# Patient Record
Sex: FEMALE | Race: WHITE | NOT HISPANIC OR LATINO | Employment: OTHER | URBAN - METROPOLITAN AREA
[De-identification: names, ages, dates, MRNs, and addresses within clinical notes are randomized per-mention and may not be internally consistent; named-entity substitution may affect disease eponyms.]

---

## 2017-01-03 ENCOUNTER — TELEPHONE (OUTPATIENT)
Dept: FAMILY MEDICINE | Facility: CLINIC | Age: 77
End: 2017-01-03

## 2017-01-03 DIAGNOSIS — M25.561 RIGHT KNEE PAIN, UNSPECIFIED CHRONICITY: Primary | ICD-10-CM

## 2017-01-03 NOTE — TELEPHONE ENCOUNTER
Pt did not get message from Daughter and arrived for appt. Has been rescheduled to 1/4/17. Thanks, Laverne

## 2017-01-03 NOTE — TELEPHONE ENCOUNTER
Called for pt again--spoke to Daughter ([facility]), she will have pt call back to reschedule. Thanks, Laverne

## 2017-01-03 NOTE — TELEPHONE ENCOUNTER
Left message on machine for pt to call back to reschedule appt today--Dr. Marin out sick today. Thanks, Laverne

## 2017-01-05 NOTE — TELEPHONE ENCOUNTER
----- Message from Manuela Rosario sent at 1/5/2017  3:22 PM CST -----  Contact: [facility] - [phone]  Patient is requesting a refill of Norco Rx. Please call at [phone]

## 2017-01-06 DIAGNOSIS — M25.561 RIGHT KNEE PAIN, UNSPECIFIED CHRONICITY: Primary | ICD-10-CM

## 2017-01-06 RX ORDER — HYDROCODONE BITARTRATE AND ACETAMINOPHEN 10; 325 MG/1; MG/1
1 TABLET ORAL EVERY 4 HOURS PRN
Qty: 40 TABLET | Refills: 0 | Status: SHIPPED | OUTPATIENT
Start: 2017-01-06 | End: 2017-03-22

## 2017-01-09 ENCOUNTER — OFFICE VISIT (OUTPATIENT)
Dept: ORTHOPEDICS | Facility: CLINIC | Age: 77
End: 2017-01-09
Payer: MEDICARE

## 2017-01-09 ENCOUNTER — HOSPITAL ENCOUNTER (OUTPATIENT)
Dept: RADIOLOGY | Facility: HOSPITAL | Age: 77
Discharge: HOME | End: 2017-01-09
Attending: ORTHOPAEDIC SURGERY
Payer: MEDICARE

## 2017-01-09 VITALS
BODY MASS INDEX: 23.7 KG/M2 | WEIGHT: 160 LBS | DIASTOLIC BLOOD PRESSURE: 81 MMHG | HEART RATE: 87 BPM | HEIGHT: 69 IN | SYSTOLIC BLOOD PRESSURE: 171 MMHG

## 2017-01-09 DIAGNOSIS — Z96.651 STATUS POST TOTAL RIGHT KNEE REPLACEMENT: Primary | ICD-10-CM

## 2017-01-09 DIAGNOSIS — M25.561 RIGHT KNEE PAIN, UNSPECIFIED CHRONICITY: ICD-10-CM

## 2017-01-09 PROCEDURE — 73564 X-RAY EXAM KNEE 4 OR MORE: CPT | Mod: 26,RT,, | Performed by: RADIOLOGY

## 2017-01-09 PROCEDURE — 99999 PR PBB SHADOW E&M-EST. PATIENT-LVL III: CPT | Mod: PBBFAC,,, | Performed by: ORTHOPAEDIC SURGERY

## 2017-01-09 PROCEDURE — 99024 POSTOP FOLLOW-UP VISIT: CPT | Mod: S$GLB,,, | Performed by: ORTHOPAEDIC SURGERY

## 2017-01-09 PROCEDURE — 73562 X-RAY EXAM OF KNEE 3: CPT | Mod: 26,59,RT, | Performed by: RADIOLOGY

## 2017-01-09 PROCEDURE — 73564 X-RAY EXAM KNEE 4 OR MORE: CPT | Mod: TC,PN,RT

## 2017-01-11 NOTE — PROGRESS NOTES
This note was created using Dragon dictation software.  It occasionally misinterpreted phrases or words.      Date of surgery: October 18, 2016    Review of Systems     Musculoskeletal: see HPI    PMH, Surgical Hx, Meds, Allergies, Family History, and Social history all reviewed and correct in chart.    Chief complaint: Right knee pain    History of present illness: 76-year-old female who is about 12 weeks out from right total knee arthroplasty.  Patient is doing pretty well.  Has some swelling still.  Lacking extension but has good flexion.  Pain of 4 out of 10.     Physical exam: Examination the right knee shows healed surgical incision.  Patient has minimal effusion still.  Range of motion is about 5° to 125°.  No calf pain.  Negative Homans sign.    X-rays: 2 views of the right knee are reviewed which show well aligned total knee arthroplasty without complication    Assessment: Status post right Microport total knee arthroplasty    Plan:   Continue with physical therapy. Follow-up in 3 months.  She can stop all the medicines.  No x-ray needed.

## 2017-01-16 ENCOUNTER — CLINICAL SUPPORT (OUTPATIENT)
Dept: REHABILITATION | Facility: HOSPITAL | Age: 77
End: 2017-01-16
Attending: ORTHOPAEDIC SURGERY
Payer: MEDICARE

## 2017-01-16 DIAGNOSIS — M25.561 RIGHT KNEE PAIN, UNSPECIFIED CHRONICITY: ICD-10-CM

## 2017-01-16 PROCEDURE — 97110 THERAPEUTIC EXERCISES: CPT | Mod: PN

## 2017-01-16 PROCEDURE — 97010 HOT OR COLD PACKS THERAPY: CPT | Mod: PN

## 2017-01-20 NOTE — PROGRESS NOTES
Name: Lizeth Henderson  [facility] Number: [id number]  Date of Treatment: 1/16/2017   Diagnosis:   Encounter Diagnosis   Name Primary?    Right knee pain, unspecified chronicity        Time in: 1645  Time Out: 1745  Total Treatment Time: 60      Subjective:    Lizeth reports improvement of symptoms and decreased pain.  Patient reports their pain to be 3/10 on a 0-10 scale with 0 being no pain and 10 being the worst pain imaginable.    Objective  Lizeth received therapeutic exercises to develop strength, endurance, ROM and flexibility for 50 minutes including:    Bike x 10'   Seated hamstring stretches L/R 3/30s   Standing gastroc stretches 3/30s B in // bars   Standing HR/TR 10/3 B in // bars   Minisquats 10/3 B in // bars with cues for proper technique   Shuttle B LE leg press 10/3 50#, unilateral leg press 10/3 37#   Supine QS B 10/3   Supine R SLR 10/3   Supine R HS with sheet 10/3   R SLR 10/3 with cues for QS   Bridges 10/3   Ball squeeze in supine 3/10   GTB 10/3    The patient received the following supervised modalities after being cleared for contradictions: Ice Pack x 10' to R knee to decrease pain and edema    Written Home Exercises Provided: Cont with HEP  Pt demo good understanding of the education provided. Lizeth demonstrated good return demonstration of activities.     Assessment:   Pt will continue to benefit from skilled PT intervention. Medical Necessity is demonstrated by:  Fall Risk, Requires skilled supervision to complete and progress HEP and Weakness.    Patient is making good progress towards established goals.    Plan:  Continue with established Plan of Care towards PT goals.

## 2017-01-24 ENCOUNTER — CLINICAL SUPPORT (OUTPATIENT)
Dept: REHABILITATION | Facility: HOSPITAL | Age: 77
End: 2017-01-24
Attending: ORTHOPAEDIC SURGERY
Payer: MEDICARE

## 2017-01-24 DIAGNOSIS — M25.561 RIGHT KNEE PAIN, UNSPECIFIED CHRONICITY: ICD-10-CM

## 2017-01-24 PROCEDURE — 97110 THERAPEUTIC EXERCISES: CPT | Mod: PN

## 2017-01-24 NOTE — PROGRESS NOTES
"Name: Lizeth Henderson  [facility] Number: [id number]  Date of Treatment: 01/24/2017   Diagnosis:   Encounter Diagnosis   Name Primary?    Right knee pain, unspecified chronicity        Time in: 1605  Time Out: 1659  Total Treatment Time: 54        Subjective:    Lizeth reports decreased pain.  Patient reports their pain to be 3/10 on a 0-10 scale with 0 being no pain and 10 being the worst pain imaginable.    Objective    Patient received individual therapy to increase strength, endurance, ROM, flexibility and posture with 0 patients with activities as follows:     Lizeth received therapeutic exercises to develop strength, endurance, ROM and flexibility for 54 minutes including:     Bike x 10 min L-2  Hamstring stretch 3 x 30 sec B LE  gastroc stretch 3 x 30 sec B LE  HR/TR x 30 reps  Mini squats 3 x 10 reps   SLR 3 x 10 reps R LE  SAQ 3 x 10 reps B LE  Quad sets x 30 reps  Shuttle 37# B LE; 31# R Le x 30 reps each        KT applied to L knee for patella mechanical correction.  "I" strip (2 separate strips)  applied over medial/lateral border of patellar at 50% tension.  Then 50-75% tension applied to distal/proximal strip, with knee flexed, utilizing "C" technique.    Pt demo good understanding of the education provided. Lizeth demonstrated good return demonstration of activities.     Assessment:     Decreased pain in R knee following application on KT tape.    Pt will continue to benefit from skilled PT intervention. Medical Necessity is demonstrated by:  Fall Risk, Unable to participate in daily activities, Continued inability to participate in vocational pursuits, Pain limits function of effected part for some activities, Unable to participate fully in daily activities, Requires skilled supervision to complete and progress HEP, Weakness and Edema.    Patient is making good progress towards established goals.        Plan:  Continue with established Plan of Care towards PT goals.   "

## 2017-01-26 ENCOUNTER — CLINICAL SUPPORT (OUTPATIENT)
Dept: REHABILITATION | Facility: HOSPITAL | Age: 77
End: 2017-01-26
Attending: ORTHOPAEDIC SURGERY
Payer: MEDICARE

## 2017-01-26 DIAGNOSIS — M25.561 RIGHT KNEE PAIN, UNSPECIFIED CHRONICITY: ICD-10-CM

## 2017-01-26 PROCEDURE — 97110 THERAPEUTIC EXERCISES: CPT | Mod: PN

## 2017-01-26 NOTE — PROGRESS NOTES
"Name: Lizeth Henderson  [facility] Number: [id number]  Date of Treatment: 01/26/2017   Diagnosis:   Encounter Diagnosis   Name Primary?    Right knee pain, unspecified chronicity        Time in: 1552  Time Out: 1650  Total Treatment Time: 58  Group Time: 0      Subjective:    Lizeth reports improvement of symptoms with taping.  Patient reports their pain to be 2/10 on a 0-10 scale with 0 being no pain and 10 being the worst pain imaginable.    Objective    Lizeth received therapeutic exercises to develop strength, endurance and ROM for 58 minutes including:     Bike Lv3 10'  Hamstring stretch 3x30" sit  gastroc stretch 3x30" 1/2 roll  HR/TR x30  Mini squats 3x10  SLR 3x10 B 2#  SAQ small 1/2 bolster (blue) 3x10 B 2#  Quad sets B long sit x30 and x10 with 10" hold  Sit<>stand x10  Bike Lv1 x10' for extension      Written Home Exercises Provided: Yes    Pt demo good understanding of the education provided. Lizeth demonstrated good return demonstration of activities.     Assessment:     Pt will continue to benefit from skilled PT intervention. Medical Necessity is demonstrated by:  Pain limits function of effected part for some activities, Unable to participate fully in daily activities, Requires skilled supervision to complete and progress HEP and Weakness.    Patient is making good progress towards established goals.    Plan:    Continue with established Plan of Care towards PT goals.   "

## 2017-02-02 ENCOUNTER — CLINICAL SUPPORT (OUTPATIENT)
Dept: REHABILITATION | Facility: HOSPITAL | Age: 77
End: 2017-02-02
Attending: ORTHOPAEDIC SURGERY
Payer: MEDICARE

## 2017-02-02 DIAGNOSIS — M25.561 RIGHT KNEE PAIN, UNSPECIFIED CHRONICITY: ICD-10-CM

## 2017-02-02 PROCEDURE — 97110 THERAPEUTIC EXERCISES: CPT | Mod: PN

## 2017-02-02 NOTE — PROGRESS NOTES
"Name: Lizeth Henderson  [facility] Number: [id number]  Date of Treatment: 02/02/2017   Diagnosis:   Encounter Diagnosis   Name Primary?    Right knee pain, unspecified chronicity        Time in: 1555  Time Out: 1655  Total Treatment Time: 60  Group Time: 0      Subjective:    Lizeth reports improvement of symptoms.  Patient reports their pain to be 2/10 on a 0-10 scale with 0 being no pain and 10 being the worst pain imaginable.    Objective    Lizeth received therapeutic exercises to develop strength, endurance, ROM and balance for 60 minutes including:     Bike Lv3 10'  Hamstring stretch 3x30" sit  Gastroc stretch 3x30" 1/2 roll  // bar ex 2x10: HR/TR, march, hip abd, hip ext, hamstring curls  Supine ex 3x10: saq with towel roll under knees, quad sets with towel roll under heels, heel slides with sheet to increase flexion    Written Home Exercises Provided: yes    Pt demo good understanding of the education provided. Lizeth demonstrated good return demonstration of activities.     Assessment:     Pt will continue to benefit from skilled PT intervention. Medical Necessity is demonstrated by:  Requires skilled supervision to complete and progress HEP and Weakness.    Patient is making good progress towards established goals.    Plan:    Continue with established Plan of Care towards PT goals.   "

## 2017-03-22 ENCOUNTER — OFFICE VISIT (OUTPATIENT)
Dept: PSYCHIATRY | Facility: CLINIC | Age: 77
End: 2017-03-22
Payer: MEDICARE

## 2017-03-22 VITALS
DIASTOLIC BLOOD PRESSURE: 80 MMHG | BODY MASS INDEX: 25.4 KG/M2 | SYSTOLIC BLOOD PRESSURE: 146 MMHG | WEIGHT: 171.5 LBS | HEART RATE: 84 BPM | HEIGHT: 69 IN

## 2017-03-22 DIAGNOSIS — F33.41 MDD (MAJOR DEPRESSIVE DISORDER), RECURRENT, IN PARTIAL REMISSION: ICD-10-CM

## 2017-03-22 DIAGNOSIS — F41.1 GAD (GENERALIZED ANXIETY DISORDER): ICD-10-CM

## 2017-03-22 PROCEDURE — 99999 PR PBB SHADOW E&M-EST. PATIENT-LVL II: CPT | Mod: PBBFAC,,, | Performed by: PSYCHIATRY & NEUROLOGY

## 2017-03-22 PROCEDURE — 3079F DIAST BP 80-89 MM HG: CPT | Mod: S$GLB,,, | Performed by: PSYCHIATRY & NEUROLOGY

## 2017-03-22 PROCEDURE — 3077F SYST BP >= 140 MM HG: CPT | Mod: S$GLB,,, | Performed by: PSYCHIATRY & NEUROLOGY

## 2017-03-22 PROCEDURE — 1159F MED LIST DOCD IN RCRD: CPT | Mod: S$GLB,,, | Performed by: PSYCHIATRY & NEUROLOGY

## 2017-03-22 PROCEDURE — 99213 OFFICE O/P EST LOW 20 MIN: CPT | Mod: S$GLB,,, | Performed by: PSYCHIATRY & NEUROLOGY

## 2017-03-22 PROCEDURE — 1157F ADVNC CARE PLAN IN RCRD: CPT | Mod: S$GLB,,, | Performed by: PSYCHIATRY & NEUROLOGY

## 2017-03-22 PROCEDURE — 99499 UNLISTED E&M SERVICE: CPT | Mod: S$GLB,,, | Performed by: PSYCHIATRY & NEUROLOGY

## 2017-03-22 PROCEDURE — 1160F RVW MEDS BY RX/DR IN RCRD: CPT | Mod: S$GLB,,, | Performed by: PSYCHIATRY & NEUROLOGY

## 2017-03-22 RX ORDER — MIRTAZAPINE 15 MG/1
TABLET, FILM COATED ORAL
Qty: 1 TABLET | Refills: 0
Start: 2017-03-22 | End: 2017-05-25 | Stop reason: SDUPTHER

## 2017-03-22 RX ORDER — TRAZODONE HYDROCHLORIDE 100 MG/1
100 TABLET ORAL NIGHTLY
Qty: 30 TABLET | Refills: 4 | Status: SHIPPED | OUTPATIENT
Start: 2017-03-22 | End: 2017-05-25 | Stop reason: SDUPTHER

## 2017-03-22 RX ORDER — SERTRALINE HYDROCHLORIDE 100 MG/1
TABLET, FILM COATED ORAL
Qty: 60 TABLET | Refills: 4 | Status: SHIPPED | OUTPATIENT
Start: 2017-03-22 | End: 2017-05-25 | Stop reason: SDUPTHER
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
## 2017-03-22 NOTE — MR AVS SNAPSHOT
[facility] [facility]  [address]  [place] [address]  Phone: [phone]                  Lizeth Henderson   3/22/2017 3:30 PM   Office Visit    Description:  Female : 1940   Provider:  Katarzyna Vinson MD   Department:  [facility]           Diagnoses this Visit        Comments    MDD (major depressive disorder), recurrent, in partial remission         DASH (generalized anxiety disorder)                To Do List           Future Appointments        Provider Department Dept Phone    4/10/2017 1:00 PM Gab Chiang MD [facility] [phone]    2017 3:40 PM Ericka Marin MD [facility] [phone]      Goals (5 Years of Data)     None      Follow-Up and Disposition     Return for 2 months .       These Medications        Disp Refills Start End    mirtazapine (REMERON) 15 MG tablet 1 tablet 0 3/22/2017     Take one-half tablet PO nightly until you run out of the medication - then STOP    Pharmacy: [address] Ph #: [phone]       trazodone (DESYREL) 100 MG tablet 30 tablet 4 3/22/2017 3/22/2018    Take 1 tablet (100 mg total) by mouth every evening. - Oral    Pharmacy: [address] Ph #: [phone]       sertraline (ZOLOFT) 100 MG tablet 60 tablet 4 3/22/2017     Take two tablets by mouth daily    Pharmacy: [address] Ph #: [phone]       folic acid-vit B6-vit B12 2.5-25-2 mg (FOLBIC) 2.5-25-2 mg Tab 30 tablet 4 3/22/2017     Take 1 tablet by mouth once daily. - Oral    Pharmacy: [address] Ph #: [phone]         [facility]     [facility] -  Assistance  Registered nurses in the [facility] Center provide clinical advisement, health education, appointment booking, and  other advisory services.  Call for this free service at [phone].             Medications           Message regarding Medications     Verify the changes and/or additions to your medication regime listed below are the same as discussed with your clinician today.  If any of these changes or additions are incorrect, please notify your healthcare provider.        START taking these NEW medications        Refills    trazodone (DESYREL) 100 MG tablet 4    Sig: Take 1 tablet (100 mg total) by mouth every evening.    Class: Normal    Route: Oral      CHANGE how you are taking these medications     Start Taking Instead of    mirtazapine (REMERON) 15 MG tablet mirtazapine (REMERON) 15 MG tablet    Dosage:  Take one-half tablet PO nightly until you run out of the medication - then STOP Dosage:  Take 1 tablet (15 mg total) by mouth every evening.    Reason for Change:  Reorder       STOP taking these medications     hydrocodone-acetaminophen 10-325mg (NORCO)  mg Tab Take 1 tablet by mouth every 4 (four) hours as needed for Pain.           Verify that the below list of medications is an accurate representation of the medications you are currently taking.  If none reported, the list may be blank. If incorrect, please contact your healthcare provider. Carry this list with you in case of emergency.           Current Medications     acetaminophen (TYLENOL) 325 MG tablet Take 1.5 tablets (487.5 mg total) by mouth every 6 (six) hours as needed for Pain.    alendronate (FOSAMAX) 70 MG tablet Take 1 tablet (70 mg total) by mouth every 7 days.    atorvastatin (LIPITOR) 40 MG tablet Take 1 tablet (40 mg total) by mouth once daily.    famotidine (PEPCID) 20 MG tablet Take 1 tablet (20 mg total) by mouth once daily.    fenofibrate (TRICOR) 54 MG tablet Take 1 tablet (54 mg total) by mouth once daily.    ferrous sulfate 325 mg (65 mg iron) Tab tablet Take 1 tablet (325 mg total) by mouth daily with breakfast.    folic acid-vit  "B6-vit B12 2.5-25-2 mg (FOLBIC) 2.5-25-2 mg Tab Take 1 tablet by mouth once daily.    lisinopril (PRINIVIL,ZESTRIL) 40 MG tablet Take 1 tablet (40 mg total) by mouth once daily.    mirabegron (MYRBETRIQ) 25 mg Tb24 ER tablet Take 1 tablet (25 mg total) by mouth once daily.    mirtazapine (REMERON) 15 MG tablet Take one-half tablet PO nightly until you run out of the medication - then STOP    sertraline (ZOLOFT) 100 MG tablet Take two tablets by mouth daily    trazodone (DESYREL) 100 MG tablet Take 1 tablet (100 mg total) by mouth every evening.           Clinical Reference Information           Your Vitals Were     BP Pulse Height Weight BMI    146/80 84 5' 9" (1.753 m) 77.8 kg (171 lb 8.3 oz) 25.33 kg/m2      Blood Pressure          Most Recent Value    BP  (!)  146/80      Allergies as of 3/22/2017     No Known Allergies      Immunizations Administered on Date of Encounter - 3/22/2017     None      Language Assistance Services     ATTENTION: Language assistance services are available, free of charge. Please call [phone].      ATENCIÓN: Si habla rik, tiene a allen disposición servicios gratuitos de asistencia lingüística. Llame al [phone].     [facility] Ý: N?u b?n nói Ti?ng Vi?t, có các d?ch v? h? tr? ngôn ng? mi?n phí dành cho b?n. G?i s? [phone].         [facility] complies with applicable Federal civil rights laws and does not discriminate on the basis of race, color, national origin, age, disability, or sex.        "

## 2017-04-10 ENCOUNTER — OFFICE VISIT (OUTPATIENT)
Dept: ORTHOPEDICS | Facility: CLINIC | Age: 77
End: 2017-04-10
Payer: MEDICARE

## 2017-04-10 VITALS
SYSTOLIC BLOOD PRESSURE: 187 MMHG | HEIGHT: 69 IN | WEIGHT: 171 LBS | HEART RATE: 79 BPM | DIASTOLIC BLOOD PRESSURE: 82 MMHG | BODY MASS INDEX: 25.33 KG/M2

## 2017-04-10 DIAGNOSIS — M17.0 ARTHRITIS OF BOTH KNEES: ICD-10-CM

## 2017-04-10 DIAGNOSIS — Z96.651 STATUS POST TOTAL RIGHT KNEE REPLACEMENT: Primary | ICD-10-CM

## 2017-04-10 PROCEDURE — 1157F ADVNC CARE PLAN IN RCRD: CPT | Mod: S$GLB,,, | Performed by: ORTHOPAEDIC SURGERY

## 2017-04-10 PROCEDURE — 99999 PR PBB SHADOW E&M-EST. PATIENT-LVL III: CPT | Mod: PBBFAC,,, | Performed by: ORTHOPAEDIC SURGERY

## 2017-04-10 PROCEDURE — 1159F MED LIST DOCD IN RCRD: CPT | Mod: S$GLB,,, | Performed by: ORTHOPAEDIC SURGERY

## 2017-04-10 PROCEDURE — 3077F SYST BP >= 140 MM HG: CPT | Mod: S$GLB,,, | Performed by: ORTHOPAEDIC SURGERY

## 2017-04-10 PROCEDURE — 99212 OFFICE O/P EST SF 10 MIN: CPT | Mod: S$GLB,,, | Performed by: ORTHOPAEDIC SURGERY

## 2017-04-10 PROCEDURE — 1125F AMNT PAIN NOTED PAIN PRSNT: CPT | Mod: S$GLB,,, | Performed by: ORTHOPAEDIC SURGERY

## 2017-04-10 PROCEDURE — 1160F RVW MEDS BY RX/DR IN RCRD: CPT | Mod: S$GLB,,, | Performed by: ORTHOPAEDIC SURGERY

## 2017-04-10 PROCEDURE — 3079F DIAST BP 80-89 MM HG: CPT | Mod: S$GLB,,, | Performed by: ORTHOPAEDIC SURGERY

## 2017-04-10 NOTE — PROGRESS NOTES
This note was created using Dragon dictation software.  It occasionally misinterpreted phrases or words.      Date of surgery: October 18, 2016    Review of Systems     Musculoskeletal: see HPI    PMH, Surgical Hx, Meds, Allergies, Family History, and Social history all reviewed and correct in chart.    Chief complaint: Right knee pain    History of present illness: 76-year-old female who is 6 months out from right total knee arthroplasty.  Patient is doing pretty well.  Has some swelling still.  Lacking extension but has good flexion.  Pain of one out of 10.     Physical exam: Examination the right knee shows healed surgical incision.  Patient has minimal swelling.  Range of motion is about 3° to 125°.  No calf pain.  Negative Homans sign.    X-rays: 2 views of the right knee are reviewed which show well aligned total knee arthroplasty without complication    Assessment: Status post right [place] total knee arthroplasty    Plan:   Continue with physical therapy exercises. Follow-up in 6 months.  4 views of the right knee at that time.  Left knee is giving her some trouble but not to severe at this time.

## 2017-05-25 ENCOUNTER — DOCUMENTATION ONLY (OUTPATIENT)
Dept: FAMILY MEDICINE | Facility: CLINIC | Age: 77
End: 2017-05-25

## 2017-05-25 ENCOUNTER — OFFICE VISIT (OUTPATIENT)
Dept: PSYCHIATRY | Facility: CLINIC | Age: 77
End: 2017-05-25
Payer: MEDICARE

## 2017-05-25 VITALS
BODY MASS INDEX: 25.6 KG/M2 | HEART RATE: 80 BPM | DIASTOLIC BLOOD PRESSURE: 79 MMHG | SYSTOLIC BLOOD PRESSURE: 158 MMHG | WEIGHT: 172.81 LBS | HEIGHT: 69 IN

## 2017-05-25 DIAGNOSIS — F33.1 MAJOR DEPRESSIVE DISORDER, RECURRENT EPISODE, MODERATE: ICD-10-CM

## 2017-05-25 DIAGNOSIS — F41.1 GENERALIZED ANXIETY DISORDER: ICD-10-CM

## 2017-05-25 PROCEDURE — 99213 OFFICE O/P EST LOW 20 MIN: CPT | Mod: S$GLB,,, | Performed by: PSYCHIATRY & NEUROLOGY

## 2017-05-25 PROCEDURE — 99499 UNLISTED E&M SERVICE: CPT | Mod: S$GLB,,, | Performed by: PSYCHIATRY & NEUROLOGY

## 2017-05-25 PROCEDURE — 1159F MED LIST DOCD IN RCRD: CPT | Mod: S$GLB,,, | Performed by: PSYCHIATRY & NEUROLOGY

## 2017-05-25 PROCEDURE — 99999 PR PBB SHADOW E&M-EST. PATIENT-LVL II: CPT | Mod: PBBFAC,,, | Performed by: PSYCHIATRY & NEUROLOGY

## 2017-05-25 RX ORDER — SERTRALINE HYDROCHLORIDE 100 MG/1
TABLET, FILM COATED ORAL
Qty: 60 TABLET | Refills: 4 | Status: SHIPPED | OUTPATIENT
Start: 2017-05-25 | End: 2017-06-26 | Stop reason: SDUPTHER

## 2017-05-25 RX ORDER — MIRTAZAPINE 15 MG/1
TABLET, FILM COATED ORAL
Qty: 30 TABLET | Refills: 0 | Status: SHIPPED | OUTPATIENT
Start: 2017-05-25 | End: 2017-06-26 | Stop reason: DRUGHIGH

## 2017-05-25 RX ORDER — TRAZODONE HYDROCHLORIDE 100 MG/1
100 TABLET ORAL NIGHTLY
Qty: 30 TABLET | Refills: 4 | Status: SHIPPED | OUTPATIENT
Start: 2017-05-25 | End: 2017-06-26 | Stop reason: DRUGHIGH
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
## 2017-05-25 NOTE — PROGRESS NOTES
Pre-Visit Chart Review  For Appointment Scheduled on 5/29/17.    Health Maintenance Due   Topic Date Due    TETANUS VACCINE  08[birth year]14/1958    Zoster Vaccine  08/14/1999    Pneumococcal (65+) (2 of 2 - PPSV23) 11/11/2016

## 2017-05-29 ENCOUNTER — OFFICE VISIT (OUTPATIENT)
Dept: FAMILY MEDICINE | Facility: CLINIC | Age: 77
End: 2017-05-29
Payer: MEDICARE

## 2017-05-29 VITALS
HEIGHT: 69 IN | BODY MASS INDEX: 26 KG/M2 | SYSTOLIC BLOOD PRESSURE: 140 MMHG | DIASTOLIC BLOOD PRESSURE: 78 MMHG | TEMPERATURE: 98 F | WEIGHT: 175.5 LBS | HEART RATE: 66 BPM

## 2017-05-29 DIAGNOSIS — Z00.00 ROUTINE MEDICAL EXAM: Primary | ICD-10-CM

## 2017-05-29 DIAGNOSIS — I10 BENIGN HYPERTENSION: ICD-10-CM

## 2017-05-29 DIAGNOSIS — D53.9 DEFICIENCY ANEMIA: ICD-10-CM

## 2017-05-29 DIAGNOSIS — E78.2 HYPERLIPIDEMIA, MIXED: ICD-10-CM

## 2017-05-29 DIAGNOSIS — M85.80 OSTEOPENIA, UNSPECIFIED LOCATION: ICD-10-CM

## 2017-05-29 DIAGNOSIS — R05.3 CHRONIC COUGH: ICD-10-CM

## 2017-05-29 DIAGNOSIS — D64.9 ANEMIA, UNSPECIFIED TYPE: ICD-10-CM

## 2017-05-29 PROCEDURE — 99397 PER PM REEVAL EST PAT 65+ YR: CPT | Mod: S$GLB,,, | Performed by: FAMILY MEDICINE

## 2017-05-29 PROCEDURE — 99999 PR PBB SHADOW E&M-EST. PATIENT-LVL III: CPT | Mod: PBBFAC,,, | Performed by: FAMILY MEDICINE

## 2017-05-29 PROCEDURE — 99499 UNLISTED E&M SERVICE: CPT | Mod: S$GLB,,, | Performed by: FAMILY MEDICINE

## 2017-05-29 RX ORDER — ASPIRIN 81 MG/1
81 TABLET ORAL DAILY
COMMUNITY

## 2017-05-29 RX ORDER — LOSARTAN POTASSIUM 50 MG/1
50 TABLET ORAL DAILY
Qty: 90 TABLET | Refills: 3 | Status: SHIPPED | OUTPATIENT
Start: 2017-05-29 | End: 2017-11-09 | Stop reason: SDUPTHER

## 2017-05-29 NOTE — PROGRESS NOTES
CHIEF COMPLAINT:  Routine medical exam      HISTORY OF PRESENT ILLNESS:  Lizeth Henderson is a 76 y.o. female who presents to clinic for a routine medical exam. She declines mammograms. She is due for a repeat DEXA scan in the fall.  She is due for screening lab work and also adacel and zostavax.  She follows up with her dentist.  She does not get any regular exercise and it is hard for her to eat a healthy diet as she is living with her granddaughter who cooks.  She stats that for the last several months she has had a chronic nonproductive cough. She denies any PND, GERD.        REVIEW OF SYSTEMS:  The patient denies any fever, chills, night sweats, headaches, vision changes, difficulty speaking or swallowing, decreased hearing, weight loss, weight gain, chest pain, palpitations, shortness of breath,  nausea, vomiting, abdominal pain, dysuria, diarrhea, constipation, hematuria, hematochezia, melena, changes in her hair, skin, nails, numbness or weakness in her extremities, erythema, swelling over any of her joints, myalgia, swollen glands, easy bruising, fatigue, edemaShe denies any vaginal discharge, breast masses, nipple discharge, change in the skin overlying her breasts.      MEDICATIONS:   Reviewed and/or reconciled in EPIC    ALLERGIES:  Reviewed and/or reconciled in [facility]    PAST MEDICAL/SURGICAL HISTORY:   Past Medical History:   Diagnosis Date    Adrenal adenoma 2016    Anxiety     Arthritis     Benign hypertension     Colon cancer age 61    colon    Coronary artery disease     Depression     Full dentures     General anesthetics causing adverse effect in therapeutic use     yells and talks when wakes up    Hyperlipidemia     Osteopenia     Shingles     Wears glasses       Past Surgical History:   Procedure Laterality Date    APPENDECTOMY      BREAST BIOPSY Left     benign    BUNIONECTOMY Left     CATARACT EXTRACTION Bilateral     HEMICOLECTOMY  2002    HERNIA REPAIR      x5    left toe  "surgery      joelle    PATENT DUCTUS ARTERIOUS LIGATION  1948    SALPINGOOPHORECTOMY  2002    due to colon cancer    TONSILLECTOMY, ADENOIDECTOMY         FAMILY HISTORY:    Family History   Problem Relation Age of Onset    Cancer Father      colon    Alzheimer's disease Mother     Depression Daughter     Kidney disease Daughter     Ulcerative colitis Daughter     Depression Daughter     Depression Daughter     Anxiety disorder Daughter      PTSD    Cancer Maternal Uncle      colon cancer    Cancer Cousin      colon cancer       SOCIAL HISTORY:    Social History     Social History    Marital status:      Spouse name: N/A    Number of children: 3    Years of education: N/A     Occupational History    Not on file.     Social History Main Topics    Smoking status: Never Smoker    Smokeless tobacco: Never Used    Alcohol use No    Drug use: No    Sexual activity: No     Other Topics Concern    Not on file     Social History Narrative    The patient does not exercise regularly ().    Rates diet as poor.    She is not satisfied with weight.                   PHYSICAL EXAM:  VITAL SIGNS:   Vitals:    05/29/17 1331   BP: (!) 160/85   BP Location: Right arm   Patient Position: Sitting   BP Method: Automatic   Pulse: 66   Temp: 98.4 °F (36.9 °C)   TempSrc: Oral   Weight: 79.6 kg (175 lb 7.8 oz)   Height: 5' 9" (1.753 m)     GENERAL:  Patient appears well nourished, sitting on exam table, in no acute distress.  HEENT:  Atraumatic, normocephalic, PERRLA, EOMI, no conjunctival injection, sclerae are anicteric, normal external auditory canals,TMs clear b/l, gross hearing intact to whisper, MMM, no oropharygneal erythema or exudate.  NECK:  Supple, normal ROM, trachea is midline , no supraclavicular or cervical LAD or masses palpated.  Thyroid gland not palpable.  CARDIOVASCULAR:  RRR, normal S1 and S2, no m/r/g.  RESPIRATORY:  CTA b/l, no wheezes, rhonchi, rales.  No increased work of breathing, no "  use of accessory muscles.  ABDOMEN:  Soft, nontender, nondistended, normoactive bowel sounds in all four quadrants, no rebound or guarding, no HSM or masses palpated.  Normal percussion.  EXTREMITIES:  2+ DP pulses b/l, no edema.  SKIN:  Warm, no lesions on exposed skin.  NEUROMUSCULAR:  Cranial nerves II-XII grossly intact.  Strength is 4+/5 over upper and lower extremity flexors/extensors b/l, 2+ biceps and patellar reflexes b/l. No clubbing or cyanosis of digits/nails.  Steady gait.  PSYCH:  Patient is alert and oriented to person, time, place. They are appropriately dressed and groomed. There is normal eye contact. Rate and tone of speech is normal. Normal insight, judgement. Normal thought content and process.     LABORATORY/IMAGING STUDIES: pending    ASSESSMENT/PLAN: This is a 76 y.o. female who presents to clinic for routine medical exam  1.Routine medical exam: We will check a screening CBC, CMP, TSH, fasting lipid panel. Patient will be notified of these results and the need for any further evaluation and treatment. She will follow up with her dentist/optometrist as scheduled. We discussed the importance of increasing her exercise, continuing with a healthy, well-balanced diet. She will look into getting her adacel, zostavax at her local pharmacy  2. Anemia: obtain anemia labs  3. Hyperlipidemia: lipid panel  4. HTN: see below  5. chronic cough: obtain cxr, stop lisinopril, start losartan  6. Osteopenia: vitamin D level, will need DEXA scan at follow up visit.    Patient readiness: acceptance and barriers:none    During the course of the visit the patient was educated and counseled about the following:     Hypertension:   Medication: stop lisinopril due to cough, start losartan, follow up in 3 weeks for nurse BP check.    Goals: Hypertension: Reduce Blood Pressure    Did patient meet goals/outcomes: No    The following self management tools provided: declined    Patient Instructions (the written plan) was  given to the patient/family.     Time spent with patient: 30 minutes        Ericka Marin MD

## 2017-06-26 ENCOUNTER — OFFICE VISIT (OUTPATIENT)
Dept: PSYCHIATRY | Facility: CLINIC | Age: 77
End: 2017-06-26
Payer: MEDICARE

## 2017-06-26 VITALS
BODY MASS INDEX: 26.81 KG/M2 | HEART RATE: 80 BPM | DIASTOLIC BLOOD PRESSURE: 84 MMHG | SYSTOLIC BLOOD PRESSURE: 157 MMHG | WEIGHT: 181 LBS | HEIGHT: 69 IN

## 2017-06-26 DIAGNOSIS — F33.41 MDD (MAJOR DEPRESSIVE DISORDER), RECURRENT, IN PARTIAL REMISSION: ICD-10-CM

## 2017-06-26 DIAGNOSIS — F41.1 GENERALIZED ANXIETY DISORDER: ICD-10-CM

## 2017-06-26 PROCEDURE — 99499 UNLISTED E&M SERVICE: CPT | Mod: S$GLB,,, | Performed by: PSYCHIATRY & NEUROLOGY

## 2017-06-26 PROCEDURE — 99213 OFFICE O/P EST LOW 20 MIN: CPT | Mod: S$GLB,,, | Performed by: PSYCHIATRY & NEUROLOGY

## 2017-06-26 PROCEDURE — 99999 PR PBB SHADOW E&M-EST. PATIENT-LVL III: CPT | Mod: PBBFAC,,, | Performed by: PSYCHIATRY & NEUROLOGY

## 2017-06-26 PROCEDURE — 1159F MED LIST DOCD IN RCRD: CPT | Mod: S$GLB,,, | Performed by: PSYCHIATRY & NEUROLOGY

## 2017-06-26 RX ORDER — TRAZODONE HYDROCHLORIDE 150 MG/1
150 TABLET ORAL NIGHTLY PRN
Qty: 30 TABLET | Refills: 2 | Status: SHIPPED | OUTPATIENT
Start: 2017-06-26 | End: 2017-08-10 | Stop reason: SDUPTHER

## 2017-06-26 RX ORDER — BUSPIRONE HYDROCHLORIDE 7.5 MG/1
7.5 TABLET ORAL 2 TIMES DAILY
Qty: 60 TABLET | Refills: 2 | Status: SHIPPED | OUTPATIENT
Start: 2017-06-26 | End: 2017-08-10 | Stop reason: DRUGHIGH

## 2017-06-26 RX ORDER — SERTRALINE HYDROCHLORIDE 100 MG/1
TABLET, FILM COATED ORAL
Qty: 60 TABLET | Refills: 4 | Status: SHIPPED | OUTPATIENT
Start: 2017-06-26 | End: 2017-08-10 | Stop reason: SDUPTHER

## 2017-06-26 RX ORDER — MIRTAZAPINE 7.5 MG/1
TABLET, FILM COATED ORAL
Qty: 14 TABLET | Refills: 0 | Status: SHIPPED | OUTPATIENT
Start: 2017-06-26 | End: 2017-08-10
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
## 2017-07-11 RX ORDER — MIRTAZAPINE 7.5 MG/1
TABLET, FILM COATED ORAL
Qty: 14 TABLET | Refills: 0 | OUTPATIENT
Start: 2017-07-11

## 2017-08-10 ENCOUNTER — OFFICE VISIT (OUTPATIENT)
Dept: PSYCHIATRY | Facility: CLINIC | Age: 77
End: 2017-08-10
Payer: MEDICARE

## 2017-08-10 ENCOUNTER — LAB VISIT (OUTPATIENT)
Dept: LAB | Facility: HOSPITAL | Age: 77
End: 2017-08-10
Attending: FAMILY MEDICINE
Payer: MEDICARE

## 2017-08-10 VITALS
WEIGHT: 175.94 LBS | DIASTOLIC BLOOD PRESSURE: 85 MMHG | SYSTOLIC BLOOD PRESSURE: 160 MMHG | BODY MASS INDEX: 26.06 KG/M2 | HEIGHT: 69 IN | HEART RATE: 66 BPM

## 2017-08-10 DIAGNOSIS — F41.1 GENERALIZED ANXIETY DISORDER: ICD-10-CM

## 2017-08-10 DIAGNOSIS — D53.9 DEFICIENCY ANEMIA: ICD-10-CM

## 2017-08-10 DIAGNOSIS — M85.80 OSTEOPENIA, UNSPECIFIED LOCATION: ICD-10-CM

## 2017-08-10 DIAGNOSIS — D64.9 ANEMIA, UNSPECIFIED TYPE: ICD-10-CM

## 2017-08-10 DIAGNOSIS — E78.2 HYPERLIPIDEMIA, MIXED: ICD-10-CM

## 2017-08-10 DIAGNOSIS — Z00.00 ROUTINE MEDICAL EXAM: ICD-10-CM

## 2017-08-10 DIAGNOSIS — F33.41 MDD (MAJOR DEPRESSIVE DISORDER), RECURRENT, IN PARTIAL REMISSION: ICD-10-CM

## 2017-08-10 LAB
25(OH)D3+25(OH)D2 SERPL-MCNC: 11 NG/ML
ALBUMIN SERPL BCP-MCNC: 4 G/DL
ALP SERPL-CCNC: 60 U/L
ALT SERPL W/O P-5'-P-CCNC: 10 U/L
ANION GAP SERPL CALC-SCNC: 10 MMOL/L
AST SERPL-CCNC: 23 U/L
BASOPHILS # BLD AUTO: 0.03 K/UL
BASOPHILS NFR BLD: 0.7 %
BILIRUB SERPL-MCNC: 0.5 MG/DL
BUN SERPL-MCNC: 15 MG/DL
CALCIUM SERPL-MCNC: 9.5 MG/DL
CHLORIDE SERPL-SCNC: 106 MMOL/L
CHOLEST/HDLC SERPL: 3.6 {RATIO}
CO2 SERPL-SCNC: 28 MMOL/L
CREAT SERPL-MCNC: 0.9 MG/DL
DIFFERENTIAL METHOD: NORMAL
EOSINOPHIL # BLD AUTO: 0.1 K/UL
EOSINOPHIL NFR BLD: 2.1 %
ERYTHROCYTE [DISTWIDTH] IN BLOOD BY AUTOMATED COUNT: 13.6 %
EST. GFR  (AFRICAN AMERICAN): >60 ML/MIN/1.73 M^2
EST. GFR  (NON AFRICAN AMERICAN): >60 ML/MIN/1.73 M^2
FERRITIN SERPL-MCNC: 260 NG/ML
FOLATE SERPL-MCNC: 16.8 NG/ML
GLUCOSE SERPL-MCNC: 95 MG/DL
HCT VFR BLD AUTO: 37.4 %
HDL/CHOLESTEROL RATIO: 28 %
HDLC SERPL-MCNC: 164 MG/DL
HDLC SERPL-MCNC: 46 MG/DL
HGB BLD-MCNC: 12.6 G/DL
IRON SERPL-MCNC: 58 UG/DL
LDLC SERPL CALC-MCNC: 93.6 MG/DL
LYMPHOCYTES # BLD AUTO: 1.3 K/UL
LYMPHOCYTES NFR BLD: 30.6 %
MCH RBC QN AUTO: 29.4 PG
MCHC RBC AUTO-ENTMCNC: 33.7 G/DL
MCV RBC AUTO: 87 FL
MONOCYTES # BLD AUTO: 0.4 K/UL
MONOCYTES NFR BLD: 8 %
NEUTROPHILS # BLD AUTO: 2.5 K/UL
NEUTROPHILS NFR BLD: 58.4 %
NONHDLC SERPL-MCNC: 118 MG/DL
PLATELET # BLD AUTO: 187 K/UL
PMV BLD AUTO: 10.7 FL
POTASSIUM SERPL-SCNC: 4.2 MMOL/L
PROT SERPL-MCNC: 7.1 G/DL
RBC # BLD AUTO: 4.29 M/UL
RETICS/RBC NFR AUTO: 1.3 %
SATURATED IRON: 17 %
SODIUM SERPL-SCNC: 144 MMOL/L
TOTAL IRON BINDING CAPACITY: 337 UG/DL
TRANSFERRIN SERPL-MCNC: 228 MG/DL
TRIGL SERPL-MCNC: 122 MG/DL
TSH SERPL DL<=0.005 MIU/L-ACNC: 0.91 UIU/ML
VIT B12 SERPL-MCNC: >2000 PG/ML
WBC # BLD AUTO: 4.35 K/UL

## 2017-08-10 PROCEDURE — 99213 OFFICE O/P EST LOW 20 MIN: CPT | Mod: S$GLB,,, | Performed by: PSYCHIATRY & NEUROLOGY

## 2017-08-10 PROCEDURE — 84443 ASSAY THYROID STIM HORMONE: CPT

## 2017-08-10 PROCEDURE — 82607 VITAMIN B-12: CPT

## 2017-08-10 PROCEDURE — 85025 COMPLETE CBC W/AUTO DIFF WBC: CPT

## 2017-08-10 PROCEDURE — 85045 AUTOMATED RETICULOCYTE COUNT: CPT

## 2017-08-10 PROCEDURE — 80061 LIPID PANEL: CPT

## 2017-08-10 PROCEDURE — 36415 COLL VENOUS BLD VENIPUNCTURE: CPT | Mod: PO

## 2017-08-10 PROCEDURE — 1159F MED LIST DOCD IN RCRD: CPT | Mod: S$GLB,,, | Performed by: PSYCHIATRY & NEUROLOGY

## 2017-08-10 PROCEDURE — 99499 UNLISTED E&M SERVICE: CPT | Mod: S$GLB,,, | Performed by: PSYCHIATRY & NEUROLOGY

## 2017-08-10 PROCEDURE — 83540 ASSAY OF IRON: CPT

## 2017-08-10 PROCEDURE — 3077F SYST BP >= 140 MM HG: CPT | Mod: S$GLB,,, | Performed by: PSYCHIATRY & NEUROLOGY

## 2017-08-10 PROCEDURE — 80053 COMPREHEN METABOLIC PANEL: CPT

## 2017-08-10 PROCEDURE — 82306 VITAMIN D 25 HYDROXY: CPT

## 2017-08-10 PROCEDURE — 82746 ASSAY OF FOLIC ACID SERUM: CPT

## 2017-08-10 PROCEDURE — 99999 PR PBB SHADOW E&M-EST. PATIENT-LVL III: CPT | Mod: PBBFAC,,, | Performed by: PSYCHIATRY & NEUROLOGY

## 2017-08-10 PROCEDURE — 82728 ASSAY OF FERRITIN: CPT

## 2017-08-10 PROCEDURE — 3079F DIAST BP 80-89 MM HG: CPT | Mod: S$GLB,,, | Performed by: PSYCHIATRY & NEUROLOGY

## 2017-08-10 PROCEDURE — 84238 ASSAY NONENDOCRINE RECEPTOR: CPT

## 2017-08-10 RX ORDER — TRAZODONE HYDROCHLORIDE 150 MG/1
150 TABLET ORAL NIGHTLY PRN
Qty: 30 TABLET | Refills: 2 | Status: SHIPPED | OUTPATIENT
Start: 2017-08-10 | End: 2017-10-23 | Stop reason: SDUPTHER

## 2017-08-10 RX ORDER — SERTRALINE HYDROCHLORIDE 100 MG/1
TABLET, FILM COATED ORAL
Qty: 60 TABLET | Refills: 2 | Status: SHIPPED | OUTPATIENT
Start: 2017-08-10 | End: 2017-10-23 | Stop reason: SDUPTHER

## 2017-08-10 RX ORDER — BUSPIRONE HYDROCHLORIDE 10 MG/1
10 TABLET ORAL 2 TIMES DAILY
Qty: 60 TABLET | Refills: 2 | Status: SHIPPED | OUTPATIENT
Start: 2017-08-10 | End: 2017-10-23 | Stop reason: SDUPTHER

## 2017-08-10 NOTE — PROGRESS NOTES
Outpatient Psychiatry Follow-Up Visit (MD/NP)    8/10/2017    Clinical Status of Patient:  Outpatient (Ambulatory)    Chief Complaint:  Lizeth Henderson is a 76 y.o. female who presents today for follow-up of anxiety, depression.   Met with patient alone    Interval History and Content of Current Session:  Interim Events/Subjective Report/Content of Current Session: Follow up appointment.    75 yo  retired female with h/o of depression onset in the early 1980's.  Depressive symptoms recurred in 2004 following the death of her . Hx of lifelong worry.     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 s/p Total Knee Arthroplasty 10/18/16     MOCA 3/17 25/30   MOCA  1/16 23/30 (missed clock hands, cube, serial 7s, delayed recall)    Interim Hx:  Mirtazapine weaned off last visit. Buspar added.  She is compliant with Sertraline, Trazodone, Folbic.  Feels better this visit.  About to move. Stress is up.  Not nearly as anxious.   + wt loss.  Appetite still good.  She overeats, some occasional binges (ice cream, pop corn),  She does feel loss of control. No purging.    Moving in 3 weeks.  Will miss Nelsy.  She sleeps with Ruba (daughter).  She falls apart when   Daughter Veronica very depressed, does not come out of her room.  Grandson also moving in. 11 people under one roof.     Anxiety at night, min clicks in bed - thinks about the move, the kids, her grandson who is incarcerated. She has had a few panic attacks.  No agoraphobia.   Feels better out of home;  She has been irritable lately.  Chest  feels tight when anxious; no other associated symptoms or radiation.   Motivated, sanded down her iron bed.   She got off of Folbic and took OTC - felt less energetic.   Not crying much, weepy at times. Self care ok.  Going to restroom a lot at night - on Myrbetriq;  It takes her at least 30 mins to go back to sleep.   Functioning, but living situation is unbearable.  Easily frustrated by Nelsy's parents - particularly Nelsy's father.  Too strict on Nelsy.  No physical violence.   Denies suicidal/homicidal ideations.  No violence, no self harm.  Hopeful now. Not being able to drive is the worst thing. No longer has a 's license.     Depression: 8/10   Anxiety: 9/10.    Denies symptoms of jossy/psychosis. 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  Denies alcohol/drug use.  Caffeine: occasional diet coke   Tobacco: none, + second smoke - they smoke outdoors     Memory pretty good; manages own meds    Gun in home, but in lock box with Miguel (son in law) and not accessible   She is doing adult coloring or reading to cope.   Denies alcohol/drug use. No caffeine, no tobacco     Review of Systems     Psychiatry: see HPI   Constitutional: wt loss  Musculoskeletal: right knee pain 3-410, no falls, uses cane PRN   Neurology:  Memory improved.       Past Medical, Family and Social History: The patient's past medical, family and social history have been reviewed and updated as appropriate within the electronic medical record - see encounter notes.    Medication:   Zoloft 200 mg daily   Trazodone 100 mg QHS prn insomnia   Trazodone 150 mg nightly   Buspar 7.5 mg BID   Foltx 1 tab daily     Compliance: yes    Side effects:  Wt gain, increased appetite     Risk Parameters:  Patient reports no suicidal ideation  Patient reports no homicidal ideation  Patient reports no self-injurious behavior  Patient reports no violent behavior       Exam (detailed: at least 9 elements; comprehensive: all 15 elements)  "  Constitutional  Vitals:  Most recent vital signs, dated more than 90 days prior to this appointment, were reviewed.    See EPIC entry for today's vital signs.       General:  unremarkable, age appropriate, casually dressed, good eye contact, at times, not tearful, well dressed      Musculoskeletal  Muscle Strength/Tone:  No tremor    Gait & Station:  Slow, steady      Psychiatric  Speech:  no latency; no press, spontaneous   Mood & Affect:  "better"   Full range, smiles at times   Thought Process:  Linear    Associations:  intact   Thought Content:  no active or passive SI today, no homicidality, delusions, or paranoia, hallucinations: (auditory: no, visual: no)      Insight:  Improved    Judgement: Adequate to circumstances    Orientation:  Alert and oriented x 4  August 10, 2017    Memory: Intact for content of interview   Language: Grossly intact    Attention Span & Concentration:  Grossly intact   Fund of Knowledge:  Intact for patient's level of education     Assessment and Diagnosis   Status/Progress: Based on the examination today, the patient's problem(s) is under inadequate control but improved.   New problems have not been presented today.  Comorbidities are currently complicating management of the primary condition.      General Impression:  Overall, she was improved on medication regimen, but with continued high psychosocial stressors and upcoming move.  pt has previously weaned off of clonazepam w/ improved cognition off of it. Anxiety and depression improved, but remain high.  She does not appear depressed and is functioning relatively well.     MDD, recurrent, moderate without psychosis  DASH  Hx Binge Eating Disorder   Cognitive Disorder, unspecified.     HTN, dyslipidemia, elevated fasting glucose.  hx colon cancer, adrenal nodule, s/p knee replacement, HTN    GAF: 58    Intervention/Counseling/Treatment Plan   · Medication Management: Continue current medications. The risks and benefits of " medication were discussed with the patient.  · Counseling provided with patient as follows: importance of compliance with chosen treatment options was emphasized, risks and benefits of treatment options, including medications, were discussed with the patient    1. Continue Zoloft  200 mg daily.      2. Continue Trazodone 150 mg QHS prn insomnia - discussed potential for oversedation.      3. Continue Foltx 1 tab daily     4. Reviewed risks of serotonin syndrome with patient due to multiple medications - potential symptoms and potential severity reviewed.     5. Stable MOCA with actual improvement in score suggests against a neurodegenerative d/o at this time - will continue to monitor     6. Pt instructed to go to ER with thoughts of harming self, others. Call to report any worsening of symptoms or problems with the medication    7. Psychotherapy is recommended.     8. Titrate Buspirone to 10 mg BID to target anxiety     9. Pt will have labs ordered by PCP today, including CBC, CMP, Iron studies, B12, Folate, Vit D, TSH    10. Encouraged pt to discuss urinary frequency and if she has any chest pain to discuss with PCP.     Return to Clinic: 8 weeks

## 2017-08-12 LAB — STFR SERPL-MCNC: 6 MG/L

## 2017-09-21 DIAGNOSIS — E55.9 VITAMIN D DEFICIENCY: Primary | ICD-10-CM

## 2017-09-21 RX ORDER — ASPIRIN 325 MG
50000 TABLET, DELAYED RELEASE (ENTERIC COATED) ORAL WEEKLY
Qty: 8 CAPSULE | Refills: 0 | Status: SHIPPED | OUTPATIENT
Start: 2017-09-21 | End: 2017-09-28 | Stop reason: SDUPTHER

## 2017-09-21 NOTE — TELEPHONE ENCOUNTER
Lab work at goal except that saturated iron is still slightly low. Can continue with iron tablet once daily.    The vitamin D level is low. I am starting high dose vitamin D supplementation for 8 weeks to be followed with a repeat vitamin D level.

## 2017-09-28 RX ORDER — ASPIRIN 325 MG
50000 TABLET, DELAYED RELEASE (ENTERIC COATED) ORAL WEEKLY
Qty: 8 CAPSULE | Refills: 0 | Status: SHIPPED | OUTPATIENT
Start: 2017-09-28 | End: 2018-01-05 | Stop reason: SDUPTHER

## 2017-10-03 DIAGNOSIS — M25.572 LEFT ANKLE PAIN, UNSPECIFIED CHRONICITY: Primary | ICD-10-CM

## 2017-10-04 ENCOUNTER — OFFICE VISIT (OUTPATIENT)
Dept: ORTHOPEDICS | Facility: CLINIC | Age: 77
End: 2017-10-04
Payer: MEDICARE

## 2017-10-04 ENCOUNTER — HOSPITAL ENCOUNTER (OUTPATIENT)
Dept: RADIOLOGY | Facility: HOSPITAL | Age: 77
Discharge: HOME | End: 2017-10-04
Attending: ORTHOPAEDIC SURGERY
Payer: MEDICARE

## 2017-10-04 VITALS
DIASTOLIC BLOOD PRESSURE: 80 MMHG | HEART RATE: 72 BPM | HEIGHT: 69 IN | SYSTOLIC BLOOD PRESSURE: 166 MMHG | BODY MASS INDEX: 25.92 KG/M2 | WEIGHT: 175 LBS

## 2017-10-04 DIAGNOSIS — M25.572 LEFT ANKLE PAIN, UNSPECIFIED CHRONICITY: ICD-10-CM

## 2017-10-04 DIAGNOSIS — M76.822 POSTERIOR TIBIAL TENDINITIS, LEFT: Primary | ICD-10-CM

## 2017-10-04 PROCEDURE — 73610 X-RAY EXAM OF ANKLE: CPT | Mod: 26,LT,, | Performed by: RADIOLOGY

## 2017-10-04 PROCEDURE — 99999 PR PBB SHADOW E&M-EST. PATIENT-LVL III: CPT | Mod: PBBFAC,,, | Performed by: ORTHOPAEDIC SURGERY

## 2017-10-04 PROCEDURE — 73610 X-RAY EXAM OF ANKLE: CPT | Mod: TC,PO,LT

## 2017-10-04 PROCEDURE — 99214 OFFICE O/P EST MOD 30 MIN: CPT | Mod: S$GLB,,, | Performed by: ORTHOPAEDIC SURGERY

## 2017-10-04 NOTE — PROGRESS NOTES
Past Medical History:   Diagnosis Date    Adrenal adenoma 2016    Anxiety     Arthritis     Benign hypertension     Colon cancer age 61    colon    Coronary artery disease     Depression     Full dentures     General anesthetics causing adverse effect in therapeutic use     yells and talks when wakes up    Hyperlipidemia     Osteopenia     Shingles     Wears glasses        Past Surgical History:   Procedure Laterality Date    APPENDECTOMY      BREAST BIOPSY Left     benign    BUNIONECTOMY Left     CATARACT EXTRACTION Bilateral     HEMICOLECTOMY  2002    HERNIA REPAIR      x5    left toe surgery      hammertoe    PATENT DUCTUS ARTERIOUS LIGATION  1948    SALPINGOOPHORECTOMY  2002    due to colon cancer    TONSILLECTOMY, ADENOIDECTOMY         Current Outpatient Prescriptions   Medication Sig    alendronate (FOSAMAX) 70 MG tablet Take 1 tablet (70 mg total) by mouth every 7 days. (Patient taking differently: Take 70 mg by mouth every 7 days. )    aspirin (ECOTRIN) 81 MG EC tablet Take 81 mg by mouth once daily.    atorvastatin (LIPITOR) 40 MG tablet Take 1 tablet (40 mg total) by mouth once daily.    busPIRone (BUSPAR) 10 MG tablet Take 1 tablet (10 mg total) by mouth 2 (two) times daily.    cholecalciferol, vitamin D3, 50,000 unit capsule Take 1 capsule (50,000 Units total) by mouth once a week.    fenofibrate (TRICOR) 54 MG tablet Take 1 tablet (54 mg total) by mouth once daily.    ferrous sulfate 325 mg (65 mg iron) Tab tablet Take 1 tablet (325 mg total) by mouth daily with breakfast.    folic acid-vit B6-vit B12 2.5-25-2 mg (FOLBIC) 2.5-25-2 mg Tab Take 1 tablet by mouth once daily.    losartan (COZAAR) 50 MG tablet Take 1 tablet (50 mg total) by mouth once daily.    mirabegron (MYRBETRIQ) 25 mg Tb24 ER tablet Take 1 tablet (25 mg total) by mouth once daily.    sertraline (ZOLOFT) 100 MG tablet Take two tablets by mouth daily    trazodone (DESYREL) 150 MG tablet Take 1 tablet  (150 mg total) by mouth nightly as needed for Insomnia.     No current facility-administered medications for this visit.        Review of patient's allergies indicates:   Allergen Reactions    Lisinopril      cough       Family History   Problem Relation Age of Onset    Cancer Father      colon    Alzheimer's disease Mother     Depression Daughter     Kidney disease Daughter     Ulcerative colitis Daughter     Depression Daughter     Depression Daughter     Anxiety disorder Daughter      PTSD    Cancer Maternal Uncle      colon cancer    Cancer Cousin      colon cancer       Social History     Social History    Marital status:      Spouse name: N/A    Number of children: 3    Years of education: N/A     Occupational History    Not on file.     Social History Main Topics    Smoking status: Never Smoker    Smokeless tobacco: Never Used    Alcohol use No    Drug use: No    Sexual activity: No     Other Topics Concern    Not on file     Social History Narrative    The patient does not exercise regularly ().    Rates diet as poor.    She is not satisfied with weight.                   Chief Complaint:   Chief Complaint   Patient presents with    Left Ankle - Pain       History of present illness: Is a 77-year-old female patient of mine who comes with a new problem.  Patient complains of medial ankle pain.  Patient denies an injury but did have a fall around that time.  She didn't think that she hurt her ankle though.  This occurred about 2 weeks ago.  Pain with walking standing sitting or laying.  Pain is as high as a 9 out of 10.  No bruising or swelling.      Review of Systems:    Constitution: Negative for chills, fever, and sweats.  Negative for unexplained weight loss.    HENT:  Negative for headaches and blurry vision.    Cardiovascular:Negative for chest pain or irregular heart beat. Negative for hypertension.    Respiratory:  Negative for cough and shortness of  breath.    Gastrointestinal: Negative for abdominal pain, heartburn, melena, nausea, and vomitting.    Genitourinary:  Negative bladder incontinence and dysuria.    Musculoskeletal:  See HPI    Neurological: Negative for numbness.    Psychiatric/Behavioral: Negative for depression.  The patient is not nervous/anxious.      Endocrine: Negative for polyuria    Hematologic/Lymphatic: Negative for bleeding problem.  Does not bruise/bleed easily.    Skin: Negative for poor would healing and rash      Physical Examination:    Vital Signs:    Vitals:    10/04/17 0800   BP: (!) 166/80   Pulse: 72       Body mass index is 25.84 kg/m².    This a well-developed, well nourished patient in no acute distress.  They are alert and oriented and cooperative to examination.  Pt. walks without an antalgic gait.      Examination of the patient's left foot and ankle shows no signs of rashes or erythema. The patient has no ecchymosis or effusion or masses. The patient has a negative anterior drawer and talar tilting exam. The patient has full range of motion of ankle dorsiflexion, plantarflexion, inversion, and eversion. Patient has 5 out of 5 motor strength in all muscle groups. Patient has 2+ dorsalis pedis pulses and intact light touch sensation. The patient is moderately tender over both medial ankle ligaments and medial malleolus .  No tenderness over the lateral ankle ligaments and the lateral malleolus. Negative squeeze test.    Examination of the patient's right foot and ankle shows no signs of rashes or erythema. The patient has no ecchymosis or effusion or masses. The patient has a negative anterior drawer and talar tilting exam. The patient has full range of motion of ankle dorsiflexion, plantarflexion, inversion, and eversion. Patient has 5 out of 5 motor strength in all muscle groups. Patient has 2+ dorsalis pedis pulses and intact light touch sensation. The patient is nontender over both medial ankle ligaments and medial  malleolus as well as lateral ankle ligaments and the lateral malleolus. Negative squeeze test.    X-rays: X-rays left ankle are ordered and reviewed which show no acute findings     Assessment:: Left posterior tibial tendinitis    Plan:  I reviewed the findings with her today.  I recommended a lace up ankle corset.  Patient needs to work on some stretching particularly of her calf and ankle.  Follow-up as needed.    This note was created using Dragon voice recognition software that occasionally misinterpreted phrases or words.    Consult note is delivered via Epic messaging service.

## 2017-10-23 ENCOUNTER — TELEPHONE (OUTPATIENT)
Dept: PSYCHIATRY | Facility: CLINIC | Age: 77
End: 2017-10-23

## 2017-10-23 RX ORDER — SERTRALINE HYDROCHLORIDE 100 MG/1
TABLET, FILM COATED ORAL
Qty: 60 TABLET | Refills: 2 | Status: SHIPPED | OUTPATIENT
Start: 2017-10-23 | End: 2017-12-20 | Stop reason: SDUPTHER

## 2017-10-23 RX ORDER — TRAZODONE HYDROCHLORIDE 150 MG/1
150 TABLET ORAL NIGHTLY PRN
Qty: 30 TABLET | Refills: 2 | Status: SHIPPED | OUTPATIENT
Start: 2017-10-23 | End: 2018-01-31 | Stop reason: SDUPTHER

## 2017-10-23 RX ORDER — BUSPIRONE HYDROCHLORIDE 10 MG/1
10 TABLET ORAL 2 TIMES DAILY
Qty: 60 TABLET | Refills: 2 | Status: SHIPPED | OUTPATIENT
Start: 2017-10-23 | End: 2018-01-31 | Stop reason: SDUPTHER

## 2017-10-23 NOTE — TELEPHONE ENCOUNTER
Patient canceled Friday appointment. States her daughter is unable to get off work to bring her. Will reschedule when she has daughter availability    Request refills on all her medications

## 2017-10-30 RX ORDER — MIRABEGRON 25 MG/1
TABLET, FILM COATED, EXTENDED RELEASE ORAL
Qty: 30 TABLET | Refills: 11 | Status: SHIPPED | OUTPATIENT
Start: 2017-10-30 | End: 2017-11-09 | Stop reason: SDUPTHER

## 2017-11-09 ENCOUNTER — OFFICE VISIT (OUTPATIENT)
Dept: FAMILY MEDICINE | Facility: CLINIC | Age: 77
End: 2017-11-09
Payer: MEDICARE

## 2017-11-09 VITALS
TEMPERATURE: 98 F | DIASTOLIC BLOOD PRESSURE: 90 MMHG | SYSTOLIC BLOOD PRESSURE: 140 MMHG | HEIGHT: 68 IN | BODY MASS INDEX: 25.56 KG/M2 | WEIGHT: 168.63 LBS | HEART RATE: 72 BPM

## 2017-11-09 DIAGNOSIS — E78.2 HYPERLIPIDEMIA, MIXED: ICD-10-CM

## 2017-11-09 DIAGNOSIS — N32.81 OVERACTIVE BLADDER: Chronic | ICD-10-CM

## 2017-11-09 DIAGNOSIS — M85.80 OSTEOPENIA, UNSPECIFIED LOCATION: ICD-10-CM

## 2017-11-09 DIAGNOSIS — I10 BENIGN HYPERTENSION: Primary | ICD-10-CM

## 2017-11-09 DIAGNOSIS — Z85.038 HISTORY OF COLON CANCER: ICD-10-CM

## 2017-11-09 DIAGNOSIS — R42 VERTIGO: ICD-10-CM

## 2017-11-09 DIAGNOSIS — E27.8 ADRENAL NODULE: ICD-10-CM

## 2017-11-09 PROCEDURE — 99214 OFFICE O/P EST MOD 30 MIN: CPT | Mod: S$GLB,,, | Performed by: INTERNAL MEDICINE

## 2017-11-09 PROCEDURE — 99499 UNLISTED E&M SERVICE: CPT | Mod: S$GLB,,, | Performed by: INTERNAL MEDICINE

## 2017-11-09 PROCEDURE — 99999 PR PBB SHADOW E&M-EST. PATIENT-LVL III: CPT | Mod: PBBFAC,,, | Performed by: INTERNAL MEDICINE

## 2017-11-09 RX ORDER — ALENDRONATE SODIUM 70 MG/1
70 TABLET ORAL
Qty: 4 TABLET | Refills: 11 | Status: SHIPPED | OUTPATIENT
Start: 2017-11-09 | End: 2018-07-02 | Stop reason: SDUPTHER

## 2017-11-09 RX ORDER — LOSARTAN POTASSIUM 100 MG/1
100 TABLET ORAL DAILY
Qty: 30 TABLET | Refills: 3 | Status: SHIPPED | OUTPATIENT
Start: 2017-11-09 | End: 2018-03-12 | Stop reason: SDUPTHER

## 2017-11-09 NOTE — Clinical Note
Had flu, tetanus, and shingles vaccines in August 2017 at [facility] in [place], please try to obtain records

## 2017-11-09 NOTE — PROGRESS NOTES
Assessment and Plan:    1. Benign hypertension  Not well controlled on any previous check in the last year. Will increase losartan to 100 mg with BP check and labs in 2 weeks. If not controlled, would add HCTZ 12.5 mg QAM.   - Basic metabolic panel; Future  - losartan (COZAAR) 100 MG tablet; Take 1 tablet (100 mg total) by mouth once daily.  Dispense: 30 tablet; Refill: 3    2. Hyperlipidemia, mixed  Stable, continue current meds    3. History of colon cancer  Stage 3, treated with surgery and chemo in 2002. Last C-scope 2009, told she does not need repeat C-scopes.     4. Adrenal nodule  Noted in 2010, follow up in Jan 2017 was unchanged so this was felt to be benign.     5. Osteopenia, unspecified location  - alendronate (FOSAMAX) 70 MG tablet; Take 1 tablet (70 mg total) by mouth every 7 days.  Dispense: 4 tablet; Refill: 11    6. Overactive bladder  Not well controlled on 25 mg, will increase to 50 which is max dose  - mirabegron (MYRBETRIQ) 25 mg Tb24 ER tablet; Take 2 tablets (50 mg total) by mouth once daily.  Dispense: 30 tablet; Refill: 11    7. Vertigo  Unclear what caused this, but history of starting when she moved her head back suggests BPPV. No previous episodes and resolved spontaneously. Will monitor.     Patient reports having all needed vaccines in August, will try to obtain records.   ______________________________________________________________________  Subjective:    Chief Complaint:  Establish care    HPI:  Lizeth is a 77 y.o. year old woman here for ER follow up and to establish care.     She was previously seeing Dr. Marin in [place], but has moved to [place].     She was seen in the ED 2 days ago for dizziness and HTN. BP was noted to be 206/99 in ED. Cardiac workup was negative, CBC and CMP unremarkable, CT head was negtive, CXR showed some RLL atelectasis. She was not given any anti-hypertensives and BP improved while she was there.     Notes that she has been having headaches  recently, would go away when she took tylenol. 2 days ago, she developed acute onset vertigo, and had a lot of difficulty walking. She took her BP at home and it was very high, which is what brought her to the ED. In the ED, BP improved without any medication and vertigo resolved and has not returned.      HTN- Typically not well controlled. Has been on stable dose of losartan for a while.     Osteopenia- She was started on fosamax in Dec 2015, was doing fine on this. Ran out of refills 3-4 months ago so has not been taking this recently. She has never had a fracture.     HLD- Stable on atorvastatin and fenofibrate. Last lipid panel showed good compliance.     PDA- Notes being diagnosed with PDA as a young child, had surgery after an episode of chest pain.     History of colon cancer- Diagnosed with stage 3 colon cancer 2001, treated with brendan-colectomy and chemotherapy which was completed in 2002. Was seeing Dr. Arenas with GI in [place], notes that she was told in 2009 after a normal colonoscopy that she did not need repeat colonoscopies unless she had symptoms.     Overactive Bladder- She started Myrbetric about 2 years ago, initially was working but now waking up several times a night to urinate. Tolerating this well, and affordable to her.     She actively follows with psychiatry ([facility]) for anxiety and depression.     She reports that she had a normal stress test years ago, has not had an angiogram in the past.     Past Medical History:  Past Medical History:   Diagnosis Date    Adrenal adenoma 2016    Anxiety     Arthritis     Benign hypertension     Colon cancer age 61    colon    Coronary artery disease     Depression     Full dentures     General anesthetics causing adverse effect in therapeutic use     yells and talks when wakes up    Hyperlipidemia     Osteopenia     Shingles     Wears glasses        Past Surgical History:  Past Surgical History:   Procedure Laterality Date    APPENDECTOMY       BREAST BIOPSY Left     benign    BUNIONECTOMY Left     CATARACT EXTRACTION Bilateral     HEMICOLECTOMY  2002    HERNIA REPAIR      x5    left toe surgery      joelle    PATENT DUCTUS ARTERIOUS LIGATION  1948    SALPINGOOPHORECTOMY  2002    due to colon cancer    TONSILLECTOMY, ADENOIDECTOMY         Family History:  Family History   Problem Relation Age of Onset    Cancer Father      colon    Alzheimer's disease Mother     Depression Daughter     Kidney disease Daughter     Ulcerative colitis Daughter     Depression Daughter     Depression Daughter     Anxiety disorder Daughter      PTSD    Cancer Maternal Uncle      colon cancer    Cancer Cousin      colon cancer       Social History:  Social History     Social History    Marital status:      Spouse name: N/A    Number of children: 3    Years of education: N/A     Social History Main Topics    Smoking status: Never Smoker    Smokeless tobacco: Never Used    Alcohol use No    Drug use: No    Sexual activity: No     Other Topics Concern    None     Social History Narrative    The patient does not exercise regularly ().    Rates diet as poor.    She is not satisfied with weight.                   Medications:      Current Outpatient Prescriptions:     aspirin (ECOTRIN) 81 MG EC tablet, Take 81 mg by mouth once daily., Disp: , Rfl:     atorvastatin (LIPITOR) 40 MG tablet, Take 1 tablet (40 mg total) by mouth once daily., Disp: 90 tablet, Rfl: 3    busPIRone (BUSPAR) 10 MG tablet, Take 1 tablet (10 mg total) by mouth 2 (two) times daily., Disp: 60 tablet, Rfl: 2    cholecalciferol, vitamin D3, 50,000 unit capsule, Take 1 capsule (50,000 Units total) by mouth once a week., Disp: 8 capsule, Rfl: 0    fenofibrate (TRICOR) 54 MG tablet, Take 1 tablet (54 mg total) by mouth once daily., Disp: 90 tablet, Rfl: 3    ferrous sulfate 325 mg (65 mg iron) Tab tablet, Take 1 tablet (325 mg total) by mouth daily with breakfast., Disp: ,  "Rfl: 0    folic acid-vit B6-vit B12 2.5-25-2 mg (FOLBIC) 2.5-25-2 mg Tab, Take 1 tablet by mouth once daily., Disp: 30 tablet, Rfl: 2    losartan (COZAAR) 50 MG tablet, Take 1 tablet (50 mg total) by mouth once daily., Disp: 90 tablet, Rfl: 3    meclizine (ANTIVERT) 25 mg tablet, Take 1 tablet (25 mg total) by mouth 3 (three) times daily as needed., Disp: 20 tablet, Rfl: 0    MYRBETRIQ 25 mg Tb24 ER tablet, TAKE 1 TABLET BY MOUTH EVERY DAY, Disp: 30 tablet, Rfl: 11    sertraline (ZOLOFT) 100 MG tablet, Take two tablets by mouth daily, Disp: 60 tablet, Rfl: 2    trazodone (DESYREL) 150 MG tablet, Take 1 tablet (150 mg total) by mouth nightly as needed for Insomnia., Disp: 30 tablet, Rfl: 2      Allergies:  Lisinopril    Immunizations:  Immunization History   Administered Date(s) Administered    Influenza 12/07/2007, 09/23/2008, 10/02/2009, 10/04/2010, 09/04/2014    Influenza - High Dose 09/30/2011, 09/04/2014, 10/23/2015    Influenza - Quadrivalent - PF (3 years & older) 09/19/2016    PPD Test 10/20/2009, 10/20/2009    Pneumococcal Conjugate - 13 Valent 11/11/2015    Pneumococcal Polysaccharide - 23 Valent 09/15/2007       Review of Systems:  Review of Systems   Constitutional: Negative for chills and fever.   Respiratory: Negative for shortness of breath.    Cardiovascular: Positive for chest pain (tight, related to anxiety). Negative for palpitations.   Gastrointestinal: Negative for abdominal pain, nausea and vomiting.   Genitourinary:        Increased urination at night   Musculoskeletal: Negative for neck pain.   Neurological: Positive for dizziness and headaches.   Psychiatric/Behavioral: The patient is nervous/anxious.      Entered by patient and reviewed and updated during visit      Objective:     Vitals:  Vitals:    11/09/17 1456   BP: (!) 160/90   Pulse: 72   Temp: 98.4 °F (36.9 °C)   Weight: 76.5 kg (168 lb 10.4 oz)   Height: 5' 8" (1.727 m)   PainSc: 0-No pain       Physical Exam "   Constitutional: She is oriented to person, place, and time. She appears well-developed and well-nourished. No distress.   HENT:   Mouth/Throat: Oropharynx is clear and moist.   Eyes: No scleral icterus.   Cardiovascular: Normal rate and regular rhythm.    No murmur heard.  Pulmonary/Chest: Effort normal and breath sounds normal. No respiratory distress. She has no wheezes.   Musculoskeletal: She exhibits edema (trace bilateral ankle edema).   Neurological: She is alert and oriented to person, place, and time.   Skin: Skin is warm and dry.   Psychiatric: She has a normal mood and affect. Her behavior is normal.   Vitals reviewed.      Data:  Previous labs reviewed and pertinent for normal CMP 2 days ago.        Yohana Young MD  Internal Medicine

## 2017-11-10 ENCOUNTER — TELEPHONE (OUTPATIENT)
Dept: ADMINISTRATIVE | Facility: HOSPITAL | Age: 77
End: 2017-11-10

## 2017-11-10 NOTE — TELEPHONE ENCOUNTER
----- Message from Yohana Young MD sent at 11/9/2017  3:46 PM CST -----  Had flu, tetanus, and shingles vaccines in August 2017 at [facility] in [place], please try to obtain records    Requested

## 2017-11-10 NOTE — LETTER
November 10, 2017    [facility] [place], [place] [facility]  [address]  [facility] [address]  Phone: [phone] November 10, 2017     Patient: Lizeth Henderson    YOB: 1940   Date of Visit: 11/10/2017       To Whom It May Concern:    We are seeing Lizeth Henderson in the clinic at [facility].  Yohana Young MD is their PCP.  She has an outstanding lab/procedure at the time we reviewed their chart.  To help with our Health Maintenance records will you please supply the following report(s):     []  Mammogram                                                []  Colonoscopy   []  Pap Smear                                                  []  Outside Lab Results   []  Dexa scans                                                   []  Eye Exam   []  Foot Exam                                                     [] Other___________   [x]  Outside Immunizations  (flu, tetanus, and shingles given in August 2017 - need dates or update in LINKS)                           Please Fax to [facility] at .    Thank you for your help.  If my Care Coordinator can be of any assistance, you can call JOVITA Greer at [phone].     If you have any questions or concerns, please don't hesitate to call.    Sincerely,        Yohana Young MD

## 2017-11-21 ENCOUNTER — LAB VISIT (OUTPATIENT)
Dept: LAB | Facility: HOSPITAL | Age: 77
End: 2017-11-21
Attending: INTERNAL MEDICINE
Payer: MEDICARE

## 2017-11-21 ENCOUNTER — CLINICAL SUPPORT (OUTPATIENT)
Dept: FAMILY MEDICINE | Facility: CLINIC | Age: 77
End: 2017-11-21
Payer: MEDICARE

## 2017-11-21 ENCOUNTER — TELEPHONE (OUTPATIENT)
Dept: FAMILY MEDICINE | Facility: CLINIC | Age: 77
End: 2017-11-21

## 2017-11-21 VITALS — DIASTOLIC BLOOD PRESSURE: 100 MMHG | SYSTOLIC BLOOD PRESSURE: 158 MMHG

## 2017-11-21 DIAGNOSIS — I10 BENIGN HYPERTENSION: Primary | ICD-10-CM

## 2017-11-21 DIAGNOSIS — I10 BENIGN HYPERTENSION: ICD-10-CM

## 2017-11-21 DIAGNOSIS — Z01.30 BP CHECK: Primary | ICD-10-CM

## 2017-11-21 LAB
ANION GAP SERPL CALC-SCNC: 10 MMOL/L
BUN SERPL-MCNC: 19 MG/DL
CALCIUM SERPL-MCNC: 9.6 MG/DL
CHLORIDE SERPL-SCNC: 103 MMOL/L
CO2 SERPL-SCNC: 29 MMOL/L
CREAT SERPL-MCNC: 0.8 MG/DL
EST. GFR  (AFRICAN AMERICAN): >60 ML/MIN/1.73 M^2
EST. GFR  (NON AFRICAN AMERICAN): >60 ML/MIN/1.73 M^2
GLUCOSE SERPL-MCNC: 93 MG/DL
POTASSIUM SERPL-SCNC: 4.4 MMOL/L
SODIUM SERPL-SCNC: 142 MMOL/L

## 2017-11-21 PROCEDURE — 36415 COLL VENOUS BLD VENIPUNCTURE: CPT | Mod: PN

## 2017-11-21 PROCEDURE — 99999 PR PBB SHADOW E&M-EST. PATIENT-LVL I: CPT | Mod: PBBFAC,,,

## 2017-11-21 PROCEDURE — 99499 UNLISTED E&M SERVICE: CPT | Mod: S$GLB,,, | Performed by: INTERNAL MEDICINE

## 2017-11-21 PROCEDURE — 80048 BASIC METABOLIC PNL TOTAL CA: CPT

## 2017-11-21 RX ORDER — HYDROCHLOROTHIAZIDE 12.5 MG/1
12.5 TABLET ORAL DAILY
Qty: 30 TABLET | Refills: 1 | Status: SHIPPED | OUTPATIENT
Start: 2017-11-21 | End: 2018-07-02 | Stop reason: SDUPTHER

## 2017-11-21 NOTE — TELEPHONE ENCOUNTER
Will add HCTZ, prescription sent to her pharmacy. Please let her know, and schedule a follow up with me in about 2 weeks.

## 2017-11-21 NOTE — TELEPHONE ENCOUNTER
Pt came in for BP check. First BP was 160/100, second BP was 158/100. Asymptomatic. POt states her BP med was doubled at last OV w/ Dr Young. Please review and advise. Pt aware we will contact her to schedule f/u appt w/ Dr Young or nurse, and/or information regarding any med changes.Pt call back numbers:   [phone] (H)  [phone] (M)

## 2017-11-22 NOTE — TELEPHONE ENCOUNTER
Spoke w/ pt. Advised as recommended. Confirmed this rx is in addition to current meds. Appt made for 12/6/17 at 1120am per pt request.

## 2017-11-28 DIAGNOSIS — M25.561 PAIN IN BOTH KNEES, UNSPECIFIED CHRONICITY: Primary | ICD-10-CM

## 2017-11-28 DIAGNOSIS — M25.562 PAIN IN BOTH KNEES, UNSPECIFIED CHRONICITY: Primary | ICD-10-CM

## 2017-11-29 ENCOUNTER — DOCUMENTATION ONLY (OUTPATIENT)
Dept: FAMILY MEDICINE | Facility: CLINIC | Age: 77
End: 2017-11-29

## 2017-11-29 NOTE — PROGRESS NOTES
Pre-Visit Chart Review  For Appointment Scheduled on 11/30/17.    There are no preventive care reminders to display for this patient.

## 2017-12-06 ENCOUNTER — PATIENT MESSAGE (OUTPATIENT)
Dept: FAMILY MEDICINE | Facility: CLINIC | Age: 77
End: 2017-12-06

## 2017-12-06 ENCOUNTER — OFFICE VISIT (OUTPATIENT)
Dept: FAMILY MEDICINE | Facility: CLINIC | Age: 77
End: 2017-12-06
Payer: MEDICARE

## 2017-12-06 ENCOUNTER — LAB VISIT (OUTPATIENT)
Dept: LAB | Facility: HOSPITAL | Age: 77
End: 2017-12-06
Attending: INTERNAL MEDICINE
Payer: MEDICARE

## 2017-12-06 VITALS
HEART RATE: 76 BPM | BODY MASS INDEX: 27.5 KG/M2 | DIASTOLIC BLOOD PRESSURE: 84 MMHG | HEIGHT: 68 IN | SYSTOLIC BLOOD PRESSURE: 134 MMHG | WEIGHT: 181.44 LBS | TEMPERATURE: 98 F

## 2017-12-06 DIAGNOSIS — I10 BENIGN HYPERTENSION: Primary | ICD-10-CM

## 2017-12-06 DIAGNOSIS — R73.03 PREDIABETES: Chronic | ICD-10-CM

## 2017-12-06 DIAGNOSIS — I10 BENIGN HYPERTENSION: ICD-10-CM

## 2017-12-06 LAB
ANION GAP SERPL CALC-SCNC: 8 MMOL/L
BUN SERPL-MCNC: 17 MG/DL
CALCIUM SERPL-MCNC: 9.5 MG/DL
CHLORIDE SERPL-SCNC: 103 MMOL/L
CO2 SERPL-SCNC: 30 MMOL/L
CREAT SERPL-MCNC: 0.8 MG/DL
EST. GFR  (AFRICAN AMERICAN): >60 ML/MIN/1.73 M^2
EST. GFR  (NON AFRICAN AMERICAN): >60 ML/MIN/1.73 M^2
ESTIMATED AVG GLUCOSE: 97 MG/DL
GLUCOSE SERPL-MCNC: 94 MG/DL
HBA1C MFR BLD HPLC: 5 %
POTASSIUM SERPL-SCNC: 4.2 MMOL/L
SODIUM SERPL-SCNC: 141 MMOL/L

## 2017-12-06 PROCEDURE — 36415 COLL VENOUS BLD VENIPUNCTURE: CPT | Mod: PN

## 2017-12-06 PROCEDURE — 80048 BASIC METABOLIC PNL TOTAL CA: CPT

## 2017-12-06 PROCEDURE — 99999 PR PBB SHADOW E&M-EST. PATIENT-LVL III: CPT | Mod: PBBFAC,,, | Performed by: INTERNAL MEDICINE

## 2017-12-06 PROCEDURE — 83036 HEMOGLOBIN GLYCOSYLATED A1C: CPT

## 2017-12-06 PROCEDURE — 99213 OFFICE O/P EST LOW 20 MIN: CPT | Mod: S$GLB,,, | Performed by: INTERNAL MEDICINE

## 2017-12-06 PROCEDURE — 99499 UNLISTED E&M SERVICE: CPT | Mod: S$GLB,,, | Performed by: INTERNAL MEDICINE

## 2017-12-06 RX ORDER — ATORVASTATIN CALCIUM 40 MG/1
40 TABLET, FILM COATED ORAL DAILY
Qty: 90 TABLET | Refills: 3 | Status: CANCELLED | OUTPATIENT
Start: 2017-12-06 | End: 2018-12-06

## 2017-12-06 RX ORDER — FENOFIBRATE 54 MG/1
54 TABLET ORAL DAILY
Qty: 90 TABLET | Refills: 3 | Status: CANCELLED | OUTPATIENT
Start: 2017-12-06

## 2017-12-06 NOTE — PROGRESS NOTES
Assessment and Plan:    1. Benign hypertension  Controlled today s/p increased dose of losartan and addition of HCTZ  - Basic metabolic panel; Future    2. Prediabetes  Persistently elevated A1c in prediabetic range, with one value of 6.6 in 2008. Based on this, feel that the diagnosis of prediabetes is appropriate. Will repeat.  - Hemoglobin A1c; Future        ______________________________________________________________________  Subjective:    Chief Complaint:  Follow up BP    HPI:  Lizeth is a 77 y.o. year old woman here for follow up of HTN.    Since the last visit where her losartan had been increased, she came in for nurse BP check and it was still elevated. Based on this, we decided to add HCTZ to her regimen. She notes that she is tolerating these well without any side effects.    She has been trying not to add much additional salt. Otherwise not really watching diet.   Notes that she has been walking 3 times a day for about 20 minutes, severn days a week.    Medications:  Current Outpatient Prescriptions on File Prior to Visit   Medication Sig Dispense Refill    alendronate (FOSAMAX) 70 MG tablet Take 1 tablet (70 mg total) by mouth every 7 days. 4 tablet 11    aspirin (ECOTRIN) 81 MG EC tablet Take 81 mg by mouth once daily.      atorvastatin (LIPITOR) 40 MG tablet Take 1 tablet (40 mg total) by mouth once daily. 90 tablet 3    busPIRone (BUSPAR) 10 MG tablet Take 1 tablet (10 mg total) by mouth 2 (two) times daily. 60 tablet 2    fenofibrate (TRICOR) 54 MG tablet Take 1 tablet (54 mg total) by mouth once daily. 90 tablet 3    folic acid-vit B6-vit B12 2.5-25-2 mg (FOLBIC) 2.5-25-2 mg Tab Take 1 tablet by mouth once daily. 30 tablet 2    hydroCHLOROthiazide (HYDRODIURIL) 12.5 MG Tab Take 1 tablet (12.5 mg total) by mouth once daily. 30 tablet 1    losartan (COZAAR) 100 MG tablet Take 1 tablet (100 mg total) by mouth once daily. 30 tablet 3    meclizine (ANTIVERT) 25 mg tablet Take 1 tablet (25 mg  "total) by mouth 3 (three) times daily as needed. 20 tablet 0    mirabegron (MYRBETRIQ) 25 mg Tb24 ER tablet Take 2 tablets (50 mg total) by mouth once daily. 30 tablet 11    sertraline (ZOLOFT) 100 MG tablet Take two tablets by mouth daily 60 tablet 2    trazodone (DESYREL) 150 MG tablet Take 1 tablet (150 mg total) by mouth nightly as needed for Insomnia. 30 tablet 2    ferrous sulfate 325 mg (65 mg iron) Tab tablet Take 1 tablet (325 mg total) by mouth daily with breakfast.  0     No current facility-administered medications on file prior to visit.        Review of Systems:  Review of Systems   Constitutional: Negative for chills and fever.   Respiratory: Negative for shortness of breath and wheezing.    Cardiovascular: Negative for chest pain and leg swelling.   Neurological: Negative for dizziness and light-headedness.       Past Medical History:  Past Medical History:   Diagnosis Date    Adrenal adenoma 2016    Anxiety     Arthritis     Benign hypertension     Colon cancer age 61    colon    Coronary artery disease     Depression     Full dentures     General anesthetics causing adverse effect in therapeutic use     yells and talks when wakes up    Hyperlipidemia     Osteopenia     Shingles     Wears glasses        Objective:    Vitals:  Vitals:    12/06/17 1113   BP: 134/84   Pulse: 76   Temp: 98.1 °F (36.7 °C)   Weight: 82.3 kg (181 lb 7 oz)   Height: 5' 8" (1.727 m)   PainSc: 0-No pain       Physical Exam   Constitutional: She is oriented to person, place, and time. She appears well-developed and well-nourished. No distress.   HENT:   Mouth/Throat: Oropharynx is clear and moist.   Eyes: No scleral icterus.   Pulmonary/Chest: Effort normal. No respiratory distress.   Musculoskeletal: She exhibits no edema.   Neurological: She is alert and oriented to person, place, and time.   Skin: Skin is warm and dry.   Psychiatric: She has a normal mood and affect. Her behavior is normal.   Vitals " reviewed.      Data:  Previous labs reviewed and pertinent for normal BMP 11/21, elevated A1c in the past, usually below 6.5 with one reading of 6.6.      Yohana Young MD  Internal Medicine

## 2017-12-11 RX ORDER — ATORVASTATIN CALCIUM 40 MG/1
40 TABLET, FILM COATED ORAL DAILY
Qty: 90 TABLET | Refills: 3 | Status: SHIPPED | OUTPATIENT
Start: 2017-12-11 | End: 2017-12-17 | Stop reason: SDUPTHER

## 2017-12-18 RX ORDER — ATORVASTATIN CALCIUM 40 MG/1
TABLET, FILM COATED ORAL
Qty: 90 TABLET | Refills: 3 | Status: SHIPPED | OUTPATIENT
Start: 2017-12-18 | End: 2018-07-02 | Stop reason: SDUPTHER

## 2017-12-20 RX ORDER — SERTRALINE HYDROCHLORIDE 100 MG/1
TABLET, FILM COATED ORAL
Qty: 180 TABLET | Refills: 0 | Status: SHIPPED | OUTPATIENT
Start: 2017-12-20 | End: 2018-01-31 | Stop reason: SDUPTHER

## 2018-01-05 DIAGNOSIS — E55.9 VITAMIN D DEFICIENCY: ICD-10-CM

## 2018-01-05 RX ORDER — CHOLECALCIFEROL (VITAMIN D3) 1250 MCG
CAPSULE ORAL
Qty: 8 CAPSULE | Refills: 3 | Status: SHIPPED | OUTPATIENT
Start: 2018-01-05 | End: 2018-01-31

## 2018-01-25 ENCOUNTER — TELEPHONE (OUTPATIENT)
Dept: PSYCHIATRY | Facility: CLINIC | Age: 78
End: 2018-01-25

## 2018-01-25 NOTE — TELEPHONE ENCOUNTER
Called patient and spoke to her and cancelled her for today appointment and she will call clinic back if she can do 01/31/18 @ 2pm

## 2018-01-31 ENCOUNTER — OFFICE VISIT (OUTPATIENT)
Dept: PSYCHIATRY | Facility: CLINIC | Age: 78
End: 2018-01-31
Payer: MEDICARE

## 2018-01-31 VITALS
BODY MASS INDEX: 27.96 KG/M2 | HEART RATE: 77 BPM | WEIGHT: 184.5 LBS | HEIGHT: 68 IN | DIASTOLIC BLOOD PRESSURE: 83 MMHG | SYSTOLIC BLOOD PRESSURE: 169 MMHG

## 2018-01-31 DIAGNOSIS — F41.1 GENERALIZED ANXIETY DISORDER: ICD-10-CM

## 2018-01-31 DIAGNOSIS — F33.41 MDD (MAJOR DEPRESSIVE DISORDER), RECURRENT, IN PARTIAL REMISSION: ICD-10-CM

## 2018-01-31 PROCEDURE — 99999 PR PBB SHADOW E&M-EST. PATIENT-LVL III: CPT | Mod: PBBFAC,,, | Performed by: PSYCHIATRY & NEUROLOGY

## 2018-01-31 PROCEDURE — 1159F MED LIST DOCD IN RCRD: CPT | Mod: S$GLB,,, | Performed by: PSYCHIATRY & NEUROLOGY

## 2018-01-31 PROCEDURE — 99499 UNLISTED E&M SERVICE: CPT | Mod: S$GLB,,, | Performed by: PSYCHIATRY & NEUROLOGY

## 2018-01-31 PROCEDURE — 99213 OFFICE O/P EST LOW 20 MIN: CPT | Mod: S$GLB,,, | Performed by: PSYCHIATRY & NEUROLOGY

## 2018-01-31 PROCEDURE — 3008F BODY MASS INDEX DOCD: CPT | Mod: S$GLB,,, | Performed by: PSYCHIATRY & NEUROLOGY

## 2018-01-31 PROCEDURE — 1125F AMNT PAIN NOTED PAIN PRSNT: CPT | Mod: S$GLB,,, | Performed by: PSYCHIATRY & NEUROLOGY

## 2018-01-31 RX ORDER — TRAZODONE HYDROCHLORIDE 150 MG/1
150 TABLET ORAL NIGHTLY PRN
Qty: 90 TABLET | Refills: 2 | Status: SHIPPED | OUTPATIENT
Start: 2018-01-31 | End: 2018-12-21 | Stop reason: SDUPTHER

## 2018-01-31 RX ORDER — SERTRALINE HYDROCHLORIDE 100 MG/1
200 TABLET, FILM COATED ORAL DAILY
Qty: 180 TABLET | Refills: 2 | Status: SHIPPED | OUTPATIENT
Start: 2018-01-31 | End: 2019-01-27 | Stop reason: SDUPTHER

## 2018-01-31 RX ORDER — BUSPIRONE HYDROCHLORIDE 10 MG/1
10 TABLET ORAL 2 TIMES DAILY
Qty: 180 TABLET | Refills: 2 | Status: SHIPPED | OUTPATIENT
Start: 2018-01-31 | End: 2019-02-14 | Stop reason: SDUPTHER

## 2018-01-31 NOTE — PROGRESS NOTES
Outpatient Psychiatry Follow-Up Visit (MD/NP)    1/31/2018    Clinical Status of Patient:  Outpatient (Ambulatory)    Chief Complaint:  Lizeth Henderson is a 77 y.o. female who presents today for follow-up of anxiety, depression.   Met with patient alone    Interval History and Content of Current Session:  Interim Events/Subjective Report/Content of Current Session: Follow up appointment.    75 yo  retired female with h/o of depression onset in the early 1980's.  Depressive symptoms recurred in 2004 following the death of her . Hx of lifelong worry.     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 s/p Total Knee Arthroplasty 10/18/16     MOCA 3/17 25/30   MOCA  1/16 23/30 (missed clock hands, cube, serial 7s, delayed recall)    Interim Hx:  No medication changes last visit.  Pt is doing exceptionally well   Loves new living situation in [place].   She has met a neighbor close to, retired nurse   Out to lunch, walking 20 mins 3 times a day   Still have problems with family   Daughter doing well     Went to ER;  New PCP - doses to weak.  Increased blood meds  checkks at home, running high;     She is having a lot of anxiety, but can distract herself.  Occasional negative thoughts. Takes trazodone in bed.   [place] messed up her check - worries financial issues;   Worries about dying.  Mother loved to be 99 yo   Makes her self get up and go.   Not going to let things get to her   Tendency to think; youngest grandson until Dec 2018  Panic attacks less than weely   Can control worry with  "getting busy   Jog saw puzzle, cross word puzzles, coloring     No significant depressed, making bed, symbolism   Self care is good   Memory not great; loses train of thought. Management own meds, no fiannces   Appetite is high, pirtion control is the issue - luch time h  Weaning self off of diet coke, drinks tea   No alcohol   Denies symptoms of jossy/psychosis.     Denies suicidal/homicidal ideations.  Occasionally russell.   Miracle happened twice to her, she recovered from near misses,but still very scary.     Review of Systems     Psychiatry: see HPI   Constitutional: wt gain   Musculoskeletal: right knee pain 2/10, no falls, uses cane PRN   Neurology:  Memory improved.       Past Medical, Family and Social History: The patient's past medical, family and social history have been reviewed and updated as appropriate within the electronic medical record - see encounter notes.    Medication:   Zoloft 200 mg daily  Trazodone 150 mg nightly  Buspar 10 mg BID   Foltx 1 tab daily   Adderall XR 30 mg in am     Compliance: yes    Side effects:  Wt gain, increased appetite     Risk Parameters:  Patient reports no suicidal ideation  Patient reports no homicidal ideation  Patient reports no self-injurious behavior  Patient reports no violent behavior       Exam (detailed: at least 9 elements; comprehensive: all 15 elements)   Constitutional  Vitals:  Most recent vital signs, dated more than 90 days prior to this appointment, were reviewed.    See EPIC entry for today's vital signs.       General:  unremarkable, age appropriate, casually dressed, good eye contact, at times, not tearful, well dressed      Musculoskeletal  Muscle Strength/Tone:  No tremor    Gait & Station:  Slow, steady      Psychiatric  Speech:  no latency; no press, spontaneous   Mood & Affect:  "good"   Full range, smiles at times   Thought Process:  Linear    Associations:  intact   Thought Content:  no active or passive SI today, no homicidality, " delusions, or paranoia, hallucinations: (auditory: no, visual: no)      Insight:  Improved    Judgement: Adequate to circumstances    Orientation:  Alert and oriented x 4    Memory: Intact for content of interview   Language: Grossly intact    Attention Span & Concentration:  Grossly intact   Fund of Knowledge:  Intact for patient's level of education     Assessment and Diagnosis   Status/Progress: Based on the examination today, the patient's problem(s) is under inadequate control but improved.   New problems have not been presented today.  Comorbidities are currently complicating management of the primary condition.      General Impression:  Overall, he was improved on medication regimen, but with continued high psychosocial stressors and upcoming move.  pt has previously weaned off of clonazepam w/ improved cognition off of it. Anxiety and depression improved, but remain high.  She does not appear depressed and is functioning relatively well.     MDD, recurrent, in partial remission   DASH  Hx Binge Eating Disorder   Cognitive Disorder, unspecified.     HTN, dyslipidemia, elevated fasting glucose.  hx colon cancer, adrenal nodule, s/p knee replacement, HTN    GAF: 65    Intervention/Counseling/Treatment Plan   · Medication Management: Continue current medications. The risks and benefits of medication were discussed with the patient.  · Counseling provided with patient as follows: importance of compliance with chosen treatment options was emphasized, risks and benefits of treatment options, including medications, were discussed with the patient    1. Continue Zoloft  200 mg daily.      2. Continue Trazodone 150  mg QHS prn insomnia - discussed potential for oversedation.      3. Continue Foltx 1 tab daily     4. Reviewed risks of serotonin syndrome with patient due to multiple medications - potential symptoms and potential severity reviewed.     5. Stable MOCA with actual improvement in score suggests against a  neurodegenerative d/o at this time - will continue to monitor     6. Pt instructed to go to ER with thoughts of harming self, others. Call to report any worsening of symptoms or problems with the medication    7. Psychotherapy is recommended.     8. Continue Buspirone 10 mg BID to target anxiety     9.e Encouraged pt to discuss urinary frequency and if she has any chest pain to discuss with PCP.     Return to Clinic: 12  weeks

## 2018-02-04 RX ORDER — BUSPIRONE HYDROCHLORIDE 10 MG/1
TABLET ORAL
Qty: 60 TABLET | Refills: 0 | OUTPATIENT
Start: 2018-02-04

## 2018-03-12 DIAGNOSIS — E78.2 HYPERLIPIDEMIA, MIXED: ICD-10-CM

## 2018-03-12 RX ORDER — LOSARTAN POTASSIUM 100 MG/1
TABLET ORAL
Qty: 30 TABLET | Refills: 0 | Status: SHIPPED | OUTPATIENT
Start: 2018-03-12 | End: 2018-04-20 | Stop reason: SDUPTHER

## 2018-03-12 NOTE — TELEPHONE ENCOUNTER
Patient did not show up for her appointment this morning. She needs to be rescheduled prior to any more refills.

## 2018-03-23 ENCOUNTER — OFFICE VISIT (OUTPATIENT)
Dept: OPHTHALMOLOGY | Facility: CLINIC | Age: 78
End: 2018-03-23
Payer: MEDICARE

## 2018-03-23 DIAGNOSIS — Z96.1 PSEUDOPHAKIA, BOTH EYES: ICD-10-CM

## 2018-03-23 DIAGNOSIS — H43.813 PVD (POSTERIOR VITREOUS DETACHMENT), BILATERAL: ICD-10-CM

## 2018-03-23 DIAGNOSIS — H53.30 BINOCULAR VISUAL DISTURBANCE: Primary | ICD-10-CM

## 2018-03-23 PROCEDURE — 99999 PR PBB SHADOW E&M-EST. PATIENT-LVL II: CPT | Mod: PBBFAC,,, | Performed by: OPHTHALMOLOGY

## 2018-03-23 PROCEDURE — 92004 COMPRE OPH EXAM NEW PT 1/>: CPT | Mod: S$GLB,,, | Performed by: OPHTHALMOLOGY

## 2018-03-23 NOTE — PROGRESS NOTES
"HPI     Blurred Vision    Additional comments: blurred  va film x 2 yrs//  DLE 6 motnhs//           Comments   blurred  va film x 2 yrs//  Had cat sx more than 5 yrs//  No  Flashes //   pos for floaters//    Film seems to be mainly when reading. Started before BP was in   200's/100's. Denies burning, but they kind of water sometimes. "Film"   seems apparent as soon as she sits down to read. Notices some when   watching tv as well, but not as bad as when reading.        Last edited by Ashley Luna MD on 3/23/2018 11:00 AM.   (History)        ROS     Positive for: Musculoskeletal (knee problems), Cardiovascular (HTN -   still "a little high" went to ED when it was 200/100 - new PCP switched   meds now doing better), Eyes (pseudophakia OU; YAG capsulotomy OS   7/20/11.), Heme/Lymph (ASA)    Negative for: Neurological (denies CVA), Endocrine (denies DM)    Last edited by Ashley Luna MD on 3/23/2018 11:04 AM.   (History)        Assessment /Plan     For exam results, see Encounter Report.    Binocular visual disturbance  -     Salazar Visual Field - OU - Extended - Both Eyes; Future    Pseudophakia, both eyes    PVD (posterior vitreous detachment), bilateral          Binocular visual disturbance - persistent for months, notices mostly when reading. Possibly some missing areas on Amsler grid testing, but optic nerves and retina appear WNL. I recommend first treating tear film with Warm compresses, lid hygiene. Handout given. Also I recommend that you use artificial tears 2-3 times daily. Recommended brands include Systane, Refresh, Genteal, and Thera-tears. Generic drops are okay too. Avoid any drops that say they "get the red out" - these should not be used on a regular basis. Recent hypertensive episode, will also check HVF 30-2.    Pseudophakia, both eyes - s/p YAG capsulotomy OS 7/20/11, OD no significant PCO. Looks good.    PVD (posterior vitreous detachment), bilateral - RD " precautions.

## 2018-03-23 NOTE — PATIENT INSTRUCTIONS
"  I recommend that you use artificial tears 2-3 times daily. Recommended brands include Systane, Refresh, Genteal, and Thera-tears. Generic drops are okay too. Avoid any drops that say they "get the red out" - these should not be used on a regular basis.      "

## 2018-04-17 ENCOUNTER — CLINICAL SUPPORT (OUTPATIENT)
Dept: OPHTHALMOLOGY | Facility: CLINIC | Age: 78
End: 2018-04-17
Attending: OPHTHALMOLOGY
Payer: MEDICARE

## 2018-04-17 DIAGNOSIS — H53.30 BINOCULAR VISUAL DISTURBANCE: ICD-10-CM

## 2018-04-17 PROCEDURE — 92083 EXTENDED VISUAL FIELD XM: CPT | Mod: S$GLB,,, | Performed by: OPHTHALMOLOGY

## 2018-04-18 ENCOUNTER — TELEPHONE (OUTPATIENT)
Dept: OPHTHALMOLOGY | Facility: CLINIC | Age: 78
End: 2018-04-18

## 2018-04-18 NOTE — TELEPHONE ENCOUNTER
States she purchased some allergy drops. Some days worse than others. HVF abnormal but no definitive pattern. If still having trouble, consider repeat test with eyelids taped. Or consider referral to Dr. Harrison.

## 2018-04-20 DIAGNOSIS — E78.2 HYPERLIPIDEMIA, MIXED: ICD-10-CM

## 2018-04-23 RX ORDER — LOSARTAN POTASSIUM 100 MG/1
TABLET ORAL
Qty: 30 TABLET | Refills: 0 | Status: SHIPPED | OUTPATIENT
Start: 2018-04-23 | End: 2018-06-06 | Stop reason: SDUPTHER

## 2018-05-16 ENCOUNTER — PES CALL (OUTPATIENT)
Dept: ADMINISTRATIVE | Facility: CLINIC | Age: 78
End: 2018-05-16

## 2018-06-06 DIAGNOSIS — E78.2 HYPERLIPIDEMIA, MIXED: ICD-10-CM

## 2018-06-06 RX ORDER — LOSARTAN POTASSIUM 100 MG/1
TABLET ORAL
Qty: 30 TABLET | Refills: 0 | Status: SHIPPED | OUTPATIENT
Start: 2018-06-06 | End: 2018-07-02 | Stop reason: SDUPTHER

## 2018-07-02 ENCOUNTER — OFFICE VISIT (OUTPATIENT)
Dept: FAMILY MEDICINE | Facility: CLINIC | Age: 78
End: 2018-07-02
Payer: MEDICARE

## 2018-07-02 VITALS
TEMPERATURE: 98 F | HEIGHT: 68 IN | DIASTOLIC BLOOD PRESSURE: 82 MMHG | WEIGHT: 188.94 LBS | BODY MASS INDEX: 28.63 KG/M2 | SYSTOLIC BLOOD PRESSURE: 134 MMHG | HEART RATE: 64 BPM | RESPIRATION RATE: 16 BRPM

## 2018-07-02 DIAGNOSIS — N32.81 OVERACTIVE BLADDER: Chronic | ICD-10-CM

## 2018-07-02 DIAGNOSIS — M85.80 OSTEOPENIA, UNSPECIFIED LOCATION: ICD-10-CM

## 2018-07-02 DIAGNOSIS — E27.8 ADRENAL NODULE: Primary | ICD-10-CM

## 2018-07-02 DIAGNOSIS — E78.2 HYPERLIPIDEMIA, MIXED: ICD-10-CM

## 2018-07-02 DIAGNOSIS — I10 ESSENTIAL HYPERTENSION: ICD-10-CM

## 2018-07-02 PROCEDURE — 99999 PR PBB SHADOW E&M-EST. PATIENT-LVL III: CPT | Mod: PBBFAC,,, | Performed by: INTERNAL MEDICINE

## 2018-07-02 PROCEDURE — 99214 OFFICE O/P EST MOD 30 MIN: CPT | Mod: S$GLB,,, | Performed by: INTERNAL MEDICINE

## 2018-07-02 PROCEDURE — 3075F SYST BP GE 130 - 139MM HG: CPT | Mod: CPTII,S$GLB,, | Performed by: INTERNAL MEDICINE

## 2018-07-02 PROCEDURE — 3079F DIAST BP 80-89 MM HG: CPT | Mod: CPTII,S$GLB,, | Performed by: INTERNAL MEDICINE

## 2018-07-02 PROCEDURE — 99499 UNLISTED E&M SERVICE: CPT | Mod: HCNC,S$GLB,, | Performed by: INTERNAL MEDICINE

## 2018-07-02 RX ORDER — FENOFIBRATE 54 MG/1
54 TABLET ORAL DAILY
Qty: 90 TABLET | Refills: 3 | Status: SHIPPED | OUTPATIENT
Start: 2018-07-02 | End: 2019-07-26 | Stop reason: SDUPTHER

## 2018-07-02 RX ORDER — TRAZODONE HYDROCHLORIDE 150 MG/1
150 TABLET ORAL NIGHTLY PRN
Qty: 90 TABLET | Refills: 2 | Status: CANCELLED | OUTPATIENT
Start: 2018-07-02 | End: 2019-07-02

## 2018-07-02 RX ORDER — ALENDRONATE SODIUM 70 MG/1
70 TABLET ORAL
Qty: 12 TABLET | Refills: 3 | Status: SHIPPED | OUTPATIENT
Start: 2018-07-02 | End: 2019-06-30 | Stop reason: SDUPTHER

## 2018-07-02 RX ORDER — ASPIRIN 81 MG/1
81 TABLET ORAL DAILY
Status: CANCELLED | COMMUNITY
Start: 2018-07-02

## 2018-07-02 RX ORDER — HYDROCHLOROTHIAZIDE 12.5 MG/1
12.5 TABLET ORAL DAILY
Qty: 90 TABLET | Refills: 3 | Status: SHIPPED | OUTPATIENT
Start: 2018-07-02 | End: 2019-07-26 | Stop reason: SDUPTHER

## 2018-07-02 RX ORDER — ATORVASTATIN CALCIUM 40 MG/1
40 TABLET, FILM COATED ORAL DAILY
Qty: 90 TABLET | Refills: 3 | Status: SHIPPED | OUTPATIENT
Start: 2018-07-02 | End: 2019-07-26 | Stop reason: SDUPTHER

## 2018-07-02 RX ORDER — BUSPIRONE HYDROCHLORIDE 10 MG/1
10 TABLET ORAL 2 TIMES DAILY
Qty: 180 TABLET | Refills: 2 | Status: CANCELLED | OUTPATIENT
Start: 2018-07-02 | End: 2019-07-02

## 2018-07-02 RX ORDER — LOSARTAN POTASSIUM 100 MG/1
100 TABLET ORAL DAILY
Qty: 90 TABLET | Refills: 3 | Status: SHIPPED | OUTPATIENT
Start: 2018-07-02 | End: 2019-07-26 | Stop reason: SDUPTHER

## 2018-07-02 RX ORDER — SERTRALINE HYDROCHLORIDE 100 MG/1
200 TABLET, FILM COATED ORAL DAILY
Qty: 180 TABLET | Refills: 2 | Status: CANCELLED | OUTPATIENT
Start: 2018-07-02

## 2018-07-02 NOTE — PROGRESS NOTES
Assessment and Plan:    1. Essential hypertension  Stable, will continue current medications, update labs.   - hydroCHLOROthiazide (HYDRODIURIL) 12.5 MG Tab; Take 1 tablet (12.5 mg total) by mouth once daily.  Dispense: 90 tablet; Refill: 3  - losartan (COZAAR) 100 MG tablet; Take 1 tablet (100 mg total) by mouth once daily.  Dispense: 90 tablet; Refill: 3  - Comprehensive metabolic panel; Future    2. Hyperlipidemia, mixed  Stable, will continue current medications, update labs.   - fenofibrate (TRICOR) 54 MG tablet; Take 1 tablet (54 mg total) by mouth once daily.  Dispense: 90 tablet; Refill: 3  - atorvastatin (LIPITOR) 40 MG tablet; Take 1 tablet (40 mg total) by mouth once daily.  Dispense: 90 tablet; Refill: 3  - Lipid panel; Future    3. Osteopenia, unspecified location  - alendronate (FOSAMAX) 70 MG tablet; Take 1 tablet (70 mg total) by mouth every 7 days.  Dispense: 12 tablet; Refill: 3    4. Adrenal nodule  Noted in 2010, follow up in Jan 2017 was unchanged so this was felt to be benign.     5. Overactive bladder  Good benefit from this medication, will continue.  - mirabegron (MYRBETRIQ) 50 mg Tb24; Take 1 tablet (50 mg total) by mouth nightly.  Dispense: 90 tablet; Refill: 3    ______________________________________________________________________  Subjective:    Chief Complaint:  Follow up chronic medical conditions    HPI:  Lizeth is a 77 y.o. year old woman here to follow up chronic medical conditions.     HTN- Takes HCTZ and losartan. Was normal today, but has been more variable at home, occasionally higher than goal.     Osteopenia- Takes alendronate.     HLD- Takes atorvastatin and fenofibrate.    Anxiety- Sees Dr. Vinson. Takes sertraline and buspar, trazodone for sleep.     OAB- Takes myrbetriq. Has run out and symptoms were a lot worse when she did not take this, had some incontinence last night.      Medications:  Current Outpatient Prescriptions on File Prior to Visit   Medication Sig Dispense  Refill    alendronate (FOSAMAX) 70 MG tablet Take 1 tablet (70 mg total) by mouth every 7 days. 4 tablet 11    aspirin (ECOTRIN) 81 MG EC tablet Take 81 mg by mouth once daily.      atorvastatin (LIPITOR) 40 MG tablet TAKE 1 TABLET BY MOUTH EVERY DAY 90 tablet 3    busPIRone (BUSPAR) 10 MG tablet Take 1 tablet (10 mg total) by mouth 2 (two) times daily. 180 tablet 2    fenofibrate (TRICOR) 54 MG tablet Take 1 tablet (54 mg total) by mouth once daily. 90 tablet 3    hydroCHLOROthiazide (HYDRODIURIL) 12.5 MG Tab Take 1 tablet (12.5 mg total) by mouth once daily. 30 tablet 1    losartan (COZAAR) 100 MG tablet TAKE 1 TABLET BY MOUTH DAILY 30 tablet 0    mirabegron (MYRBETRIQ) 50 mg Tb24 Take by mouth once daily.      sertraline (ZOLOFT) 100 MG tablet Take 2 tablets (200 mg total) by mouth once daily. 180 tablet 2    traZODone (DESYREL) 150 MG tablet Take 1 tablet (150 mg total) by mouth nightly as needed for Insomnia. 90 tablet 2    ferrous sulfate 325 mg (65 mg iron) Tab tablet Take 1 tablet (325 mg total) by mouth daily with breakfast.  0    folic acid-vit B6-vit B12 2.5-25-2 mg (FOLBIC) 2.5-25-2 mg Tab Take 1 tablet by mouth once daily. 90 tablet 2     No current facility-administered medications on file prior to visit.        Review of Systems:  Review of Systems   Constitutional: Negative for chills and fever.   Respiratory: Negative for shortness of breath and wheezing.    Cardiovascular: Negative for chest pain and leg swelling.   Gastrointestinal: Negative for constipation and diarrhea.   Musculoskeletal: Positive for arthralgias.   Neurological: Negative for seizures and syncope.   Psychiatric/Behavioral: The patient is nervous/anxious.        Past Medical History:  Past Medical History:   Diagnosis Date    Adrenal adenoma 2016    Anxiety     Arthritis     Benign hypertension     Colon cancer age 61    colon    Coronary artery disease     Depression     Full dentures     General anesthetics  "causing adverse effect in therapeutic use     yells and talks when wakes up    Hyperlipidemia     Osteopenia     Shingles     Wears glasses        Objective:    Vitals:  Vitals:    07/02/18 1403   BP: 134/82   Pulse: 64   Resp: 16   Temp: 98.3 °F (36.8 °C)   TempSrc: Oral   Weight: 85.7 kg (188 lb 15 oz)   Height: 5' 8" (1.727 m)   PainSc:   8   PainLoc: Leg       Physical Exam   Constitutional: She is oriented to person, place, and time. She appears well-developed and well-nourished. No distress.   HENT:   Mouth/Throat: Oropharynx is clear and moist.   Eyes: No scleral icterus.   Cardiovascular: Normal rate and regular rhythm.    No murmur heard.  Pulmonary/Chest: Effort normal and breath sounds normal. No respiratory distress. She has no wheezes. She has no rales.   Musculoskeletal: She exhibits no edema.   Neurological: She is alert and oriented to person, place, and time.   Skin: Skin is warm and dry.   Psychiatric: She has a normal mood and affect. Her behavior is normal.   Vitals reviewed.      Data:  Previous labs reviewed and pertinent for BMP generally WNL in Dec.      Yohana Young MD  Internal Medicine  "

## 2018-08-16 ENCOUNTER — LAB VISIT (OUTPATIENT)
Dept: LAB | Facility: HOSPITAL | Age: 78
End: 2018-08-16
Attending: INTERNAL MEDICINE
Payer: MEDICARE

## 2018-08-16 DIAGNOSIS — I10 ESSENTIAL HYPERTENSION: ICD-10-CM

## 2018-08-16 DIAGNOSIS — E78.2 HYPERLIPIDEMIA, MIXED: ICD-10-CM

## 2018-08-16 LAB
ALBUMIN SERPL BCP-MCNC: 3.7 G/DL
ALP SERPL-CCNC: 49 U/L
ALT SERPL W/O P-5'-P-CCNC: 8 U/L
ANION GAP SERPL CALC-SCNC: 9 MMOL/L
AST SERPL-CCNC: 20 U/L
BILIRUB SERPL-MCNC: 0.4 MG/DL
BUN SERPL-MCNC: 20 MG/DL
CALCIUM SERPL-MCNC: 9.5 MG/DL
CHLORIDE SERPL-SCNC: 106 MMOL/L
CHOLEST SERPL-MCNC: 163 MG/DL
CHOLEST/HDLC SERPL: 3.5 {RATIO}
CO2 SERPL-SCNC: 29 MMOL/L
CREAT SERPL-MCNC: 1 MG/DL
EST. GFR  (AFRICAN AMERICAN): >60 ML/MIN/1.73 M^2
EST. GFR  (NON AFRICAN AMERICAN): 53.7 ML/MIN/1.73 M^2
GLUCOSE SERPL-MCNC: 86 MG/DL
HDLC SERPL-MCNC: 47 MG/DL
HDLC SERPL: 28.8 %
LDLC SERPL CALC-MCNC: 98.2 MG/DL
NONHDLC SERPL-MCNC: 116 MG/DL
POTASSIUM SERPL-SCNC: 3.9 MMOL/L
PROT SERPL-MCNC: 6.5 G/DL
SODIUM SERPL-SCNC: 144 MMOL/L
TRIGL SERPL-MCNC: 89 MG/DL

## 2018-08-16 PROCEDURE — 36415 COLL VENOUS BLD VENIPUNCTURE: CPT | Mod: PN

## 2018-08-16 PROCEDURE — 80053 COMPREHEN METABOLIC PANEL: CPT

## 2018-08-16 PROCEDURE — 80061 LIPID PANEL: CPT

## 2018-09-06 ENCOUNTER — PES CALL (OUTPATIENT)
Dept: ADMINISTRATIVE | Facility: CLINIC | Age: 78
End: 2018-09-06

## 2018-11-05 NOTE — TELEPHONE ENCOUNTER
Called and spoke to patient. Notes that she is not taking lisinopril, only needs refill on atorvastatin.   
LOV 12/06/2017    Follow up scheduled for 03/12/2018  
ambulatory

## 2018-12-20 ENCOUNTER — PATIENT OUTREACH (OUTPATIENT)
Dept: ADMINISTRATIVE | Facility: HOSPITAL | Age: 78
End: 2018-12-20

## 2018-12-21 RX ORDER — TRAZODONE HYDROCHLORIDE 150 MG/1
TABLET ORAL
Qty: 90 TABLET | Refills: 0 | Status: SHIPPED | OUTPATIENT
Start: 2018-12-21 | End: 2019-02-14 | Stop reason: SDUPTHER

## 2019-01-03 ENCOUNTER — OFFICE VISIT (OUTPATIENT)
Dept: PODIATRY | Facility: CLINIC | Age: 79
End: 2019-01-03
Payer: MEDICARE

## 2019-01-03 ENCOUNTER — OFFICE VISIT (OUTPATIENT)
Dept: FAMILY MEDICINE | Facility: CLINIC | Age: 79
End: 2019-01-03
Payer: MEDICARE

## 2019-01-03 ENCOUNTER — LAB VISIT (OUTPATIENT)
Dept: LAB | Facility: HOSPITAL | Age: 79
End: 2019-01-03
Attending: INTERNAL MEDICINE
Payer: MEDICARE

## 2019-01-03 VITALS — BODY MASS INDEX: 28.63 KG/M2 | WEIGHT: 188.94 LBS | HEIGHT: 68 IN

## 2019-01-03 VITALS
DIASTOLIC BLOOD PRESSURE: 70 MMHG | SYSTOLIC BLOOD PRESSURE: 122 MMHG | HEART RATE: 80 BPM | BODY MASS INDEX: 29.03 KG/M2 | WEIGHT: 191.56 LBS | HEIGHT: 68 IN

## 2019-01-03 DIAGNOSIS — R53.83 FATIGUE, UNSPECIFIED TYPE: ICD-10-CM

## 2019-01-03 DIAGNOSIS — R26.81 GAIT INSTABILITY: ICD-10-CM

## 2019-01-03 DIAGNOSIS — F41.1 GENERALIZED ANXIETY DISORDER: ICD-10-CM

## 2019-01-03 DIAGNOSIS — G62.0 CHEMOTHERAPY-INDUCED PERIPHERAL NEUROPATHY: Primary | ICD-10-CM

## 2019-01-03 DIAGNOSIS — B35.1 ONYCHOMYCOSIS DUE TO DERMATOPHYTE: ICD-10-CM

## 2019-01-03 DIAGNOSIS — I70.0 AORTIC ATHEROSCLEROSIS: ICD-10-CM

## 2019-01-03 DIAGNOSIS — F33.1 MAJOR DEPRESSIVE DISORDER, RECURRENT EPISODE, MODERATE: Primary | ICD-10-CM

## 2019-01-03 DIAGNOSIS — R73.03 PREDIABETES: Chronic | ICD-10-CM

## 2019-01-03 DIAGNOSIS — I10 BENIGN HYPERTENSION: ICD-10-CM

## 2019-01-03 DIAGNOSIS — Z85.038 HISTORY OF COLON CANCER: ICD-10-CM

## 2019-01-03 DIAGNOSIS — M20.11 VALGUS DEFORMITY OF BOTH GREAT TOES: ICD-10-CM

## 2019-01-03 DIAGNOSIS — T45.1X5A CHEMOTHERAPY-INDUCED PERIPHERAL NEUROPATHY: Primary | ICD-10-CM

## 2019-01-03 DIAGNOSIS — M20.12 VALGUS DEFORMITY OF BOTH GREAT TOES: ICD-10-CM

## 2019-01-03 DIAGNOSIS — M20.42 HAMMER TOES OF BOTH FEET: ICD-10-CM

## 2019-01-03 DIAGNOSIS — E27.8 ADRENAL NODULE: ICD-10-CM

## 2019-01-03 DIAGNOSIS — M20.41 HAMMER TOES OF BOTH FEET: ICD-10-CM

## 2019-01-03 DIAGNOSIS — E78.2 HYPERLIPIDEMIA, MIXED: ICD-10-CM

## 2019-01-03 DIAGNOSIS — Z78.0 ASYMPTOMATIC POSTMENOPAUSAL STATE: ICD-10-CM

## 2019-01-03 DIAGNOSIS — R29.6 FALLS FREQUENTLY: ICD-10-CM

## 2019-01-03 DIAGNOSIS — M85.80 OSTEOPENIA, UNSPECIFIED LOCATION: ICD-10-CM

## 2019-01-03 LAB
ALBUMIN SERPL BCP-MCNC: 3.6 G/DL
ALP SERPL-CCNC: 46 U/L
ALT SERPL W/O P-5'-P-CCNC: 8 U/L
ANION GAP SERPL CALC-SCNC: 11 MMOL/L
AST SERPL-CCNC: 22 U/L
BASOPHILS # BLD AUTO: 0.04 K/UL
BASOPHILS NFR BLD: 0.6 %
BILIRUB SERPL-MCNC: 0.4 MG/DL
BUN SERPL-MCNC: 18 MG/DL
CALCIUM SERPL-MCNC: 9.6 MG/DL
CHLORIDE SERPL-SCNC: 105 MMOL/L
CO2 SERPL-SCNC: 28 MMOL/L
CREAT SERPL-MCNC: 1 MG/DL
DIFFERENTIAL METHOD: ABNORMAL
EOSINOPHIL # BLD AUTO: 0.1 K/UL
EOSINOPHIL NFR BLD: 0.9 %
ERYTHROCYTE [DISTWIDTH] IN BLOOD BY AUTOMATED COUNT: 12.8 %
EST. GFR  (AFRICAN AMERICAN): >60 ML/MIN/1.73 M^2
EST. GFR  (NON AFRICAN AMERICAN): 53.7 ML/MIN/1.73 M^2
ESTIMATED AVG GLUCOSE: 114 MG/DL
GLUCOSE SERPL-MCNC: 123 MG/DL
HBA1C MFR BLD HPLC: 5.6 %
HCT VFR BLD AUTO: 36 %
HGB BLD-MCNC: 11.6 G/DL
IMM GRANULOCYTES # BLD AUTO: 0.03 K/UL
IMM GRANULOCYTES NFR BLD AUTO: 0.5 %
LYMPHOCYTES # BLD AUTO: 1.7 K/UL
LYMPHOCYTES NFR BLD: 27 %
MCH RBC QN AUTO: 28.3 PG
MCHC RBC AUTO-ENTMCNC: 32.2 G/DL
MCV RBC AUTO: 88 FL
MONOCYTES # BLD AUTO: 0.5 K/UL
MONOCYTES NFR BLD: 7.5 %
NEUTROPHILS # BLD AUTO: 4.1 K/UL
NEUTROPHILS NFR BLD: 63.5 %
NRBC BLD-RTO: 0 /100 WBC
PLATELET # BLD AUTO: 216 K/UL
PMV BLD AUTO: 10.6 FL
POTASSIUM SERPL-SCNC: 3.8 MMOL/L
PROT SERPL-MCNC: 6.9 G/DL
RBC # BLD AUTO: 4.1 M/UL
SODIUM SERPL-SCNC: 144 MMOL/L
TSH SERPL DL<=0.005 MIU/L-ACNC: 1.06 UIU/ML
WBC # BLD AUTO: 6.4 K/UL

## 2019-01-03 PROCEDURE — 99999 PR PBB SHADOW E&M-EST. PATIENT-LVL III: CPT | Mod: PBBFAC,,, | Performed by: INTERNAL MEDICINE

## 2019-01-03 PROCEDURE — 99499 RISK ADDL DX/OHS AUDIT: ICD-10-PCS | Mod: HCNC,S$GLB,, | Performed by: INTERNAL MEDICINE

## 2019-01-03 PROCEDURE — 99214 OFFICE O/P EST MOD 30 MIN: CPT | Mod: S$GLB,,, | Performed by: INTERNAL MEDICINE

## 2019-01-03 PROCEDURE — 84443 ASSAY THYROID STIM HORMONE: CPT

## 2019-01-03 PROCEDURE — 11721 PR DEBRIDEMENT OF NAILS, 6 OR MORE: ICD-10-PCS | Mod: Q9,S$GLB,, | Performed by: PODIATRIST

## 2019-01-03 PROCEDURE — 1100F PR PT FALLS ASSESS DOC 2+ FALLS/FALL W/INJURY/YR: ICD-10-PCS | Mod: CPTII,S$GLB,, | Performed by: PODIATRIST

## 2019-01-03 PROCEDURE — 99999 PR PBB SHADOW E&M-EST. PATIENT-LVL III: ICD-10-PCS | Mod: PBBFAC,,, | Performed by: INTERNAL MEDICINE

## 2019-01-03 PROCEDURE — 36415 COLL VENOUS BLD VENIPUNCTURE: CPT | Mod: PN

## 2019-01-03 PROCEDURE — 3074F PR MOST RECENT SYSTOLIC BLOOD PRESSURE < 130 MM HG: ICD-10-PCS | Mod: CPTII,S$GLB,, | Performed by: PODIATRIST

## 2019-01-03 PROCEDURE — 3078F DIAST BP <80 MM HG: CPT | Mod: CPTII,S$GLB,, | Performed by: INTERNAL MEDICINE

## 2019-01-03 PROCEDURE — 99499 UNLISTED E&M SERVICE: CPT | Mod: HCNC,S$GLB,, | Performed by: INTERNAL MEDICINE

## 2019-01-03 PROCEDURE — 3288F PR FALLS RISK ASSESSMENT DOCUMENTED: ICD-10-PCS | Mod: CPTII,S$GLB,, | Performed by: PODIATRIST

## 2019-01-03 PROCEDURE — 99203 OFFICE O/P NEW LOW 30 MIN: CPT | Mod: 25,S$GLB,, | Performed by: PODIATRIST

## 2019-01-03 PROCEDURE — 99999 PR PBB SHADOW E&M-EST. PATIENT-LVL III: CPT | Mod: PBBFAC,,, | Performed by: PODIATRIST

## 2019-01-03 PROCEDURE — 3288F FALL RISK ASSESSMENT DOCD: CPT | Mod: CPTII,S$GLB,, | Performed by: PODIATRIST

## 2019-01-03 PROCEDURE — 99214 PR OFFICE/OUTPT VISIT, EST, LEVL IV, 30-39 MIN: ICD-10-PCS | Mod: S$GLB,,, | Performed by: INTERNAL MEDICINE

## 2019-01-03 PROCEDURE — 3078F DIAST BP <80 MM HG: CPT | Mod: CPTII,S$GLB,, | Performed by: PODIATRIST

## 2019-01-03 PROCEDURE — 1100F PTFALLS ASSESS-DOCD GE2>/YR: CPT | Mod: CPTII,S$GLB,, | Performed by: PODIATRIST

## 2019-01-03 PROCEDURE — 85025 COMPLETE CBC W/AUTO DIFF WBC: CPT

## 2019-01-03 PROCEDURE — 3074F SYST BP LT 130 MM HG: CPT | Mod: CPTII,S$GLB,, | Performed by: INTERNAL MEDICINE

## 2019-01-03 PROCEDURE — 3078F PR MOST RECENT DIASTOLIC BLOOD PRESSURE < 80 MM HG: ICD-10-PCS | Mod: CPTII,S$GLB,, | Performed by: PODIATRIST

## 2019-01-03 PROCEDURE — 3074F SYST BP LT 130 MM HG: CPT | Mod: CPTII,S$GLB,, | Performed by: PODIATRIST

## 2019-01-03 PROCEDURE — 1100F PTFALLS ASSESS-DOCD GE2>/YR: CPT | Mod: CPTII,S$GLB,, | Performed by: INTERNAL MEDICINE

## 2019-01-03 PROCEDURE — 83036 HEMOGLOBIN GLYCOSYLATED A1C: CPT

## 2019-01-03 PROCEDURE — 80053 COMPREHEN METABOLIC PANEL: CPT

## 2019-01-03 PROCEDURE — 99499 UNLISTED E&M SERVICE: CPT | Mod: HCNC,S$GLB,, | Performed by: PODIATRIST

## 2019-01-03 PROCEDURE — 99499 RISK ADDL DX/OHS AUDIT: ICD-10-PCS | Mod: HCNC,S$GLB,, | Performed by: PODIATRIST

## 2019-01-03 PROCEDURE — 3288F FALL RISK ASSESSMENT DOCD: CPT | Mod: CPTII,S$GLB,, | Performed by: INTERNAL MEDICINE

## 2019-01-03 PROCEDURE — 3078F PR MOST RECENT DIASTOLIC BLOOD PRESSURE < 80 MM HG: ICD-10-PCS | Mod: CPTII,S$GLB,, | Performed by: INTERNAL MEDICINE

## 2019-01-03 PROCEDURE — 99999 PR PBB SHADOW E&M-EST. PATIENT-LVL III: ICD-10-PCS | Mod: PBBFAC,,, | Performed by: PODIATRIST

## 2019-01-03 PROCEDURE — 1100F PR PT FALLS ASSESS DOC 2+ FALLS/FALL W/INJURY/YR: ICD-10-PCS | Mod: CPTII,S$GLB,, | Performed by: INTERNAL MEDICINE

## 2019-01-03 PROCEDURE — 99203 PR OFFICE/OUTPT VISIT, NEW, LEVL III, 30-44 MIN: ICD-10-PCS | Mod: 25,S$GLB,, | Performed by: PODIATRIST

## 2019-01-03 PROCEDURE — 11721 DEBRIDE NAIL 6 OR MORE: CPT | Mod: Q9,S$GLB,, | Performed by: PODIATRIST

## 2019-01-03 PROCEDURE — 3074F PR MOST RECENT SYSTOLIC BLOOD PRESSURE < 130 MM HG: ICD-10-PCS | Mod: CPTII,S$GLB,, | Performed by: INTERNAL MEDICINE

## 2019-01-03 PROCEDURE — 3288F PR FALLS RISK ASSESSMENT DOCUMENTED: ICD-10-PCS | Mod: CPTII,S$GLB,, | Performed by: INTERNAL MEDICINE

## 2019-01-03 NOTE — PROGRESS NOTES
"Assessment and Plan:    1. Benign hypertension  Controlled today, continue same medications, update labs. Advised to obtain BP cuff and work on home monitoring as more values will help us to better manage her BP.  - Comprehensive metabolic panel; Future    2. Hyperlipidemia, mixed  Last labs were controlled. Will be checking LFTs today. Continue same medications.     3. Osteopenia, unspecified location  On fosamax, due for repeat DEXA.  - DXA Bone Density Spine And Hip; Future    4. Falls frequently  Agree that patient should be seeing PT for gait training (reportedly Podiatrist was planning to write PT orders, patient advised to call me if she does not hear from anyone). Discussed ideally would be using walker rather than a cane. She has a walker but has been using the cane.     5. Fatigue, unspecified type  Discussed possible medical causes of this, may also be due to inactivity, depressed mood, ageing, poor diet etc. Recommended improvements in diet, getting more active, self care, etc.   - TSH; Future  - CBC auto differential; Future    6. Major depressive disorder, recurrent episode, moderate  7. Generalized anxiety disorder  Continue follow up with psych for medication management. Appears to be doing well at this time.     8. Prediabetes  Poor diet recently, patient reports she is about to start weight watchers. Will recheck A1c.  - Hemoglobin A1c; Future    9. Adrenal nodule  CT in 2016 "The left adrenal nodule which suggests adenoma is not significantly changed compared to images from prior CT chest of 2/17/2010" No additional follow up needed at this time.     10. History of colon cancer  With post-chemotherapy neuropathy.   - CBC auto differential; Future    11. Asymptomatic postmenopausal state  - DXA Bone Density Spine And Hip; Future    12. Aortic atherosclerosis  Noted on previous imaging. Patient is on statin. Will CTM. " "    ______________________________________________________________________  Subjective:    Chief Complaint:  Follow up chronic medical conditions    HPI:  Lizeth is a 78 y.o. year old woman here to follow up chronic medical conditions.     HTN- Uses losartan and HCTZ. No problems with these. Has not been checking BP often at home.     HLD- Takes atorvastatin, fenofibrate, no side effects.     Prediabetes- Reports that diet has been poor for the last month or so with the holidays, has put on some weight. Not on medications for this.     Depression/Anxiety- Uses buspar BID, sertraline, and trazodone. She notes that her mood has been "up and down" but overall improved on the current regimen. Feels that she is crying less, and better able to deal with stressors. No side effects on the current medications. Sees psychiatry for medication management.    Osteopenia- Takes fosamax, new since last DEXA in 2015. She has had a couple of falls, but has not had any major falls. No fractures.     Seen by podiatry this AM with neuropathy. Had recommended PT and will be placing orders per patient.    Medications:  Current Outpatient Medications on File Prior to Visit   Medication Sig Dispense Refill    alendronate (FOSAMAX) 70 MG tablet Take 1 tablet (70 mg total) by mouth every 7 days. 12 tablet 3    aspirin (ECOTRIN) 81 MG EC tablet Take 81 mg by mouth once daily.      atorvastatin (LIPITOR) 40 MG tablet Take 1 tablet (40 mg total) by mouth once daily. 90 tablet 3    busPIRone (BUSPAR) 10 MG tablet Take 1 tablet (10 mg total) by mouth 2 (two) times daily. 180 tablet 2    fenofibrate (TRICOR) 54 MG tablet Take 1 tablet (54 mg total) by mouth once daily. 90 tablet 3    folic acid-vit B6-vit B12 2.5-25-2 mg (FOLBIC) 2.5-25-2 mg Tab Take 1 tablet by mouth once daily. 90 tablet 2    hydroCHLOROthiazide (HYDRODIURIL) 12.5 MG Tab Take 1 tablet (12.5 mg total) by mouth once daily. 90 tablet 3    losartan (COZAAR) 100 MG tablet " "Take 1 tablet (100 mg total) by mouth once daily. 90 tablet 3    mirabegron (MYRBETRIQ) 50 mg Tb24 Take 1 tablet (50 mg total) by mouth nightly. 90 tablet 3    sertraline (ZOLOFT) 100 MG tablet Take 2 tablets (200 mg total) by mouth once daily. 180 tablet 2    traZODone (DESYREL) 150 MG tablet TAKE 1 TABLET(150 MG) BY MOUTH EVERY NIGHT AS NEEDED FOR INSOMNIA 90 tablet 0    [DISCONTINUED] ferrous sulfate 325 mg (65 mg iron) Tab tablet Take 1 tablet (325 mg total) by mouth daily with breakfast.  0     No current facility-administered medications on file prior to visit.        Review of Systems:  Review of Systems   Constitutional: Negative for chills and fever.   Respiratory: Negative for shortness of breath and wheezing.    Cardiovascular: Negative for chest pain, palpitations and leg swelling.   Gastrointestinal: Negative for nausea and vomiting.   Neurological: Positive for numbness (feet). Negative for seizures, syncope and weakness (no focal weakness).   Psychiatric/Behavioral: Positive for decreased concentration. Negative for dysphoric mood, hallucinations, self-injury and suicidal ideas. The patient is nervous/anxious.        Past Medical History:  Past Medical History:   Diagnosis Date    Adrenal adenoma 2016    Anxiety     Arthritis     Benign hypertension     Colon cancer age 61    colon    Coronary artery disease     Depression     Full dentures     General anesthetics causing adverse effect in therapeutic use     yells and talks when wakes up    Hyperlipidemia     Osteopenia     Shingles     Wears glasses        Objective:    Vitals:  Vitals:    01/03/19 1029   BP: 122/70   Pulse: 80   Weight: 86.9 kg (191 lb 9.3 oz)   Height: 5' 8" (1.727 m)   PainSc: 0-No pain       Physical Exam   Constitutional: She is oriented to person, place, and time. She appears well-developed and well-nourished. No distress.   HENT:   Mouth/Throat: Oropharynx is clear and moist.   Eyes: Conjunctivae are normal. " Right eye exhibits no discharge. Left eye exhibits no discharge.   Cardiovascular: Normal rate and regular rhythm.   Pulmonary/Chest: Effort normal. No respiratory distress. She has no wheezes. She has no rales.   Neurological: She is alert and oriented to person, place, and time.   walks slowly with cane, rises slowly from chair   Skin: Skin is warm and dry.   Psychiatric: She has a normal mood and affect. Her behavior is normal. Judgment and thought content normal.   Vitals reviewed.      Data:  Previous labs reviewed and pertinent for A1c 5.0.      Yohana Young MD  Internal Medicine

## 2019-01-04 NOTE — PROGRESS NOTES
Subjective:      Patient ID: Lizeth Henderson is a 78 y.o. female.    Chief Complaint: Foot Pain (toe pain,  toe nails thick and long)    Lizeth is a 78 y.o. female who presents to the clinic for evaluation and treatment of high risk feet. Lizeth has a past medical history of Adrenal adenoma (2016), Anxiety, Arthritis, Benign hypertension, Colon cancer (age 61), Coronary artery disease, Depression, Full dentures, General anesthetics causing adverse effect in therapeutic use, Hyperlipidemia, Osteopenia, Shingles, and Wears glasses. The patient's chief complaint is long, thick toenails. Relates experiencing pain from the toenails with wearing shoe gear.  Describes pain as aching and rates as a 2/10.  Symptoms are exacerbated with wearing closed toe shoes and alleviated with doffing of shoe gear.  Relates being physically unable to trim on her own.  Also, relates having instability with gait.  Relates this has been an issue for years.  States this is also in conjunction with daily symptoms of numbness, tingling, and burning of the lower extremities.  Attempts to treat instability with use of a cane.  Denies any additional pedal complaints.      PCP: Yohana Young MD    Date Last Seen by PCP: 1/3/19      Hemoglobin A1C   Date Value Ref Range Status   01/03/2019 5.6 4.0 - 5.6 % Final     Comment:     ADA Screening Guidelines:  5.7-6.4%  Consistent with prediabetes  >or=6.5%  Consistent with diabetes  High levels of fetal hemoglobin interfere with the HbA1C  assay. Heterozygous hemoglobin variants (HbS, HgC, etc)do  not significantly interfere with this assay.   However, presence of multiple variants may affect accuracy.     12/06/2017 5.0 4.0 - 5.6 % Final     Comment:     According to ADA guidelines, hemoglobin A1c <7.0% represents  optimal control in non-pregnant diabetic patients. Different  metrics may apply to specific patient populations.   Standards of Medical Care in Diabetes-2016.  For the purpose of screening  for the presence of diabetes:  <5.7%     Consistent with the absence of diabetes  5.7-6.4%  Consistent with increasing risk for diabetes   (prediabetes)  >or=6.5%  Consistent with diabetes  Currently, no consensus exists for use of hemoglobin A1c  for diagnosis of diabetes for children.  This Hemoglobin A1c assay has significant interference with fetal   hemoglobin   (HbF). The results are invalid for patients with abnormal amounts of   HbF,   including those with known Hereditary Persistence   of Fetal Hemoglobin. Heterozygous hemoglobin variants (HbAS, HbAC,   HbAD, HbAE, HbA2) do not significantly interfere with this assay;   however, presence of multiple variants in a sample may impact the %   interference.     11/21/2014 5.3 4.5 - 6.2 % Final       Review of Systems   Constitution: Negative for chills and fever.   Cardiovascular: Negative for claudication and leg swelling.   Skin: Positive for color change and nail changes.   Musculoskeletal: Positive for myalgias. Negative for muscle cramps and muscle weakness.   Neurological: Positive for numbness and paresthesias.   Psychiatric/Behavioral: Negative for altered mental status.           Objective:      Physical Exam   Constitutional: She is oriented to person, place, and time. She appears well-developed and well-nourished. No distress.   Cardiovascular:   Pulses:       Dorsalis pedis pulses are 2+ on the right side, and 2+ on the left side.        Posterior tibial pulses are 1+ on the right side, and 1+ on the left side.   CFT is < 3 seconds bilateral.  Pedal hair growth is decreased bilateral.  Varicosities noted bilateral.  Mild nonpitting lower extremity edema noted bilateral.  Toes are cool to touch bilateral.     Musculoskeletal: She exhibits tenderness. She exhibits no edema.   Muscle strength 5/5 in all muscle groups bilateral.  No tenderness nor crepitation with ROM of foot/ankle joints bilateral.  Mild pain with palpation of hypertrophic toenails  bilateral.  Bilateral pes planus.  Bilateral hallux abducto valgus.  Bilateral semi-rigid contracture of toes 2-5.  Instability with gait.  Increased angle and base of gait.  Decreased stride length and speed.   Neurological: She is alert and oriented to person, place, and time. She has normal strength. A sensory deficit is present.   Protective sensation per Meeker-Aishwarya monofilament is significantly decreased bilateral.  Vibratory sensation is absent bilateral.  Light touch is intact bilateral.   Skin: Skin is warm, dry and intact. Capillary refill takes less than 2 seconds. No abrasion, no bruising, no burn, no ecchymosis, no laceration, no lesion, no petechiae and no rash noted. She is not diaphoretic. No erythema. No pallor.   Pedal skin appears thin bilateral.  Toenails x 10 appear appear thickened by 6 mm's, elongated by 8 mm's, and discolored with subungual debris. Examination of the skin reveals no evidence of significant maceration, rashes, open lesions, suspicious appearing nevi or other concerning lesions.              Assessment:       Encounter Diagnoses   Name Primary?    Chemotherapy-induced peripheral neuropathy Yes    Valgus deformity of both great toes     Hammer toes of both feet     Onychomycosis due to dermatophyte     Gait instability          Plan:       Lizeth was seen today for foot pain.    Diagnoses and all orders for this visit:    Chemotherapy-induced peripheral neuropathy  -     Ambulatory consult to Physical Therapy    Valgus deformity of both great toes    Hammer toes of both feet    Onychomycosis due to dermatophyte    Gait instability  -     Ambulatory consult to Physical Therapy      I counseled the patient on her conditions, their implications and medical management.    - Orders written for PT to address gait instability, likely secondary to neuropathy induced by chemotherapy.  Advised to continue use of her assistive device as a third point of contact.      - Recommend  treating bilateral onychomycosis with daily application of Vicks vaporub daily x 6 months.      - Instructed to continue wearing shoe gear that accommodates for digital deformities.    - Shoe inspection. Patient instructed on proper foot hygeine. We discussed wearing proper shoe gear, daily foot inspections, never walking without protective shoe gear, never putting sharp instruments to feet, routine podiatric nail visits every 2-3 months.      - With patient's permission, nails were aggressively reduced and debrided x 10 to their soft tissue attachment mechanically and with electric , removing all offending nail and debris. Patient relates relief following the procedure. She will continue to monitor the areas daily, inspect her feet, wear protective shoe gear when ambulatory, moisturizer to maintain skin integrity and follow in this office in approximately 2-3 months, sooner p.r.n.    Ric Akbar DPM

## 2019-01-10 ENCOUNTER — CLINICAL SUPPORT (OUTPATIENT)
Dept: REHABILITATION | Facility: HOSPITAL | Age: 79
End: 2019-01-10
Attending: PODIATRIST
Payer: MEDICARE

## 2019-01-10 DIAGNOSIS — R26.89 FUNCTIONAL GAIT ABNORMALITY: ICD-10-CM

## 2019-01-10 PROCEDURE — 97161 PT EVAL LOW COMPLEX 20 MIN: CPT | Mod: PO | Performed by: PHYSICAL THERAPIST

## 2019-01-10 NOTE — PLAN OF CARE
[facility]  Physical Therapy Initial Evaluation    Name: Lizeth Henderson  Clinic Number: [id number]    Therapy Diagnosis:   Encounter Diagnosis   Name Primary?    Functional gait abnormality      Physician: Ric Akbar DPM    Physician Orders: PT Eval and Treat   Medical Diagnosis from Referral:   Chemotherapy-induced peripheral neuropathy   Gait instability   PT to address gait instability, likely secondary to neuropathy induced by chemotherapy     Evaluation Date: 1/10/2019  Authorization Period Expiration: 1/3/2020  Plan of Care Expiration: 03/08/2019  Visit # / Visits authorized: 1    Time In: 0720  Time Out: 0800  Total Billable Time: 40 minutes    Precautions: Standard and Fall    Subjective   Date of onset: 1/3/2019  History of current condition - Lizeth reports: having balance difficulty with walking at home. Last fall > 6 weeks ago. Patient reported having a cane in the past and now walking without out. Patient reported also getting order for walker to help with long distance walking outside the house.     Past Medical History:   Diagnosis Date    Adrenal adenoma 2016    Anxiety     Arthritis     Benign hypertension     Colon cancer age 61    colon    Coronary artery disease     Depression     Full dentures     General anesthetics causing adverse effect in therapeutic use     yells and talks when wakes up    Hyperlipidemia     Osteopenia     Shingles     Wears glasses      Lizeth Henderson  has a past surgical history that includes Salpingoophorectomy (2002); Hemicolectomy (2002); Breast biopsy (Left); TONSILLECTOMY, ADENOIDECTOMY; Patent ductus arterious ligation (1948); left toe surgery; Bunionectomy (Left); Hernia repair; Appendectomy; and Cataract extraction (Bilateral).    Lizeth has a current medication list which includes the following prescription(s): alendronate, aspirin, atorvastatin, buspirone, fenofibrate, folic acid-vit b6-vit b12 2.5-25-2 mg,  hydrochlorothiazide, losartan, mirabegron, sertraline, and trazodone.    Review of patient's allergies indicates:   Allergen Reactions    Lisinopril      cough        Imaging: noted noted    Prior Therapy: 2 years ago for right TKR 2016  Social History:  lives with their daughter  Occupation: Retired  Prior Level of Function: modified independent with cane.  Current Level of Function: modified independent, increase time    Pain:  Current 0/10, worst 0/10, best 0/10   Location: NA   Description: NA  Aggravating Factors: NA  Easing Factors: NA     Pts goals: To walk with more stability, improve posture. Increase strength to legs.    Objective     Posture:FHP, rounded shoulders, thoracic kyphosis, ingrid-pes planus  Sensation: intact to light touch  Vibratory sense absent @ malleolus and first MTP bilateral     Right Left Comment   Hip Flexion: 4/5 4-/5    Hip ABD: 4/5 4-/5    Hip ADD: 4+/5 4+/5          Knee Ext: 4+/5 4/5    Knee Flex: 4+/5 4/5        VILLELA Balance Assessment    1. Sitting to Standing   3 - able to stand independely using hands  2. Standing Unsupported   4 - unable to stand 30 seconds unassisted  3. Sitting Unsupported   4 - able to sit safely and securely 2 minutes  4. Standing to Sitting   3 - controls descent by using hands  5. Pivot Transfer   2 - able to transfer with verbal cuing and/or supervision  6. Standing with Eyes Closed   3 - able to stand 10 seconds with supervision  7. Standing with Feet Together   3 - able to place feet together independently and stand for 1 minute with supervision  8. Reaching Forward with Outstretched Arm   2 - can reach forward 5 cm/2 inches safely  9. Retrieving Object from Floor   3 - able to pick slipper but needs supervision  10. Turning to Look Behind   4 - looks behind from both sides and weights shifts well  11. Turning 360 Degrees   2 - able to turn 360 safely but slowly  12. Placing Alternate Foot on Step   1 - able to complete > 2 steps needs min  assist  13. Standing with One Foot in Front   1 - need help to step but can hold 15 seconds  14. Standing on One Foot   0 - unable to try or needs assistance to prevent fall    Total: 35  Maximum: 56    41-56= Independent  21-40= Walking with assistance  0-20= Wheelchair bound    GAIT: Lizeth ambulates 100 feet with no assistive device with modified independently, increase time noted.    GAIT DEVIATIONS: decreased bartolo, decreased step length to LLE    Transfers: modified independent, increase time noted, decreased body mechanics awareness.    Pt/family was provided educational information, including: role of PT, goals for PT, scheduling - pt verbalized understanding. Discussed insurance limitations with pt.     OTHER TESTS:  30 second Chair Rise 9 completed      Minimum standards/norms:    TUG:  < 13.5 seconds/ Males 7.3 sec, Females 8.1 sec  SLS:  26-29 sec  Ages 20-69  Self Selected Walking Speed:  .4-.8m/sec  Limited community ambulator                                                   .8- 1.2  Community ambulator                                                    1.2 m/sec and above  Able to safely cross streets  30 Second Chair Rise:  Ages 60-64  Males 14-19   Females  12-17      CMS Impairment/Limitation/Restriction for FOTO upper leg Survey    Therapist reviewed FOTO scores for Lizeth Henderson on 1/10/2019.   FOTO documents entered into [employer] ([employer]) - see Media section.    Limitation Score: 49%  Category: Mobility    Current : CK = at least 40% but < 60% impaired, limited or restricted  Goal: CK = at least 40% but < 60% impaired, limited or restricted         TREATMENT   Due to time constraints and patient safety, patient in agreement with HEP being addressing next therapy session with family member as well.  Home Exercises and Patient Education Provided    Education provided:   - next session, postural correction addressing in seated position.    Written Home Exercises Provided: next session.  Exercises were  reviewed and Lizeth was able to demonstrate them prior to the end of the session.  Lizeth demonstrated good  understanding of the education provided.(transfers)    Assessment   Lizeth is a 78 y.o. female referred to outpatient Physical Therapy with a medical diagnosis of Chemotherapy-induced peripheral neuropathy   Gait instability . Pt presents with postural imbalance.    Problem List: decreased ROM, decreased flexibility, decreased strength, decreased balance and stability and inability to participate fully in vocation pursuits.    Pt prognosis is Good.   Pt will benefit from skilled outpatient Physical Therapy to address the deficits stated above and in the chart below, provide pt/family education, and to maximize pt's level of independence.     Plan of care discussed with patient: Yes  Pt's spiritual, cultural and educational needs considered and patient is agreeable to the plan of care and goals as stated below:     Anticipated Barriers for therapy: transportation    Medical Necessity is demonstrated by the following  History  Co-morbidities and personal factors that may impact the plan of care Co-morbidities:   advanced age, anxiety and history of cancer    Personal Factors:   no deficits     high   Examination  Body Structures and Functions, activity limitations and participation restrictions that may impact the plan of care Body Regions:   lower extremities  trunk    Body Systems:    gross symmetry  ROM  strength  gross coordinated movement  balance  gait  transfers  transitions  motor control    Participation Restrictions:   Home management  Community ambulation    Activity limitations:   Learning and applying knowledge  no deficits    General Tasks and Commands  no deficits    Communication  no deficits    Mobility  lifting and carrying objects  walking    Self care  no deficits    Domestic Life  doing house work (cleaning house, washing dishes, laundry)    Interactions/Relationships  no deficits    Life  Areas  no deficits    Community and Social Life  no deficits         high   Clinical Presentation stable and uncomplicated low   Decision Making/ Complexity Score: low     Short Term GOALS:  In 4 weeks, pt. will:  - improve hip/quad MMT 1/2 grade for standing ADLs  - decrease outcome measure limitation to <45%  - demonstrate functional sit to stand testing up to 10 times for mobility purposes.  - negotiate stairs mod-I with good postural balance.    Long Term GOALS:  In 8 weeks, pt. will:  - be independent and compliant with HEP and SX management   - decrease outcome measure limitation to <40%  - ambulate > 150 ft with good standing postural response.  - demonstrate standing righting/protective response for safety purposes.  - demonstrate anterior loading over threshold independently.  - improve briggs balance by 5 points for ADL purposes.    Plan   Plan of care Certification: 1/10/2019 to 03/08/2019.  Outpatient Physical Therapy 2 times weekly for 8 weeks to include the following interventions: Gait Training, Manual Therapy, Moist Heat/ Ice, Neuromuscular Re-ed, Patient Education, Therapeutic Activites and Therapeutic Exercise.      Lizeth may at times be seen by a PTA as part of the Rehab Team.    Leo Rivero, PT

## 2019-01-15 ENCOUNTER — CLINICAL SUPPORT (OUTPATIENT)
Dept: REHABILITATION | Facility: HOSPITAL | Age: 79
End: 2019-01-15
Attending: PODIATRIST
Payer: MEDICARE

## 2019-01-15 DIAGNOSIS — R26.89 FUNCTIONAL GAIT ABNORMALITY: ICD-10-CM

## 2019-01-15 PROCEDURE — 97110 THERAPEUTIC EXERCISES: CPT | Mod: PO | Performed by: PHYSICAL THERAPIST

## 2019-01-15 NOTE — PROGRESS NOTES
"  Physical Therapy Daily Treatment Note     Name: Lizeth Henderson  Clinic Number: [id number]    Therapy Diagnosis:   Encounter Diagnosis   Name Primary?    Functional gait abnormality      Physician: Ric Akbar DPM    Visit Date: 1/15/2019  Medical Diagnosis from Referral:   Chemotherapy-induced peripheral neuropathy   Gait instability   PT to address gait instability, likely secondary to neuropathy induced by chemotherapy    Would benefit from gait and proprioceptive training     Evaluation Date: 1/10/2019  Authorization Period Expiration: 1/3/2020  Plan of Care Expiration: 03/08/2019  Visit # / Visits authorized: 2/12     Time In: 0700  Time Out: 0800  Total Billable Time: 30 minutes    Precautions: Standard and Fall    Subjective     Pt reports: feeling well this am and not using cane. No falls noted.  She was compliant with home exercise program.  Response to previous treatment: muscle soreness  Functional change: Too soon to tell    Pain: 0/10  Location: NA     Objective     Lizeth received therapeutic exercises to develop strength, endurance, ROM, flexibility, posture and core stabilization for 45 minutes including: (30 minutes one on one)  Recumbent bike x 10 minutes, low intensity  Seated: LAQ 2# 3/10 each  Supine: ball squeezes 3/10, 3" hold  S/L: CLAM shells with green band 3/10 each  SLR 2/10 each    Standing:hip abduction 2/10 each    Sit to stands: with vertical 10# stick 3/10, 23" height      Home Exercises Provided and Patient Education Provided     Education provided:   - Yes    Written Home Exercises Provided: Patient instructed to cont prior HEP.  Exercises were reviewed and Lizeth was able to demonstrate them prior to the end of the session.  Lizeth demonstrated good  understanding of the education provided.     See EMR under Media for exercises provided prior visit.    Assessment     Cueing on posture.  Decreased body mechanics with sit to stands with impaired safety awareness.    Lizeth is " progressing well towards her goals.   Pt prognosis is Good.     Pt will continue to benefit from skilled outpatient physical therapy to address the deficits listed in the problem list box on initial evaluation, provide pt/family education and to maximize pt's level of independence in the home and community environment.     Pt's spiritual, cultural and educational needs considered and pt agreeable to plan of care and goals.    Anticipated barriers to physical therapy: none    Goals:   Short Term GOALS:  In 4 weeks, pt. will:  - improve hip/quad MMT 1/2 grade for standing ADLs.(Progressing, not met)  - decrease outcome measure limitation to <45%(Progressing, not met)  - demonstrate functional sit to stand testing up to 10 times for mobility purposes.(Progressing, not met)  - negotiate stairs mod-I with good postural balance.(Progressing, not met)   Long Term GOALS:  In 8 weeks, pt. will:  - be independent and compliant with HEP and SX management (Progressing, not met)  - decrease outcome measure limitation to <40%(Progressing, not met)  - ambulate > 150 ft with good standing postural response.(Progressing, not met)  - demonstrate standing righting/protective response for safety purposes.(Progressing, not met)  - demonstrate anterior loading over threshold independently.(Progressing, not met)  - improve briggs balance by 5 points for ADL purposes.(Progressing, not met)    Plan     Continue with POC addressing leg strength, core and balance.    Leo Rivero, PT

## 2019-01-17 ENCOUNTER — CLINICAL SUPPORT (OUTPATIENT)
Dept: REHABILITATION | Facility: HOSPITAL | Age: 79
End: 2019-01-17
Payer: MEDICARE

## 2019-01-17 DIAGNOSIS — R26.89 FUNCTIONAL GAIT ABNORMALITY: ICD-10-CM

## 2019-01-17 PROCEDURE — 97110 THERAPEUTIC EXERCISES: CPT | Mod: PO | Performed by: PHYSICAL THERAPIST

## 2019-01-17 NOTE — PROGRESS NOTES
"  Physical Therapy Daily Treatment Note     Name: Lizeth Henderson  Clinic Number: [id number]    Therapy Diagnosis:   Encounter Diagnosis   Name Primary?    Functional gait abnormality      Physician: Ric Akbar DPM    Visit Date: 1/17/2019  Medical Diagnosis from Referral:   Chemotherapy-induced peripheral neuropathy   Gait instability   PT to address gait instability, likely secondary to neuropathy induced by chemotherapy   Would benefit from gait and proprioceptive training   Evaluation Date: 1/10/2019  Authorization Period Expiration: 1/3/2020  Plan of Care Expiration: 03/08/2019  Visit # / Visits authorized: 3/12     Time In: 0700  Time Out: 0755  Total Billable Time: 45 minutes    Precautions: Standard and Fall    Subjective     Pt reports: having no new s/s. Left hip pain intermittent with prolong sitting position.  She was compliant with home exercise program.  Response to previous treatment: muscle soreness  Functional change: Too soon to tell    Pain: 0/10  Location: NA     Objective     Lizeth received therapeutic exercises to develop strength, endurance, ROM, flexibility, posture and core stabilization for 45 minutes including:    Recumbent bike x 10 minutes, low intensity    Seated: LAQ 2# 3/10 each, altermating  Supine: ball squeezes 3/10, 3" hold  Supine: short lever bridge with glut sets 3/10  S/L: CLAM shells with green band 3/10 each  SLR 2/10 each  Supine: DKTC with green SW 3/10 (HS)    Standing:hip abduction 2/10 each      Leg press: 20# DL 3/10    Transfers:  Sit to stands: with vertical 10# stick 3/10, 22" height  Sit to stands: without UE support: 1/5    Anterior loading: pivot step to pattern 1/10 with CGA    Home Exercises Provided and Patient Education Provided     Education provided:   - Yes    Written Home Exercises Provided: Patient instructed to cont prior HEP.  Exercises were reviewed and Lizeth was able to demonstrate them prior to the end of the session.  Lizeth demonstrated " good  understanding of the education provided.     See EMR under Media for exercises provided prior visit.    Assessment   Iminprovement noted in S/L position with clams.  Cueing on posture.  CGA with anterior loading, pivot turns.    Lizeth is progressing well towards her goals.   Pt prognosis is Good.     Pt will continue to benefit from skilled outpatient physical therapy to address the deficits listed in the problem list box on initial evaluation, provide pt/family education and to maximize pt's level of independence in the home and community environment.     Pt's spiritual, cultural and educational needs considered and pt agreeable to plan of care and goals.    Anticipated barriers to physical therapy: none    Goals:   Short Term GOALS:  In 4 weeks, pt. will:  - improve hip/quad MMT 1/2 grade for standing ADLs.(Progressing, not met)  - decrease outcome measure limitation to <45%(Progressing, not met)  - demonstrate functional sit to stand testing up to 10 times for mobility purposes.(Progressing, not met)  - negotiate stairs mod-I with good postural balance.(Progressing, not met)   Long Term GOALS:  In 8 weeks, pt. will:  - be independent and compliant with HEP and SX management (Progressing, not met)  - decrease outcome measure limitation to <40%(Progressing, not met)  - ambulate > 150 ft with good standing postural response.(Progressing, not met)  - demonstrate standing righting/protective response for safety purposes.(Progressing, not met)  - demonstrate anterior loading over threshold independently.(Progressing, not met)  - improve briggs balance by 5 points for ADL purposes.(Progressing, not met)    Plan     Continue with POC addressing leg strength, core and balance.    Leo Rivero, PT

## 2019-01-22 ENCOUNTER — CLINICAL SUPPORT (OUTPATIENT)
Dept: REHABILITATION | Facility: HOSPITAL | Age: 79
End: 2019-01-22
Attending: PODIATRIST
Payer: MEDICARE

## 2019-01-22 DIAGNOSIS — R26.89 FUNCTIONAL GAIT ABNORMALITY: ICD-10-CM

## 2019-01-22 PROCEDURE — 97110 THERAPEUTIC EXERCISES: CPT | Mod: PO

## 2019-01-22 NOTE — PROGRESS NOTES
"  Physical Therapy Daily Treatment Note     Name: Lizeth Henderson  Clinic Number: [id number]    Therapy Diagnosis:   Encounter Diagnosis   Name Primary?    Functional gait abnormality      Physician: Ric Akbar DPM    Visit Date: 1/22/2019  Medical Diagnosis from Referral:   Chemotherapy-induced peripheral neuropathy   Gait instability   PT to address gait instability, likely secondary to neuropathy induced by chemotherapy   Would benefit from gait and proprioceptive training   Evaluation Date: 1/10/2019  Authorization Period Expiration: 1/3/2020  Plan of Care Expiration: 03/08/2019  Visit # / Visits authorized: 4/12     Time In: 1700  Time Out: 1750  Total Billable Time: 45 minutes    Precautions: Standard and Fall    Subjective     Pt reports: that she tripped over her choir robe on Sunday morning and fell into a wall. Patient states she did not fall onto the floor but she did catch herself with her left hand and now there is a bruise on her left wrist. Patient states she continues to walk her dog 3x a day and she feels like she is able to walk longer distances before she needs to take a break. She was compliant with home exercise program.  Response to previous treatment: muscle soreness  Functional change: Too soon to tell    Pain: 0/10  Location: NA     Objective     Lizeth received therapeutic exercises to develop strength, endurance, ROM, flexibility, posture and core stabilization for 45 minutes including:    Recumbent bike x 10 minutes, low intensity    Seated: LAQ 3# 3/10 each, altermating  Supine: ball squeezes 3/10, 3" hold  Supine: short lever bridge with glut sets 3/10  S/L: CLAM shells with green band 3/10 each  SLR 2/10 each  Supine: DKTC with green SW 3/10 (HS)    Standing:hip abduction 2/10 each  Heel raises 2/10  Leg press: 20# DL 3/10    Transfers:  Sit to stands: with vertical 10# stick 3/10, 22" height  Sit to stands: without UE support: 1/5    Anterior loading: pivot step to pattern 1/10 " with CGA    Home Exercises Provided and Patient Education Provided     Education provided:   - Yes    Written Home Exercises Provided: yes with red and green theraband.  Exercises were reviewed and Lizeth was able to demonstrate them prior to the end of the session. Lizeth demonstrated good  understanding of the education provided.     See EMR under Media for exercises provided 1/22/19.    Assessment   Lizeth tolerated treatment well today. Valgus collapse noted with sit to stand and leg press activities requiring tactile cueing to achieve correction. Patient with heavy hip flexion compensation when performing standing hip abduction indicating weak lateral gluts. Patient able to perform therapeutic exercise without complaints of pain.     Lizeth is progressing well towards her goals.   Pt prognosis is Good.     Pt will continue to benefit from skilled outpatient physical therapy to address the deficits listed in the problem list box on initial evaluation, provide pt/family education and to maximize pt's level of independence in the home and community environment.     Pt's spiritual, cultural and educational needs considered and pt agreeable to plan of care and goals.    Anticipated barriers to physical therapy: none    Goals:   Short Term GOALS:  In 4 weeks, pt. will:  - improve hip/quad MMT 1/2 grade for standing ADLs.(Progressing, not met)  - decrease outcome measure limitation to <45%(Progressing, not met)  - demonstrate functional sit to stand testing up to 10 times for mobility purposes.(Progressing, not met)  - negotiate stairs mod-I with good postural balance.(Progressing, not met)   Long Term GOALS:  In 8 weeks, pt. will:  - be independent and compliant with HEP and SX management (Progressing, not met)  - decrease outcome measure limitation to <40%(Progressing, not met)  - ambulate > 150 ft with good standing postural response.(Progressing, not met)  - demonstrate standing righting/protective response for  safety purposes.(Progressing, not met)  - demonstrate anterior loading over threshold independently.(Progressing, not met)  - improve briggs balance by 5 points for ADL purposes.(Progressing, not met)    Plan     Continue with POC addressing leg strength, core and balance.    Kylah Hobson, PTA

## 2019-01-24 ENCOUNTER — CLINICAL SUPPORT (OUTPATIENT)
Dept: REHABILITATION | Facility: HOSPITAL | Age: 79
End: 2019-01-24
Attending: PODIATRIST
Payer: MEDICARE

## 2019-01-24 DIAGNOSIS — R26.89 FUNCTIONAL GAIT ABNORMALITY: ICD-10-CM

## 2019-01-24 PROCEDURE — 97110 THERAPEUTIC EXERCISES: CPT | Mod: PO | Performed by: PHYSICAL THERAPIST

## 2019-01-24 NOTE — PROGRESS NOTES
"  Physical Therapy Daily Treatment Note     Name: Lizeth Henderson  Clinic Number: [id number]    Therapy Diagnosis:   Encounter Diagnosis   Name Primary?    Functional gait abnormality      Physician: Ric Akbar DPM    Visit Date: 1/24/2019  Medical Diagnosis from Referral:   Chemotherapy-induced peripheral neuropathy   Gait instability   PT to address gait instability, likely secondary to neuropathy induced by chemotherapy   Would benefit from gait and proprioceptive training   Evaluation Date: 1/10/2019  Authorization Period Expiration: 1/3/2020  Plan of Care Expiration: 03/08/2019  Visit # / Visits authorized: 5/12     Time In: 0700  Time Out: 0735  Total Billable Time: 30 minutes    Precautions: Standard and Fall    Subjective     Pt reports: feeling better and good energy from previous session. No falls noted. Patient reported having to leave early due to prior engagements/MD.. She was compliant with home exercise program.  Response to previous treatment: muscle soreness  Functional change: Too soon to tell    Pain: 0/10  Location: NA     Objective     Lizeth received therapeutic exercises to develop strength, endurance, ROM, flexibility, posture and core stabilization for 30 minutes including:    Recumbent bike x 10 minutes, low intensity    Seated: LAQ 3# 3/10 each, altermating  Supine: ball squeezes 3/10, 3" hold  Supine: short lever bridge with glut sets 3/10, with green band  S/L: CLAM shells with green band 3/10 each    Standing:hip abduction 3/10 each  Heel raises 2/10    Transfers:  Sit to stands: with vertical 10# stick 3/10, 22" height  Sit to stands: without UE support: 1/5    Anterior loading: pivot step to pattern 1/10 with CGA (previous)    Home Exercises Provided and Patient Education Provided     Education provided:   - Yes    Written Home Exercises Provided: yes with red and green theraband.  Exercises were reviewed and Lizeth was able to demonstrate them prior to the end of the session. " Lizeth demonstrated good  understanding of the education provided.     See EMR under Media for exercises provided 1/22/19.    Assessment   Lizeth tolerated therapy without increase in s/s. Cueing on posture.  Sit to stands improving with < use of hands. Cueing on knee position to avoid increases valgus as well.    Lizeth is progressing well towards her goals.   Pt prognosis is Good.     Pt will continue to benefit from skilled outpatient physical therapy to address the deficits listed in the problem list box on initial evaluation, provide pt/family education and to maximize pt's level of independence in the home and community environment.     Pt's spiritual, cultural and educational needs considered and pt agreeable to plan of care and goals.    Anticipated barriers to physical therapy: none    Goals:   Short Term GOALS:  In 4 weeks, pt. will:  - improve hip/quad MMT 1/2 grade for standing ADLs.(Progressing, not met)  - decrease outcome measure limitation to <45%(Progressing, not met)  - demonstrate functional sit to stand testing up to 10 times for mobility purposes.(Progressing, not met)  - negotiate stairs mod-I with good postural balance.(Progressing, not met)   Long Term GOALS:  In 8 weeks, pt. will:  - be independent and compliant with HEP and SX management (Progressing, not met)  - decrease outcome measure limitation to <40%(Progressing, not met)  - ambulate > 150 ft with good standing postural response.(Progressing, not met)  - demonstrate standing righting/protective response for safety purposes.(Progressing, not met)  - demonstrate anterior loading over threshold independently.(Progressing, not met)  - improve briggs balance by 5 points for ADL purposes.(Progressing, not met)    Plan     Continue with POC addressing leg strength, core and balance.    Leo Rivero, PT

## 2019-01-27 RX ORDER — SERTRALINE HYDROCHLORIDE 100 MG/1
TABLET, FILM COATED ORAL
Qty: 180 TABLET | Refills: 0 | Status: SHIPPED | OUTPATIENT
Start: 2019-01-27 | End: 2019-02-14 | Stop reason: SDUPTHER

## 2019-01-31 ENCOUNTER — CLINICAL SUPPORT (OUTPATIENT)
Dept: REHABILITATION | Facility: HOSPITAL | Age: 79
End: 2019-01-31
Attending: INTERNAL MEDICINE
Payer: MEDICARE

## 2019-01-31 DIAGNOSIS — R26.89 FUNCTIONAL GAIT ABNORMALITY: ICD-10-CM

## 2019-01-31 PROCEDURE — 97110 THERAPEUTIC EXERCISES: CPT | Mod: HCNC,PO | Performed by: PHYSICAL THERAPIST

## 2019-01-31 NOTE — PROGRESS NOTES
"  Physical Therapy Daily Treatment Note     Name: Lizeth Henderson  Clinic Number: [id number]    Therapy Diagnosis:   Encounter Diagnosis   Name Primary?    Functional gait abnormality      Physician: Ric Akbar DPM    Visit Date: 1/31/2019  Medical Diagnosis from Referral:   Chemotherapy-induced peripheral neuropathy   Gait instability   PT to address gait instability, likely secondary to neuropathy induced by chemotherapy   Would benefit from gait and proprioceptive training   Evaluation Date: 1/10/2019  Authorization Period Expiration: 1/3/2020  Plan of Care Expiration: 03/08/2019  Visit # / Visits authorized: 6/12     Time In: 0700  Time Out:0800  Total Billable Time: 30 minutes    Precautions: Standard and Fall    Subjective     Pt reports: having no new s/s and will be possibly getting an order for DME addressing a rolator for community distances. No falls noted. She was compliant with home exercise program.  Response to previous treatment: muscle soreness  Functional change: walking more in the house without cane.    Pain: 0/10  Location: NA     Objective     Lizeth received therapeutic exercises to develop strength, endurance, ROM, flexibility, posture and core stabilization for 45 minutes including: (30 minutes one on one)    Recumbent bike x 10 minutes, low intensity    Seated: LAQ 3# 3/10 each, altermating  Supine: ball squeezes 3/10, 3" hold  Supine: short lever bridge with glut sets 3/10, with green band  S/L: CLAM shells with green band 3/10 each    Standing:hip abduction 3/10 each  Heel raises 2/10    Transfers:  Sit to stands: with vertical 10# stick 3/10, 22" height  Sit to stands: without UE support: 1/5    Anterior loading: pivot step to pattern 1/10 with CGA (previous)    Home Exercises Provided and Patient Education Provided     Education provided:   - Yes    Written Home Exercises Provided: yes with red and green theraband.  Exercises were reviewed and Lizeth was able to demonstrate them " prior to the end of the session. Lizeth demonstrated good  understanding of the education provided.     See EMR under Media for exercises provided 1/22/19.    Assessment   Patient demonstrated good tolerance with TE and required 50% cueing on posture.  Increased time with turning during ambulatory tasks.    Lizeth is progressing well towards her goals.   Pt prognosis is Good.     Pt will continue to benefit from skilled outpatient physical therapy to address the deficits listed in the problem list box on initial evaluation, provide pt/family education and to maximize pt's level of independence in the home and community environment.     Pt's spiritual, cultural and educational needs considered and pt agreeable to plan of care and goals.    Anticipated barriers to physical therapy: none    Goals:   Short Term GOALS:  In 4 weeks, pt. will:  - improve hip/quad MMT 1/2 grade for standing ADLs.(Progressing, not met)  - decrease outcome measure limitation to <45%(Progressing, not met)  - demonstrate functional sit to stand testing up to 10 times for mobility purposes.Met, 1/31/2019  - negotiate stairs mod-I with good postural balance.(Progressing, not met)   Long Term GOALS:  In 8 weeks, pt. will:  - be independent and compliant with HEP and SX management (Progressing, not met)  - decrease outcome measure limitation to <40%(Progressing, not met)  - ambulate > 150 ft with good standing postural response.(Progressing, not met)  - demonstrate standing righting/protective response for safety purposes.(Progressing, not met)  - demonstrate anterior loading over threshold independently.(Progressing, not met)  - improve briggs balance by 5 points for ADL purposes.(Progressing, not met)    Plan     Continue with POC addressing leg strength, core and balance.    Leo Rivero, PT

## 2019-02-07 ENCOUNTER — CLINICAL SUPPORT (OUTPATIENT)
Dept: REHABILITATION | Facility: HOSPITAL | Age: 79
End: 2019-02-07
Attending: PODIATRIST
Payer: MEDICARE

## 2019-02-07 DIAGNOSIS — R26.89 FUNCTIONAL GAIT ABNORMALITY: ICD-10-CM

## 2019-02-07 PROCEDURE — 97110 THERAPEUTIC EXERCISES: CPT | Mod: HCNC,PO | Performed by: PHYSICAL THERAPIST

## 2019-02-07 NOTE — PROGRESS NOTES
"  Physical Therapy Daily Treatment Note     Name: Lizeth Henderson  Clinic Number: [id number]    Therapy Diagnosis:   Encounter Diagnosis   Name Primary?    Functional gait abnormality      Physician: Ric Akbar DPM    Visit Date: 2/7/2019  Medical Diagnosis from Referral:   Chemotherapy-induced peripheral neuropathy   Gait instability   PT to address gait instability, likely secondary to neuropathy induced by chemotherapy   Would benefit from gait and proprioceptive training   Evaluation Date: 1/10/2019  Authorization Period Expiration: 1/3/2020  Plan of Care Expiration: 03/08/2019  Visit # / Visits authorized: 7/12     Time In: 0700  Time Out:0730  Total Billable Time: 25 minutes    Precautions: Standard and Fall    Subjective     Pt reports: doing well with no falls noted. Patient reported having to leave early due to MD appt. She was compliant with home exercise program.  Response to previous treatment: muscle soreness  Functional change: walking more in the house without cane.    Pain: 0/10  Location: NA     Objective     Lizeth received therapeutic exercises to develop strength, endurance, ROM, flexibility, posture and core stabilization for 30 minutes including: (25 minutes one on one)    Recumbent bike x 10 minutes, low intensity    Seated: LAQ 3# 3/10 each, altermating  Supine: ball squeezes 3/10, 3" hold  Supine: short lever bridge with glut sets 3/10, with green band  S/L: CLAM shells with green band 3/10 each    Standing:hip abduction 3/10 each (previous)  Heel raises 2/10 (previous)    Transfers:  Sit to stands: with vertical 10# stick 3/10, 22" height  Sit to stands: without UE support: 1/5    Anterior loading: pivot step to pattern 1/10 with CGA (previous)    Stair negotiations: x 5,  modified independent    Home Exercises Provided and Patient Education Provided     Education provided:   - Yes    Written Home Exercises Provided: yes with red and green theraband.  Exercises were reviewed and Lizeth " was able to demonstrate them prior to the end of the session. Lizeth demonstrated good  understanding of the education provided.     See EMR under Media for exercises provided 1/22/19.    Assessment   Negotiating stairs goal met Mod-I.  Improvement with gait.    Lizeth is progressing well towards her goals.   Pt prognosis is Good.     Pt will continue to benefit from skilled outpatient physical therapy to address the deficits listed in the problem list box on initial evaluation, provide pt/family education and to maximize pt's level of independence in the home and community environment.     Pt's spiritual, cultural and educational needs considered and pt agreeable to plan of care and goals.    Anticipated barriers to physical therapy: none    Goals:   Short Term GOALS:  In 4 weeks, pt. will:  - improve hip/quad MMT 1/2 grade for standing ADLs.(Progressing, not met)  - decrease outcome measure limitation to <45%(Progressing, not met)  - demonstrate functional sit to stand testing up to 10 times for mobility purposes.Met, 1/31/2019  - negotiate stairs mod-I with good postural balance. Met, 2/7/2019  Long Term GOALS:  In 8 weeks, pt. will:  - be independent and compliant with HEP and SX management (Progressing, not met)  - decrease outcome measure limitation to <40%(Progressing, not met)  - ambulate > 150 ft with good standing postural response.(Progressing, not met)  - demonstrate standing righting/protective response for safety purposes.(Progressing, not met)  - demonstrate anterior loading over threshold independently.(Progressing, not met)  - improve briggs balance by 5 points for ADL purposes.(Progressing, not met)    Plan     Continue with POC addressing leg strength, core and balance.    Leo Rivero, PT

## 2019-02-14 ENCOUNTER — CLINICAL SUPPORT (OUTPATIENT)
Dept: REHABILITATION | Facility: HOSPITAL | Age: 79
End: 2019-02-14
Attending: PODIATRIST
Payer: MEDICARE

## 2019-02-14 ENCOUNTER — OFFICE VISIT (OUTPATIENT)
Dept: PSYCHIATRY | Facility: CLINIC | Age: 79
End: 2019-02-14
Payer: MEDICARE

## 2019-02-14 ENCOUNTER — HOSPITAL ENCOUNTER (OUTPATIENT)
Dept: RADIOLOGY | Facility: HOSPITAL | Age: 79
Discharge: HOME | End: 2019-02-14
Attending: INTERNAL MEDICINE
Payer: MEDICARE

## 2019-02-14 ENCOUNTER — TELEPHONE (OUTPATIENT)
Dept: PODIATRY | Facility: CLINIC | Age: 79
End: 2019-02-14

## 2019-02-14 VITALS
HEART RATE: 80 BPM | HEIGHT: 68 IN | SYSTOLIC BLOOD PRESSURE: 139 MMHG | DIASTOLIC BLOOD PRESSURE: 76 MMHG | WEIGHT: 183.19 LBS | BODY MASS INDEX: 27.76 KG/M2

## 2019-02-14 DIAGNOSIS — R26.81 GAIT INSTABILITY: Primary | ICD-10-CM

## 2019-02-14 DIAGNOSIS — F33.1 MAJOR DEPRESSIVE DISORDER, RECURRENT EPISODE, MODERATE: ICD-10-CM

## 2019-02-14 DIAGNOSIS — Z78.0 ASYMPTOMATIC POSTMENOPAUSAL STATE: ICD-10-CM

## 2019-02-14 DIAGNOSIS — M85.80 OSTEOPENIA, UNSPECIFIED LOCATION: ICD-10-CM

## 2019-02-14 DIAGNOSIS — F41.1 GENERALIZED ANXIETY DISORDER: ICD-10-CM

## 2019-02-14 DIAGNOSIS — R26.89 FUNCTIONAL GAIT ABNORMALITY: ICD-10-CM

## 2019-02-14 PROCEDURE — 99999 PR PBB SHADOW E&M-EST. PATIENT-LVL III: CPT | Mod: PBBFAC,HCNC,, | Performed by: PSYCHIATRY & NEUROLOGY

## 2019-02-14 PROCEDURE — 97110 THERAPEUTIC EXERCISES: CPT | Mod: HCNC,PO

## 2019-02-14 PROCEDURE — 90833 PR PSYCHOTHERAPY W/PATIENT W/E&M, 30 MIN (ADD ON): ICD-10-PCS | Mod: HCNC,S$GLB,, | Performed by: PSYCHIATRY & NEUROLOGY

## 2019-02-14 PROCEDURE — 3075F PR MOST RECENT SYSTOLIC BLOOD PRESS GE 130-139MM HG: ICD-10-PCS | Mod: HCNC,CPTII,S$GLB, | Performed by: PSYCHIATRY & NEUROLOGY

## 2019-02-14 PROCEDURE — 99213 OFFICE O/P EST LOW 20 MIN: CPT | Mod: HCNC,S$GLB,, | Performed by: PSYCHIATRY & NEUROLOGY

## 2019-02-14 PROCEDURE — 1101F PR PT FALLS ASSESS DOC 0-1 FALLS W/OUT INJ PAST YR: ICD-10-PCS | Mod: HCNC,CPTII,S$GLB, | Performed by: PSYCHIATRY & NEUROLOGY

## 2019-02-14 PROCEDURE — 99213 PR OFFICE/OUTPT VISIT, EST, LEVL III, 20-29 MIN: ICD-10-PCS | Mod: HCNC,S$GLB,, | Performed by: PSYCHIATRY & NEUROLOGY

## 2019-02-14 PROCEDURE — 77080 DXA BONE DENSITY AXIAL: CPT | Mod: TC,HCNC,PO

## 2019-02-14 PROCEDURE — 77080 DEXA BONE DENSITY SPINE HIP: ICD-10-PCS | Mod: 26,HCNC,, | Performed by: RADIOLOGY

## 2019-02-14 PROCEDURE — 3075F SYST BP GE 130 - 139MM HG: CPT | Mod: HCNC,CPTII,S$GLB, | Performed by: PSYCHIATRY & NEUROLOGY

## 2019-02-14 PROCEDURE — 77080 DXA BONE DENSITY AXIAL: CPT | Mod: 26,HCNC,, | Performed by: RADIOLOGY

## 2019-02-14 PROCEDURE — 1101F PT FALLS ASSESS-DOCD LE1/YR: CPT | Mod: HCNC,CPTII,S$GLB, | Performed by: PSYCHIATRY & NEUROLOGY

## 2019-02-14 PROCEDURE — 90833 PSYTX W PT W E/M 30 MIN: CPT | Mod: HCNC,S$GLB,, | Performed by: PSYCHIATRY & NEUROLOGY

## 2019-02-14 PROCEDURE — 3078F PR MOST RECENT DIASTOLIC BLOOD PRESSURE < 80 MM HG: ICD-10-PCS | Mod: HCNC,CPTII,S$GLB, | Performed by: PSYCHIATRY & NEUROLOGY

## 2019-02-14 PROCEDURE — 99999 PR PBB SHADOW E&M-EST. PATIENT-LVL III: ICD-10-PCS | Mod: PBBFAC,HCNC,, | Performed by: PSYCHIATRY & NEUROLOGY

## 2019-02-14 PROCEDURE — 3078F DIAST BP <80 MM HG: CPT | Mod: HCNC,CPTII,S$GLB, | Performed by: PSYCHIATRY & NEUROLOGY

## 2019-02-14 RX ORDER — SERTRALINE HYDROCHLORIDE 100 MG/1
TABLET, FILM COATED ORAL
Qty: 180 TABLET | Refills: 1 | Status: SHIPPED | OUTPATIENT
Start: 2019-02-14 | End: 2019-06-12 | Stop reason: SDUPTHER

## 2019-02-14 RX ORDER — TRAZODONE HYDROCHLORIDE 150 MG/1
TABLET ORAL
Qty: 90 TABLET | Refills: 0 | Status: SHIPPED | OUTPATIENT
Start: 2019-02-14 | End: 2019-06-12 | Stop reason: SDUPTHER

## 2019-02-14 RX ORDER — BUSPIRONE HYDROCHLORIDE 10 MG/1
10 TABLET ORAL 2 TIMES DAILY
Qty: 180 TABLET | Refills: 0 | Status: SHIPPED | OUTPATIENT
Start: 2019-02-14 | End: 2019-05-31 | Stop reason: SDUPTHER

## 2019-02-14 RX ORDER — BUPROPION HYDROCHLORIDE 100 MG/1
100 TABLET, EXTENDED RELEASE ORAL DAILY
Qty: 30 TABLET | Refills: 1 | Status: SHIPPED | OUTPATIENT
Start: 2019-02-14 | End: 2019-05-31 | Stop reason: SDUPTHER

## 2019-02-14 NOTE — PROGRESS NOTES
Outpatient Psychiatry Follow-Up Visit (MD/NP)    2/14/2019    Clinical Status of Patient:  Outpatient (Ambulatory)    Chief Complaint:  Lizeth Henderson is a 78 y.o. female who presents today for follow-up of anxiety, depression.   Met with patient alone    Interval History and Content of Current Session:  Interim Events/Subjective Report/Content of Current Session: Follow up appointment.    77 yo  retired female with h/o of depression onset in the early 1980's.  Depressive symptoms recurred in 2004 following the death of her . Hx of lifelong worry.     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 s/p Total Knee Arthroplasty 10/18/16     MOCA 3/17 25/30   MOCA  1/16 23/30 (missed clock hands, cube, serial 7s, delayed recall)    Interim Hx:  No medication changes last visit.  Pt presents after one year.  Reports she has not been doing as well due to several psychosocial stressors.   Daughter Ruba has problems with uncontrolled spending. They were in process of being evicted from apartment (a second time) and now are trying to get caught up on rent   Current apartment is comfortable, she is able to walk the dog. Been there one year.  Only met one person at [facility].     Grandson in jail;. He asks her to call around to look for half way house for him. She falls for this; he has been scamming people from [employer].    Daughter Veronica is doing better.  Her oldest daughter Connie she suspects has relapsed on drugs  Not heard from her since July.     Pt reports feeling very russell, less irritable (used  to have road rage at home).. Ruba will say something sometimes about her being irritable.   Sleeps welll.  Was playing computer games. Reads some. She has some issues with forgetfulness, focus.   Joined [employer].     Made a friend through [employer]  + motivation issues, does some housework.  Sadness related to not driving.   Panic attacks are occurring like she used to get. Denies anticipatory anxiety or agoraphobia. + issues with sleep onset.   Can control worry with getting busy     Memory not great; loses train of thought. No issues with management of her own meds, no fiances to manage.  Daughter tells her she repeats herself.   Appetite tends to be high.   Weaning self off of diet coke, drinks tea   No alcohol   Denies symptoms of jossy/psychosis.   Denies suicidal/homicidal ideations.    MMSE 30/30 with normal clock drawing  PHQ-9: somewhat difficult (depressed mood, anhedonia, tired, appetite changes, feeling bad about herself)  DASH-7: somewhat difficult (worryng too much, nervous, impending doom, irritable, restless)    Psychotherapy:   · Target symptoms: depression, anxiety  · Why chosen therapy is appropriate versus another modality: relevant to diagnosis, patient responds to this modality  · Outcome monitoring methods: self-report, observation  · Therapeutic intervention type: supportive psychotherapy  · Topics discussed/themes: building skills sets for symptom management, symptom recognition, nutrition, setting boundaries   · The patient's response to the intervention is accepting. The patient's progress toward treatment goals is fair progress.  · Duration of intervention: 20 minutes      Review of Systems     Psychiatry: see HPI   Constitutional: wt stable   Musculoskeletal: right knee pain 6/10, no falls  Neurology:  Memory issues     Past Medical, Family and Social History: The patient's past medical, family and social history have been reviewed and updated as appropriate within the electronic medical  "record - see encounter notes.    Medication:   Zoloft 200 mg daily  Trazodone 150 mg nightly  Buspar 10 mg BID   Foltx 1 tab daily     Compliance: yes    Side effects:  None reported today     Risk Parameters:  Patient reports no suicidal ideation  Patient reports no homicidal ideation  Patient reports no self-injurious behavior  Patient reports no violent behavior       Exam (detailed: at least 9 elements; comprehensive: all 15 elements)   Constitutional  Vitals:  Most recent vital signs, dated more than 90 days prior to this appointment, were reviewed.    See EPIC entry for today's vital signs.       General:  unremarkable, age appropriate, casually dressed, good eye contact, at times, not tearful, well dressed      Musculoskeletal  Muscle Strength/Tone:  No tremor    Gait & Station:  Slow, steady      Psychiatric  Speech:  no latency; no press, spontaneous   Mood & Affect:  "depressed"  Full range, smiles at times   Thought Process:  Linear    Associations:  intact   Thought Content:  no active or passive SI today, no homicidality, delusions, or paranoia, hallucinations: (auditory: no, visual: no)      Insight:  Improved    Judgement: Adequate to circumstances    Orientation:  Alert and oriented x 4    Memory: Intact for content of interview   Language: Grossly intact    Attention Span & Concentration:  Grossly intact   Fund of Knowledge:  Intact for patient's level of education     Assessment and Diagnosis   Status/Progress: Based on the examination today, the patient's problem(s) is under inadequate control but improved.   New problems have not been presented today.  Comorbidities are currently complicating management of the primary condition.      General Impression:  Overall, he was improved on medication regimen, but with continued high psychosocial stressors and upcoming move.  pt has previously weaned off of clonazepam w/ improved cognition off of it. Anxiety and depression improved, but remain high.  She " does not appear depressed and is functioning relatively well, but stressors and family conflicts are impacting her mood, anxiety symptoms. MMSE 30/30 with normal clock drawing.  Depression/anxiety are likely what is affecting pt's cognition.     MDD, recurrent, mild    DSAH  Hx Binge Eating Disorder   Cognitive Disorder, unspecified.     HTN, dyslipidemia, elevated fasting glucose.  hx colon cancer, adrenal nodule, s/p knee replacement, HTN    GAF: 60    Intervention/Counseling/Treatment Plan   · Medication Management: Continue current medications. The risks and benefits of medication were discussed with the patient.  · Counseling provided with patient as follows: importance of compliance with chosen treatment options was emphasized, risks and benefits of treatment options, including medications, were discussed with the patient    1. Continue Zoloft  200 mg daily.      2. Continue Trazodone 150  mg QHS prn insomnia - discussed potential for oversedation.      3. Continue Foltx 1 tab daily     4. Reviewed risks of serotonin syndrome with patient due to multiple medications - potential symptoms and potential severity reviewed.     5. Add Wellbutrin  mg daily.  Discussed increased risk of seizures, anxiety, insomnia     6. Pt instructed to go to ER with thoughts of harming self, others. Call to report any worsening of symptoms or problems with the medication    7. Psychotherapy is recommended.     8. Continue Buspirone 10 mg BID to target anxiety     9. Given contact info for [facility] in [place]       Return to Clinic: 6 weeks

## 2019-02-14 NOTE — TELEPHONE ENCOUNTER
----- Message from Joceline Cole sent at 2/14/2019  1:35 PM CST -----  Contact: pt  Pt calling states physical therapist Feroz said need something from the Dr to get the pt a walker,so she would like the office to please call him at [phone].

## 2019-02-14 NOTE — PROGRESS NOTES
"  Physical Therapy Daily Treatment Note     Name: Lizeth Henderson  Clinic Number: [id number]    Therapy Diagnosis:   Encounter Diagnosis   Name Primary?    Functional gait abnormality      Physician: Ric Akbar DPM    Visit Date: 2/14/2019  Medical Diagnosis from Referral:   Chemotherapy-induced peripheral neuropathy   Gait instability   PT to address gait instability, likely secondary to neuropathy induced by chemotherapy   Would benefit from gait and proprioceptive training   Evaluation Date: 1/10/2019  Authorization Period Expiration: 1/3/2020  Plan of Care Expiration: 03/08/2019  Visit # / Visits authorized: 8/12     Time In: 0700  Time Out:0750  Total Billable Time: 45 minutes    Precautions: Standard and Fall    Subjective     Pt reports: doing well with no falls noted. No complaints today. Requests to leave 10 minutes early due to appoint for Bone Scan  Response to previous treatment: muscle soreness  Functional change: walking more in the house without cane.    Pain: 0/10  Location: NA     Objective     Lizeth received therapeutic exercises to develop strength, endurance, ROM, flexibility, posture and core stabilization for 30 minutes including:     Recumbent bike x 10 minutes, low intensity    Seated: LAQ 3# 3/10 each, altermating  Supine: ball squeezes 3/10, 3" hold  Supine: short lever bridge with glut sets 3/10, with green band  S/L: CLAM shells with green band 3/10 each  Mini Squats, VCs for posture 3/10  Standing:hip abduction 3/10 each  Heel raises 2/10 (previous)    Transfers:  Sit to stands: with vertical 10# stick 3/10, 22" height  Sit to stands: without UE support: 1/5    Anterior loading: pivot step to pattern 1/10 with CGA (previous)    Stair negotiations: x 5,  modified independent    Home Exercises Provided and Patient Education Provided     Education provided:   - Yes    Written Home Exercises Provided: yes with red and green theraband.  Exercises were reviewed and Lizeth was able to " demonstrate them prior to the end of the session. Lizeth demonstrated good  understanding of the education provided.     See EMR under Media for exercises provided 1/22/19.    Assessment   Pt dimitri tx with ther ex well, frequent rest breaks required. Pt required VCs for posture and to decrease knee valgus with exs.  Improvement with gait.    Lizeth is progressing well towards her goals.   Pt prognosis is Good.     Pt will continue to benefit from skilled outpatient physical therapy to address the deficits listed in the problem list box on initial evaluation, provide pt/family education and to maximize pt's level of independence in the home and community environment.     Pt's spiritual, cultural and educational needs considered and pt agreeable to plan of care and goals.    Anticipated barriers to physical therapy: none    Goals:   Short Term GOALS:  In 4 weeks, pt. will:  - improve hip/quad MMT 1/2 grade for standing ADLs.(Progressing, not met)  - decrease outcome measure limitation to <45%(Progressing, not met)  - demonstrate functional sit to stand testing up to 10 times for mobility purposes.Met, 1/31/2019  - negotiate stairs mod-I with good postural balance. Met, 2/7/2019  Long Term GOALS:  In 8 weeks, pt. will:  - be independent and compliant with HEP and SX management (Progressing, not met)  - decrease outcome measure limitation to <40%(Progressing, not met)  - ambulate > 150 ft with good standing postural response.(Progressing, not met)  - demonstrate standing righting/protective response for safety purposes.(Progressing, not met)  - demonstrate anterior loading over threshold independently.(Progressing, not met)  - improve briggs balance by 5 points for ADL purposes.(Progressing, not met)    Plan     Continue with POC addressing leg strength, core and balance.    Lazarus Bustillo, PTA

## 2019-02-14 NOTE — PATIENT INSTRUCTIONS
[facility], call [phone].     Continue all meds except ADD Bupropion SR (Wellbutrin) 100 mg in AM for depression booster

## 2019-02-14 NOTE — TELEPHONE ENCOUNTER
Spoke with Feroz DACOSTA here at [facility] he thought patient would benefit very much from a walker or Rolator. He asked if order could be placed and if/when done to contact Sydnie GARCIA here to get this placed for the patient.     Please advise. Thank you.

## 2019-02-19 ENCOUNTER — CLINICAL SUPPORT (OUTPATIENT)
Dept: REHABILITATION | Facility: HOSPITAL | Age: 79
End: 2019-02-19
Attending: PODIATRIST
Payer: MEDICARE

## 2019-02-19 DIAGNOSIS — R26.89 FUNCTIONAL GAIT ABNORMALITY: ICD-10-CM

## 2019-02-19 PROCEDURE — 97110 THERAPEUTIC EXERCISES: CPT | Mod: HCNC,PO

## 2019-02-19 NOTE — PROGRESS NOTES
"  Physical Therapy Daily Treatment Note     Name: Lizeth Henderson  Clinic Number: [id number]    Therapy Diagnosis:   Encounter Diagnosis   Name Primary?    Functional gait abnormality      Physician: Ric Akbar DPM    Visit Date: 2/19/2019  Medical Diagnosis from Referral:   Chemotherapy-induced peripheral neuropathy   Gait instability   PT to address gait instability, likely secondary to neuropathy induced by chemotherapy   Would benefit from gait and proprioceptive training   Evaluation Date: 1/10/2019  Authorization Period Expiration: 1/3/2020  Plan of Care Expiration: 03/08/2019  Visit # / Visits authorized: 9/12     Time In: 0700  Time Out:0750  Total Billable Time: 45 minutes    Precautions: Standard and Fall    Subjective     Pt reports: doing well with no falls noted. No complaints today. Requests to leave 10 minutes early due to appoint for Bone Scan  Response to previous treatment: muscle soreness  Functional change: walking more in the house without cane.    Pain: 0/10  Location: NA     Objective     Lizeth received therapeutic exercises to develop strength, endurance, ROM, flexibility, posture and core stabilization for 30 minutes including:     Recumbent bike x 10 minutes, low intensity  Sravan Chi walking 45' x 1  Seated: LAQ 3# 3/10 each, altermating  Supine: ball squeezes 3/10, 3" hold  Supine: short lever bridge with glut sets 3/10, with green band  S/L: CLAM shells  3/10 each L only due to hip pn with R SL  Mini Squats, VCs for posture 3/10  Standing:hip abduction 3/10 each  Heel raises 2/10    Transfers:  Sit to stands: with vertical 10# stick 3/10, 22" height  Sit to stands: without UE support: 1/5    Anterior loading: pivot step to pattern 1/10 with CGA (previous)    Stair negotiations: x 5,  modified independent    Home Exercises Provided and Patient Education Provided     Education provided:   - Yes    Written Home Exercises Provided: yes with red and green theraband.  Exercises were " reviewed and Lizeth was able to demonstrate them prior to the end of the session. Lizeth demonstrated good  understanding of the education provided.     See EMR under Media for exercises provided 1/22/19.    Assessment   Pt dimitri tx with ther ex well, with the exception of attempted L SL which provoked L hip pn. Pt with frequent rest breaks required. Pt required VCs for posture and to decrease knee valgus with exs. Some minor bal issues with Sravan Chi walking but she was able to recover w/o A.   Improvement with gait.    Lizeth is progressing well towards her goals.   Pt prognosis is Good.     Pt will continue to benefit from skilled outpatient physical therapy to address the deficits listed in the problem list box on initial evaluation, provide pt/family education and to maximize pt's level of independence in the home and community environment.     Pt's spiritual, cultural and educational needs considered and pt agreeable to plan of care and goals.    Anticipated barriers to physical therapy: none    Goals:   Short Term GOALS:  In 4 weeks, pt. will:  - improve hip/quad MMT 1/2 grade for standing ADLs.(Progressing, not met)  - decrease outcome measure limitation to <45%(Progressing, not met)  - demonstrate functional sit to stand testing up to 10 times for mobility purposes.Met, 1/31/2019  - negotiate stairs mod-I with good postural balance. Met, 2/7/2019  Long Term GOALS:  In 8 weeks, pt. will:  - be independent and compliant with HEP and SX management (Progressing, not met)  - decrease outcome measure limitation to <40%(Progressing, not met)  - ambulate > 150 ft with good standing postural response.(Progressing, not met)  - demonstrate standing righting/protective response for safety purposes.(Progressing, not met)  - demonstrate anterior loading over threshold independently.(Progressing, not met)  - improve briggs balance by 5 points for ADL purposes.(Progressing, not met)    Plan     Continue with POC addressing leg  strength, core and balance.    Lazarus Bustillo, PTA

## 2019-04-03 ENCOUNTER — OFFICE VISIT (OUTPATIENT)
Dept: PODIATRY | Facility: CLINIC | Age: 79
End: 2019-04-03
Payer: MEDICARE

## 2019-04-03 VITALS — WEIGHT: 183.19 LBS | HEIGHT: 68 IN | BODY MASS INDEX: 27.76 KG/M2

## 2019-04-03 DIAGNOSIS — L84 CORN OR CALLUS: ICD-10-CM

## 2019-04-03 DIAGNOSIS — B35.1 ONYCHOMYCOSIS DUE TO DERMATOPHYTE: ICD-10-CM

## 2019-04-03 DIAGNOSIS — R20.2 PARESTHESIA OF FOOT, BILATERAL: ICD-10-CM

## 2019-04-03 DIAGNOSIS — G62.0 CHEMOTHERAPY-INDUCED PERIPHERAL NEUROPATHY: Primary | ICD-10-CM

## 2019-04-03 DIAGNOSIS — M20.42 HAMMER TOES OF BOTH FEET: ICD-10-CM

## 2019-04-03 DIAGNOSIS — T45.1X5A CHEMOTHERAPY-INDUCED PERIPHERAL NEUROPATHY: Primary | ICD-10-CM

## 2019-04-03 DIAGNOSIS — M20.11 VALGUS DEFORMITY OF BOTH GREAT TOES: ICD-10-CM

## 2019-04-03 DIAGNOSIS — M20.12 VALGUS DEFORMITY OF BOTH GREAT TOES: ICD-10-CM

## 2019-04-03 DIAGNOSIS — M20.41 HAMMER TOES OF BOTH FEET: ICD-10-CM

## 2019-04-03 PROCEDURE — 11055 PR TRIM HYPERKERATOTIC SKIN LESION, ONE: ICD-10-PCS | Mod: Q9,HCNC,S$GLB, | Performed by: PODIATRIST

## 2019-04-03 PROCEDURE — 1101F PR PT FALLS ASSESS DOC 0-1 FALLS W/OUT INJ PAST YR: ICD-10-PCS | Mod: HCNC,CPTII,S$GLB, | Performed by: PODIATRIST

## 2019-04-03 PROCEDURE — 99213 OFFICE O/P EST LOW 20 MIN: CPT | Mod: 25,HCNC,S$GLB, | Performed by: PODIATRIST

## 2019-04-03 PROCEDURE — 99999 PR PBB SHADOW E&M-EST. PATIENT-LVL II: ICD-10-PCS | Mod: PBBFAC,HCNC,, | Performed by: PODIATRIST

## 2019-04-03 PROCEDURE — 1101F PT FALLS ASSESS-DOCD LE1/YR: CPT | Mod: HCNC,CPTII,S$GLB, | Performed by: PODIATRIST

## 2019-04-03 PROCEDURE — 11055 PARING/CUTG B9 HYPRKER LES 1: CPT | Mod: Q9,HCNC,S$GLB, | Performed by: PODIATRIST

## 2019-04-03 PROCEDURE — 11721 PR DEBRIDEMENT OF NAILS, 6 OR MORE: ICD-10-PCS | Mod: 59,Q9,HCNC,S$GLB | Performed by: PODIATRIST

## 2019-04-03 PROCEDURE — 11721 DEBRIDE NAIL 6 OR MORE: CPT | Mod: 59,Q9,HCNC,S$GLB | Performed by: PODIATRIST

## 2019-04-03 PROCEDURE — 99499 RISK ADDL DX/OHS AUDIT: ICD-10-PCS | Mod: HCNC,S$GLB,, | Performed by: PODIATRIST

## 2019-04-03 PROCEDURE — 99213 PR OFFICE/OUTPT VISIT, EST, LEVL III, 20-29 MIN: ICD-10-PCS | Mod: 25,HCNC,S$GLB, | Performed by: PODIATRIST

## 2019-04-03 PROCEDURE — 99999 PR PBB SHADOW E&M-EST. PATIENT-LVL II: CPT | Mod: PBBFAC,HCNC,, | Performed by: PODIATRIST

## 2019-04-03 PROCEDURE — 99499 UNLISTED E&M SERVICE: CPT | Mod: HCNC,S$GLB,, | Performed by: PODIATRIST

## 2019-04-03 NOTE — PROGRESS NOTES
Subjective:      Patient ID: Lizeth Henderson is a 78 y.o. female.    Chief Complaint: Diabetes Mellitus (Casimiro 1/13/19 5.6 1/3/19) and Diabetic Foot Exam    Lizeth is a 78 y.o. female who presents to the clinic for evaluation and treatment of high risk feet. Lizeth has a past medical history of Adrenal adenoma (2016), Anxiety, Arthritis, Benign hypertension, Colon cancer (age 61), Coronary artery disease, Depression, Full dentures, General anesthetics causing adverse effect in therapeutic use, Hyperlipidemia, Osteopenia, Shingles, and Wears glasses. The patient's chief complaint is long, thick toenails.  Relates being unable to trim on her own.  Denies being painful with wearing shoe gear.  Also, relates having a painful callus to the tip of the Lt. 3rd toe.  States the lesion is tender with weight bearing.  Rates pain as a 9/10.  Attempts to pad the site with a toe sleeve with only modest improvement.  Denies any additional pedal complaints.      PCP: Yohana Young MD    Date Last Seen by PCP: 1/3/19      Hemoglobin A1C   Date Value Ref Range Status   01/03/2019 5.6 4.0 - 5.6 % Final     Comment:     ADA Screening Guidelines:  5.7-6.4%  Consistent with prediabetes  >or=6.5%  Consistent with diabetes  High levels of fetal hemoglobin interfere with the HbA1C  assay. Heterozygous hemoglobin variants (HbS, HgC, etc)do  not significantly interfere with this assay.   However, presence of multiple variants may affect accuracy.     12/06/2017 5.0 4.0 - 5.6 % Final     Comment:     According to ADA guidelines, hemoglobin A1c <7.0% represents  optimal control in non-pregnant diabetic patients. Different  metrics may apply to specific patient populations.   Standards of Medical Care in Diabetes-2016.  For the purpose of screening for the presence of diabetes:  <5.7%     Consistent with the absence of diabetes  5.7-6.4%  Consistent with increasing risk for diabetes   (prediabetes)  >or=6.5%  Consistent with  diabetes  Currently, no consensus exists for use of hemoglobin A1c  for diagnosis of diabetes for children.  This Hemoglobin A1c assay has significant interference with fetal   hemoglobin   (HbF). The results are invalid for patients with abnormal amounts of   HbF,   including those with known Hereditary Persistence   of Fetal Hemoglobin. Heterozygous hemoglobin variants (HbAS, HbAC,   HbAD, HbAE, HbA2) do not significantly interfere with this assay;   however, presence of multiple variants in a sample may impact the %   interference.     11/21/2014 5.3 4.5 - 6.2 % Final       Review of Systems   Constitution: Negative for chills and fever.   Cardiovascular: Negative for claudication and leg swelling.   Skin: Positive for color change and nail changes.   Musculoskeletal: Negative for muscle cramps, muscle weakness and myalgias.   Neurological: Positive for numbness and paresthesias.   Psychiatric/Behavioral: Negative for altered mental status.           Objective:      Physical Exam   Constitutional: She is oriented to person, place, and time. She appears well-developed and well-nourished. No distress.   Cardiovascular:   Pulses:       Dorsalis pedis pulses are 2+ on the right side, and 2+ on the left side.        Posterior tibial pulses are 2+ on the right side, and 2+ on the left side.   CFT is < 3 seconds bilateral.  Pedal hair growth is decreased bilateral.  Varicosities noted bilateral.  Mild nonpitting lower extremity edema noted bilateral.  Toes are cool to touch bilateral.     Musculoskeletal: She exhibits tenderness. She exhibits no edema.   Muscle strength 5/5 in all muscle groups bilateral.  No tenderness nor crepitation with ROM of foot/ankle joints bilateral.  Mild pain with palpation of the callus of the LT. 3rd toe.   Bilateral pes planus.  Bilateral hallux abducto valgus.  Bilateral semi-rigid contracture of toes 2-5.    Neurological: She is alert and oriented to person, place, and time. She has  normal strength. A sensory deficit is present.   Protective sensation per Slater-Aishwarya monofilament is significantly decreased bilateral.  Vibratory sensation is absent bilateral.  Light touch is intact bilateral.   Skin: Skin is warm, dry and intact. Capillary refill takes less than 2 seconds. Lesion noted. No abrasion, no bruising, no burn, no ecchymosis, no laceration, no petechiae and no rash noted. She is not diaphoretic. No erythema. No pallor.   Pedal skin appears thin bilateral.  Toenails x 10 appear appear thickened by 4 mm's, elongated by 6mm's, and discolored with subungual debris.  Hyperkeratotic lesion noted to the distal tip of the Lt. 3rd toe.  Examination of the skin reveals no evidence of significant maceration, rashes, open lesions, suspicious appearing nevi or other concerning lesions.              Assessment:       Encounter Diagnoses   Name Primary?    Chemotherapy-induced peripheral neuropathy Yes    Valgus deformity of both great toes     Hammer toes of both feet     Onychomycosis due to dermatophyte     Corn or callus     Paresthesia of foot, bilateral          Plan:       Lizeth was seen today for diabetes mellitus and diabetic foot exam.    Diagnoses and all orders for this visit:    Chemotherapy-induced peripheral neuropathy    Valgus deformity of both great toes    Hammer toes of both feet    Onychomycosis due to dermatophyte    Corn or callus    Paresthesia of foot, bilateral      I counseled the patient on her conditions, their implications and medical management.    - Given verbal and written information regarding frankincense and myrrh balm to address LE paresthesias.    - Instructed to continue wearing shoe gear that accommodates for digital deformities.  Briefly discussed performing a flexor tenotomy of the Lt. 3rd toe, in the future, to minimize callus build up and pain.    - Shoe inspection. Patient instructed on proper foot hygeine. We discussed wearing proper shoe  gear, daily foot inspections, never walking without protective shoe gear, never putting sharp instruments to feet, routine podiatric nail visits every 2-3 months.      - With patient's permission, nails were aggressively reduced and debrided x 10 to their soft tissue attachment mechanically and with electric , removing all offending nail and debris.  Also, a sterile #15 scalpel was used to trim the lesion of the LT. 3rd toe down to smooth appearing skin without incident.  Patient relates relief following the procedure. She will continue to monitor the areas daily, inspect her feet, wear protective shoe gear when ambulatory, moisturizer to maintain skin integrity and follow in this office in approximately 2-3 months, sooner p.r.n.     Ric Akbar DPM

## 2019-04-09 DIAGNOSIS — E55.9 VITAMIN D DEFICIENCY: ICD-10-CM

## 2019-04-10 RX ORDER — CHOLECALCIFEROL (VITAMIN D3) 1250 MCG
CAPSULE ORAL
Qty: 8 CAPSULE | Refills: 0 | Status: SHIPPED | OUTPATIENT
Start: 2019-04-10 | End: 2019-06-12

## 2019-05-01 RX ORDER — SERTRALINE HYDROCHLORIDE 100 MG/1
TABLET, FILM COATED ORAL
Qty: 180 TABLET | Refills: 0 | OUTPATIENT
Start: 2019-05-01

## 2019-05-31 RX ORDER — BUSPIRONE HYDROCHLORIDE 10 MG/1
TABLET ORAL
Qty: 180 TABLET | Refills: 0 | Status: SHIPPED | OUTPATIENT
Start: 2019-05-31 | End: 2019-06-12 | Stop reason: SDUPTHER

## 2019-05-31 RX ORDER — BUPROPION HYDROCHLORIDE 100 MG/1
TABLET, EXTENDED RELEASE ORAL
Qty: 30 TABLET | Refills: 0 | Status: SHIPPED | OUTPATIENT
Start: 2019-05-31 | End: 2019-06-17

## 2019-06-07 ENCOUNTER — PES CALL (OUTPATIENT)
Dept: ADMINISTRATIVE | Facility: CLINIC | Age: 79
End: 2019-06-07

## 2019-06-12 ENCOUNTER — OFFICE VISIT (OUTPATIENT)
Dept: PSYCHIATRY | Facility: CLINIC | Age: 79
End: 2019-06-12
Payer: MEDICARE

## 2019-06-12 VITALS
SYSTOLIC BLOOD PRESSURE: 127 MMHG | WEIGHT: 170.88 LBS | HEART RATE: 82 BPM | DIASTOLIC BLOOD PRESSURE: 80 MMHG | BODY MASS INDEX: 25.9 KG/M2 | HEIGHT: 68 IN

## 2019-06-12 DIAGNOSIS — F33.41 MDD (MAJOR DEPRESSIVE DISORDER), RECURRENT, IN PARTIAL REMISSION: ICD-10-CM

## 2019-06-12 DIAGNOSIS — F41.1 GENERALIZED ANXIETY DISORDER: ICD-10-CM

## 2019-06-12 PROCEDURE — 3074F SYST BP LT 130 MM HG: CPT | Mod: HCNC,CPTII,S$GLB, | Performed by: PSYCHIATRY & NEUROLOGY

## 2019-06-12 PROCEDURE — 1101F PR PT FALLS ASSESS DOC 0-1 FALLS W/OUT INJ PAST YR: ICD-10-PCS | Mod: HCNC,CPTII,S$GLB, | Performed by: PSYCHIATRY & NEUROLOGY

## 2019-06-12 PROCEDURE — 90833 PSYTX W PT W E/M 30 MIN: CPT | Mod: HCNC,S$GLB,, | Performed by: PSYCHIATRY & NEUROLOGY

## 2019-06-12 PROCEDURE — 3079F PR MOST RECENT DIASTOLIC BLOOD PRESSURE 80-89 MM HG: ICD-10-PCS | Mod: HCNC,CPTII,S$GLB, | Performed by: PSYCHIATRY & NEUROLOGY

## 2019-06-12 PROCEDURE — 1101F PT FALLS ASSESS-DOCD LE1/YR: CPT | Mod: HCNC,CPTII,S$GLB, | Performed by: PSYCHIATRY & NEUROLOGY

## 2019-06-12 PROCEDURE — 99213 PR OFFICE/OUTPT VISIT, EST, LEVL III, 20-29 MIN: ICD-10-PCS | Mod: HCNC,S$GLB,, | Performed by: PSYCHIATRY & NEUROLOGY

## 2019-06-12 PROCEDURE — 3074F PR MOST RECENT SYSTOLIC BLOOD PRESSURE < 130 MM HG: ICD-10-PCS | Mod: HCNC,CPTII,S$GLB, | Performed by: PSYCHIATRY & NEUROLOGY

## 2019-06-12 PROCEDURE — 99999 PR PBB SHADOW E&M-EST. PATIENT-LVL III: ICD-10-PCS | Mod: PBBFAC,HCNC,, | Performed by: PSYCHIATRY & NEUROLOGY

## 2019-06-12 PROCEDURE — 90833 PR PSYCHOTHERAPY W/PATIENT W/E&M, 30 MIN (ADD ON): ICD-10-PCS | Mod: HCNC,S$GLB,, | Performed by: PSYCHIATRY & NEUROLOGY

## 2019-06-12 PROCEDURE — 99213 OFFICE O/P EST LOW 20 MIN: CPT | Mod: HCNC,S$GLB,, | Performed by: PSYCHIATRY & NEUROLOGY

## 2019-06-12 PROCEDURE — 99999 PR PBB SHADOW E&M-EST. PATIENT-LVL III: CPT | Mod: PBBFAC,HCNC,, | Performed by: PSYCHIATRY & NEUROLOGY

## 2019-06-12 PROCEDURE — 3079F DIAST BP 80-89 MM HG: CPT | Mod: HCNC,CPTII,S$GLB, | Performed by: PSYCHIATRY & NEUROLOGY

## 2019-06-12 RX ORDER — BUSPIRONE HYDROCHLORIDE 10 MG/1
10 TABLET ORAL 2 TIMES DAILY
Qty: 180 TABLET | Refills: 1 | Status: SHIPPED | OUTPATIENT
Start: 2019-06-12 | End: 2019-08-21 | Stop reason: SDUPTHER

## 2019-06-12 RX ORDER — SERTRALINE HYDROCHLORIDE 100 MG/1
TABLET, FILM COATED ORAL
Qty: 180 TABLET | Refills: 1 | Status: SHIPPED | OUTPATIENT
Start: 2019-06-12 | End: 2019-09-13 | Stop reason: SDUPTHER

## 2019-06-12 RX ORDER — TRAZODONE HYDROCHLORIDE 150 MG/1
TABLET ORAL
Qty: 90 TABLET | Refills: 1 | Status: SHIPPED | OUTPATIENT
Start: 2019-06-12 | End: 2019-09-13 | Stop reason: SDUPTHER
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
## 2019-06-12 NOTE — Clinical Note
Please confirm if pt is taking Wellbutrin  mg daily - she was unsure at this visit if she has been taking it - thanks

## 2019-06-17 ENCOUNTER — TELEPHONE (OUTPATIENT)
Dept: PSYCHIATRY | Facility: CLINIC | Age: 79
End: 2019-06-17

## 2019-06-17 NOTE — TELEPHONE ENCOUNTER
Called pt regarding below message. Left voicemail with return number.       ----- Message from Katarzyna Vinson MD sent at 6/16/2019  8:09 PM CDT -----  Please confirm if pt is taking Wellbutrin  mg daily - she was unsure at this visit if she has been taking it - thanks

## 2019-06-17 NOTE — TELEPHONE ENCOUNTER
Please advise the patient that since she was feeling well at last visit, we will not proceed with Wellbutrin, please d/c this med at pt's pharmacy to avoid confusion.  thanks

## 2019-06-17 NOTE — TELEPHONE ENCOUNTER
Called pt regarding below message. Pt states she is not taking the Wellbutrin  mg. Informed pt I will send this information to Dr. Visnon. Pt verbalized understanding with no further questions.

## 2019-06-17 NOTE — TELEPHONE ENCOUNTER
Called pt regarding below message. Informed pt of below per Dr. Vinson. Informed pt I will discontinue the medication at her pharmacy. Pt verbalized understanding with no further questions.

## 2019-06-18 ENCOUNTER — TELEPHONE (OUTPATIENT)
Dept: PODIATRY | Facility: CLINIC | Age: 79
End: 2019-06-18

## 2019-06-18 NOTE — TELEPHONE ENCOUNTER
Called and spoke with patient, she is needing a flexor tenotomy of the Lt. 3rd toe, made that appt at her request on 7/12/19 @ 1000am. Patient voiced all understanding.

## 2019-06-18 NOTE — TELEPHONE ENCOUNTER
----- Message from Hedy Benites sent at 6/18/2019 10:32 AM CDT -----  Contact: pt  Calling in regards to reschedule appointment and be worked into the schedule and please advise  [phone]

## 2019-06-18 NOTE — TELEPHONE ENCOUNTER
----- Message from Mahesh Galeano sent at 6/18/2019 10:39 AM CDT -----  Type: Needs Medical Advice    Who Called:  Patient    Best Call Back Number: [phone]  Additional Information: Patient states that she would like a callback regarding rescheduling her appointment on 07/10 to 07/12 if possible.  Patient states that Dr. Akbar told her that she would need a longer appointment to make time for a minor procedure.

## 2019-06-30 DIAGNOSIS — M85.80 OSTEOPENIA, UNSPECIFIED LOCATION: ICD-10-CM

## 2019-07-01 RX ORDER — ALENDRONATE SODIUM 70 MG/1
TABLET ORAL
Qty: 12 TABLET | Refills: 0 | Status: SHIPPED | OUTPATIENT
Start: 2019-07-01 | End: 2019-07-26 | Stop reason: SDUPTHER

## 2019-07-02 NOTE — TELEPHONE ENCOUNTER
"Called and spoke with patient in regards to scheduling a follow up appointment. Patient stated "My daughter is gone every other week and she is the one that helps me. We are going to schedule it on the portal tonight." I voiced understanding.   "

## 2019-07-12 ENCOUNTER — OFFICE VISIT (OUTPATIENT)
Dept: PODIATRY | Facility: CLINIC | Age: 79
End: 2019-07-12
Payer: MEDICARE

## 2019-07-12 VITALS — WEIGHT: 170.88 LBS | HEIGHT: 68 IN | BODY MASS INDEX: 25.9 KG/M2

## 2019-07-12 DIAGNOSIS — L84 CORN OR CALLUS: ICD-10-CM

## 2019-07-12 DIAGNOSIS — M20.42 HAMMER TOES OF BOTH FEET: ICD-10-CM

## 2019-07-12 DIAGNOSIS — N32.81 OVERACTIVE BLADDER: Chronic | ICD-10-CM

## 2019-07-12 DIAGNOSIS — M79.675 TOE PAIN, LEFT: Primary | ICD-10-CM

## 2019-07-12 DIAGNOSIS — M20.41 HAMMER TOES OF BOTH FEET: ICD-10-CM

## 2019-07-12 PROCEDURE — 99999 PR PBB SHADOW E&M-EST. PATIENT-LVL III: CPT | Mod: PBBFAC,HCNC,, | Performed by: PODIATRIST

## 2019-07-12 PROCEDURE — 28232 PR INCISION FLEX TOE TENDON: ICD-10-PCS | Mod: HCNC,T3,S$GLB, | Performed by: PODIATRIST

## 2019-07-12 PROCEDURE — 28232 INCISION OF TOE TENDON: CPT | Mod: HCNC,T3,S$GLB, | Performed by: PODIATRIST

## 2019-07-12 PROCEDURE — 99499 NO LOS: ICD-10-PCS | Mod: HCNC,S$GLB,, | Performed by: PODIATRIST

## 2019-07-12 PROCEDURE — 99499 UNLISTED E&M SERVICE: CPT | Mod: HCNC,S$GLB,, | Performed by: PODIATRIST

## 2019-07-12 PROCEDURE — 99999 PR PBB SHADOW E&M-EST. PATIENT-LVL III: ICD-10-PCS | Mod: PBBFAC,HCNC,, | Performed by: PODIATRIST

## 2019-07-12 NOTE — PROGRESS NOTES
Subjective:      Patient ID: Lizeth Henderson is a 78 y.o. female.    Chief Complaint: Foot Pain (3rd toe left foot)    Patient presents to clinic to address the hammertoe of the Lt. 3rd toe.  Relates continued discomfort from the digit on account of callus build up.  Rates pain from the toe as a 0/10 while at rest.  Symptoms are exacerbated with direct weight bearing to the digit.  Has attempted to file the site and soften with moisturizer with minimal improvement in symptoms.  Presents to have a flexor tenotomy of the affected digit.  Denies any additional pedal complaints.      Hemoglobin A1C   Date Value Ref Range Status   01/03/2019 5.6 4.0 - 5.6 % Final     Comment:     ADA Screening Guidelines:  5.7-6.4%  Consistent with prediabetes  >or=6.5%  Consistent with diabetes  High levels of fetal hemoglobin interfere with the HbA1C  assay. Heterozygous hemoglobin variants (HbS, HgC, etc)do  not significantly interfere with this assay.   However, presence of multiple variants may affect accuracy.     12/06/2017 5.0 4.0 - 5.6 % Final     Comment:     According to ADA guidelines, hemoglobin A1c <7.0% represents  optimal control in non-pregnant diabetic patients. Different  metrics may apply to specific patient populations.   Standards of Medical Care in Diabetes-2016.  For the purpose of screening for the presence of diabetes:  <5.7%     Consistent with the absence of diabetes  5.7-6.4%  Consistent with increasing risk for diabetes   (prediabetes)  >or=6.5%  Consistent with diabetes  Currently, no consensus exists for use of hemoglobin A1c  for diagnosis of diabetes for children.  This Hemoglobin A1c assay has significant interference with fetal   hemoglobin   (HbF). The results are invalid for patients with abnormal amounts of   HbF,   including those with known Hereditary Persistence   of Fetal Hemoglobin. Heterozygous hemoglobin variants (HbAS, HbAC,   HbAD, HbAE, HbA2) do not significantly interfere with this  assay;   however, presence of multiple variants in a sample may impact the %   interference.     11/21/2014 5.3 4.5 - 6.2 % Final       Review of Systems   Constitution: Negative for chills and fever.   Cardiovascular: Negative for claudication and leg swelling.   Skin: Positive for color change and nail changes.   Musculoskeletal: Negative for muscle cramps, muscle weakness and myalgias.   Neurological: Positive for numbness and paresthesias.   Psychiatric/Behavioral: Negative for altered mental status.           Objective:      Physical Exam   Constitutional: She is oriented to person, place, and time. She appears well-developed and well-nourished. No distress.   Cardiovascular:   Pulses:       Dorsalis pedis pulses are 2+ on the right side, and 2+ on the left side.        Posterior tibial pulses are 2+ on the right side, and 2+ on the left side.   CFT is < 3 seconds bilateral.  Pedal hair growth is decreased bilateral.  Varicosities noted bilateral.  Mild nonpitting lower extremity edema noted bilateral.  Toes are cool to touch bilateral.     Musculoskeletal: She exhibits tenderness. She exhibits no edema.   Muscle strength 5/5 in all muscle groups bilateral.  No tenderness nor crepitation with ROM of foot/ankle joints bilateral.  Mild pain with palpation of the callus at the tip of the Lt. 3rd toe.   Bilateral pes planus.  Bilateral hallux abducto valgus.  Bilateral semi-rigid contracture of toes 2-5.    Neurological: She is alert and oriented to person, place, and time. She has normal strength. A sensory deficit is present.   Protective sensation per [place]-Aishwarya monofilament is significantly decreased bilateral.  Vibratory sensation is absent bilateral.  Light touch is intact bilateral.   Skin: Skin is warm, dry and intact. Capillary refill takes less than 2 seconds. Lesion noted. No abrasion, no bruising, no burn, no ecchymosis, no laceration, no petechiae and no rash noted. She is not diaphoretic. No  erythema. No pallor.   Pedal skin appears thin bilateral. Hyperkeratotic lesion noted to the distal tip of the Lt. 3rd toe.  Examination of the skin reveals no evidence of significant maceration, rashes, open lesions, suspicious appearing nevi or other concerning lesions.              Assessment:       Encounter Diagnoses   Name Primary?    Hammer toes of both feet     Toe pain, left Yes    Corn or callus          Plan:       Lizeth was seen today for foot pain.    Diagnoses and all orders for this visit:    Toe pain, left    Hammer toes of both feet    Corn or callus      I counseled the patient on her conditions, their implications and medical management.    Discussed with patient the procedure, risks, benefits, and follow up care associated with a flexor tenotomy of the Lt. 3rd toe.  All questions were thoroughly answered.  Consent was read, signed, and witnessed by nursing staff.  See attached procedure note.     The stab incision was covered with xeroform, and a football dressing was applied.     Advised to keep today's dressings CDI x 1 week.     Advised to keep the surgical extremities dry while bathing to minimize the risk of infection.     Patient to ambulate only in postoperative shoes.     Following today's procedure, patient to rest, ice, and elevate the surgical extremity.     RTC in 1 week for follow up.      Op Note:     Date of surgery: 7/12/19     Surgeon: Dr. Raffy DPM     Preoperative diagnosis: 1. Hammer toe, Lt. 3rd    Postoperative diagnosis: As above  Procedure: 1. Flexor tenotomy of toe, Lt. 3rd  Anesthesia: Local  Hemostasis: Pressure  Estimated blood loss: < 0.1 mL  Specimen: None  Culture: None  Complications: None  Condition upon discharge: Stable     Procedure in detail:  Upon prepping the skin with an alcohol swab, a digital block of the Lt. 3rd toe was performed utilizing 3 mL of 1% lidocaine plain.  The foot were then scrubbed, draped, and prepped in the usual aseptic manner.   Digital tourniquets were then applied.     Attention was directed to the plantar surface of the Lt. 3rd toe.  A #11 scalpel was used to perform a percutaneous tenotomy, through a single stab incision, at the plantar aspect of the distal interphalangeal joint. This was done taking care to release the flexor digitorum longus tendon.  The distal portion of each toe was manually dorsiflexed to ensure adequate release during the tenotomy.  The stab incision was then reapproximated with 4-0 nylon utilizing a simple interrupted suture technique.     Upon completion, a prompt hyperemic response was noted to all toes of the surgical extremity.  The incision was dressed with xeroform, and a football dressing and postoperative shoe were applied.  The patient tolerated the procedure and local anesthesia quite well.  The patient was discharged with the following verbal and written instructions:     1. Keep dressing, clean, dry, and intact to surgical extremity for 1 week.   2. Rest, ice, and elevate the surgical extremity.  3. Weight bearing to the surgical extremity in postoperative shoe only.  4. Instructed to call with any postoperative concerns or complications.  5. Follow up in one week in clinic.     Ric Akbar DPM

## 2019-07-15 RX ORDER — MIRABEGRON 50 MG/1
TABLET, FILM COATED, EXTENDED RELEASE ORAL
Qty: 90 TABLET | Refills: 0 | Status: SHIPPED | OUTPATIENT
Start: 2019-07-15 | End: 2019-07-26

## 2019-07-19 ENCOUNTER — OFFICE VISIT (OUTPATIENT)
Dept: PODIATRY | Facility: CLINIC | Age: 79
End: 2019-07-19
Payer: MEDICARE

## 2019-07-19 VITALS — BODY MASS INDEX: 25.9 KG/M2 | HEIGHT: 68 IN | WEIGHT: 170.88 LBS

## 2019-07-19 DIAGNOSIS — Z98.890 POSTOPERATIVE STATE: Primary | ICD-10-CM

## 2019-07-19 PROCEDURE — 99999 PR PBB SHADOW E&M-EST. PATIENT-LVL II: ICD-10-PCS | Mod: PBBFAC,HCNC,, | Performed by: PODIATRIST

## 2019-07-19 PROCEDURE — 99024 POSTOP FOLLOW-UP VISIT: CPT | Mod: HCNC,S$GLB,, | Performed by: PODIATRIST

## 2019-07-19 PROCEDURE — 99024 PR POST-OP FOLLOW-UP VISIT: ICD-10-PCS | Mod: HCNC,S$GLB,, | Performed by: PODIATRIST

## 2019-07-19 PROCEDURE — 99999 PR PBB SHADOW E&M-EST. PATIENT-LVL II: CPT | Mod: PBBFAC,HCNC,, | Performed by: PODIATRIST

## 2019-07-19 NOTE — PROGRESS NOTES
Subjective:      Patient ID: Lizeth Henderson is a 78 y.o. female.    Chief Complaint: Post-op Evaluation    Patient presents to clinic 1 week S/P flexor tenotomy of the Lt. 3rd toe.  Denies pain from the surgical site with today's exam.  Has kept the previous football dressing clean, dry, and intact x 1 week.  Relates light ambulation around her home while wearing the postoperative shoe.  Denies having N/V/F/C/D. Denies any additional pedal complaints.      Hemoglobin A1C   Date Value Ref Range Status   01/03/2019 5.6 4.0 - 5.6 % Final     Comment:     ADA Screening Guidelines:  5.7-6.4%  Consistent with prediabetes  >or=6.5%  Consistent with diabetes  High levels of fetal hemoglobin interfere with the HbA1C  assay. Heterozygous hemoglobin variants (HbS, HgC, etc)do  not significantly interfere with this assay.   However, presence of multiple variants may affect accuracy.     12/06/2017 5.0 4.0 - 5.6 % Final     Comment:     According to ADA guidelines, hemoglobin A1c <7.0% represents  optimal control in non-pregnant diabetic patients. Different  metrics may apply to specific patient populations.   Standards of Medical Care in Diabetes-2016.  For the purpose of screening for the presence of diabetes:  <5.7%     Consistent with the absence of diabetes  5.7-6.4%  Consistent with increasing risk for diabetes   (prediabetes)  >or=6.5%  Consistent with diabetes  Currently, no consensus exists for use of hemoglobin A1c  for diagnosis of diabetes for children.  This Hemoglobin A1c assay has significant interference with fetal   hemoglobin   (HbF). The results are invalid for patients with abnormal amounts of   HbF,   including those with known Hereditary Persistence   of Fetal Hemoglobin. Heterozygous hemoglobin variants (HbAS, HbAC,   HbAD, HbAE, HbA2) do not significantly interfere with this assay;   however, presence of multiple variants in a sample may impact the %   interference.     11/21/2014 5.3 4.5 - 6.2 % Final        Review of Systems   Constitution: Negative for chills and fever.   Cardiovascular: Negative for claudication and leg swelling.   Skin: Positive for color change and nail changes.   Musculoskeletal: Negative for muscle cramps, muscle weakness and myalgias.   Neurological: Positive for numbness and paresthesias.   Psychiatric/Behavioral: Negative for altered mental status.           Objective:      Physical Exam   Constitutional: She is oriented to person, place, and time. She appears well-developed and well-nourished. No distress.   Cardiovascular:   Pulses:       Dorsalis pedis pulses are 2+ on the right side, and 2+ on the left side.        Posterior tibial pulses are 2+ on the right side, and 2+ on the left side.   CFT is < 3 seconds bilateral.  Pedal hair growth is decreased bilateral.  Varicosities noted bilateral.  Mild nonpitting lower extremity edema noted bilateral.  Toes are cool to touch bilateral.     Musculoskeletal: She exhibits no edema or tenderness.   Muscle strength 5/5 in all muscle groups bilateral.  No tenderness nor crepitation with ROM of foot/ankle joints bilateral.  (-) for pain with palpation to the incision site of the Lt. 3rd toe.  Bilateral pes planus.  Bilateral hallux abducto valgus.  Bilateral semi-rigid contracture of toes 2-5.    Neurological: She is alert and oriented to person, place, and time. She has normal strength. A sensory deficit is present.   Protective sensation per [place]-Aishwarya monofilament is significantly decreased bilateral.  Vibratory sensation is absent bilateral.  Light touch is intact bilateral.   Skin: Skin is warm, dry and intact. Capillary refill takes less than 2 seconds. No abrasion, no bruising, no burn, no ecchymosis, no laceration, no lesion, no petechiae and no rash noted. She is not diaphoretic. No erythema. No pallor.   Stab incision of the Lt. 3rd toe is well approximated with suture.  No sign of localized infection, dehiscence, ischemia, or  necrosis noted the length of the incision.             Assessment:       Encounter Diagnosis   Name Primary?    Postoperative state Yes         Plan:       Lizeth was seen today for post-op evaluation.    Diagnoses and all orders for this visit:    Postoperative state      I counseled the patient on her conditions, their implications and medical management.    Overall, satisfactory postoperative results noted.  Incision is well maintained and devoid of localized signs of infection.    The incision was covered with xeroform and a bandage.  Advised to repeat this bandage daily x 1 week.    May resume use of casual shoe gear.    Instructed to keep the site covered with a bandage while showering.  To refrain from soaking and scrubbing x 1 week.     RTC in 1 week for 2nd postop visit and suture removal.    Ric Akbar DPM

## 2019-07-26 ENCOUNTER — LAB VISIT (OUTPATIENT)
Dept: LAB | Facility: HOSPITAL | Age: 79
End: 2019-07-26
Attending: INTERNAL MEDICINE
Payer: MEDICARE

## 2019-07-26 ENCOUNTER — OFFICE VISIT (OUTPATIENT)
Dept: PODIATRY | Facility: CLINIC | Age: 79
End: 2019-07-26
Payer: MEDICARE

## 2019-07-26 ENCOUNTER — OFFICE VISIT (OUTPATIENT)
Dept: FAMILY MEDICINE | Facility: CLINIC | Age: 79
End: 2019-07-26
Payer: MEDICARE

## 2019-07-26 VITALS
WEIGHT: 168.31 LBS | RESPIRATION RATE: 18 BRPM | HEIGHT: 68 IN | SYSTOLIC BLOOD PRESSURE: 104 MMHG | DIASTOLIC BLOOD PRESSURE: 78 MMHG | BODY MASS INDEX: 25.51 KG/M2 | HEART RATE: 80 BPM

## 2019-07-26 VITALS — BODY MASS INDEX: 25.9 KG/M2 | HEIGHT: 68 IN | WEIGHT: 170.88 LBS

## 2019-07-26 DIAGNOSIS — I10 ESSENTIAL HYPERTENSION: ICD-10-CM

## 2019-07-26 DIAGNOSIS — L57.0 ACTINIC KERATOSES: ICD-10-CM

## 2019-07-26 DIAGNOSIS — M85.80 OSTEOPENIA, UNSPECIFIED LOCATION: ICD-10-CM

## 2019-07-26 DIAGNOSIS — R29.898 WEAKNESS OF BOTH LOWER EXTREMITIES: ICD-10-CM

## 2019-07-26 DIAGNOSIS — N18.30 STAGE 3 CHRONIC KIDNEY DISEASE: ICD-10-CM

## 2019-07-26 DIAGNOSIS — N32.81 OVERACTIVE BLADDER: Chronic | ICD-10-CM

## 2019-07-26 DIAGNOSIS — Z98.890 POSTOPERATIVE STATE: Primary | ICD-10-CM

## 2019-07-26 DIAGNOSIS — E78.2 HYPERLIPIDEMIA, MIXED: ICD-10-CM

## 2019-07-26 DIAGNOSIS — N18.30 STAGE 3 CHRONIC KIDNEY DISEASE: Primary | ICD-10-CM

## 2019-07-26 LAB
ANION GAP SERPL CALC-SCNC: 11 MMOL/L (ref 8–16)
BUN SERPL-MCNC: 25 MG/DL (ref 8–23)
CALCIUM SERPL-MCNC: 10 MG/DL (ref 8.7–10.5)
CHLORIDE SERPL-SCNC: 102 MMOL/L (ref 95–110)
CO2 SERPL-SCNC: 26 MMOL/L (ref 23–29)
CREAT SERPL-MCNC: 1.1 MG/DL (ref 0.5–1.4)
EST. GFR  (AFRICAN AMERICAN): 55.2 ML/MIN/1.73 M^2
EST. GFR  (NON AFRICAN AMERICAN): 47.9 ML/MIN/1.73 M^2
GLUCOSE SERPL-MCNC: 106 MG/DL (ref 70–110)
POTASSIUM SERPL-SCNC: 3.9 MMOL/L (ref 3.5–5.1)
SODIUM SERPL-SCNC: 139 MMOL/L (ref 136–145)

## 2019-07-26 PROCEDURE — 80048 BASIC METABOLIC PNL TOTAL CA: CPT | Mod: HCNC

## 2019-07-26 PROCEDURE — 99999 PR PBB SHADOW E&M-EST. PATIENT-LVL II: CPT | Mod: PBBFAC,HCNC,, | Performed by: PODIATRIST

## 2019-07-26 PROCEDURE — 1101F PT FALLS ASSESS-DOCD LE1/YR: CPT | Mod: HCNC,CPTII,S$GLB, | Performed by: INTERNAL MEDICINE

## 2019-07-26 PROCEDURE — 3078F DIAST BP <80 MM HG: CPT | Mod: HCNC,CPTII,S$GLB, | Performed by: INTERNAL MEDICINE

## 2019-07-26 PROCEDURE — 3074F PR MOST RECENT SYSTOLIC BLOOD PRESSURE < 130 MM HG: ICD-10-PCS | Mod: HCNC,CPTII,S$GLB, | Performed by: INTERNAL MEDICINE

## 2019-07-26 PROCEDURE — 1101F PR PT FALLS ASSESS DOC 0-1 FALLS W/OUT INJ PAST YR: ICD-10-PCS | Mod: HCNC,CPTII,S$GLB, | Performed by: INTERNAL MEDICINE

## 2019-07-26 PROCEDURE — 3078F PR MOST RECENT DIASTOLIC BLOOD PRESSURE < 80 MM HG: ICD-10-PCS | Mod: HCNC,CPTII,S$GLB, | Performed by: INTERNAL MEDICINE

## 2019-07-26 PROCEDURE — 99499 UNLISTED E&M SERVICE: CPT | Mod: S$GLB,,, | Performed by: INTERNAL MEDICINE

## 2019-07-26 PROCEDURE — 3074F SYST BP LT 130 MM HG: CPT | Mod: HCNC,CPTII,S$GLB, | Performed by: INTERNAL MEDICINE

## 2019-07-26 PROCEDURE — 99499 RISK ADDL DX/OHS AUDIT: ICD-10-PCS | Mod: S$GLB,,, | Performed by: INTERNAL MEDICINE

## 2019-07-26 PROCEDURE — 36415 COLL VENOUS BLD VENIPUNCTURE: CPT | Mod: HCNC,PN

## 2019-07-26 PROCEDURE — 99024 PR POST-OP FOLLOW-UP VISIT: ICD-10-PCS | Mod: HCNC,S$GLB,, | Performed by: PODIATRIST

## 2019-07-26 PROCEDURE — 99214 OFFICE O/P EST MOD 30 MIN: CPT | Mod: HCNC,S$GLB,, | Performed by: INTERNAL MEDICINE

## 2019-07-26 PROCEDURE — 99999 PR PBB SHADOW E&M-EST. PATIENT-LVL III: CPT | Mod: PBBFAC,HCNC,, | Performed by: INTERNAL MEDICINE

## 2019-07-26 PROCEDURE — 99024 POSTOP FOLLOW-UP VISIT: CPT | Mod: HCNC,S$GLB,, | Performed by: PODIATRIST

## 2019-07-26 PROCEDURE — 99999 PR PBB SHADOW E&M-EST. PATIENT-LVL III: ICD-10-PCS | Mod: PBBFAC,HCNC,, | Performed by: INTERNAL MEDICINE

## 2019-07-26 PROCEDURE — 99999 PR PBB SHADOW E&M-EST. PATIENT-LVL II: ICD-10-PCS | Mod: PBBFAC,HCNC,, | Performed by: PODIATRIST

## 2019-07-26 PROCEDURE — 99214 PR OFFICE/OUTPT VISIT, EST, LEVL IV, 30-39 MIN: ICD-10-PCS | Mod: HCNC,S$GLB,, | Performed by: INTERNAL MEDICINE

## 2019-07-26 RX ORDER — ALENDRONATE SODIUM 70 MG/1
TABLET ORAL
Qty: 12 TABLET | Refills: 3 | Status: SHIPPED | OUTPATIENT
Start: 2019-07-26 | End: 2019-10-24 | Stop reason: SDUPTHER

## 2019-07-26 RX ORDER — ATORVASTATIN CALCIUM 40 MG/1
40 TABLET, FILM COATED ORAL DAILY
Qty: 90 TABLET | Refills: 3 | Status: SHIPPED | OUTPATIENT
Start: 2019-07-26 | End: 2019-09-13 | Stop reason: SDUPTHER

## 2019-07-26 RX ORDER — HYDROCHLOROTHIAZIDE 12.5 MG/1
12.5 TABLET ORAL DAILY
Qty: 90 TABLET | Refills: 3 | Status: SHIPPED | OUTPATIENT
Start: 2019-07-26 | End: 2019-09-13 | Stop reason: SDUPTHER

## 2019-07-26 RX ORDER — FENOFIBRATE 54 MG/1
54 TABLET ORAL DAILY
Qty: 90 TABLET | Refills: 3 | Status: SHIPPED | OUTPATIENT
Start: 2019-07-26 | End: 2019-09-13 | Stop reason: SDUPTHER

## 2019-07-26 RX ORDER — LOSARTAN POTASSIUM 100 MG/1
100 TABLET ORAL DAILY
Qty: 90 TABLET | Refills: 3 | Status: SHIPPED | OUTPATIENT
Start: 2019-07-26 | End: 2019-09-13 | Stop reason: SDUPTHER

## 2019-07-26 NOTE — PROGRESS NOTES
Assessment and Plan:    1. Hyperlipidemia, mixed  Doing well, will continue current medications.   - atorvastatin (LIPITOR) 40 MG tablet; Take 1 tablet (40 mg total) by mouth once daily.  Dispense: 90 tablet; Refill: 3  - fenofibrate (TRICOR) 54 MG tablet; Take 1 tablet (54 mg total) by mouth once daily.  Dispense: 90 tablet; Refill: 3    2. Overactive bladder  Helping with symptoms, will continue  - mirabegron (MYRBETRIQ) 50 mg Tb24; TAKE 1 TABLET(50 MG) BY MOUTH EVERY NIGHT  Dispense: 90 tablet; Refill: 0    3. Essential hypertension  BP controlled, continue current medications.  - hydroCHLOROthiazide (HYDRODIURIL) 12.5 MG Tab; Take 1 tablet (12.5 mg total) by mouth once daily.  Dispense: 90 tablet; Refill: 3  - losartan (COZAAR) 100 MG tablet; Take 1 tablet (100 mg total) by mouth once daily.  Dispense: 90 tablet; Refill: 3  - Basic metabolic panel; Future    4. Osteopenia, unspecified location  - alendronate (FOSAMAX) 70 MG tablet; TAKE 1 TABLET(70 MG) BY MOUTH EVERY 7 DAYS  Dispense: 12 tablet; Refill: 3    5. Stage 3 chronic kidney disease  - Basic metabolic panel; Future    6. Actinic keratoses  Several AKs noted, family h/o NMSC, fair skin. Recommended full skin check with Dermatology.  - Ambulatory referral to Dermatology    7. Weakness of both lower extremities  Discussed using walker as often as possible, ideally whenever she is leaving the house. 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     HPI:  Lizeth is a 78 y.o. year old woman here to follow up chronic medical conditions.     She reports that she has been having some episodes of feeling like her legs are weak and shaky. Describes this as sometimes when she is walking for longer distances her legs will start to feel shaky and as if they would give out. Has not been using her walker as much  as she should. She had gone to PT in January but did not feel like she found this helpful as this caused too much knee pain.     She has not had nay falls and she is taking alendronate.    She has been taking the losartan an HCTZ and BP has been controlled. No problems with this.     No problems with atorvastatin or fenofibrate.       Medications:  Current Outpatient Medications on File Prior to Visit   Medication Sig Dispense Refill    alendronate (FOSAMAX) 70 MG tablet TAKE 1 TABLET(70 MG) BY MOUTH EVERY 7 DAYS 12 tablet 0    aspirin (ECOTRIN) 81 MG EC tablet Take 81 mg by mouth once daily.      atorvastatin (LIPITOR) 40 MG tablet Take 1 tablet (40 mg total) by mouth once daily. 90 tablet 3    busPIRone (BUSPAR) 10 MG tablet Take 1 tablet (10 mg total) by mouth 2 (two) times daily. 180 tablet 1    fenofibrate (TRICOR) 54 MG tablet Take 1 tablet (54 mg total) by mouth once daily. 90 tablet 3    hydroCHLOROthiazide (HYDRODIURIL) 12.5 MG Tab Take 1 tablet (12.5 mg total) by mouth once daily. 90 tablet 3    losartan (COZAAR) 100 MG tablet Take 1 tablet (100 mg total) by mouth once daily. 90 tablet 3    sertraline (ZOLOFT) 100 MG tablet TAKE 2 TABLETS(200 MG) BY MOUTH EVERY  tablet 1    traZODone (DESYREL) 150 MG tablet TAKE 1 TABLET(150 MG) BY MOUTH EVERY NIGHT AS NEEDED FOR INSOMNIA 90 tablet 1    [DISCONTINUED] MYRBETRIQ 50 mg Tb24 TAKE 1 TABLET(50 MG) BY MOUTH EVERY NIGHT 90 tablet 0     No current facility-administered medications on file prior to visit.        Review of Systems:  Review of Systems   Constitutional: Negative for chills and fever.   Respiratory: Negative for chest tightness and shortness of breath.    Cardiovascular: Negative for chest pain and leg swelling.   Skin: Negative for rash and wound.   Neurological: Positive for weakness. Negative for numbness.       Past Medical History:  Past Medical History:   Diagnosis Date    Adrenal adenoma 2016    Anxiety     Arthritis      "Benign hypertension     Colon cancer age 61    colon    Coronary artery disease     Depression     Full dentures     General anesthetics causing adverse effect in therapeutic use     yells and talks when wakes up    Hyperlipidemia     Osteopenia     Shingles     Wears glasses        Objective:    Vitals:  Vitals:    07/26/19 1326   BP: 104/78   Pulse: 80   Resp: 18   Weight: 76.4 kg (168 lb 5.1 oz)   Height: 5' 8" (1.727 m)   PainSc: 0-No pain       Physical Exam   Constitutional: She is oriented to person, place, and time. She appears well-developed and well-nourished. No distress.   HENT:   Mouth/Throat: Oropharynx is clear and moist.   Eyes: No scleral icterus.   Cardiovascular: Normal rate and regular rhythm.   No murmur heard.  Pulmonary/Chest: Effort normal and breath sounds normal. No respiratory distress. She has no wheezes.   Musculoskeletal: She exhibits no edema.   Neurological: She is alert and oriented to person, place, and time.   Skin: Skin is warm and dry.   several small erythematous papules with scaling on left forearm   Psychiatric: She has a normal mood and affect. Her behavior is normal.   Vitals reviewed.      Data:  Previous labs reviewed and pertinent for eGFR 53.7.      Yohana Young MD  Internal Medicine  "

## 2019-07-26 NOTE — PROGRESS NOTES
Subjective:      Patient ID: Lizeth Henderson is a 78 y.o. female.    Chief Complaint: Post-op Evaluation    Patient presents to clinic 2 weeks S/P flexor tenotomy of the Lt. 3rd toe.  Denies pain from the surgical site with today's exam.  Has kept the incision site covered with xeroform and a bandage daily x 1 week.  Relates light ambulation around her home while wearing casual shoe gear.  Denies having N/V/F/C/D. Denies any additional pedal complaints.      Hemoglobin A1C   Date Value Ref Range Status   01/03/2019 5.6 4.0 - 5.6 % Final     Comment:     ADA Screening Guidelines:  5.7-6.4%  Consistent with prediabetes  >or=6.5%  Consistent with diabetes  High levels of fetal hemoglobin interfere with the HbA1C  assay. Heterozygous hemoglobin variants (HbS, HgC, etc)do  not significantly interfere with this assay.   However, presence of multiple variants may affect accuracy.     12/06/2017 5.0 4.0 - 5.6 % Final     Comment:     According to ADA guidelines, hemoglobin A1c <7.0% represents  optimal control in non-pregnant diabetic patients. Different  metrics may apply to specific patient populations.   Standards of Medical Care in Diabetes-2016.  For the purpose of screening for the presence of diabetes:  <5.7%     Consistent with the absence of diabetes  5.7-6.4%  Consistent with increasing risk for diabetes   (prediabetes)  >or=6.5%  Consistent with diabetes  Currently, no consensus exists for use of hemoglobin A1c  for diagnosis of diabetes for children.  This Hemoglobin A1c assay has significant interference with fetal   hemoglobin   (HbF). The results are invalid for patients with abnormal amounts of   HbF,   including those with known Hereditary Persistence   of Fetal Hemoglobin. Heterozygous hemoglobin variants (HbAS, HbAC,   HbAD, HbAE, HbA2) do not significantly interfere with this assay;   however, presence of multiple variants in a sample may impact the %   interference.     11/21/2014 5.3 4.5 - 6.2 %  Final       Review of Systems   Constitution: Negative for chills and fever.   Cardiovascular: Negative for claudication and leg swelling.   Skin: Positive for color change and nail changes.   Musculoskeletal: Negative for muscle cramps, muscle weakness and myalgias.   Neurological: Positive for numbness and paresthesias.   Psychiatric/Behavioral: Negative for altered mental status.           Objective:      Physical Exam   Constitutional: She is oriented to person, place, and time. She appears well-developed and well-nourished. No distress.   Cardiovascular:   Pulses:       Dorsalis pedis pulses are 2+ on the right side, and 2+ on the left side.        Posterior tibial pulses are 2+ on the right side, and 2+ on the left side.   CFT is < 3 seconds bilateral.  Pedal hair growth is decreased bilateral.  Varicosities noted bilateral.  Mild nonpitting lower extremity edema noted bilateral.  Toes are cool to touch bilateral.     Musculoskeletal: She exhibits no edema or tenderness.   Muscle strength 5/5 in all muscle groups bilateral.  No tenderness nor crepitation with ROM of foot/ankle joints bilateral.  (-) for pain with palpation to the incision site of the Lt. 3rd toe.  Bilateral pes planus.  Bilateral hallux abducto valgus.  Bilateral semi-rigid contracture of toes 2-5.    Neurological: She is alert and oriented to person, place, and time. She has normal strength. A sensory deficit is present.   Protective sensation per Liberty-Aishwarya monofilament is significantly decreased bilateral.  Vibratory sensation is absent bilateral.  Light touch is intact bilateral.   Skin: Skin is warm, dry and intact. Capillary refill takes less than 2 seconds. No abrasion, no bruising, no burn, no ecchymosis, no laceration, no lesion, no petechiae and no rash noted. She is not diaphoretic. No erythema. No pallor.   Stab incision of the Lt. 3rd toe is well approximated with suture.  No sign of localized infection, dehiscence,  ischemia, or necrosis noted the length of the incision.             Assessment:       Encounter Diagnosis   Name Primary?    Postoperative state Yes         Plan:       Lizeth was seen today for post-op evaluation.    Diagnoses and all orders for this visit:    Postoperative state      I counseled the patient on her conditions, their implications and medical management.    Overall, satisfactory postoperative results noted.  Incision is well maintained and devoid of localized signs of infection.    With the patient's verbal consent, a sterile suture removal kit was used to remove the single stitch of the Lt. 3rd toe with minimal pain noted.      May discontinue daily bandage changes, as the incision is fully epithelialized.    May resume use of casual shoe gear with ambulation to tolerance.    To continue with her normal bathing routine.     RTC in 3 months for routine care.    Ric Akbar DPM

## 2019-07-26 NOTE — PATIENT INSTRUCTIONS
The dry, scaly spots that we talked about today are called actinic keratoses. They are related to sun exposure and damage to the skin from the sun. They tend to occur on areas with a lot of sun exposure like the face, ears, arms, and hands, but can happen anywhere with sun exposure. These are not skin cancer, but they have the possibility of turning in to skin cancer, so they are usually removed in some way.     If there are only a few, they can be treated with cryotherapy (frozen off), so that the damaged surface skin falls off, and healthy skin can take its place. This procedure is generally pretty fast, and involves some stinging/pain. The risks of this procedure include pain, bleeding, infection, scarring, or the need for a repeat procedure if the lesion returns or does not resolve. After this procedure, you can usually return to your usual activity.     If there are a lot of actinic keratoses, you may need to see a Dermatologist (skin doctor) to talk about possible treatments. They may recommend creams or special lights that can treat a lot of actinic keratoses at the same time.

## 2019-07-30 DIAGNOSIS — I10 ESSENTIAL HYPERTENSION: ICD-10-CM

## 2019-07-30 DIAGNOSIS — E78.2 HYPERLIPIDEMIA, MIXED: ICD-10-CM

## 2019-07-30 RX ORDER — LOSARTAN POTASSIUM 100 MG/1
TABLET ORAL
Qty: 90 TABLET | Refills: 0 | OUTPATIENT
Start: 2019-07-30

## 2019-07-30 RX ORDER — ATORVASTATIN CALCIUM 40 MG/1
TABLET, FILM COATED ORAL
Qty: 90 TABLET | Refills: 0 | OUTPATIENT
Start: 2019-07-30

## 2019-07-30 RX ORDER — FENOFIBRATE 54 MG/1
TABLET ORAL
Qty: 90 TABLET | Refills: 0 | OUTPATIENT
Start: 2019-07-30

## 2019-07-30 RX ORDER — HYDROCHLOROTHIAZIDE 12.5 MG/1
TABLET ORAL
Qty: 90 TABLET | Refills: 0 | OUTPATIENT
Start: 2019-07-30

## 2019-07-30 NOTE — TELEPHONE ENCOUNTER
Spoke w/ Roshni, these were duplicates sent in error . The rx refills were received last week . Pls disregard . It will not allow me to cancel the refill request. Can you please refuse the meds . Thx .--lp

## 2019-08-21 ENCOUNTER — TELEPHONE (OUTPATIENT)
Dept: PSYCHIATRY | Facility: CLINIC | Age: 79
End: 2019-08-21

## 2019-08-21 DIAGNOSIS — F33.1 MDD (MAJOR DEPRESSIVE DISORDER), RECURRENT EPISODE, MODERATE: Primary | ICD-10-CM

## 2019-08-21 RX ORDER — BUSPIRONE HYDROCHLORIDE 10 MG/1
TABLET ORAL
Qty: 1 TABLET | Refills: 0
Start: 2019-08-21 | End: 2019-08-28 | Stop reason: SDUPTHER

## 2019-08-21 NOTE — TELEPHONE ENCOUNTER
Pt called regarding current status. Pt states increase in anxiety and depression x 1 wk. Pt reports Financial stress ( car repossessed and may be evicted from apartment). Pt reports feelings of hopelessness, not eating, crying spells, unmotivated and stays in bed. Pt is requesting medication changes. Advised pt I will send her concerns to Dr. Vinson as well as a request for case management to help with her current financial struggles. Pt verbalized understanding with no further questions.

## 2019-08-21 NOTE — TELEPHONE ENCOUNTER
"I spoke to the patient.  She reports feeling very stressed. Their car was repossessed last week  Lease renewed, but now charging a higher rate and they don't think they are going to be able to stay in current apartment.   She cannot sleep at night  Ate two pieces of toast all day yesterday, lost 3 lbs in interim  She is very anxious   Yesterday "all I did was cry."  No acute safety concerns or SI reported.   Has gone to her [employer] - was told daughter made too much money for community assistance.   Pt is very accepting of referral to case management.  I offered retrial of Mirtazapine - she declined, cannot get a ride to the pharmacy.  Pt's daughter is working, gets a ride to work with coworker.  Daughter lost $ 600 in disability income which is hurting them too.   Will titrate Buspar to 10 mg in am and 20 mg in PM.   Continue Sertraline 200 mg daily and trazodone 150 mg nightly for now.  I can also titrate trazodone if needed.   She will call me back in 1-2 weeks for an update.        "

## 2019-08-23 ENCOUNTER — OUTPATIENT CASE MANAGEMENT (OUTPATIENT)
Dept: ADMINISTRATIVE | Facility: OTHER | Age: 79
End: 2019-08-23

## 2019-08-23 NOTE — LETTER
September 6, 2019           Dear Ms. Lizeth,     Welcome to [facility] Complex Care Management Program. It was a pleasure talking with you today. My name is Safia Coleman, BERNARD and I look forward to being your Nurse Care Manager. My goal is to help you function at the healthiest and highest level possible. You can contact me directly at [phone]. As we discussed and with your permission, I have sent a referral to our  for assistance with the resources we discussed today.     As an [facility] patient with [employer], some of the services we may be able to provide include:      Development of an individualized care plan with a Registered Nurse    Connection with a    Connection with available resources and services     Coordinate communication among your care team members    Provide coaching and education    Help you understand your doctors treatment plan   Help you obtain information about your insurance coverage.     All services provided by [facility]s Complex Care Managers and other care team members are coordinated with and communicated to your primary care team.    As part of your enrollment, you will be receiving education materials and more information about these services in your [facility] account, by phone or through the mail.  If you do not wish to participate or receive information, please contact our office at [phone].      Sincerely,        Safia Coleman, RN  [facility]   Outpatient RN Complex Care Manager

## 2019-08-28 ENCOUNTER — TELEPHONE (OUTPATIENT)
Dept: PSYCHIATRY | Facility: CLINIC | Age: 79
End: 2019-08-28

## 2019-08-28 RX ORDER — BUSPIRONE HYDROCHLORIDE 10 MG/1
TABLET ORAL
Qty: 1 TABLET | Refills: 0
Start: 2019-08-28 | End: 2019-09-13 | Stop reason: SDUPTHER

## 2019-09-06 ENCOUNTER — PATIENT MESSAGE (OUTPATIENT)
Dept: PSYCHIATRY | Facility: CLINIC | Age: 79
End: 2019-09-06

## 2019-09-06 NOTE — PATIENT INSTRUCTIONS
Fall Prevention  Falls often occur due to slipping, tripping or losing your balance. Millions of people fall every year and injure themselves. Here are ways to reduce your risk of falling again.  · Think about your fall, was there anything that caused your fall that can be fixed, removed, or replaced?  · Make your home safe by keeping walkways clear of objects you may trip over.  · Use non-slip pads under rugs. Do not use area rugs or small throw rugs.  · Use non-slip mats in bathtubs and showers.  · Install handrails and lights on staircases.  · Do not walk in poorly lit areas.  · Do not stand on chairs or wobbly ladders.  · Use caution when reaching overhead or looking upward. This position can cause a loss of balance.  · Be sure your shoes fit properly, have non-slip bottoms and are in good condition.   · Wear shoes both inside and out. Avoid going barefoot or wearing slippers.  · Be cautious when going up and down stairs, curbs, and when walking on uneven sidewalks.  · If your balance is poor, consider using a cane or walker.  · If your fall was related to alcohol use, stop or limit alcohol intake.   · If your fall was related to use of sleeping medicines, talk to your doctor about this. You may need to reduce your dosage at bedtime if you awaken during the night to go to the bathroom.    · To reduce the need for nighttime bathroom trips:  ¨ Avoid drinking fluids for several hours before going to bed  ¨ Empty your bladder before going to bed  ¨ Men can keep a urinal at the bedside  · Stay as active as you can. Balance, flexibility, strength, and endurance all come from exercise. They all play a role in preventing falls. Ask your healthcare provider which types of activity are right for you.  · Get your vision checked on a regular basis.  · If you have pets, know where they are before you stand up or walk so you don't trip over them.  · Use night lights.  Date Last Reviewed: 11/5/2015  © 8385-7284 [employer]. [address] [place] [address], [place] [address]. All rights reserved. This information is not intended as a substitute for professional medical care. Always follow your healthcare professional's instructions.        Preventing Falls in the Home  An adult or child can fall for many reasons. If you are an older adult, you may fall because your reaction time slows down. Your muscles and joints may get stiff, weak, or less flexible because of illness, medicines, or a physical condition. These things can also make a child more likely to fall or be injured in a fall.  Other health problems that make falls more likely include:  · Arthritis  · Dizziness or lightheadedness when you get out of bed (orthostatic hypotension)  · History of a stroke  · Dizziness  · Anemia  · Certain medicines taken for mental illness  · Problems with balance or gait  · History of falls with or without an injury  · Changes in vision (vision impairment)  · Changes in thinking skills and memory (cognitive impairment)  Injuries from a fall can include broken bones, dislocated joints, and cuts. When these injuries are serious enough, they can make it impossible for you or a child who is injured in a fall to live on his or her own.  Prevention tips  To help prevent falls and fall-related injuries, follow the tips below.   Floors  Make floors safer by doing the following:   · Put nonskid pads under area rugs.  · Remove throw rugs.  · Replace worn floor coverings.  · Tack carpets firmly to each step on carpeted stairs. Put nonskid strips on the edges of uncarpeted stairs.  · Keep floors and stairs free of clutter and cords.  · Arrange furniture so there are clear pathways.  · Clean up any spills right away.  · Wear shoes that fit.  Bathrooms    Make bathrooms safer by doing the following:   · Install grab bars in the tub or shower.  · Apply nonskid strips or put a nonskid rubber mat in the tub or shower.  · Sit on a bath chair to bathe.  · Use  bathmats with nonskid backing.  Lighting and the environment  Improve lighting in your home by doing the following:   · Keep a flashlight in each room. Or put a lamp next to the bed within easy reach.  · Put nightlights in the bedrooms, hallways, kitchen, and bathrooms.  · Make sure all stairways have good lighting.  · Take your time when going up and down stairs.  · Put handrails on both sides of stairs and in walkways for more support. To prevent injury to your wrist or arm, dont use handrails to pull yourself up.  · Install grab bars to pull yourself up.  · Move or rearrange items that you use often. This will make them easier to find or reach.  · Look at your home to find any safety hazards. Especially look at doorways, walkways, and the driveway. Remove or repair any safety problems that you find.  Date Last Reviewed: 8/1/2016  © 6351-7901 [employer]. [address] [place] [address]. All rights reserved. This information is not intended as a substitute for professional medical care. Always follow your healthcare professional's instructions.        Exercises to Prevent Falls  Certain types of exercises may help make you less likely to fall. Try the ones below. Or do other exercises that your health care provider suggests. Depending on your health, you may need to start slowly. Don't let that stop you. Even small amounts of exercise can help you. Be sure to talk to your health care provider before starting any exercise program.       Improve balance  Many types of exercise can help improve balance. Sravan chi and yoga are good examples. Here's another one to try. You can do it anytime and almost anywhere.  · Stand next to a counter or solid support.  · Push yourself up onto your tiptoes.  · Hold for 5 seconds. If you start to lose your balance, hold on to the counter.  · Rest and repeat 5 times. Work up to holding for 20 to 30 seconds, if you can. Increase flexibility  Being more flexible  "makes it easier for you to move around safely. Try exercises like the seated hamstring stretch.  · Sit in a chair and put one foot on a stool.  · Straighten your leg and reach with both hands down either side of your leg. Reach as far down your leg as you can.  · Hold for about 20 seconds.  · Go back to the starting position. Then repeat 5 times. Switch legs. Build strength  "Resistance" exercises help build strength. You can do them without equipment. Or you can use weights, elastic bands, or special machines. One such exercise is called the biceps curl. You can hold a 1-pound weight or even a can of soup. Do this exercise at least 3 times a week. Strive for every day.  · Sit up straight in a chair.  · Keep your elbow close to your body and your wrist straight.  · Bend your arm, moving your hand up to your shoulder. Then slowly lower your arm.  · Repeat 5 times. Switch to the other arm.   Build your staying power  Aerobic exercises make your heart and lungs stronger so you can keep moving longer. Walking and swimming are two of the best types of exercises you can do. Using a stationary bike is great, too. Find an aerobic exercise that you enjoy. Start slowly and build up. Even 5 minutes is helpful. Aim for a goal of 30 minutes, at least 3 times a week. You don't have to do 30 minutes in 1 session. Break it up and walk a little throughout the day.  More helpful tips  · Start easy. Slowly work up to doing more.  · Talk with your health care provider about the best exercises for you.  · Call senior centers or health clubs about exercise programs.  · If needed, have a family member watch you walk every so often to check your stability.  · Exercise with a friend. Choose an activity you both enjoy.  · Consider kayce chi or yoga to strengthen your balance.  · Try exercises that you can do anytime, anywhere. Here are 2 examples. Have someone with you when you first try these:  ¨ Practice walking by placing 1 foot right in " front of the other.  ¨ Stand up and sit down 10 times. Repeat this throughout the day.   Date Last Reviewed: 6/13/2015  © 0063-4419 [employer]. [address] [place] [address]. All rights reserved. This information is not intended as a substitute for professional medical care. Always follow your healthcare professional's instructions.        Preventing Falls: Staying Active    Staying active is one of the best things you can do to prevent falls. Keep in mind that doing too little can be as risky as doing too much. That's because not being active can make you weaker and more likely to fall. But how much can you do safely? Start easy. Slowly work up to doing more. Talk to your health care provider about safe ways for you to stay active.  Stay active, stay connected  Staying connected with other people can help lower your risk of falls. One way it does this is by helping to keep you from feeling isolated and depressed. Find a social activity you enjoy. Make it part of your weekly or daily routine:  · Join a club or visit a senior center.  · Go to Yarsanism services.  · Organize a [employer] or game of cards.  · Garden with your neighbor.  · Have a friend join you to go walking outdoors or in the mall.  How exercise helps  The list of benefits from exercise just keeps getting longer. And, as you age, you can keep reaping those rewards. Balance, flexibility, strength, and endurance all come from exercise. They all play a role in preventing falls. It's never too late to start exercising. Try organized activities. You can find these at senior centers, health clubs, or even at a Taoism, temple, or Mandaen. This approach may work best if you've never exercised in the past or if you need company to get motivated. But you can exercise on your own if you prefer. Try an exercise video or walk in the park.  Date Last Reviewed: 6/13/2015  © 7618-2700 [employer]. [address] [place] [address]  [place] [address]. All rights reserved. This information is not intended as a substitute for professional medical care. Always follow your healthcare professional's instructions.        Depression  Depression is one of the most common mental health problems today. It is not just a state of unhappiness or sadness. It is a true disease. The cause seems to be related to a decrease in chemicals that transmit signals in the brain. Having a family history of depression, alcoholism, or suicide increases the risk. Chronic illness, chronic pain, migraine headaches and high emotional stress also increase the risk.  Depression is something we tend to recognize in others, but may have a hard time seeing in ourselves. It can show in many physical and emotional ways:  · Loss of appetite  · Over-eating  · Not being able to sleep  · Sleeping too much  · Tiredness not related to physical exertion  · Restlessness or irritability  · Slowness of movement or speech  · Feeling depressed or withdrawn  · Loss of interest in things you once enjoyed  · Trouble concentrating, poor memory, trouble making decisions  · Thoughts of harming or killing oneself, or thoughts that life is not worth living  · Low self-esteem  The treatment for depression may include both medicine and psychotherapy. Antidepressants can reduce suffering and can improve the ability to function during the depressed period. Therapy can offer emotional support and help you understand emotional factors that may be causing the depression.  Home care  · On-going care and support helps people manage this disease.  Find a healthcare provider and therapist who meet your needs. Seek help when you feel like you may be getting ill.  · Be kind to yourself. Make it a point to do things that you enjoy (gardening, walking in nature, going to a movie, etc.). Reward yourself for small successes.  · Take care of your physical body. Eat a balanced diet (low in saturated fat and high in fruits and  vegetables). Exercise at least 3 times a week for 30 minutes. Even mild-moderate exercise (like brisk walking) can make you feel better.  · Avoid alcohol, which can make depression worse.  · Take medicine as prescribed.  · Tell each of your healthcare providers about all of the prescription drugs, over-the-counter medicines, vitamins, and supplements you take. Certain supplements interact with medicines and can result in dangerous side effects. Ask your pharmacist when you have questions about drug interactions.  · Talk with your family and trusted friends about your feelings and thoughts. Ask them to help you recognize behavior changes early so you can get help and, if needed, medicine can be adjusted.  Follow-up care  Follow up with your healthcare provider, or as advised.  Call 911  Call 911 if you:  · Have suicidal thoughts, a suicide plan, and the means to carry out the plan  · Have trouble breathing  · Are very confused  · Feel very drowsy or have trouble awakening  · Faint or lose consciousness  · Have new chest pain that becomes more severe, lasts longer, or spreads into your shoulder, arm, neck, jaw or back  When to seek medical advice  Call your healthcare provider right away if any of these occur:  · Feeling extreme depression, fear, anxiety, or anger toward yourself or others  · Feeling out of control  · Feeling that you may try to harm yourself or another  · Hearing voices that others do not hear  · Seeing things that others do not see  · Cant sleep or eat for 3 days in a row  · Friends or family express concern over your behavior and ask you to seek help  Date Last Reviewed: 9/29/2015  © 5164-4801 [employer]. [address] [place] [address], [place] [address]. All rights reserved. This information is not intended as a substitute for professional medical care. Always follow your healthcare professional's instructions.        Know the Signs and Symptoms of Depression  Everyone feels down at times.  "The blues are a natural part of life. But an unhappy period thats intense or lasts for more than a couple of weeks can be a sign of depression.  Depression is a serious illness. It is not a sign of weakness or a "character flaw," and it is not something you can "snap out of." In fact, most people with depression need treatment to get better. Depression can disrupt the lives of family and friends. If you know someone you think may be depressed, find out what you can do to help.    Recognizing signs of depression  People who are depressed may:  · Feel unhappy, sad, blue, down, or miserable nearly every day  · Feel helpless, hopeless, or worthless  · Lose interest in hobbies, friends, and activities that used to give pleasure  · Not sleep well or sleep too much  · Gain or lose weight  · Feel low on energy or constantly tired  · Have a hard time concentrating or making decisions  · Lose interest in sex  · Have physical symptoms, such as stomachaches, headaches, or backaches  Know the serious signals  Never ignore a person's comments about suicide or behaviors that can lead to self-harm. Warning signals for suicide include:  · Threats or talk of suicide  · Statements such as I wont be a problem much longer or Nothing matters  · Giving away possessions or making a will or  arrangements  · Buying a gun or other weapon  · Sudden, unexplained cheerfulness or calm after a period of depression  If you notice any of these signs, get help right away. Call a healthcare professional, mental health clinic, or suicide hotline and ask what action to take. In an emergency, dont hesitate to call the police.  Resources:  · National Institutes of Mental Izyhgt072-197-3133qnt.[facility] Hospitalh.nih.gov  · National Erin on Mental Iwrkzuo221-082-1365lfj.caryln.org   · Mental Health Qqacqaq585-992-6048zrb.nmha.org  · National Suicide Ixkrlje266-742-5102 (800-SUICIDE)  · National Suicide Prevention Ssixtwac468-822-2068 " (229-521-JRMV)www.suicidepreventionlifeline.org   Date Last Reviewed: 1/1/2017  © 1893-3585 [employer]. [address] [place] [address], [place] [address]. All rights reserved. This information is not intended as a substitute for professional medical care. Always follow your healthcare professional's instructions.        Depression: Tips to Help Yourself  As your healthcare providers help treat your depression, you can also help yourself. Keep in mind that your illness affects you emotionally, physically, mentally, and socially. So full recovery will take time. Take care of your body and your soul, and be patient with yourself as you get better.    Self-care  · Educate yourself. Read about treatment and medicine options. If you have the energy, attend local conferences or support groups. Keep a list of useful websites and helpful books and use them as needed. This illness is not your fault. Dont blame yourself for your depression.  · Manage early symptoms. If you notice symptoms returning, experience triggers, or identify other factors that may lead to a depressive episode, get help as soon as possible. Ask trusted friends and family to monitor your behavior and let you know if they see anything of concern.  · Work with your provider. Find a provider you can trust. Communicate honestly with that person and share information on your treatment for depression and your reaction to medicines.  · Be prepared for a crisis. Know what to do if you experience a crisis. Keep the phone number of a crisis hotline and know the location of your community's urgent care centers and the closest emergency department.  · Hold off on big decisions. Depression can cloud your judgment. So wait until you feel better before making major life decisions, such as changing jobs, moving, or getting  or .  · Be patient. Recovering from depression is a process. Dont be discouraged if it takes some time to feel  better.  · Keep it simple. Depression saps your energy and concentration. So you wont be able to do all the things you used to do. Set small goals and do what you can.  · Be with others. Dont isolate yourself--youll only feel worse. Try to be with other people. And take part in fun activities when you can. Go to a movie, ballgame, [employer] service, or social event. Talk openly with people you can trust. And accept help when its offered.  Take care of your body  People with depression often lose the desire to take care of themselves. That only makes their problems worse. During treatment and afterward, make a point to:  · Exercise. Its a great way to take care of your body. And studies have shown that exercise helps fight depression.  · Avoid drugs and alcohol. These may ease the pain in the short term. But theyll only make your problems worse in the long run.  · Get relief from stress. Ask your healthcare provider for relaxation exercises and techniques to help relieve stress.  · Eat right. A balanced and healthy diet helps keep your body healthy.  Date Last Reviewed: 1/1/2017  © 2828-3826 [employer]. [address] [place] [address]. All rights reserved. This information is not intended as a substitute for professional medical care. Always follow your healthcare professional's instructions.        What Can Cause Depression?  Depression is a real illness and certain factors have been known to trigger it. Below are some common known causes. Any of these factors, or a combination of them, can make depression more likely. Sometimes, depression occurs for no one clear reason. But no matter what the cause, depression can be treated.    Loss or stress  Depression can occur in children and adults, but it often starts in adulthood. Normal grief over a death, breakup, or other loss may become depression. Life stresses such as physical abuse, job loss, or sudden change in finances can also  trigger depression. In some cases, years go by before the depression sets in.  Family history  The tendency to develop depression seems to run in families. If one or more of your close relatives (parents, grandparents, or siblings) have had an episode of depression, you may be more likely to develop the illness, too.  Drugs or alcohol  Drugs and alcohol can upset the chemical balance in the brain. This can lead to an episode of depression. Some depressed people turn to drugs or alcohol to numb the pain. But in the long run, doing so just makes depression worse.  Medicines  Depression can be a side effect of some medicines for high blood pressure, cancer, pain, and other health problems. So tell your doctor about all medicines you take. But never stop taking one without your doctors OK.  Physical illness  Being sick can make anyone feel frustrated and sad. But some health problems may cause actual changes in your brain that lead to depression. Other health problems, such as an underactive thyroid, may be mistaken for depression.  Hormones  Hormones carry messages in the bloodstream. They may affect brain chemicals, leading to depression. Women may get depressed when their hormone levels change quickly, such as just before their period, after giving birth, or during menopause.  Date Last Reviewed: 1/1/2017  © 7817-2271 [employer]. [address] [place] [address], [place] [address]. All rights reserved. This information is not intended as a substitute for professional medical care. Always follow your healthcare professional's instructions.        Your Bodys Response to Anxiety    Normal anxiety is part of the bodys natural defense system. It's an alert to a threat that is unknown, vague, or comes from your own internal fears. While youre in this state, your feelings can range from a vague sense of worry to physical sensations such as a pounding heartbeat. These feelings make you want to react to the threat.  An anxiety response is normal in many situations. But when you have an anxiety disorder, the same response can occur at the wrong times.  Anxiety can be helpful  Normal anxiety is a signal from your brain that warns you of a threat and is a normal response to help you prevent something or decrease the bad effects of something you can't control. For example, anxiety is a normal response to situations that might damage your body, separate you from a loved one, or lose your job. The symptoms of anxiety can be physical and mental.  How does it feel?  At certain times, people with anxiety may have:  · Dizziness  · Muscle tension or pain  · Restlessness  · Sleeplessness  · Trouble concentrating  · Racing heartbeat  · Fast breathing  · Shaking or trembling  · Stomachache  · Diarrhea  · Loss of energy  · Sweating  · Cold, clammy hands  · Chest pain  · Dry mouth  Anxiety can also be a problem  Anxiety can become a problem when it is hard to control, occurs for months, and interferes with important parts of your life. With an anxiety disorder, your body has the response described above, but in inappropriate ways. The response a person has depends on the anxiety disorder he or she has. With some disorders, the anxiety is way out of proportion to the threat that triggers it. With others, anxiety may occur even when there isnt a clear threat or trigger.  Who does it affect?  Some people are more prone to persistent anxiety than others. It tends to run in families, and it affects more younger people than older people, and more women than men. But no age, race, or gender is immune to anxiety problems.  Anxiety can be treated  The good news is that the anxiety thats disrupting your life can be treated. Check with your healthcare provider and rule out any physical problems that may be causing the anxiety symptoms. If an anxiety disorder is diagnosed seek mental healthcare. This is an illness and it can respond to treatment. Most  "types of anxiety disorders will respond to "talk therapy" and medicines. Working with your doctor or other healthcare provider, you can develop skills to help you cope with anxiety. You can also gain the perspective you need to overcome your fears. Note: Good sources of support or guidance can be found at your local hospital, mental health clinic, or an employee assistance program.  How to cope with anxiety  If anxiety is wearing you down, here are some things you can do to cope:  · Keep in mind that you cant control everything about a situation. Change what you can and let the rest take its course.  · Exercise--its a great way to relieve tension and help your body feel relaxed.  · Avoid caffeine and nicotine, which can make anxiety symptoms worse.  · Fight the temptation to turn to alcohol or unprescribed drugs for relief. They only make things worse in the long run.  · Educate yourself about anxiety disorders. Keep track of helpful online resources and books you can use during stressful periods.  · Try stress management techniques such as meditation.  · Consider online or in-person support groups.   Date Last Reviewed: 1/1/2017 © 2000-2017 [employer]. [address] [place] [address]. All rights reserved. This information is not intended as a substitute for professional medical care. Always follow your healthcare professional's instructions.        Treating Anxiety Disorders with Therapy    If you have an anxiety disorder, you dont have to suffer anymore. Treatment is available. Therapy (also called counseling) is often a helpful treatment for anxiety disorders. With therapy, a specially trained professional (therapist) helps you face and learn to manage your anxiety. Therapy can be short-term or long-term depending on your needs. In some cases, medicine may also be prescribed with therapy. It may take time before you notice how much therapy is helping, but stick with it. With therapy, " you can feel better.  Cognitive behavioral therapy (CBT)  Cognitive behavioral therapy (CBT) teaches you to manage anxiety. It does this by helping you understand how you think and act when youre anxious. Research has shown CBT to be a very effective treatment for anxiety disorders. How CBT is run is almost like a class. It involves homework and activities to build skills that teach you to cope with anxiety step by step. It can be done in a group or one-on-one, and often takes place for a set number of sessions. CBT has two main parts:  · Cognitive therapy helps you identify the negative, irrational thoughts that occur with your anxiety. Youll learn to replace these with more positive, realistic thoughts.  · Behavioral therapy helps you change how you react to anxiety. Youll learn coping skills and methods for relaxing to help you better deal with anxiety.  Other forms of therapy  Other therapy methods may work better for you than CBT. Or, you may move from CBT to another form of therapy as your treatment needs change. This may mean meeting with a therapist by yourself or in a group. Therapy can also help you work through problems in your life, such as drug or alcohol dependence, that may be making your anxiety worse.  Getting better takes time  Therapy will help you feel better and teach you skills to help manage anxiety long term. But change doesnt happen right away. It takes a commitment from you. And treatment only works if you learn to face the causes of your anxiety. So, you might feel worse before you feel better. This can sometimes make it hard to stick with it. But remember: Therapy is a very effective treatment. The results will be well worth it.  Helping yourself  If anxiety is wearing you down, here are some things you can do to cope:  · Check with your doctor and rule out any physical problems that may be causing the anxiety symptoms.  · If an anxiety disorder is diagnosed, seek mental healthcare.  This is an illness and it can respond to treatment. Most types of anxiety disorders will respond to talk therapy and medicine.  · Educate yourself about anxiety disorders. Keep track of helpful online resources and books you can use during stressful periods.  · Try stress management techniques such as meditation.  · Consider online or in-person support groups.  · Dont fight your feelings. Anxiety feeds itself. The more you worry about it, the worse it gets. Instead, try to identify what might have triggered your anxiety. Then try to put this threat in perspective.  · Keep in mind that you cant control everything about a situation. Change what you can and let the rest take its course.  · Exercise -- its a great way to relieve tension and help your body feel relaxed.  · Examine your life for stress, and try to find ways to reduce it.  · Avoid caffeine and nicotine, which can make anxiety symptoms worse.  · Fight the temptation to turn to alcohol or unprescribed drugs for relief. They only make things worse in the long run.   Date Last Reviewed: 1/1/2017  © 1316-8428 [employer]. [address] [place] [address]. All rights reserved. This information is not intended as a substitute for professional medical care. Always follow your healthcare professional's instructions.        Counseling for Depression  For some people, counseling, also called talk therapy, has been found to be as effective as medicine for mild to moderate depression. When done by a trained professional, this treatment is a powerful way to better understand your thoughts and feelings. Like medicine, it may take time before you notice how much counseling is helping.    Kinds of talk therapy  Different counselors use different methods for talk therapy. But all therapy aims to help change how you think about your problem. Most therapy for depression is often done one-on-one. But it may also be done in a group setting. You and  your healthcare provider can discuss the type of therapy you think would work best for you. You can also discuss who the best person is to provide the therapy.  How therapy helps  Talking about your problems can help them seem less overwhelming. It can help work through problems you have with your life and your relationships. It can also help you understand how depression is clouding your thinking, not letting you see the world the way it really is. Therapy can give you:  · Insight about your emotions  · New tools for dealing with your problems  · Emotional support for making progress  Getting better takes time  Talk therapy can help you feel better. But change doesnt happen right away. Depression takes away your energy and motivation. So it can be hard to feel like going to therapy and sticking with it. But therapy has been proven to be very valuable in the treatment of depression. Therapy for depression is often done for a set number of sessions. In other cases, you and your therapist decide together at what point you no longer need therapy.  Additional sources of help  In addition to a professional counselor, it may help to talk to other people in your life. You may find support and insight from:  · A close friend or family member  · A  trained in counseling  · A local support group or community group  · A 12-step program (such as Alcoholics Anonymous) for dealing with problems that can contribute to depression, such as alcohol or drug addiction  Date Last Reviewed: 1/1/2017 © 2000-2017 [employer]. [address] [place] [address]. All rights reserved. This information is not intended as a substitute for professional medical care. Always follow your healthcare professional's instructions.        Established High Blood Pressure    High blood pressure (hypertension) is a chronic disease. Often, healthcare providers dont know what causes it. But it can be caused by certain  health conditions and medicines.  If you have high blood pressure, you may not have any symptoms. If you do have symptoms, they may include headache, dizziness, changes in your vision, chest pain, and shortness of breath. But even without symptoms, high blood pressure thats not treated raises your risk for heart attack and stroke. High blood pressure is a serious health risk and shouldnt be ignored.  A blood pressure reading is made up of two numbers: a higher number over a lower number. The top number is the systolic pressure. The bottom number is the diastolic pressure. A normal blood pressure is a systolic pressure of  less than 120 over a diastolic pressure of less than 80. You will see your blood pressure readings written together. For example, a person with a systolic pressure of 188 and a diastolic pressure of 78 will have 118/78 written in the medical record.  High blood pressure is when either the top number is 140 or higher, or the bottom number is 90 or higher. This must be the result when taking your blood pressure a number of times. The blood pressures between normal and high are called prehypertension.  Home care  If you have high blood pressure, you should do what is listed below to lower your blood pressure. If you are taking medicines for high blood pressure, these methods may reduce or end your need for medicines in the future.  · Begin a weight-loss program if you are overweight.  · Cut back on how much salt you get in your diet. Heres how to do this:  ¨ Dont eat foods that have a lot of salt. These include olives, pickles, smoked meats, and salted potato chips.  ¨ Dont add salt to your food at the table.  ¨ Use only small amounts of salt when cooking.  · Start an exercise program. Talk with your healthcare provider about the type of exercise program that would be best for you. It doesn't have to be hard. Even brisk walking for 20 minutes 3 times a week is a good form of exercise.  · Dont  take medicines that stimulate the heart. This includes many over-the-counter cold and sinus decongestant pills and sprays, as well as diet pills. Check the warnings about hypertension on the label. Before buying any over-the-counter medicines or supplements, always ask the pharmacist about the product's potential interaction with your high blood pressure and your high blood pressure medicines.  · Stimulants such as amphetamine or cocaine could be deadly for someone with high blood pressure. Never take these.  · Limit how much caffeine you get in your diet. Switch to caffeine-free products.  · Stop smoking. If you are a long-time smoker, this can be hard. Talk to your healthcare provider about medicines and nicotine replacement options to help you. Also, enroll in a stop-smoking program to make it more likely that you will quit for good.  · Learn how to handle stress. This is an important part of any program to lower blood pressure. Learn about relaxation methods like meditation, yoga, or biofeedback.  · If your provider prescribed medicines, take them exactly as directed. Missing doses may cause your blood pressure get out of control.  · If you miss a dose or doses, check with your healthcare provider or pharmacist about what to do.  · Consider buying an automatic blood pressure machine. Ask your provider for a recommendation. You can get one of these at most pharmacies.     The American Heart Association recommends the following guidelines for home blood pressure monitoring:  · Don't smoke or drink coffee for 30 minutes before taking your blood pressure.  · Go to the bathroom before the test.  · Relax for 5 minutes before taking the measurement.  · Sit with your back supported (don't sit on a couch or soft chair); keep your feet on the floor uncrossed. Place your arm on a solid flat surface (like a table) with the upper part of the arm at heart level. Place the middle of the cuff directly above the eye of the  elbow. Check the monitor's instruction manual for an illustration.  · Take multiple readings. When you measure, take 2 to 3 readings one minute apart and record all of the results.  · Take your blood pressure at the same time every day, or as your healthcare provider recommends.  · Record the date, time, and blood pressure reading.  · Take the record with you to your next medical appointment. If your blood pressure monitor has a built-in memory, simply take the monitor with you to your next appointment.  · Call your provider if you have several high readings. Don't be frightened by a single high blood pressure reading, but if you get several high readings, check in with your healthcare provider.  · Note: When blood pressure reaches a systolic (top number) of 180 or higher OR diastolic (bottom number) of 110 or higher, seek emergency medical treatment.  Follow-up care  You will need to see your healthcare provider regularly. This is to check your blood pressure and to make changes to your medicines. Make a follow-up appointment as directed. Bring the record of your home blood pressure readings to the appointment.  When to seek medical advice  Call your healthcare provider right away if any of these occur:  · Blood pressure reaches a systolic (upper number) of 180 or higher OR a diastolic (bottom number) of 110 or higher  · Chest pain or shortness of breath  · Severe headache  · Throbbing or rushing sound in the ears  · Nosebleed  · Sudden severe pain in your belly (abdomen)  · Extreme drowsiness, confusion, or fainting  · Dizziness or spinning sensation (vertigo)  · Weakness of an arm or leg or one side of the face  · You have problems speaking or seeing   Date Last Reviewed: 12/1/2016  © 6210-9809 [employer]. [address] [place] [address], [place] [address]. All rights reserved. This information is not intended as a substitute for professional medical care. Always follow your healthcare professional's  instructions.        Low Blood Pressure (All Causes)  A blood pressure reading is made up of 2 numbers There is a top number over a bottom number. The top number is the systolic pressure. The bottom number is the diastolic pressure. A normal blood pressure is a systolic pressure less than 120 over a diastolic pressure less than 80. Low blood pressure (hypotension) is a blood pressure that is less than what is normal for you. Low blood pressure can cause dizziness, lightheadedness, or fainting.  Some medicines can cause low blood pressure. They include:  · High blood pressure pills  · Water pills (diuretics)  · Some heart medicines  · Some antidepressants  · Pain, anxiety, sedative, and sleeping medicines  Other causes include:  · Dehydration, severe infection, or fever  · Blood loss, such as bleeding from the stomach or intestines.  · Heart failure  · Change in heart rate or rhythm (arrhythmia)  · A drop in blood pressure from a sudden change in body position, from lying down to standing (orthostatic hypotension)  · Alcohol or drug intoxication  · Neurological diseases that impair the autonomic nervous system  Treatment will depend on what is causing your low blood pressure.  Home care  Follow these guidelines when caring for yourself at home:  · Rest until your symptoms get better.  · Keep a record of your symptoms and what you were doing when they occurred. Bring the record with you to your next appointment.  · Be aware of how quickly your blood pressure drops when you become dehydrated, spend a lot of time in the sun, or have low blood sugar. Take measures to prevent blood pressure drops at these times.  · Follow the treatment plan described by your healthcare provider.  Follow-up care  Follow up with your healthcare provider, or as advised.  When to seek medical advice  Call your healthcare provider right away if any of these occur:  · Dizziness, lightheadedness, or fainting  · Black or red color in your stools  or vomit  · Shortness of breath or difficulty breathing  · Chest, shoulder, arm, neck, or upper back pain  · Abdominal pain  · Diarrhea or vomiting that doesnt go away  · You arent able to eat or drink  · Fever of 100.4°F (38°C) or higher, or as directed by your healthcare provider  · Burning when you urinate  · Foul-smelling urine  Date Last Reviewed: 12/1/2016 © 2000-2017 [employer]. [address] [place] [address]. All rights reserved. This information is not intended as a substitute for professional medical care. Always follow your healthcare professional's instructions.        Understanding Osteoarthritis of the Knee    A joint is a place where two bones meet. The knee is called a hinge joint. This joint is formed where the thighbone (femur) meets the shinbone (tibia). A healthy knee joint bends freely. Knee osteoarthritis is a condition where parts of the knee joint wear out. This can lead to pain, stiffness, and limited movement.   What is osteoarthritis?  Every joint contains a smooth tissue called cartilage. Cartilage cushions the ends of bones and helps bones in a joint glide smoothly against each other. Knee osteoarthritis occurs when cartilage in the knee joint begins to break down and wear away. Bones may become exposed and rub together. The cartilage may become irritated and rough. This prevents smooth movement of the joint and can lead to pain.  Causes of osteoarthritis of the knee  Causes can include:  · Wear and tear from normal use over time  · Overuse of the knee during sports or work activities  · Being overweight. This increases stress on the knee joint.  · Misalignment of the knee joint  · Injury to the knee  Symptoms of osteoarthritis of the knee  Common symptoms include:  · Pain and swelling around the joint. The pain and swelling get worse with activity and better with rest.  · Grinding sound when moving the knee  · Reduced knee movement  · Knee stiffness. This is  often worse first thing in the morning.  Treating osteoarthritis of the knee  Osteoarthritis is a long-term condition. Treatment usually focuses on managing symptoms. Treatment may include:  · Over-the-counter or prescription medicines taken by mouth to help relieve pain and swelling  · Injections of medicine into the joint to help relieve symptoms for a time  · A weight-loss plan for people who are overweight  · A plan of physical therapy and exercises to improve the strength and flexibility of the muscles around the knee  · Heat or cold therapy to help relieve pain and stiffness  · Assistive devices that help with movement, such as a cane or a walker  · Assistive devices that make activities of daily life easier, such as raised toilet seats or shower bars  If other treatments dont do enough to relieve symptoms, you may need surgery to replace the joint. During this surgery, the damaged joint is removed. An artificial knee joint is then put into place. This can help relieve pain and stiffness and restore movement of the knee.     When to call your healthcare provider  Call your healthcare provider right away if you have any of these:  · Fever of 100.4°F (38°C) or higher, or as directed  · Symptoms that dont get better with prescribed medicines or get worse  · New symptoms   Date Last Reviewed: 3/10/2016  © 1357-1381 [employer]. [address] [place] [address], [place] [address]. All rights reserved. This information is not intended as a substitute for professional medical care. Always follow your healthcare professional's instructions.

## 2019-09-06 NOTE — PROGRESS NOTES
Summary:  08/26/2019  (1st Attempt)  RN-CM received OPCM referral for High Risk (84%) patient from Katarzyna Vinson MD. Patient's PCP is Dr. Yohana Young. Reason for referral: MDD (major depressive disorder), recurrent episode, moderate. Chart review completed. Attempted to contact patient X2 at [phone]; phone rang and rolled to busy. Will attempt to contact patient at a later date. Gideon Coleman RN-OPCM    09/06/2019 (Enrollment) RNGideonCM contacted patient and completed assessment and medication reconciliation today. Patient is an 80 y/o , female with a PMH of Chemotherapy-Induced Peripheral Neuropathy, Depression, Anxiety, Benign Hypertension, Mitral Regurgitation, Hyperlipidemia, Overactive Bladder, History of Colon Cancer (2002), Prediabetes, Adrenal Nodule, Osteopenia, Polyarthralgia, Arthritis of Both Knees, S/P Right Total Knee Replacement (2016), Insomnia, Gait Abnormality. Patient lives with her daughter, Ruba Godinez in an apartment in [place]. Patient was referred mainly for depression associated with housing insecurity. Patient is AAOX4. Reports independence in all ADLs and IADLs. Reports financial struggles in affording medication co-pays, rent, food and also transportation issues. States that she has lost about 20 pounds over the last 6 months due to inadequate food supply and loss of appetite. States that daughter Ruba, works and has to take off of work to get her to her appointments. Patient states that she has had to miss or re-schedule appointments in the past due to transportation problems. Patient states that her daughter Ruba had significant medical issues several months ago, where she was unable to work for approximately 6 weeks or so, her car was repossessed and she ended up having to take money out of her 401K to get the car back. Patient states that during that period of time, they got behind on their rent, couldn't afford much food (states were eating peanut butter/jelly  sandwiches and hot dogs), and were without transportation for some time. Questioned patient as to whether she has utilized food quinones or any kind of assistance from [employer]ist in the past. Patient states that she and her daughter go to a large [employer] and they have helped them in the past. Encouraged patient to talk with the ministry in her [employer] regarding any assistance they may be able to offer for rent, food, etc. Patient states that they have been unable to recover from the financial setback and are in danger of being evicted from their apartment. She is attempting to find resources for alternate housing. Patient is in agreement with  referral for housing resources as the possibility of eviction is causing her significant anxiety and depression. Patient scored a 12 on the PHQ9 assessment. Denies any SI/HI or plan of self-harm. Denies need for any additional mental health options and is currently being treated by Dr. Vinson for depression and anxiety. Patient states that she and her daughter have been looking for alternate arrangements in [place], but have not been able to find any available, affordable housing alternatives. Patient expressed that the housing needs were her most pressing issue. [facility] regarding patient's situation. She will contact patient as soon as possible to complete assessment and provide patient with housing resources and address other social needs once the most pressing issue has been addressed. Patient states she considers her health good compared to others her age. Patient states that she has fallen in the past, but no falls in the last 12 months. States that she has a walker, but only uses it when she goes out of the apartment.  I will print educational literature about Fall Prevention, Depression/Anxiety, Hypertension/Hypotension, and Osteoporosis and provide to patient when we meet in the clinic on Monday 9/16/19 after her appointment at  8:00am with NP for her Annual Wellness Visit. Reviewed insurance benefits with patient and patient may be eligible for [employer] Gold Plus SNP-DE (Dual Eligibility) Plan. Encouraged patient to call [employer] to see if she qualifies for the plan as there are many cost savings benefits with this plan. Patient confirmed that she knows how to contact the insurance company. Patient expressed appreciation for outreach and is in agreement with meeting in the clinic on Monday 9/16/19 after her appointment with NP. Will continue to follow and coordinate care with OPCM-LCSW as appropriate to meet patient's needs. - EVELIA Coleman, RN-OPCM    09/09/2019 Received IM message from Rebekah Benites LPN from Dr. Vinson's office at 1:40PM stating that patient was being evicted from her apartment on Wednesday, 9/11/19, and had no place to go. This RN-CM contacted OPCM-LCSW, Lisa Joseph who called patient and provided her with a list of resources to follow up with regarding more affordable housing. SW discovered that patient and daughter have been dealing with this issue for about a year and have only been looking for housing in the [facility], and now it has come to a crisis situation. States that patient expressed immediate concern over housing issues and that she was only able to complete a partial assessment as patient's housing needs were the most pressing issue. SW states that patient was in agreement with a follow up call on 9/10/19 to complete assessment and attempt to address the other social needs identified in nursing assessment. Gideon Coleman, RN-OPCM.      Interventions:  - Assessment completed with patient 9/6/19.   - Sent referral to OPCM-LCSW for identified barriers: Housing, Depression, Medication, Nutrition, Transportation, Financial Support, Advanced Care Planning.   - Discussed case with OPCM-ERICA, Lisa Joseph regarding patient's need for immediate housing resources.   - Printed patient educational information/resources  "(CHRIS) on Depression, Anxiety, Hypertension/Hypotension, Fall Prevention, Osteoarthritis (will provide to patient after clinic visit on Monday 9/16/19 0800).   - Reviewed upcoming scheduled appointments with patient.    - Empowered patient to discuss treatment plan with Physician/care team.   - Educated patient on importance of compliance with physician follow ups.  - Educated patient on importance of Medication compliance.  - Educated patient on importance of exercise as tolerated.   - Educated patient on importance of Dietary compliance.  - Encouraged patient to call [employer] regarding qualification for [employer] Gold Plus SNP-DE (Dual Eligibility) Plan.     Plan:  - Call patient on Friday 9/13/19 and remind her to meet me in clinic on Monday, 9/16/19, after her 0800 appointment to provide educational resources, list of upcoming scheduled appointments, [facility]'s "How to Start the Conversation about Advance Care Planning", [employer] OTC Catalog and [employer] Summary of Benefits for Humana Gold Plus HMO.   - Provide patient with contact information (if not already done by SW) for Ana Vazquez at [facility] ([phone], ) to assist in transferring patient to [employer] Gold Plus SNP-DE (Dual Eligibility) Plan if qualifies.    - Provide patient with and review [employer] Summary of Benefits for current Gold Plus HMO plan and the Gold Plus SNP-DE Plan if patient qualifies for Dual Eligibility Plan.  - Provide patient with and review the [employer] OTC Products Catalog and patient's current $30 per quarter benefit.   - Assess for contact with OPCM-LCSW for identified barriers: Housing, Depression, Medication, Nutrition, Transportation, Financial Support, Advanced Care Planning. - 9/9/19 Partial SW assessment completed (Patient in immediate need of housing resources). SW to call patient to complete assessment 9/10/19 and address other identified needs.   - Coordinate care with OPCM-SW as appropriate to meet patient's needs.   - " Depression/Anxiety - Review educational information/resources with patient. Educate as needed.   - Hypertension/Hypotension - Review educational information/resources with patient. Educate as needed.   - Fall Prevention - Review educational information/resources with patient. Educate as needed.   - Osteoarthritis - Review educational information/resources with patient. Educate as needed.   - Assess for any additional needs.     Today[facility] Self-Management Care Plan was developed with the patients input and was based on identified barriers from todays assessment. Goals were written today with the patient and the patient has agreed to work towards these goals to improve her overall well-being. Patient verbalized understanding of the care plan, goals, and all of today's instructions. Encouraged patient to communicate with her physician and health care team about health conditions and the treatment plan. Provided my contact information today and encouraged patient to call me with any questions as needed. - EVELIA Coleman, RN-[facility] [place]       Clinical Reference Documents Added to Patient Instructions       Document    ANXIETY DISORDERS, TREATING, WITH THERAPY   (ENGLISH)    ANXIETY, YOUR BODY'S RESPONSE TO  (ENGLISH)    DEPRESSION (ENGLISH)    DEPRESSION, KNOW THE SIGNS AND SYMPTOMS (ENGLISH)    DEPRESSION, WHAT CAN CAUSE (ENGLISH)    DEPRESSION, COUNSELING FOR (ENGLISH)    DEPRESSION: TIPS TO HELP YOURSELF  (ENGLISH)    FALL PREVENTION (ENGLISH)    FALLS IN THE HOME, PREVENTING (ENGLISH)    FALLS, PREVENTING, EXERCISES TO IMPROVE BALANCE, FLEXIBILITY, STRENGTH, AND STAYING POWER (ENGLISH)    FALLS, PREVENTING, STAYING ACTIVE (ENGLISH)    HYPERTENSION, ESTABLISHED (ENGLISH)    HYPOTENSION, ALL CAUSES (ENGLISH)    OSTEOARTHRITIS OF THE KNEE, UNDERSTANDING (ENGLISH)

## 2019-09-09 ENCOUNTER — OUTPATIENT CASE MANAGEMENT (OUTPATIENT)
Dept: ADMINISTRATIVE | Facility: OTHER | Age: 79
End: 2019-09-09

## 2019-09-09 ENCOUNTER — TELEPHONE (OUTPATIENT)
Dept: PSYCHIATRY | Facility: CLINIC | Age: 79
End: 2019-09-09

## 2019-09-09 NOTE — TELEPHONE ENCOUNTER
I spoke to the patient and her daughter (also a patient)  Pt learned today that they will be evicted from apartment on Wednesday. They do not have money to move or for deposit on apartment.  Pt states she has been very overwhelmed, at her wits end.  No options to live with family.  Case Management spoke with patient and provided some information/resources.     Additionally, I provided contact info for:   BAKARI, [facility], [employer], and they have contact info for [facility]. I have asked them to notify me of outcome.

## 2019-09-09 NOTE — TELEPHONE ENCOUNTER
Pt called clinic regarding current living status. Pt states she received word today that she will be evicted on 9/11/19 at 9 am. Pt has no where to go and is very upset. Advised pt I will reach out to case management and return her call with additional information. Pt verbalized understanding with no further questions.     Skype message sent directly to Safia to discuss options.

## 2019-09-09 NOTE — PROGRESS NOTES
LCSW received a referral from OPCM RN for the following patient identified barriers: housing, depression (PHQ-9=12), medication, nutrition, transportation, financial.  Collaborated with OPCM RN; housing situation is priority.  Contacted pt telephonically and completed a partial assessment.  Pt states she and her daughter live together in an apartment; per pt she received word from the constable that she and daughter need to be out of the apartment by 9/11/19.  Pt states they've been dealing with this for about 1 year and have received several eviction notices.  Pt reports previous  allowed them to pay rent late secondary to daughter received check from employer after first of the month.  Pt states new  won't allow this.  Additionally, daughter had health issues and was out of work for 6 weeks so they are behind in paying rent.  Pt states they've looked for other affordable apartments (prefers no more than $1000/month) but weren't able to find one on the 1st floor which is what pt needs secondary to bad knees.  Pt states they didn't look outside of [facility].  Provided pt with contact number for [facility] and encouraged her to call.  This LCSW contacted Cruzito Kaye with [facility] [phone] who states the program requires a minor dependent to live in the home.  Cruzito states he may be aware of some resources and asks pt to call his office [phone].  Provided pt with office contact number.  This LCSW contacted several apartment complexes, 2 of which have waiting lists for 2 BR on first floor[phone] and [facility] [phone].  Also provided contact number for [facility] apartment complex, [address] [phone] which has at least 1 apartment available October 5.  Pt agrees to follow up 9/10/19

## 2019-09-11 ENCOUNTER — TELEPHONE (OUTPATIENT)
Dept: PSYCHIATRY | Facility: CLINIC | Age: 79
End: 2019-09-11

## 2019-09-11 NOTE — TELEPHONE ENCOUNTER
I spoke to the patient.  Reports she is not doing well.  She reports that she and daughter are being evicted in morning.  She is worried about how she will move her things.  Daughter is coming over tonight to take some of their things to her home. She has obtained names of an apt from [facility] and they have 3 pm appt today about possibly renting an apt.    She feels scared about how she feels.  She has felt hopeless, briefly passively suicidal, but she denies any active SI and no intention to act on this.    Pt lives with daughter Ruba -   Daughter is Wendy is coming over tonight to help.   Small bedroom   [place] -   We are going over at 3 pm today and talk to the apartments

## 2019-09-11 NOTE — TELEPHONE ENCOUNTER
Pts daughter called clinic request urgent appt. Ruba states they are in the process of being evicted and pt is not handling this well. Pt is crying non stop, expressed feelings of hopelessness, and is unable to cope with the eviction. Ruba thinks pt needs to be seen asap. Advised Ruba I will send a request to Dr. Vinson and I will return her call. Ruba verbalized understanding with no further questions.

## 2019-09-11 NOTE — TELEPHONE ENCOUNTER
Called pt per Dr. Vinson's request. Advised pt that Dr. Vinson will return her call after clinic and would like to see pt tomorrow at 2:30 pm. Pt has agreed to schedule the appt. Advised pt if her symptoms worsen or if she has SI/HI to go to the ER. Pt verbalized understanding with no further questions.

## 2019-09-12 ENCOUNTER — TELEPHONE (OUTPATIENT)
Dept: PSYCHIATRY | Facility: CLINIC | Age: 79
End: 2019-09-12

## 2019-09-12 NOTE — TELEPHONE ENCOUNTER
Please contact pt - she did not show for appt today - I had sent urgent message to case management to assist with her transportation.  She was being evicted today, please follow up on how she is doing - thank you

## 2019-09-12 NOTE — TELEPHONE ENCOUNTER
Called Pt regarding below message. Pt states no one called regarding transportation. [facility] and have found anew apartment in the [place] area. Pt states rent will be $1050 and [facility] will pay half of first month. Pt sounds very optimistic and pleased. Advised pt I will send a message to Lisa to arrange transportation for her upcoming appointments. Pt verbalized understanding and was very thankful for Dr. Vinson and myself.

## 2019-09-13 ENCOUNTER — TELEPHONE (OUTPATIENT)
Dept: PSYCHIATRY | Facility: CLINIC | Age: 79
End: 2019-09-13

## 2019-09-13 ENCOUNTER — OUTPATIENT CASE MANAGEMENT (OUTPATIENT)
Dept: ADMINISTRATIVE | Facility: OTHER | Age: 79
End: 2019-09-13

## 2019-09-13 RX ORDER — HYDROXYZINE HYDROCHLORIDE 10 MG/1
10 TABLET, FILM COATED ORAL 2 TIMES DAILY PRN
Qty: 30 TABLET | Refills: 0 | Status: SHIPPED | OUTPATIENT
Start: 2019-09-13 | End: 2019-09-17 | Stop reason: ALTCHOICE

## 2019-09-13 RX ORDER — HYDROXYZINE HYDROCHLORIDE 10 MG/1
10 TABLET, FILM COATED ORAL 3 TIMES DAILY PRN
Qty: 30 TABLET | Refills: 0 | Status: SHIPPED | OUTPATIENT
Start: 2019-09-13 | End: 2019-09-13 | Stop reason: SDUPTHER

## 2019-09-13 NOTE — TELEPHONE ENCOUNTER
Called pts daughters Jeferson regarding voicemail received. Jeferson states they were forcibly removed this morning by the constable. Jeferson states pt became frantic and jeferson thought it was best to bring pt to [facility] ER for evaluation. Pt has been placed in psych room and has been seen by LCSW in ER. Advised Jeferson to call if sh needs anything Jeferson verbalized understanding with no further questions.

## 2019-09-13 NOTE — TELEPHONE ENCOUNTER
Late entry.  I spoke to the patient at approx 5:15 pm (I am out of the clinic today attending physician retreat).     Pt reports having panic attack when constable came to evict her.  She was crying and calling for her dog, who has been boarded at their vet.  Pt reports apt manager called her and told her all of her possessions were hauled out to curb and people were going through them.  Daughter took her to [facility] ER - she reports they did nothing for her, but refill her medications (she was not able to pack them before the eviction).   She asks for PRN med to help w/ anxiety.   Her Druze has given her hotel voucher for 2 days; they have applied to move into a new apt which is more affordable.  They are waiting for application to go through.   Pt denies SI at any time today.  She feels better tonight in hotel.     Pt reports she can tolerate benadryl, as she takes it PRN for allergies.   I will send in Rx for Vistaril 10 mg BID prn anxiety and will also send over Rx of medications.  Discussed this med is for short term use - may cause confusion, high risk med, inc'd risk of falls, sedation.   Pt reports Druze will bring her to follow up appt next week if I can work her in.  Will contact her Monday to offer appt next week.

## 2019-09-13 NOTE — TELEPHONE ENCOUNTER
Please contact [facility] pain management to see if they contact patient and daughter today to discuss emergency housing options - [phone] Shanti Sharp or Main case management # [phone].

## 2019-09-13 NOTE — TELEPHONE ENCOUNTER
Patients daughter called and asked if there was a way for patient to be seen today I explained that Dr Vinson was out of clinic. She asked to have Dr Vinson call her if possible she needs to speak to Dr vinson about the patient.  Please call when available

## 2019-09-13 NOTE — TELEPHONE ENCOUNTER
Called pt regarding below message. Informed pt of below information and gave pt Lisa's direct number. Pt is going to return Lisa's call at her convenience. Advised pt to reschedule her appt with Иван Castillo on 9/16 due to lack of transportation until she can register with [facility]. Pt verbalized understanding with no further questions.     ----- Message from Lisa Joseph LCSW sent at 9/13/2019  8:58 AM CDT -----  Contact: Lisa Joseph LCSW  Good morning:  I am a  with [facility] (OPCM) working with Ms. Henderson.  I received a referral from the OPCM RN Safia Masterson on 9/6/19 and contacted Ms. Henderson on 9/9/19.  After discussing the case with Safia then contacting the patient it became clear that housing was the priority as pt stated she and daughter were being evicted on 9/11/19.  Per pt, they had been dealing with the threat of eviction for about 1 year and pt/daughter had looked for other apartments but weren't able to secure appropriate and affordable housing.  They never looked outside of the [facility].  I provided pt with some possible resources and made a few calls to apartment complexes myself.  There were a few good possibilities and others I encouraged her to put her name on the waiting list.  I have attempted to reach out to pt 2 times since then to address other needs but have been unable to reach her.  I left 2 messages for her to call.  [facility] offers transportation however the pt needs to call herself to get registered and reservations need to be made about 1 week in advance.      If you speak to Ms. Henderson, please ask her to call me at [phone].  Thank you,  Lisa Joseph LCSW

## 2019-09-13 NOTE — PROGRESS NOTES
Summary:  09/13/2019 Spoke with RADHA, Lisa Joseph this morning regarding her inability to make contact with the patient since initially speaking with her on 9/9/19. States that she has left messages for patient on 9/10/19 and again this morning, 9/13/19, with no return call. States that per Dr. Vinson's office notes, patient did not show up for appointment on 9/12/19, was evicted on 9/11/19 and has been staying in a hotel, but has found an apartment which [employer] will pay 1/2 the rent for the first month. States that patient told office that no one had called her about transportation options. SW has attempted to contact patient several times, but has not gotten a return call. SW sending message today to Dr. Vinson regarding attempts to contact patient and to request that office ask patient to call SW if they are in contact with her. (1st Attempt) This RN-CM attempted to contact patient to remind her about meeting in the clinic on Monday, 9/16/19, after her Annual Wellness appointment with the NP at 8:00am and to request that she call SW back as soon as possible. Called [phone]; no answer; left detailed message requesting that patient return my call and that OPCM-ERICA had been trying to contact her since 9/10/19. Left call back number for this RN-CM and also number for ERICA-CM. Called [phone]; no answer; no voice mail box set up. Called 3rd number, [phone] X 2 attempts; no answer; message stating your call cannot be completed at this time. Will check schedule Monday morning, 9/16/19, to see if patient arrives at clinic for 8:00am appointment and if arrives, will attempt to meet with patient clinic. If patient does not show up for appointment on Monday morning, will attempt to contact patient again telephonically. - EVELIA Coleman, RN-OPCM.     Interventions:  - Spoke with OPCM-LCSW regarding inability to make contact with patient to address other identified needs.   - Attempted to contact patient at 3  "different telephone numbers; was able to leave message at one number.    Plan:  - Call patient on Friday 9/13/19 and remind her to meet me in clinic on Monday, 9/16/19, after her 0800 appointment to provide educational resources, list of upcoming scheduled appointments, [facility]'s "How to Start the Conversation about Advance Care Planning", [employer] OTC Catalog and [employer] Summary of Benefits for [employer] Gold Plus HMO. - 9/13/19 Unable to contact patient; left message.  - Provide patient with contact information (if not already done by SW) for Ana Vazquez at [facility] ([phone], ) to assist in transferring patient to [employer] Gold Plus SNP-DE (Dual Eligibility) Plan if qualifies.    - Provide patient with and review [employer] Summary of Benefits for current Gold Plus HMO plan and the Gold Plus SNP-DE Plan if patient qualifies for Dual Eligibility Plan.  - Provide patient with and review the Hum[employer] OTC Products Catalog and patient's current $30 per quarter benefit.   - Assess for contact with OPCM-LCSW for identified barriers: Housing, Depression, Medication, Nutrition, Transportation, Financial Support, Advanced Care Planning. - 9/9/19 Partial SW assessment completed (Patient in immediate need of housing resources). SW to call patient to complete assessment 9/10/19 and address other identified needs. 9/13/19 SW has been unable to reach patient to complete assessment.   - Coordinate care with OPCM-SW as appropriate to meet patient's needs. - Ongoing; 9/13/19 Spoke with SW; has been unable to reach patient again since 9/9/19; has left messages 9/10/19 and 9/13/19, but patient has not returned her call. SW sending message today to Dr. Vinson regarding same.  - Depression/Anxiety - Review educational information/resources with patient. Educate as needed.   - Hypertension/Hypotension - Review educational information/resources with patient. Educate as needed.   - Fall Prevention - Review educational information/resources " with patient. Educate as needed.   - Osteoarthritis - Review educational information/resources with patient. Educate as needed.   - Assess for any additional needs.          Clinical Reference Documents Added to Patient Instructions       Document    ANXIETY DISORDERS, TREATING, WITH THERAPY   (ENGLISH)    ANXIETY, YOUR BODY'S RESPONSE TO  (ENGLISH)    DEPRESSION (ENGLISH)    DEPRESSION, KNOW THE SIGNS AND SYMPTOMS (ENGLISH)    DEPRESSION, WHAT CAN CAUSE (ENGLISH)    DEPRESSION, COUNSELING FOR (ENGLISH)    DEPRESSION: TIPS TO HELP YOURSELF  (ENGLISH)    FALL PREVENTION (ENGLISH)    FALLS IN THE HOME, PREVENTING (ENGLISH)    FALLS, PREVENTING, EXERCISES TO IMPROVE BALANCE, FLEXIBILITY, STRENGTH, AND STAYING POWER (ENGLISH)    FALLS, PREVENTING, STAYING ACTIVE (ENGLISH)    HYPERTENSION, ESTABLISHED (ENGLISH)    HYPOTENSION, ALL CAUSES (ENGLISH)    OSTEOARTHRITIS OF THE KNEE, UNDERSTANDING (ENGLISH)

## 2019-09-13 NOTE — TELEPHONE ENCOUNTER
Called and spoke to Ms Sharp she is going to reach out to our [facility] case management social worker and have her reach out to [facility]  and see what we all can come up with to help the patient with resources on housing.

## 2019-09-13 NOTE — PROGRESS NOTES
Unable to reach pt for follow up x 2 attempts (9/10 and 9/13/19).  Messages left for pt to call.  Collaborated with OPCM RN Safia.  Message sent to Dr. Vinson and BEAR Welch re: status.  Mailed letter to pt requesting return call.    1:15p  Spoke to [facility] ER  Kasie Valverde LMSW as pt is currently in ER.  Discussed case and efforts made to try to assist with housing/resources.  Pt stated no family or friends who could provide temporary housing; also suggested pt enlist the help of her [employer].  Have been unable to reach pt x several attempts since Tuesday.  Kasie is assisting as able.  Collaborated with OPCM RN and OPCM Supervisors.

## 2019-09-13 NOTE — TELEPHONE ENCOUNTER
Noted.  Pt currently in [facility] ER. Please check on pt status prior to end of clinic day.  Please confirm with pt's daughter Ruba if they have a place to stay moving forward.  Thank you

## 2019-09-16 ENCOUNTER — TELEPHONE (OUTPATIENT)
Dept: FAMILY MEDICINE | Facility: CLINIC | Age: 79
End: 2019-09-16

## 2019-09-16 NOTE — TELEPHONE ENCOUNTER
Called pt regarding below message. Spoke to pts daughter regarding appt for tomorrow. Agreed to schedule. Ruba verbalized understanding with no further questions.

## 2019-09-16 NOTE — TELEPHONE ENCOUNTER
Ms. Henderson missed her wellness visit today. I called to offer scheduling of AWV but pt states she was just evicted from her appt and could not do that now.

## 2019-09-16 NOTE — PROGRESS NOTES
"Summary:  Letter not mailed as pt was evicted from her apartment last week.  Contacted pt, which is listed as daughter Ruba's number.  Spoke to pt who states "not very well"; had ER secondary to being upset about eviction.  Offered to assist in any way.  Pt states the [employer] is helping a lot; has paid for them to stay at [facility] [phone] (room is under the name Justin Gilmore) Rm 104 for 3 nights.  Pt states they applied for an apartment at [facility] and are waiting on background check; if approved, the apartment is available immediately.  [employer] is $1050 which is cheaper than the $1400 they paid at last apartment complex.  This LCSW offered to assist calling other apartment complexes in the event [facility] falls through.  Pt changed conversation stating she needs a ride to see Dr. Vinson but thinks a friend from [employer] can take her.  This LCSW informed pt per Humana plan, no transportation benefit.  Pt states she contacted [facility] in the past but was informed they were full.  Pt also saying she didn't get her medications when she left the apartment and pharmacy won't refill prescriptions if they aren't due.  Pt wanting to end call abruptly, said "I'll let you know" and hung up.  Per chart, pt has Medicaid QMB which may qualify her for Humana SNP DE.  Message to [facility] rep Hamida Magallanes requesting she check Medicaid status in chart.  Collaborating with OPCM RN    Call back to pt who requests this LCSW speak to her daughter Ruba.  This LCSW provided supportive counseling offered to help in any way.  Per Ruba, their [employer] and her co-workers are assisting as able.  Ruba states they can stay with a [employer] member tonight if needed.  Ruba reports she contacted [employer] re: status of background check and was told they don't have it yet; states the apartment will call after lunch if background check not received.  Ruba states [facility] provided them with a list of apartment complexes " that she is calling but none have immediate availability.  This LCSW inquired if apartment complex may have kept pts medications in their office.  Ruba states she believes they don't have them; wants pt to call [employer] and ask for an override.  This LCSW informed Ruba pt may be eligible for [employer] Gold SNP DE plan and if so, would have a transportation benefit, up to 60 Well Dine meals per year, and OTC benefit.  Explained this LCSW wouldn't be able to contact [employer] re: possible plan change.  Provided contact number for [employer] rep Ana Vazquez [phone] ext [phone] and encouraged pt/Ruba to call.  Offered to assist in any way.  Ruba states they have support and have pursued options but nothing has come through yet.  Provided contact info.    Intervention:  Provide support  Provide contact info for [employer] rep Ana Vazquez re: EDWIN DE  Collaborate with EDDIE RN    Plan:  Follow up in 1 week  Complete assessment

## 2019-09-16 NOTE — TELEPHONE ENCOUNTER
Pt left voicemail regarding scheduling a f/u appt. No available appts for this week. Please advise on where to schedule pt.

## 2019-09-17 ENCOUNTER — OUTPATIENT CASE MANAGEMENT (OUTPATIENT)
Dept: ADMINISTRATIVE | Facility: OTHER | Age: 79
End: 2019-09-17

## 2019-09-17 ENCOUNTER — OFFICE VISIT (OUTPATIENT)
Dept: PSYCHIATRY | Facility: CLINIC | Age: 79
End: 2019-09-17
Payer: MEDICARE

## 2019-09-17 VITALS
BODY MASS INDEX: 24.85 KG/M2 | HEIGHT: 68 IN | HEART RATE: 95 BPM | DIASTOLIC BLOOD PRESSURE: 89 MMHG | WEIGHT: 163.94 LBS | SYSTOLIC BLOOD PRESSURE: 159 MMHG

## 2019-09-17 DIAGNOSIS — F33.1 MDD (MAJOR DEPRESSIVE DISORDER), RECURRENT EPISODE, MODERATE: ICD-10-CM

## 2019-09-17 DIAGNOSIS — F41.1 GENERALIZED ANXIETY DISORDER: ICD-10-CM

## 2019-09-17 DIAGNOSIS — Z59.9 PROBLEM RELATED TO HOUSING AND ECONOMIC CIRCUMSTANCES: ICD-10-CM

## 2019-09-17 DIAGNOSIS — I10 ESSENTIAL HYPERTENSION: ICD-10-CM

## 2019-09-17 PROCEDURE — 99999 PR PBB SHADOW E&M-EST. PATIENT-LVL III: CPT | Mod: PBBFAC,HCNC,, | Performed by: PSYCHIATRY & NEUROLOGY

## 2019-09-17 PROCEDURE — 1101F PT FALLS ASSESS-DOCD LE1/YR: CPT | Mod: HCNC,CPTII,S$GLB, | Performed by: PSYCHIATRY & NEUROLOGY

## 2019-09-17 PROCEDURE — 1101F PR PT FALLS ASSESS DOC 0-1 FALLS W/OUT INJ PAST YR: ICD-10-PCS | Mod: HCNC,CPTII,S$GLB, | Performed by: PSYCHIATRY & NEUROLOGY

## 2019-09-17 PROCEDURE — 99999 PR PBB SHADOW E&M-EST. PATIENT-LVL III: ICD-10-PCS | Mod: PBBFAC,HCNC,, | Performed by: PSYCHIATRY & NEUROLOGY

## 2019-09-17 PROCEDURE — 3079F PR MOST RECENT DIASTOLIC BLOOD PRESSURE 80-89 MM HG: ICD-10-PCS | Mod: HCNC,CPTII,S$GLB, | Performed by: PSYCHIATRY & NEUROLOGY

## 2019-09-17 PROCEDURE — 99213 OFFICE O/P EST LOW 20 MIN: CPT | Mod: HCNC,S$GLB,, | Performed by: PSYCHIATRY & NEUROLOGY

## 2019-09-17 PROCEDURE — 90833 PSYTX W PT W E/M 30 MIN: CPT | Mod: HCNC,S$GLB,, | Performed by: PSYCHIATRY & NEUROLOGY

## 2019-09-17 PROCEDURE — 3077F PR MOST RECENT SYSTOLIC BLOOD PRESSURE >= 140 MM HG: ICD-10-PCS | Mod: HCNC,CPTII,S$GLB, | Performed by: PSYCHIATRY & NEUROLOGY

## 2019-09-17 PROCEDURE — 99213 PR OFFICE/OUTPT VISIT, EST, LEVL III, 20-29 MIN: ICD-10-PCS | Mod: HCNC,S$GLB,, | Performed by: PSYCHIATRY & NEUROLOGY

## 2019-09-17 PROCEDURE — 3077F SYST BP >= 140 MM HG: CPT | Mod: HCNC,CPTII,S$GLB, | Performed by: PSYCHIATRY & NEUROLOGY

## 2019-09-17 PROCEDURE — 90833 PR PSYCHOTHERAPY W/PATIENT W/E&M, 30 MIN (ADD ON): ICD-10-PCS | Mod: HCNC,S$GLB,, | Performed by: PSYCHIATRY & NEUROLOGY

## 2019-09-17 PROCEDURE — 3079F DIAST BP 80-89 MM HG: CPT | Mod: HCNC,CPTII,S$GLB, | Performed by: PSYCHIATRY & NEUROLOGY

## 2019-09-17 RX ORDER — HYDROCHLOROTHIAZIDE 12.5 MG/1
12.5 TABLET ORAL DAILY
Qty: 30 TABLET | Refills: 0 | Status: SHIPPED | OUTPATIENT
Start: 2019-09-17 | End: 2020-01-28 | Stop reason: SDUPTHER

## 2019-09-17 RX ORDER — TRAZODONE HYDROCHLORIDE 150 MG/1
TABLET ORAL
Qty: 30 TABLET | Refills: 0 | Status: SHIPPED | OUTPATIENT
Start: 2019-09-17 | End: 2019-10-14 | Stop reason: SDUPTHER

## 2019-09-17 RX ORDER — LOSARTAN POTASSIUM 100 MG/1
100 TABLET ORAL DAILY
Qty: 30 TABLET | Refills: 0 | Status: SHIPPED | OUTPATIENT
Start: 2019-09-17 | End: 2020-01-28 | Stop reason: SDUPTHER

## 2019-09-17 RX ORDER — ALPRAZOLAM 0.25 MG/1
TABLET ORAL
Qty: 20 TABLET | Refills: 0 | Status: SHIPPED | OUTPATIENT
Start: 2019-09-17 | End: 2019-10-14 | Stop reason: SDUPTHER

## 2019-09-17 RX ORDER — SERTRALINE HYDROCHLORIDE 100 MG/1
TABLET, FILM COATED ORAL
Qty: 60 TABLET | Refills: 0 | Status: SHIPPED | OUTPATIENT
Start: 2019-09-17 | End: 2019-10-14 | Stop reason: SDUPTHER

## 2019-09-17 SDOH — SOCIAL DETERMINANTS OF HEALTH (SDOH): PROBLEM RELATED TO HOUSING AND ECONOMIC CIRCUMSTANCES, UNSPECIFIED: Z59.9

## 2019-09-17 NOTE — PROGRESS NOTES
Received phone message from [employer] [phone], if interested in apartment call.  This LCSW [facility] who states 2BR apartment is available immediately; 1st floor may be available as well; rent is $1090 and application fee and deposit will be waived.  Attempted to contact pt/Ruba; no answer but left message re: above

## 2019-09-17 NOTE — PROGRESS NOTES
Summary:    09/17/2019 (2nd Attempt)  RNGideonCM spoke with OPCM-SW, Lisa Joseph, regarding her inability to reach patient/daughter to inform them about an available apartment at [facility]. I attempted to contact patient/daughter, Ruba, at the only available phone number [phone] to update and to follow up for OPCM. There was no answer; left detailed message requesting a return call to this RN-CM and OPCM-SW, along with contact information for both. Also included in message the information regarding the available apartment: [facility] [phone], if interested in apartment call. Per [employer] a 2BR apartment is available immediately; 1st floor may be available as well; rent is $1090 and application fee and deposit will be waived. (3rd Attempt)  I am unable to send an attempt letter to patient via [employer] because her address is not correct and patient is only staying in the hotel for a couple of days, then may be moving into a [employer] member's home or into an available apartment. It appears that patient may be active in the Patient Portal. Will send attempt letter via patient portal. Will continue to coordinate with OPCM-SW to try and reach patient for follow up. If no response to attempt letter or messages by 9/23/19, will attempt to contact telephonically to determine status. Gideon Coleman, RN-OPCM    09/23/2019 (4th Attempt - Case Closure) RNGideonCM received update that attempt letter sent via patient portal had not been read to date. As I have no know address for patient I am unable to mail an attempt letter to her. Spoke with OPCM-SW this morning and she has tried again to contact patient and daughter, with no success. I attempted to contact patient today at all 3 numbers listed in chart. The [phone] has no voice mail set up. The [phone] states call cannot be completed at this time. Left message on patient's daughter (Ruba Godinez) phone [phone] stating that both SW and RN were closing case today  due to inability to speak with patient or daughter. Left contact information for this RN-CM and SW-CM and encouraged daughter to call us if there were any needs that we could assist them with. Will send message to referring physician, Dr. Katarzyna Vinson to let her know that we are closing the case; neither patient nor daughter has returned our multiple calls. Closing case today. - EVELIA Coleman, RN-OPCM    Interventions:  - Left detailed message requesting a return call to OPCM-RN and OPCM-SW. - 9/17/19, 9/23/19  - Sent attempt letter via patient portal as patient's current address is unknown. - Rec'd notice on 9/23/19 that attempt letter has not been read.  - Spoke with OPCM-SW this morning; unable to reach patient or daughter and neither have returned her calls. - 9/23/19  - Sent in-basket message to referring physician, Dr. Katarzyna Vinson to let her know that we are closing the case; neither patient nor daughter has returned our multiple calls. - 9/23/19  - Closed case 9/23/19

## 2019-09-18 ENCOUNTER — PATIENT MESSAGE (OUTPATIENT)
Dept: ADMINISTRATIVE | Facility: OTHER | Age: 79
End: 2019-09-18

## 2019-09-18 NOTE — TELEPHONE ENCOUNTER
Called pt regarding below message. Spoke to pts daughter jeferson. Jeferson confirmed they did receive my message regarding prescriptions sent to [facility]. Jeferson verbalized understanding with no further questions.

## 2019-09-19 ENCOUNTER — OUTPATIENT CASE MANAGEMENT (OUTPATIENT)
Dept: ADMINISTRATIVE | Facility: OTHER | Age: 79
End: 2019-09-19

## 2019-09-19 NOTE — PROGRESS NOTES
2nd Attempt to complete SW follow-up for Outpatient Care Management; left message on mobile number requesting return call.  No answer on home number and voicemail is not set up.  Unable to send a letter via [employer]PS as permanent address is unknown at this time.  Collaborating with OPCM RN re: difficulty reaching pt/daughter.  OPCM RN has attempted to follow up x 2 and has not received a return call.    On 9/18/19, OPCM RN sent a message through to pt portal requesting pt call OPCM RN and OPCM SW; contact numbers listed.  LCSW will reattempt on 9/24/19 and if unable to reach pt will close case.

## 2019-09-22 NOTE — PROGRESS NOTES
Outpatient Psychiatry Follow-Up Visit (MD/NP)    9/17/2019    Clinical Status of Patient:  Outpatient (Ambulatory)    Chief Complaint:  Lizeth Henderson is a 79 y.o. female who presents today for follow-up of anxiety, depression.   Met with patient alone and pt w/ daughter.      Interval History and Content of Current Session:  Interim Events/Subjective Report/Content of Current Session: Follow up appointment.    78 yo  retired female with h/o of depression onset in the early 1980's.  Depressive symptoms recurred in 2004 following the death of her . Hx of lifelong worry.     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  She and daughter were recently evicted.  Have been staying in hotel, then with [employer] members.  They are leaving to go out of town so will have no place to go tomorrow.  She and daughter have an appt this evening to see home in [place].  She was turned down at another complex for bad credit.  Pt went to ER last week after having a severe panic attack when the constable came to evict them.  She was not able to get her meds together when they left, so she has been w/out her Sertraline and trazodone.  She has been able to take her Buspirone.  Vistaril is barely taking edge off.   Dog is being boarded.   I placed referral to case management.  Pt and daughter do have a list of resources, including  "community action center, homeless shelters, Evangelical organizations.  "I have called them all."  Pt has learned of a resource in [place] that can provide her furniture.     Pt reports she has been doing horribly.  This is the worst thing that could have happened to her.  She feels helpless and hopeless.  She has been having crying spells, although they have been a little better.  States she went bizzerk before going to ER.  She has been eating junk food.  + panic attacks.    Pt does have daughter Veronica who will take in her only, not her daughter - pt will not go w/out daughter Ruba, "we are a team."   She has lost 7 lbs since appt in 6/19.  She is overwhelmed, worried, depressed, but better than she was.     DASH-7: 19, extremely difficult   PHQ-9: 24, extremely difficult     Pt denies any active SI, but has had passive SI.  Denies symptoms of jossy/psychosis. No HI, no violence.   Pt's daughter Ruba reports she was very worried about her mother last week.  Neither feel she needs to be hospitalized - Ruba admits to feeling just like her mother has been feeling.  She too denied active SI.     Denies alcohol/drug use.    MMSE 30/30 with normal clock drawing 2/19   DASH-7: somewhat difficult (worryng too much, nervous, impending doom) - feels this is a function of family stressors.     Psychotherapy:   · Target symptoms: depression, anxiety  · Why chosen therapy is appropriate versus another modality: relevant to diagnosis, patient responds to this modality  · Outcome monitoring methods: self-report, observation  · Therapeutic intervention type: supportive psychotherapy  · Topics discussed/themes: building skills sets for symptom management, symptom recognition, housing concerns. financial stressors   · The patient's response to the intervention is accepting. The patient's progress toward treatment goals is poor progress.  · Duration of intervention: 20 minutes      Review of Systems     Psychiatry: see HPI   Constitutional: " "wt loss  Musculoskeletal: 610 leg pain, no falls  CV: no chest pain     Past Medical, Family and Social History: The patient's past medical, family and social history have been reviewed and updated as appropriate within the electronic medical record - see encounter notes.    Medication:   Zoloft 200 mg daily  Trazodone 150 mg nightly  Buspar 10 mg BID     Compliance: not taking Sertraline and Trazodone     Side effects:  None reported today     Risk Parameters:  Patient reports intermittent passive suicidal ideation  Patient reports no homicidal ideation  Patient reports no self-injurious behavior  Patient reports no violent behavior       Exam (detailed: at least 9 elements; comprehensive: all 15 elements)   Constitutional  Vitals:  Most recent vital signs, dated more than 90 days prior to this appointment, were reviewed.    See EPIC entry for today's vital signs.       General:  unremarkable, age appropriate, casually dressed, briefly tearful, good eye contact      Musculoskeletal  Muscle Strength/Tone:  No tremor    Gait & Station:  Slow, steady      Psychiatric  Speech:  no latency; no press, spontaneous   Mood & Affect:  "helpless"  Anxious    Thought Process:  Linear    Associations:  intact   Thought Content:  no active SI, intermittent passive SI today, no homicidality, delusions, or paranoia, hallucinations: (auditory: no, visual: no)      Insight:  Fair    Judgement: Adequate to circumstances    Orientation:  Alert and oriented x 4    Memory: Intact for content of interview   Language: Grossly intact    Attention Span & Concentration:  Grossly intact   Fund of Knowledge:  Intact for patient's level of education     Assessment and Diagnosis   Status/Progress: Based on the examination today, the patient's problem(s) is under inadequate control.   New problems have not been presented today.  Comorbidities are currently complicating management of the primary condition.      General Impression:  Overall, he was " improved on medication regimen, but with continued high psychosocial stressors and upcoming move.  pt has previously weaned off of clonazepam w/ improved cognition off of it. Anxiety and depression improved, but remain high.  She does not appear depressed and is functioning relatively well, but stressors and family conflicts are impacting her mood, anxiety symptoms. MMSE 30/30 with normal clock drawing in previous visit  Pt presents today distraught, recently evicted from apt.       MDD, recurrent, moderate   DASH  Hx Binge Eating Disorder   Cognitive Disorder, unspecified.     HTN, dyslipidemia, elevated fasting glucose.  hx colon cancer, adrenal nodule, s/p knee replacement, HTN    GAF:50     Intervention/Counseling/Treatment Plan   · Medication Management: Continue current medications. The risks and benefits of medication were discussed with the patient.  · Counseling provided with patient as follows: importance of compliance with chosen treatment options was emphasized, risks and benefits of treatment options, including medications, were discussed with the patient    1. Continue Zoloft 200 mg daily.  We will look into pharmacy which may be more affordable.      2. Continue Trazodone 150  mg QHS prn insomnia - discussed potential for oversedation.      3. Reviewed risks of serotonin syndrome with patient due to multiple medications - potential symptoms and potential severity reviewed.     4.  Pt given Rx of Wellbutrin  mg BID at ER     5.. Continue Buspirone 10 mg BID to target anxiety     6. Pt instructed to go to ER with thoughts of harming self, others. Call to report any worsening of symptoms or problems with the medication    7. Stop Vistaril, small Rx of Alprazolam 0.25 mg daily PRN anxiety, Discussed risk of decreased RT, sedation, addictive potential, and not to mix with alcohol, fall risk    8. Pt asked to check in with me for an update in the next 1-2 weeks.     Return to Clinic: 3-4 weeks.

## 2019-09-24 ENCOUNTER — TELEPHONE (OUTPATIENT)
Dept: PSYCHIATRY | Facility: CLINIC | Age: 79
End: 2019-09-24

## 2019-09-24 NOTE — PROGRESS NOTES
3rd Attempt to complete SW follow-up for Outpatient Care Management;  left message on mobile phone encouraged pt/daughter Ruba to call re: any needs.  Called home phone; no answer and voicemail isn't set up.  LCSW will close case and notified OPCM RN.

## 2019-09-24 NOTE — TELEPHONE ENCOUNTER
----- Message from Joao Albarran RN sent at 9/24/2019 12:55 PM CDT -----  Contact: Safia Coleman RN-EDDIE  Good afternoon. This is Safia Coleman RN with [facility]'s Outpatient Care Management Team. I received a referral on the above patient from your office. I wanted to make you aware that despite multiple attempts to contact Ms. Henderson or her daughter, Ruba Godinez, neither the , Lisa Joseph or I have been able to reach them. We have both left multiple messages requesting return calls. As of today, we have not received a return call from either.  As per our policy, we are closing her case today. Both Lisa and I left another message today on Ms. Yeh's phone (the only working phone we have on file) that we were closing the case due to inability to follow up. I left both my contact information as well as the SW's contact information and encouraged her to call one or both of us as needed. Per the appointment schedule, Ms. Stanley has an appointment with you on 10/15/19. Please let her know that we have been trying to reach her to follow up and that she can call us if we can assist in any way.      Kindest Regards,  Safia Coleman RN-[facility]  [phone] or [phone]

## 2019-10-04 ENCOUNTER — PES CALL (OUTPATIENT)
Dept: ADMINISTRATIVE | Facility: CLINIC | Age: 79
End: 2019-10-04

## 2019-10-14 RX ORDER — BUSPIRONE HYDROCHLORIDE 10 MG/1
TABLET ORAL
Qty: 60 TABLET | Refills: 2 | Status: SHIPPED | OUTPATIENT
Start: 2019-10-14 | End: 2020-04-30

## 2019-10-14 RX ORDER — SERTRALINE HYDROCHLORIDE 100 MG/1
TABLET, FILM COATED ORAL
Qty: 60 TABLET | Refills: 2 | Status: SHIPPED | OUTPATIENT
Start: 2019-10-14 | End: 2020-01-26

## 2019-10-14 RX ORDER — BUPROPION HYDROCHLORIDE 100 MG/1
100 TABLET, EXTENDED RELEASE ORAL 2 TIMES DAILY
Qty: 60 TABLET | Refills: 2 | Status: SHIPPED | OUTPATIENT
Start: 2019-10-14 | End: 2020-04-01

## 2019-10-14 RX ORDER — ALPRAZOLAM 0.25 MG/1
TABLET ORAL
Qty: 20 TABLET | Refills: 0 | Status: SHIPPED | OUTPATIENT
Start: 2019-10-14 | End: 2021-08-10 | Stop reason: SDUPTHER

## 2019-10-14 RX ORDER — TRAZODONE HYDROCHLORIDE 150 MG/1
TABLET ORAL
Qty: 30 TABLET | Refills: 2 | Status: SHIPPED | OUTPATIENT
Start: 2019-10-14 | End: 2020-03-26

## 2019-10-14 NOTE — TELEPHONE ENCOUNTER
Patient called an cancelled appointment for 10/15/19 as she does not have a ride because they moved and she needs her daughter to be off to bring her in.  She rescheduled for 12/12/19 and needs refills on her medications and she changed the pharmacy closer to her home [place]

## 2019-10-24 DIAGNOSIS — M85.80 OSTEOPENIA, UNSPECIFIED LOCATION: ICD-10-CM

## 2019-10-24 DIAGNOSIS — N32.81 OVERACTIVE BLADDER: Chronic | ICD-10-CM

## 2019-10-24 DIAGNOSIS — E78.2 HYPERLIPIDEMIA, MIXED: ICD-10-CM

## 2019-10-24 RX ORDER — ALENDRONATE SODIUM 70 MG/1
TABLET ORAL
Qty: 12 TABLET | Refills: 3 | Status: SHIPPED | OUTPATIENT
Start: 2019-10-24 | End: 2020-05-21

## 2019-10-24 RX ORDER — FENOFIBRATE 54 MG/1
54 TABLET ORAL DAILY
Qty: 30 TABLET | Refills: 0 | Status: SHIPPED | OUTPATIENT
Start: 2019-10-24 | End: 2020-05-21

## 2019-10-24 RX ORDER — ATORVASTATIN CALCIUM 40 MG/1
40 TABLET, FILM COATED ORAL DAILY
Qty: 30 TABLET | Refills: 0 | Status: SHIPPED | OUTPATIENT
Start: 2019-10-24 | End: 2020-05-21

## 2019-10-24 NOTE — TELEPHONE ENCOUNTER
----- Message from Ananya Roberto sent at 10/24/2019 11:18 AM CDT -----  Contact: patient  Patient called to state she is changing drug stores AND she needs all her scripts filled that Dr. Young fills, thank you!    New Pharmacy: [facility] in [place] [phone]    Medications:    alendronate (FOSAMAX) 70 MG tablet    mirabegron (MYRBETRIQ) 50 mg Tb24    fenofibrate (TRICOR) 54 MG tablet    atorvastatin (LIPITOR) 40 MG tablet (thinks)

## 2019-10-25 ENCOUNTER — TELEPHONE (OUTPATIENT)
Dept: FAMILY MEDICINE | Facility: CLINIC | Age: 79
End: 2019-10-25

## 2019-11-04 RX ORDER — SERTRALINE HYDROCHLORIDE 100 MG/1
TABLET, FILM COATED ORAL
Qty: 180 TABLET | Refills: 0 | OUTPATIENT
Start: 2019-11-04

## 2020-01-20 ENCOUNTER — HOSPITAL ENCOUNTER (EMERGENCY)
Facility: HOSPITAL | Age: 80
Discharge: HOME | End: 2020-01-20
Attending: EMERGENCY MEDICINE
Payer: MEDICARE

## 2020-01-20 VITALS
OXYGEN SATURATION: 99 % | WEIGHT: 146.63 LBS | SYSTOLIC BLOOD PRESSURE: 151 MMHG | RESPIRATION RATE: 18 BRPM | TEMPERATURE: 99 F | BODY MASS INDEX: 22.29 KG/M2 | DIASTOLIC BLOOD PRESSURE: 75 MMHG | HEART RATE: 84 BPM

## 2020-01-20 DIAGNOSIS — N30.01 ACUTE CYSTITIS WITH HEMATURIA: ICD-10-CM

## 2020-01-20 DIAGNOSIS — R11.2 NON-INTRACTABLE VOMITING WITH NAUSEA, UNSPECIFIED VOMITING TYPE: Primary | ICD-10-CM

## 2020-01-20 DIAGNOSIS — R11.10 EMESIS: ICD-10-CM

## 2020-01-20 DIAGNOSIS — E86.0 DEHYDRATION: ICD-10-CM

## 2020-01-20 LAB
ALBUMIN SERPL BCP-MCNC: 3.7 G/DL (ref 3.5–5.2)
ALP SERPL-CCNC: 55 U/L (ref 55–135)
ALT SERPL W/O P-5'-P-CCNC: 8 U/L (ref 10–44)
AMORPH CRY URNS QL MICRO: ABNORMAL
ANION GAP SERPL CALC-SCNC: 13 MMOL/L (ref 8–16)
AST SERPL-CCNC: 23 U/L (ref 10–40)
BACTERIA #/AREA URNS HPF: ABNORMAL /HPF
BASOPHILS # BLD AUTO: 0.03 K/UL (ref 0–0.2)
BASOPHILS NFR BLD: 0.3 % (ref 0–1.9)
BILIRUB SERPL-MCNC: 0.6 MG/DL (ref 0.1–1)
BILIRUB UR QL STRIP: NEGATIVE
BUN SERPL-MCNC: 24 MG/DL (ref 8–23)
CALCIUM SERPL-MCNC: 10 MG/DL (ref 8.7–10.5)
CAOX CRY URNS QL MICRO: ABNORMAL
CHLORIDE SERPL-SCNC: 103 MMOL/L (ref 95–110)
CLARITY UR: CLEAR
CO2 SERPL-SCNC: 22 MMOL/L (ref 23–29)
COLOR UR: YELLOW
CREAT SERPL-MCNC: 1.4 MG/DL (ref 0.5–1.4)
DIFFERENTIAL METHOD: ABNORMAL
EOSINOPHIL # BLD AUTO: 0.1 K/UL (ref 0–0.5)
EOSINOPHIL NFR BLD: 0.8 % (ref 0–8)
ERYTHROCYTE [DISTWIDTH] IN BLOOD BY AUTOMATED COUNT: 13.4 % (ref 11.5–14.5)
EST. GFR  (AFRICAN AMERICAN): 41 ML/MIN/1.73 M^2
EST. GFR  (NON AFRICAN AMERICAN): 36 ML/MIN/1.73 M^2
GLUCOSE SERPL-MCNC: 154 MG/DL (ref 70–110)
GLUCOSE UR QL STRIP: NEGATIVE
HCT VFR BLD AUTO: 35 % (ref 37–48.5)
HGB BLD-MCNC: 11.4 G/DL (ref 12–16)
HGB UR QL STRIP: ABNORMAL
IMM GRANULOCYTES # BLD AUTO: 0.03 K/UL (ref 0–0.04)
IMM GRANULOCYTES NFR BLD AUTO: 0.3 % (ref 0–0.5)
KETONES UR QL STRIP: NEGATIVE
LEUKOCYTE ESTERASE UR QL STRIP: NEGATIVE
LYMPHOCYTES # BLD AUTO: 0.8 K/UL (ref 1–4.8)
LYMPHOCYTES NFR BLD: 8.3 % (ref 18–48)
MCH RBC QN AUTO: 29.1 PG (ref 27–31)
MCHC RBC AUTO-ENTMCNC: 32.6 G/DL (ref 32–36)
MCV RBC AUTO: 89 FL (ref 82–98)
MICROSCOPIC COMMENT: ABNORMAL
MONOCYTES # BLD AUTO: 0.9 K/UL (ref 0.3–1)
MONOCYTES NFR BLD: 9 % (ref 4–15)
NEUTROPHILS # BLD AUTO: 7.7 K/UL (ref 1.8–7.7)
NEUTROPHILS NFR BLD: 81.3 % (ref 38–73)
NITRITE UR QL STRIP: NEGATIVE
NRBC BLD-RTO: 0 /100 WBC
PH UR STRIP: 5 [PH] (ref 5–8)
PLATELET # BLD AUTO: 235 K/UL (ref 150–350)
PMV BLD AUTO: 10.8 FL (ref 9.2–12.9)
POTASSIUM SERPL-SCNC: 4.3 MMOL/L (ref 3.5–5.1)
PROT SERPL-MCNC: 6.9 G/DL (ref 6–8.4)
PROT UR QL STRIP: NEGATIVE
RBC # BLD AUTO: 3.92 M/UL (ref 4–5.4)
RBC #/AREA URNS HPF: 20 /HPF (ref 0–4)
SODIUM SERPL-SCNC: 138 MMOL/L (ref 136–145)
SP GR UR STRIP: >=1.03 (ref 1–1.03)
URN SPEC COLLECT METH UR: ABNORMAL
UROBILINOGEN UR STRIP-ACNC: NEGATIVE EU/DL
WBC # BLD AUTO: 9.47 K/UL (ref 3.9–12.7)
WBC #/AREA URNS HPF: 7 /HPF (ref 0–5)

## 2020-01-20 PROCEDURE — 80053 COMPREHEN METABOLIC PANEL: CPT | Mod: HCNC

## 2020-01-20 PROCEDURE — 99285 EMERGENCY DEPT VISIT HI MDM: CPT | Mod: 25,HCNC

## 2020-01-20 PROCEDURE — 93010 ELECTROCARDIOGRAM REPORT: CPT | Mod: HCNC,,, | Performed by: INTERNAL MEDICINE

## 2020-01-20 PROCEDURE — 93010 EKG 12-LEAD: ICD-10-PCS | Mod: HCNC,,, | Performed by: INTERNAL MEDICINE

## 2020-01-20 PROCEDURE — 36415 COLL VENOUS BLD VENIPUNCTURE: CPT | Mod: HCNC

## 2020-01-20 PROCEDURE — 85025 COMPLETE CBC W/AUTO DIFF WBC: CPT | Mod: HCNC

## 2020-01-20 PROCEDURE — 63600175 PHARM REV CODE 636 W HCPCS: Performed by: EMERGENCY MEDICINE

## 2020-01-20 PROCEDURE — 81000 URINALYSIS NONAUTO W/SCOPE: CPT | Mod: HCNC

## 2020-01-20 PROCEDURE — 96360 HYDRATION IV INFUSION INIT: CPT | Mod: HCNC

## 2020-01-20 PROCEDURE — 93005 ELECTROCARDIOGRAM TRACING: CPT | Mod: HCNC

## 2020-01-20 RX ORDER — NITROFURANTOIN 25; 75 MG/1; MG/1
100 CAPSULE ORAL 2 TIMES DAILY
Qty: 10 CAPSULE | Refills: 0 | Status: SHIPPED | OUTPATIENT
Start: 2020-01-20 | End: 2020-01-25

## 2020-01-20 RX ORDER — ONDANSETRON 4 MG/1
4 TABLET, FILM COATED ORAL EVERY 6 HOURS
Qty: 20 TABLET | Refills: 0 | Status: SHIPPED | OUTPATIENT
Start: 2020-01-20 | End: 2020-05-21

## 2020-01-20 RX ADMIN — SODIUM CHLORIDE 1000 ML: 0.9 INJECTION, SOLUTION INTRAVENOUS at 09:01

## 2020-01-21 NOTE — ED PROVIDER NOTES
79 year old female that presents to the ER with a 4 day history of emesis, urinary retention and no bowel movement. She also has low mid back pain.       Pt understands that a workup will begin in the treatment lounge/results waiting areas due to there being no available beds. Pt also undertstands they will be placed in the next available bed where they will be seen and dispositioned by a physician. I am removing myself from the care of pt. Pt will be assigned to next available physician.      Kosta De Luna FN-C 1941       SCRIBE #1 NOTE: I, Enrique White, am scribing for, and in the presence of, Michael Munoz MD. I have scribed the entire note.       History     Chief Complaint   Patient presents with    Emesis     x4d. with urinary retention x4h. Mid/lower back pain, no CVA tenderness.     Review of patient's allergies indicates:   Allergen Reactions    Lisinopril      cough         History of Present Illness     HPI    1/20/2020, 9:28 PM  History obtained from the patient      History of Present Illness: Lizeth Henderson is a 79 y.o. female patient who presents to the Emergency Department for evaluation of vomiting which onset 3-4 days ago. Symptoms are episodic and moderate in severity. No mitigating or exacerbating factors reported. Associated sxs include urine decreased. Patient denies any fever, chills, diarrhea, abd pain, dysuria, and all other sxs at this time. No prior Tx reported. No further complaints or concerns at this time. Patient denies any recent medications changes/dietary changes. Patient reports 3 episodes of emesis today PTA.      Arrival mode: Personal transportation      PCP: Yohana Young MD      Past Medical History:  Past Medical History:   Diagnosis Date    Adrenal adenoma 2016    Anxiety     Arthritis     Benign hypertension     Colon cancer age 61    colon    Coronary artery disease     Depression     Full dentures     General anesthetics causing adverse effect in  therapeutic use     yells and talks when wakes up    Hyperlipidemia     Osteopenia     Shingles     Wears glasses        Past Surgical History:  Past Surgical History:   Procedure Laterality Date    APPENDECTOMY      BREAST BIOPSY Left     benign    BUNIONECTOMY Left     CATARACT EXTRACTION Bilateral     HEMICOLECTOMY  2002    HERNIA REPAIR      x5    left toe surgery      hammertoe    PATENT DUCTUS ARTERIOUS LIGATION  1948    SALPINGOOPHORECTOMY  2002    due to colon cancer    TONSILLECTOMY, ADENOIDECTOMY           Family History:  Family History   Problem Relation Age of Onset    Cancer Father         colon    Hypertension Father     Alzheimer's disease Mother     Depression Daughter     Kidney disease Daughter     Ulcerative colitis Daughter     Depression Daughter     Depression Daughter     Anxiety disorder Daughter         PTSD    Cancer Maternal Uncle         colon cancer    Cancer Cousin         colon cancer    Amblyopia Neg Hx     Blindness Neg Hx     Cataracts Neg Hx     Diabetes Neg Hx     Glaucoma Neg Hx     Macular degeneration Neg Hx     Retinal detachment Neg Hx     Strabismus Neg Hx     Stroke Neg Hx     Thyroid disease Neg Hx        Social History:   Social History     Tobacco Use    Smoking status: Never Smoker    Smokeless tobacco: Never Used   Substance and Sexual Activity    Alcohol use: No    Drug use: No    Sexual activity: Never        Review of Systems     Review of Systems   Constitutional: Negative for chills and fever.   HENT: Negative for sore throat.    Respiratory: Negative for shortness of breath.    Cardiovascular: Negative for chest pain.   Gastrointestinal: Positive for vomiting. Negative for abdominal pain, diarrhea and nausea.   Genitourinary: Positive for decreased urine volume. Negative for dysuria.   Musculoskeletal: Negative for back pain.   Skin: Negative for rash.   Neurological: Negative for weakness.   Hematological: Does not  bruise/bleed easily.   All other systems reviewed and are negative.       Physical Exam     Initial Vitals [01/20/20 1937]   BP Pulse Resp Temp SpO2   (!) 168/81 108 18 99.4 °F (37.4 °C) 99 %      MAP       --          Physical Exam  Nursing Notes and Vital Signs Reviewed.  Constitutional: Well-developed and well-nourished. NAD.  Head: Atraumatic. Normocephalic.  Eyes: PERRL. EOM intact. Conjunctivae are not pale. No scleral icterus.  ENT: Mucous membranes are moist. Oropharynx is clear and symmetric.    Neck: Supple. Full ROM. No lymphadenopathy.  Cardiovascular: Regular rate. Regular rhythm. No murmurs, rubs, or gallops. Distal pulses are 2+ and symmetric.  Pulmonary/Chest: No respiratory distress. Clear to auscultation bilaterally. No wheezing or rales.  Abdominal: Soft and non-distended.  There is no tenderness.  No rebound, guarding, or rigidity. Good bowel sounds.  Genitourinary: No CVA tenderness  Musculoskeletal: Moves all extremities. No obvious deformities. No calf tenderness.  Skin: Warm and dry.  Neurological:  Alert, awake, and appropriate.  Normal speech.  No acute focal neurological deficits are appreciated.  Psychiatric: Normal affect. Good eye contact. Appropriate in content.     ED Course   Procedures  ED Vital Signs:  Vitals:    01/20/20 1937 01/20/20 2128 01/20/20 2130 01/20/20 2132   BP: (!) 168/81 (!) 148/74 (!) 151/82 (!) 152/83   Pulse: 108 80 94 (!) 119   Resp: 18      Temp: 99.4 °F (37.4 °C) 98.9 °F (37.2 °C)     TempSrc: Oral Oral     SpO2: 99%      Weight: 66.5 kg (146 lb 9.7 oz)       01/20/20 2315   BP: (!) 151/75   Pulse: 84   Resp: 18   Temp: 98.8 °F (37.1 °C)   TempSrc: Oral   SpO2:    Weight:        Abnormal Lab Results:  Labs Reviewed   COMPREHENSIVE METABOLIC PANEL - Abnormal; Notable for the following components:       Result Value    CO2 22 (*)     Glucose 154 (*)     BUN, Bld 24 (*)     ALT 8 (*)     eGFR if  41 (*)     eGFR if non  36 (*)      All other components within normal limits   CBC W/ AUTO DIFFERENTIAL - Abnormal; Notable for the following components:    RBC 3.92 (*)     Hemoglobin 11.4 (*)     Hematocrit 35.0 (*)     Lymph # 0.8 (*)     Gran% 81.3 (*)     Lymph% 8.3 (*)     All other components within normal limits   URINALYSIS, REFLEX TO URINE CULTURE - Abnormal; Notable for the following components:    Specific Gravity, UA >=1.030 (*)     Occult Blood UA 3+ (*)     All other components within normal limits    Narrative:     Preferred Collection Type->Urine, Clean Catch   URINALYSIS MICROSCOPIC - Abnormal; Notable for the following components:    RBC, UA 20 (*)     WBC, UA 7 (*)     Bacteria Few (*)     All other components within normal limits    Narrative:     Preferred Collection Type->Urine, Clean Catch        All Lab Results:  Results for orders placed or performed during the hospital encounter of 01/20/20   Comprehensive metabolic panel   Result Value Ref Range    Sodium 138 136 - 145 mmol/L    Potassium 4.3 3.5 - 5.1 mmol/L    Chloride 103 95 - 110 mmol/L    CO2 22 (L) 23 - 29 mmol/L    Glucose 154 (H) 70 - 110 mg/dL    BUN, Bld 24 (H) 8 - 23 mg/dL    Creatinine 1.4 0.5 - 1.4 mg/dL    Calcium 10.0 8.7 - 10.5 mg/dL    Total Protein 6.9 6.0 - 8.4 g/dL    Albumin 3.7 3.5 - 5.2 g/dL    Total Bilirubin 0.6 0.1 - 1.0 mg/dL    Alkaline Phosphatase 55 55 - 135 U/L    AST 23 10 - 40 U/L    ALT 8 (L) 10 - 44 U/L    Anion Gap 13 8 - 16 mmol/L    eGFR if African American 41 (A) >60 mL/min/1.73 m^2    eGFR if non African American 36 (A) >60 mL/min/1.73 m^2   CBC auto differential   Result Value Ref Range    WBC 9.47 3.90 - 12.70 K/uL    RBC 3.92 (L) 4.00 - 5.40 M/uL    Hemoglobin 11.4 (L) 12.0 - 16.0 g/dL    Hematocrit 35.0 (L) 37.0 - 48.5 %    Mean Corpuscular Volume 89 82 - 98 fL    Mean Corpuscular Hemoglobin 29.1 27.0 - 31.0 pg    Mean Corpuscular Hemoglobin Conc 32.6 32.0 - 36.0 g/dL    RDW 13.4 11.5 - 14.5 %    Platelets 235 150 - 350 K/uL     MPV 10.8 9.2 - 12.9 fL    Immature Granulocytes 0.3 0.0 - 0.5 %    Gran # (ANC) 7.7 1.8 - 7.7 K/uL    Immature Grans (Abs) 0.03 0.00 - 0.04 K/uL    Lymph # 0.8 (L) 1.0 - 4.8 K/uL    Mono # 0.9 0.3 - 1.0 K/uL    Eos # 0.1 0.0 - 0.5 K/uL    Baso # 0.03 0.00 - 0.20 K/uL    nRBC 0 0 /100 WBC    Gran% 81.3 (H) 38.0 - 73.0 %    Lymph% 8.3 (L) 18.0 - 48.0 %    Mono% 9.0 4.0 - 15.0 %    Eosinophil% 0.8 0.0 - 8.0 %    Basophil% 0.3 0.0 - 1.9 %    Differential Method Automated    Urinalysis, Reflex to Urine Culture Urine, Clean Catch   Result Value Ref Range    Specimen UA Urine, Catheterized     Color, UA Yellow Yellow, Straw, Ciara    Appearance, UA Clear Clear    pH, UA 5.0 5.0 - 8.0    Specific Gravity, UA >=1.030 (A) 1.005 - 1.030    Protein, UA Negative Negative    Glucose, UA Negative Negative    Ketones, UA Negative Negative    Bilirubin (UA) Negative Negative    Occult Blood UA 3+ (A) Negative    Nitrite, UA Negative Negative    Urobilinogen, UA Negative <2.0 EU/dL    Leukocytes, UA Negative Negative   Urinalysis Microscopic   Result Value Ref Range    RBC, UA 20 (H) 0 - 4 /hpf    WBC, UA 7 (H) 0 - 5 /hpf    Bacteria Few (A) None-Occ /hpf    Ca Oxalate Simin, UA Occasional None-Moderate    Amorphous, UA Few None-Moderate    Microscopic Comment SEE COMMENT          Imaging Results          X-Ray Chest PA And Lateral (Final result)  Result time 01/20/20 22:05:42    Final result by Clovis Harrison III, MD (01/20/20 22:05:42)                 Impression:      No acute disease identified in the chest.      Electronically signed by: Clovis Harrison MD  Date:    01/20/2020  Time:    22:05             Narrative:    EXAMINATION:  XR CHEST PA AND LATERAL    CLINICAL HISTORY:  emesis;    COMPARISON:  11/07/2017    FINDINGS:  Heart size is within normal limits.  There is stable aortic atherosclerosis and tortuosity.  The lungs appear clear of active disease.  No acute appearing infiltrate, pleural effusion or pneumothorax  identified.  No acute osseous abnormality identified.                               X-Ray Abdomen Flat And Erect (Final result)  Result time 01/20/20 22:08:01    Final result by Clovis Harrison III, MD (01/20/20 22:08:01)                 Impression:      Constipation.  Nonspecific short segment of gas-filled mildly dilated colon near the splenic flexure.  No evidence of significant bowel obstruction or other acute disease.      Electronically signed by: Clovis Harrison MD  Date:    01/20/2020  Time:    22:08             Narrative:    EXAMINATION:  XR ABDOMEN FLAT AND ERECT    CLINICAL HISTORY:  Vomiting (787.03);    FINDINGS:  No definite free air is seen. There is focal gaseous dilation of the colon at the hepatic flexure.  There is a moderate amount of stool scattered throughout the remainder of the colon which does not appear dilated.  No significant small bowel dilation identified.  Pelvic phleboliths are noted.  No definitive radiographic evidence of urolithiasis.                                 The EKG was ordered, reviewed, and independently interpreted by the ED provider.  Interpretation time: 1943  Rate: 106 BPM  Rhythm: sinus tachycardia  Interpretation: LVH with repolarization abnormality. No STEMI.      The Emergency Provider reviewed the vital signs and test results, which are outlined above.     ED Discussion       11:32 PM: Reassessed pt at this time.  Pt states her condition has improved at this time. Discussed with pt all pertinent ED information and results. Discussed pt dx and plan of tx. Gave pt all f/u and return to the ED instructions. All questions and concerns were addressed at this time. Pt expresses understanding of information and instructions, and is comfortable with plan to discharge. Pt is stable for discharge.  Tolerating PO well    I discussed with patient and/or family/caretaker that evaluation in the ED does not suggest any emergent or life threatening medical conditions requiring  immediate intervention beyond what was provided in the ED, and I believe patient is safe for discharge.  Regardless, an unremarkable evaluation in the ED does not preclude the development or presence of a serious of life threatening condition. As such, patient was instructed to return immediately for any worsening or change in current symptoms.    I counseled the patient on the risks of taking antibiotics, including taking all doses as prescribed. I explained the risk of antibiotic resistance in the future and susceptibility to C. diff infection, severe allergic reactions, and yeast infections.        MDM        Medical Decision Making:   Clinical Tests:   Lab Tests: Ordered and Reviewed  Radiological Study: Ordered and Reviewed  Medical Tests: Reviewed and Ordered           ED Medication(s):  Medications   sodium chloride 0.9% bolus 1,000 mL (0 mLs Intravenous Stopped 1/20/20 0213)       Discharge Medication List as of 1/20/2020 11:33 PM      START taking these medications    Details   nitrofurantoin, macrocrystal-monohydrate, (MACROBID) 100 MG capsule Take 1 capsule (100 mg total) by mouth 2 (two) times daily. for 5 days, Starting Mon 1/20/2020, Until Sat 1/25/2020, Print      ondansetron (ZOFRAN) 4 MG tablet Take 1 tablet (4 mg total) by mouth every 6 (six) hours., Starting Mon 1/20/2020, Print             Follow-up Information     Yohana Young MD In 2 days.    Specialty:  Internal Medicine  Contact information:  [address] [place] [address]  [phone]             [facility] - .    Specialty:  Emergency Medicine  Why:  If symptoms worsen  Contact information:  [address]  [facility] [address] [phone]                     Scribe Attestation:   Scribe #1: I performed the above scribed service and the documentation accurately describes the services I performed. I attest to the accuracy of the note.     Attending:   Physician Attestation Statement  for Scribe #1: I, Michael Munoz MD, personally performed the services described in this documentation, as scribed by Enrique White, in my presence, and it is both accurate and complete.           Clinical Impression       ICD-10-CM ICD-9-CM   1. Non-intractable vomiting with nausea, unspecified vomiting type R11.2 787.01   2. Emesis R11.10 787.03   3. Dehydration E86.0 276.51   4. Acute cystitis with hematuria N30.01 595.0       Disposition:   Disposition: Discharged  Condition: Stable         Michael Munoz MD  01/21/20 0044

## 2020-01-23 ENCOUNTER — OFFICE VISIT (OUTPATIENT)
Dept: PODIATRY | Facility: CLINIC | Age: 80
End: 2020-01-23
Payer: MEDICARE

## 2020-01-23 VITALS
RESPIRATION RATE: 17 BRPM | DIASTOLIC BLOOD PRESSURE: 74 MMHG | HEIGHT: 68 IN | SYSTOLIC BLOOD PRESSURE: 120 MMHG | WEIGHT: 148.81 LBS | HEART RATE: 74 BPM | BODY MASS INDEX: 22.55 KG/M2

## 2020-01-23 DIAGNOSIS — G62.0 CHEMOTHERAPY-INDUCED PERIPHERAL NEUROPATHY: Primary | ICD-10-CM

## 2020-01-23 DIAGNOSIS — B35.1 ONYCHOMYCOSIS DUE TO DERMATOPHYTE: ICD-10-CM

## 2020-01-23 DIAGNOSIS — T45.1X5A CHEMOTHERAPY-INDUCED PERIPHERAL NEUROPATHY: Primary | ICD-10-CM

## 2020-01-23 PROCEDURE — 1159F MED LIST DOCD IN RCRD: CPT | Mod: HCNC,S$GLB,, | Performed by: PODIATRIST

## 2020-01-23 PROCEDURE — 3074F SYST BP LT 130 MM HG: CPT | Mod: HCNC,CPTII,S$GLB, | Performed by: PODIATRIST

## 2020-01-23 PROCEDURE — 99999 PR PBB SHADOW E&M-EST. PATIENT-LVL III: ICD-10-PCS | Mod: PBBFAC,HCNC,, | Performed by: PODIATRIST

## 2020-01-23 PROCEDURE — 1126F PR PAIN SEVERITY QUANTIFIED, NO PAIN PRESENT: ICD-10-PCS | Mod: HCNC,S$GLB,, | Performed by: PODIATRIST

## 2020-01-23 PROCEDURE — 99213 PR OFFICE/OUTPT VISIT, EST, LEVL III, 20-29 MIN: ICD-10-PCS | Mod: 25,HCNC,S$GLB, | Performed by: PODIATRIST

## 2020-01-23 PROCEDURE — 99213 OFFICE O/P EST LOW 20 MIN: CPT | Mod: 25,HCNC,S$GLB, | Performed by: PODIATRIST

## 2020-01-23 PROCEDURE — 11721 PR DEBRIDEMENT OF NAILS, 6 OR MORE: ICD-10-PCS | Mod: Q9,HCNC,S$GLB, | Performed by: PODIATRIST

## 2020-01-23 PROCEDURE — 1159F PR MEDICATION LIST DOCUMENTED IN MEDICAL RECORD: ICD-10-PCS | Mod: HCNC,S$GLB,, | Performed by: PODIATRIST

## 2020-01-23 PROCEDURE — 1101F PT FALLS ASSESS-DOCD LE1/YR: CPT | Mod: HCNC,CPTII,S$GLB, | Performed by: PODIATRIST

## 2020-01-23 PROCEDURE — 1101F PR PT FALLS ASSESS DOC 0-1 FALLS W/OUT INJ PAST YR: ICD-10-PCS | Mod: HCNC,CPTII,S$GLB, | Performed by: PODIATRIST

## 2020-01-23 PROCEDURE — 3074F PR MOST RECENT SYSTOLIC BLOOD PRESSURE < 130 MM HG: ICD-10-PCS | Mod: HCNC,CPTII,S$GLB, | Performed by: PODIATRIST

## 2020-01-23 PROCEDURE — 3078F PR MOST RECENT DIASTOLIC BLOOD PRESSURE < 80 MM HG: ICD-10-PCS | Mod: HCNC,CPTII,S$GLB, | Performed by: PODIATRIST

## 2020-01-23 PROCEDURE — 1126F AMNT PAIN NOTED NONE PRSNT: CPT | Mod: HCNC,S$GLB,, | Performed by: PODIATRIST

## 2020-01-23 PROCEDURE — 3078F DIAST BP <80 MM HG: CPT | Mod: HCNC,CPTII,S$GLB, | Performed by: PODIATRIST

## 2020-01-23 PROCEDURE — 11721 DEBRIDE NAIL 6 OR MORE: CPT | Mod: Q9,HCNC,S$GLB, | Performed by: PODIATRIST

## 2020-01-23 PROCEDURE — 99999 PR PBB SHADOW E&M-EST. PATIENT-LVL III: CPT | Mod: PBBFAC,HCNC,, | Performed by: PODIATRIST

## 2020-01-23 NOTE — PROGRESS NOTES
Subjective:       Patient ID: Lizeth Henderson is a 79 y.o. female.    Chief Complaint: Routine Foot Care (nial cut down, rates no pain, nondiabetic, PCP Dr. Kirkpatrick )      HPI: Patient presents to the office with the chief complaint of elongated, thickened and dystrophic nail plates to the B/L foot. This patient does have Peripheral Neuropathy. Patient does follow with Primary Care for management of comorbid states. This patient's PMD is Yohana Young MD. This patient last saw his/her primary care provider on 7/29/19.  Patient presents this afternoon with her daughter.      Review of patient's allergies indicates:   Allergen Reactions    Lisinopril      cough       Past Medical History:   Diagnosis Date    Adrenal adenoma 2016    Anxiety     Arthritis     Benign hypertension     Colon cancer age 61    colon    Coronary artery disease     Depression     Full dentures     General anesthetics causing adverse effect in therapeutic use     yells and talks when wakes up    Hyperlipidemia     Osteopenia     Shingles     Wears glasses        Family History   Problem Relation Age of Onset    Cancer Father         colon    Hypertension Father     Alzheimer's disease Mother     Depression Daughter     Kidney disease Daughter     Ulcerative colitis Daughter     Depression Daughter     Depression Daughter     Anxiety disorder Daughter         PTSD    Cancer Maternal Uncle         colon cancer    Cancer Cousin         colon cancer    Amblyopia Neg Hx     Blindness Neg Hx     Cataracts Neg Hx     Diabetes Neg Hx     Glaucoma Neg Hx     Macular degeneration Neg Hx     Retinal detachment Neg Hx     Strabismus Neg Hx     Stroke Neg Hx     Thyroid disease Neg Hx        Social History     Socioeconomic History    Marital status:      Spouse name: Not on file    Number of children: 3    Years of education: Not on file    Highest education level: Not on file   Occupational History    Not on  file   Social Needs    Financial resource strain: Not on file    Food insecurity:     Worry: Not on file     Inability: Not on file    Transportation needs:     Medical: Not on file     Non-medical: Not on file   Tobacco Use    Smoking status: Never Smoker    Smokeless tobacco: Never Used   Substance and Sexual Activity    Alcohol use: No    Drug use: No    Sexual activity: Never   Lifestyle    Physical activity:     Days per week: Not on file     Minutes per session: Not on file    Stress: Not on file   Relationships    Social connections:     Talks on phone: Not on file     Gets together: Not on file     Attends Denominational service: Not on file     Active member of club or organization: Not on file     Attends meetings of clubs or organizations: Not on file     Relationship status: Not on file   Other Topics Concern    Not on file   Social History Narrative    The patient does not exercise regularly ().    Rates diet as poor.    She is not satisfied with weight.               Past Surgical History:   Procedure Laterality Date    APPENDECTOMY      BREAST BIOPSY Left     benign    BUNIONECTOMY Left     CATARACT EXTRACTION Bilateral     HEMICOLECTOMY  2002    HERNIA REPAIR      x5    left toe surgery      hammjackie    PATENT DUCTUS ARTERIOUS LIGATION  1948    SALPINGOOPHORECTOMY  2002    due to colon cancer    TONSILLECTOMY, ADENOIDECTOMY         Review of Systems   Constitutional: Negative for chills, fatigue and fever.   HENT: Negative for hearing loss.    Eyes: Negative for photophobia and visual disturbance.   Respiratory: Negative for cough, chest tightness, shortness of breath and wheezing.    Cardiovascular: Negative for chest pain and palpitations.   Gastrointestinal: Negative for constipation, diarrhea, nausea and vomiting.   Endocrine: Negative for cold intolerance and heat intolerance.   Genitourinary: Negative for flank pain.   Musculoskeletal: Positive for arthralgias, back pain and  "gait problem. Negative for neck pain and neck stiffness.   Skin: Negative for wound.   Neurological: Positive for numbness. Negative for light-headedness and headaches.   Psychiatric/Behavioral: Negative for sleep disturbance.          Objective:   /74 (BP Location: Right arm, Patient Position: Sitting, BP Method: Medium (Automatic))   Pulse 74   Resp 17   Ht 5' 8" (1.727 m)   Wt 67.5 kg (148 lb 13 oz)   BMI 22.63 kg/m²     Physical Exam  LOWER EXTREMITY PHYSICAL EXAMINATION    NEUROLOGY: Protective sensation is not intact to the left and right plantar surfaces of the foot and digits, as the patient has no sensation/detection at greater than 4 distinct points of contact with 5.07 [place] [employer] monofilament. Sensation to light touch is intact on the left and right foot. Proprioception is intact, bilateral. Sensation to pin prick is reduced to absent. Vibratory sensation is diminished    DERMATOLOGY: On the left foot, nails 1, 2, 3, 4 and 5 are suggestive o type is noted.  f onychomycotic changes. On the right foot, nails 1, 2, 3, 4 and 5 are suggestive of onychomycotic changes. These nail plates are thickened, are dystrophic, chaulky in appearance and malodorous with substantial subungual debris. These nail plates are yellow to brown in appearance. The remaining nail plates are elongated and do not have suggestive clinical features of onychomycosis.     ORTHOPEDIC: Manual Muscle Testing is 5/5 in all planes on the left and right, without pains, with and without resistance.  Pes planus foot Gait pattern is non-antalgic.    VASCULAR: Warm to warm, proximal to distal. Capillary refill time is within normal limits and less  than 3 seconds. Hair growth is sparse on the left and right dorsal foot and at the digits. The left dorsalis pedis pulse is 1/4 and on the right is 1/4, and the left posterior tibial pulse is 1/4 and is 1/4 on the right.     Assessment:     1. Chemotherapy-induced peripheral neuropathy  "   2. Onychomycosis due to dermatophyte        Plan:     Chemotherapy-induced peripheral neuropathy  Neuropathic foot counseling and education is provided at this visit. Patient is advised to wear socks and shoes at all times.  Do not walk barefoot, or with just socks, even when indoors.  Be sure to check and inspect the inside of the shoe before putting them on her feet.  Protect your feet at all times.  Walking shoes and/or athletic shoes are the best types of shoe gear. Do not wear vinyl or plastic type shoe gear, as they do not stretch and/or breathe.  Protect your feet from hot and/or cold. Elevate the extremities when sitting.  Do not wear excessively tight socks and/or shoe gear. Wiggle your toes for a few minutes throughout the day. Move your ankles up and down, in and out, to help blood flow in your lower extremity.     Onychomycosis due to dermatophyte  The onychomycotic nail plates, as outlined above, are sharply debrided with double action nail nipper, and/or with the assistance of a mechanical rotary nehemias, with removal of all offending nail and nail border(s), for reduction of pains. Nails are reduced in terms of length, width and girth with removal of subungual debris to facilitate pain free weight bearing and ambulation. The elongated nails as outlined in the objective portion of this note, were trimmed to appropriate length, with a double action nail nipper, for alleviation/reduction of pains as well. Follow up in approx. 3-4 months.        Future Appointments   Date Time Provider Department Center   1/28/2020  2:00 PM [facility]

## 2020-01-26 RX ORDER — SERTRALINE HYDROCHLORIDE 100 MG/1
TABLET, FILM COATED ORAL
Qty: 180 TABLET | Refills: 0 | Status: SHIPPED | OUTPATIENT
Start: 2020-01-26 | End: 2020-04-01

## 2020-01-28 ENCOUNTER — OFFICE VISIT (OUTPATIENT)
Dept: INTERNAL MEDICINE | Facility: CLINIC | Age: 80
End: 2020-01-28
Payer: MEDICARE

## 2020-01-28 VITALS
BODY MASS INDEX: 22.38 KG/M2 | TEMPERATURE: 98 F | HEART RATE: 82 BPM | DIASTOLIC BLOOD PRESSURE: 86 MMHG | OXYGEN SATURATION: 99 % | SYSTOLIC BLOOD PRESSURE: 148 MMHG | WEIGHT: 147.69 LBS | HEIGHT: 68 IN

## 2020-01-28 DIAGNOSIS — N39.0 URINARY TRACT INFECTION WITH HEMATURIA, SITE UNSPECIFIED: ICD-10-CM

## 2020-01-28 DIAGNOSIS — F33.1 MAJOR DEPRESSIVE DISORDER, RECURRENT EPISODE, MODERATE: ICD-10-CM

## 2020-01-28 DIAGNOSIS — R31.9 URINARY TRACT INFECTION WITH HEMATURIA, SITE UNSPECIFIED: ICD-10-CM

## 2020-01-28 DIAGNOSIS — I10 ESSENTIAL HYPERTENSION: ICD-10-CM

## 2020-01-28 DIAGNOSIS — N32.81 OVERACTIVE BLADDER: Chronic | ICD-10-CM

## 2020-01-28 DIAGNOSIS — I10 BENIGN HYPERTENSION: Primary | ICD-10-CM

## 2020-01-28 PROCEDURE — 3077F SYST BP >= 140 MM HG: CPT | Mod: HCNC,CPTII,S$GLB, | Performed by: FAMILY MEDICINE

## 2020-01-28 PROCEDURE — 99999 PR PBB SHADOW E&M-EST. PATIENT-LVL III: ICD-10-PCS | Mod: PBBFAC,HCNC,, | Performed by: FAMILY MEDICINE

## 2020-01-28 PROCEDURE — 3079F DIAST BP 80-89 MM HG: CPT | Mod: HCNC,CPTII,S$GLB, | Performed by: FAMILY MEDICINE

## 2020-01-28 PROCEDURE — 99499 RISK ADDL DX/OHS AUDIT: ICD-10-PCS | Mod: HCNC,S$GLB,, | Performed by: FAMILY MEDICINE

## 2020-01-28 PROCEDURE — 99214 OFFICE O/P EST MOD 30 MIN: CPT | Mod: HCNC,S$GLB,, | Performed by: FAMILY MEDICINE

## 2020-01-28 PROCEDURE — 1126F AMNT PAIN NOTED NONE PRSNT: CPT | Mod: HCNC,S$GLB,, | Performed by: FAMILY MEDICINE

## 2020-01-28 PROCEDURE — 3077F PR MOST RECENT SYSTOLIC BLOOD PRESSURE >= 140 MM HG: ICD-10-PCS | Mod: HCNC,CPTII,S$GLB, | Performed by: FAMILY MEDICINE

## 2020-01-28 PROCEDURE — 1126F PR PAIN SEVERITY QUANTIFIED, NO PAIN PRESENT: ICD-10-PCS | Mod: HCNC,S$GLB,, | Performed by: FAMILY MEDICINE

## 2020-01-28 PROCEDURE — 1159F MED LIST DOCD IN RCRD: CPT | Mod: HCNC,S$GLB,, | Performed by: FAMILY MEDICINE

## 2020-01-28 PROCEDURE — 99499 UNLISTED E&M SERVICE: CPT | Mod: HCNC,S$GLB,, | Performed by: FAMILY MEDICINE

## 2020-01-28 PROCEDURE — 1101F PT FALLS ASSESS-DOCD LE1/YR: CPT | Mod: HCNC,CPTII,S$GLB, | Performed by: FAMILY MEDICINE

## 2020-01-28 PROCEDURE — 1101F PR PT FALLS ASSESS DOC 0-1 FALLS W/OUT INJ PAST YR: ICD-10-PCS | Mod: HCNC,CPTII,S$GLB, | Performed by: FAMILY MEDICINE

## 2020-01-28 PROCEDURE — 1159F PR MEDICATION LIST DOCUMENTED IN MEDICAL RECORD: ICD-10-PCS | Mod: HCNC,S$GLB,, | Performed by: FAMILY MEDICINE

## 2020-01-28 PROCEDURE — 99214 PR OFFICE/OUTPT VISIT, EST, LEVL IV, 30-39 MIN: ICD-10-PCS | Mod: HCNC,S$GLB,, | Performed by: FAMILY MEDICINE

## 2020-01-28 PROCEDURE — 99999 PR PBB SHADOW E&M-EST. PATIENT-LVL III: CPT | Mod: PBBFAC,HCNC,, | Performed by: FAMILY MEDICINE

## 2020-01-28 PROCEDURE — 3079F PR MOST RECENT DIASTOLIC BLOOD PRESSURE 80-89 MM HG: ICD-10-PCS | Mod: HCNC,CPTII,S$GLB, | Performed by: FAMILY MEDICINE

## 2020-01-28 RX ORDER — HYDROCHLOROTHIAZIDE 12.5 MG/1
12.5 TABLET ORAL DAILY
Qty: 90 TABLET | Refills: 0 | Status: SHIPPED | OUTPATIENT
Start: 2020-01-28 | End: 2020-08-12 | Stop reason: SDUPTHER

## 2020-01-28 RX ORDER — LOSARTAN POTASSIUM 100 MG/1
100 TABLET ORAL DAILY
Qty: 90 TABLET | Refills: 0 | Status: SHIPPED | OUTPATIENT
Start: 2020-01-28 | End: 2020-10-07

## 2020-01-28 NOTE — PROGRESS NOTES
Lizeth Henderson  01/28/2020  [id number]    Yohana Young MD  Patient Care Team:  Yohana Young MD as PCP - General  Katarzyna Vinson MD (Psychiatry)  Alyssa Toledo LPN (Inactive) as Care Coordinator        Chief Complaint:  Chief Complaint   Patient presents with    Annual Exam       History of Present Illness:   Lizeth Henderson 79 y.o. female  has recently moved to town and needs refill on her 2 blood pressure medications.  She states her pressure has been mildly elevated recently and is today 148.    Otherwise she is feeling well and we discussed and looked at her emergency department records from a few weeks ago when she was treated for vomiting and UTI.  She states that this is totally resolved and that she is staying hydrated well.    She is also treated in Psychiatry.    She will be finding a new PCP in this area soon and follow up with them in the next few months for labs and recheck of her blood pressure.  She has not had any recent symptoms of chest pain and dizziness or shortness of breath.      History:  Past Medical History:   Diagnosis Date    Adrenal adenoma 2016    Anxiety     Arthritis     Benign hypertension     Colon cancer age 61    colon    Coronary artery disease     Depression     Full dentures     General anesthetics causing adverse effect in therapeutic use     yells and talks when wakes up    Hyperlipidemia     Osteopenia     Shingles     Wears glasses      Past Surgical History:   Procedure Laterality Date    APPENDECTOMY      BREAST BIOPSY Left     benign    BUNIONECTOMY Left     CATARACT EXTRACTION Bilateral     HEMICOLECTOMY  2002    HERNIA REPAIR      x5    left toe surgery      hammertoe    PATENT DUCTUS ARTERIOUS LIGATION  1948    SALPINGOOPHORECTOMY  2002    due to colon cancer    TONSILLECTOMY, ADENOIDECTOMY       Family History   Problem Relation Age of Onset    Cancer Father         colon    Hypertension Father     Alzheimer's disease Mother      Depression Daughter     Kidney disease Daughter     Ulcerative colitis Daughter     Depression Daughter     Depression Daughter     Anxiety disorder Daughter         PTSD    Cancer Maternal Uncle         colon cancer    Cancer Cousin         colon cancer    Amblyopia Neg Hx     Blindness Neg Hx     Cataracts Neg Hx     Diabetes Neg Hx     Glaucoma Neg Hx     Macular degeneration Neg Hx     Retinal detachment Neg Hx     Strabismus Neg Hx     Stroke Neg Hx     Thyroid disease Neg Hx      Social History     Socioeconomic History    Marital status:      Spouse name: Not on file    Number of children: 3    Years of education: Not on file    Highest education level: Not on file   Occupational History    Not on file   Social Needs    Financial resource strain: Not on file    Food insecurity:     Worry: Not on file     Inability: Not on file    Transportation needs:     Medical: Not on file     Non-medical: Not on file   Tobacco Use    Smoking status: Never Smoker    Smokeless tobacco: Never Used   Substance and Sexual Activity    Alcohol use: No    Drug use: No    Sexual activity: Never   Lifestyle    Physical activity:     Days per week: Not on file     Minutes per session: Not on file    Stress: Not on file   Relationships    Social connections:     Talks on phone: Not on file     Gets together: Not on file     Attends [employer] service: Not on file     Active member of club or organization: Not on file     Attends meetings of clubs or organizations: Not on file     Relationship status: Not on file   Other Topics Concern    Not on file   Social History Narrative    The patient does not exercise regularly ().    Rates diet as poor.    She is not satisfied with weight.             Patient Active Problem List   Diagnosis    Major depressive disorder, recurrent episode, moderate    Generalized anxiety disorder    Benign hypertension    Osteopenia    Mitral regurgitation     Eating disorder    Polyarthralgia    History of colon cancer    Arthritis of both knees    Hyperlipidemia, mixed    Adrenal nodule    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    Primary insomnia    Postoperative anemia    Gait abnormality    Overactive bladder    Prediabetes    Functional gait abnormality    Hammer toes of both feet    Chemotherapy-induced peripheral neuropathy    Valgus deformity of both great toes    Urinary tract infection with hematuria     Review of patient's allergies indicates:   Allergen Reactions    Lisinopril      cough       The following were reviewed at this visit: active problem list, medication list, allergies, family history, social history, and health maintenance.    Medications:  Current Outpatient Medications on File Prior to Visit   Medication Sig Dispense Refill    alendronate (FOSAMAX) 70 MG tablet TAKE 1 TABLET(70 MG) BY MOUTH EVERY 7 DAYS 12 tablet 3    ALPRAZolam (XANAX) 0.25 MG tablet Take one-half to one tablet PO daily PRN anxiety 20 tablet 0    aspirin (ECOTRIN) 81 MG EC tablet Take 81 mg by mouth once daily.      atorvastatin (LIPITOR) 40 MG tablet Take 1 tablet (40 mg total) by mouth once daily. 30 tablet 0    busPIRone (BUSPAR) 10 MG tablet Take one tablet PO BID 60 tablet 2    fenofibrate (TRICOR) 54 MG tablet Take 1 tablet (54 mg total) by mouth once daily. 30 tablet 0    hydroCHLOROthiazide (HYDRODIURIL) 12.5 MG Tab Take 1 tablet (12.5 mg total) by mouth once daily. 30 tablet 0    losartan (COZAAR) 100 MG tablet Take 1 tablet (100 mg total) by mouth once daily. 30 tablet 0    mirabegron (MYRBETRIQ) 50 mg Tb24 TAKE 1 TABLET(50 MG) BY MOUTH EVERY NIGHT 90 tablet 0    sertraline (ZOLOFT) 100 MG tablet TAKE 2 TABLETS BY MOUTH EVERY  tablet 0    buPROPion (WELLBUTRIN SR) 100 MG TBSR 12 hr tablet Take 1 tablet (100 mg total) by mouth 2 (two) times daily. 60 tablet 2    ondansetron (ZOFRAN) 4 MG tablet Take 1 tablet  (4 mg total) by mouth every 6 (six) hours. (Patient not taking: Reported on 1/28/2020) 20 tablet 0    traZODone (DESYREL) 150 MG tablet TAKE 1 TABLET(150 MG) BY MOUTH EVERY NIGHT AS NEEDED FOR INSOMNIA (Patient not taking: Reported on 1/28/2020) 30 tablet 2     No current facility-administered medications on file prior to visit.        Medications have been reviewed and reconciled with patient at this visit.      Exam:  Wt Readings from Last 3 Encounters:   01/28/20 67 kg (147 lb 11.3 oz)   01/23/20 67.5 kg (148 lb 13 oz)   01/20/20 66.5 kg (146 lb 9.7 oz)     Temp Readings from Last 3 Encounters:   01/28/20 98.4 °F (36.9 °C) (Tympanic)   01/20/20 98.8 °F (37.1 °C) (Oral)   09/13/19 98.5 °F (36.9 °C) (Oral)     BP Readings from Last 3 Encounters:   01/28/20 (!) 148/86   01/23/20 120/74   01/20/20 (!) 151/75     Pulse Readings from Last 3 Encounters:   01/28/20 82   01/23/20 74   01/20/20 84     Body mass index is 22.46 kg/m².      Review of Systems   Constitutional: Negative for chills, fever and weight loss.   Respiratory: Negative for shortness of breath.    Cardiovascular: Negative for chest pain and palpitations.   Genitourinary: Negative for dysuria.     Physical Exam   Constitutional: She is oriented to person, place, and time. She appears well-developed and well-nourished. No distress.   HENT:   Head: Normocephalic and atraumatic.   Eyes: Pupils are equal, round, and reactive to light. EOM are normal. Right eye exhibits no discharge. Left eye exhibits no discharge.   Neck: Normal range of motion. Neck supple. No thyromegaly present.   Cardiovascular: Normal rate and regular rhythm. Exam reveals no gallop and no friction rub.   No murmur heard.  Pulmonary/Chest: Effort normal and breath sounds normal. No respiratory distress. She has no wheezes. She has no rales.   Abdominal: Soft. She exhibits no distension. There is no tenderness.   Musculoskeletal: Normal range of motion.   Neurological: She is alert and  oriented to person, place, and time.   Psychiatric: She has a normal mood and affect.   Vitals reviewed.    WNWD, A&O    Very pleasant adult female accompanied by her daughter today.  Lizeth was seen today for annual exam.    Diagnoses and all orders for this visit:    Benign hypertension    Major depressive disorder, recurrent episode, moderate    Overactive bladder    Urinary tract infection with hematuria, site unspecified     25 min spent face-to-face with patient and over 50% that time in consultation regarding a needed follow-up for her recent UTI and vomiting and need for good hydration.  Also proper management of her blood pressure.    The patient verbalized good understanding of the medical issues discussed today and expressed appreciation for the care provided.  Patient was given the opportunity to ask questions and be an active participant in their medical care. Patient had no further questions or concerns at this time.   The patient was encouraged to participate in appropriate physical activity.    After visit summary was printed and given to patient upon discharge today.  Patient goals and care plan are included in After Visit Summary.    This note was produced with voice recognition software and may have sound a like errors

## 2020-01-28 NOTE — PATIENT INSTRUCTIONS
Follow-up with new primary care an and next few months    Blood pressure medications will be refilled for 90 days

## 2020-03-26 RX ORDER — TRAZODONE HYDROCHLORIDE 150 MG/1
TABLET ORAL
Qty: 30 TABLET | Refills: 0 | Status: SHIPPED | OUTPATIENT
Start: 2020-03-26 | End: 2020-04-01

## 2020-04-01 ENCOUNTER — OFFICE VISIT (OUTPATIENT)
Dept: PSYCHIATRY | Facility: CLINIC | Age: 80
End: 2020-04-01
Payer: MEDICARE

## 2020-04-01 DIAGNOSIS — F41.1 GENERALIZED ANXIETY DISORDER: Primary | ICD-10-CM

## 2020-04-01 PROCEDURE — 99499 UNLISTED E&M SERVICE: CPT | Mod: HCNC,S$GLB,, | Performed by: PSYCHIATRY & NEUROLOGY

## 2020-04-01 PROCEDURE — 99499 RISK ADDL DX/OHS AUDIT: ICD-10-PCS | Mod: HCNC,S$GLB,, | Performed by: PSYCHIATRY & NEUROLOGY

## 2020-04-01 PROCEDURE — 90792 PR PSYCHIATRIC DIAGNOSTIC EVALUATION W/MEDICAL SERVICES: ICD-10-PCS | Mod: HCNC,S$GLB,, | Performed by: PSYCHIATRY & NEUROLOGY

## 2020-04-01 PROCEDURE — 99999 PR PBB SHADOW E&M-EST. PATIENT-LVL I: CPT | Mod: PBBFAC,HCNC,, | Performed by: PSYCHIATRY & NEUROLOGY

## 2020-04-01 PROCEDURE — 99999 PR PBB SHADOW E&M-EST. PATIENT-LVL I: ICD-10-PCS | Mod: PBBFAC,HCNC,, | Performed by: PSYCHIATRY & NEUROLOGY

## 2020-04-01 PROCEDURE — 90792 PSYCH DIAG EVAL W/MED SRVCS: CPT | Mod: HCNC,S$GLB,, | Performed by: PSYCHIATRY & NEUROLOGY

## 2020-04-01 RX ORDER — SERTRALINE HYDROCHLORIDE 50 MG/1
50 TABLET, FILM COATED ORAL DAILY
Qty: 30 TABLET | Refills: 1 | Status: SHIPPED | OUTPATIENT
Start: 2020-04-01 | End: 2020-04-30 | Stop reason: SDUPTHER

## 2020-04-01 RX ORDER — BUPROPION HYDROCHLORIDE 150 MG/1
150 TABLET ORAL DAILY
Qty: 30 TABLET | Refills: 1 | Status: SHIPPED | OUTPATIENT
Start: 2020-04-01 | End: 2020-04-30 | Stop reason: SDUPTHER

## 2020-04-01 RX ORDER — MIRTAZAPINE 15 MG/1
15 TABLET, FILM COATED ORAL NIGHTLY PRN
Qty: 30 TABLET | Refills: 1 | Status: SHIPPED | OUTPATIENT
Start: 2020-04-01 | End: 2020-04-30 | Stop reason: SDUPTHER

## 2020-04-01 RX ORDER — BUSPIRONE HYDROCHLORIDE 10 MG/1
10 TABLET ORAL 2 TIMES DAILY
Qty: 60 TABLET | Refills: 1 | Status: SHIPPED | OUTPATIENT
Start: 2020-04-01 | End: 2020-04-30 | Stop reason: SDUPTHER
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
## 2020-04-25 RX ORDER — BUPROPION HYDROCHLORIDE 100 MG/1
TABLET, EXTENDED RELEASE ORAL
Qty: 60 TABLET | Refills: 2 | OUTPATIENT
Start: 2020-04-25

## 2020-04-30 ENCOUNTER — OFFICE VISIT (OUTPATIENT)
Dept: PSYCHIATRY | Facility: CLINIC | Age: 80
End: 2020-04-30
Payer: MEDICARE

## 2020-04-30 DIAGNOSIS — F33.9 MAJOR DEPRESSION, RECURRENT, CHRONIC: ICD-10-CM

## 2020-04-30 DIAGNOSIS — F41.1 GENERALIZED ANXIETY DISORDER: Primary | ICD-10-CM

## 2020-04-30 PROCEDURE — 99214 OFFICE O/P EST MOD 30 MIN: CPT | Mod: HCNC,95,, | Performed by: PSYCHIATRY & NEUROLOGY

## 2020-04-30 PROCEDURE — 1159F PR MEDICATION LIST DOCUMENTED IN MEDICAL RECORD: ICD-10-PCS | Mod: HCNC,95,, | Performed by: PSYCHIATRY & NEUROLOGY

## 2020-04-30 PROCEDURE — 1159F MED LIST DOCD IN RCRD: CPT | Mod: HCNC,95,, | Performed by: PSYCHIATRY & NEUROLOGY

## 2020-04-30 PROCEDURE — 1101F PT FALLS ASSESS-DOCD LE1/YR: CPT | Mod: HCNC,CPTII,95, | Performed by: PSYCHIATRY & NEUROLOGY

## 2020-04-30 PROCEDURE — 99214 PR OFFICE/OUTPT VISIT, EST, LEVL IV, 30-39 MIN: ICD-10-PCS | Mod: HCNC,95,, | Performed by: PSYCHIATRY & NEUROLOGY

## 2020-04-30 PROCEDURE — 1101F PR PT FALLS ASSESS DOC 0-1 FALLS W/OUT INJ PAST YR: ICD-10-PCS | Mod: HCNC,CPTII,95, | Performed by: PSYCHIATRY & NEUROLOGY

## 2020-04-30 PROCEDURE — 99499 UNLISTED E&M SERVICE: CPT | Mod: HCNC,95,, | Performed by: PSYCHIATRY & NEUROLOGY

## 2020-04-30 PROCEDURE — 99499 RISK ADDL DX/OHS AUDIT: ICD-10-PCS | Mod: HCNC,95,, | Performed by: PSYCHIATRY & NEUROLOGY

## 2020-04-30 RX ORDER — BUSPIRONE HYDROCHLORIDE 10 MG/1
10 TABLET ORAL 2 TIMES DAILY
Qty: 180 TABLET | Refills: 0 | Status: SHIPPED | OUTPATIENT
Start: 2020-04-30 | End: 2020-07-28 | Stop reason: SDUPTHER

## 2020-04-30 RX ORDER — MIRTAZAPINE 15 MG/1
15 TABLET, FILM COATED ORAL NIGHTLY PRN
Qty: 90 TABLET | Refills: 0 | Status: SHIPPED | OUTPATIENT
Start: 2020-04-30 | End: 2020-07-28 | Stop reason: SDUPTHER

## 2020-04-30 RX ORDER — SERTRALINE HYDROCHLORIDE 50 MG/1
50 TABLET, FILM COATED ORAL DAILY
Qty: 90 TABLET | Refills: 0 | Status: SHIPPED | OUTPATIENT
Start: 2020-04-30 | End: 2020-07-28 | Stop reason: SDUPTHER

## 2020-04-30 RX ORDER — BUPROPION HYDROCHLORIDE 150 MG/1
150 TABLET ORAL DAILY
Qty: 90 TABLET | Refills: 0 | Status: SHIPPED | OUTPATIENT
Start: 2020-04-30 | End: 2020-07-28 | Stop reason: SDUPTHER
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
## 2020-05-06 ENCOUNTER — PATIENT MESSAGE (OUTPATIENT)
Dept: ADMINISTRATIVE | Facility: HOSPITAL | Age: 80
End: 2020-05-06

## 2020-05-21 ENCOUNTER — OFFICE VISIT (OUTPATIENT)
Dept: PODIATRY | Facility: CLINIC | Age: 80
End: 2020-05-21
Payer: MEDICARE

## 2020-05-21 VITALS — TEMPERATURE: 99 F | HEIGHT: 68 IN | BODY MASS INDEX: 22.38 KG/M2 | WEIGHT: 147.69 LBS

## 2020-05-21 DIAGNOSIS — M20.42 HAMMER TOES OF BOTH FEET: ICD-10-CM

## 2020-05-21 DIAGNOSIS — G62.0 CHEMOTHERAPY-INDUCED PERIPHERAL NEUROPATHY: Primary | ICD-10-CM

## 2020-05-21 DIAGNOSIS — T45.1X5A CHEMOTHERAPY-INDUCED PERIPHERAL NEUROPATHY: Primary | ICD-10-CM

## 2020-05-21 DIAGNOSIS — M20.41 HAMMER TOES OF BOTH FEET: ICD-10-CM

## 2020-05-21 DIAGNOSIS — B35.1 ONYCHOMYCOSIS DUE TO DERMATOPHYTE: ICD-10-CM

## 2020-05-21 PROCEDURE — 1101F PR PT FALLS ASSESS DOC 0-1 FALLS W/OUT INJ PAST YR: ICD-10-PCS | Mod: HCNC,CPTII,S$GLB, | Performed by: PODIATRIST

## 2020-05-21 PROCEDURE — 99999 PR PBB SHADOW E&M-EST. PATIENT-LVL III: CPT | Mod: PBBFAC,HCNC,, | Performed by: PODIATRIST

## 2020-05-21 PROCEDURE — 99213 PR OFFICE/OUTPT VISIT, EST, LEVL III, 20-29 MIN: ICD-10-PCS | Mod: 25,HCNC,S$GLB, | Performed by: PODIATRIST

## 2020-05-21 PROCEDURE — 1125F AMNT PAIN NOTED PAIN PRSNT: CPT | Mod: HCNC,S$GLB,, | Performed by: PODIATRIST

## 2020-05-21 PROCEDURE — 99499 RISK ADDL DX/OHS AUDIT: ICD-10-PCS | Mod: HCNC,S$GLB,, | Performed by: PODIATRIST

## 2020-05-21 PROCEDURE — 11721 DEBRIDE NAIL 6 OR MORE: CPT | Mod: Q9,HCNC,S$GLB, | Performed by: PODIATRIST

## 2020-05-21 PROCEDURE — 1125F PR PAIN SEVERITY QUANTIFIED, PAIN PRESENT: ICD-10-PCS | Mod: HCNC,S$GLB,, | Performed by: PODIATRIST

## 2020-05-21 PROCEDURE — 99999 PR PBB SHADOW E&M-EST. PATIENT-LVL III: ICD-10-PCS | Mod: PBBFAC,HCNC,, | Performed by: PODIATRIST

## 2020-05-21 PROCEDURE — 1159F MED LIST DOCD IN RCRD: CPT | Mod: HCNC,S$GLB,, | Performed by: PODIATRIST

## 2020-05-21 PROCEDURE — 99213 OFFICE O/P EST LOW 20 MIN: CPT | Mod: 25,HCNC,S$GLB, | Performed by: PODIATRIST

## 2020-05-21 PROCEDURE — 99499 UNLISTED E&M SERVICE: CPT | Mod: HCNC,S$GLB,, | Performed by: PODIATRIST

## 2020-05-21 PROCEDURE — 11721 PR DEBRIDEMENT OF NAILS, 6 OR MORE: ICD-10-PCS | Mod: Q9,HCNC,S$GLB, | Performed by: PODIATRIST

## 2020-05-21 PROCEDURE — 1101F PT FALLS ASSESS-DOCD LE1/YR: CPT | Mod: HCNC,CPTII,S$GLB, | Performed by: PODIATRIST

## 2020-05-21 PROCEDURE — 1159F PR MEDICATION LIST DOCUMENTED IN MEDICAL RECORD: ICD-10-PCS | Mod: HCNC,S$GLB,, | Performed by: PODIATRIST

## 2020-05-21 NOTE — PROGRESS NOTES
Subjective:      Patient ID: Lizeth Henderson is a 79 y.o. female.    Chief Complaint: Nail Care    Lizeth is a 79 y.o. female who presents to the clinic for evaluation and treatment of high risk feet. Lizeth has a past medical history of Adrenal adenoma (2016), Anxiety, Arthritis, Benign hypertension, Colon cancer (age 61), Coronary artery disease, Depression, Full dentures, General anesthetics causing adverse effect in therapeutic use, Hyperlipidemia, Osteopenia, Shingles, and Wears glasses. The patient's chief complaint is long, thick toenails.  Relates being unable to trim on her own.  Denies being painful with wearing shoe gear.  Notes sporadic neuropathy pain in the feet, however, states this is fleeting in presentation.  Notes symptoms resolve on their own.  Has not attempted to self treat.  Denies any additional pedal complaints.      PCP: Jaun Gray MD  Date Last Seen by PCP: 1/28/20      Hemoglobin A1C   Date Value Ref Range Status   01/03/2019 5.6 4.0 - 5.6 % Final     Comment:     ADA Screening Guidelines:  5.7-6.4%  Consistent with prediabetes  >or=6.5%  Consistent with diabetes  High levels of fetal hemoglobin interfere with the HbA1C  assay. Heterozygous hemoglobin variants (HbS, HgC, etc)do  not significantly interfere with this assay.   However, presence of multiple variants may affect accuracy.     12/06/2017 5.0 4.0 - 5.6 % Final     Comment:     According to ADA guidelines, hemoglobin A1c <7.0% represents  optimal control in non-pregnant diabetic patients. Different  metrics may apply to specific patient populations.   Standards of Medical Care in Diabetes-2016.  For the purpose of screening for the presence of diabetes:  <5.7%     Consistent with the absence of diabetes  5.7-6.4%  Consistent with increasing risk for diabetes   (prediabetes)  >or=6.5%  Consistent with diabetes  Currently, no consensus exists for use of hemoglobin A1c  for diagnosis of diabetes for children.  This  Hemoglobin A1c assay has significant interference with fetal   hemoglobin   (HbF). The results are invalid for patients with abnormal amounts of   HbF,   including those with known Hereditary Persistence   of Fetal Hemoglobin. Heterozygous hemoglobin variants (HbAS, HbAC,   HbAD, HbAE, HbA2) do not significantly interfere with this assay;   however, presence of multiple variants in a sample may impact the %   interference.     11/21/2014 5.3 4.5 - 6.2 % Final       Review of Systems   Constitution: Negative for chills and fever.   Cardiovascular: Negative for claudication and leg swelling.   Skin: Positive for color change and nail changes.   Musculoskeletal: Negative for muscle cramps, muscle weakness and myalgias.   Neurological: Positive for numbness and paresthesias.   Psychiatric/Behavioral: Negative for altered mental status.           Objective:      Physical Exam   Constitutional: She is oriented to person, place, and time. She appears well-developed and well-nourished. No distress.   Cardiovascular:   Pulses:       Dorsalis pedis pulses are 2+ on the right side, and 2+ on the left side.        Posterior tibial pulses are 2+ on the right side, and 2+ on the left side.   CFT is < 3 seconds bilateral.  Pedal hair growth is decreased bilateral.  Varicosities noted bilateral.  Mild nonpitting lower extremity edema noted bilateral.  Toes are cool to touch bilateral.     Musculoskeletal: She exhibits no edema or tenderness.   Muscle strength 5/5 in all muscle groups bilateral.  No tenderness nor crepitation with ROM of foot/ankle joints bilateral.  Bilateral pes planus.  Bilateral hallux abducto valgus.  Bilateral semi-rigid contracture of toes 2-5.    Neurological: She is alert and oriented to person, place, and time. She has normal strength. A sensory deficit is present.   Protective sensation per [place] monofilament is significantly decreased bilateral.  Light touch is intact bilateral.   Skin: Skin  is warm, dry and intact. Capillary refill takes less than 2 seconds. Lesion noted. No abrasion, no bruising, no burn, no ecchymosis, no laceration, no petechiae and no rash noted. She is not diaphoretic. No erythema. No pallor.   Pedal skin appears thin bilateral.  Toenails x 10 appear appear thickened by 4 mm's, elongated by 5 mm's, and discolored with subungual debris. Examination of the skin reveals no evidence of significant maceration, rashes, open lesions, suspicious appearing nevi or other concerning lesions.              Assessment:       Encounter Diagnoses   Name Primary?    Chemotherapy-induced peripheral neuropathy Yes    Onychomycosis due to dermatophyte     Hammer toes of both feet          Plan:       Lizeth was seen today for nail care.    Diagnoses and all orders for this visit:    Chemotherapy-induced peripheral neuropathy    Onychomycosis due to dermatophyte    Hammer toes of both feet      I counseled the patient on her conditions, their implications and medical management.    - Instructed to continue wearing shoe gear that accommodates for digital deformities.     - Shoe inspection. Patient instructed on proper foot hygeine. We discussed wearing proper shoe gear, daily foot inspections, never walking without protective shoe gear, never putting sharp instruments to feet, routine podiatric nail visits every 2-3 months.      - With patient's permission, nails were aggressively reduced and debrided x 10 to their soft tissue attachment mechanically and with electric , removing all offending nail and debris. Patient relates relief following the procedure. She will continue to monitor the areas daily, inspect her feet, wear protective shoe gear when ambulatory, moisturizer to maintain skin integrity and follow in this office in approximately 2-3 months, sooner p.r.n.     Ric Akbar DPM

## 2020-07-28 ENCOUNTER — OFFICE VISIT (OUTPATIENT)
Dept: PSYCHIATRY | Facility: CLINIC | Age: 80
End: 2020-07-28
Payer: MEDICARE

## 2020-07-28 DIAGNOSIS — F33.9 MAJOR DEPRESSION, RECURRENT, CHRONIC: ICD-10-CM

## 2020-07-28 DIAGNOSIS — F41.1 GENERALIZED ANXIETY DISORDER: Primary | ICD-10-CM

## 2020-07-28 PROCEDURE — 1101F PR PT FALLS ASSESS DOC 0-1 FALLS W/OUT INJ PAST YR: ICD-10-PCS | Mod: HCNC,CPTII,95, | Performed by: PSYCHIATRY & NEUROLOGY

## 2020-07-28 PROCEDURE — 99213 OFFICE O/P EST LOW 20 MIN: CPT | Mod: HCNC,95,, | Performed by: PSYCHIATRY & NEUROLOGY

## 2020-07-28 PROCEDURE — 1101F PT FALLS ASSESS-DOCD LE1/YR: CPT | Mod: HCNC,CPTII,95, | Performed by: PSYCHIATRY & NEUROLOGY

## 2020-07-28 PROCEDURE — 99213 PR OFFICE/OUTPT VISIT, EST, LEVL III, 20-29 MIN: ICD-10-PCS | Mod: HCNC,95,, | Performed by: PSYCHIATRY & NEUROLOGY

## 2020-07-28 PROCEDURE — 99499 RISK ADDL DX/OHS AUDIT: ICD-10-PCS | Mod: HCNC,95,, | Performed by: PSYCHIATRY & NEUROLOGY

## 2020-07-28 PROCEDURE — 1159F PR MEDICATION LIST DOCUMENTED IN MEDICAL RECORD: ICD-10-PCS | Mod: HCNC,95,, | Performed by: PSYCHIATRY & NEUROLOGY

## 2020-07-28 PROCEDURE — 99499 UNLISTED E&M SERVICE: CPT | Mod: HCNC,95,, | Performed by: PSYCHIATRY & NEUROLOGY

## 2020-07-28 PROCEDURE — 1159F MED LIST DOCD IN RCRD: CPT | Mod: HCNC,95,, | Performed by: PSYCHIATRY & NEUROLOGY

## 2020-07-28 RX ORDER — MIRTAZAPINE 15 MG/1
15 TABLET, FILM COATED ORAL NIGHTLY PRN
Qty: 90 TABLET | Refills: 0 | Status: SHIPPED | OUTPATIENT
Start: 2020-07-28 | End: 2020-10-07

## 2020-07-28 RX ORDER — BUPROPION HYDROCHLORIDE 150 MG/1
150 TABLET ORAL DAILY
Qty: 90 TABLET | Refills: 0 | Status: SHIPPED | OUTPATIENT
Start: 2020-07-28 | End: 2020-10-07

## 2020-07-28 RX ORDER — SERTRALINE HYDROCHLORIDE 50 MG/1
50 TABLET, FILM COATED ORAL DAILY
Qty: 90 TABLET | Refills: 0 | Status: SHIPPED | OUTPATIENT
Start: 2020-07-28 | End: 2020-10-07

## 2020-07-28 RX ORDER — BUSPIRONE HYDROCHLORIDE 10 MG/1
10 TABLET ORAL 2 TIMES DAILY
Qty: 180 TABLET | Refills: 0 | Status: SHIPPED | OUTPATIENT
Start: 2020-07-28 | End: 2020-11-04 | Stop reason: SDUPTHER
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
## 2020-08-05 ENCOUNTER — LAB VISIT (OUTPATIENT)
Dept: LAB | Facility: HOSPITAL | Age: 80
End: 2020-08-05
Attending: FAMILY MEDICINE
Payer: MEDICARE

## 2020-08-05 ENCOUNTER — TELEPHONE (OUTPATIENT)
Dept: ORTHOPEDICS | Facility: CLINIC | Age: 80
End: 2020-08-05

## 2020-08-05 ENCOUNTER — OFFICE VISIT (OUTPATIENT)
Dept: INTERNAL MEDICINE | Facility: CLINIC | Age: 80
End: 2020-08-05
Payer: MEDICARE

## 2020-08-05 VITALS
TEMPERATURE: 99 F | HEIGHT: 68 IN | DIASTOLIC BLOOD PRESSURE: 76 MMHG | SYSTOLIC BLOOD PRESSURE: 142 MMHG | RESPIRATION RATE: 18 BRPM | OXYGEN SATURATION: 98 % | WEIGHT: 185.31 LBS | BODY MASS INDEX: 28.09 KG/M2 | HEART RATE: 94 BPM

## 2020-08-05 DIAGNOSIS — I10 BENIGN HYPERTENSION: ICD-10-CM

## 2020-08-05 DIAGNOSIS — N32.81 OVERACTIVE BLADDER: Chronic | ICD-10-CM

## 2020-08-05 DIAGNOSIS — E78.2 HYPERLIPIDEMIA, MIXED: ICD-10-CM

## 2020-08-05 DIAGNOSIS — M17.12 ARTHRITIS OF LEFT KNEE: ICD-10-CM

## 2020-08-05 DIAGNOSIS — E78.2 HYPERLIPIDEMIA, MIXED: Primary | ICD-10-CM

## 2020-08-05 LAB
ALBUMIN SERPL BCP-MCNC: 3.4 G/DL (ref 3.5–5.2)
ALP SERPL-CCNC: 76 U/L (ref 55–135)
ALT SERPL W/O P-5'-P-CCNC: 5 U/L (ref 10–44)
ANION GAP SERPL CALC-SCNC: 7 MMOL/L (ref 8–16)
AST SERPL-CCNC: 18 U/L (ref 10–40)
BASOPHILS # BLD AUTO: 0.02 K/UL (ref 0–0.2)
BASOPHILS NFR BLD: 0.4 % (ref 0–1.9)
BILIRUB SERPL-MCNC: 0.3 MG/DL (ref 0.1–1)
BUN SERPL-MCNC: 24 MG/DL (ref 8–23)
CALCIUM SERPL-MCNC: 9.2 MG/DL (ref 8.7–10.5)
CHLORIDE SERPL-SCNC: 108 MMOL/L (ref 95–110)
CHOLEST SERPL-MCNC: 279 MG/DL (ref 120–199)
CHOLEST/HDLC SERPL: 6.2 {RATIO} (ref 2–5)
CO2 SERPL-SCNC: 28 MMOL/L (ref 23–29)
CREAT SERPL-MCNC: 0.9 MG/DL (ref 0.5–1.4)
DIFFERENTIAL METHOD: ABNORMAL
EOSINOPHIL # BLD AUTO: 0.1 K/UL (ref 0–0.5)
EOSINOPHIL NFR BLD: 1.6 % (ref 0–8)
ERYTHROCYTE [DISTWIDTH] IN BLOOD BY AUTOMATED COUNT: 13.2 % (ref 11.5–14.5)
EST. GFR  (AFRICAN AMERICAN): >60 ML/MIN/1.73 M^2
EST. GFR  (NON AFRICAN AMERICAN): >60 ML/MIN/1.73 M^2
GLUCOSE SERPL-MCNC: 72 MG/DL (ref 70–110)
HCT VFR BLD AUTO: 34.4 % (ref 37–48.5)
HDLC SERPL-MCNC: 45 MG/DL (ref 40–75)
HDLC SERPL: 16.1 % (ref 20–50)
HGB BLD-MCNC: 10.8 G/DL (ref 12–16)
IMM GRANULOCYTES # BLD AUTO: 0.02 K/UL (ref 0–0.04)
IMM GRANULOCYTES NFR BLD AUTO: 0.4 % (ref 0–0.5)
LDLC SERPL CALC-MCNC: 204.2 MG/DL (ref 63–159)
LYMPHOCYTES # BLD AUTO: 1.7 K/UL (ref 1–4.8)
LYMPHOCYTES NFR BLD: 33.7 % (ref 18–48)
MCH RBC QN AUTO: 28.1 PG (ref 27–31)
MCHC RBC AUTO-ENTMCNC: 31.4 G/DL (ref 32–36)
MCV RBC AUTO: 89 FL (ref 82–98)
MONOCYTES # BLD AUTO: 0.5 K/UL (ref 0.3–1)
MONOCYTES NFR BLD: 9.9 % (ref 4–15)
NEUTROPHILS # BLD AUTO: 2.7 K/UL (ref 1.8–7.7)
NEUTROPHILS NFR BLD: 54 % (ref 38–73)
NONHDLC SERPL-MCNC: 234 MG/DL
NRBC BLD-RTO: 0 /100 WBC
PLATELET # BLD AUTO: 216 K/UL (ref 150–350)
PMV BLD AUTO: 10.9 FL (ref 9.2–12.9)
POTASSIUM SERPL-SCNC: 4.3 MMOL/L (ref 3.5–5.1)
PROT SERPL-MCNC: 6.5 G/DL (ref 6–8.4)
RBC # BLD AUTO: 3.85 M/UL (ref 4–5.4)
SODIUM SERPL-SCNC: 143 MMOL/L (ref 136–145)
TRIGL SERPL-MCNC: 149 MG/DL (ref 30–150)
WBC # BLD AUTO: 5.04 K/UL (ref 3.9–12.7)

## 2020-08-05 PROCEDURE — 1101F PR PT FALLS ASSESS DOC 0-1 FALLS W/OUT INJ PAST YR: ICD-10-PCS | Mod: HCNC,CPTII,S$GLB, | Performed by: FAMILY MEDICINE

## 2020-08-05 PROCEDURE — 99999 PR PBB SHADOW E&M-EST. PATIENT-LVL III: CPT | Mod: PBBFAC,HCNC,, | Performed by: FAMILY MEDICINE

## 2020-08-05 PROCEDURE — 1159F MED LIST DOCD IN RCRD: CPT | Mod: HCNC,S$GLB,, | Performed by: FAMILY MEDICINE

## 2020-08-05 PROCEDURE — 1101F PT FALLS ASSESS-DOCD LE1/YR: CPT | Mod: HCNC,CPTII,S$GLB, | Performed by: FAMILY MEDICINE

## 2020-08-05 PROCEDURE — 36415 COLL VENOUS BLD VENIPUNCTURE: CPT | Mod: HCNC

## 2020-08-05 PROCEDURE — 1126F PR PAIN SEVERITY QUANTIFIED, NO PAIN PRESENT: ICD-10-PCS | Mod: HCNC,S$GLB,, | Performed by: FAMILY MEDICINE

## 2020-08-05 PROCEDURE — 85025 COMPLETE CBC W/AUTO DIFF WBC: CPT | Mod: HCNC

## 2020-08-05 PROCEDURE — 99999 PR PBB SHADOW E&M-EST. PATIENT-LVL III: ICD-10-PCS | Mod: PBBFAC,HCNC,, | Performed by: FAMILY MEDICINE

## 2020-08-05 PROCEDURE — 3077F SYST BP >= 140 MM HG: CPT | Mod: HCNC,CPTII,S$GLB, | Performed by: FAMILY MEDICINE

## 2020-08-05 PROCEDURE — 1126F AMNT PAIN NOTED NONE PRSNT: CPT | Mod: HCNC,S$GLB,, | Performed by: FAMILY MEDICINE

## 2020-08-05 PROCEDURE — 99214 OFFICE O/P EST MOD 30 MIN: CPT | Mod: HCNC,S$GLB,, | Performed by: FAMILY MEDICINE

## 2020-08-05 PROCEDURE — 99214 PR OFFICE/OUTPT VISIT, EST, LEVL IV, 30-39 MIN: ICD-10-PCS | Mod: HCNC,S$GLB,, | Performed by: FAMILY MEDICINE

## 2020-08-05 PROCEDURE — 80061 LIPID PANEL: CPT | Mod: HCNC

## 2020-08-05 PROCEDURE — 3078F DIAST BP <80 MM HG: CPT | Mod: HCNC,CPTII,S$GLB, | Performed by: FAMILY MEDICINE

## 2020-08-05 PROCEDURE — 3077F PR MOST RECENT SYSTOLIC BLOOD PRESSURE >= 140 MM HG: ICD-10-PCS | Mod: HCNC,CPTII,S$GLB, | Performed by: FAMILY MEDICINE

## 2020-08-05 PROCEDURE — 3078F PR MOST RECENT DIASTOLIC BLOOD PRESSURE < 80 MM HG: ICD-10-PCS | Mod: HCNC,CPTII,S$GLB, | Performed by: FAMILY MEDICINE

## 2020-08-05 PROCEDURE — 1159F PR MEDICATION LIST DOCUMENTED IN MEDICAL RECORD: ICD-10-PCS | Mod: HCNC,S$GLB,, | Performed by: FAMILY MEDICINE

## 2020-08-05 PROCEDURE — 80053 COMPREHEN METABOLIC PANEL: CPT | Mod: HCNC

## 2020-08-05 RX ORDER — MIRABEGRON 50 MG/1
TABLET, FILM COATED, EXTENDED RELEASE ORAL
Qty: 90 TABLET | Refills: 0 | Status: SHIPPED | OUTPATIENT
Start: 2020-08-05 | End: 2020-08-12 | Stop reason: SDUPTHER

## 2020-08-05 NOTE — PROGRESS NOTES
"Subjective:       Patient ID: Lizeth Henderson is a 79 y.o. female.    Chief Complaint: Establish Care    HPI  Right knee replaced   Difficult recovery with time  Onset 20 years ago  Trauma in teens after fall on gym fall  Now with left knee pain  Has tried physical therapy in past with some relief  Does not desire at this time  Desire conservative therapy  Has also had steroid injections  No adverse effects with steroid injections  Desires ortho referral for possible synnvisc injections  Worse with getting up  Walking improves pain    Review of Systems   Constitutional: Negative for activity change and unexpected weight change.   HENT: Negative for hearing loss, rhinorrhea and trouble swallowing.    Eyes: Negative for discharge and visual disturbance.   Respiratory: Negative for chest tightness and wheezing.    Cardiovascular: Negative for chest pain and palpitations.   Gastrointestinal: Negative for blood in stool, constipation, diarrhea and vomiting.   Endocrine: Negative for polydipsia and polyuria.   Genitourinary: Negative for difficulty urinating, dysuria, hematuria and menstrual problem.   Musculoskeletal: Positive for arthralgias. Negative for gait problem, joint swelling and neck pain.   Neurological: Negative for weakness and headaches.   Psychiatric/Behavioral: Negative for confusion and dysphoric mood.        Objective:   BP (!) 142/76 (BP Location: Right arm, Patient Position: Sitting, BP Method: Large (Manual))   Pulse 94   Temp 98.6 °F (37 °C) (Tympanic)   Resp 18   Ht 5' 8" (1.727 m)   Wt 84.1 kg (185 lb 4.8 oz)   SpO2 98%   BMI 28.17 kg/m²     BP Readings from Last 3 Encounters:   08/05/20 (!) 142/76   01/28/20 (!) 148/86   01/23/20 120/74       Lab Results   Component Value Date    HGBA1C 5.6 01/03/2019       Physical Exam  Vitals signs reviewed.   Constitutional:       General: She is not in acute distress.     Appearance: She is well-developed.   HENT:      Head: Normocephalic and " atraumatic.   Neck:      Musculoskeletal: Normal range of motion.   Cardiovascular:      Rate and Rhythm: Normal rate.   Pulmonary:      Effort: Pulmonary effort is normal. No respiratory distress.   Abdominal:      Palpations: Abdomen is soft.   Musculoskeletal: Normal range of motion.         General: Deformity (left knee) present. No swelling or signs of injury.      Right lower leg: No edema.      Left lower leg: No edema.   Skin:     General: Skin is warm and dry.   Neurological:      Mental Status: She is alert and oriented to person, place, and time.   Psychiatric:         Behavior: Behavior normal.       Assessment:     1. Hyperlipidemia, mixed    2. Overactive bladder    3. Benign hypertension    4. Arthritis of left knee      Plan:     Problem List Items Addressed This Visit        Cardiac/Vascular    Benign hypertension    Current Assessment & Plan     mildly elevated today. Due to geriatric patient with left knee arthritis, will relax normotensive requirements         Relevant Orders    Comprehensive metabolic panel    CBC auto differential    Hyperlipidemia, mixed - Primary    Current Assessment & Plan     Not on statin med.  78yo  Apple per day           Relevant Orders    Lipid Panel       Renal/    Overactive bladder (Chronic)    Current Assessment & Plan     Improved sx but not 100% resolved. Has been taking for 5 years. No adverse side effects         Relevant Medications    mirabegron (MYRBETRIQ) 50 mg Tb24      Other Visit Diagnoses     Arthritis of left knee        Relevant Orders    Ambulatory referral/consult to Orthopedics      desires conservative options. Will refer to ortho for possible synvisc injections    Follow up in about 3 months (around 11/5/2020) for Hypertension.

## 2020-08-05 NOTE — ASSESSMENT & PLAN NOTE
mildly elevated today. Due to geriatric patient with left knee arthritis, will relax normotensive requirements

## 2020-08-06 ENCOUNTER — TELEPHONE (OUTPATIENT)
Dept: ORTHOPEDICS | Facility: CLINIC | Age: 80
End: 2020-08-06

## 2020-08-06 NOTE — PROGRESS NOTES
Sig increase in cholesterol-may consider meds but continue with apple/day and monitor. Anemia present-rec eating food rich in iron

## 2020-08-07 ENCOUNTER — TELEPHONE (OUTPATIENT)
Dept: ORTHOPEDICS | Facility: CLINIC | Age: 80
End: 2020-08-07

## 2020-08-10 ENCOUNTER — TELEPHONE (OUTPATIENT)
Dept: INTERNAL MEDICINE | Facility: CLINIC | Age: 80
End: 2020-08-10

## 2020-08-10 NOTE — TELEPHONE ENCOUNTER
----- Message from Jordy Bowen MD sent at 8/6/2020  8:22 AM CDT -----  Sig increase in cholesterol-may consider meds but continue with apple/day and monitor. Anemia present-rec eating food rich in iron

## 2020-08-12 ENCOUNTER — PATIENT MESSAGE (OUTPATIENT)
Dept: INTERNAL MEDICINE | Facility: CLINIC | Age: 80
End: 2020-08-12

## 2020-08-12 DIAGNOSIS — N32.81 OVERACTIVE BLADDER: Chronic | ICD-10-CM

## 2020-08-12 DIAGNOSIS — I10 ESSENTIAL HYPERTENSION: ICD-10-CM

## 2020-08-12 RX ORDER — HYDROCHLOROTHIAZIDE 12.5 MG/1
12.5 TABLET ORAL DAILY
Qty: 90 TABLET | Refills: 0 | Status: SHIPPED | OUTPATIENT
Start: 2020-08-12 | End: 2020-10-08 | Stop reason: SDUPTHER

## 2020-08-12 RX ORDER — MIRABEGRON 50 MG/1
TABLET, FILM COATED, EXTENDED RELEASE ORAL
Qty: 90 TABLET | Refills: 0 | Status: SHIPPED | OUTPATIENT
Start: 2020-08-12 | End: 2020-10-08 | Stop reason: SDUPTHER

## 2020-08-12 RX ORDER — BUSPIRONE HYDROCHLORIDE 10 MG/1
10 TABLET ORAL 2 TIMES DAILY
Qty: 180 TABLET | Refills: 0 | OUTPATIENT
Start: 2020-08-12 | End: 2021-08-12

## 2020-09-23 ENCOUNTER — PATIENT OUTREACH (OUTPATIENT)
Dept: ADMINISTRATIVE | Facility: OTHER | Age: 80
End: 2020-09-23

## 2020-09-23 ENCOUNTER — OFFICE VISIT (OUTPATIENT)
Dept: PODIATRY | Facility: CLINIC | Age: 80
End: 2020-09-23
Payer: MEDICAID

## 2020-09-23 VITALS
SYSTOLIC BLOOD PRESSURE: 192 MMHG | HEIGHT: 68 IN | WEIGHT: 185.44 LBS | BODY MASS INDEX: 28.1 KG/M2 | HEART RATE: 89 BPM | DIASTOLIC BLOOD PRESSURE: 101 MMHG

## 2020-09-23 DIAGNOSIS — G62.0 CHEMOTHERAPY-INDUCED PERIPHERAL NEUROPATHY: Primary | ICD-10-CM

## 2020-09-23 DIAGNOSIS — T45.1X5A CHEMOTHERAPY-INDUCED PERIPHERAL NEUROPATHY: Primary | ICD-10-CM

## 2020-09-23 DIAGNOSIS — B35.1 ONYCHOMYCOSIS DUE TO DERMATOPHYTE: ICD-10-CM

## 2020-09-23 DIAGNOSIS — M20.42 HAMMER TOES OF BOTH FEET: ICD-10-CM

## 2020-09-23 DIAGNOSIS — M20.41 HAMMER TOES OF BOTH FEET: ICD-10-CM

## 2020-09-23 PROCEDURE — 11721 PR DEBRIDEMENT OF NAILS, 6 OR MORE: ICD-10-PCS | Mod: S$PBB,HCNC,, | Performed by: PODIATRIST

## 2020-09-23 PROCEDURE — 99213 PR OFFICE/OUTPT VISIT, EST, LEVL III, 20-29 MIN: ICD-10-PCS | Mod: 25,S$PBB,HCNC, | Performed by: PODIATRIST

## 2020-09-23 PROCEDURE — 99213 OFFICE O/P EST LOW 20 MIN: CPT | Mod: PBBFAC,HCNC | Performed by: PODIATRIST

## 2020-09-23 PROCEDURE — 99999 PR PBB SHADOW E&M-EST. PATIENT-LVL III: CPT | Mod: PBBFAC,HCNC,, | Performed by: PODIATRIST

## 2020-09-23 PROCEDURE — 99999 PR PBB SHADOW E&M-EST. PATIENT-LVL III: ICD-10-PCS | Mod: PBBFAC,HCNC,, | Performed by: PODIATRIST

## 2020-09-23 PROCEDURE — 11721 DEBRIDE NAIL 6 OR MORE: CPT | Mod: Q9,PBBFAC,HCNC | Performed by: PODIATRIST

## 2020-09-23 PROCEDURE — 99213 OFFICE O/P EST LOW 20 MIN: CPT | Mod: 25,S$PBB,HCNC, | Performed by: PODIATRIST

## 2020-09-23 PROCEDURE — 11721 DEBRIDE NAIL 6 OR MORE: CPT | Mod: S$PBB,HCNC,, | Performed by: PODIATRIST

## 2020-09-23 NOTE — PROGRESS NOTES
PODIATRIC MEDICINE AND SURGERY     CHIEF COMPLAINT  Chief Complaint   Patient presents with    Routine Foot Care     PCP Dr. Bowen 8/05/20 3 mo RFC         HPI    SUBJECTIVE: Lizeth Henderson is a 80 y.o. female who  has a past medical history of Adrenal adenoma (2016), Anxiety, Arthritis, Benign hypertension, Colon cancer (age 61), Coronary artery disease, Depression, Full dentures, General anesthetics causing adverse effect in therapeutic use, Hyperlipidemia, Osteopenia, Shingles, and Wears glasses. Lizethpresents to clinic for high risk oot exam and care secondary to neuropathy.  Lizeth admits numbness, burning, and/or tingling sensations in their feet. Patient admits to painful toenails aggravated by increased weight bearing, shoe gear, and pressure. States pain is relieved with routine debridements. Patient has no other pedal complaints at this time.    HgA1c:   Hemoglobin A1C   Date Value Ref Range Status   01/03/2019 5.6 4.0 - 5.6 % Final     Comment:     ADA Screening Guidelines:  5.7-6.4%  Consistent with prediabetes  >or=6.5%  Consistent with diabetes  High levels of fetal hemoglobin interfere with the HbA1C  assay. Heterozygous hemoglobin variants (HbS, HgC, etc)do  not significantly interfere with this assay.   However, presence of multiple variants may affect accuracy.     12/06/2017 5.0 4.0 - 5.6 % Final     Comment:     According to ADA guidelines, hemoglobin A1c <7.0% represents  optimal control in non-pregnant diabetic patients. Different  metrics may apply to specific patient populations.   Standards of Medical Care in Diabetes-2016.  For the purpose of screening for the presence of diabetes:  <5.7%     Consistent with the absence of diabetes  5.7-6.4%  Consistent with increasing risk for diabetes   (prediabetes)  >or=6.5%  Consistent with diabetes  Currently, no consensus exists for use of hemoglobin A1c  for diagnosis of diabetes for children.  This Hemoglobin A1c assay has significant  interference with fetal   hemoglobin   (HbF). The results are invalid for patients with abnormal amounts of   HbF,   including those with known Hereditary Persistence   of Fetal Hemoglobin. Heterozygous hemoglobin variants (HbAS, HbAC,   HbAD, HbAE, HbA2) do not significantly interfere with this assay;   however, presence of multiple variants in a sample may impact the %   interference.     11/21/2014 5.3 4.5 - 6.2 % Final         PMH  Past Medical History:   Diagnosis Date    Adrenal adenoma 2016    Anxiety     Arthritis     Benign hypertension     Colon cancer age 61    colon    Coronary artery disease     Depression     Full dentures     General anesthetics causing adverse effect in therapeutic use     yells and talks when wakes up    Hyperlipidemia     Osteopenia     Shingles     Wears glasses      Patient Active Problem List   Diagnosis    Major depression, recurrent, chronic    Generalized anxiety disorder    Benign hypertension    Osteopenia    Mitral regurgitation    Eating disorder    Polyarthralgia    History of colon cancer    Arthritis of both knees    Hyperlipidemia, mixed    Adrenal nodule    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      buPROPion (WELLBUTRIN XL) 150 MG TB24 tablet Take 1 tablet (150 mg total) by mouth once daily. 90 tablet 0    busPIRone (BUSPAR) 10 MG tablet Take 1 tablet (10 mg  total) by mouth 2 (two) times daily. 180 tablet 0    hydroCHLOROthiazide (HYDRODIURIL) 12.5 MG Tab Take 1 tablet (12.5 mg total) by mouth once daily. 90 tablet 0    losartan (COZAAR) 100 MG tablet Take 1 tablet (100 mg total) by mouth once daily. 90 tablet 0    mirabegron (MYRBETRIQ) 50 mg Tb24 TAKE 1 TABLET(50 MG) BY MOUTH EVERY NIGHT 90 tablet 0    mirtazapine (REMERON) 15 MG tablet Take 1 tablet (15 mg total) by mouth nightly as needed. 90 tablet 0    sertraline (ZOLOFT) 50 MG tablet Take 1 tablet (50 mg total) by mouth once daily. 90 tablet 0     No current facility-administered medications on file prior to visit.        PSH     Past Surgical History:   Procedure Laterality Date    APPENDECTOMY      BREAST BIOPSY Left     benign    BUNIONECTOMY Left     CATARACT EXTRACTION Bilateral     HEMICOLECTOMY  2002    HERNIA REPAIR      x5    left toe surgery      hammertoe    PATENT DUCTUS ARTERIOUS LIGATION  1948    SALPINGOOPHORECTOMY  2002    due to colon cancer    TONSILLECTOMY, ADENOIDECTOMY          ALL  Review of patient's allergies indicates:   Allergen Reactions    Lisinopril      cough       SOC     Social History     Tobacco Use    Smoking status: Never Smoker    Smokeless tobacco: Never Used   Substance Use Topics    Alcohol use: No     Frequency: Never    Drug use: No         Family HX    Family History   Problem Relation Age of Onset    Cancer Father         colon    Hypertension Father     Alzheimer's disease Mother     Depression Daughter     Kidney disease Daughter     Ulcerative colitis Daughter     Depression Daughter     Depression Daughter     Anxiety disorder Daughter         PTSD    Cancer Maternal Uncle         colon cancer    Cancer Cousin         colon cancer    Amblyopia Neg Hx     Blindness Neg Hx     Cataracts Neg Hx     Diabetes Neg Hx     Glaucoma Neg Hx     Macular degeneration Neg Hx     Retinal detachment Neg Hx     Strabismus Neg Hx     Stroke  "Neg Hx     Thyroid disease Neg Hx             REVIEW OF SYSTEMS  General: Denies any fever or chills  Chest: Denies shortness of breath, wheezing, coughing, or sputum production  Heart: Denies chest pain.  As noted above and per history of current illness above, otherwise negative in the remainder of the 14 systems.    PHYSICAL EXAM  Vitals:    09/23/20 1552   BP: (!) 192/101   Pulse: 89   Weight: 84.1 kg (185 lb 6.5 oz)   Height: 5' 8" (1.727 m)       GEN:  This patient is well-developed, well-nourished and appears stated age, well-oriented to person, place and time, and cooperative and pleasant on today's visit.      LOWER EXTREMITY    VASCULAR  DP pedal pulse 2/4 RIGHT, LEFT2/4   PT pedal pulse 2/4 RIGHT, LEFT2/4  Capillary refill time immediate to the toes.   Feet are warm to the touch. Skin temperature warm to warm from proximally to distally       DERMATOLOGIC  Skin moist with healthy texture and turgor.  Thickened, dystrophic, elongated, discolored toenails with subungal debris 1-5 b/l   There are no open ulcerations, lacerations, or fissures to bilateral foot and ankle regions. There are no signs of infection as there is no erythema, no proximal-extending lymphangiitis, no fluctuance, or crepitus noted on palpation to bilateral foot and ankle regions.   There is no interdigital maceration.   There are no hyperkeratotic lesions noted to feet.    NEUROLOGIC  Protective sensation intact at 6/10 sites upon examination with Mulino Weinsten 5.07 g monofilament.  Propioception intact at 1st MTPJ b/l.  Babinski reflex absent    ORTHOPEDIC/BIOMECHANICAL  No symptomatic structural abnormalities noted. Muscle strength is 5/5 for foot inverters, everters, plantarflexors, and dorsiflexors. Muscle tone is normal.  Inspection/palpation of bone, joints and muscles unremarkable. Semi rigid digital contractures noted 2-5 b/l.     ASSESSMENT  Chemotherapy-induced peripheral neuropathy    Onychomycosis due to " dermatophyte    Hammer toes of both feet        PLAN  -The patient was examined and evaulated  -Discuss presenting problems, etiology, pathologic processes and management options with patient today.   .  -With patient's permission, the elongated onychomycotic toenails, as outlined in the physical examination, were sharply debrided/trimmed with a double action nail nipper to their soft tissue attachment. If indicated, the nails were then smoothed down in thickness with an ethan board to facilitate in further debridement removing all offending nail and subungual debris. Patient relates relief following the procedure.       Discussed hammertoe deformities and conservative management.  Patient was also guided on shoe gear selection of extra depth or larger toe boxed shoes.       Disclaimer: This note was partially prepared using a voice recognition system and is likely to have sound alike errors within the text.        Future Appointments   Date Time Provider Department Center   11/5/2020  8:00 AM [facility]    12/17/2020  1:15 PM Carlotta Xiao DPM HG POD [place]       Report Electronically Signed By:     Carlotta Xiao DPM   Podiatry  [facility]-   9/23/2020

## 2020-09-29 ENCOUNTER — PATIENT MESSAGE (OUTPATIENT)
Dept: OTHER | Facility: OTHER | Age: 80
End: 2020-09-29

## 2020-10-07 DIAGNOSIS — I10 ESSENTIAL HYPERTENSION: ICD-10-CM

## 2020-10-07 DIAGNOSIS — N32.81 OVERACTIVE BLADDER: Chronic | ICD-10-CM

## 2020-10-07 RX ORDER — SERTRALINE HYDROCHLORIDE 50 MG/1
TABLET, FILM COATED ORAL
Qty: 90 TABLET | Refills: 0 | Status: SHIPPED | OUTPATIENT
Start: 2020-10-07 | End: 2020-11-04

## 2020-10-07 RX ORDER — MIRTAZAPINE 15 MG/1
TABLET, FILM COATED ORAL
Qty: 90 TABLET | Refills: 0 | Status: SHIPPED | OUTPATIENT
Start: 2020-10-07 | End: 2020-11-04 | Stop reason: SDUPTHER

## 2020-10-07 RX ORDER — BUPROPION HYDROCHLORIDE 150 MG/1
TABLET ORAL
Qty: 90 TABLET | Refills: 0 | Status: SHIPPED | OUTPATIENT
Start: 2020-10-07 | End: 2020-11-04 | Stop reason: SDUPTHER

## 2020-10-08 RX ORDER — MIRABEGRON 50 MG/1
TABLET, FILM COATED, EXTENDED RELEASE ORAL
Qty: 90 TABLET | Refills: 0 | Status: SHIPPED | OUTPATIENT
Start: 2020-10-08 | End: 2021-01-02

## 2020-10-08 RX ORDER — HYDROCHLOROTHIAZIDE 12.5 MG/1
12.5 TABLET ORAL DAILY
Qty: 90 TABLET | Refills: 3 | Status: SHIPPED | OUTPATIENT
Start: 2020-10-08 | End: 2021-12-31

## 2020-10-08 NOTE — TELEPHONE ENCOUNTER
----- Message from Radha Brannon sent at 10/7/2020  3:50 PM CDT -----  Regarding: pt  Pt is stating she has been trying to get a refill on all of her medication  prescribed for her , but not one has been sent to the pharmacy . Please call back at .[phone] (home)       .  [id number] - [place], [place] - [address]  [address]  [address] [address]  Phone: [phone] Fax: [phone]    Thank you,   Radha Brannon

## 2020-10-13 ENCOUNTER — PATIENT MESSAGE (OUTPATIENT)
Dept: ORTHOPEDICS | Facility: CLINIC | Age: 80
End: 2020-10-13

## 2020-10-13 DIAGNOSIS — M25.562 LEFT KNEE PAIN, UNSPECIFIED CHRONICITY: Primary | ICD-10-CM

## 2020-10-15 ENCOUNTER — HOSPITAL ENCOUNTER (OUTPATIENT)
Dept: RADIOLOGY | Facility: HOSPITAL | Age: 80
Discharge: HOME | End: 2020-10-15
Attending: PHYSICIAN ASSISTANT
Payer: MEDICARE

## 2020-10-15 ENCOUNTER — OFFICE VISIT (OUTPATIENT)
Dept: ORTHOPEDICS | Facility: CLINIC | Age: 80
End: 2020-10-15
Payer: MEDICARE

## 2020-10-15 VITALS
HEIGHT: 68 IN | WEIGHT: 185 LBS | BODY MASS INDEX: 28.04 KG/M2 | HEART RATE: 83 BPM | DIASTOLIC BLOOD PRESSURE: 89 MMHG | SYSTOLIC BLOOD PRESSURE: 162 MMHG

## 2020-10-15 DIAGNOSIS — G89.29 CHRONIC PAIN OF LEFT KNEE: ICD-10-CM

## 2020-10-15 DIAGNOSIS — M25.562 LEFT KNEE PAIN, UNSPECIFIED CHRONICITY: ICD-10-CM

## 2020-10-15 DIAGNOSIS — M17.12 PRIMARY OSTEOARTHRITIS OF LEFT KNEE: Primary | ICD-10-CM

## 2020-10-15 DIAGNOSIS — M25.562 CHRONIC PAIN OF LEFT KNEE: ICD-10-CM

## 2020-10-15 PROCEDURE — 1101F PR PT FALLS ASSESS DOC 0-1 FALLS W/OUT INJ PAST YR: ICD-10-PCS | Mod: HCNC,CPTII,S$GLB, | Performed by: PHYSICIAN ASSISTANT

## 2020-10-15 PROCEDURE — 1125F AMNT PAIN NOTED PAIN PRSNT: CPT | Mod: HCNC,S$GLB,, | Performed by: PHYSICIAN ASSISTANT

## 2020-10-15 PROCEDURE — 73564 XR KNEE ORTHO LEFT WITH FLEXION: ICD-10-PCS | Mod: 26,HCNC,LT, | Performed by: RADIOLOGY

## 2020-10-15 PROCEDURE — 3077F PR MOST RECENT SYSTOLIC BLOOD PRESSURE >= 140 MM HG: ICD-10-PCS | Mod: HCNC,CPTII,S$GLB, | Performed by: PHYSICIAN ASSISTANT

## 2020-10-15 PROCEDURE — 3077F SYST BP >= 140 MM HG: CPT | Mod: HCNC,CPTII,S$GLB, | Performed by: PHYSICIAN ASSISTANT

## 2020-10-15 PROCEDURE — 1101F PT FALLS ASSESS-DOCD LE1/YR: CPT | Mod: HCNC,CPTII,S$GLB, | Performed by: PHYSICIAN ASSISTANT

## 2020-10-15 PROCEDURE — 3079F PR MOST RECENT DIASTOLIC BLOOD PRESSURE 80-89 MM HG: ICD-10-PCS | Mod: HCNC,CPTII,S$GLB, | Performed by: PHYSICIAN ASSISTANT

## 2020-10-15 PROCEDURE — 73562 X-RAY EXAM OF KNEE 3: CPT | Mod: 26,59,HCNC,RT | Performed by: RADIOLOGY

## 2020-10-15 PROCEDURE — 1125F PR PAIN SEVERITY QUANTIFIED, PAIN PRESENT: ICD-10-PCS | Mod: HCNC,S$GLB,, | Performed by: PHYSICIAN ASSISTANT

## 2020-10-15 PROCEDURE — 99999 PR PBB SHADOW E&M-EST. PATIENT-LVL III: CPT | Mod: PBBFAC,HCNC,, | Performed by: PHYSICIAN ASSISTANT

## 2020-10-15 PROCEDURE — 99999 PR PBB SHADOW E&M-EST. PATIENT-LVL III: ICD-10-PCS | Mod: PBBFAC,HCNC,, | Performed by: PHYSICIAN ASSISTANT

## 2020-10-15 PROCEDURE — 3079F DIAST BP 80-89 MM HG: CPT | Mod: HCNC,CPTII,S$GLB, | Performed by: PHYSICIAN ASSISTANT

## 2020-10-15 PROCEDURE — 1159F PR MEDICATION LIST DOCUMENTED IN MEDICAL RECORD: ICD-10-PCS | Mod: HCNC,S$GLB,, | Performed by: PHYSICIAN ASSISTANT

## 2020-10-15 PROCEDURE — 20610 LARGE JOINT ASPIRATION/INJECTION: L KNEE: ICD-10-PCS | Mod: HCNC,LT,S$GLB, | Performed by: PHYSICIAN ASSISTANT

## 2020-10-15 PROCEDURE — 20610 DRAIN/INJ JOINT/BURSA W/O US: CPT | Mod: HCNC,LT,S$GLB, | Performed by: PHYSICIAN ASSISTANT

## 2020-10-15 PROCEDURE — 99204 OFFICE O/P NEW MOD 45 MIN: CPT | Mod: 25,HCNC,S$GLB, | Performed by: PHYSICIAN ASSISTANT

## 2020-10-15 PROCEDURE — 73562 X-RAY EXAM OF KNEE 3: CPT | Mod: TC,HCNC,59,RT

## 2020-10-15 PROCEDURE — 1159F MED LIST DOCD IN RCRD: CPT | Mod: HCNC,S$GLB,, | Performed by: PHYSICIAN ASSISTANT

## 2020-10-15 PROCEDURE — 99204 PR OFFICE/OUTPT VISIT, NEW, LEVL IV, 45-59 MIN: ICD-10-PCS | Mod: 25,HCNC,S$GLB, | Performed by: PHYSICIAN ASSISTANT

## 2020-10-15 PROCEDURE — 73562 XR KNEE ORTHO LEFT WITH FLEXION: ICD-10-PCS | Mod: 26,59,HCNC,RT | Performed by: RADIOLOGY

## 2020-10-15 PROCEDURE — 73564 X-RAY EXAM KNEE 4 OR MORE: CPT | Mod: 26,HCNC,LT, | Performed by: RADIOLOGY

## 2020-10-15 RX ORDER — DICLOFENAC SODIUM 10 MG/G
2 GEL TOPICAL 3 TIMES DAILY PRN
Qty: 2 TUBE | Refills: 6 | Status: SHIPPED | OUTPATIENT
Start: 2020-10-15 | End: 2021-02-03

## 2020-10-15 RX ORDER — METHYLPREDNISOLONE ACETATE 80 MG/ML
80 INJECTION, SUSPENSION INTRA-ARTICULAR; INTRALESIONAL; INTRAMUSCULAR; SOFT TISSUE
Status: DISCONTINUED | OUTPATIENT
Start: 2020-10-15 | End: 2020-10-15 | Stop reason: HOSPADM

## 2020-10-15 RX ADMIN — METHYLPREDNISOLONE ACETATE 80 MG: 80 INJECTION, SUSPENSION INTRA-ARTICULAR; INTRALESIONAL; INTRAMUSCULAR; SOFT TISSUE at 08:10

## 2020-10-15 NOTE — PROCEDURES
Large Joint Aspiration/Injection: L knee    Date/Time: 10/15/2020 8:20 AM  Performed by: Raquel Stevens PA-C  Authorized by: Raquel Stevens PA-C     Consent Done?:  Yes (Verbal)  Indications:  Pain  Site marked: the procedure site was marked    Timeout: prior to procedure the correct patient, procedure, and site was verified    Prep: patient was prepped and draped in usual sterile fashion      Local anesthesia used?: Yes    Local anesthetic:  Lidocaine 1% without epinephrine  Anesthetic total (ml):  2      Details:  Needle Size:  22 G  Ultrasonic Guidance for needle placement?: No    Approach:  Anterolateral  Location:  Knee  Site:  L knee  Medications:  80 mg methylPREDNISolone acetate 80 mg/mL  Patient tolerance:  Patient tolerated the procedure well with no immediate complications     After verbal consent was obtained for left knee injection, patient ID, site, and side were verified.  The  left  knee was sterilly prepped in the standard fashion.  A 22-gauge needle was introduced into left knee joint from an carolann-lateral site without complication. The left knee was then injected with 20 mg lidocaine plain and 80 mg depomedrol.  A sterile bandaid was applied.  The patient was informed to apply an ice pack approximately 10min once arriving home and not to do anything strenuous for 24hours.  Elevation of glucose in diabetic patients was discussed.  She was instructed to call if there were any problems. The patient was discharged in stable condition.

## 2020-10-15 NOTE — PROGRESS NOTES
Subjective:      Patient ID: Lizeth Henderson is a 80 y.o. female.    Chief Complaint: Pain of the Left Knee      HPI: Lizeth Henderson  is a 80 y.o. female who c/o Pain of the Left Knee   for duration of 5-7 years.  She denies any inciting injury.  She has history right total knee arthroplasty done in [place] about for 5 years ago.  Her complaint today is the left knee.  She has had pain chronically for years.  She has had prior corticosteroid injections which gave a little bit of relief.  She does ibuprofen 400 mg every 4-6 hours with minimal relief as well.  She tells me she has never done viscosupplementation but has done physical therapy.  She is not overly active.  Her hobbies include reading.  Severity 9/10.  Quality is aching, sharp, shooting, intermittent.  Alleviating factors include rest.  Also minimally improved with ibuprofen 400 mg.  Aggravating factors include getting up from prolonged inactivity, weight-bearing, being active.  She complains of intermittent swelling occasionally.  She has no complaints with the right knee today.    Past Medical History:   Diagnosis Date    Adrenal adenoma 2016    Anxiety     Arthritis     Benign hypertension     Colon cancer age 61    colon    Coronary artery disease     Depression     Full dentures     General anesthetics causing adverse effect in therapeutic use     yells and talks when wakes up    Hyperlipidemia     Osteopenia     Shingles     Wears glasses      Past Surgical History:   Procedure Laterality Date    APPENDECTOMY      BREAST BIOPSY Left     benign    BUNIONECTOMY Left     CATARACT EXTRACTION Bilateral     HEMICOLECTOMY  2002    HERNIA REPAIR      x5    left toe surgery      joelle    PATENT DUCTUS ARTERIOUS LIGATION  1948    SALPINGOOPHORECTOMY  2002    due to colon cancer    TONSILLECTOMY, ADENOIDECTOMY       Family History   Problem Relation Age of Onset    Cancer Father         colon    Hypertension Father      Alzheimer's disease Mother     Depression Daughter     Kidney disease Daughter     Ulcerative colitis Daughter     Depression Daughter     Depression Daughter     Anxiety disorder Daughter         PTSD    Cancer Maternal Uncle         colon cancer    Cancer Cousin         colon cancer    Amblyopia Neg Hx     Blindness Neg Hx     Cataracts Neg Hx     Diabetes Neg Hx     Glaucoma Neg Hx     Macular degeneration Neg Hx     Retinal detachment Neg Hx     Strabismus Neg Hx     Stroke Neg Hx     Thyroid disease Neg Hx      Social History     Socioeconomic History    Marital status:      Spouse name: Not on file    Number of children: 3    Years of education: Not on file    Highest education level: Not on file   Occupational History    Not on file   Social Needs    Financial resource strain: Somewhat hard    Food insecurity     Worry: Sometimes true     Inability: Sometimes true    Transportation needs     Medical: No     Non-medical: No   Tobacco Use    Smoking status: Never Smoker    Smokeless tobacco: Never Used   Substance and Sexual Activity    Alcohol use: No     Frequency: Never    Drug use: No    Sexual activity: Never   Lifestyle    Physical activity     Days per week: 7 days     Minutes per session: 40 min    Stress: To some extent   Relationships    Social connections     Talks on phone: More than three times a week     Gets together: Never     Attends Denominational service: Not on file     Active member of club or organization: No     Attends meetings of clubs or organizations: Never     Relationship status:    Other Topics Concern    Not on file   Social History Narrative    The patient does not exercise regularly ().    Rates diet as poor.    She is not satisfied with weight.             Medication List with Changes/Refills   New Medications    DICLOFENAC SODIUM (VOLTAREN) 1 % GEL    Apply 2 g topically 3 (three) times daily as needed.   Current Medications     ALPRAZOLAM (XANAX) 0.25 MG TABLET    Take one-half to one tablet PO daily PRN anxiety    ASPIRIN (ECOTRIN) 81 MG EC TABLET    Take 81 mg by mouth once daily.    BUPROPION (WELLBUTRIN XL) 150 MG TB24 TABLET    TAKE 1 TABLET(150 MG) BY MOUTH EVERY DAY    BUSPIRONE (BUSPAR) 10 MG TABLET    Take 1 tablet (10 mg total) by mouth 2 (two) times daily.    HYDROCHLOROTHIAZIDE (HYDRODIURIL) 12.5 MG TAB    Take 1 tablet (12.5 mg total) by mouth once daily.    LOSARTAN (COZAAR) 100 MG TABLET    TAKE 1 TABLET(100 MG) BY MOUTH EVERY DAY    MIRABEGRON (MYRBETRIQ) 50 MG TB24    TAKE 1 TABLET(50 MG) BY MOUTH EVERY NIGHT    MIRTAZAPINE (REMERON) 15 MG TABLET    TAKE 1 TABLET(15 MG) BY MOUTH EVERY NIGHT AS NEEDED    SERTRALINE (ZOLOFT) 50 MG TABLET    TAKE 1 TABLET(50 MG) BY MOUTH EVERY DAY     Review of patient's allergies indicates:   Allergen Reactions    Lisinopril      cough       Review of Systems   Constitution: Negative for fever.   Cardiovascular: Negative for chest pain.   Respiratory: Negative for cough and shortness of breath.    Skin: Negative for rash.   Musculoskeletal: Positive for joint pain, joint swelling and stiffness.   Gastrointestinal: Negative for heartburn.   Neurological: Negative for headaches and numbness.         Objective:        General    Nursing note and vitals reviewed.  Constitutional: She is oriented to person, place, and time. She appears well-developed and well-nourished.   HENT:   Head: Normocephalic and atraumatic.   Eyes: EOM are normal.   Cardiovascular: Normal rate and regular rhythm.    Pulmonary/Chest: Effort normal.   Abdominal: Soft.   Neurological: She is alert and oriented to person, place, and time.   Psychiatric: She has a normal mood and affect. Her behavior is normal.             Left Knee Exam     Inspection   Erythema: absent  Swelling: absent  Effusion: absent  Deformity: absent  Bruising: absent    Tenderness   The patient tender to palpation of the condyle, medial joint line  and lateral joint line.    Crepitus   The patient has crepitus of the patella.    Range of Motion   Extension: normal   Flexion:  120 abnormal     Tests   Meniscus   Gus:  Medial - negative Lateral - negative  Stability Lachman: normal (-1 to 2mm) PCL-Posterior Drawer: normal (0 to 2mm)  MCL - Valgus: abnormal - grade I  LCL - Varus: normal (0 to 2mm)  Patella   Patellar apprehension: negative  Patellar Tracking: normal  Patellar Grind: negative    Other   Meniscal Cyst: absent  Popliteal (Baker's) Cyst: absent  Sensation: normal    Comments:  Comp soft, cap refill < 2 sec.    Muscle Strength   Right Lower Extremity   Quadriceps:  4/5   Hamstrin/5   Left Lower Extremity   Quadriceps:  4/5   Hamstrin/5     Vascular Exam       Edema  Right Lower Leg: absent  Left Lower Leg: absent              Xray images and report were reviewed today.  I agree with the radiologist's interpretation.    X-ray Knee Ortho Left with Flexion  Narrative: EXAMINATION:  XR KNEE ORTHO LEFT WITH FLEXION    CLINICAL HISTORY:  Pain in left knee    TECHNIQUE:  AP standing views of both knees, AP flexion views of both knees, lateral view of the left knee and Merchant views of both knees    COMPARISON:  2017    FINDINGS:  A right total knee arthroplasty is noted.  There is at least moderate joint space narrowing and marginal osteophyte formation seen involving all 3 compartments of the left knee and greatest at the lateral compartment.  No left-sided joint effusion.  No acute fracture or dislocation.  Impression: 1.  As above    Electronically signed by: Hugh Obrien DO  Date:    10/15/2020  Time:    08:40        Assessment:       Encounter Diagnoses   Name Primary?    Primary osteoarthritis of left knee Yes    Chronic pain of left knee           Plan:       Lizeth was seen today for pain.    Diagnoses and all orders for this visit:    Primary osteoarthritis of left knee  -     diclofenac sodium (VOLTAREN) 1 % Gel; Apply 2  g topically 3 (three) times daily as needed.  -     CANE FOR HOME USE  -     Prior authorization Order  -     Large Joint Aspiration/Injection: L knee    Chronic pain of left knee  -     diclofenac sodium (VOLTAREN) 1 % Gel; Apply 2 g topically 3 (three) times daily as needed.  -     CANE FOR HOME USE  -     Prior authorization Order  -     Large Joint Aspiration/Injection: L knee        Lizeth Henderson is a new pt who comes in today for the above problems.  We had a long discussion today regarding degenerative arthritis in the knees. The patient understands that arthritis is chronic and will worsen over time.  The patient also understands that arthritis may cause episodic flare-ups in pain. Management or if arthritis is achieved through a multi-modal approach including weight loss in obese individuals, activity modification, NSAIDs (topical vs oral) where appropriate, periodic intra-articular steroid injections, viscosupplementation, physical therapy, knee bracing, ambulatory aids, as well as geniculate nerve blocks.      After discussion of risks and benefits of all of the above were discussed, the decision was made to move forward with corticosteroid injection today.  We will get her a cane for ambulation as well.  I have submitted an authorization request for [employer].  Lastly I have recommended a topical anti-inflammatory cream.  I have reviewed her recent blood work.  In August her GFR was within normal limits.  However blood work over the last year and a half prior did show a decrease in GFR.  I would like to try to avoid oral NSAIDs to avoid irritation in her kidneys.  I have also given her a home exercise program to work on quad strengthening.  If she has problems before follow-up next month, she will notify the office.  They verbalized understanding and agree.    Kellgren Roel Scale 3 left knee      Follow up in about 1 month (around 11/15/2020) for monovisc left knee.          The patient  understands, chooses and consents to this plan and accepts all   the risks which include but are not limited to the risks mentioned above.     Disclaimer: This note was prepared using a voice recognition system and is likely to have sound alike errors within the text.

## 2020-11-04 ENCOUNTER — OFFICE VISIT (OUTPATIENT)
Dept: PSYCHIATRY | Facility: CLINIC | Age: 80
End: 2020-11-04
Payer: MEDICARE

## 2020-11-04 DIAGNOSIS — F41.1 GAD (GENERALIZED ANXIETY DISORDER): ICD-10-CM

## 2020-11-04 DIAGNOSIS — F33.1 MDD (MAJOR DEPRESSIVE DISORDER), RECURRENT EPISODE, MODERATE: Primary | ICD-10-CM

## 2020-11-04 PROCEDURE — 99499 RISK ADDL DX/OHS AUDIT: ICD-10-PCS | Mod: 95,,, | Performed by: PSYCHIATRY & NEUROLOGY

## 2020-11-04 PROCEDURE — 1159F PR MEDICATION LIST DOCUMENTED IN MEDICAL RECORD: ICD-10-PCS | Mod: HCNC,95,, | Performed by: PSYCHIATRY & NEUROLOGY

## 2020-11-04 PROCEDURE — 99214 OFFICE O/P EST MOD 30 MIN: CPT | Mod: HCNC,95,, | Performed by: PSYCHIATRY & NEUROLOGY

## 2020-11-04 PROCEDURE — 1101F PR PT FALLS ASSESS DOC 0-1 FALLS W/OUT INJ PAST YR: ICD-10-PCS | Mod: HCNC,CPTII,95, | Performed by: PSYCHIATRY & NEUROLOGY

## 2020-11-04 PROCEDURE — 1101F PT FALLS ASSESS-DOCD LE1/YR: CPT | Mod: HCNC,CPTII,95, | Performed by: PSYCHIATRY & NEUROLOGY

## 2020-11-04 PROCEDURE — 99214 PR OFFICE/OUTPT VISIT, EST, LEVL IV, 30-39 MIN: ICD-10-PCS | Mod: HCNC,95,, | Performed by: PSYCHIATRY & NEUROLOGY

## 2020-11-04 PROCEDURE — 99499 UNLISTED E&M SERVICE: CPT | Mod: 95,,, | Performed by: PSYCHIATRY & NEUROLOGY

## 2020-11-04 PROCEDURE — 1159F MED LIST DOCD IN RCRD: CPT | Mod: HCNC,95,, | Performed by: PSYCHIATRY & NEUROLOGY

## 2020-11-04 RX ORDER — BUSPIRONE HYDROCHLORIDE 10 MG/1
10 TABLET ORAL 2 TIMES DAILY
Qty: 180 TABLET | Refills: 0 | Status: SHIPPED | OUTPATIENT
Start: 2020-11-04 | End: 2021-02-10 | Stop reason: SDUPTHER

## 2020-11-04 RX ORDER — ESCITALOPRAM OXALATE 5 MG/1
5 TABLET ORAL DAILY
Qty: 30 TABLET | Refills: 2 | Status: SHIPPED | OUTPATIENT
Start: 2020-11-04 | End: 2020-12-11 | Stop reason: SDUPTHER

## 2020-11-04 RX ORDER — MIRTAZAPINE 15 MG/1
TABLET, FILM COATED ORAL
Qty: 90 TABLET | Refills: 0 | Status: SHIPPED | OUTPATIENT
Start: 2020-11-04 | End: 2021-02-10 | Stop reason: SDUPTHER

## 2020-11-04 RX ORDER — BUPROPION HYDROCHLORIDE 150 MG/1
150 TABLET ORAL DAILY
Qty: 90 TABLET | Refills: 0 | Status: SHIPPED | OUTPATIENT
Start: 2020-11-04 | End: 2021-02-10 | Stop reason: SDUPTHER
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
## 2020-11-05 ENCOUNTER — LAB VISIT (OUTPATIENT)
Dept: LAB | Facility: HOSPITAL | Age: 80
End: 2020-11-05
Attending: FAMILY MEDICINE
Payer: MEDICARE

## 2020-11-05 ENCOUNTER — OFFICE VISIT (OUTPATIENT)
Dept: INTERNAL MEDICINE | Facility: CLINIC | Age: 80
End: 2020-11-05
Payer: MEDICARE

## 2020-11-05 VITALS
TEMPERATURE: 99 F | HEART RATE: 87 BPM | HEIGHT: 68 IN | BODY MASS INDEX: 29.02 KG/M2 | WEIGHT: 191.5 LBS | DIASTOLIC BLOOD PRESSURE: 86 MMHG | OXYGEN SATURATION: 98 % | SYSTOLIC BLOOD PRESSURE: 144 MMHG

## 2020-11-05 DIAGNOSIS — N32.81 OVERACTIVE BLADDER: Chronic | ICD-10-CM

## 2020-11-05 DIAGNOSIS — R26.9 GAIT ABNORMALITY: Primary | ICD-10-CM

## 2020-11-05 DIAGNOSIS — I10 BENIGN HYPERTENSION: ICD-10-CM

## 2020-11-05 DIAGNOSIS — M17.0 ARTHRITIS OF BOTH KNEES: ICD-10-CM

## 2020-11-05 DIAGNOSIS — D64.9 POSTOPERATIVE ANEMIA: ICD-10-CM

## 2020-11-05 DIAGNOSIS — R73.03 PREDIABETES: Chronic | ICD-10-CM

## 2020-11-05 PROBLEM — N39.0 URINARY TRACT INFECTION WITH HEMATURIA: Status: RESOLVED | Noted: 2020-01-28 | Resolved: 2020-11-05

## 2020-11-05 PROBLEM — R31.9 URINARY TRACT INFECTION WITH HEMATURIA: Status: RESOLVED | Noted: 2020-01-28 | Resolved: 2020-11-05

## 2020-11-05 LAB
ALBUMIN SERPL BCP-MCNC: 3.6 G/DL (ref 3.5–5.2)
ALP SERPL-CCNC: 87 U/L (ref 55–135)
ALT SERPL W/O P-5'-P-CCNC: 6 U/L (ref 10–44)
ANION GAP SERPL CALC-SCNC: 11 MMOL/L (ref 8–16)
AST SERPL-CCNC: 20 U/L (ref 10–40)
BASOPHILS # BLD AUTO: 0.03 K/UL (ref 0–0.2)
BASOPHILS NFR BLD: 0.5 % (ref 0–1.9)
BILIRUB SERPL-MCNC: 0.3 MG/DL (ref 0.1–1)
BUN SERPL-MCNC: 25 MG/DL (ref 8–23)
CALCIUM SERPL-MCNC: 9.3 MG/DL (ref 8.7–10.5)
CHLORIDE SERPL-SCNC: 104 MMOL/L (ref 95–110)
CO2 SERPL-SCNC: 27 MMOL/L (ref 23–29)
CREAT SERPL-MCNC: 0.9 MG/DL (ref 0.5–1.4)
DIFFERENTIAL METHOD: ABNORMAL
EOSINOPHIL # BLD AUTO: 0.1 K/UL (ref 0–0.5)
EOSINOPHIL NFR BLD: 1.6 % (ref 0–8)
ERYTHROCYTE [DISTWIDTH] IN BLOOD BY AUTOMATED COUNT: 13.3 % (ref 11.5–14.5)
EST. GFR  (AFRICAN AMERICAN): >60 ML/MIN/1.73 M^2
EST. GFR  (NON AFRICAN AMERICAN): >60 ML/MIN/1.73 M^2
FERRITIN SERPL-MCNC: 98 NG/ML (ref 20–300)
GLUCOSE SERPL-MCNC: 90 MG/DL (ref 70–110)
HCT VFR BLD AUTO: 36 % (ref 37–48.5)
HGB BLD-MCNC: 11.4 G/DL (ref 12–16)
IMM GRANULOCYTES # BLD AUTO: 0.03 K/UL (ref 0–0.04)
IMM GRANULOCYTES NFR BLD AUTO: 0.5 % (ref 0–0.5)
IRON SERPL-MCNC: 75 UG/DL (ref 30–160)
LYMPHOCYTES # BLD AUTO: 1.6 K/UL (ref 1–4.8)
LYMPHOCYTES NFR BLD: 27.8 % (ref 18–48)
MCH RBC QN AUTO: 28.6 PG (ref 27–31)
MCHC RBC AUTO-ENTMCNC: 31.7 G/DL (ref 32–36)
MCV RBC AUTO: 91 FL (ref 82–98)
MONOCYTES # BLD AUTO: 0.5 K/UL (ref 0.3–1)
MONOCYTES NFR BLD: 8.7 % (ref 4–15)
NEUTROPHILS # BLD AUTO: 3.5 K/UL (ref 1.8–7.7)
NEUTROPHILS NFR BLD: 60.9 % (ref 38–73)
NRBC BLD-RTO: 0 /100 WBC
PATH REV BLD -IMP: NORMAL
PLATELET # BLD AUTO: 221 K/UL (ref 150–350)
PMV BLD AUTO: 11.1 FL (ref 9.2–12.9)
POTASSIUM SERPL-SCNC: 4 MMOL/L (ref 3.5–5.1)
PROT SERPL-MCNC: 7 G/DL (ref 6–8.4)
RBC # BLD AUTO: 3.98 M/UL (ref 4–5.4)
SATURATED IRON: 18 % (ref 20–50)
SODIUM SERPL-SCNC: 142 MMOL/L (ref 136–145)
TOTAL IRON BINDING CAPACITY: 420 UG/DL (ref 250–450)
TRANSFERRIN SERPL-MCNC: 284 MG/DL (ref 200–375)
WBC # BLD AUTO: 5.72 K/UL (ref 3.9–12.7)

## 2020-11-05 PROCEDURE — 82728 ASSAY OF FERRITIN: CPT | Mod: HCNC

## 2020-11-05 PROCEDURE — 85025 COMPLETE CBC W/AUTO DIFF WBC: CPT | Mod: HCNC

## 2020-11-05 PROCEDURE — 1101F PT FALLS ASSESS-DOCD LE1/YR: CPT | Mod: HCNC,CPTII,S$GLB, | Performed by: FAMILY MEDICINE

## 2020-11-05 PROCEDURE — 99999 PR PBB SHADOW E&M-EST. PATIENT-LVL IV: ICD-10-PCS | Mod: PBBFAC,HCNC,, | Performed by: FAMILY MEDICINE

## 2020-11-05 PROCEDURE — 3080F DIAST BP >= 90 MM HG: CPT | Mod: HCNC,CPTII,S$GLB, | Performed by: FAMILY MEDICINE

## 2020-11-05 PROCEDURE — 3077F PR MOST RECENT SYSTOLIC BLOOD PRESSURE >= 140 MM HG: ICD-10-PCS | Mod: HCNC,CPTII,S$GLB, | Performed by: FAMILY MEDICINE

## 2020-11-05 PROCEDURE — 85060 BLOOD SMEAR INTERPRETATION: CPT | Mod: HCNC,,, | Performed by: PATHOLOGY

## 2020-11-05 PROCEDURE — 1126F AMNT PAIN NOTED NONE PRSNT: CPT | Mod: HCNC,S$GLB,, | Performed by: FAMILY MEDICINE

## 2020-11-05 PROCEDURE — 99214 PR OFFICE/OUTPT VISIT, EST, LEVL IV, 30-39 MIN: ICD-10-PCS | Mod: HCNC,S$GLB,, | Performed by: FAMILY MEDICINE

## 2020-11-05 PROCEDURE — 36415 COLL VENOUS BLD VENIPUNCTURE: CPT | Mod: HCNC

## 2020-11-05 PROCEDURE — 3077F SYST BP >= 140 MM HG: CPT | Mod: HCNC,CPTII,S$GLB, | Performed by: FAMILY MEDICINE

## 2020-11-05 PROCEDURE — 99214 OFFICE O/P EST MOD 30 MIN: CPT | Mod: HCNC,S$GLB,, | Performed by: FAMILY MEDICINE

## 2020-11-05 PROCEDURE — 83540 ASSAY OF IRON: CPT | Mod: HCNC

## 2020-11-05 PROCEDURE — 1159F PR MEDICATION LIST DOCUMENTED IN MEDICAL RECORD: ICD-10-PCS | Mod: HCNC,S$GLB,, | Performed by: FAMILY MEDICINE

## 2020-11-05 PROCEDURE — 83036 HEMOGLOBIN GLYCOSYLATED A1C: CPT | Mod: HCNC

## 2020-11-05 PROCEDURE — 1159F MED LIST DOCD IN RCRD: CPT | Mod: HCNC,S$GLB,, | Performed by: FAMILY MEDICINE

## 2020-11-05 PROCEDURE — 1126F PR PAIN SEVERITY QUANTIFIED, NO PAIN PRESENT: ICD-10-PCS | Mod: HCNC,S$GLB,, | Performed by: FAMILY MEDICINE

## 2020-11-05 PROCEDURE — 3080F PR MOST RECENT DIASTOLIC BLOOD PRESSURE >= 90 MM HG: ICD-10-PCS | Mod: HCNC,CPTII,S$GLB, | Performed by: FAMILY MEDICINE

## 2020-11-05 PROCEDURE — 99999 PR PBB SHADOW E&M-EST. PATIENT-LVL IV: CPT | Mod: PBBFAC,HCNC,, | Performed by: FAMILY MEDICINE

## 2020-11-05 PROCEDURE — 1101F PR PT FALLS ASSESS DOC 0-1 FALLS W/OUT INJ PAST YR: ICD-10-PCS | Mod: HCNC,CPTII,S$GLB, | Performed by: FAMILY MEDICINE

## 2020-11-05 PROCEDURE — 80053 COMPREHEN METABOLIC PANEL: CPT | Mod: HCNC

## 2020-11-05 PROCEDURE — 85060 PATHOLOGIST REVIEW: ICD-10-PCS | Mod: HCNC,,, | Performed by: PATHOLOGY

## 2020-11-05 NOTE — ASSESSMENT & PLAN NOTE
Using cane  Received steroid inj  Looking into hylalin inj  Doing HEP  Walks dog   Handicap tag to fill out

## 2020-11-05 NOTE — PROGRESS NOTES
"Subjective:       Patient ID: Lizeth Henderson is a 80 y.o. female.    Chief Complaint: Follow-up (HTN)    HPI  Has been evaluated by ortho  Received injections for knees  Also received topical cream  Using cane for ambulation  Desires handicap sticker  Reports taking 2 tabs ibu bid    Does not have smart phone but interested in dig htn program  Reports compliance to antihtn meds  Denies adverse side effects  Review of Systems   Constitutional: Negative for appetite change, chills, diaphoresis and fever.   HENT: Negative for congestion, facial swelling, hearing loss and voice change.    Respiratory: Negative for cough and shortness of breath.    Cardiovascular: Negative for chest pain.   Gastrointestinal: Negative for blood in stool, diarrhea, nausea and vomiting.   Skin: Negative for color change, pallor, rash and wound.   Neurological: Negative for facial asymmetry, speech difficulty, weakness and headaches.        Objective:   BP (!) 140/90 (BP Location: Right arm, Patient Position: Sitting, BP Method: Medium (Manual))   Pulse 87   Temp 99 °F (37.2 °C) (Temporal)   Ht 5' 8" (1.727 m)   Wt 86.8 kg (191 lb 7.5 oz)   SpO2 98%   BMI 29.11 kg/m²     BP Readings from Last 3 Encounters:   11/05/20 (!) 140/90   10/15/20 (!) 162/89   09/23/20 (!) 192/101       Lab Results   Component Value Date    HGBA1C 5.6 01/03/2019       Physical Exam  Vitals signs reviewed.   Constitutional:       General: She is not in acute distress.     Appearance: Normal appearance. She is well-developed. She is not ill-appearing or toxic-appearing.   HENT:      Head: Normocephalic and atraumatic.      Right Ear: Hearing normal.      Left Ear: Hearing normal.   Neck:      Musculoskeletal: Normal range of motion.   Cardiovascular:      Rate and Rhythm: Normal rate.   Pulmonary:      Effort: Pulmonary effort is normal. No respiratory distress.   Abdominal:      Palpations: Abdomen is soft.   Musculoskeletal: Normal range of motion.      " Comments: Cane with ambulation   Skin:     General: Skin is warm and dry.   Neurological:      Mental Status: She is alert and oriented to person, place, and time.   Psychiatric:         Behavior: Behavior normal.       Assessment:     1. Gait abnormality    2. Arthritis of both knees    3. Benign hypertension    4. Prediabetes    5. Postoperative anemia    6. Overactive bladder      Plan:     Problem List Items Addressed This Visit        Cardiac/Vascular    Benign hypertension    Current Assessment & Plan     Will relax normotensive range in this geriatric population  Mildly elevated         Relevant Orders    Comprehensive Metabolic Panel       Renal/    Overactive bladder (Chronic)    Current Assessment & Plan     Monitored and evaluated medical condition. Stable.  Reports compliance to meds.  No adverse effects to meds.  Continue meds and monitor.    Continue myrbetriq            Oncology    Postoperative anemia    Current Assessment & Plan     Repeat labs  Not taking fe supp  If persists, rec fe supp         Relevant Orders    Pathologist Interpretation Differential    Iron and TIBC    Ferritin    CBC Auto Differential       Endocrine    Prediabetes (Chronic)    Relevant Orders    Hemoglobin A1C       Orthopedic    Arthritis of both knees    Current Assessment & Plan     Using cane  Received steroid inj  Looking into hylalin inj  Doing HEP  Walks dog   Handicap tag to fill out            Other    Gait abnormality - Primary          Follow up in about 3 months (around 2/5/2021) for Hypertension, fasting.

## 2020-11-05 NOTE — ASSESSMENT & PLAN NOTE
Monitored and evaluated medical condition. Stable.  Reports compliance to meds.  No adverse effects to meds.  Continue meds and monitor.    Continue myrbetriq

## 2020-11-06 PROBLEM — D72.0: Status: ACTIVE | Noted: 2020-11-06

## 2020-11-06 LAB
ESTIMATED AVG GLUCOSE: 108 MG/DL (ref 68–131)
HBA1C MFR BLD HPLC: 5.4 % (ref 4–5.6)
PATH REV BLD -IMP: NORMAL

## 2020-11-09 NOTE — PROGRESS NOTES
Anemia stable, otherwise normal labs. Kidney fcn improved.   Anemia likely iron def/nutrition. Will monitor. rec iron rich foods

## 2020-11-12 ENCOUNTER — PATIENT OUTREACH (OUTPATIENT)
Dept: ADMINISTRATIVE | Facility: HOSPITAL | Age: 80
End: 2020-11-12

## 2020-11-12 NOTE — PROGRESS NOTES
Working [employer] HTN report.  Noted pt saw pcp 11/5 bp 144/86. Has nurse visit scheduled for 11/19. Entered [id number].

## 2020-12-08 ENCOUNTER — OFFICE VISIT (OUTPATIENT)
Dept: PODIATRY | Facility: CLINIC | Age: 80
End: 2020-12-08
Payer: MEDICARE

## 2020-12-08 VITALS
SYSTOLIC BLOOD PRESSURE: 174 MMHG | HEIGHT: 68 IN | BODY MASS INDEX: 29.01 KG/M2 | HEART RATE: 85 BPM | WEIGHT: 191.38 LBS | DIASTOLIC BLOOD PRESSURE: 91 MMHG

## 2020-12-08 DIAGNOSIS — L60.0 ONYCHOCRYPTOSIS: Primary | ICD-10-CM

## 2020-12-08 DIAGNOSIS — G62.0 CHEMOTHERAPY-INDUCED PERIPHERAL NEUROPATHY: ICD-10-CM

## 2020-12-08 DIAGNOSIS — B35.1 ONYCHOMYCOSIS DUE TO DERMATOPHYTE: ICD-10-CM

## 2020-12-08 DIAGNOSIS — M79.674 TOE PAIN, BILATERAL: ICD-10-CM

## 2020-12-08 DIAGNOSIS — M79.675 TOE PAIN, BILATERAL: ICD-10-CM

## 2020-12-08 DIAGNOSIS — T45.1X5A CHEMOTHERAPY-INDUCED PERIPHERAL NEUROPATHY: ICD-10-CM

## 2020-12-08 PROCEDURE — 3080F DIAST BP >= 90 MM HG: CPT | Mod: HCNC,CPTII,S$GLB, | Performed by: PODIATRIST

## 2020-12-08 PROCEDURE — 1159F PR MEDICATION LIST DOCUMENTED IN MEDICAL RECORD: ICD-10-PCS | Mod: HCNC,S$GLB,, | Performed by: PODIATRIST

## 2020-12-08 PROCEDURE — 99213 PR OFFICE/OUTPT VISIT, EST, LEVL III, 20-29 MIN: ICD-10-PCS | Mod: 25,HCNC,S$GLB, | Performed by: PODIATRIST

## 2020-12-08 PROCEDURE — 3080F PR MOST RECENT DIASTOLIC BLOOD PRESSURE >= 90 MM HG: ICD-10-PCS | Mod: HCNC,CPTII,S$GLB, | Performed by: PODIATRIST

## 2020-12-08 PROCEDURE — 99213 OFFICE O/P EST LOW 20 MIN: CPT | Mod: 25,HCNC,S$GLB, | Performed by: PODIATRIST

## 2020-12-08 PROCEDURE — 3288F PR FALLS RISK ASSESSMENT DOCUMENTED: ICD-10-PCS | Mod: HCNC,CPTII,S$GLB, | Performed by: PODIATRIST

## 2020-12-08 PROCEDURE — 11721 DEBRIDE NAIL 6 OR MORE: CPT | Mod: Q9,HCNC,S$GLB, | Performed by: PODIATRIST

## 2020-12-08 PROCEDURE — 1101F PT FALLS ASSESS-DOCD LE1/YR: CPT | Mod: HCNC,CPTII,S$GLB, | Performed by: PODIATRIST

## 2020-12-08 PROCEDURE — 99999 PR PBB SHADOW E&M-EST. PATIENT-LVL III: CPT | Mod: PBBFAC,HCNC,, | Performed by: PODIATRIST

## 2020-12-08 PROCEDURE — 3077F SYST BP >= 140 MM HG: CPT | Mod: HCNC,CPTII,S$GLB, | Performed by: PODIATRIST

## 2020-12-08 PROCEDURE — 3288F FALL RISK ASSESSMENT DOCD: CPT | Mod: HCNC,CPTII,S$GLB, | Performed by: PODIATRIST

## 2020-12-08 PROCEDURE — 1101F PR PT FALLS ASSESS DOC 0-1 FALLS W/OUT INJ PAST YR: ICD-10-PCS | Mod: HCNC,CPTII,S$GLB, | Performed by: PODIATRIST

## 2020-12-08 PROCEDURE — 11721 PR DEBRIDEMENT OF NAILS, 6 OR MORE: ICD-10-PCS | Mod: Q9,HCNC,S$GLB, | Performed by: PODIATRIST

## 2020-12-08 PROCEDURE — 1159F MED LIST DOCD IN RCRD: CPT | Mod: HCNC,S$GLB,, | Performed by: PODIATRIST

## 2020-12-08 PROCEDURE — 3077F PR MOST RECENT SYSTOLIC BLOOD PRESSURE >= 140 MM HG: ICD-10-PCS | Mod: HCNC,CPTII,S$GLB, | Performed by: PODIATRIST

## 2020-12-08 PROCEDURE — 99999 PR PBB SHADOW E&M-EST. PATIENT-LVL III: ICD-10-PCS | Mod: PBBFAC,HCNC,, | Performed by: PODIATRIST

## 2020-12-08 NOTE — PROGRESS NOTES
PODIATRIC MEDICINE AND SURGERY     CHIEF COMPLAINT  Chief Complaint   Patient presents with    Routine Foot Care     PCP Dr. Bowen 11/05/20 3 mo RFC         HPI    SUBJECTIVE: Lizeth Henderson is a 80 y.o. female who  has a past medical history of Adrenal adenoma (2016), Anxiety, Arthritis, Benign hypertension, Colon cancer (age 61), Coronary artery disease, Depression, Full dentures, General anesthetics causing adverse effect in therapeutic use, Hyperlipidemia, Osteopenia, Shingles, and Wears glasses. Lizethpresents to clinic for high risk oot exam and care secondary to neuropathy.  Lizeth admits numbness, burning, and/or tingling sensations in their feet. Patient admits to painful toenails aggravated by increased weight bearing, shoe gear, and pressure. States pain is relieved with routine debridements. She complains about painful ingrown toenail LEFT hallux. Patient has no other pedal complaints at this time.    HgA1c:   Hemoglobin A1C   Date Value Ref Range Status   11/05/2020 5.4 4.0 - 5.6 % Final     Comment:     ADA Screening Guidelines:  5.7-6.4%  Consistent with prediabetes  >or=6.5%  Consistent with diabetes  High levels of fetal hemoglobin interfere with the HbA1C  assay. Heterozygous hemoglobin variants (HbS, HgC, etc)do  not significantly interfere with this assay.   However, presence of multiple variants may affect accuracy.     01/03/2019 5.6 4.0 - 5.6 % Final     Comment:     ADA Screening Guidelines:  5.7-6.4%  Consistent with prediabetes  >or=6.5%  Consistent with diabetes  High levels of fetal hemoglobin interfere with the HbA1C  assay. Heterozygous hemoglobin variants (HbS, HgC, etc)do  not significantly interfere with this assay.   However, presence of multiple variants may affect accuracy.     12/06/2017 5.0 4.0 - 5.6 % Final     Comment:     According to ADA guidelines, hemoglobin A1c <7.0% represents  optimal control in non-pregnant diabetic patients. Different  metrics may apply to  specific patient populations.   Standards of Medical Care in Diabetes-2016.  For the purpose of screening for the presence of diabetes:  <5.7%     Consistent with the absence of diabetes  5.7-6.4%  Consistent with increasing risk for diabetes   (prediabetes)  >or=6.5%  Consistent with diabetes  Currently, no consensus exists for use of hemoglobin A1c  for diagnosis of diabetes for children.  This Hemoglobin A1c assay has significant interference with fetal   hemoglobin   (HbF). The results are invalid for patients with abnormal amounts of   HbF,   including those with known Hereditary Persistence   of Fetal Hemoglobin. Heterozygous hemoglobin variants (HbAS, HbAC,   HbAD, HbAE, HbA2) do not significantly interfere with this assay;   however, presence of multiple variants in a sample may impact the %   interference.           PMH  Past Medical History:   Diagnosis Date    Adrenal adenoma 2016    Anxiety     Arthritis     Benign hypertension     Colon cancer age 61    colon    Coronary artery disease     Depression     Full dentures     General anesthetics causing adverse effect in therapeutic use     yells and talks when wakes up    Hyperlipidemia     Osteopenia     Shingles     Wears glasses      Patient Active Problem List   Diagnosis    Major depression, recurrent, chronic    Generalized anxiety disorder    Benign hypertension    Osteopenia    Mitral regurgitation    Eating disorder    Polyarthralgia    History of colon cancer    Arthritis of both knees    Hyperlipidemia, mixed    Adrenal nodule    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      buPROPion (WELLBUTRIN XL) 150 MG TB24 tablet Take 1 tablet (150 mg total) by mouth once daily. 90 tablet 0    busPIRone (BUSPAR) 10 MG tablet Take 1 tablet (10 mg total) by mouth 2 (two) times daily. 180 tablet 0    diclofenac sodium (VOLTAREN) 1 % Gel Apply 2 g topically 3 (three) times daily as needed. 2 Tube 6    escitalopram oxalate (LEXAPRO) 5 MG Tab Take 1 tablet (5 mg total) by mouth once daily. 30 tablet 2    hydroCHLOROthiazide (HYDRODIURIL) 12.5 MG Tab Take 1 tablet (12.5 mg total) by mouth once daily. 90 tablet 3    losartan (COZAAR) 100 MG tablet TAKE 1 TABLET(100 MG) BY MOUTH EVERY DAY 90 tablet 0    mirabegron (MYRBETRIQ) 50 mg Tb24 TAKE 1 TABLET(50 MG) BY MOUTH EVERY NIGHT 90 tablet 0    mirtazapine (REMERON) 15 MG tablet TAKE 1 TABLET(15 MG) BY MOUTH EVERY NIGHT AS NEEDED 90 tablet 0     No current facility-administered medications on file prior to visit.        PSH     Past Surgical History:   Procedure Laterality Date    APPENDECTOMY      BREAST BIOPSY Left     benign    BUNIONECTOMY Left     CATARACT EXTRACTION Bilateral     HEMICOLECTOMY  2002    HERNIA REPAIR      x5    left toe surgery      hammertoe    PATENT DUCTUS ARTERIOUS LIGATION  1948    SALPINGOOPHORECTOMY  2002    due to colon cancer    TONSILLECTOMY, ADENOIDECTOMY          ALL  Review of patient's allergies indicates:   Allergen Reactions    Lisinopril      cough       SOC     Social History     Tobacco Use    Smoking status: Never Smoker    Smokeless tobacco: Never Used   Substance Use Topics    Alcohol use: No     Frequency: Never    Drug use: No         Family HX    Family History   Problem Relation Age of Onset    Cancer Father         colon    Hypertension Father     Alzheimer's  "disease Mother     Depression Daughter     Kidney disease Daughter     Ulcerative colitis Daughter     Depression Daughter     Depression Daughter     Anxiety disorder Daughter         PTSD    Cancer Maternal Uncle         colon cancer    Cancer Cousin         colon cancer    Amblyopia Neg Hx     Blindness Neg Hx     Cataracts Neg Hx     Diabetes Neg Hx     Glaucoma Neg Hx     Macular degeneration Neg Hx     Retinal detachment Neg Hx     Strabismus Neg Hx     Stroke Neg Hx     Thyroid disease Neg Hx             REVIEW OF SYSTEMS  General: Denies any fever or chills  Chest: Denies shortness of breath, wheezing, coughing, or sputum production  Heart: Denies chest pain.  As noted above and per history of current illness above, otherwise negative in the remainder of the 14 systems.    PHYSICAL EXAM  Vitals:    12/08/20 1619   BP: (!) 174/91   Pulse: 85   Weight: 86.8 kg (191 lb 5.8 oz)   Height: 5' 8" (1.727 m)       GEN:  This patient is well-developed, well-nourished and appears stated age, well-oriented to person, place and time, and cooperative and pleasant on today's visit.      LOWER EXTREMITY    VASCULAR  DP pedal pulse 2/4 RIGHT, LEFT2/4   PT pedal pulse 2/4 RIGHT, LEFT2/4  Capillary refill time immediate to the toes.   Feet are warm to the touch. Skin temperature warm to warm from proximally to distally       DERMATOLOGIC  Skin moist with healthy texture and turgor.  Thickened, dystrophic, elongated, discolored toenails with subungal debris 1-5 b/l   There are no open ulcerations, lacerations, or fissures to bilateral foot and ankle regions. There are no signs of infection as there is no erythema, no proximal-extending lymphangiitis, no fluctuance, or crepitus noted on palpation to bilateral foot and ankle regions.   There is no interdigital maceration.   There are no hyperkeratotic lesions noted to feet.  Incurvated medial border LEFT hallux, no acute SOI     NEUROLOGIC  Protective sensation " intact at 6/10 sites upon examination with [place] 5.07 g monofilament.  Propioception intact at 1st MTPJ b/l.  Babinski reflex absent    ORTHOPEDIC/BIOMECHANICAL  No symptomatic structural abnormalities noted. Muscle strength is 5/5 for foot inverters, everters, plantarflexors, and dorsiflexors. Muscle tone is normal.  Inspection/palpation of bone, joints and muscles unremarkable. Semi rigid digital contractures noted 2-5 b/l.     ASSESSMENT  Onychocryptosis    Chemotherapy-induced peripheral neuropathy    Onychomycosis due to dermatophyte    Toe pain, bilateral        PLAN  -The patient was examined and evaulated  -Discuss presenting problems, etiology, pathologic processes and management options with patient today.     Utilizing sterile toenail clippers I aggressively trimmed the offending nail border approximately 3 mm from its edge and carried the nail plate incision down at an angle in order to wedge out the offending cryptotic portion of the nail plate. The offending border was then removed in toto. The remaining nail was grinded down with an electric  down to nail bed. Minimal blood was drawn. Applied betadine and covered with band-aid. Patient tolerated the procedure well and related significant relief.      -With patient's permission, the elongated onychomycotic toenails, as outlined in the physical examination, were sharply debrided/trimmed with a double action nail nipper to their soft tissue attachment. If indicated, the nails were then smoothed down in thickness with an ethan board to facilitate in further debridement removing all offending nail and subungual debris. Patient relates relief following the procedure.         Disclaimer: This note was partially prepared using a voice recognition system and is likely to have sound alike errors within the text.        Future Appointments   Date Time Provider Department Center   2/4/2021  7:20 [facility]    3/11/2021  3:15 PM Carlotta Xiao DPM [facility] [place]       Report Electronically Signed By:     Carlotta Xiao DPM   Podiatry  [facility]-   12/8/2020

## 2020-12-11 ENCOUNTER — PATIENT MESSAGE (OUTPATIENT)
Dept: OTHER | Facility: OTHER | Age: 80
End: 2020-12-11

## 2020-12-11 ENCOUNTER — PATIENT MESSAGE (OUTPATIENT)
Dept: PSYCHIATRY | Facility: CLINIC | Age: 80
End: 2020-12-11

## 2020-12-11 RX ORDER — ESCITALOPRAM OXALATE 5 MG/1
5 TABLET ORAL DAILY
Qty: 30 TABLET | Refills: 2 | Status: SHIPPED | OUTPATIENT
Start: 2020-12-11 | End: 2021-02-10 | Stop reason: SDUPTHER

## 2021-01-17 ENCOUNTER — IMMUNIZATION (OUTPATIENT)
Dept: INTERNAL MEDICINE | Facility: CLINIC | Age: 81
End: 2021-01-17
Payer: MEDICARE

## 2021-01-17 DIAGNOSIS — Z23 NEED FOR VACCINATION: Primary | ICD-10-CM

## 2021-01-17 PROCEDURE — 91300 COVID-19, MRNA, LNP-S, PF, 30 MCG/0.3 ML DOSE VACCINE: CPT | Mod: PBBFAC | Performed by: FAMILY MEDICINE

## 2021-01-21 ENCOUNTER — TELEPHONE (OUTPATIENT)
Dept: PSYCHIATRY | Facility: CLINIC | Age: 81
End: 2021-01-21

## 2021-01-30 DIAGNOSIS — M25.569 KNEE PAIN, UNSPECIFIED CHRONICITY, UNSPECIFIED LATERALITY: Primary | ICD-10-CM

## 2021-02-03 ENCOUNTER — OFFICE VISIT (OUTPATIENT)
Dept: ORTHOPEDICS | Facility: CLINIC | Age: 81
End: 2021-02-03
Payer: MEDICARE

## 2021-02-03 ENCOUNTER — HOSPITAL ENCOUNTER (OUTPATIENT)
Dept: RADIOLOGY | Facility: HOSPITAL | Age: 81
Discharge: HOME | End: 2021-02-03
Attending: ORTHOPAEDIC SURGERY
Payer: MEDICARE

## 2021-02-03 VITALS — RESPIRATION RATE: 16 BRPM | HEIGHT: 68 IN | BODY MASS INDEX: 28.95 KG/M2 | WEIGHT: 191 LBS

## 2021-02-03 DIAGNOSIS — M17.12 PRIMARY OSTEOARTHRITIS OF LEFT KNEE: Primary | ICD-10-CM

## 2021-02-03 DIAGNOSIS — M25.569 KNEE PAIN, UNSPECIFIED CHRONICITY, UNSPECIFIED LATERALITY: ICD-10-CM

## 2021-02-03 PROCEDURE — 1125F PR PAIN SEVERITY QUANTIFIED, PAIN PRESENT: ICD-10-PCS | Mod: S$GLB,,, | Performed by: ORTHOPAEDIC SURGERY

## 2021-02-03 PROCEDURE — 1159F MED LIST DOCD IN RCRD: CPT | Mod: S$GLB,,, | Performed by: ORTHOPAEDIC SURGERY

## 2021-02-03 PROCEDURE — 73564 X-RAY EXAM KNEE 4 OR MORE: CPT | Mod: 26,LT,, | Performed by: RADIOLOGY

## 2021-02-03 PROCEDURE — 73564 PR  X-RAY KNEE 4+ VIEW: ICD-10-PCS | Mod: 26,LT,, | Performed by: RADIOLOGY

## 2021-02-03 PROCEDURE — 99203 OFFICE O/P NEW LOW 30 MIN: CPT | Mod: S$GLB,,, | Performed by: ORTHOPAEDIC SURGERY

## 2021-02-03 PROCEDURE — 3288F PR FALLS RISK ASSESSMENT DOCUMENTED: ICD-10-PCS | Mod: CPTII,S$GLB,, | Performed by: ORTHOPAEDIC SURGERY

## 2021-02-03 PROCEDURE — 99999 PR PBB SHADOW E&M-EST. PATIENT-LVL III: CPT | Mod: PBBFAC,,, | Performed by: ORTHOPAEDIC SURGERY

## 2021-02-03 PROCEDURE — 1101F PR PT FALLS ASSESS DOC 0-1 FALLS W/OUT INJ PAST YR: ICD-10-PCS | Mod: CPTII,S$GLB,, | Performed by: ORTHOPAEDIC SURGERY

## 2021-02-03 PROCEDURE — 99203 PR OFFICE/OUTPT VISIT, NEW, LEVL III, 30-44 MIN: ICD-10-PCS | Mod: S$GLB,,, | Performed by: ORTHOPAEDIC SURGERY

## 2021-02-03 PROCEDURE — 73564 X-RAY EXAM KNEE 4 OR MORE: CPT | Mod: 26,RT,, | Performed by: RADIOLOGY

## 2021-02-03 PROCEDURE — 1159F PR MEDICATION LIST DOCUMENTED IN MEDICAL RECORD: ICD-10-PCS | Mod: S$GLB,,, | Performed by: ORTHOPAEDIC SURGERY

## 2021-02-03 PROCEDURE — 1125F AMNT PAIN NOTED PAIN PRSNT: CPT | Mod: S$GLB,,, | Performed by: ORTHOPAEDIC SURGERY

## 2021-02-03 PROCEDURE — 73564 X-RAY EXAM KNEE 4 OR MORE: CPT | Mod: TC,50,PO

## 2021-02-03 PROCEDURE — 99999 PR PBB SHADOW E&M-EST. PATIENT-LVL III: ICD-10-PCS | Mod: PBBFAC,,, | Performed by: ORTHOPAEDIC SURGERY

## 2021-02-03 PROCEDURE — 1101F PT FALLS ASSESS-DOCD LE1/YR: CPT | Mod: CPTII,S$GLB,, | Performed by: ORTHOPAEDIC SURGERY

## 2021-02-03 PROCEDURE — 3288F FALL RISK ASSESSMENT DOCD: CPT | Mod: CPTII,S$GLB,, | Performed by: ORTHOPAEDIC SURGERY

## 2021-02-07 ENCOUNTER — IMMUNIZATION (OUTPATIENT)
Dept: INTERNAL MEDICINE | Facility: CLINIC | Age: 81
End: 2021-02-07
Payer: MEDICARE

## 2021-02-07 DIAGNOSIS — Z23 NEED FOR VACCINATION: Primary | ICD-10-CM

## 2021-02-07 PROCEDURE — 91300 COVID-19, MRNA, LNP-S, PF, 30 MCG/0.3 ML DOSE VACCINE: CPT | Mod: PBBFAC | Performed by: FAMILY MEDICINE

## 2021-02-07 PROCEDURE — 0002A COVID-19, MRNA, LNP-S, PF, 30 MCG/0.3 ML DOSE VACCINE: CPT | Mod: PBBFAC | Performed by: FAMILY MEDICINE

## 2021-02-10 ENCOUNTER — OFFICE VISIT (OUTPATIENT)
Dept: PSYCHIATRY | Facility: CLINIC | Age: 81
End: 2021-02-10
Payer: MEDICARE

## 2021-02-10 DIAGNOSIS — F41.1 GAD (GENERALIZED ANXIETY DISORDER): ICD-10-CM

## 2021-02-10 DIAGNOSIS — F33.1 MDD (MAJOR DEPRESSIVE DISORDER), RECURRENT EPISODE, MODERATE: Primary | ICD-10-CM

## 2021-02-10 PROCEDURE — 99499 RISK ADDL DX/OHS AUDIT: ICD-10-PCS | Mod: 95,,, | Performed by: PSYCHIATRY & NEUROLOGY

## 2021-02-10 PROCEDURE — 1159F MED LIST DOCD IN RCRD: CPT | Mod: 95,,, | Performed by: PSYCHIATRY & NEUROLOGY

## 2021-02-10 PROCEDURE — 99499 UNLISTED E&M SERVICE: CPT | Mod: 95,,, | Performed by: PSYCHIATRY & NEUROLOGY

## 2021-02-10 PROCEDURE — 1159F PR MEDICATION LIST DOCUMENTED IN MEDICAL RECORD: ICD-10-PCS | Mod: 95,,, | Performed by: PSYCHIATRY & NEUROLOGY

## 2021-02-10 PROCEDURE — 99214 OFFICE O/P EST MOD 30 MIN: CPT | Mod: 95,,, | Performed by: PSYCHIATRY & NEUROLOGY

## 2021-02-10 PROCEDURE — 99214 PR OFFICE/OUTPT VISIT, EST, LEVL IV, 30-39 MIN: ICD-10-PCS | Mod: 95,,, | Performed by: PSYCHIATRY & NEUROLOGY

## 2021-02-10 RX ORDER — MIRTAZAPINE 15 MG/1
TABLET, FILM COATED ORAL
Qty: 135 TABLET | Refills: 0 | Status: SHIPPED | OUTPATIENT
Start: 2021-02-10 | End: 2021-05-13

## 2021-02-10 RX ORDER — BUSPIRONE HYDROCHLORIDE 10 MG/1
10 TABLET ORAL 2 TIMES DAILY
Qty: 180 TABLET | Refills: 0 | Status: SHIPPED | OUTPATIENT
Start: 2021-02-10 | End: 2021-05-13 | Stop reason: SDUPTHER

## 2021-02-10 RX ORDER — BUPROPION HYDROCHLORIDE 150 MG/1
150 TABLET ORAL DAILY
Qty: 90 TABLET | Refills: 0 | Status: SHIPPED | OUTPATIENT
Start: 2021-02-10 | End: 2021-05-13 | Stop reason: SDUPTHER

## 2021-02-10 RX ORDER — ESCITALOPRAM OXALATE 5 MG/1
5 TABLET ORAL DAILY
Qty: 90 TABLET | Refills: 0 | Status: SHIPPED | OUTPATIENT
Start: 2021-02-10 | End: 2021-05-13 | Stop reason: SDUPTHER

## 2021-02-12 ENCOUNTER — PES CALL (OUTPATIENT)
Dept: ADMINISTRATIVE | Facility: CLINIC | Age: 81
End: 2021-02-12

## 2021-02-22 ENCOUNTER — PES CALL (OUTPATIENT)
Dept: ADMINISTRATIVE | Facility: CLINIC | Age: 81
End: 2021-02-22

## 2021-02-23 ENCOUNTER — OFFICE VISIT (OUTPATIENT)
Dept: PODIATRY | Facility: CLINIC | Age: 81
End: 2021-02-23
Payer: MEDICARE

## 2021-02-23 VITALS
DIASTOLIC BLOOD PRESSURE: 94 MMHG | SYSTOLIC BLOOD PRESSURE: 181 MMHG | WEIGHT: 190.94 LBS | HEIGHT: 68 IN | BODY MASS INDEX: 28.94 KG/M2 | HEART RATE: 90 BPM

## 2021-02-23 DIAGNOSIS — M79.675 TOE PAIN, BILATERAL: ICD-10-CM

## 2021-02-23 DIAGNOSIS — T45.1X5A CHEMOTHERAPY-INDUCED PERIPHERAL NEUROPATHY: Primary | ICD-10-CM

## 2021-02-23 DIAGNOSIS — M79.674 TOE PAIN, BILATERAL: ICD-10-CM

## 2021-02-23 DIAGNOSIS — G62.0 CHEMOTHERAPY-INDUCED PERIPHERAL NEUROPATHY: Primary | ICD-10-CM

## 2021-02-23 DIAGNOSIS — B35.1 ONYCHOMYCOSIS DUE TO DERMATOPHYTE: ICD-10-CM

## 2021-02-23 PROCEDURE — 99213 PR OFFICE/OUTPT VISIT, EST, LEVL III, 20-29 MIN: ICD-10-PCS | Mod: 25,S$GLB,, | Performed by: PODIATRIST

## 2021-02-23 PROCEDURE — 3080F DIAST BP >= 90 MM HG: CPT | Mod: CPTII,S$GLB,, | Performed by: PODIATRIST

## 2021-02-23 PROCEDURE — 99499 RISK ADDL DX/OHS AUDIT: ICD-10-PCS | Mod: S$GLB,,, | Performed by: PODIATRIST

## 2021-02-23 PROCEDURE — 11721 DEBRIDE NAIL 6 OR MORE: CPT | Mod: 59,Q9,S$GLB, | Performed by: PODIATRIST

## 2021-02-23 PROCEDURE — 3077F PR MOST RECENT SYSTOLIC BLOOD PRESSURE >= 140 MM HG: ICD-10-PCS | Mod: CPTII,S$GLB,, | Performed by: PODIATRIST

## 2021-02-23 PROCEDURE — 11056 PR TRIM BENIGN HYPERKERATOTIC SKIN LESION,2-4: ICD-10-PCS | Mod: Q9,S$GLB,, | Performed by: PODIATRIST

## 2021-02-23 PROCEDURE — 3080F PR MOST RECENT DIASTOLIC BLOOD PRESSURE >= 90 MM HG: ICD-10-PCS | Mod: CPTII,S$GLB,, | Performed by: PODIATRIST

## 2021-02-23 PROCEDURE — 1101F PR PT FALLS ASSESS DOC 0-1 FALLS W/OUT INJ PAST YR: ICD-10-PCS | Mod: CPTII,S$GLB,, | Performed by: PODIATRIST

## 2021-02-23 PROCEDURE — 99499 UNLISTED E&M SERVICE: CPT | Mod: S$GLB,,, | Performed by: PODIATRIST

## 2021-02-23 PROCEDURE — 99999 PR PBB SHADOW E&M-EST. PATIENT-LVL III: CPT | Mod: PBBFAC,,, | Performed by: PODIATRIST

## 2021-02-23 PROCEDURE — 3077F SYST BP >= 140 MM HG: CPT | Mod: CPTII,S$GLB,, | Performed by: PODIATRIST

## 2021-02-23 PROCEDURE — 11056 PARNG/CUTG B9 HYPRKR LES 2-4: CPT | Mod: Q9,S$GLB,, | Performed by: PODIATRIST

## 2021-02-23 PROCEDURE — 99999 PR PBB SHADOW E&M-EST. PATIENT-LVL III: ICD-10-PCS | Mod: PBBFAC,,, | Performed by: PODIATRIST

## 2021-02-23 PROCEDURE — 3288F FALL RISK ASSESSMENT DOCD: CPT | Mod: CPTII,S$GLB,, | Performed by: PODIATRIST

## 2021-02-23 PROCEDURE — 1159F PR MEDICATION LIST DOCUMENTED IN MEDICAL RECORD: ICD-10-PCS | Mod: S$GLB,,, | Performed by: PODIATRIST

## 2021-02-23 PROCEDURE — 99213 OFFICE O/P EST LOW 20 MIN: CPT | Mod: 25,S$GLB,, | Performed by: PODIATRIST

## 2021-02-23 PROCEDURE — 1101F PT FALLS ASSESS-DOCD LE1/YR: CPT | Mod: CPTII,S$GLB,, | Performed by: PODIATRIST

## 2021-02-23 PROCEDURE — 1159F MED LIST DOCD IN RCRD: CPT | Mod: S$GLB,,, | Performed by: PODIATRIST

## 2021-02-23 PROCEDURE — 3288F PR FALLS RISK ASSESSMENT DOCUMENTED: ICD-10-PCS | Mod: CPTII,S$GLB,, | Performed by: PODIATRIST

## 2021-02-23 PROCEDURE — 11721 PR DEBRIDEMENT OF NAILS, 6 OR MORE: ICD-10-PCS | Mod: 59,Q9,S$GLB, | Performed by: PODIATRIST

## 2021-02-24 ENCOUNTER — OFFICE VISIT (OUTPATIENT)
Dept: ORTHOPEDICS | Facility: CLINIC | Age: 81
End: 2021-02-24
Payer: MEDICARE

## 2021-02-24 VITALS — BODY MASS INDEX: 28.79 KG/M2 | WEIGHT: 190 LBS | HEIGHT: 68 IN | RESPIRATION RATE: 16 BRPM

## 2021-02-24 DIAGNOSIS — M17.12 PRIMARY OSTEOARTHRITIS OF LEFT KNEE: Primary | ICD-10-CM

## 2021-02-24 PROCEDURE — 99499 NO LOS: ICD-10-PCS | Mod: S$GLB,,, | Performed by: ORTHOPAEDIC SURGERY

## 2021-02-24 PROCEDURE — 20610 DRAIN/INJ JOINT/BURSA W/O US: CPT | Mod: LT,S$GLB,, | Performed by: ORTHOPAEDIC SURGERY

## 2021-02-24 PROCEDURE — 1125F PR PAIN SEVERITY QUANTIFIED, PAIN PRESENT: ICD-10-PCS | Mod: S$GLB,,, | Performed by: ORTHOPAEDIC SURGERY

## 2021-02-24 PROCEDURE — 99999 PR PBB SHADOW E&M-EST. PATIENT-LVL III: CPT | Mod: PBBFAC,,, | Performed by: ORTHOPAEDIC SURGERY

## 2021-02-24 PROCEDURE — 99499 UNLISTED E&M SERVICE: CPT | Mod: S$GLB,,, | Performed by: ORTHOPAEDIC SURGERY

## 2021-02-24 PROCEDURE — 1101F PT FALLS ASSESS-DOCD LE1/YR: CPT | Mod: CPTII,S$GLB,, | Performed by: ORTHOPAEDIC SURGERY

## 2021-02-24 PROCEDURE — 99999 PR PBB SHADOW E&M-EST. PATIENT-LVL III: ICD-10-PCS | Mod: PBBFAC,,, | Performed by: ORTHOPAEDIC SURGERY

## 2021-02-24 PROCEDURE — 1101F PR PT FALLS ASSESS DOC 0-1 FALLS W/OUT INJ PAST YR: ICD-10-PCS | Mod: CPTII,S$GLB,, | Performed by: ORTHOPAEDIC SURGERY

## 2021-02-24 PROCEDURE — 3288F FALL RISK ASSESSMENT DOCD: CPT | Mod: CPTII,S$GLB,, | Performed by: ORTHOPAEDIC SURGERY

## 2021-02-24 PROCEDURE — 20610 LARGE JOINT ASPIRATION/INJECTION: L KNEE: ICD-10-PCS | Mod: LT,S$GLB,, | Performed by: ORTHOPAEDIC SURGERY

## 2021-02-24 PROCEDURE — 3288F PR FALLS RISK ASSESSMENT DOCUMENTED: ICD-10-PCS | Mod: CPTII,S$GLB,, | Performed by: ORTHOPAEDIC SURGERY

## 2021-02-24 PROCEDURE — 1125F AMNT PAIN NOTED PAIN PRSNT: CPT | Mod: S$GLB,,, | Performed by: ORTHOPAEDIC SURGERY

## 2021-03-01 ENCOUNTER — TELEPHONE (OUTPATIENT)
Dept: ADMINISTRATIVE | Facility: HOSPITAL | Age: 81
End: 2021-03-01

## 2021-04-14 ENCOUNTER — TELEPHONE (OUTPATIENT)
Dept: INTERNAL MEDICINE | Facility: CLINIC | Age: 81
End: 2021-04-14

## 2021-04-15 DIAGNOSIS — I10 ESSENTIAL HYPERTENSION: ICD-10-CM

## 2021-04-15 RX ORDER — LOSARTAN POTASSIUM 100 MG/1
100 TABLET ORAL DAILY
Qty: 90 TABLET | Refills: 0 | Status: SHIPPED | OUTPATIENT
Start: 2021-04-15 | End: 2021-07-19 | Stop reason: SDUPTHER

## 2021-04-22 ENCOUNTER — OFFICE VISIT (OUTPATIENT)
Dept: PODIATRY | Facility: CLINIC | Age: 81
End: 2021-04-22
Payer: MEDICARE

## 2021-04-22 DIAGNOSIS — M79.674 TOE PAIN, BILATERAL: ICD-10-CM

## 2021-04-22 DIAGNOSIS — L84 CORN OR CALLUS: ICD-10-CM

## 2021-04-22 DIAGNOSIS — B35.1 ONYCHOMYCOSIS DUE TO DERMATOPHYTE: ICD-10-CM

## 2021-04-22 DIAGNOSIS — G62.0 CHEMOTHERAPY-INDUCED PERIPHERAL NEUROPATHY: Primary | ICD-10-CM

## 2021-04-22 DIAGNOSIS — M79.675 TOE PAIN, BILATERAL: ICD-10-CM

## 2021-04-22 DIAGNOSIS — T45.1X5A CHEMOTHERAPY-INDUCED PERIPHERAL NEUROPATHY: Primary | ICD-10-CM

## 2021-04-22 PROCEDURE — 3288F PR FALLS RISK ASSESSMENT DOCUMENTED: ICD-10-PCS | Mod: CPTII,S$GLB,, | Performed by: PODIATRIST

## 2021-04-22 PROCEDURE — 11721 PR DEBRIDEMENT OF NAILS, 6 OR MORE: ICD-10-PCS | Mod: Q9,59,S$GLB, | Performed by: PODIATRIST

## 2021-04-22 PROCEDURE — 99499 UNLISTED E&M SERVICE: CPT | Mod: S$GLB,,, | Performed by: PODIATRIST

## 2021-04-22 PROCEDURE — 3288F FALL RISK ASSESSMENT DOCD: CPT | Mod: CPTII,S$GLB,, | Performed by: PODIATRIST

## 2021-04-22 PROCEDURE — 99999 PR PBB SHADOW E&M-EST. PATIENT-LVL II: ICD-10-PCS | Mod: PBBFAC,,, | Performed by: PODIATRIST

## 2021-04-22 PROCEDURE — 11721 DEBRIDE NAIL 6 OR MORE: CPT | Mod: Q9,59,S$GLB, | Performed by: PODIATRIST

## 2021-04-22 PROCEDURE — 11056 PARNG/CUTG B9 HYPRKR LES 2-4: CPT | Mod: Q9,S$GLB,, | Performed by: PODIATRIST

## 2021-04-22 PROCEDURE — 1101F PR PT FALLS ASSESS DOC 0-1 FALLS W/OUT INJ PAST YR: ICD-10-PCS | Mod: CPTII,S$GLB,, | Performed by: PODIATRIST

## 2021-04-22 PROCEDURE — 99999 PR PBB SHADOW E&M-EST. PATIENT-LVL II: CPT | Mod: PBBFAC,,, | Performed by: PODIATRIST

## 2021-04-22 PROCEDURE — 99499 RISK ADDL DX/OHS AUDIT: ICD-10-PCS | Mod: S$GLB,,, | Performed by: PODIATRIST

## 2021-04-22 PROCEDURE — 1101F PT FALLS ASSESS-DOCD LE1/YR: CPT | Mod: CPTII,S$GLB,, | Performed by: PODIATRIST

## 2021-04-22 PROCEDURE — 11056 PR TRIM BENIGN HYPERKERATOTIC SKIN LESION,2-4: ICD-10-PCS | Mod: Q9,S$GLB,, | Performed by: PODIATRIST

## 2021-05-04 ENCOUNTER — OFFICE VISIT (OUTPATIENT)
Dept: INTERNAL MEDICINE | Facility: CLINIC | Age: 81
End: 2021-05-04
Payer: MEDICARE

## 2021-05-04 ENCOUNTER — HOSPITAL ENCOUNTER (OUTPATIENT)
Dept: CARDIOLOGY | Facility: HOSPITAL | Age: 81
Discharge: HOME | End: 2021-05-04
Payer: MEDICARE

## 2021-05-04 ENCOUNTER — HOSPITAL ENCOUNTER (OUTPATIENT)
Dept: RADIOLOGY | Facility: HOSPITAL | Age: 81
Discharge: HOME | End: 2021-05-04
Attending: FAMILY MEDICINE
Payer: MEDICARE

## 2021-05-04 VITALS
WEIGHT: 222.25 LBS | TEMPERATURE: 99 F | HEIGHT: 68 IN | BODY MASS INDEX: 33.68 KG/M2 | OXYGEN SATURATION: 96 % | SYSTOLIC BLOOD PRESSURE: 126 MMHG | HEART RATE: 100 BPM | DIASTOLIC BLOOD PRESSURE: 80 MMHG

## 2021-05-04 DIAGNOSIS — N32.81 OVERACTIVE BLADDER: ICD-10-CM

## 2021-05-04 DIAGNOSIS — Z76.89 ENCOUNTER TO ESTABLISH CARE WITH NEW DOCTOR: Primary | ICD-10-CM

## 2021-05-04 DIAGNOSIS — Z28.39 IMMUNIZATION DEFICIENCY: ICD-10-CM

## 2021-05-04 DIAGNOSIS — D64.9 ANEMIA, UNSPECIFIED TYPE: ICD-10-CM

## 2021-05-04 DIAGNOSIS — I10 ESSENTIAL HYPERTENSION: ICD-10-CM

## 2021-05-04 DIAGNOSIS — M17.0 ARTHRITIS OF BOTH KNEES: ICD-10-CM

## 2021-05-04 PROCEDURE — 3288F PR FALLS RISK ASSESSMENT DOCUMENTED: ICD-10-PCS | Mod: CPTII,S$GLB,, | Performed by: FAMILY MEDICINE

## 2021-05-04 PROCEDURE — 99214 OFFICE O/P EST MOD 30 MIN: CPT | Mod: S$GLB,,, | Performed by: FAMILY MEDICINE

## 2021-05-04 PROCEDURE — 71046 X-RAY EXAM CHEST 2 VIEWS: CPT | Mod: 26,,, | Performed by: RADIOLOGY

## 2021-05-04 PROCEDURE — 99999 PR PBB SHADOW E&M-EST. PATIENT-LVL IV: ICD-10-PCS | Mod: PBBFAC,,, | Performed by: FAMILY MEDICINE

## 2021-05-04 PROCEDURE — 1101F PR PT FALLS ASSESS DOC 0-1 FALLS W/OUT INJ PAST YR: ICD-10-PCS | Mod: CPTII,S$GLB,, | Performed by: FAMILY MEDICINE

## 2021-05-04 PROCEDURE — 93010 ELECTROCARDIOGRAM REPORT: CPT | Mod: S$GLB,,, | Performed by: INTERNAL MEDICINE

## 2021-05-04 PROCEDURE — 1125F PR PAIN SEVERITY QUANTIFIED, PAIN PRESENT: ICD-10-PCS | Mod: S$GLB,,, | Performed by: FAMILY MEDICINE

## 2021-05-04 PROCEDURE — 1125F AMNT PAIN NOTED PAIN PRSNT: CPT | Mod: S$GLB,,, | Performed by: FAMILY MEDICINE

## 2021-05-04 PROCEDURE — 1101F PT FALLS ASSESS-DOCD LE1/YR: CPT | Mod: CPTII,S$GLB,, | Performed by: FAMILY MEDICINE

## 2021-05-04 PROCEDURE — 71046 X-RAY EXAM CHEST 2 VIEWS: CPT | Mod: TC

## 2021-05-04 PROCEDURE — 99214 PR OFFICE/OUTPT VISIT, EST, LEVL IV, 30-39 MIN: ICD-10-PCS | Mod: S$GLB,,, | Performed by: FAMILY MEDICINE

## 2021-05-04 PROCEDURE — 3288F FALL RISK ASSESSMENT DOCD: CPT | Mod: CPTII,S$GLB,, | Performed by: FAMILY MEDICINE

## 2021-05-04 PROCEDURE — 71046 XR CHEST PA AND LATERAL: ICD-10-PCS | Mod: 26,,, | Performed by: RADIOLOGY

## 2021-05-04 PROCEDURE — 99999 PR PBB SHADOW E&M-EST. PATIENT-LVL IV: CPT | Mod: PBBFAC,,, | Performed by: FAMILY MEDICINE

## 2021-05-04 PROCEDURE — 93010 EKG 12-LEAD: ICD-10-PCS | Mod: S$GLB,,, | Performed by: INTERNAL MEDICINE

## 2021-05-04 PROCEDURE — 93005 ELECTROCARDIOGRAM TRACING: CPT

## 2021-05-04 PROCEDURE — 1159F MED LIST DOCD IN RCRD: CPT | Mod: S$GLB,,, | Performed by: FAMILY MEDICINE

## 2021-05-04 PROCEDURE — 1159F PR MEDICATION LIST DOCUMENTED IN MEDICAL RECORD: ICD-10-PCS | Mod: S$GLB,,, | Performed by: FAMILY MEDICINE

## 2021-05-04 RX ORDER — OXYBUTYNIN CHLORIDE 5 MG/1
5 TABLET ORAL 2 TIMES DAILY
Qty: 60 TABLET | Refills: 2 | Status: SHIPPED | OUTPATIENT
Start: 2021-05-04 | End: 2021-08-07

## 2021-05-13 ENCOUNTER — OFFICE VISIT (OUTPATIENT)
Dept: PSYCHIATRY | Facility: CLINIC | Age: 81
End: 2021-05-13
Payer: MEDICARE

## 2021-05-13 DIAGNOSIS — F33.41 MDD (MAJOR DEPRESSIVE DISORDER), RECURRENT, IN PARTIAL REMISSION: Primary | ICD-10-CM

## 2021-05-13 DIAGNOSIS — G47.00 INSOMNIA, UNSPECIFIED TYPE: ICD-10-CM

## 2021-05-13 PROCEDURE — 1159F MED LIST DOCD IN RCRD: CPT | Mod: S$GLB,,, | Performed by: PSYCHIATRY & NEUROLOGY

## 2021-05-13 PROCEDURE — 99499 UNLISTED E&M SERVICE: CPT | Mod: S$GLB,,, | Performed by: PSYCHIATRY & NEUROLOGY

## 2021-05-13 PROCEDURE — 99214 PR OFFICE/OUTPT VISIT, EST, LEVL IV, 30-39 MIN: ICD-10-PCS | Mod: S$GLB,,, | Performed by: PSYCHIATRY & NEUROLOGY

## 2021-05-13 PROCEDURE — 1159F PR MEDICATION LIST DOCUMENTED IN MEDICAL RECORD: ICD-10-PCS | Mod: S$GLB,,, | Performed by: PSYCHIATRY & NEUROLOGY

## 2021-05-13 PROCEDURE — 99214 OFFICE O/P EST MOD 30 MIN: CPT | Mod: S$GLB,,, | Performed by: PSYCHIATRY & NEUROLOGY

## 2021-05-13 PROCEDURE — 99499 RISK ADDL DX/OHS AUDIT: ICD-10-PCS | Mod: S$GLB,,, | Performed by: PSYCHIATRY & NEUROLOGY

## 2021-05-13 RX ORDER — QUETIAPINE FUMARATE 50 MG/1
50 TABLET, FILM COATED ORAL NIGHTLY
Qty: 30 TABLET | Refills: 2 | Status: SHIPPED | OUTPATIENT
Start: 2021-05-13 | End: 2021-08-09

## 2021-05-13 RX ORDER — ESCITALOPRAM OXALATE 5 MG/1
5 TABLET ORAL DAILY
Qty: 90 TABLET | Refills: 0 | Status: SHIPPED | OUTPATIENT
Start: 2021-05-13 | End: 2021-08-10 | Stop reason: SDUPTHER

## 2021-05-13 RX ORDER — BUSPIRONE HYDROCHLORIDE 10 MG/1
10 TABLET ORAL 2 TIMES DAILY
Qty: 180 TABLET | Refills: 0 | Status: SHIPPED | OUTPATIENT
Start: 2021-05-13 | End: 2021-08-10 | Stop reason: SDUPTHER

## 2021-05-13 RX ORDER — BUPROPION HYDROCHLORIDE 150 MG/1
150 TABLET ORAL DAILY
Qty: 90 TABLET | Refills: 0 | Status: SHIPPED | OUTPATIENT
Start: 2021-05-13 | End: 2021-08-10 | Stop reason: SDUPTHER

## 2021-05-18 ENCOUNTER — LAB VISIT (OUTPATIENT)
Dept: LAB | Facility: HOSPITAL | Age: 81
End: 2021-05-18
Attending: FAMILY MEDICINE
Payer: MEDICARE

## 2021-05-18 DIAGNOSIS — D64.9 ANEMIA, UNSPECIFIED TYPE: ICD-10-CM

## 2021-05-18 PROCEDURE — 82274 ASSAY TEST FOR BLOOD FECAL: CPT | Performed by: FAMILY MEDICINE

## 2021-05-20 ENCOUNTER — PATIENT MESSAGE (OUTPATIENT)
Dept: INTERNAL MEDICINE | Facility: CLINIC | Age: 81
End: 2021-05-20

## 2021-05-27 LAB — HEMOCCULT STL QL IA: NEGATIVE

## 2021-06-07 ENCOUNTER — OFFICE VISIT (OUTPATIENT)
Dept: INTERNAL MEDICINE | Facility: CLINIC | Age: 81
End: 2021-06-07
Payer: MEDICARE

## 2021-06-07 DIAGNOSIS — N32.81 OVERACTIVE BLADDER: Primary | ICD-10-CM

## 2021-06-07 PROCEDURE — 1159F PR MEDICATION LIST DOCUMENTED IN MEDICAL RECORD: ICD-10-PCS | Mod: 95,,, | Performed by: FAMILY MEDICINE

## 2021-06-07 PROCEDURE — 1159F MED LIST DOCD IN RCRD: CPT | Mod: 95,,, | Performed by: FAMILY MEDICINE

## 2021-06-07 PROCEDURE — 99213 PR OFFICE/OUTPT VISIT, EST, LEVL III, 20-29 MIN: ICD-10-PCS | Mod: 95,,, | Performed by: FAMILY MEDICINE

## 2021-06-07 PROCEDURE — 99213 OFFICE O/P EST LOW 20 MIN: CPT | Mod: 95,,, | Performed by: FAMILY MEDICINE

## 2021-06-10 ENCOUNTER — TELEPHONE (OUTPATIENT)
Dept: INTERNAL MEDICINE | Facility: CLINIC | Age: 81
End: 2021-06-10

## 2021-06-14 ENCOUNTER — TELEPHONE (OUTPATIENT)
Dept: ADMINISTRATIVE | Facility: HOSPITAL | Age: 81
End: 2021-06-14

## 2021-06-24 DIAGNOSIS — N32.81 OVERACTIVE BLADDER: Chronic | ICD-10-CM

## 2021-06-24 RX ORDER — MIRABEGRON 50 MG/1
TABLET, FILM COATED, EXTENDED RELEASE ORAL
Qty: 90 TABLET | Refills: 0 | Status: SHIPPED | OUTPATIENT
Start: 2021-06-24 | End: 2022-06-10 | Stop reason: SDUPTHER

## 2021-07-15 DIAGNOSIS — I10 ESSENTIAL HYPERTENSION: ICD-10-CM

## 2021-07-19 DIAGNOSIS — I10 ESSENTIAL HYPERTENSION: ICD-10-CM

## 2021-07-19 RX ORDER — LOSARTAN POTASSIUM 100 MG/1
TABLET ORAL
Qty: 90 TABLET | Refills: 0 | OUTPATIENT
Start: 2021-07-19

## 2021-07-19 RX ORDER — LOSARTAN POTASSIUM 100 MG/1
100 TABLET ORAL DAILY
Qty: 90 TABLET | Refills: 0 | Status: SHIPPED | OUTPATIENT
Start: 2021-07-19 | End: 2021-10-25

## 2021-08-07 RX ORDER — OXYBUTYNIN CHLORIDE 5 MG/1
TABLET ORAL
Qty: 60 TABLET | Refills: 2 | Status: SHIPPED | OUTPATIENT
Start: 2021-08-07 | End: 2021-11-04

## 2021-08-10 ENCOUNTER — OFFICE VISIT (OUTPATIENT)
Dept: PSYCHIATRY | Facility: CLINIC | Age: 81
End: 2021-08-10
Payer: MEDICARE

## 2021-08-10 DIAGNOSIS — G47.00 INSOMNIA, UNSPECIFIED TYPE: ICD-10-CM

## 2021-08-10 DIAGNOSIS — F33.9 MAJOR DEPRESSION, RECURRENT, CHRONIC: ICD-10-CM

## 2021-08-10 DIAGNOSIS — F41.1 GAD (GENERALIZED ANXIETY DISORDER): Primary | ICD-10-CM

## 2021-08-10 PROCEDURE — 99499 RISK ADDL DX/OHS AUDIT: ICD-10-PCS | Mod: 95,,, | Performed by: PSYCHIATRY & NEUROLOGY

## 2021-08-10 PROCEDURE — 99213 PR OFFICE/OUTPT VISIT, EST, LEVL III, 20-29 MIN: ICD-10-PCS | Mod: 95,,, | Performed by: PSYCHIATRY & NEUROLOGY

## 2021-08-10 PROCEDURE — 99213 OFFICE O/P EST LOW 20 MIN: CPT | Mod: 95,,, | Performed by: PSYCHIATRY & NEUROLOGY

## 2021-08-10 PROCEDURE — 99499 UNLISTED E&M SERVICE: CPT | Mod: 95,,, | Performed by: PSYCHIATRY & NEUROLOGY

## 2021-08-10 RX ORDER — BUPROPION HYDROCHLORIDE 150 MG/1
150 TABLET ORAL DAILY
Qty: 90 TABLET | Refills: 1 | Status: SHIPPED | OUTPATIENT
Start: 2021-08-10 | End: 2022-01-31

## 2021-08-10 RX ORDER — ALPRAZOLAM 0.25 MG/1
TABLET ORAL
Qty: 20 TABLET | Refills: 3 | Status: SHIPPED | OUTPATIENT
Start: 2021-08-10 | End: 2022-03-01 | Stop reason: SDUPTHER

## 2021-08-10 RX ORDER — BUSPIRONE HYDROCHLORIDE 10 MG/1
10 TABLET ORAL 2 TIMES DAILY
Qty: 180 TABLET | Refills: 1 | Status: SHIPPED | OUTPATIENT
Start: 2021-08-10 | End: 2022-03-01 | Stop reason: SDUPTHER

## 2021-08-10 RX ORDER — ESCITALOPRAM OXALATE 5 MG/1
5 TABLET ORAL DAILY
Qty: 90 TABLET | Refills: 1 | Status: SHIPPED | OUTPATIENT
Start: 2021-08-10 | End: 2022-03-01

## 2021-08-10 RX ORDER — QUETIAPINE FUMARATE 50 MG/1
50 TABLET, FILM COATED ORAL NIGHTLY
Qty: 30 TABLET | Refills: 3 | Status: SHIPPED | OUTPATIENT
Start: 2021-08-10 | End: 2021-12-27

## 2021-08-18 ENCOUNTER — TELEPHONE (OUTPATIENT)
Dept: ADMINISTRATIVE | Facility: HOSPITAL | Age: 81
End: 2021-08-18

## 2021-09-04 ENCOUNTER — HOSPITAL ENCOUNTER (EMERGENCY)
Facility: HOSPITAL | Age: 81
Discharge: HOME | End: 2021-09-04
Attending: EMERGENCY MEDICINE
Payer: MEDICARE

## 2021-09-04 VITALS
RESPIRATION RATE: 20 BRPM | HEIGHT: 68 IN | HEART RATE: 95 BPM | WEIGHT: 217.38 LBS | OXYGEN SATURATION: 97 % | DIASTOLIC BLOOD PRESSURE: 77 MMHG | SYSTOLIC BLOOD PRESSURE: 161 MMHG | BODY MASS INDEX: 32.94 KG/M2 | TEMPERATURE: 98 F

## 2021-09-04 DIAGNOSIS — R11.0 NAUSEA: ICD-10-CM

## 2021-09-04 DIAGNOSIS — T67.5XXA HEAT EXHAUSTION, INITIAL ENCOUNTER: Primary | ICD-10-CM

## 2021-09-04 LAB
ALBUMIN SERPL BCP-MCNC: 3.9 G/DL (ref 3.5–5.2)
ALP SERPL-CCNC: 76 U/L (ref 55–135)
ALT SERPL W/O P-5'-P-CCNC: 10 U/L (ref 10–44)
ANION GAP SERPL CALC-SCNC: 15 MMOL/L (ref 8–16)
AST SERPL-CCNC: 21 U/L (ref 10–40)
BASOPHILS # BLD AUTO: 0.03 K/UL (ref 0–0.2)
BASOPHILS NFR BLD: 0.4 % (ref 0–1.9)
BILIRUB SERPL-MCNC: 0.6 MG/DL (ref 0.1–1)
BUN SERPL-MCNC: 25 MG/DL (ref 8–23)
CALCIUM SERPL-MCNC: 10.4 MG/DL (ref 8.7–10.5)
CHLORIDE SERPL-SCNC: 101 MMOL/L (ref 95–110)
CK SERPL-CCNC: 197 U/L (ref 20–180)
CO2 SERPL-SCNC: 23 MMOL/L (ref 23–29)
CREAT SERPL-MCNC: 1.2 MG/DL (ref 0.5–1.4)
DIFFERENTIAL METHOD: ABNORMAL
EOSINOPHIL # BLD AUTO: 0 K/UL (ref 0–0.5)
EOSINOPHIL NFR BLD: 0.4 % (ref 0–8)
ERYTHROCYTE [DISTWIDTH] IN BLOOD BY AUTOMATED COUNT: 13.3 % (ref 11.5–14.5)
EST. GFR  (AFRICAN AMERICAN): 49 ML/MIN/1.73 M^2
EST. GFR  (NON AFRICAN AMERICAN): 42 ML/MIN/1.73 M^2
GLUCOSE SERPL-MCNC: 129 MG/DL (ref 70–110)
HCT VFR BLD AUTO: 35.3 % (ref 37–48.5)
HGB BLD-MCNC: 11.9 G/DL (ref 12–16)
IMM GRANULOCYTES # BLD AUTO: 0.04 K/UL (ref 0–0.04)
IMM GRANULOCYTES NFR BLD AUTO: 0.5 % (ref 0–0.5)
LIPASE SERPL-CCNC: 12 U/L (ref 4–60)
LYMPHOCYTES # BLD AUTO: 1.2 K/UL (ref 1–4.8)
LYMPHOCYTES NFR BLD: 13.6 % (ref 18–48)
MCH RBC QN AUTO: 27.7 PG (ref 27–31)
MCHC RBC AUTO-ENTMCNC: 33.7 G/DL (ref 32–36)
MCV RBC AUTO: 82 FL (ref 82–98)
MONOCYTES # BLD AUTO: 0.7 K/UL (ref 0.3–1)
MONOCYTES NFR BLD: 7.9 % (ref 4–15)
NEUTROPHILS # BLD AUTO: 6.5 K/UL (ref 1.8–7.7)
NEUTROPHILS NFR BLD: 77.2 % (ref 38–73)
NRBC BLD-RTO: 0 /100 WBC
PLATELET # BLD AUTO: 281 K/UL (ref 150–450)
PMV BLD AUTO: 10.3 FL (ref 9.2–12.9)
POTASSIUM SERPL-SCNC: 3.4 MMOL/L (ref 3.5–5.1)
PROT SERPL-MCNC: 7.3 G/DL (ref 6–8.4)
RBC # BLD AUTO: 4.3 M/UL (ref 4–5.4)
SODIUM SERPL-SCNC: 139 MMOL/L (ref 136–145)
TROPONIN I SERPL DL<=0.01 NG/ML-MCNC: 0.01 NG/ML (ref 0–0.03)
WBC # BLD AUTO: 8.45 K/UL (ref 3.9–12.7)

## 2021-09-04 PROCEDURE — 84484 ASSAY OF TROPONIN QUANT: CPT | Performed by: EMERGENCY MEDICINE

## 2021-09-04 PROCEDURE — 96375 TX/PRO/DX INJ NEW DRUG ADDON: CPT

## 2021-09-04 PROCEDURE — 25000003 PHARM REV CODE 250: Performed by: EMERGENCY MEDICINE

## 2021-09-04 PROCEDURE — 83690 ASSAY OF LIPASE: CPT | Performed by: EMERGENCY MEDICINE

## 2021-09-04 PROCEDURE — 82550 ASSAY OF CK (CPK): CPT | Performed by: EMERGENCY MEDICINE

## 2021-09-04 PROCEDURE — 96361 HYDRATE IV INFUSION ADD-ON: CPT

## 2021-09-04 PROCEDURE — 93010 ELECTROCARDIOGRAM REPORT: CPT | Mod: ,,, | Performed by: INTERNAL MEDICINE

## 2021-09-04 PROCEDURE — 93005 ELECTROCARDIOGRAM TRACING: CPT

## 2021-09-04 PROCEDURE — 99284 EMERGENCY DEPT VISIT MOD MDM: CPT | Mod: 25

## 2021-09-04 PROCEDURE — 93010 EKG 12-LEAD: ICD-10-PCS | Mod: ,,, | Performed by: INTERNAL MEDICINE

## 2021-09-04 PROCEDURE — 85025 COMPLETE CBC W/AUTO DIFF WBC: CPT | Performed by: EMERGENCY MEDICINE

## 2021-09-04 PROCEDURE — 96365 THER/PROPH/DIAG IV INF INIT: CPT

## 2021-09-04 PROCEDURE — 63600175 PHARM REV CODE 636 W HCPCS: Performed by: EMERGENCY MEDICINE

## 2021-09-04 PROCEDURE — 80053 COMPREHEN METABOLIC PANEL: CPT | Performed by: EMERGENCY MEDICINE

## 2021-09-04 RX ORDER — PROMETHAZINE HYDROCHLORIDE 25 MG/1
25 TABLET ORAL EVERY 6 HOURS PRN
Qty: 18 TABLET | Refills: 0 | Status: SHIPPED | OUTPATIENT
Start: 2021-09-04 | End: 2021-09-11

## 2021-09-04 RX ORDER — POTASSIUM CHLORIDE 20 MEQ/1
40 TABLET, EXTENDED RELEASE ORAL
Status: COMPLETED | OUTPATIENT
Start: 2021-09-04 | End: 2021-09-04

## 2021-09-04 RX ORDER — ONDANSETRON 2 MG/ML
8 INJECTION INTRAMUSCULAR; INTRAVENOUS
Status: COMPLETED | OUTPATIENT
Start: 2021-09-04 | End: 2021-09-04

## 2021-09-04 RX ADMIN — SODIUM CHLORIDE, SODIUM LACTATE, POTASSIUM CHLORIDE, AND CALCIUM CHLORIDE 1000 ML: .6; .31; .03; .02 INJECTION, SOLUTION INTRAVENOUS at 11:09

## 2021-09-04 RX ADMIN — ONDANSETRON 8 MG: 2 INJECTION INTRAMUSCULAR; INTRAVENOUS at 07:09

## 2021-09-04 RX ADMIN — PROMETHAZINE HYDROCHLORIDE 12.5 MG: 25 INJECTION INTRAMUSCULAR; INTRAVENOUS at 11:09

## 2021-09-04 RX ADMIN — POTASSIUM CHLORIDE 40 MEQ: 1500 TABLET, EXTENDED RELEASE ORAL at 11:09

## 2021-09-04 RX ADMIN — SODIUM CHLORIDE, SODIUM LACTATE, POTASSIUM CHLORIDE, AND CALCIUM CHLORIDE 1000 ML: .6; .31; .03; .02 INJECTION, SOLUTION INTRAVENOUS at 07:09

## 2021-09-23 ENCOUNTER — PATIENT OUTREACH (OUTPATIENT)
Dept: ADMINISTRATIVE | Facility: HOSPITAL | Age: 81
End: 2021-09-23

## 2021-09-26 ENCOUNTER — IMMUNIZATION (OUTPATIENT)
Dept: PRIMARY CARE CLINIC | Facility: CLINIC | Age: 81
End: 2021-09-26
Payer: MEDICARE

## 2021-09-26 DIAGNOSIS — Z23 NEED FOR VACCINATION: Primary | ICD-10-CM

## 2021-09-26 PROCEDURE — 91300 COVID-19, MRNA, LNP-S, PF, 30 MCG/0.3 ML DOSE VACCINE: CPT | Mod: PBBFAC | Performed by: FAMILY MEDICINE

## 2021-09-26 PROCEDURE — 0003A COVID-19, MRNA, LNP-S, PF, 30 MCG/0.3 ML DOSE VACCINE: CPT | Mod: CV19,PBBFAC | Performed by: FAMILY MEDICINE

## 2021-10-24 DIAGNOSIS — I10 ESSENTIAL HYPERTENSION: ICD-10-CM

## 2021-10-26 RX ORDER — LOSARTAN POTASSIUM 100 MG/1
TABLET ORAL
Qty: 90 TABLET | Refills: 2 | Status: SHIPPED | OUTPATIENT
Start: 2021-10-26 | End: 2022-07-19

## 2021-11-16 ENCOUNTER — TELEPHONE (OUTPATIENT)
Dept: INTERNAL MEDICINE | Facility: CLINIC | Age: 81
End: 2021-11-16
Payer: MEDICARE

## 2021-11-18 ENCOUNTER — OFFICE VISIT (OUTPATIENT)
Dept: INTERNAL MEDICINE | Facility: CLINIC | Age: 81
End: 2021-11-18
Payer: MEDICARE

## 2021-11-18 VITALS
DIASTOLIC BLOOD PRESSURE: 68 MMHG | RESPIRATION RATE: 16 BRPM | WEIGHT: 200.81 LBS | BODY MASS INDEX: 30.44 KG/M2 | HEART RATE: 106 BPM | OXYGEN SATURATION: 96 % | SYSTOLIC BLOOD PRESSURE: 122 MMHG | HEIGHT: 68 IN | TEMPERATURE: 99 F

## 2021-11-18 DIAGNOSIS — L29.9 PRURITIC CONDITION: ICD-10-CM

## 2021-11-18 DIAGNOSIS — M25.472 EDEMA OF LEFT ANKLE: Primary | ICD-10-CM

## 2021-11-18 PROCEDURE — 99214 OFFICE O/P EST MOD 30 MIN: CPT | Mod: PBBFAC | Performed by: PHYSICIAN ASSISTANT

## 2021-11-18 PROCEDURE — 99999 PR PBB SHADOW E&M-EST. PATIENT-LVL IV: CPT | Mod: PBBFAC,HCNC,, | Performed by: PHYSICIAN ASSISTANT

## 2021-11-18 PROCEDURE — 99999 PR PBB SHADOW E&M-EST. PATIENT-LVL IV: ICD-10-PCS | Mod: PBBFAC,HCNC,, | Performed by: PHYSICIAN ASSISTANT

## 2021-11-18 PROCEDURE — 99213 PR OFFICE/OUTPT VISIT, EST, LEVL III, 20-29 MIN: ICD-10-PCS | Mod: HCNC,S$GLB,, | Performed by: PHYSICIAN ASSISTANT

## 2021-11-18 PROCEDURE — 99213 OFFICE O/P EST LOW 20 MIN: CPT | Mod: HCNC,S$GLB,, | Performed by: PHYSICIAN ASSISTANT

## 2021-11-18 RX ORDER — AMMONIUM LACTATE 12 G/100G
LOTION TOPICAL 2 TIMES DAILY PRN
Qty: 396 G | Refills: 1 | Status: SHIPPED | OUTPATIENT
Start: 2021-11-18 | End: 2022-06-13

## 2021-12-15 ENCOUNTER — PATIENT OUTREACH (OUTPATIENT)
Dept: ADMINISTRATIVE | Facility: HOSPITAL | Age: 81
End: 2021-12-15
Payer: MEDICARE

## 2021-12-20 ENCOUNTER — TELEPHONE (OUTPATIENT)
Dept: ADMINISTRATIVE | Facility: HOSPITAL | Age: 81
End: 2021-12-20
Payer: MEDICARE

## 2022-01-27 ENCOUNTER — TELEPHONE (OUTPATIENT)
Dept: PSYCHIATRY | Facility: CLINIC | Age: 82
End: 2022-01-27
Payer: MEDICARE

## 2022-02-01 RX ORDER — OXYBUTYNIN CHLORIDE 5 MG/1
TABLET ORAL
Qty: 60 TABLET | Refills: 2 | Status: SHIPPED | OUTPATIENT
Start: 2022-02-01 | End: 2022-04-20

## 2022-02-11 ENCOUNTER — OFFICE VISIT (OUTPATIENT)
Dept: PODIATRY | Facility: CLINIC | Age: 82
End: 2022-02-11
Payer: MEDICARE

## 2022-02-11 VITALS — WEIGHT: 200 LBS | HEIGHT: 68 IN | BODY MASS INDEX: 30.31 KG/M2

## 2022-02-11 DIAGNOSIS — T45.1X5A CHEMOTHERAPY-INDUCED PERIPHERAL NEUROPATHY: Primary | ICD-10-CM

## 2022-02-11 DIAGNOSIS — L60.3 ONYCHODYSTROPHY: ICD-10-CM

## 2022-02-11 DIAGNOSIS — G62.0 CHEMOTHERAPY-INDUCED PERIPHERAL NEUROPATHY: Primary | ICD-10-CM

## 2022-02-11 PROCEDURE — 99499 UNLISTED E&M SERVICE: CPT | Mod: HCNC,S$GLB,, | Performed by: PODIATRIST

## 2022-02-11 PROCEDURE — 99999 PR PBB SHADOW E&M-EST. PATIENT-LVL III: CPT | Mod: PBBFAC,HCNC,, | Performed by: PODIATRIST

## 2022-02-11 PROCEDURE — 11721 DEBRIDE NAIL 6 OR MORE: CPT | Mod: Q9,HCNC,S$GLB, | Performed by: PODIATRIST

## 2022-02-11 PROCEDURE — 1125F PR PAIN SEVERITY QUANTIFIED, PAIN PRESENT: ICD-10-PCS | Mod: HCNC,CPTII,S$GLB, | Performed by: PODIATRIST

## 2022-02-11 PROCEDURE — 1160F RVW MEDS BY RX/DR IN RCRD: CPT | Mod: HCNC,CPTII,S$GLB, | Performed by: PODIATRIST

## 2022-02-11 PROCEDURE — 3288F FALL RISK ASSESSMENT DOCD: CPT | Mod: HCNC,CPTII,S$GLB, | Performed by: PODIATRIST

## 2022-02-11 PROCEDURE — 99999 PR PBB SHADOW E&M-EST. PATIENT-LVL III: ICD-10-PCS | Mod: PBBFAC,HCNC,, | Performed by: PODIATRIST

## 2022-02-11 PROCEDURE — 1160F PR REVIEW ALL MEDS BY PRESCRIBER/CLIN PHARMACIST DOCUMENTED: ICD-10-PCS | Mod: HCNC,CPTII,S$GLB, | Performed by: PODIATRIST

## 2022-02-11 PROCEDURE — 1159F PR MEDICATION LIST DOCUMENTED IN MEDICAL RECORD: ICD-10-PCS | Mod: HCNC,CPTII,S$GLB, | Performed by: PODIATRIST

## 2022-02-11 PROCEDURE — 3288F PR FALLS RISK ASSESSMENT DOCUMENTED: ICD-10-PCS | Mod: HCNC,CPTII,S$GLB, | Performed by: PODIATRIST

## 2022-02-11 PROCEDURE — 1125F AMNT PAIN NOTED PAIN PRSNT: CPT | Mod: HCNC,CPTII,S$GLB, | Performed by: PODIATRIST

## 2022-02-11 PROCEDURE — 99499 NO LOS: ICD-10-PCS | Mod: HCNC,S$GLB,, | Performed by: PODIATRIST

## 2022-02-11 PROCEDURE — 1159F MED LIST DOCD IN RCRD: CPT | Mod: HCNC,CPTII,S$GLB, | Performed by: PODIATRIST

## 2022-02-11 PROCEDURE — 1101F PR PT FALLS ASSESS DOC 0-1 FALLS W/OUT INJ PAST YR: ICD-10-PCS | Mod: HCNC,CPTII,S$GLB, | Performed by: PODIATRIST

## 2022-02-11 PROCEDURE — 11721 PR DEBRIDEMENT OF NAILS, 6 OR MORE: ICD-10-PCS | Mod: Q9,HCNC,S$GLB, | Performed by: PODIATRIST

## 2022-02-11 PROCEDURE — 1101F PT FALLS ASSESS-DOCD LE1/YR: CPT | Mod: HCNC,CPTII,S$GLB, | Performed by: PODIATRIST

## 2022-02-11 NOTE — PROGRESS NOTES
Subjective:       Patient ID: Lizeth Henderson is a 81 y.o. female.    Chief Complaint: Routine Foot Care (F/u for nail care, soreness on toenails, rates pain 4/10, x years, non-diabetic, wears casual shoes, last seen PCP Dr. Euceda 06/07/21)      HPI: Patient presents to the office today with the chief complaint of elongated, thickened and dystrophic nail plates to the B/L foot.  This patient does have a PMHx. of  Peripheral Neuropathy secondary to chemotherapy. Patient does follow with Primary Care for management of comorbid states. This patient's PMD is Lazarus Euceda MD. This patient last saw his/her primary care provider on 11/18/2021    Review of patient's allergies indicates:   Allergen Reactions    Lisinopril      cough       Past Medical History:   Diagnosis Date    Adrenal adenoma 2016    Anxiety     Arthritis     Benign hypertension     Colon cancer age 61    colon    Coronary artery disease     Depression     Full dentures     General anesthetics causing adverse effect in therapeutic use     yells and talks when wakes up    Hyperlipidemia     Osteopenia     Shingles     Wears glasses        Family History   Problem Relation Age of Onset    Cancer Father         colon    Hypertension Father     Alzheimer's disease Mother     Depression Daughter     Kidney disease Daughter     Ulcerative colitis Daughter     Depression Daughter     Depression Daughter     Anxiety disorder Daughter         PTSD    Cancer Maternal Uncle         colon cancer    Cancer Cousin         colon cancer    Amblyopia Neg Hx     Blindness Neg Hx     Cataracts Neg Hx     Diabetes Neg Hx     Glaucoma Neg Hx     Macular degeneration Neg Hx     Retinal detachment Neg Hx     Strabismus Neg Hx     Stroke Neg Hx     Thyroid disease Neg Hx        Social History     Socioeconomic History    Marital status:     Number of children: 3   Tobacco Use    Smoking status: Never Smoker    Smokeless  "tobacco: Never Used   Substance and Sexual Activity    Alcohol use: No    Drug use: No    Sexual activity: Never   Social History Narrative    The patient does not exercise regularly ().    Rates diet as poor.    She is not satisfied with weight.             Social Determinants of Health     Financial Resource Strain: Low Risk     Difficulty of Paying Living Expenses: Not very hard   Food Insecurity: Food Insecurity Present    Worried About Running Out of Food in the Last Year: Sometimes true    Ran Out of Food in the Last Year: Never true   Transportation Needs: No Transportation Needs    Lack of Transportation (Medical): No    Lack of Transportation (Non-Medical): No   Physical Activity: Unknown    Days of Exercise per Week: 0 days   Stress: No Stress Concern Present    Feeling of Stress : Only a little   Social Connections: Unknown    Frequency of Communication with Friends and Family: Three times a week    Frequency of Social Gatherings with Friends and Family: Never    Active Member of Clubs or Organizations: No    Marital Status:    Housing Stability: Low Risk     Unable to Pay for Housing in the Last Year: No    Number of Places Lived in the Last Year: 1    Unstable Housing in the Last Year: No       Past Surgical History:   Procedure Laterality Date    APPENDECTOMY      BREAST BIOPSY Left     benign    BUNIONECTOMY Left     CATARACT EXTRACTION Bilateral     HEMICOLECTOMY  2002    HERNIA REPAIR      x5    left toe surgery      joelle    PATENT DUCTUS ARTERIOUS LIGATION  1948    SALPINGOOPHORECTOMY  2002    due to colon cancer    TONSILLECTOMY, ADENOIDECTOMY         Review of Systems       Objective:   Ht 5' 8" (1.727 m)   Wt 90.7 kg (200 lb)   BMI 30.41 kg/m²     Physical Exam  LOWER EXTREMITY PHYSICAL EXAMINATION    VASCULAR:  The right dorsalis pedis pulse 2/4 and the right posterior tibial pulse 2/4.  The left dorsalis pedis pulse 2/4 and posterior tibial pulse on the " left is 2/4.  Capillary refill is intact.  Pedal hair growth intact  NEUROLOGY: Protective sensation is not intact to the left and right plantar surfaces of the foot and digits, as the patient has no sensation/detection at greater than 4 distinct points of contact with 5.07 [place] A[place]a monofilament. Sensation to light touch is intact on the left and right foot. Proprioception is intact, bilateral. Sensation to pin prick is reduced to absent. Vibratory sensation is diminished.    DERMATOLOGY:  Skin is supple, moist, intact.  There is no signs of callusing, ulcerations, other lesions identified to the dorsal or plantar aspect of the right or left foot.  The R1, 2, 5 and left L1,2, 5 are thickened, discolored dystrophic.  There is subungual debris.  Nail plates have area of dark discoloration.  The remaining nails 3-4 on the right foot and the left foot are elongated but of normal color, thickness, and texture.   There is no signs of ingrowing into the medial or lateral borders.  There is no evidence of wounds or skin breakdown.  No edema or erythema.  No obvious lacerations or fissuring.  Interdigital spaces are clean, dry, intact.  No rashes or scars appreciated.    ORTHOPEDIC: Manual Muscle Testing is 5/5 in all planes on the left and right, without pains, with and without resistance. Gait pattern is non-antalgic.    Assessment:     1. Chemotherapy-induced peripheral neuropathy    2. Onychodystrophy        Plan:     Chemotherapy-induced peripheral neuropathy    Onychodystrophy      Foot counseling and education is provided at this visit. Patient is advised to wear socks and shoes at all times.  Do not walk barefoot, or with just socks, even when indoors.  Be sure to check and inspect the inside of the shoe before putting them on her feet.  Protect your feet at all times.  Walking shoes and/or athletic shoes are the best types of shoe gear. Do not wear vinyl or plastic type shoe gear, as they do not stretch  and/or breathe.  Protect your feet from hot and/or cold. Elevate the extremities when sitting.  Do not wear excessively tight socks and/or shoe gear. Wiggle your toes for a few minutes throughout the day. Move your ankles up and down, in and out, to help blood flow in your lower extremity.    Dystrophic nail plates, as outlined above (R#1,2,5  ; L#1,2,5 ), are sharply debrided with double action nail nipper, and/or with the assistance of a mechanical rotary nehemias, with removal of all offending nail and nail border(s), for reduction of pains. Nails are reduced in terms of length, width and girth with removal of subungual debris to facilitate pain free weight bearing and ambulation. The elongated nails as outlined in the objective portion of this note, were trimmed to appropriate length, with a double action nail nipper, for alleviation/reduction of pains as well. Follow up in approx. 3-4 months.          Future Appointments   Date Time Provider Department [place]   3/1/2022  4:30 PM Preston Frye MD [facility]   6/17/2022 11:15 AM Danyell Nails DPM [facility] [place]

## 2022-02-28 ENCOUNTER — TELEPHONE (OUTPATIENT)
Dept: PSYCHIATRY | Facility: CLINIC | Age: 82
End: 2022-02-28
Payer: MEDICARE

## 2022-03-01 ENCOUNTER — OFFICE VISIT (OUTPATIENT)
Dept: PSYCHIATRY | Facility: CLINIC | Age: 82
End: 2022-03-01
Payer: MEDICARE

## 2022-03-01 DIAGNOSIS — F33.9 MAJOR DEPRESSION, RECURRENT, CHRONIC: ICD-10-CM

## 2022-03-01 DIAGNOSIS — G47.00 INSOMNIA, UNSPECIFIED TYPE: ICD-10-CM

## 2022-03-01 DIAGNOSIS — F41.1 GAD (GENERALIZED ANXIETY DISORDER): Primary | ICD-10-CM

## 2022-03-01 PROCEDURE — 99213 OFFICE O/P EST LOW 20 MIN: CPT | Mod: 95,,, | Performed by: PSYCHIATRY & NEUROLOGY

## 2022-03-01 PROCEDURE — 99213 PR OFFICE/OUTPT VISIT, EST, LEVL III, 20-29 MIN: ICD-10-PCS | Mod: 95,,, | Performed by: PSYCHIATRY & NEUROLOGY

## 2022-03-01 RX ORDER — ESCITALOPRAM OXALATE 10 MG/1
10 TABLET ORAL DAILY
Qty: 30 TABLET | Refills: 2 | Status: SHIPPED | OUTPATIENT
Start: 2022-03-01 | End: 2022-04-21

## 2022-03-01 RX ORDER — BUSPIRONE HYDROCHLORIDE 10 MG/1
10 TABLET ORAL 2 TIMES DAILY
Qty: 180 TABLET | Refills: 1 | Status: SHIPPED | OUTPATIENT
Start: 2022-03-01 | End: 2022-07-12 | Stop reason: SDUPTHER

## 2022-03-01 RX ORDER — QUETIAPINE FUMARATE 50 MG/1
50 TABLET, FILM COATED ORAL NIGHTLY
Qty: 30 TABLET | Refills: 2 | Status: SHIPPED | OUTPATIENT
Start: 2022-03-01 | End: 2022-05-28

## 2022-03-01 RX ORDER — ALPRAZOLAM 0.25 MG/1
TABLET ORAL
Qty: 20 TABLET | Refills: 3 | Status: SHIPPED | OUTPATIENT
Start: 2022-03-01 | End: 2022-07-12 | Stop reason: SDUPTHER

## 2022-03-01 RX ORDER — BUPROPION HYDROCHLORIDE 150 MG/1
150 TABLET ORAL DAILY
Qty: 90 TABLET | Refills: 0 | Status: SHIPPED | OUTPATIENT
Start: 2022-03-01 | End: 2022-07-12 | Stop reason: SDUPTHER
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
## 2022-03-11 ENCOUNTER — OFFICE VISIT (OUTPATIENT)
Dept: INTERNAL MEDICINE | Facility: CLINIC | Age: 82
End: 2022-03-11
Payer: MEDICARE

## 2022-03-11 ENCOUNTER — HOSPITAL ENCOUNTER (OUTPATIENT)
Dept: RADIOLOGY | Facility: HOSPITAL | Age: 82
Discharge: HOME | End: 2022-03-11
Attending: PHYSICIAN ASSISTANT
Payer: MEDICARE

## 2022-03-11 ENCOUNTER — PATIENT MESSAGE (OUTPATIENT)
Dept: INTERNAL MEDICINE | Facility: CLINIC | Age: 82
End: 2022-03-11

## 2022-03-11 VITALS
TEMPERATURE: 97 F | HEART RATE: 107 BPM | OXYGEN SATURATION: 94 % | BODY MASS INDEX: 31.91 KG/M2 | WEIGHT: 210.56 LBS | HEIGHT: 68 IN | SYSTOLIC BLOOD PRESSURE: 124 MMHG | DIASTOLIC BLOOD PRESSURE: 72 MMHG

## 2022-03-11 DIAGNOSIS — R05.9 COUGH: ICD-10-CM

## 2022-03-11 DIAGNOSIS — R13.10 DYSPHAGIA, UNSPECIFIED TYPE: Primary | ICD-10-CM

## 2022-03-11 DIAGNOSIS — D72.0 HYPERSEGMENTATION OF NEUTROPHILS: ICD-10-CM

## 2022-03-11 DIAGNOSIS — J30.9 ALLERGIC RHINITIS, UNSPECIFIED SEASONALITY, UNSPECIFIED TRIGGER: ICD-10-CM

## 2022-03-11 DIAGNOSIS — N18.30 STAGE 3 CHRONIC KIDNEY DISEASE, UNSPECIFIED WHETHER STAGE 3A OR 3B CKD: ICD-10-CM

## 2022-03-11 DIAGNOSIS — E27.8 ADRENAL NODULE: ICD-10-CM

## 2022-03-11 PROCEDURE — 1101F PR PT FALLS ASSESS DOC 0-1 FALLS W/OUT INJ PAST YR: ICD-10-PCS | Mod: CPTII,S$GLB,, | Performed by: PHYSICIAN ASSISTANT

## 2022-03-11 PROCEDURE — 71046 X-RAY EXAM CHEST 2 VIEWS: CPT | Mod: 26,,, | Performed by: RADIOLOGY

## 2022-03-11 PROCEDURE — 3078F PR MOST RECENT DIASTOLIC BLOOD PRESSURE < 80 MM HG: ICD-10-PCS | Mod: CPTII,S$GLB,, | Performed by: PHYSICIAN ASSISTANT

## 2022-03-11 PROCEDURE — 99999 PR PBB SHADOW E&M-EST. PATIENT-LVL V: ICD-10-PCS | Mod: PBBFAC,,, | Performed by: PHYSICIAN ASSISTANT

## 2022-03-11 PROCEDURE — 3288F FALL RISK ASSESSMENT DOCD: CPT | Mod: CPTII,S$GLB,, | Performed by: PHYSICIAN ASSISTANT

## 2022-03-11 PROCEDURE — 71046 X-RAY EXAM CHEST 2 VIEWS: CPT | Mod: TC

## 2022-03-11 PROCEDURE — 3288F PR FALLS RISK ASSESSMENT DOCUMENTED: ICD-10-PCS | Mod: CPTII,S$GLB,, | Performed by: PHYSICIAN ASSISTANT

## 2022-03-11 PROCEDURE — 99499 RISK ADDL DX/OHS AUDIT: ICD-10-PCS | Mod: S$GLB,,, | Performed by: PHYSICIAN ASSISTANT

## 2022-03-11 PROCEDURE — 99999 PR PBB SHADOW E&M-EST. PATIENT-LVL V: CPT | Mod: PBBFAC,,, | Performed by: PHYSICIAN ASSISTANT

## 2022-03-11 PROCEDURE — 99214 PR OFFICE/OUTPT VISIT, EST, LEVL IV, 30-39 MIN: ICD-10-PCS | Mod: S$GLB,,, | Performed by: PHYSICIAN ASSISTANT

## 2022-03-11 PROCEDURE — 3074F PR MOST RECENT SYSTOLIC BLOOD PRESSURE < 130 MM HG: ICD-10-PCS | Mod: CPTII,S$GLB,, | Performed by: PHYSICIAN ASSISTANT

## 2022-03-11 PROCEDURE — 1160F PR REVIEW ALL MEDS BY PRESCRIBER/CLIN PHARMACIST DOCUMENTED: ICD-10-PCS | Mod: CPTII,S$GLB,, | Performed by: PHYSICIAN ASSISTANT

## 2022-03-11 PROCEDURE — 1125F AMNT PAIN NOTED PAIN PRSNT: CPT | Mod: CPTII,S$GLB,, | Performed by: PHYSICIAN ASSISTANT

## 2022-03-11 PROCEDURE — 99214 OFFICE O/P EST MOD 30 MIN: CPT | Mod: S$GLB,,, | Performed by: PHYSICIAN ASSISTANT

## 2022-03-11 PROCEDURE — 1125F PR PAIN SEVERITY QUANTIFIED, PAIN PRESENT: ICD-10-PCS | Mod: CPTII,S$GLB,, | Performed by: PHYSICIAN ASSISTANT

## 2022-03-11 PROCEDURE — 71046 XR CHEST PA AND LATERAL: ICD-10-PCS | Mod: 26,,, | Performed by: RADIOLOGY

## 2022-03-11 PROCEDURE — 1101F PT FALLS ASSESS-DOCD LE1/YR: CPT | Mod: CPTII,S$GLB,, | Performed by: PHYSICIAN ASSISTANT

## 2022-03-11 PROCEDURE — 3074F SYST BP LT 130 MM HG: CPT | Mod: CPTII,S$GLB,, | Performed by: PHYSICIAN ASSISTANT

## 2022-03-11 PROCEDURE — 1160F RVW MEDS BY RX/DR IN RCRD: CPT | Mod: CPTII,S$GLB,, | Performed by: PHYSICIAN ASSISTANT

## 2022-03-11 PROCEDURE — 99499 UNLISTED E&M SERVICE: CPT | Mod: S$GLB,,, | Performed by: PHYSICIAN ASSISTANT

## 2022-03-11 PROCEDURE — 1159F PR MEDICATION LIST DOCUMENTED IN MEDICAL RECORD: ICD-10-PCS | Mod: CPTII,S$GLB,, | Performed by: PHYSICIAN ASSISTANT

## 2022-03-11 PROCEDURE — 1159F MED LIST DOCD IN RCRD: CPT | Mod: CPTII,S$GLB,, | Performed by: PHYSICIAN ASSISTANT

## 2022-03-11 PROCEDURE — 3078F DIAST BP <80 MM HG: CPT | Mod: CPTII,S$GLB,, | Performed by: PHYSICIAN ASSISTANT

## 2022-03-11 RX ORDER — AZELASTINE 1 MG/ML
1 SPRAY, METERED NASAL 2 TIMES DAILY
Qty: 30 ML | Refills: 0 | Status: SHIPPED | OUTPATIENT
Start: 2022-03-11 | End: 2022-03-11

## 2022-03-11 NOTE — PROGRESS NOTES
Subjective:       Patient ID: Lizeth Henderson is a 81 y.o. female.    Chief Complaint: Choking and Cough    Patient Care Team:  Lazarus Euceda MD as PCP - General (Family Medicine)  Katarzyna Vinson MD (Psychiatry)  Alyssa Toledo LPN (Inactive) as Care Coordinator    Lizeth Henderson is a 81 y.o. female who presents today with complaints of Choking and Cough. She has had a couple of episodes where she feels like she feels like she cannot swallow and then feels like she cannot catch her breath. This started a couple of years ago and seems to be increasing in frequency. Episodes last about 15 minutes then she feels exhausted because she had become so anxious with the episode. Last episode was 4-5 days ago - when she was eating cornbread then again the next day with M&M's. She also reports nonproductive cough for the last few months and she believes she strained a muscle in her chest wall on right anterior and right lateral chest wall. No relief with cough syrup but relief with cough drop. She also reports sinus PND.     Review of Systems   Constitutional: Negative for activity change and unexpected weight change.   HENT: Positive for trouble swallowing. Negative for hearing loss and rhinorrhea.    Eyes: Negative for discharge and visual disturbance.   Respiratory: Positive for cough. Negative for chest tightness and wheezing.    Cardiovascular: Negative for chest pain and palpitations.   Gastrointestinal: Negative for blood in stool, constipation, diarrhea and vomiting.   Endocrine: Negative for polydipsia and polyuria.   Genitourinary: Negative for difficulty urinating, dysuria, hematuria and menstrual problem.   Musculoskeletal: Negative for arthralgias, joint swelling and neck pain.   Neurological: Negative for weakness and headaches.   Psychiatric/Behavioral: Negative for confusion and dysphoric mood.       Objective:      Physical Exam  Vitals and nursing note reviewed.   Constitutional:        General: She is not in acute distress.     Appearance: She is well-developed.   HENT:      Head: Normocephalic and atraumatic.      Nose: Nose normal.      Mouth/Throat:      Lips: Pink.      Mouth: Mucous membranes are moist.      Pharynx: No pharyngeal swelling or posterior oropharyngeal erythema.      Comments: Clear PND noted to posterior pharynx  Voice is normal  Pt handling secretions without difficulty  Eyes:      General: Lids are normal. No scleral icterus.     Extraocular Movements: Extraocular movements intact.      Conjunctiva/sclera: Conjunctivae normal.   Cardiovascular:      Rate and Rhythm: Normal rate and regular rhythm.      Heart sounds: No murmur heard.    No friction rub. No gallop.   Pulmonary:      Effort: Pulmonary effort is normal.      Breath sounds: Normal breath sounds. No decreased breath sounds, wheezing, rhonchi or rales.   Neurological:      Mental Status: She is alert.      Cranial Nerves: No cranial nerve deficit.      Gait: Gait normal.   Psychiatric:         Mood and Affect: Mood and affect normal.         Assessment:       1. Dysphagia, unspecified type    2. Stage 3 chronic kidney disease, unspecified whether stage 3a or 3b CKD    3. Adrenal nodule    4. Hypersegmentation of neutrophils    5. Cough    6. Allergic rhinitis, unspecified seasonality, unspecified trigger        Plan:     Problem List Items Addressed This Visit        Renal/    Stage 3 chronic kidney disease, unspecified whether stage 3a or 3b CKD    Current Assessment & Plan     Lab Results   Component Value Date    BUN 25 (H) 09/04/2021    BUN 25 (H) 11/05/2020    BUN 24 (H) 08/05/2020    CREATININE 1.2 09/04/2021    CREATININE 0.9 11/05/2020    CREATININE 0.9 08/05/2020    EGFRNONAA 42 (A) 09/04/2021    ESTGFRAFRICA 49 (A) 09/04/2021                 Oncology    Hypersegmentation of neutrophils    Overview     Mild on peripheral smear. MCV trending upward. Consider checking b12 and folate if persists. Also a/w  iron def. Consider nutrition              Endocrine    Adrenal nodule    Overview     Followed by Endocrinology             Other Visit Diagnoses     Dysphagia, unspecified type    -  Primary    Relevant Orders    Ambulatory referral/consult to ENT    Cough        Relevant Orders    X-Ray Chest PA And Lateral (Completed)    Allergic rhinitis, unspecified seasonality, unspecified trigger              X-Ray Chest PA And Lateral  Narrative: EXAMINATION:  XR CHEST PA AND LATERAL    CLINICAL HISTORY:  Cough, unspecified    TECHNIQUE:  PA and lateral views of the chest were performed.    COMPARISON:  Multiple chest radiographs dating back to January 11, 2010    FINDINGS:  Lungs are clear without focal consolidation, edema, or mass.  There is no pneumothorax or pleural effusion.  Heart size is normal.  Thoracic aorta is mildly tortuous.  Atherosclerotic calcifications are present within the thoracic aorta.  No acute osseous abnormality.  Impression: As above.    Electronically signed by: Elliot Palma  Date:    03/11/2022  Time:    11:57

## 2022-03-11 NOTE — ASSESSMENT & PLAN NOTE
Lab Results   Component Value Date    BUN 25 (H) 09/04/2021    BUN 25 (H) 11/05/2020    BUN 24 (H) 08/05/2020    CREATININE 1.2 09/04/2021    CREATININE 0.9 11/05/2020    CREATININE 0.9 08/05/2020    EGFRNONAA 42 (A) 09/04/2021    ESTGFRAFRICA 49 (A) 09/04/2021

## 2022-03-14 ENCOUNTER — OFFICE VISIT (OUTPATIENT)
Dept: OTOLARYNGOLOGY | Facility: CLINIC | Age: 82
End: 2022-03-14
Payer: MEDICARE

## 2022-03-14 VITALS — HEIGHT: 68 IN | BODY MASS INDEX: 32.34 KG/M2 | WEIGHT: 213.38 LBS | TEMPERATURE: 98 F

## 2022-03-14 DIAGNOSIS — R13.10 DYSPHAGIA, UNSPECIFIED TYPE: ICD-10-CM

## 2022-03-14 PROCEDURE — 3288F FALL RISK ASSESSMENT DOCD: CPT | Mod: CPTII,S$GLB,, | Performed by: PHYSICIAN ASSISTANT

## 2022-03-14 PROCEDURE — 99999 PR PBB SHADOW E&M-EST. PATIENT-LVL III: CPT | Mod: PBBFAC,,, | Performed by: PHYSICIAN ASSISTANT

## 2022-03-14 PROCEDURE — 1126F AMNT PAIN NOTED NONE PRSNT: CPT | Mod: CPTII,S$GLB,, | Performed by: PHYSICIAN ASSISTANT

## 2022-03-14 PROCEDURE — 1101F PT FALLS ASSESS-DOCD LE1/YR: CPT | Mod: CPTII,S$GLB,, | Performed by: PHYSICIAN ASSISTANT

## 2022-03-14 PROCEDURE — 1101F PR PT FALLS ASSESS DOC 0-1 FALLS W/OUT INJ PAST YR: ICD-10-PCS | Mod: CPTII,S$GLB,, | Performed by: PHYSICIAN ASSISTANT

## 2022-03-14 PROCEDURE — 31575 DIAGNOSTIC LARYNGOSCOPY: CPT | Mod: S$GLB,,, | Performed by: STUDENT IN AN ORGANIZED HEALTH CARE EDUCATION/TRAINING PROGRAM

## 2022-03-14 PROCEDURE — 99203 OFFICE O/P NEW LOW 30 MIN: CPT | Mod: 25,S$GLB,, | Performed by: PHYSICIAN ASSISTANT

## 2022-03-14 PROCEDURE — 3288F PR FALLS RISK ASSESSMENT DOCUMENTED: ICD-10-PCS | Mod: CPTII,S$GLB,, | Performed by: PHYSICIAN ASSISTANT

## 2022-03-14 PROCEDURE — 1159F MED LIST DOCD IN RCRD: CPT | Mod: CPTII,S$GLB,, | Performed by: PHYSICIAN ASSISTANT

## 2022-03-14 PROCEDURE — 1126F PR PAIN SEVERITY QUANTIFIED, NO PAIN PRESENT: ICD-10-PCS | Mod: CPTII,S$GLB,, | Performed by: PHYSICIAN ASSISTANT

## 2022-03-14 PROCEDURE — 99999 PR PBB SHADOW E&M-EST. PATIENT-LVL III: ICD-10-PCS | Mod: PBBFAC,,, | Performed by: PHYSICIAN ASSISTANT

## 2022-03-14 PROCEDURE — 99203 PR OFFICE/OUTPT VISIT, NEW, LEVL III, 30-44 MIN: ICD-10-PCS | Mod: 25,S$GLB,, | Performed by: PHYSICIAN ASSISTANT

## 2022-03-14 PROCEDURE — 31575 PR LARYNGOSCOPY, FLEXIBLE; DIAGNOSTIC: ICD-10-PCS | Mod: S$GLB,,, | Performed by: STUDENT IN AN ORGANIZED HEALTH CARE EDUCATION/TRAINING PROGRAM

## 2022-03-14 PROCEDURE — 1159F PR MEDICATION LIST DOCUMENTED IN MEDICAL RECORD: ICD-10-PCS | Mod: CPTII,S$GLB,, | Performed by: PHYSICIAN ASSISTANT

## 2022-03-14 NOTE — PROGRESS NOTES
Subjective:   Patient ID: Lizeth Henderson is a 81 y.o. female.    Chief Complaint: Cough (Onset 2-3 months. Pt reports coughing and choking sensation. Pt. Reports happens while she is eating, she swallows then feels as if she cannot catch her breath. )    Ms. Henderson is a very pleasant 82 yo female here to see me today with complaints of FB sensation in throat and recurrent choking episodes while eating. She had 2 episodes last week while eating m&m candy and then corn bread. She has had similar episodes in past. Consistency of food does not matter.  She is not a smoker and denies any pain. She does suffer with seasonal allergies and often has rhinorrhea and PND.     Review of patient's allergies indicates:   Allergen Reactions    Lisinopril      cough           Review of Systems   Constitutional: Negative.    HENT: Positive for hearing loss, postnasal drip and trouble swallowing.    Eyes: Positive for itching.   Respiratory: Positive for cough and choking.    Cardiovascular: Negative.    Endocrine: Negative.    Genitourinary: Negative.    Skin: Positive for rash.   Neurological: Negative.    Hematological: Negative.    Psychiatric/Behavioral: Negative.          Objective:   There were no vitals taken for this visit.    Physical Exam  Constitutional:       General: She is not in acute distress.     Appearance: She is well-developed.   HENT:      Head: Normocephalic and atraumatic.      Right Ear: Tympanic membrane, ear canal and external ear normal.      Left Ear: Tympanic membrane, ear canal and external ear normal.      Nose: Nose normal. No nasal deformity, septal deviation, mucosal edema or rhinorrhea.      Right Sinus: No maxillary sinus tenderness or frontal sinus tenderness.      Left Sinus: No maxillary sinus tenderness or frontal sinus tenderness.      Mouth/Throat:      Mouth: Mucous membranes are not pale and not dry.      Dentition: No dental caries.      Pharynx: Uvula midline. No oropharyngeal  exudate or posterior oropharyngeal erythema.   Eyes:      General: Lids are normal. No scleral icterus.     Extraocular Movements:      Right eye: Normal extraocular motion and no nystagmus.      Left eye: Normal extraocular motion and no nystagmus.      Conjunctiva/sclera: Conjunctivae normal.      Right eye: Right conjunctiva is not injected. No chemosis.     Left eye: Left conjunctiva is not injected. No chemosis.     Pupils: Pupils are equal, round, and reactive to light.   Neck:      Thyroid: No thyroid mass or thyromegaly.      Trachea: Trachea and phonation normal. No tracheal tenderness or tracheal deviation.   Pulmonary:      Effort: Pulmonary effort is normal. No respiratory distress.      Breath sounds: No stridor.   Abdominal:      General: There is no distension.   Lymphadenopathy:      Head:      Right side of head: No submental, submandibular, preauricular, posterior auricular or occipital adenopathy.      Left side of head: No submental, submandibular, preauricular, posterior auricular or occipital adenopathy.      Cervical: No cervical adenopathy.   Skin:     General: Skin is warm and dry.      Findings: No erythema or rash.   Neurological:      Mental Status: She is alert and oriented to person, place, and time.      Cranial Nerves: No cranial nerve deficit.   Psychiatric:         Behavior: Behavior normal.          Procedure -Transnasal fiberoptic laryngoscopy     Surgeon: Saurabh Martinez M.D. .      Anesthesia: topical 0.05% oxymetazoline with 4% lidocaine      Complications: None.     Description of Procedure: With the patient in the sitting position, topical lidocaine and oxymetazoline was applied to the nose. The scope was passed through the nose. Examination was carried out of the nose, nasopharynx, oropharynx, hypopharynx, and larynx with findings as noted above. Scope was removed. The patient tolerated the procedure well.      Findings: No masses or lesions in the nose, nasopharynx,  oropharynx, hypopharynx, or larynx. Vocal fold abduction and adduction is normal. Significant pooling of secretions in the piriform sinuses without evidence of penetration or aspiration.            Assessment:     1. Dysphagia, unspecified type        Plan:     Dysphagia, unspecified type  -     Ambulatory referral/consult to ENT      Will plan for swallow study. Pending results, may have her see GI.     Answers for HPI/ROS submitted by the patient on 3/14/2022  Acid Reflux?: Yes  Seasonal Allergies?: Yes

## 2022-03-15 ENCOUNTER — HOSPITAL ENCOUNTER (OUTPATIENT)
Dept: RADIOLOGY | Facility: HOSPITAL | Age: 82
Discharge: HOME | End: 2022-03-15
Attending: PHYSICIAN ASSISTANT
Payer: MEDICARE

## 2022-03-15 DIAGNOSIS — R13.10 DYSPHAGIA, UNSPECIFIED TYPE: ICD-10-CM

## 2022-03-15 PROCEDURE — A9698 NON-RAD CONTRAST MATERIALNOC: HCPCS | Performed by: PHYSICIAN ASSISTANT

## 2022-03-15 PROCEDURE — 25500020 PHARM REV CODE 255: Performed by: PHYSICIAN ASSISTANT

## 2022-03-15 PROCEDURE — 92611 MOTION FLUOROSCOPY/SWALLOW: CPT

## 2022-03-15 PROCEDURE — 74230 X-RAY XM SWLNG FUNCJ C+: CPT | Mod: TC

## 2022-03-15 RX ADMIN — BARIUM SULFATE 20 ML: 0.81 POWDER, FOR SUSPENSION ORAL at 11:03

## 2022-03-15 NOTE — PROCEDURES
Modified Barium Swallow    Patient Name:  Lizeth Henderson   MRN:  [id number]      Recommendations:     Recommendations:                General Recommendations:  GI evaluation and /or esophageal work-up recommended d/t pt c/o dysphagia with solids.  No further SLP intervention indicated following MBSS results.  Diet recommendations:  Regular consistency diet/thin liquids  Aspiration Precautions: Double swallow with each bite/sip and HOB to 90 degrees ; alternate liquids/solids, Reflux precautions, Remain upright atleast 45 minutes-1 hour post meal.  General Precautions: Standard, reflux  Communication strategies:  none    Referral     Reason for Referral  Patient was referred for a Modified Barium Swallow Study to assess the efficiency of his/her swallow function, rule out aspiration and make recommendations regarding safe dietary consistencies, effective compensatory strategies, and safe eating environment.     Diagnosis: dysphagia      History:     Past Medical History:   Diagnosis Date    Adrenal adenoma 2016    Anxiety     Arthritis     Benign hypertension     Colon cancer age 61    colon    Coronary artery disease     Depression     Full dentures     General anesthetics causing adverse effect in therapeutic use     yells and talks when wakes up    Hyperlipidemia     Osteopenia     Shingles     Wears glasses        Objective:     Current Respiratory Status: WNL, room air  Alert: yes    Cooperative: yes    Follows Directions: yes    Visualization  · Patient was seen in the lateral view; esophageal sweep also conducted during MBSS.    Oral Peripheral Examination  ·  OM WFL     Consistencies Assessed  · Pt presented with multiple trials of thin liquids, pureed and soft solid consistencies during VFSS.    Oral Preparation/Oral Phase  · Decreased base of tongue mobility with mild tongue base/valleculae residue, in which cleared with double swallow strategy.    Pharyngeal Phase   · Narrowing pharyngeal  tract with air-filled pharynx noted during deglutition, without stasis or impedence of bolus flow.  Mild pharyngeal residue, cleared with double swallow strategy.  NO LARYNGEAL PENETRATION OR ASPIRATION VISUALIZED.    Cervical Esophageal Phase  · UES appeared to accommodate all bolus types without stasis or retrograde movement observed      · Esophageal sweep (lateral view):  Suspected dysmotility with solids with residue remaining in esophagus post deglutition.  Residue slightly reduced with liquid rinse.  Retrograde movement of bolus present in mid/lower esophagus.  Recommended further GI/esophageal work-up for pt c/o dysphagia, specifically SOB and coughing/choking during PO intake.    Assessment:     Impressions  ·  Patient with oral and pharyngeal phases of swallowing deemed WFL    Prognosis: Excellent    Barriers:  · None    Plan  OM and oropharyngeal swallow deemed WFL.  Pt recommended for regular consistency diet as tolerated, utilizing swallowing/reflux precautions.  No further skilled ST intervention indicated at this time.  MD to consider further esophageal, GI work-up due to pt continued c/o dysphagia and current MBSS findings.    Education  Results were discussed with patient.    Goals:       Plan:      Discharge recommendations:  Regular consistency diet/thin liquids.  No further SLP services clinically indicated.  Recommended further esophageal medical work-up d/t pt c/o ongoing dysphagia in the absence of oropharyngeal dysphagia/aspiration.  Barriers to Discharge:  None    Time Tracking:   SLP Treatment Date:3/15/2022  Speech Start Time: 11:20  Speech Stop Time:  11:50     Speech Total Time (min): 30 minutes  03/15/2022

## 2022-03-16 DIAGNOSIS — N18.9 CHRONIC KIDNEY DISEASE, UNSPECIFIED CKD STAGE: ICD-10-CM

## 2022-04-27 ENCOUNTER — TELEPHONE (OUTPATIENT)
Dept: ADMINISTRATIVE | Facility: HOSPITAL | Age: 82
End: 2022-04-27
Payer: MEDICARE

## 2022-05-12 ENCOUNTER — PATIENT OUTREACH (OUTPATIENT)
Dept: ADMINISTRATIVE | Facility: OTHER | Age: 82
End: 2022-05-12
Payer: MEDICARE

## 2022-05-12 NOTE — PROGRESS NOTES
Health Maintenance Due   Topic Date Due    Lipid Panel  08/05/2021    COVID-19 Vaccine (4 - Booster for Pfizer series) 12/26/2021    DEXA Scan  02/14/2022     Updates were requested from care everywhere.  Chart was reviewed for overdue Proactive [facility] Encounters ([facility]) topics (CRS, Breast Cancer Screening, Eye exam)  Health Maintenance has been updated.  LINKS immunization registry triggered.  Immunizations were reconciled.

## 2022-05-16 ENCOUNTER — OFFICE VISIT (OUTPATIENT)
Dept: PODIATRY | Facility: CLINIC | Age: 82
End: 2022-05-16
Payer: MEDICARE

## 2022-05-16 DIAGNOSIS — T45.1X5A CHEMOTHERAPY-INDUCED PERIPHERAL NEUROPATHY: Primary | ICD-10-CM

## 2022-05-16 DIAGNOSIS — L60.3 ONYCHODYSTROPHY: ICD-10-CM

## 2022-05-16 DIAGNOSIS — G62.0 CHEMOTHERAPY-INDUCED PERIPHERAL NEUROPATHY: Primary | ICD-10-CM

## 2022-05-16 PROCEDURE — 99999 PR PBB SHADOW E&M-EST. PATIENT-LVL II: ICD-10-PCS | Mod: PBBFAC,,, | Performed by: PODIATRIST

## 2022-05-16 PROCEDURE — 99499 UNLISTED E&M SERVICE: CPT | Mod: S$GLB,,, | Performed by: PODIATRIST

## 2022-05-16 PROCEDURE — 1101F PR PT FALLS ASSESS DOC 0-1 FALLS W/OUT INJ PAST YR: ICD-10-PCS | Mod: CPTII,S$GLB,, | Performed by: PODIATRIST

## 2022-05-16 PROCEDURE — 11721 PR DEBRIDEMENT OF NAILS, 6 OR MORE: ICD-10-PCS | Mod: Q9,S$GLB,, | Performed by: PODIATRIST

## 2022-05-16 PROCEDURE — 1160F RVW MEDS BY RX/DR IN RCRD: CPT | Mod: CPTII,S$GLB,, | Performed by: PODIATRIST

## 2022-05-16 PROCEDURE — 99499 NO LOS: ICD-10-PCS | Mod: S$GLB,,, | Performed by: PODIATRIST

## 2022-05-16 PROCEDURE — 1101F PT FALLS ASSESS-DOCD LE1/YR: CPT | Mod: CPTII,S$GLB,, | Performed by: PODIATRIST

## 2022-05-16 PROCEDURE — 11721 DEBRIDE NAIL 6 OR MORE: CPT | Mod: Q9,S$GLB,, | Performed by: PODIATRIST

## 2022-05-16 PROCEDURE — 1160F PR REVIEW ALL MEDS BY PRESCRIBER/CLIN PHARMACIST DOCUMENTED: ICD-10-PCS | Mod: CPTII,S$GLB,, | Performed by: PODIATRIST

## 2022-05-16 PROCEDURE — 99999 PR PBB SHADOW E&M-EST. PATIENT-LVL II: CPT | Mod: PBBFAC,,, | Performed by: PODIATRIST

## 2022-05-16 PROCEDURE — 1159F PR MEDICATION LIST DOCUMENTED IN MEDICAL RECORD: ICD-10-PCS | Mod: CPTII,S$GLB,, | Performed by: PODIATRIST

## 2022-05-16 PROCEDURE — 3288F FALL RISK ASSESSMENT DOCD: CPT | Mod: CPTII,S$GLB,, | Performed by: PODIATRIST

## 2022-05-16 PROCEDURE — 1159F MED LIST DOCD IN RCRD: CPT | Mod: CPTII,S$GLB,, | Performed by: PODIATRIST

## 2022-05-16 PROCEDURE — 3288F PR FALLS RISK ASSESSMENT DOCUMENTED: ICD-10-PCS | Mod: CPTII,S$GLB,, | Performed by: PODIATRIST

## 2022-05-16 NOTE — PROGRESS NOTES
Subjective:       Patient ID: Lizeth Henderson is a 81 y.o. female.    Chief Complaint: Routine Foot Care (Patient is non diabetic and continues with Chemotherapy induced PN. She complains of pins and needles feeling to bilateral feet. She denies pain at present.)      HPI: Patient presents to the office today with the chief complaint of elongated, thickened and dystrophic nail plates to the B/L foot. This patient is a Diabetic Type II. Patient does follow with Primary Care and/or Endocrinology for management of Diabetes Mellitus. This patient's PMD is Lazarus Euceda MD. This patient last saw his/her primary care provider on 3/11/22.     Hemoglobin A1C   Date Value Ref Range Status   11/05/2020 5.4 4.0 - 5.6 % Final     Comment:     ADA Screening Guidelines:  5.7-6.4%  Consistent with prediabetes  >or=6.5%  Consistent with diabetes  High levels of fetal hemoglobin interfere with the HbA1C  assay. Heterozygous hemoglobin variants (HbS, HgC, etc)do  not significantly interfere with this assay.   However, presence of multiple variants may affect accuracy.     01/03/2019 5.6 4.0 - 5.6 % Final     Comment:     ADA Screening Guidelines:  5.7-6.4%  Consistent with prediabetes  >or=6.5%  Consistent with diabetes  High levels of fetal hemoglobin interfere with the HbA1C  assay. Heterozygous hemoglobin variants (HbS, HgC, etc)do  not significantly interfere with this assay.   However, presence of multiple variants may affect accuracy.     12/06/2017 5.0 4.0 - 5.6 % Final     Comment:     According to ADA guidelines, hemoglobin A1c <7.0% represents  optimal control in non-pregnant diabetic patients. Different  metrics may apply to specific patient populations.   Standards of Medical Care in Diabetes-2016.  For the purpose of screening for the presence of diabetes:  <5.7%     Consistent with the absence of diabetes  5.7-6.4%  Consistent with increasing risk for diabetes   (prediabetes)  >or=6.5%  Consistent with  diabetes  Currently, no consensus exists for use of hemoglobin A1c  for diagnosis of diabetes for children.  This Hemoglobin A1c assay has significant interference with fetal   hemoglobin   (HbF). The results are invalid for patients with abnormal amounts of   HbF,   including those with known Hereditary Persistence   of Fetal Hemoglobin. Heterozygous hemoglobin variants (HbAS, HbAC,   HbAD, HbAE, HbA2) do not significantly interfere with this assay;   however, presence of multiple variants in a sample may impact the %   interference.     .     Review of patient's allergies indicates:   Allergen Reactions    Lisinopril      cough       Past Medical History:   Diagnosis Date    Adrenal adenoma 2016    Anxiety     Arthritis     Benign hypertension     Colon cancer age 61    colon    Coronary artery disease     Depression     Full dentures     General anesthetics causing adverse effect in therapeutic use     yells and talks when wakes up    Hyperlipidemia     Osteopenia     Shingles     Wears glasses        Family History   Problem Relation Age of Onset    Cancer Father     Hypertension Father     Arthritis Father     Alzheimer's disease Mother     Arthritis Mother     Depression Daughter     Kidney disease Daughter     Ulcerative colitis Daughter     Depression Daughter     Depression Daughter     Anxiety disorder Daughter         PTSD    Cancer Maternal Uncle     Cancer Cousin         colon cancer    Early death Maternal Grandmother     Amblyopia Neg Hx     Blindness Neg Hx     Cataracts Neg Hx     Diabetes Neg Hx     Glaucoma Neg Hx     Macular degeneration Neg Hx     Retinal detachment Neg Hx     Strabismus Neg Hx     Stroke Neg Hx     Thyroid disease Neg Hx        Social History     Socioeconomic History    Marital status:     Number of children: 3   Tobacco Use    Smoking status: Never Smoker    Smokeless tobacco: Never Used   Substance and Sexual Activity     Alcohol use: No    Drug use: No    Sexual activity: Never   Social History Narrative    The patient does not exercise regularly ().    Rates diet as poor.    She is not satisfied with weight.             Social Determinants of Health     Financial Resource Strain: Unknown    Difficulty of Paying Living Expenses: Patient refused   Food Insecurity: Unknown    Worried About Running Out of Food in the Last Year: Patient refused    Ran Out of Food in the Last Year: Patient refused   Transportation Needs: Unknown    Lack of Transportation (Medical): Patient refused    Lack of Transportation (Non-Medical): Patient refused   Physical Activity: Unknown    Days of Exercise per Week: 0 days   Stress: Stress Concern Present    Feeling of Stress : To some extent   Social Connections: Unknown    Frequency of Communication with Friends and Family: More than three times a week    Frequency of Social Gatherings with Friends and Family: Patient refused    Active Member of Clubs or Organizations: No    Attends Club or Organization Meetings: Patient refused    Marital Status:    Housing Stability: Unknown    Unable to Pay for Housing in the Last Year: Patient refused    Number of Places Lived in the Last Year: 1    Unstable Housing in the Last Year: Patient refused       Past Surgical History:   Procedure Laterality Date    APPENDECTOMY      BREAST BIOPSY Left     benign    BUNIONECTOMY Left     CATARACT EXTRACTION Bilateral     COLON SURGERY      2001    EYE SURGERY      cataract surg    HEMICOLECTOMY  2002    HERNIA REPAIR      x5    JOINT REPLACEMENT      knee    left toe surgery      joelle    PATENT DUCTUS ARTERIOUS LIGATION  1948    SALPINGOOPHORECTOMY  2002    due to colon cancer    TONSILLECTOMY, ADENOIDECTOMY         Review of Systems       Objective:   There were no vitals taken for this visit.    Physical Exam  LOWER EXTREMITY PHYSICAL EXAMINATION    VASCULAR:  The right dorsalis pedis  pulse 2/4 and the right posterior tibial pulse 2/4.  The left dorsalis pedis pulse 2/4 and posterior tibial pulse on the left is 2/4.  Capillary refill is intact.  Pedal hair growth intact    NEUROLOGY: Protective sensation is not intact to the left and right plantar surfaces of the foot and digits, as the patient has no sensation/detection at greater than 4 distinct points of contact with 5.07 [place] Aishwarya monofilament. Sensation to light touch is intact on the left and right foot. Proprioception is intact, bilateral. Sensation to pin prick is reduced to absent. Vibratory sensation is diminished.    DERMATOLOGY:  Skin is supple, moist, intact.  There is no signs of callusing, ulcerations, other lesions identified to the dorsal or plantar aspect of the right or left foot.  The R1, 2, 5 and left L1,2, 5 are thickened, discolored dystrophic.  There is subungual debris.  Nail plates have area of dark discoloration.  The remaining nails 3-4 on the right foot and the left foot are elongated but of normal color, thickness, and texture.   There is no signs of ingrowing into the medial or lateral borders.  There is no evidence of wounds or skin breakdown.  No edema or erythema.  No obvious lacerations or fissuring.  Interdigital spaces are clean, dry, intact.  No rashes or scars appreciated.    ORTHOPEDIC: Manual Muscle Testing is 5/5 in all planes on the left and right, without pains, with and without resistance. Gait pattern is non-antalgic.    Assessment:     1. Chemotherapy-induced peripheral neuropathy    2. Onychodystrophy        Plan:     Chemotherapy-induced peripheral neuropathy    Onychodystrophy        Thorough discussion is had with the patient this afternoon, concerning the diagnosis, its etiology, and the treatment algorithm at present.  Greater than 50% of this visit spent on counseling and coordination of care. Greater than 15 minutes of a 20 minute appointment spent on education about the diabetic foot,  neuropathy, and prevention of limb loss.  Shoe inspection. Diabetic Foot Education. Patient reminded of the importance of good nutrition and blood sugar control to help prevent podiatric complications of diabetes. Patient instructed on proper foot hygeine. We discussed wearing proper and supportive shoe gear, daily foot inspections, never walking barefooted or sock footed, never putting sharp instruments to feet which can cause major complications associated with infection, ulcers, lacerations.      Dystrophic nail plates, as outlined above (R#1,2,5  ; L#1,2,5 ), are sharply debrided with double action nail nipper, and/or with the assistance of a mechanical rotary nehemias, with removal of all offending nail and nail border(s), for reduction of pains. Nails are reduced in terms of length, width and girth with removal of subungual debris to facilitate pain free weight bearing and ambulation. The elongated nails as outlined in the objective portion of this note, were trimmed to appropriate length, with a double action nail nipper, for alleviation/reduction of pains as well. Follow up in approx. 3-4 months.        Future Appointments   Date Time Provider Department Center   5/31/2022  4:30 PM [facility]   8/15/2022  3:15 PM Danyell Nails DPM [facility]

## 2022-05-26 ENCOUNTER — PATIENT MESSAGE (OUTPATIENT)
Dept: PSYCHIATRY | Facility: CLINIC | Age: 82
End: 2022-05-26
Payer: MEDICARE

## 2022-05-30 ENCOUNTER — TELEPHONE (OUTPATIENT)
Dept: PSYCHIATRY | Facility: CLINIC | Age: 82
End: 2022-05-30
Payer: MEDICARE

## 2022-05-30 ENCOUNTER — PATIENT MESSAGE (OUTPATIENT)
Dept: PSYCHIATRY | Facility: CLINIC | Age: 82
End: 2022-05-30
Payer: MEDICARE

## 2022-06-10 ENCOUNTER — OFFICE VISIT (OUTPATIENT)
Dept: INTERNAL MEDICINE | Facility: CLINIC | Age: 82
End: 2022-06-10
Payer: MEDICARE

## 2022-06-10 VITALS
TEMPERATURE: 99 F | WEIGHT: 210.56 LBS | OXYGEN SATURATION: 96 % | SYSTOLIC BLOOD PRESSURE: 98 MMHG | HEIGHT: 68 IN | BODY MASS INDEX: 31.91 KG/M2 | DIASTOLIC BLOOD PRESSURE: 66 MMHG | HEART RATE: 84 BPM

## 2022-06-10 DIAGNOSIS — I10 ESSENTIAL HYPERTENSION: ICD-10-CM

## 2022-06-10 DIAGNOSIS — N32.81 OVERACTIVE BLADDER: Chronic | ICD-10-CM

## 2022-06-10 DIAGNOSIS — R41.3 MEMORY DIFFICULTIES: Primary | ICD-10-CM

## 2022-06-10 DIAGNOSIS — H91.90 DECREASED HEARING, UNSPECIFIED LATERALITY: ICD-10-CM

## 2022-06-10 PROCEDURE — 1101F PT FALLS ASSESS-DOCD LE1/YR: CPT | Mod: CPTII,S$GLB,, | Performed by: PHYSICIAN ASSISTANT

## 2022-06-10 PROCEDURE — 3288F PR FALLS RISK ASSESSMENT DOCUMENTED: ICD-10-PCS | Mod: CPTII,S$GLB,, | Performed by: PHYSICIAN ASSISTANT

## 2022-06-10 PROCEDURE — 3074F SYST BP LT 130 MM HG: CPT | Mod: CPTII,S$GLB,, | Performed by: PHYSICIAN ASSISTANT

## 2022-06-10 PROCEDURE — 3074F PR MOST RECENT SYSTOLIC BLOOD PRESSURE < 130 MM HG: ICD-10-PCS | Mod: CPTII,S$GLB,, | Performed by: PHYSICIAN ASSISTANT

## 2022-06-10 PROCEDURE — 3078F PR MOST RECENT DIASTOLIC BLOOD PRESSURE < 80 MM HG: ICD-10-PCS | Mod: CPTII,S$GLB,, | Performed by: PHYSICIAN ASSISTANT

## 2022-06-10 PROCEDURE — 1159F PR MEDICATION LIST DOCUMENTED IN MEDICAL RECORD: ICD-10-PCS | Mod: CPTII,S$GLB,, | Performed by: PHYSICIAN ASSISTANT

## 2022-06-10 PROCEDURE — 1160F PR REVIEW ALL MEDS BY PRESCRIBER/CLIN PHARMACIST DOCUMENTED: ICD-10-PCS | Mod: CPTII,S$GLB,, | Performed by: PHYSICIAN ASSISTANT

## 2022-06-10 PROCEDURE — 1125F AMNT PAIN NOTED PAIN PRSNT: CPT | Mod: CPTII,S$GLB,, | Performed by: PHYSICIAN ASSISTANT

## 2022-06-10 PROCEDURE — 99999 PR PBB SHADOW E&M-EST. PATIENT-LVL V: ICD-10-PCS | Mod: PBBFAC,,, | Performed by: PHYSICIAN ASSISTANT

## 2022-06-10 PROCEDURE — 1160F RVW MEDS BY RX/DR IN RCRD: CPT | Mod: CPTII,S$GLB,, | Performed by: PHYSICIAN ASSISTANT

## 2022-06-10 PROCEDURE — 1159F MED LIST DOCD IN RCRD: CPT | Mod: CPTII,S$GLB,, | Performed by: PHYSICIAN ASSISTANT

## 2022-06-10 PROCEDURE — 3288F FALL RISK ASSESSMENT DOCD: CPT | Mod: CPTII,S$GLB,, | Performed by: PHYSICIAN ASSISTANT

## 2022-06-10 PROCEDURE — 1101F PR PT FALLS ASSESS DOC 0-1 FALLS W/OUT INJ PAST YR: ICD-10-PCS | Mod: CPTII,S$GLB,, | Performed by: PHYSICIAN ASSISTANT

## 2022-06-10 PROCEDURE — 99214 OFFICE O/P EST MOD 30 MIN: CPT | Mod: S$GLB,,, | Performed by: PHYSICIAN ASSISTANT

## 2022-06-10 PROCEDURE — 3078F DIAST BP <80 MM HG: CPT | Mod: CPTII,S$GLB,, | Performed by: PHYSICIAN ASSISTANT

## 2022-06-10 PROCEDURE — 99999 PR PBB SHADOW E&M-EST. PATIENT-LVL V: CPT | Mod: PBBFAC,,, | Performed by: PHYSICIAN ASSISTANT

## 2022-06-10 PROCEDURE — 99214 PR OFFICE/OUTPT VISIT, EST, LEVL IV, 30-39 MIN: ICD-10-PCS | Mod: S$GLB,,, | Performed by: PHYSICIAN ASSISTANT

## 2022-06-10 PROCEDURE — 1125F PR PAIN SEVERITY QUANTIFIED, PAIN PRESENT: ICD-10-PCS | Mod: CPTII,S$GLB,, | Performed by: PHYSICIAN ASSISTANT

## 2022-06-10 RX ORDER — MIRABEGRON 50 MG/1
50 TABLET, FILM COATED, EXTENDED RELEASE ORAL DAILY
Qty: 90 TABLET | Refills: 1 | Status: SHIPPED | OUTPATIENT
Start: 2022-06-10 | End: 2022-12-21

## 2022-06-10 NOTE — PROGRESS NOTES
Subjective:       Patient ID: Lizeth Henderson is a 81 y.o. female.    Chief Complaint: Memory Loss      Patient Care Team:  Lazarus Euceda MD as PCP - General (Family Medicine)  Katarzyna Vinson MD (Psychiatry)  Alyssa Toledo LPN (Inactive) as Care Coordinator    HPI    Lizeth Henderson is a 81 y.o. female who presents today with complaints of Memory Loss  Patient reports today with c/o difficulty with speaking things that she is thinking and forgetfulness for the last 2 weeks. She reports forgetting dates/appts.  She does not drive. She denies misplacing items in the home or forgetting familiar faces or names of people.She denies any other neurological symptoms such as facial drooping, extremity weakness, gait disturbance or recent vision changes.  She reports intermittent lightheadedness when standing and fatigue. BP today 98/66. She is currently on losartan 100mg and HCTZ 12.5mg.     She is requesting refill of Myrbetriq.    Review of Systems   Constitutional: Negative for activity change and unexpected weight change.   HENT: Positive for hearing loss ( chronic - needs hearing aids). Negative for rhinorrhea and trouble swallowing.    Eyes: Negative for discharge and visual disturbance.   Respiratory: Negative for chest tightness and wheezing.    Cardiovascular: Negative for chest pain and palpitations.   Gastrointestinal: Negative for blood in stool, constipation, diarrhea and vomiting.   Endocrine: Negative for polydipsia and polyuria.   Genitourinary: Negative for difficulty urinating, dysuria, hematuria and menstrual problem.   Musculoskeletal: Positive for arthralgias ( chronic - knees). Negative for joint swelling and neck pain.   Neurological: Negative for weakness and headaches.   Psychiatric/Behavioral: Positive for confusion. Negative for dysphoric mood.       Objective:      Physical Exam  Vitals and nursing note reviewed.   Constitutional:       General: She is not in acute distress.      Appearance: She is well-developed.   HENT:      Head: Normocephalic and atraumatic.      Right Ear: Decreased hearing noted.      Left Ear: Decreased hearing noted.      Nose: Nose normal.      Mouth/Throat:      Lips: Pink.      Mouth: Mucous membranes are moist.      Pharynx: Oropharynx is clear.   Eyes:      General: Lids are normal. No scleral icterus.     Extraocular Movements: Extraocular movements intact.      Conjunctiva/sclera: Conjunctivae normal.   Cardiovascular:      Rate and Rhythm: Normal rate and regular rhythm.   Pulmonary:      Effort: Pulmonary effort is normal.      Breath sounds: Normal breath sounds. No decreased breath sounds, wheezing, rhonchi or rales.   Neurological:      Mental Status: She is alert.      Cranial Nerves: No cranial nerve deficit or facial asymmetry.      Motor: Motor function is intact.   Psychiatric:         Mood and Affect: Mood and affect normal.         Assessment:       1. Memory difficulties    2. Essential hypertension    3. Overactive bladder    4. Decreased hearing, unspecified laterality        Plan:   1. Memory difficulties  -     Ambulatory referral/consult to Neurology    2. Essential hypertension  -     Hypertension Digital Medicine ([facility]) Enrollment Order  -     Hypertension Digital Medicine ([facility]): Assign Onboarding Questionnaires    3. Overactive bladder  -     mirabegron (MYRBETRIQ) 50 mg Tb24    4. Decreased hearing, unspecified laterality  -     Ambulatory referral/consult to Audiology      Pt encouraged to check BP daily, possible hypotension causing symptoms, consider adjusting BP meds if continue to be low on BP readings. Ordered digital HTN.  Will refer to neurology for cognitive testing and evaluation

## 2022-06-13 ENCOUNTER — PATIENT MESSAGE (OUTPATIENT)
Dept: ADMINISTRATIVE | Facility: OTHER | Age: 82
End: 2022-06-13
Payer: MEDICARE

## 2022-06-20 ENCOUNTER — TELEPHONE (OUTPATIENT)
Dept: OTOLARYNGOLOGY | Facility: CLINIC | Age: 82
End: 2022-06-20
Payer: MEDICARE

## 2022-06-20 NOTE — TELEPHONE ENCOUNTER
Spoke to daughter, told her we needed to reschedule  her mothers audiogram appt to wither today at the [facility] or next week at [facility]. Chose to reschedule to next week. Rescheduled to next Monday 6/27 at 4:30.

## 2022-06-27 ENCOUNTER — CLINICAL SUPPORT (OUTPATIENT)
Dept: AUDIOLOGY | Facility: CLINIC | Age: 82
End: 2022-06-27
Payer: MEDICARE

## 2022-06-27 DIAGNOSIS — H91.90 DECREASED HEARING, UNSPECIFIED LATERALITY: ICD-10-CM

## 2022-06-27 DIAGNOSIS — H90.3 SENSORINEURAL HEARING LOSS, BILATERAL: Primary | ICD-10-CM

## 2022-06-27 DIAGNOSIS — H93.13 TINNITUS, BILATERAL: ICD-10-CM

## 2022-06-27 PROCEDURE — 99999 PR PBB SHADOW E&M-EST. PATIENT-LVL I: CPT | Mod: PBBFAC,,, | Performed by: AUDIOLOGIST-HEARING AID FITTER

## 2022-06-27 PROCEDURE — 92557 PR COMPREHENSIVE HEARING TEST: ICD-10-PCS | Mod: S$GLB,,, | Performed by: AUDIOLOGIST-HEARING AID FITTER

## 2022-06-27 PROCEDURE — 92567 PR TYMPA2METRY: ICD-10-PCS | Mod: S$GLB,,, | Performed by: AUDIOLOGIST-HEARING AID FITTER

## 2022-06-27 PROCEDURE — 92567 TYMPANOMETRY: CPT | Mod: S$GLB,,, | Performed by: AUDIOLOGIST-HEARING AID FITTER

## 2022-06-27 PROCEDURE — 92557 COMPREHENSIVE HEARING TEST: CPT | Mod: S$GLB,,, | Performed by: AUDIOLOGIST-HEARING AID FITTER

## 2022-06-27 PROCEDURE — 99999 PR PBB SHADOW E&M-EST. PATIENT-LVL I: ICD-10-PCS | Mod: PBBFAC,,, | Performed by: AUDIOLOGIST-HEARING AID FITTER

## 2022-06-28 NOTE — PROGRESS NOTES
Referring provider: LORENA Mayeraj Henderson was seen 06/28/2022 for an audiological evaluation.  Patient complains of hearing loss in the bilateral ear starting many years and and gradually declining. She reports equally sided hearing. She has tinnitus in the bilateral ear (non-pulsatile) that has been present for years and is unchanged. No dizziness. No otalgia, aural fullness, pressure or drainage from ears. No significant otologic history.    Results reveal a moderate-to-severe sensorineural hearing loss 250-8000 Hz for the right ear, and a mild-to-severe sensorineural hearing loss 250-8000 Hz for the left ear.   Speech Reception Thresholds were 45 dBHL for the right ear and 45 dBHL for the left ear.   Word recognition scores were good for the right ear and good for the left ear.   Tympanograms were Type A for the right ear and Type A for the left ear.    Patient was counseled on the above findings.    Recommendations:  1. Hearing aids. Patient is provided a copy of her audiogram and will check with [employer].

## 2022-07-06 ENCOUNTER — PATIENT MESSAGE (OUTPATIENT)
Dept: ADMINISTRATIVE | Facility: OTHER | Age: 82
End: 2022-07-06
Payer: MEDICARE

## 2022-07-08 ENCOUNTER — TELEPHONE (OUTPATIENT)
Dept: ADMINISTRATIVE | Facility: HOSPITAL | Age: 82
End: 2022-07-08
Payer: MEDICARE

## 2022-07-12 ENCOUNTER — OFFICE VISIT (OUTPATIENT)
Dept: PSYCHIATRY | Facility: CLINIC | Age: 82
End: 2022-07-12
Payer: MEDICARE

## 2022-07-12 DIAGNOSIS — F33.9 MAJOR DEPRESSION, RECURRENT, CHRONIC: ICD-10-CM

## 2022-07-12 DIAGNOSIS — G47.00 INSOMNIA, UNSPECIFIED TYPE: ICD-10-CM

## 2022-07-12 DIAGNOSIS — F41.1 GAD (GENERALIZED ANXIETY DISORDER): Primary | ICD-10-CM

## 2022-07-12 PROCEDURE — 99214 OFFICE O/P EST MOD 30 MIN: CPT | Mod: 95,,, | Performed by: PSYCHIATRY & NEUROLOGY

## 2022-07-12 PROCEDURE — 99214 PR OFFICE/OUTPT VISIT, EST, LEVL IV, 30-39 MIN: ICD-10-PCS | Mod: 95,,, | Performed by: PSYCHIATRY & NEUROLOGY

## 2022-07-12 PROCEDURE — 99499 RISK ADDL DX/OHS AUDIT: ICD-10-PCS | Mod: 95,,, | Performed by: PSYCHIATRY & NEUROLOGY

## 2022-07-12 PROCEDURE — 99499 UNLISTED E&M SERVICE: CPT | Mod: 95,,, | Performed by: PSYCHIATRY & NEUROLOGY

## 2022-07-12 RX ORDER — BUPROPION HYDROCHLORIDE 150 MG/1
150 TABLET ORAL DAILY
Qty: 90 TABLET | Refills: 1 | Status: SHIPPED | OUTPATIENT
Start: 2022-07-12 | End: 2022-07-19

## 2022-07-12 RX ORDER — BUSPIRONE HYDROCHLORIDE 10 MG/1
10 TABLET ORAL 2 TIMES DAILY
Qty: 180 TABLET | Refills: 0 | Status: SHIPPED | OUTPATIENT
Start: 2022-07-12 | End: 2022-10-17

## 2022-07-12 RX ORDER — QUETIAPINE FUMARATE 50 MG/1
50 TABLET, FILM COATED ORAL NIGHTLY
Qty: 90 TABLET | Refills: 1 | Status: SHIPPED | OUTPATIENT
Start: 2022-07-12 | End: 2022-10-26 | Stop reason: SDUPTHER

## 2022-07-12 RX ORDER — ALPRAZOLAM 0.25 MG/1
TABLET ORAL
Qty: 20 TABLET | Refills: 2 | Status: SHIPPED | OUTPATIENT
Start: 2022-07-12 | End: 2022-10-26 | Stop reason: SDUPTHER

## 2022-07-12 RX ORDER — ESCITALOPRAM OXALATE 10 MG/1
10 TABLET ORAL DAILY
Qty: 90 TABLET | Refills: 1 | Status: SHIPPED | OUTPATIENT
Start: 2022-07-12 | End: 2022-07-19
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
## 2022-08-15 ENCOUNTER — OFFICE VISIT (OUTPATIENT)
Dept: PODIATRY | Facility: CLINIC | Age: 82
End: 2022-08-15
Payer: MEDICARE

## 2022-08-15 DIAGNOSIS — T45.1X5A CHEMOTHERAPY-INDUCED PERIPHERAL NEUROPATHY: Primary | ICD-10-CM

## 2022-08-15 DIAGNOSIS — L60.3 ONYCHODYSTROPHY: ICD-10-CM

## 2022-08-15 DIAGNOSIS — G62.0 CHEMOTHERAPY-INDUCED PERIPHERAL NEUROPATHY: Primary | ICD-10-CM

## 2022-08-15 PROCEDURE — 3288F FALL RISK ASSESSMENT DOCD: CPT | Mod: CPTII,S$GLB,, | Performed by: PODIATRIST

## 2022-08-15 PROCEDURE — 1160F PR REVIEW ALL MEDS BY PRESCRIBER/CLIN PHARMACIST DOCUMENTED: ICD-10-PCS | Mod: CPTII,S$GLB,, | Performed by: PODIATRIST

## 2022-08-15 PROCEDURE — 1101F PR PT FALLS ASSESS DOC 0-1 FALLS W/OUT INJ PAST YR: ICD-10-PCS | Mod: CPTII,S$GLB,, | Performed by: PODIATRIST

## 2022-08-15 PROCEDURE — 99499 UNLISTED E&M SERVICE: CPT | Mod: S$GLB,,, | Performed by: PODIATRIST

## 2022-08-15 PROCEDURE — 99999 PR PBB SHADOW E&M-EST. PATIENT-LVL II: CPT | Mod: PBBFAC,,, | Performed by: PODIATRIST

## 2022-08-15 PROCEDURE — 3288F PR FALLS RISK ASSESSMENT DOCUMENTED: ICD-10-PCS | Mod: CPTII,S$GLB,, | Performed by: PODIATRIST

## 2022-08-15 PROCEDURE — 1159F MED LIST DOCD IN RCRD: CPT | Mod: CPTII,S$GLB,, | Performed by: PODIATRIST

## 2022-08-15 PROCEDURE — 1160F RVW MEDS BY RX/DR IN RCRD: CPT | Mod: CPTII,S$GLB,, | Performed by: PODIATRIST

## 2022-08-15 PROCEDURE — 11721 PR DEBRIDEMENT OF NAILS, 6 OR MORE: ICD-10-PCS | Mod: Q9,S$GLB,, | Performed by: PODIATRIST

## 2022-08-15 PROCEDURE — 99999 PR PBB SHADOW E&M-EST. PATIENT-LVL II: ICD-10-PCS | Mod: PBBFAC,,, | Performed by: PODIATRIST

## 2022-08-15 PROCEDURE — 1101F PT FALLS ASSESS-DOCD LE1/YR: CPT | Mod: CPTII,S$GLB,, | Performed by: PODIATRIST

## 2022-08-15 PROCEDURE — 99499 NO LOS: ICD-10-PCS | Mod: S$GLB,,, | Performed by: PODIATRIST

## 2022-08-15 PROCEDURE — 1159F PR MEDICATION LIST DOCUMENTED IN MEDICAL RECORD: ICD-10-PCS | Mod: CPTII,S$GLB,, | Performed by: PODIATRIST

## 2022-08-15 PROCEDURE — 11721 DEBRIDE NAIL 6 OR MORE: CPT | Mod: Q9,S$GLB,, | Performed by: PODIATRIST

## 2022-08-15 NOTE — PROGRESS NOTES
Subjective:       Patient ID: Lizeth Henderson is a 82 y.o. female.    Chief Complaint: Routine Foot Care (Patient is non diabetic with chemo induced PN. She denies pain at present and was last seen on 6.10.22 by NP Traci Patterson.)      HPI: Patient presents to the office today with the chief complaint of elongated, thickened and dystrophic nail plates to the B/L foot. This patient is a Diabetic Type II. Patient does follow with Primary Care and/or Endocrinology for management of Diabetes Mellitus. This patient's PMD is Lazarus Euceda MD. This patient last saw his/her primary care provider on 06/10/2022    Hemoglobin A1C   Date Value Ref Range Status   11/05/2020 5.4 4.0 - 5.6 % Final     Comment:     ADA Screening Guidelines:  5.7-6.4%  Consistent with prediabetes  >or=6.5%  Consistent with diabetes  High levels of fetal hemoglobin interfere with the HbA1C  assay. Heterozygous hemoglobin variants (HbS, HgC, etc)do  not significantly interfere with this assay.   However, presence of multiple variants may affect accuracy.     01/03/2019 5.6 4.0 - 5.6 % Final     Comment:     ADA Screening Guidelines:  5.7-6.4%  Consistent with prediabetes  >or=6.5%  Consistent with diabetes  High levels of fetal hemoglobin interfere with the HbA1C  assay. Heterozygous hemoglobin variants (HbS, HgC, etc)do  not significantly interfere with this assay.   However, presence of multiple variants may affect accuracy.     12/06/2017 5.0 4.0 - 5.6 % Final     Comment:     According to ADA guidelines, hemoglobin A1c <7.0% represents  optimal control in non-pregnant diabetic patients. Different  metrics may apply to specific patient populations.   Standards of Medical Care in Diabetes-2016.  For the purpose of screening for the presence of diabetes:  <5.7%     Consistent with the absence of diabetes  5.7-6.4%  Consistent with increasing risk for diabetes   (prediabetes)  >or=6.5%  Consistent with diabetes  Currently, no consensus  exists for use of hemoglobin A1c  for diagnosis of diabetes for children.  This Hemoglobin A1c assay has significant interference with fetal   hemoglobin   (HbF). The results are invalid for patients with abnormal amounts of   HbF,   including those with known Hereditary Persistence   of Fetal Hemoglobin. Heterozygous hemoglobin variants (HbAS, HbAC,   HbAD, HbAE, HbA2) do not significantly interfere with this assay;   however, presence of multiple variants in a sample may impact the %   interference.     .     Review of patient's allergies indicates:   Allergen Reactions    Lisinopril      cough       Past Medical History:   Diagnosis Date    Adrenal adenoma 2016    Anxiety     Arthritis     Benign hypertension     Colon cancer age 61    colon    Coronary artery disease     Depression     Full dentures     General anesthetics causing adverse effect in therapeutic use     yells and talks when wakes up    Hyperlipidemia     Osteopenia     Shingles     Wears glasses        Family History   Problem Relation Age of Onset    Cancer Father     Hypertension Father     Arthritis Father     Alzheimer's disease Mother     Arthritis Mother     Depression Daughter     Kidney disease Daughter     Ulcerative colitis Daughter     Depression Daughter     Depression Daughter     Anxiety disorder Daughter         PTSD    Cancer Maternal Uncle     Cancer Cousin         colon cancer    Early death Maternal Grandmother     Amblyopia Neg Hx     Blindness Neg Hx     Cataracts Neg Hx     Diabetes Neg Hx     Glaucoma Neg Hx     Macular degeneration Neg Hx     Retinal detachment Neg Hx     Strabismus Neg Hx     Stroke Neg Hx     Thyroid disease Neg Hx        Social History     Socioeconomic History    Marital status:     Number of children: 3   Tobacco Use    Smoking status: Never Smoker    Smokeless tobacco: Never Used   Substance and Sexual Activity    Alcohol use: No    Drug use: No     Sexual activity: Never   Social History Narrative    The patient does not exercise regularly ().    Rates diet as poor.    She is not satisfied with weight.             Social Determinants of Health     Financial Resource Strain: Low Risk     Difficulty of Paying Living Expenses: Not hard at all   Food Insecurity: No Food Insecurity    Worried About Running Out of Food in the Last Year: Never true    Ran Out of Food in the Last Year: Never true   Transportation Needs: No Transportation Needs    Lack of Transportation (Medical): No    Lack of Transportation (Non-Medical): No   Physical Activity: Inactive    Days of Exercise per Week: 0 days    Minutes of Exercise per Session: 0 min   Stress: Stress Concern Present    Feeling of Stress : To some extent   Social Connections: Unknown    Frequency of Communication with Friends and Family: Three times a week    Frequency of Social Gatherings with Friends and Family: More than three times a week    Active Member of Clubs or Organizations: No    Attends Club or Organization Meetings: Never    Marital Status:    Housing Stability: Low Risk     Unable to Pay for Housing in the Last Year: No    Number of Places Lived in the Last Year: 1    Unstable Housing in the Last Year: No       Past Surgical History:   Procedure Laterality Date    APPENDECTOMY      BREAST BIOPSY Left     benign    BUNIONECTOMY Left     CATARACT EXTRACTION Bilateral     COLON SURGERY      2001    EYE SURGERY      cataract surg    HEMICOLECTOMY  2002    HERNIA REPAIR      x5    JOINT REPLACEMENT      knee    left toe surgery      joelle    PATENT DUCTUS ARTERIOUS LIGATION  1948    SALPINGOOPHORECTOMY  2002    due to colon cancer    TONSILLECTOMY, ADENOIDECTOMY         Review of Systems       Objective:   There were no vitals taken for this visit.    Physical Exam  LOWER EXTREMITY PHYSICAL EXAMINATION    VASCULAR:  The right dorsalis pedis pulse 2/4 and the right  posterior tibial pulse 2/4.  The left dorsalis pedis pulse 2/4 and posterior tibial pulse on the left is 2/4.  Capillary refill is intact.  Pedal hair growth intact    NEUROLOGY: Protective sensation is not intact to the left and right plantar surfaces of the foot and digits, as the patient has no sensation/detection at greater than 4 distinct points of contact with 5.07 Honokaa Aishwarya monofilament. Sensation to light touch is intact on the left and right foot. Proprioception is intact, bilateral. Sensation to pin prick is reduced to absent. Vibratory sensation is diminished.    DERMATOLOGY:  Skin is supple, moist, intact.  There is no signs of callusing, ulcerations, other lesions identified to the dorsal or plantar aspect of the right or left foot.  The R1, 2, 5 and left L1,2, 5 are thickened, discolored dystrophic.  There is subungual debris.  Nail plates have area of dark discoloration.  The remaining nails 3-4 on the right foot and the left foot are elongated but of normal color, thickness, and texture.   There is no signs of ingrowing into the medial or lateral borders.  There is no evidence of wounds or skin breakdown.  No edema or erythema.  No obvious lacerations or fissuring.  Interdigital spaces are clean, dry, intact.  No rashes or scars appreciated.    ORTHOPEDIC: Manual Muscle Testing is 5/5 in all planes on the left and right, without pains, with and without resistance. Gait pattern is non-antalgic.    Assessment:     1. Chemotherapy-induced peripheral neuropathy    2. Onychodystrophy        Plan:     Chemotherapy-induced peripheral neuropathy    Onychodystrophy        Thorough discussion is had with the patient this afternoon, concerning the diagnosis, its etiology, and the treatment algorithm at present.  Greater than 50% of this visit spent on counseling and coordination of care. Greater than 15 minutes of a 20 minute appointment spent on education about the diabetic foot, neuropathy, and  prevention of limb loss.  Shoe inspection. Diabetic Foot Education. Patient reminded of the importance of good nutrition and blood sugar control to help prevent podiatric complications of diabetes. Patient instructed on proper foot hygeine. We discussed wearing proper and supportive shoe gear, daily foot inspections, never walking barefooted or sock footed, never putting sharp instruments to feet which can cause major complications associated with infection, ulcers, lacerations.      Dystrophic nail plates, as outlined above (R#1,2,5  ; L#1,2,5 ), are sharply debrided with double action nail nipper, and/or with the assistance of a mechanical rotary nehemias, with removal of all offending nail and nail border(s), for reduction of pains. Nails are reduced in terms of length, width and girth with removal of subungual debris to facilitate pain free weight bearing and ambulation. The elongated nails as outlined in the objective portion of this note, were trimmed to appropriate length, with a double action nail nipper, for alleviation/reduction of pains as well. Follow up in approx. 3-4 months.        Future Appointments   Date Time Provider Department Center   8/24/2022 12:00 PM JUSTUS Pena [id number]   11/15/2022  2:45 PM Danyell Nails DPM [facility]

## 2022-08-23 ENCOUNTER — PES CALL (OUTPATIENT)
Dept: ADMINISTRATIVE | Facility: CLINIC | Age: 82
End: 2022-08-23
Payer: MEDICARE

## 2022-08-24 DIAGNOSIS — Z78.0 MENOPAUSE: ICD-10-CM

## 2022-09-02 ENCOUNTER — PES CALL (OUTPATIENT)
Dept: ADMINISTRATIVE | Facility: CLINIC | Age: 82
End: 2022-09-02
Payer: MEDICARE

## 2022-09-09 ENCOUNTER — PES CALL (OUTPATIENT)
Dept: ADMINISTRATIVE | Facility: CLINIC | Age: 82
End: 2022-09-09
Payer: MEDICARE

## 2022-10-18 ENCOUNTER — PES CALL (OUTPATIENT)
Dept: ADMINISTRATIVE | Facility: OTHER | Age: 82
End: 2022-10-18
Payer: MEDICARE

## 2022-10-26 ENCOUNTER — OFFICE VISIT (OUTPATIENT)
Dept: PSYCHIATRY | Facility: CLINIC | Age: 82
End: 2022-10-26
Payer: MEDICARE

## 2022-10-26 DIAGNOSIS — F33.9 MAJOR DEPRESSION, RECURRENT, CHRONIC: ICD-10-CM

## 2022-10-26 DIAGNOSIS — G47.00 INSOMNIA, UNSPECIFIED TYPE: ICD-10-CM

## 2022-10-26 DIAGNOSIS — F41.1 GAD (GENERALIZED ANXIETY DISORDER): Primary | ICD-10-CM

## 2022-10-26 PROCEDURE — 99999 PR PBB SHADOW E&M-EST. PATIENT-LVL I: CPT | Mod: PBBFAC,,, | Performed by: PSYCHIATRY & NEUROLOGY

## 2022-10-26 PROCEDURE — 99499 UNLISTED E&M SERVICE: CPT | Mod: 95,,, | Performed by: PSYCHIATRY & NEUROLOGY

## 2022-10-26 PROCEDURE — 99214 PR OFFICE/OUTPT VISIT, EST, LEVL IV, 30-39 MIN: ICD-10-PCS | Mod: S$GLB,,, | Performed by: PSYCHIATRY & NEUROLOGY

## 2022-10-26 PROCEDURE — 99214 OFFICE O/P EST MOD 30 MIN: CPT | Mod: S$GLB,,, | Performed by: PSYCHIATRY & NEUROLOGY

## 2022-10-26 PROCEDURE — 99499 RISK ADDL DX/OHS AUDIT: ICD-10-PCS | Mod: 95,,, | Performed by: PSYCHIATRY & NEUROLOGY

## 2022-10-26 PROCEDURE — 99999 PR PBB SHADOW E&M-EST. PATIENT-LVL I: ICD-10-PCS | Mod: PBBFAC,,, | Performed by: PSYCHIATRY & NEUROLOGY

## 2022-10-26 RX ORDER — BUPROPION HYDROCHLORIDE 150 MG/1
150 TABLET ORAL DAILY
Qty: 90 TABLET | Refills: 0 | Status: SHIPPED | OUTPATIENT
Start: 2022-10-26 | End: 2023-03-14 | Stop reason: SDUPTHER

## 2022-10-26 RX ORDER — BUSPIRONE HYDROCHLORIDE 15 MG/1
15 TABLET ORAL 2 TIMES DAILY
Qty: 60 TABLET | Refills: 3 | Status: SHIPPED | OUTPATIENT
Start: 2022-10-26 | End: 2023-01-11

## 2022-10-26 RX ORDER — QUETIAPINE FUMARATE 50 MG/1
50 TABLET, FILM COATED ORAL NIGHTLY
Qty: 90 TABLET | Refills: 0 | Status: SHIPPED | OUTPATIENT
Start: 2022-10-26 | End: 2023-03-14 | Stop reason: SDUPTHER

## 2022-10-26 RX ORDER — ESCITALOPRAM OXALATE 10 MG/1
10 TABLET ORAL DAILY
Qty: 90 TABLET | Refills: 0 | Status: SHIPPED | OUTPATIENT
Start: 2022-10-26 | End: 2023-03-14 | Stop reason: SDUPTHER

## 2022-10-26 RX ORDER — ALPRAZOLAM 0.25 MG/1
TABLET ORAL
Qty: 20 TABLET | Refills: 2 | Status: SHIPPED | OUTPATIENT
Start: 2022-10-26 | End: 2023-06-14
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
## 2022-10-29 ENCOUNTER — IMMUNIZATION (OUTPATIENT)
Dept: INTERNAL MEDICINE | Facility: CLINIC | Age: 82
End: 2022-10-29
Payer: MEDICARE

## 2022-10-29 PROCEDURE — G0008 FLU VACCINE - QUADRIVALENT - ADJUVANTED: ICD-10-PCS | Mod: S$GLB,,, | Performed by: FAMILY MEDICINE

## 2022-10-29 PROCEDURE — 90694 VACC AIIV4 NO PRSRV 0.5ML IM: CPT | Mod: S$GLB,,, | Performed by: FAMILY MEDICINE

## 2022-10-29 PROCEDURE — 90694 FLU VACCINE - QUADRIVALENT - ADJUVANTED: ICD-10-PCS | Mod: S$GLB,,, | Performed by: FAMILY MEDICINE

## 2022-10-29 PROCEDURE — G0008 ADMIN INFLUENZA VIRUS VAC: HCPCS | Mod: S$GLB,,, | Performed by: FAMILY MEDICINE

## 2022-11-08 ENCOUNTER — TELEPHONE (OUTPATIENT)
Dept: PODIATRY | Facility: CLINIC | Age: 82
End: 2022-11-08
Payer: MEDICARE

## 2022-11-23 ENCOUNTER — OFFICE VISIT (OUTPATIENT)
Dept: PODIATRY | Facility: CLINIC | Age: 82
End: 2022-11-23
Payer: MEDICARE

## 2022-11-23 VITALS — BODY MASS INDEX: 31.91 KG/M2 | WEIGHT: 210.56 LBS | HEIGHT: 68 IN

## 2022-11-23 DIAGNOSIS — G62.0 CHEMOTHERAPY-INDUCED PERIPHERAL NEUROPATHY: Primary | ICD-10-CM

## 2022-11-23 DIAGNOSIS — T45.1X5A CHEMOTHERAPY-INDUCED PERIPHERAL NEUROPATHY: Primary | ICD-10-CM

## 2022-11-23 DIAGNOSIS — L60.3 ONYCHODYSTROPHY: ICD-10-CM

## 2022-11-23 PROCEDURE — 1159F PR MEDICATION LIST DOCUMENTED IN MEDICAL RECORD: ICD-10-PCS | Mod: CPTII,S$GLB,, | Performed by: PODIATRIST

## 2022-11-23 PROCEDURE — 11721 PR DEBRIDEMENT OF NAILS, 6 OR MORE: ICD-10-PCS | Mod: Q9,S$GLB,, | Performed by: PODIATRIST

## 2022-11-23 PROCEDURE — 99499 NO LOS: ICD-10-PCS | Mod: S$GLB,,, | Performed by: PODIATRIST

## 2022-11-23 PROCEDURE — 1160F PR REVIEW ALL MEDS BY PRESCRIBER/CLIN PHARMACIST DOCUMENTED: ICD-10-PCS | Mod: CPTII,S$GLB,, | Performed by: PODIATRIST

## 2022-11-23 PROCEDURE — 11721 DEBRIDE NAIL 6 OR MORE: CPT | Mod: Q9,S$GLB,, | Performed by: PODIATRIST

## 2022-11-23 PROCEDURE — 99499 UNLISTED E&M SERVICE: CPT | Mod: S$GLB,,, | Performed by: PODIATRIST

## 2022-11-23 PROCEDURE — 99999 PR PBB SHADOW E&M-EST. PATIENT-LVL III: ICD-10-PCS | Mod: PBBFAC,,, | Performed by: PODIATRIST

## 2022-11-23 PROCEDURE — 1160F RVW MEDS BY RX/DR IN RCRD: CPT | Mod: CPTII,S$GLB,, | Performed by: PODIATRIST

## 2022-11-23 PROCEDURE — 1126F AMNT PAIN NOTED NONE PRSNT: CPT | Mod: CPTII,S$GLB,, | Performed by: PODIATRIST

## 2022-11-23 PROCEDURE — 1126F PR PAIN SEVERITY QUANTIFIED, NO PAIN PRESENT: ICD-10-PCS | Mod: CPTII,S$GLB,, | Performed by: PODIATRIST

## 2022-11-23 PROCEDURE — 99999 PR PBB SHADOW E&M-EST. PATIENT-LVL III: CPT | Mod: PBBFAC,,, | Performed by: PODIATRIST

## 2022-11-23 PROCEDURE — 1159F MED LIST DOCD IN RCRD: CPT | Mod: CPTII,S$GLB,, | Performed by: PODIATRIST

## 2022-11-23 NOTE — PROGRESS NOTES
Subjective:       Patient ID: Lizeth Henderson is a 82 y.o. female.    Chief Complaint: Nail Care (0/10 pain, pre diabetic pcp: dr. Euceda last seen 06/07/21)      HPI: Patient presents to the office today with the chief complaint of elongated, thickened and dystrophic nail plates to the B/L foot. This patient is a Diabetic Type II. Patient does follow with Primary Care and/or Endocrinology for management of Diabetes Mellitus. This patient's PMD is Lazarus Euceda MD. This patient last saw his/her primary care provider on 06/10/2022    Hemoglobin A1C   Date Value Ref Range Status   11/05/2020 5.4 4.0 - 5.6 % Final     Comment:     ADA Screening Guidelines:  5.7-6.4%  Consistent with prediabetes  >or=6.5%  Consistent with diabetes  High levels of fetal hemoglobin interfere with the HbA1C  assay. Heterozygous hemoglobin variants (HbS, HgC, etc)do  not significantly interfere with this assay.   However, presence of multiple variants may affect accuracy.     01/03/2019 5.6 4.0 - 5.6 % Final     Comment:     ADA Screening Guidelines:  5.7-6.4%  Consistent with prediabetes  >or=6.5%  Consistent with diabetes  High levels of fetal hemoglobin interfere with the HbA1C  assay. Heterozygous hemoglobin variants (HbS, HgC, etc)do  not significantly interfere with this assay.   However, presence of multiple variants may affect accuracy.     12/06/2017 5.0 4.0 - 5.6 % Final     Comment:     According to ADA guidelines, hemoglobin A1c <7.0% represents  optimal control in non-pregnant diabetic patients. Different  metrics may apply to specific patient populations.   Standards of Medical Care in Diabetes-2016.  For the purpose of screening for the presence of diabetes:  <5.7%     Consistent with the absence of diabetes  5.7-6.4%  Consistent with increasing risk for diabetes   (prediabetes)  >or=6.5%  Consistent with diabetes  Currently, no consensus exists for use of hemoglobin A1c  for diagnosis of diabetes for  children.  This Hemoglobin A1c assay has significant interference with fetal   hemoglobin   (HbF). The results are invalid for patients with abnormal amounts of   HbF,   including those with known Hereditary Persistence   of Fetal Hemoglobin. Heterozygous hemoglobin variants (HbAS, HbAC,   HbAD, HbAE, HbA2) do not significantly interfere with this assay;   however, presence of multiple variants in a sample may impact the %   interference.     .     Review of patient's allergies indicates:   Allergen Reactions    Lisinopril      cough       Past Medical History:   Diagnosis Date    Adrenal adenoma 2016    Anxiety     Arthritis     Benign hypertension     Colon cancer age 61    colon    Coronary artery disease     Depression     Full dentures     General anesthetics causing adverse effect in therapeutic use     yells and talks when wakes up    Hyperlipidemia     Osteopenia     Shingles     Wears glasses        Family History   Problem Relation Age of Onset    Cancer Father     Hypertension Father     Arthritis Father     Alzheimer's disease Mother     Arthritis Mother     Depression Daughter     Kidney disease Daughter     Ulcerative colitis Daughter     Depression Daughter     Depression Daughter     Anxiety disorder Daughter         PTSD    Cancer Maternal Uncle     Cancer Cousin         colon cancer    Early death Maternal Grandmother     Amblyopia Neg Hx     Blindness Neg Hx     Cataracts Neg Hx     Diabetes Neg Hx     Glaucoma Neg Hx     Macular degeneration Neg Hx     Retinal detachment Neg Hx     Strabismus Neg Hx     Stroke Neg Hx     Thyroid disease Neg Hx        Social History     Socioeconomic History    Marital status:     Number of children: 3   Tobacco Use    Smoking status: Never    Smokeless tobacco: Never   Substance and Sexual Activity    Alcohol use: No    Drug use: No    Sexual activity: Never   Social History Narrative    The patient does not exercise regularly ().    Rates diet as poor.     "She is not satisfied with weight.             Social Determinants of Health     Financial Resource Strain: Low Risk     Difficulty of Paying Living Expenses: Not hard at all   Food Insecurity: No Food Insecurity    Worried About Running Out of Food in the Last Year: Never true    Ran Out of Food in the Last Year: Never true   Transportation Needs: No Transportation Needs    Lack of Transportation (Medical): No    Lack of Transportation (Non-Medical): No   Physical Activity: Inactive    Days of Exercise per Week: 0 days    Minutes of Exercise per Session: 0 min   Stress: Stress Concern Present    Feeling of Stress : To some extent   Social Connections: Unknown    Frequency of Communication with Friends and Family: Three times a week    Frequency of Social Gatherings with Friends and Family: More than three times a week    Active Member of Clubs or Organizations: No    Attends Club or Organization Meetings: Never    Marital Status:    Housing Stability: Low Risk     Unable to Pay for Housing in the Last Year: No    Number of Places Lived in the Last Year: 1    Unstable Housing in the Last Year: No       Past Surgical History:   Procedure Laterality Date    APPENDECTOMY      BREAST BIOPSY Left     benign    BUNIONECTOMY Left     CATARACT EXTRACTION Bilateral     COLON SURGERY      2001    EYE SURGERY      cataract surg    HEMICOLECTOMY  2002    HERNIA REPAIR      x5    JOINT REPLACEMENT      knee    left toe surgery      joelle    PATENT DUCTUS ARTERIOUS LIGATION  1948    SALPINGOOPHORECTOMY  2002    due to colon cancer    TONSILLECTOMY, ADENOIDECTOMY         Review of Systems       Objective:   Ht 5' 8" (1.727 m)   Wt 95.5 kg (210 lb 8.6 oz)   BMI 32.01 kg/m²     Physical Exam  LOWER EXTREMITY PHYSICAL EXAMINATION    VASCULAR:  The right dorsalis pedis pulse 2/4 and the right posterior tibial pulse 2/4.  The left dorsalis pedis pulse 2/4 and posterior tibial pulse on the left is 2/4.  Capillary refill is " intact.  Pedal hair growth intact    NEUROLOGY: Protective sensation is not intact to the left and right plantar surfaces of the foot and digits, as the patient has no sensation/detection at greater than 4 distinct points of contact with 5.07 Eustis Aishwarya monofilament. Sensation to light touch is intact on the left and right foot. Proprioception is intact, bilateral. Sensation to pin prick is reduced to absent. Vibratory sensation is diminished.    DERMATOLOGY:  Skin is supple, moist, intact.  There is no signs of callusing, ulcerations, other lesions identified to the dorsal or plantar aspect of the right or left foot.  The R1, 2, 5 and left L1,2, 5 are thickened, discolored dystrophic.  There is subungual debris.  Nail plates have area of dark discoloration.  The remaining nails 3-4 on the right foot and the left foot are elongated but of normal color, thickness, and texture.   There is no signs of ingrowing into the medial or lateral borders.  There is no evidence of wounds or skin breakdown.  No edema or erythema.  No obvious lacerations or fissuring.  Interdigital spaces are clean, dry, intact.  No rashes or scars appreciated.    ORTHOPEDIC: Manual Muscle Testing is 5/5 in all planes on the left and right, without pains, with and without resistance. Gait pattern is non-antalgic.    Assessment:     1. Chemotherapy-induced peripheral neuropathy    2. Onychodystrophy        Plan:     Chemotherapy-induced peripheral neuropathy    Onychodystrophy      Thorough discussion is had with the patient this afternoon, concerning the diagnosis, its etiology, and the treatment algorithm at present.  Greater than 50% of this visit spent on counseling and coordination of care. Greater than 15 minutes of a 20 minute appointment spent on education about the diabetic foot, neuropathy, and prevention of limb loss.  Shoe inspection. Diabetic Foot Education. Patient reminded of the importance of good nutrition and blood sugar  control to help prevent podiatric complications of diabetes. Patient instructed on proper foot hygeine. We discussed wearing proper and supportive shoe gear, daily foot inspections, never walking barefooted or sock footed, never putting sharp instruments to feet which can cause major complications associated with infection, ulcers, lacerations.      Dystrophic nail plates, as outlined above (R#1,2,5  ; L#1,2,5 ), are sharply debrided with double action nail nipper, and/or with the assistance of a mechanical rotary nehemias, with removal of all offending nail and nail border(s), for reduction of pains. Nails are reduced in terms of length, width and girth with removal of subungual debris to facilitate pain free weight bearing and ambulation. The elongated nails as outlined in the objective portion of this note, were trimmed to appropriate length, with a double action nail nipper, for alleviation/reduction of pains as well. Follow up in approx. 3-4 months.        Future Appointments   Date Time Provider Department Center   2/20/2023  3:00 PM Danyell Nails DPM [facility]

## 2022-11-29 ENCOUNTER — PATIENT MESSAGE (OUTPATIENT)
Dept: PSYCHIATRY | Facility: CLINIC | Age: 82
End: 2022-11-29
Payer: MEDICARE

## 2022-12-02 ENCOUNTER — TELEPHONE (OUTPATIENT)
Dept: PSYCHIATRY | Facility: CLINIC | Age: 82
End: 2022-12-02
Payer: MEDICARE

## 2023-01-30 ENCOUNTER — PES CALL (OUTPATIENT)
Dept: ADMINISTRATIVE | Facility: OTHER | Age: 83
End: 2023-01-30
Payer: MEDICARE

## 2023-02-09 DIAGNOSIS — Z00.00 ENCOUNTER FOR MEDICARE ANNUAL WELLNESS EXAM: ICD-10-CM

## 2023-02-20 ENCOUNTER — OFFICE VISIT (OUTPATIENT)
Dept: PODIATRY | Facility: CLINIC | Age: 83
End: 2023-02-20
Payer: MEDICARE

## 2023-02-20 VITALS — HEIGHT: 68 IN | WEIGHT: 210.56 LBS | BODY MASS INDEX: 31.91 KG/M2

## 2023-02-20 DIAGNOSIS — L60.3 ONYCHODYSTROPHY: ICD-10-CM

## 2023-02-20 DIAGNOSIS — T45.1X5A CHEMOTHERAPY-INDUCED PERIPHERAL NEUROPATHY: Primary | ICD-10-CM

## 2023-02-20 DIAGNOSIS — G62.0 CHEMOTHERAPY-INDUCED PERIPHERAL NEUROPATHY: Primary | ICD-10-CM

## 2023-02-20 PROCEDURE — 11721 DEBRIDE NAIL 6 OR MORE: CPT | Mod: Q9,HCNC,S$GLB, | Performed by: PODIATRIST

## 2023-02-20 PROCEDURE — 1126F AMNT PAIN NOTED NONE PRSNT: CPT | Mod: HCNC,CPTII,S$GLB, | Performed by: PODIATRIST

## 2023-02-20 PROCEDURE — 1101F PR PT FALLS ASSESS DOC 0-1 FALLS W/OUT INJ PAST YR: ICD-10-PCS | Mod: HCNC,CPTII,S$GLB, | Performed by: PODIATRIST

## 2023-02-20 PROCEDURE — 3288F FALL RISK ASSESSMENT DOCD: CPT | Mod: HCNC,CPTII,S$GLB, | Performed by: PODIATRIST

## 2023-02-20 PROCEDURE — 1159F PR MEDICATION LIST DOCUMENTED IN MEDICAL RECORD: ICD-10-PCS | Mod: HCNC,CPTII,S$GLB, | Performed by: PODIATRIST

## 2023-02-20 PROCEDURE — 99499 NO LOS: ICD-10-PCS | Mod: HCNC,S$GLB,, | Performed by: PODIATRIST

## 2023-02-20 PROCEDURE — 1126F PR PAIN SEVERITY QUANTIFIED, NO PAIN PRESENT: ICD-10-PCS | Mod: HCNC,CPTII,S$GLB, | Performed by: PODIATRIST

## 2023-02-20 PROCEDURE — 99499 UNLISTED E&M SERVICE: CPT | Mod: HCNC,S$GLB,, | Performed by: PODIATRIST

## 2023-02-20 PROCEDURE — 1160F RVW MEDS BY RX/DR IN RCRD: CPT | Mod: HCNC,CPTII,S$GLB, | Performed by: PODIATRIST

## 2023-02-20 PROCEDURE — 99999 PR PBB SHADOW E&M-EST. PATIENT-LVL III: ICD-10-PCS | Mod: PBBFAC,HCNC,, | Performed by: PODIATRIST

## 2023-02-20 PROCEDURE — 11721 PR DEBRIDEMENT OF NAILS, 6 OR MORE: ICD-10-PCS | Mod: Q9,HCNC,S$GLB, | Performed by: PODIATRIST

## 2023-02-20 PROCEDURE — 1160F PR REVIEW ALL MEDS BY PRESCRIBER/CLIN PHARMACIST DOCUMENTED: ICD-10-PCS | Mod: HCNC,CPTII,S$GLB, | Performed by: PODIATRIST

## 2023-02-20 PROCEDURE — 3288F PR FALLS RISK ASSESSMENT DOCUMENTED: ICD-10-PCS | Mod: HCNC,CPTII,S$GLB, | Performed by: PODIATRIST

## 2023-02-20 PROCEDURE — 1101F PT FALLS ASSESS-DOCD LE1/YR: CPT | Mod: HCNC,CPTII,S$GLB, | Performed by: PODIATRIST

## 2023-02-20 PROCEDURE — 99999 PR PBB SHADOW E&M-EST. PATIENT-LVL III: CPT | Mod: PBBFAC,HCNC,, | Performed by: PODIATRIST

## 2023-02-20 PROCEDURE — 1159F MED LIST DOCD IN RCRD: CPT | Mod: HCNC,CPTII,S$GLB, | Performed by: PODIATRIST

## 2023-02-21 NOTE — PROGRESS NOTES
Subjective:       Patient ID: Lizeth Henderson is a 82 y.o. female.    Chief Complaint: Nail Care (Coming in for nail care, rates pain 0/10, but has soreness, nondiabetic, wearing socks and shoes,last seen PCP Dr. Euceda on 09/09/2022.)    HPI: Patient presents to the office today with the chief complaint of elongated, thickened and dystrophic nail plates to the B/L foot. This patient is a Diabetic Type II. Patient does follow with Primary Care and/or Endocrinology for management of Diabetes Mellitus. This patient's PMD is Lazarus Euceda MD. This patient last saw his/her primary care provider on 09/09/2022    Hemoglobin A1C   Date Value Ref Range Status   11/05/2020 5.4 4.0 - 5.6 % Final     Comment:     ADA Screening Guidelines:  5.7-6.4%  Consistent with prediabetes  >or=6.5%  Consistent with diabetes  High levels of fetal hemoglobin interfere with the HbA1C  assay. Heterozygous hemoglobin variants (HbS, HgC, etc)do  not significantly interfere with this assay.   However, presence of multiple variants may affect accuracy.     01/03/2019 5.6 4.0 - 5.6 % Final     Comment:     ADA Screening Guidelines:  5.7-6.4%  Consistent with prediabetes  >or=6.5%  Consistent with diabetes  High levels of fetal hemoglobin interfere with the HbA1C  assay. Heterozygous hemoglobin variants (HbS, HgC, etc)do  not significantly interfere with this assay.   However, presence of multiple variants may affect accuracy.     12/06/2017 5.0 4.0 - 5.6 % Final     Comment:     According to ADA guidelines, hemoglobin A1c <7.0% represents  optimal control in non-pregnant diabetic patients. Different  metrics may apply to specific patient populations.   Standards of Medical Care in Diabetes-2016.  For the purpose of screening for the presence of diabetes:  <5.7%     Consistent with the absence of diabetes  5.7-6.4%  Consistent with increasing risk for diabetes   (prediabetes)  >or=6.5%  Consistent with diabetes  Currently, no consensus exists  for use of hemoglobin A1c  for diagnosis of diabetes for children.  This Hemoglobin A1c assay has significant interference with fetal   hemoglobin   (HbF). The results are invalid for patients with abnormal amounts of   HbF,   including those with known Hereditary Persistence   of Fetal Hemoglobin. Heterozygous hemoglobin variants (HbAS, HbAC,   HbAD, HbAE, HbA2) do not significantly interfere with this assay;   however, presence of multiple variants in a sample may impact the %   interference.     .     Review of patient's allergies indicates:   Allergen Reactions    Lisinopril      cough       Past Medical History:   Diagnosis Date    Adrenal adenoma 2016    Anxiety     Arthritis     Benign hypertension     Colon cancer age 61    colon    Coronary artery disease     Depression     Full dentures     General anesthetics causing adverse effect in therapeutic use     yells and talks when wakes up    Hyperlipidemia     Osteopenia     Shingles     Wears glasses        Family History   Problem Relation Age of Onset    Cancer Father     Hypertension Father     Arthritis Father     Alzheimer's disease Mother     Arthritis Mother     Depression Daughter     Kidney disease Daughter     Ulcerative colitis Daughter     Depression Daughter     Depression Daughter     Anxiety disorder Daughter         PTSD    Cancer Maternal Uncle     Cancer Cousin         colon cancer    Early death Maternal Grandmother     Amblyopia Neg Hx     Blindness Neg Hx     Cataracts Neg Hx     Diabetes Neg Hx     Glaucoma Neg Hx     Macular degeneration Neg Hx     Retinal detachment Neg Hx     Strabismus Neg Hx     Stroke Neg Hx     Thyroid disease Neg Hx        Social History     Socioeconomic History    Marital status:     Number of children: 3   Tobacco Use    Smoking status: Never    Smokeless tobacco: Never   Substance and Sexual Activity    Alcohol use: No    Drug use: No    Sexual activity: Never   Social History Narrative    The patient  "does not exercise regularly ().    Rates diet as poor.    She is not satisfied with weight.             Social Determinants of Health     Financial Resource Strain: Low Risk     Difficulty of Paying Living Expenses: Not hard at all   Food Insecurity: No Food Insecurity    Worried About Running Out of Food in the Last Year: Never true    Ran Out of Food in the Last Year: Never true   Transportation Needs: No Transportation Needs    Lack of Transportation (Medical): No    Lack of Transportation (Non-Medical): No   Physical Activity: Inactive    Days of Exercise per Week: 0 days    Minutes of Exercise per Session: 0 min   Stress: Stress Concern Present    Feeling of Stress : To some extent   Social Connections: Unknown    Frequency of Communication with Friends and Family: Three times a week    Frequency of Social Gatherings with Friends and Family: More than three times a week    Active Member of Clubs or Organizations: No    Attends Club or Organization Meetings: Never    Marital Status:    Housing Stability: Low Risk     Unable to Pay for Housing in the Last Year: No    Number of Places Lived in the Last Year: 1    Unstable Housing in the Last Year: No       Past Surgical History:   Procedure Laterality Date    APPENDECTOMY      BREAST BIOPSY Left     benign    BUNIONECTOMY Left     CATARACT EXTRACTION Bilateral     COLON SURGERY      2001    EYE SURGERY      cataract surg    HEMICOLECTOMY  2002    HERNIA REPAIR      x5    JOINT REPLACEMENT      knee    left toe surgery      joelle    PATENT DUCTUS ARTERIOUS LIGATION  1948    SALPINGOOPHORECTOMY  2002    due to colon cancer    TONSILLECTOMY, ADENOIDECTOMY         Review of Systems       Objective:   Ht 5' 8" (1.727 m)   Wt 95.5 kg (210 lb 8.6 oz)   BMI 32.01 kg/m²     Physical Exam  LOWER EXTREMITY PHYSICAL EXAMINATION    VASCULAR:  The right dorsalis pedis pulse 2/4 and the right posterior tibial pulse 2/4.  The left dorsalis pedis pulse 2/4 and " posterior tibial pulse on the left is 2/4.  Capillary refill is intact.  Pedal hair growth intact    NEUROLOGY: Protective sensation is not intact to the left and right plantar surfaces of the foot and digits, as the patient has no sensation/detection at greater than 4 distinct points of contact with 5.07 [place] Aishwarya monofilament. Sensation to light touch is intact on the left and right foot. Proprioception is intact, bilateral. Sensation to pin prick is reduced to absent. Vibratory sensation is diminished.    DERMATOLOGY:  Skin is supple, moist, intact.  There is no signs of callusing, ulcerations, other lesions identified to the dorsal or plantar aspect of the right or left foot.  The R1, 2, 5 and left L1,2, 5 are thickened, discolored dystrophic.  There is subungual debris.  Nail plates have area of dark discoloration.  The remaining nails 3-4 on the right foot and the left foot are elongated but of normal color, thickness, and texture.   There is no signs of ingrowing into the medial or lateral borders.  There is no evidence of wounds or skin breakdown.  No edema or erythema.  No obvious lacerations or fissuring.  Interdigital spaces are clean, dry, intact.  No rashes or scars appreciated.    ORTHOPEDIC: Manual Muscle Testing is 5/5 in all planes on the left and right, without pains, with and without resistance. Gait pattern is non-antalgic.    Assessment:     1. Chemotherapy-induced peripheral neuropathy    2. Onychodystrophy        Plan:     Chemotherapy-induced peripheral neuropathy    Onychodystrophy      Thorough discussion is had with the patient this afternoon, concerning the diagnosis, its etiology, and the treatment algorithm at present.  Greater than 50% of this visit spent on counseling and coordination of care. Greater than 15 minutes of a 20 minute appointment spent on education about the diabetic foot, neuropathy, and prevention of limb loss.  Shoe inspection. Diabetic Foot Education. Patient  reminded of the importance of good nutrition and blood sugar control to help prevent podiatric complications of diabetes. Patient instructed on proper foot hygeine. We discussed wearing proper and supportive shoe gear, daily foot inspections, never walking barefooted or sock footed, never putting sharp instruments to feet which can cause major complications associated with infection, ulcers, lacerations.      Dystrophic nail plates, as outlined above (R#1,2,5  ; L#1,2,5 ), are sharply debrided with double action nail nipper, and/or with the assistance of a mechanical rotary nehemias, with removal of all offending nail and nail border(s), for reduction of pains. Nails are reduced in terms of length, width and girth with removal of subungual debris to facilitate pain free weight bearing and ambulation. The elongated nails as outlined in the objective portion of this note, were trimmed to appropriate length, with a double action nail nipper, for alleviation/reduction of pains as well. Follow up in approx. 3-4 months.        Future Appointments   Date Time Provider Department Center   2/24/2023  3:15 PM Miesha Leavitt MD [facility]   2/28/2023  3:00 PM Mara Villanueva [facility]   3/14/2023  3:00 PM Preston Frye MD [facility]

## 2023-02-24 ENCOUNTER — OFFICE VISIT (OUTPATIENT)
Dept: OPHTHALMOLOGY | Facility: CLINIC | Age: 83
End: 2023-02-24
Payer: MEDICARE

## 2023-02-24 DIAGNOSIS — H26.491 PCO (POSTERIOR CAPSULAR OPACIFICATION), RIGHT: ICD-10-CM

## 2023-02-24 DIAGNOSIS — H04.123 DRY EYES, BILATERAL: Primary | ICD-10-CM

## 2023-02-24 DIAGNOSIS — H02.9 LESION OF LEFT UPPER EYELID: ICD-10-CM

## 2023-02-24 DIAGNOSIS — Z96.1 PSEUDOPHAKIA OF BOTH EYES: ICD-10-CM

## 2023-02-24 DIAGNOSIS — H43.813 PVD (POSTERIOR VITREOUS DETACHMENT), BOTH EYES: ICD-10-CM

## 2023-02-24 PROCEDURE — 1160F RVW MEDS BY RX/DR IN RCRD: CPT | Mod: HCNC,CPTII,S$GLB, | Performed by: STUDENT IN AN ORGANIZED HEALTH CARE EDUCATION/TRAINING PROGRAM

## 2023-02-24 PROCEDURE — 99204 PR OFFICE/OUTPT VISIT, NEW, LEVL IV, 45-59 MIN: ICD-10-PCS | Mod: HCNC,S$GLB,, | Performed by: STUDENT IN AN ORGANIZED HEALTH CARE EDUCATION/TRAINING PROGRAM

## 2023-02-24 PROCEDURE — 99999 PR PBB SHADOW E&M-EST. PATIENT-LVL II: CPT | Mod: PBBFAC,HCNC,, | Performed by: STUDENT IN AN ORGANIZED HEALTH CARE EDUCATION/TRAINING PROGRAM

## 2023-02-24 PROCEDURE — 1159F PR MEDICATION LIST DOCUMENTED IN MEDICAL RECORD: ICD-10-PCS | Mod: HCNC,CPTII,S$GLB, | Performed by: STUDENT IN AN ORGANIZED HEALTH CARE EDUCATION/TRAINING PROGRAM

## 2023-02-24 PROCEDURE — 99999 PR PBB SHADOW E&M-EST. PATIENT-LVL II: ICD-10-PCS | Mod: PBBFAC,HCNC,, | Performed by: STUDENT IN AN ORGANIZED HEALTH CARE EDUCATION/TRAINING PROGRAM

## 2023-02-24 PROCEDURE — 1159F MED LIST DOCD IN RCRD: CPT | Mod: HCNC,CPTII,S$GLB, | Performed by: STUDENT IN AN ORGANIZED HEALTH CARE EDUCATION/TRAINING PROGRAM

## 2023-02-24 PROCEDURE — 99204 OFFICE O/P NEW MOD 45 MIN: CPT | Mod: HCNC,S$GLB,, | Performed by: STUDENT IN AN ORGANIZED HEALTH CARE EDUCATION/TRAINING PROGRAM

## 2023-02-24 PROCEDURE — 1160F PR REVIEW ALL MEDS BY PRESCRIBER/CLIN PHARMACIST DOCUMENTED: ICD-10-PCS | Mod: HCNC,CPTII,S$GLB, | Performed by: STUDENT IN AN ORGANIZED HEALTH CARE EDUCATION/TRAINING PROGRAM

## 2023-02-24 NOTE — PROGRESS NOTES
HPI     Blurred Vision     Additional comments: Patient states she feels as if she has a film over   both her eyes. Patient states she noticed the film over her eyes about 2   years ago. Patient states now it is becoming worse. Patient states she had   cataract surgery maybe about 10 years ago.          Last edited by Clare Staples on 2/24/2023  3:33 PM.            Assessment /Plan     For exam results, see Encounter Report.    Dry eyes, bilateral- - ATs QID and lid hygiene w/ baby shampoo  - pataday or zadator    PCO (posterior capsular opacification), right- Early capsular fibrosis without visual symptoms. Yag laser treatment as needed if visual loss occurs.     PVD (posterior vitreous detachment), both eyes- No tears or breaks were seen after careful retinal evaluation. Present >3 months per patient report.   RT/RD precautions    Discussed retinal detachment signs and symptoms including flashes of lights, floaters, perceived curtains or veils. Advised to patient to monitor visual status including increase in flashes and floaters, or the development of visual field changes including curtain and /or veils. Advised patient to RTC urgently if these symptoms occur. Explained the need for follow up exams to the patient even if there are no changes in the symptoms.      Pseudophakia of both eyes- Follow    LESION LEFT UPPER LID- FOLLOW    Return to clinic in 3M R/C SLIT LAMP PHOTO OS DION LESION

## 2023-03-14 ENCOUNTER — OFFICE VISIT (OUTPATIENT)
Dept: PSYCHIATRY | Facility: CLINIC | Age: 83
End: 2023-03-14
Payer: MEDICARE

## 2023-03-14 DIAGNOSIS — F33.41 MDD (MAJOR DEPRESSIVE DISORDER), RECURRENT, IN PARTIAL REMISSION: ICD-10-CM

## 2023-03-14 DIAGNOSIS — F41.1 GAD (GENERALIZED ANXIETY DISORDER): Primary | ICD-10-CM

## 2023-03-14 DIAGNOSIS — G47.00 INSOMNIA, UNSPECIFIED TYPE: ICD-10-CM

## 2023-03-14 PROCEDURE — 99214 PR OFFICE/OUTPT VISIT, EST, LEVL IV, 30-39 MIN: ICD-10-PCS | Mod: HCNC,95,, | Performed by: PSYCHIATRY & NEUROLOGY

## 2023-03-14 PROCEDURE — 99214 OFFICE O/P EST MOD 30 MIN: CPT | Mod: HCNC,95,, | Performed by: PSYCHIATRY & NEUROLOGY

## 2023-03-14 RX ORDER — ESCITALOPRAM OXALATE 10 MG/1
10 TABLET ORAL DAILY
Qty: 90 TABLET | Refills: 1 | Status: SHIPPED | OUTPATIENT
Start: 2023-03-14 | End: 2023-05-15

## 2023-03-14 RX ORDER — QUETIAPINE FUMARATE 50 MG/1
50 TABLET, FILM COATED ORAL NIGHTLY
Qty: 90 TABLET | Refills: 1 | Status: SHIPPED | OUTPATIENT
Start: 2023-03-14 | End: 2023-09-11

## 2023-03-14 RX ORDER — BUSPIRONE HYDROCHLORIDE 15 MG/1
15 TABLET ORAL 2 TIMES DAILY
Qty: 180 TABLET | Refills: 1 | Status: SHIPPED | OUTPATIENT
Start: 2023-03-14 | End: 2023-09-21

## 2023-03-14 RX ORDER — BUPROPION HYDROCHLORIDE 150 MG/1
150 TABLET ORAL DAILY
Qty: 90 TABLET | Refills: 1 | Status: SHIPPED | OUTPATIENT
Start: 2023-03-14 | End: 2023-09-26 | Stop reason: SDUPTHER
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
## 2023-05-15 RX ORDER — ESCITALOPRAM OXALATE 10 MG/1
TABLET ORAL
Qty: 90 TABLET | Refills: 0 | Status: SHIPPED | OUTPATIENT
Start: 2023-05-15 | End: 2023-09-26 | Stop reason: SDUPTHER

## 2023-05-17 ENCOUNTER — PES CALL (OUTPATIENT)
Dept: ADMINISTRATIVE | Facility: CLINIC | Age: 83
End: 2023-05-17
Payer: MEDICARE

## 2023-05-22 ENCOUNTER — OFFICE VISIT (OUTPATIENT)
Dept: PODIATRY | Facility: CLINIC | Age: 83
End: 2023-05-22
Payer: MEDICARE

## 2023-05-22 DIAGNOSIS — L60.3 ONYCHODYSTROPHY: ICD-10-CM

## 2023-05-22 DIAGNOSIS — G62.0 CHEMOTHERAPY-INDUCED PERIPHERAL NEUROPATHY: Primary | ICD-10-CM

## 2023-05-22 DIAGNOSIS — T45.1X5A CHEMOTHERAPY-INDUCED PERIPHERAL NEUROPATHY: Primary | ICD-10-CM

## 2023-05-22 PROCEDURE — 99999 PR PBB SHADOW E&M-EST. PATIENT-LVL III: CPT | Mod: PBBFAC,,, | Performed by: PODIATRIST

## 2023-05-22 PROCEDURE — 11721 PR DEBRIDEMENT OF NAILS, 6 OR MORE: ICD-10-PCS | Mod: Q9,S$GLB,, | Performed by: PODIATRIST

## 2023-05-22 PROCEDURE — 99213 OFFICE O/P EST LOW 20 MIN: CPT | Performed by: PODIATRIST

## 2023-05-22 PROCEDURE — 99999 PR PBB SHADOW E&M-EST. PATIENT-LVL III: ICD-10-PCS | Mod: PBBFAC,,, | Performed by: PODIATRIST

## 2023-05-22 PROCEDURE — 11721 DEBRIDE NAIL 6 OR MORE: CPT | Mod: Q9,S$GLB,, | Performed by: PODIATRIST

## 2023-05-22 PROCEDURE — 99499 UNLISTED E&M SERVICE: CPT | Mod: S$GLB,,, | Performed by: PODIATRIST

## 2023-05-22 PROCEDURE — 99499 NO LOS: ICD-10-PCS | Mod: S$GLB,,, | Performed by: PODIATRIST

## 2023-05-22 NOTE — PROGRESS NOTES
Subjective:       Patient ID: Lizeth Henderson is a 82 y.o. female.    Chief Complaint: Routine Foot Care (Patient present with CIPN. She denies pain at present and is wearing closed toe shoes. )    HPI: Patient presents to the office today with the chief complaint of elongated, thickened and dystrophic nail plates to the B/L foot. This patient is a Diabetic Type II. Patient does follow with Primary Care and/or Endocrinology for management of Diabetes Mellitus. This patient's PMD is Desi Maddox MD. This patient last saw his/her primary care provider on 09/09/2022    Hemoglobin A1C   Date Value Ref Range Status   11/05/2020 5.4 4.0 - 5.6 % Final     Comment:     ADA Screening Guidelines:  5.7-6.4%  Consistent with prediabetes  >or=6.5%  Consistent with diabetes  High levels of fetal hemoglobin interfere with the HbA1C  assay. Heterozygous hemoglobin variants (HbS, HgC, etc)do  not significantly interfere with this assay.   However, presence of multiple variants may affect accuracy.     01/03/2019 5.6 4.0 - 5.6 % Final     Comment:     ADA Screening Guidelines:  5.7-6.4%  Consistent with prediabetes  >or=6.5%  Consistent with diabetes  High levels of fetal hemoglobin interfere with the HbA1C  assay. Heterozygous hemoglobin variants (HbS, HgC, etc)do  not significantly interfere with this assay.   However, presence of multiple variants may affect accuracy.     12/06/2017 5.0 4.0 - 5.6 % Final     Comment:     According to ADA guidelines, hemoglobin A1c <7.0% represents  optimal control in non-pregnant diabetic patients. Different  metrics may apply to specific patient populations.   Standards of Medical Care in Diabetes-2016.  For the purpose of screening for the presence of diabetes:  <5.7%     Consistent with the absence of diabetes  5.7-6.4%  Consistent with increasing risk for diabetes   (prediabetes)  >or=6.5%  Consistent with diabetes  Currently, no consensus exists for use of hemoglobin A1c  for  diagnosis of diabetes for children.  This Hemoglobin A1c assay has significant interference with fetal   hemoglobin   (HbF). The results are invalid for patients with abnormal amounts of   HbF,   including those with known Hereditary Persistence   of Fetal Hemoglobin. Heterozygous hemoglobin variants (HbAS, HbAC,   HbAD, HbAE, HbA2) do not significantly interfere with this assay;   however, presence of multiple variants in a sample may impact the %   interference.     .     Review of patient's allergies indicates:   Allergen Reactions    Lisinopril      cough       Past Medical History:   Diagnosis Date    Adrenal adenoma 2016    Anxiety     Arthritis     Benign hypertension     Colon cancer age 61    colon    Coronary artery disease     Depression     Full dentures     General anesthetics causing adverse effect in therapeutic use     yells and talks when wakes up    Hyperlipidemia     Osteopenia     Shingles     Wears glasses        Family History   Problem Relation Age of Onset    Cancer Father     Hypertension Father     Arthritis Father     Alzheimer's disease Mother     Arthritis Mother     Depression Daughter     Kidney disease Daughter     Ulcerative colitis Daughter     Depression Daughter     Depression Daughter     Anxiety disorder Daughter         PTSD    Cancer Maternal Uncle     Cancer Cousin         colon cancer    Early death Maternal Grandmother     Amblyopia Neg Hx     Blindness Neg Hx     Cataracts Neg Hx     Diabetes Neg Hx     Glaucoma Neg Hx     Macular degeneration Neg Hx     Retinal detachment Neg Hx     Strabismus Neg Hx     Stroke Neg Hx     Thyroid disease Neg Hx        Social History     Socioeconomic History    Marital status:     Number of children: 3   Tobacco Use    Smoking status: Never    Smokeless tobacco: Never   Substance and Sexual Activity    Alcohol use: No    Drug use: No    Sexual activity: Never   Social History Narrative    The patient does not exercise regularly ().     Rates diet as poor.    She is not satisfied with weight.             Social Determinants of Health     Financial Resource Strain: Low Risk     Difficulty of Paying Living Expenses: Not hard at all   Food Insecurity: No Food Insecurity    Worried About Running Out of Food in the Last Year: Never true    Ran Out of Food in the Last Year: Never true   Transportation Needs: No Transportation Needs    Lack of Transportation (Medical): No    Lack of Transportation (Non-Medical): No   Physical Activity: Inactive    Days of Exercise per Week: 0 days    Minutes of Exercise per Session: 0 min   Stress: Stress Concern Present    Feeling of Stress : To some extent   Social Connections: Unknown    Frequency of Communication with Friends and Family: Three times a week    Frequency of Social Gatherings with Friends and Family: More than three times a week    Active Member of Clubs or Organizations: No    Attends Club or Organization Meetings: Never    Marital Status:    Housing Stability: Low Risk     Unable to Pay for Housing in the Last Year: No    Number of Places Lived in the Last Year: 1    Unstable Housing in the Last Year: No       Past Surgical History:   Procedure Laterality Date    APPENDECTOMY      BREAST BIOPSY Left     benign    BUNIONECTOMY Left     CATARACT EXTRACTION Bilateral     COLON SURGERY      2001    EYE SURGERY      cataract surg    HEMICOLECTOMY  2002    HERNIA REPAIR      x5    JOINT REPLACEMENT      knee    left toe surgery      joelle    PATENT DUCTUS ARTERIOUS LIGATION  1948    SALPINGOOPHORECTOMY  2002    due to colon cancer    TONSILLECTOMY, ADENOIDECTOMY         Review of Systems       Objective:   There were no vitals taken for this visit.    Physical Exam  LOWER EXTREMITY PHYSICAL EXAMINATION    VASCULAR:  The right dorsalis pedis pulse 2/4 and the right posterior tibial pulse 2/4.  The left dorsalis pedis pulse 2/4 and posterior tibial pulse on the left is 2/4.  Capillary refill is  intact.  Pedal hair growth intact    NEUROLOGY: Protective sensation is not intact to the left and right plantar surfaces of the foot and digits, as the patient has no sensation/detection at greater than 4 distinct points of contact with 5.07 [place] Aishwarya monofilament. Sensation to light touch is intact on the left and right foot. Proprioception is intact, bilateral. Sensation to pin prick is reduced to absent. Vibratory sensation is diminished.    DERMATOLOGY:  Skin is supple, moist, intact.  There is no signs of callusing, ulcerations, other lesions identified to the dorsal or plantar aspect of the right or left foot.  The R1, 2, 5 and left L1,2, 5 are thickened, discolored dystrophic.  There is subungual debris.  Nail plates have area of dark discoloration.  The remaining nails 3-4 on the right foot and the left foot are elongated but of normal color, thickness, and texture.   There is no signs of ingrowing into the medial or lateral borders.  There is no evidence of wounds or skin breakdown.  No edema or erythema.  No obvious lacerations or fissuring.  Interdigital spaces are clean, dry, intact.  No rashes or scars appreciated.    ORTHOPEDIC: Manual Muscle Testing is 5/5 in all planes on the left and right, without pains, with and without resistance. Gait pattern is non-antalgic.    Assessment:     1. Chemotherapy-induced peripheral neuropathy    2. Onychodystrophy        Plan:     Chemotherapy-induced peripheral neuropathy    Onychodystrophy        Thorough discussion is had with the patient this afternoon, concerning the diagnosis, its etiology, and the treatment algorithm at present.  Greater than 50% of this visit spent on counseling and coordination of care. Greater than 15 minutes of a 20 minute appointment spent on education about the diabetic foot, neuropathy, and prevention of limb loss.  Shoe inspection. Diabetic Foot Education. Patient reminded of the importance of good nutrition and blood sugar  control to help prevent podiatric complications of diabetes. Patient instructed on proper foot hygeine. We discussed wearing proper and supportive shoe gear, daily foot inspections, never walking barefooted or sock footed, never putting sharp instruments to feet which can cause major complications associated with infection, ulcers, lacerations.      Dystrophic nail plates, as outlined above (R#1,2,5  ; L#1,2,5 ), are sharply debrided with double action nail nipper, and/or with the assistance of a mechanical rotary nehemias, with removal of all offending nail and nail border(s), for reduction of pains. Nails are reduced in terms of length, width and girth with removal of subungual debris to facilitate pain free weight bearing and ambulation. The elongated nails as outlined in the objective portion of this note, were trimmed to appropriate length, with a double action nail nipper, for alleviation/reduction of pains as well. Follow up in approx. 3-4 months.        Future Appointments   Date Time Provider Department Center   6/15/2023 10:40 AM Faith Andrews MD [facility] [place]   8/23/2023  3:00 PM Danyell Nails DPM [facility]

## 2023-06-14 DIAGNOSIS — N32.81 OVERACTIVE BLADDER: Chronic | ICD-10-CM

## 2023-06-14 RX ORDER — ALPRAZOLAM 0.25 MG/1
TABLET ORAL
Qty: 20 TABLET | Refills: 2 | Status: SHIPPED | OUTPATIENT
Start: 2023-06-14 | End: 2023-09-26 | Stop reason: SDUPTHER

## 2023-06-14 NOTE — TELEPHONE ENCOUNTER
Care Due:                  Date            Visit Type   Department     Provider  --------------------------------------------------------------------------------    Last Visit: None Found      None         None Found  Next Visit: None Scheduled  None         None Found                                                            Last  Test          Frequency    Reason                     Performed    Due Date  --------------------------------------------------------------------------------    Office Visit  12 months..  hydroCHLOROthiazide,       Not Found    Overdue                             losartan.................    CMP.........  12 months..  hydroCHLOROthiazide,       09- 08-                             losartan.................    [employer] Embedded Care Due Messages. Reference number: [id number].   6/14/2023 11:13:50 AM CDT

## 2023-06-15 ENCOUNTER — OFFICE VISIT (OUTPATIENT)
Dept: DERMATOLOGY | Facility: CLINIC | Age: 83
End: 2023-06-15
Payer: MEDICARE

## 2023-06-15 DIAGNOSIS — B07.9 VERRUCA VULGARIS: Primary | ICD-10-CM

## 2023-06-15 DIAGNOSIS — L73.8 SEBACEOUS HYPERPLASIA: ICD-10-CM

## 2023-06-15 DIAGNOSIS — L82.1 SEBORRHEIC KERATOSES: ICD-10-CM

## 2023-06-15 DIAGNOSIS — D22.9 MULTIPLE BENIGN NEVI: ICD-10-CM

## 2023-06-15 DIAGNOSIS — D23.9 DERMATOFIBROMA: ICD-10-CM

## 2023-06-15 DIAGNOSIS — D18.00 HEMANGIOMA, UNSPECIFIED SITE: ICD-10-CM

## 2023-06-15 DIAGNOSIS — Z12.83 SKIN CANCER SCREENING: ICD-10-CM

## 2023-06-15 PROCEDURE — 99999 PR PBB SHADOW E&M-EST. PATIENT-LVL III: ICD-10-PCS | Mod: PBBFAC,,, | Performed by: STUDENT IN AN ORGANIZED HEALTH CARE EDUCATION/TRAINING PROGRAM

## 2023-06-15 PROCEDURE — 99999 PR PBB SHADOW E&M-EST. PATIENT-LVL III: CPT | Mod: PBBFAC,,, | Performed by: STUDENT IN AN ORGANIZED HEALTH CARE EDUCATION/TRAINING PROGRAM

## 2023-06-15 PROCEDURE — 1159F PR MEDICATION LIST DOCUMENTED IN MEDICAL RECORD: ICD-10-PCS | Mod: CPTII,S$GLB,, | Performed by: STUDENT IN AN ORGANIZED HEALTH CARE EDUCATION/TRAINING PROGRAM

## 2023-06-15 PROCEDURE — 1160F PR REVIEW ALL MEDS BY PRESCRIBER/CLIN PHARMACIST DOCUMENTED: ICD-10-PCS | Mod: CPTII,S$GLB,, | Performed by: STUDENT IN AN ORGANIZED HEALTH CARE EDUCATION/TRAINING PROGRAM

## 2023-06-15 PROCEDURE — 99203 OFFICE O/P NEW LOW 30 MIN: CPT | Mod: 25,S$GLB,, | Performed by: STUDENT IN AN ORGANIZED HEALTH CARE EDUCATION/TRAINING PROGRAM

## 2023-06-15 PROCEDURE — 1159F MED LIST DOCD IN RCRD: CPT | Mod: CPTII,S$GLB,, | Performed by: STUDENT IN AN ORGANIZED HEALTH CARE EDUCATION/TRAINING PROGRAM

## 2023-06-15 PROCEDURE — 1160F RVW MEDS BY RX/DR IN RCRD: CPT | Mod: CPTII,S$GLB,, | Performed by: STUDENT IN AN ORGANIZED HEALTH CARE EDUCATION/TRAINING PROGRAM

## 2023-06-15 PROCEDURE — 99203 PR OFFICE/OUTPT VISIT, NEW, LEVL III, 30-44 MIN: ICD-10-PCS | Mod: 25,S$GLB,, | Performed by: STUDENT IN AN ORGANIZED HEALTH CARE EDUCATION/TRAINING PROGRAM

## 2023-06-15 PROCEDURE — 17110 PR DESTRUCTION BENIGN LESIONS UP TO 14: ICD-10-PCS | Mod: S$GLB,,, | Performed by: STUDENT IN AN ORGANIZED HEALTH CARE EDUCATION/TRAINING PROGRAM

## 2023-06-15 PROCEDURE — 17110 DESTRUCTION B9 LES UP TO 14: CPT | Mod: S$GLB,,, | Performed by: STUDENT IN AN ORGANIZED HEALTH CARE EDUCATION/TRAINING PROGRAM

## 2023-06-15 NOTE — PATIENT INSTRUCTIONS

## 2023-06-15 NOTE — PROGRESS NOTES
Patient Information  Name: Lizeth Henderson  : 1940  MRN: [id number]     Referring Physician:  Dr. Menon   Primary Care Physician:  Dr. Desi Maddox MD   Date of Visit: 06/15/2023      Subjective:       Lizeth Henderson is a 82 y.o. female who presents for   Chief Complaint   Patient presents with    Skin Check     Full body skin exam. No personal hx of skin cancer      HPI  Patient here for skin check.     Does patient have a personal hx of skin cancers? no  Does patient have family hx of melanoma?  no  Does patient have hx of strong sun exposure or tanning bed use in the past? yes    Patient was last seen:Visit date not found     Prior notes by myself reviewed.   Clinical documentation obtained by nursing staff reviewed.    Review of Systems   Skin:  Negative for itching and rash.      Objective:    Physical Exam   Constitutional: She appears well-developed and well-nourished. No distress.   Neurological: She is alert and oriented to person, place, and time. She is not disoriented.   Psychiatric: She has a normal mood and affect.   Skin:   Areas Examined (abnormalities noted in diagram):   Scalp / Hair Palpated and Inspected  Head / Face Inspection Performed  Neck Inspection Performed  Chest / Axilla Inspection Performed  Abdomen Inspection Performed  Genitals / Buttocks / Groin Inspection Performed  Back Inspection Performed  RUE Inspected  LUE Inspection Performed  RLE Inspected  LLE Inspection Performed  Nails and Digits Inspection Performed                 Diagram Legend     Erythematous scaling macule/papule c/w actinic keratosis       Vascular papule c/w angioma      Pigmented verrucoid papule/plaque c/w seborrheic keratosis      Yellow umbilicated papule c/w sebaceous hyperplasia      Irregularly shaped tan macule c/w lentigo     1-2 mm smooth white papules consistent with Milia      Movable subcutaneous cyst with punctum c/w epidermal inclusion cyst      Subcutaneous movable cyst c/w pilar  cyst      Firm pink to brown papule c/w dermatofibroma      Pedunculated fleshy papule(s) c/w skin tag(s)      Evenly pigmented macule c/w junctional nevus     Mildly variegated pigmented, slightly irregular-bordered macule c/w mildly atypical nevus      Flesh colored to evenly pigmented papule c/w intradermal nevus       Pink pearly papule/plaque c/w basal cell carcinoma      Erythematous hyperkeratotic cursted plaque c/w SCC      Surgical scar with no sign of skin cancer recurrence      Open and closed comedones      Inflammatory papules and pustules      Verrucoid papule consistent consistent with wart     Erythematous eczematous patches and plaques     Dystrophic onycholytic nail with subungual debris c/w onychomycosis     Umbilicated papule    Erythematous-base heme-crusted tan verrucoid plaque consistent with inflamed seborrheic keratosis     Erythematous Silvery Scaling Plaque c/w Psoriasis     See annotation      No images are attached to the encounter or orders placed in the encounter.    [] Data reviewed  [] Independent review of test  [] Management discussed with another provider    Assessment / Plan:        Verruca vulgaris  Cryosurgery procedure note:    Verbal consent from the patient is obtained including, but not limited to, risk of hypopigmentation/hyperpigmentation, scar, recurrence of lesion. Liquid nitrogen cryosurgery is applied to 1 verruca with prior paring, as detailed in the physical exam, to produce a freeze injury. 2 consecutive freeze thaw cycles are applied to each verruca. The patient is aware that blisters (possibly blood blisters) may form.    Skin cancer screening  Total body skin examination performed today including at least 12 points as noted in physical examination. No lesions suspicious for malignancy noted.    Recommend daily sun protection/avoidance, use of at least SPF 30, broad spectrum sunscreen (OTC drug), skin self examinations, and routine physician surveillance to  optimize early detection    Seborrheic keratoses  These are benign inherited growths without a malignant potential. Reassurance given to patient. No treatment is necessary.     Hemangioma, unspecified site  This is a benign vascular lesion. Reassurance given. No treatment required.     Multiple benign nevi  Discussed ABCDE's of nevi.  Monitor for new mole or moles that are becoming bigger, darker, irritated, or developing irregular borders. Brochure provided. Instructed patient to observe lesion(s) for changes and follow up in clinic if changes are noted. Patient to monitor skin at home for new or changing lesions.     Dermatofibroma  This is a benign scar-like lesion secondary to minor trauma. No treatment required.     Sebaceous hyperplasia  This is a common condition representing benign enlargement of the sebaceous lobule. It typically occurs in adulthood. Reassurance given to patient.              LOS NUMBER AND COMPLEXITY OF PROBLEMS    COMPLEXITY OF DATA RISK TOTAL TIME (m)   28005  61153 [] 1 self-limited or minor problem [x] Minimal to none [] No treatment recommended or patient to monitor 15-29  10-19   94922  87264 Low  [] 2 or > self limited or minor problems  [] 1 stable chronic illness  [] 1 acute, uncomplicated illness or injury Limited (2)  [] Prior external notes from each unique source  [] Review result of each unique test  [] Order each unique test [x]  Low  OTC medications, minor skin biopsy 30-44  20-29   63081  25485 Moderate  []  1 or > chronic illness with progression, exacerbation or SE of treatment  [x]  2 or more stable chronic illnesses  []  1 acute illness with systemic symptoms  []  1 acute complicated injury  []  1 undiagnosed new problem with uncertain prognosis Moderate (1/3 below)  []  3 or more data items        *Now includes assessment requiring independent historian  []  Independent interpretation of a test  []  Discuss management/test with another provider Moderate  []   Prescription drug mgmt  []  Minor surgery with risk discussed  []  Mgmt limited by social determinates 45-59  30-39   40740  35446 High  []  1 or more chronic illness with severe exacerbation, progression or SE of treatment  []  1 acute or chronic illness/injury that poses a threat to life or bodily function Extensive (2/3 below)  []  3 or more data items        *Now includes assessment requiring independent historian.  []  Independent interpretation of a test  []  Discuss management/test with another provider High  []  Major surgery with risk discussed  []  Drug therapy requiring intensive monitoring for toxicity  []  Hospitalization  []  Decision for DNR 60-74  40-54      No follow-ups on file.    Faith Andrews MD, FAAD  [facility]

## 2023-06-16 RX ORDER — MIRABEGRON 50 MG/1
TABLET, FILM COATED, EXTENDED RELEASE ORAL
Qty: 90 TABLET | Refills: 0 | Status: SHIPPED | OUTPATIENT
Start: 2023-06-16 | End: 2023-09-15

## 2023-06-23 ENCOUNTER — PES CALL (OUTPATIENT)
Dept: ADMINISTRATIVE | Facility: CLINIC | Age: 83
End: 2023-06-23
Payer: MEDICARE

## 2023-07-06 ENCOUNTER — PATIENT MESSAGE (OUTPATIENT)
Dept: INTERNAL MEDICINE | Facility: CLINIC | Age: 83
End: 2023-07-06

## 2023-07-06 ENCOUNTER — OFFICE VISIT (OUTPATIENT)
Dept: INTERNAL MEDICINE | Facility: CLINIC | Age: 83
End: 2023-07-06
Payer: MEDICARE

## 2023-07-06 VITALS
BODY MASS INDEX: 34.12 KG/M2 | HEART RATE: 110 BPM | SYSTOLIC BLOOD PRESSURE: 132 MMHG | TEMPERATURE: 97 F | OXYGEN SATURATION: 96 % | WEIGHT: 224.44 LBS | DIASTOLIC BLOOD PRESSURE: 82 MMHG

## 2023-07-06 DIAGNOSIS — R73.03 PREDIABETES: Chronic | ICD-10-CM

## 2023-07-06 DIAGNOSIS — D64.9 ANEMIA, UNSPECIFIED TYPE: ICD-10-CM

## 2023-07-06 DIAGNOSIS — F41.1 GENERALIZED ANXIETY DISORDER: ICD-10-CM

## 2023-07-06 DIAGNOSIS — N18.31 CHRONIC KIDNEY DISEASE, STAGE 3A: ICD-10-CM

## 2023-07-06 DIAGNOSIS — Z00.00 ROUTINE GENERAL MEDICAL EXAMINATION AT A HEALTH CARE FACILITY: Primary | ICD-10-CM

## 2023-07-06 DIAGNOSIS — E27.8 ADRENAL NODULE: ICD-10-CM

## 2023-07-06 DIAGNOSIS — T45.1X5A CHEMOTHERAPY-INDUCED PERIPHERAL NEUROPATHY: ICD-10-CM

## 2023-07-06 DIAGNOSIS — I70.0 ATHEROSCLEROSIS OF AORTA: ICD-10-CM

## 2023-07-06 DIAGNOSIS — D72.0 HYPERSEGMENTATION OF NEUTROPHILS: ICD-10-CM

## 2023-07-06 DIAGNOSIS — F33.9 MAJOR DEPRESSION, RECURRENT, CHRONIC: ICD-10-CM

## 2023-07-06 DIAGNOSIS — M81.0 AGE-RELATED OSTEOPOROSIS WITHOUT CURRENT PATHOLOGICAL FRACTURE: ICD-10-CM

## 2023-07-06 DIAGNOSIS — G62.0 CHEMOTHERAPY-INDUCED PERIPHERAL NEUROPATHY: ICD-10-CM

## 2023-07-06 DIAGNOSIS — E78.2 HYPERLIPIDEMIA, MIXED: ICD-10-CM

## 2023-07-06 DIAGNOSIS — I10 ESSENTIAL HYPERTENSION: ICD-10-CM

## 2023-07-06 PROCEDURE — 99999 PR PBB SHADOW E&M-EST. PATIENT-LVL IV: CPT | Mod: PBBFAC,HCNC,, | Performed by: PHYSICIAN ASSISTANT

## 2023-07-06 PROCEDURE — 1160F PR REVIEW ALL MEDS BY PRESCRIBER/CLIN PHARMACIST DOCUMENTED: ICD-10-PCS | Mod: HCNC,CPTII,S$GLB, | Performed by: PHYSICIAN ASSISTANT

## 2023-07-06 PROCEDURE — 3288F PR FALLS RISK ASSESSMENT DOCUMENTED: ICD-10-PCS | Mod: HCNC,CPTII,S$GLB, | Performed by: PHYSICIAN ASSISTANT

## 2023-07-06 PROCEDURE — 99999 PR PBB SHADOW E&M-EST. PATIENT-LVL IV: ICD-10-PCS | Mod: PBBFAC,HCNC,, | Performed by: PHYSICIAN ASSISTANT

## 2023-07-06 PROCEDURE — 3288F FALL RISK ASSESSMENT DOCD: CPT | Mod: HCNC,CPTII,S$GLB, | Performed by: PHYSICIAN ASSISTANT

## 2023-07-06 PROCEDURE — 3079F DIAST BP 80-89 MM HG: CPT | Mod: HCNC,CPTII,S$GLB, | Performed by: PHYSICIAN ASSISTANT

## 2023-07-06 PROCEDURE — 3075F PR MOST RECENT SYSTOLIC BLOOD PRESS GE 130-139MM HG: ICD-10-PCS | Mod: HCNC,CPTII,S$GLB, | Performed by: PHYSICIAN ASSISTANT

## 2023-07-06 PROCEDURE — 3075F SYST BP GE 130 - 139MM HG: CPT | Mod: HCNC,CPTII,S$GLB, | Performed by: PHYSICIAN ASSISTANT

## 2023-07-06 PROCEDURE — 1160F RVW MEDS BY RX/DR IN RCRD: CPT | Mod: HCNC,CPTII,S$GLB, | Performed by: PHYSICIAN ASSISTANT

## 2023-07-06 PROCEDURE — 99397 PR PREVENTIVE VISIT,EST,65 & OVER: ICD-10-PCS | Mod: HCNC,S$GLB,, | Performed by: PHYSICIAN ASSISTANT

## 2023-07-06 PROCEDURE — 1159F PR MEDICATION LIST DOCUMENTED IN MEDICAL RECORD: ICD-10-PCS | Mod: HCNC,CPTII,S$GLB, | Performed by: PHYSICIAN ASSISTANT

## 2023-07-06 PROCEDURE — 1101F PT FALLS ASSESS-DOCD LE1/YR: CPT | Mod: HCNC,CPTII,S$GLB, | Performed by: PHYSICIAN ASSISTANT

## 2023-07-06 PROCEDURE — 1125F AMNT PAIN NOTED PAIN PRSNT: CPT | Mod: HCNC,CPTII,S$GLB, | Performed by: PHYSICIAN ASSISTANT

## 2023-07-06 PROCEDURE — 99397 PER PM REEVAL EST PAT 65+ YR: CPT | Mod: HCNC,S$GLB,, | Performed by: PHYSICIAN ASSISTANT

## 2023-07-06 PROCEDURE — 1125F PR PAIN SEVERITY QUANTIFIED, PAIN PRESENT: ICD-10-PCS | Mod: HCNC,CPTII,S$GLB, | Performed by: PHYSICIAN ASSISTANT

## 2023-07-06 PROCEDURE — 3079F PR MOST RECENT DIASTOLIC BLOOD PRESSURE 80-89 MM HG: ICD-10-PCS | Mod: HCNC,CPTII,S$GLB, | Performed by: PHYSICIAN ASSISTANT

## 2023-07-06 PROCEDURE — 1101F PR PT FALLS ASSESS DOC 0-1 FALLS W/OUT INJ PAST YR: ICD-10-PCS | Mod: HCNC,CPTII,S$GLB, | Performed by: PHYSICIAN ASSISTANT

## 2023-07-06 PROCEDURE — 1159F MED LIST DOCD IN RCRD: CPT | Mod: HCNC,CPTII,S$GLB, | Performed by: PHYSICIAN ASSISTANT

## 2023-07-06 RX ORDER — LOSARTAN POTASSIUM 100 MG/1
100 TABLET ORAL DAILY
Qty: 90 TABLET | Refills: 3 | Status: SHIPPED | OUTPATIENT
Start: 2023-07-06 | End: 2024-07-05

## 2023-07-06 RX ORDER — HYDROCHLOROTHIAZIDE 12.5 MG/1
12.5 TABLET ORAL DAILY
Qty: 90 TABLET | Refills: 3 | Status: SHIPPED | OUTPATIENT
Start: 2023-07-06 | End: 2024-03-02

## 2023-07-06 NOTE — PROGRESS NOTES
Subjective:       Patient ID: Lizeth Henderson is a 82 y.o. female.    Chief Complaint: Annual Exam      Patient presents to clinic today for annual physical exam.      Review of Systems   Constitutional:  Positive for fatigue (chronic). Negative for chills, fever and unexpected weight change.   HENT:  Positive for hearing loss (chronic, wears hearing aids) and rhinorrhea. Negative for congestion, dental problem, ear pain and trouble swallowing.    Eyes:  Negative for pain and visual disturbance.   Respiratory:  Negative for cough and shortness of breath.    Cardiovascular:  Positive for leg swelling (ankles, chronic). Negative for chest pain and palpitations.   Gastrointestinal:  Negative for abdominal distention, abdominal pain, blood in stool, constipation, diarrhea, nausea and vomiting.   Genitourinary:  Negative for difficulty urinating and vaginal discharge.   Musculoskeletal:  Positive for arthralgias (chronic). Negative for myalgias.   Skin:  Negative for rash.   Neurological:  Negative for dizziness, weakness, numbness and headaches.   Hematological:  Negative for adenopathy. Bruises/bleeds easily (chronic, bruising).   Psychiatric/Behavioral:  Positive for dysphoric mood (chronic). Negative for sleep disturbance. The patient is nervous/anxious (chronic).      Objective:      Physical Exam  Vitals and nursing note reviewed.   Constitutional:       General: She is not in acute distress.     Appearance: Normal appearance. She is well-developed.      Comments: Ambulatory with rollator   HENT:      Head: Normocephalic and atraumatic.      Right Ear: Tympanic membrane, ear canal and external ear normal.      Left Ear: Tympanic membrane, ear canal and external ear normal.      Nose: Nose normal. No mucosal edema or rhinorrhea.      Mouth/Throat:      Lips: Pink.      Mouth: Mucous membranes are moist.      Pharynx: Oropharynx is clear. Uvula midline.   Eyes:      General: Lids are normal. No scleral icterus.      Extraocular Movements: Extraocular movements intact.      Conjunctiva/sclera: Conjunctivae normal.      Pupils: Pupils are equal, round, and reactive to light.   Neck:      Thyroid: No thyromegaly.   Cardiovascular:      Rate and Rhythm: Normal rate and regular rhythm.      Pulses: Normal pulses.   Pulmonary:      Effort: Pulmonary effort is normal.      Breath sounds: Normal breath sounds. No wheezing, rhonchi or rales.   Abdominal:      General: Bowel sounds are normal. There is no distension.      Palpations: Abdomen is soft. There is no mass.      Tenderness: There is no abdominal tenderness.   Musculoskeletal:         General: Normal range of motion.      Cervical back: Normal range of motion and neck supple.      Right lower leg: No edema.      Left lower leg: No edema.   Lymphadenopathy:      Cervical: No cervical adenopathy.   Skin:     General: Skin is warm and dry.      Findings: No lesion or rash.      Nails: There is no clubbing.   Neurological:      Mental Status: She is alert.      Cranial Nerves: No cranial nerve deficit.      Sensory: No sensory deficit.   Psychiatric:         Mood and Affect: Mood and affect normal.       Assessment:       1. Routine general medical examination at a health care facility    2. Major depression, recurrent, chronic    3. Chemotherapy-induced peripheral neuropathy    4. Chronic kidney disease, stage 3a    5. Atherosclerosis of aorta    6. Essential hypertension    7. Hyperlipidemia, mixed    8. Generalized anxiety disorder    9. Anemia, unspecified type    10. Prediabetes    11. Age-related osteoporosis without current pathological fracture    12. Adrenal nodule    13. Hypersegmentation of neutrophils        Plan:   1. Routine general medical examination at a health care facility    2. Major depression, recurrent, chronic  Assessment & Plan:  Continue wellbutrin, lexapro, seroquel and buspar      3. Chemotherapy-induced peripheral neuropathy    4. Chronic kidney  disease, stage 3a  Assessment & Plan:  Status pending lab       5. Atherosclerosis of aorta  Overview:  CXR 3/2022, on ASA       6. Essential hypertension  Assessment & Plan:  /82, controlled, continue HCTZ and losartan    Orders:  -     TSH; Future; Expected date: 07/06/2023  -     CBC Auto Differential; Future; Expected date: 07/06/2023  -     hydroCHLOROthiazide (HYDRODIURIL) 12.5 MG Tab; Take 1 tablet (12.5 mg total) by mouth once daily.  Dispense: 90 tablet; Refill: 3  -     losartan (COZAAR) 100 MG tablet; Take 1 tablet (100 mg total) by mouth once daily.  Dispense: 90 tablet; Refill: 3    7. Hyperlipidemia, mixed  Assessment & Plan:  Status pending lab     Orders:  -     Comprehensive Metabolic Panel; Future; Expected date: 07/06/2023  -     Lipid Panel; Future; Expected date: 07/06/2023    8. Generalized anxiety disorder  Assessment & Plan:  Continue wellbutrin, lexapro, seroquel and buspar      9. Anemia, unspecified type  Assessment & Plan:  Status pending lab       10. Prediabetes  Assessment & Plan:  Status pending lab     Orders:  -     Hemoglobin A1C; Future; Expected date: 07/06/2023    11. Age-related osteoporosis without current pathological fracture  Overview:  DEXA 2/2019    Assessment & Plan:  [place] DEXA    Orders:  -     Vitamin D; Future; Expected date: 07/06/2023  -     DXA Bone Density Axial Skeleton 1 or more sites; Future; Expected date: 07/06/2023    12. Adrenal nodule  Overview:  Followed by Endocrinology, continue with current treatment plan       13. Hypersegmentation of neutrophils  Overview:  Mild on peripheral smear. MCV trending upward. Consider checking b12 and folate if persists. Also a/w iron def. Consider nutrition          Fasting labs ASAP  DEXA schedule  Discussed Covid booster, scheduling information given.  Six-month follow-up scheduled with PCP  Health Maintenance reviewed/updated.

## 2023-07-07 PROBLEM — R26.89 FUNCTIONAL GAIT ABNORMALITY: Status: RESOLVED | Noted: 2019-01-10 | Resolved: 2023-07-07

## 2023-07-07 PROBLEM — Z76.89 ENCOUNTER TO ESTABLISH CARE WITH NEW DOCTOR: Status: RESOLVED | Noted: 2021-05-04 | Resolved: 2023-07-07

## 2023-07-07 PROBLEM — N18.31 CHRONIC KIDNEY DISEASE, STAGE 3A: Status: ACTIVE | Noted: 2022-03-11

## 2023-07-07 PROBLEM — I70.0 ATHEROSCLEROSIS OF AORTA: Status: ACTIVE | Noted: 2023-07-07

## 2023-07-07 PROBLEM — M81.0 AGE-RELATED OSTEOPOROSIS WITHOUT CURRENT PATHOLOGICAL FRACTURE: Status: ACTIVE | Noted: 2023-07-07

## 2023-07-12 ENCOUNTER — LAB VISIT (OUTPATIENT)
Dept: LAB | Facility: HOSPITAL | Age: 83
End: 2023-07-12
Attending: PHYSICIAN ASSISTANT
Payer: MEDICARE

## 2023-07-12 DIAGNOSIS — R73.03 PREDIABETES: Chronic | ICD-10-CM

## 2023-07-12 DIAGNOSIS — I10 ESSENTIAL HYPERTENSION: ICD-10-CM

## 2023-07-12 DIAGNOSIS — E78.2 HYPERLIPIDEMIA, MIXED: ICD-10-CM

## 2023-07-12 DIAGNOSIS — M81.0 AGE-RELATED OSTEOPOROSIS WITHOUT CURRENT PATHOLOGICAL FRACTURE: ICD-10-CM

## 2023-07-12 LAB
25(OH)D3+25(OH)D2 SERPL-MCNC: 24 NG/ML (ref 30–96)
ALBUMIN SERPL BCP-MCNC: 3.5 G/DL (ref 3.5–5.2)
ALP SERPL-CCNC: 74 U/L (ref 55–135)
ALT SERPL W/O P-5'-P-CCNC: 7 U/L (ref 10–44)
ANION GAP SERPL CALC-SCNC: 10 MMOL/L (ref 8–16)
AST SERPL-CCNC: 18 U/L (ref 10–40)
BASOPHILS # BLD AUTO: 0.05 K/UL (ref 0–0.2)
BASOPHILS NFR BLD: 0.8 % (ref 0–1.9)
BILIRUB SERPL-MCNC: 0.3 MG/DL (ref 0.1–1)
BUN SERPL-MCNC: 31 MG/DL (ref 8–23)
CALCIUM SERPL-MCNC: 9.5 MG/DL (ref 8.7–10.5)
CHLORIDE SERPL-SCNC: 101 MMOL/L (ref 95–110)
CHOLEST SERPL-MCNC: 258 MG/DL (ref 120–199)
CHOLEST/HDLC SERPL: 8.1 {RATIO} (ref 2–5)
CO2 SERPL-SCNC: 27 MMOL/L (ref 23–29)
CREAT SERPL-MCNC: 1.4 MG/DL (ref 0.5–1.4)
DIFFERENTIAL METHOD: ABNORMAL
EOSINOPHIL # BLD AUTO: 0.1 K/UL (ref 0–0.5)
EOSINOPHIL NFR BLD: 2.1 % (ref 0–8)
ERYTHROCYTE [DISTWIDTH] IN BLOOD BY AUTOMATED COUNT: 13.7 % (ref 11.5–14.5)
EST. GFR  (NO RACE VARIABLE): 37.3 ML/MIN/1.73 M^2
ESTIMATED AVG GLUCOSE: 126 MG/DL (ref 68–131)
GLUCOSE SERPL-MCNC: 116 MG/DL (ref 70–110)
HBA1C MFR BLD: 6 % (ref 4–5.6)
HCT VFR BLD AUTO: 37.1 % (ref 37–48.5)
HDLC SERPL-MCNC: 32 MG/DL (ref 40–75)
HDLC SERPL: 12.4 % (ref 20–50)
HGB BLD-MCNC: 11.9 G/DL (ref 12–16)
IMM GRANULOCYTES # BLD AUTO: 0.03 K/UL (ref 0–0.04)
IMM GRANULOCYTES NFR BLD AUTO: 0.5 % (ref 0–0.5)
LDLC SERPL CALC-MCNC: 177.4 MG/DL (ref 63–159)
LYMPHOCYTES # BLD AUTO: 1.9 K/UL (ref 1–4.8)
LYMPHOCYTES NFR BLD: 30.1 % (ref 18–48)
MCH RBC QN AUTO: 27.9 PG (ref 27–31)
MCHC RBC AUTO-ENTMCNC: 32.1 G/DL (ref 32–36)
MCV RBC AUTO: 87 FL (ref 82–98)
MONOCYTES # BLD AUTO: 0.5 K/UL (ref 0.3–1)
MONOCYTES NFR BLD: 8.2 % (ref 4–15)
NEUTROPHILS # BLD AUTO: 3.6 K/UL (ref 1.8–7.7)
NEUTROPHILS NFR BLD: 58.3 % (ref 38–73)
NONHDLC SERPL-MCNC: 226 MG/DL
NRBC BLD-RTO: 0 /100 WBC
PLATELET # BLD AUTO: 240 K/UL (ref 150–450)
PMV BLD AUTO: 10.4 FL (ref 9.2–12.9)
POTASSIUM SERPL-SCNC: 3.8 MMOL/L (ref 3.5–5.1)
PROT SERPL-MCNC: 6.9 G/DL (ref 6–8.4)
RBC # BLD AUTO: 4.27 M/UL (ref 4–5.4)
SODIUM SERPL-SCNC: 138 MMOL/L (ref 136–145)
TRIGL SERPL-MCNC: 243 MG/DL (ref 30–150)
TSH SERPL DL<=0.005 MIU/L-ACNC: 1.28 UIU/ML (ref 0.4–4)
WBC # BLD AUTO: 6.24 K/UL (ref 3.9–12.7)

## 2023-07-12 PROCEDURE — 36415 COLL VENOUS BLD VENIPUNCTURE: CPT | Mod: HCNC,PO | Performed by: PHYSICIAN ASSISTANT

## 2023-07-12 PROCEDURE — 83036 HEMOGLOBIN GLYCOSYLATED A1C: CPT | Mod: HCNC | Performed by: PHYSICIAN ASSISTANT

## 2023-07-12 PROCEDURE — 80061 LIPID PANEL: CPT | Mod: HCNC | Performed by: PHYSICIAN ASSISTANT

## 2023-07-12 PROCEDURE — 82306 VITAMIN D 25 HYDROXY: CPT | Mod: HCNC | Performed by: PHYSICIAN ASSISTANT

## 2023-07-12 PROCEDURE — 85025 COMPLETE CBC W/AUTO DIFF WBC: CPT | Mod: HCNC | Performed by: PHYSICIAN ASSISTANT

## 2023-07-12 PROCEDURE — 80053 COMPREHEN METABOLIC PANEL: CPT | Mod: HCNC | Performed by: PHYSICIAN ASSISTANT

## 2023-07-12 PROCEDURE — 84443 ASSAY THYROID STIM HORMONE: CPT | Mod: HCNC | Performed by: PHYSICIAN ASSISTANT

## 2023-07-18 DIAGNOSIS — E55.9 VITAMIN D DEFICIENCY DISEASE: Primary | ICD-10-CM

## 2023-07-18 RX ORDER — ERGOCALCIFEROL 1.25 MG/1
50000 CAPSULE ORAL
Qty: 12 CAPSULE | Refills: 3 | Status: SHIPPED | OUTPATIENT
Start: 2023-07-18 | End: 2023-07-18

## 2023-07-18 RX ORDER — ERGOCALCIFEROL 1.25 MG/1
50000 CAPSULE ORAL
Qty: 12 CAPSULE | Refills: 3 | Status: ON HOLD | OUTPATIENT
Start: 2023-07-18 | End: 2024-03-05 | Stop reason: HOSPADM

## 2023-07-18 RX ORDER — ATORVASTATIN CALCIUM 10 MG/1
10 TABLET, FILM COATED ORAL DAILY
Qty: 90 TABLET | Refills: 1 | Status: SHIPPED | OUTPATIENT
Start: 2023-07-18 | End: 2023-11-22

## 2023-07-25 ENCOUNTER — PATIENT OUTREACH (OUTPATIENT)
Dept: ADMINISTRATIVE | Facility: HOSPITAL | Age: 83
End: 2023-07-25
Payer: MEDICARE

## 2023-07-25 NOTE — PROGRESS NOTES
[employer] CAPITATED ANALYSIS: per chart review pt had annual exam 7/06/23 with pcp, awv not scheduled due to dual ins/ruperto.

## 2023-08-23 ENCOUNTER — OFFICE VISIT (OUTPATIENT)
Dept: PODIATRY | Facility: CLINIC | Age: 83
End: 2023-08-23
Payer: MEDICARE

## 2023-08-23 ENCOUNTER — APPOINTMENT (OUTPATIENT)
Dept: RADIOLOGY | Facility: HOSPITAL | Age: 83
End: 2023-08-23
Attending: PHYSICIAN ASSISTANT
Payer: MEDICARE

## 2023-08-23 VITALS — BODY MASS INDEX: 33.95 KG/M2 | HEIGHT: 68 IN | WEIGHT: 224 LBS

## 2023-08-23 DIAGNOSIS — T45.1X5A CHEMOTHERAPY-INDUCED PERIPHERAL NEUROPATHY: Primary | ICD-10-CM

## 2023-08-23 DIAGNOSIS — L60.3 ONYCHODYSTROPHY: ICD-10-CM

## 2023-08-23 DIAGNOSIS — M81.0 AGE-RELATED OSTEOPOROSIS WITHOUT CURRENT PATHOLOGICAL FRACTURE: ICD-10-CM

## 2023-08-23 DIAGNOSIS — G62.0 CHEMOTHERAPY-INDUCED PERIPHERAL NEUROPATHY: Primary | ICD-10-CM

## 2023-08-23 PROCEDURE — 11721 PR DEBRIDEMENT OF NAILS, 6 OR MORE: ICD-10-PCS | Mod: Q9,HCNC,S$GLB, | Performed by: PODIATRIST

## 2023-08-23 PROCEDURE — 77080 DXA BONE DENSITY AXIAL: CPT | Mod: TC,HCNC

## 2023-08-23 PROCEDURE — 77080 DXA BONE DENSITY AXIAL SKELETON 1 OR MORE SITES: ICD-10-PCS | Mod: 26,HCNC,, | Performed by: RADIOLOGY

## 2023-08-23 PROCEDURE — 99499 NO LOS: ICD-10-PCS | Mod: HCNC,S$GLB,, | Performed by: PODIATRIST

## 2023-08-23 PROCEDURE — 11721 DEBRIDE NAIL 6 OR MORE: CPT | Mod: Q9,HCNC,S$GLB, | Performed by: PODIATRIST

## 2023-08-23 PROCEDURE — 99499 UNLISTED E&M SERVICE: CPT | Mod: HCNC,S$GLB,, | Performed by: PODIATRIST

## 2023-08-23 PROCEDURE — 99999 PR PBB SHADOW E&M-EST. PATIENT-LVL III: CPT | Mod: PBBFAC,HCNC,, | Performed by: PODIATRIST

## 2023-08-23 PROCEDURE — 99999 PR PBB SHADOW E&M-EST. PATIENT-LVL III: ICD-10-PCS | Mod: PBBFAC,HCNC,, | Performed by: PODIATRIST

## 2023-08-23 PROCEDURE — 77080 DXA BONE DENSITY AXIAL: CPT | Mod: 26,HCNC,, | Performed by: RADIOLOGY

## 2023-08-23 NOTE — PROGRESS NOTES
Subjective:       Patient ID: Lizeth Henderson is a 83 y.o. female.    Chief Complaint: Nail Care (Nail care, Pt has 3/10 pain , Non diabetic wearing sneakers, last saw PCP Lazarus Euceda MD on 06/23/23.)    HPI: Patient presents to the office today with the chief complaint of elongated, thickened and dystrophic nail plates to the B/L foot. This patient is a Diabetic Type II. Patient does follow with Primary Care and/or Endocrinology for management of Diabetes Mellitus. This patient's PMD is Lazarus Euceda MD. This patient last saw his/her primary care provider on 06/23/2023. Presents to clinic with daughter present.     Hemoglobin A1C   Date Value Ref Range Status   07/12/2023 6.0 (H) 4.0 - 5.6 % Final     Comment:     ADA Screening Guidelines:  5.7-6.4%  Consistent with prediabetes  >or=6.5%  Consistent with diabetes    High levels of fetal hemoglobin interfere with the HbA1C  assay. Heterozygous hemoglobin variants (HbS, HgC, etc)do  not significantly interfere with this assay.   However, presence of multiple variants may affect accuracy.     11/05/2020 5.4 4.0 - 5.6 % Final     Comment:     ADA Screening Guidelines:  5.7-6.4%  Consistent with prediabetes  >or=6.5%  Consistent with diabetes  High levels of fetal hemoglobin interfere with the HbA1C  assay. Heterozygous hemoglobin variants (HbS, HgC, etc)do  not significantly interfere with this assay.   However, presence of multiple variants may affect accuracy.     01/03/2019 5.6 4.0 - 5.6 % Final     Comment:     ADA Screening Guidelines:  5.7-6.4%  Consistent with prediabetes  >or=6.5%  Consistent with diabetes  High levels of fetal hemoglobin interfere with the HbA1C  assay. Heterozygous hemoglobin variants (HbS, HgC, etc)do  not significantly interfere with this assay.   However, presence of multiple variants may affect accuracy.     .     Review of patient's allergies indicates:   Allergen Reactions    Lisinopril      cough       Past Medical  History:   Diagnosis Date    Adrenal adenoma 2016    Anxiety     Arthritis     Benign hypertension     Colon cancer age 61    colon    Coronary artery disease     Depression     Full dentures     General anesthetics causing adverse effect in therapeutic use     yells and talks when wakes up    Hyperlipidemia     Osteopenia     Shingles     Wears glasses        Family History   Problem Relation Age of Onset    Cancer Father     Hypertension Father     Arthritis Father     Alzheimer's disease Mother     Arthritis Mother     Depression Daughter     Kidney disease Daughter     Ulcerative colitis Daughter     Depression Daughter     Depression Daughter     Anxiety disorder Daughter         PTSD    Cancer Maternal Uncle     Cancer Cousin         colon cancer    Early death Maternal Grandmother     Amblyopia Neg Hx     Blindness Neg Hx     Cataracts Neg Hx     Diabetes Neg Hx     Glaucoma Neg Hx     Macular degeneration Neg Hx     Retinal detachment Neg Hx     Strabismus Neg Hx     Stroke Neg Hx     Thyroid disease Neg Hx        Social History     Socioeconomic History    Marital status:     Number of children: 3   Tobacco Use    Smoking status: Never    Smokeless tobacco: Never   Substance and Sexual Activity    Alcohol use: No    Drug use: No    Sexual activity: Never   Social History Narrative    The patient does not exercise regularly ().    Rates diet as poor.    She is not satisfied with weight.             Social Determinants of Health     Financial Resource Strain: Low Risk  (6/10/2022)    Overall Financial Resource Strain (CARDIA)     Difficulty of Paying Living Expenses: Not hard at all   Food Insecurity: No Food Insecurity (6/10/2022)    Hunger Vital Sign     Worried About Running Out of Food in the Last Year: Never true     Ran Out of Food in the Last Year: Never true   Transportation Needs: No Transportation Needs (6/10/2022)    PRAPARE - Transportation     Lack of Transportation (Medical): No     Lack  "of Transportation (Non-Medical): No   Physical Activity: Inactive (6/10/2022)    Exercise Vital Sign     Days of Exercise per Week: 0 days     Minutes of Exercise per Session: 0 min   Stress: Stress Concern Present (6/10/2022)    [employer] - Occupational Stress Questionnaire     Feeling of Stress : To some extent   Social Connections: Unknown (6/10/2022)    Social Connection and Isolation Panel [NHANES]     Frequency of Communication with Friends and Family: Three times a week   Housing Stability: Low Risk  (6/10/2022)    Housing Stability Vital Sign     Unable to Pay for Housing in the Last Year: No     Number of Places Lived in the Last Year: 1     Unstable Housing in the Last Year: No       Past Surgical History:   Procedure Laterality Date    APPENDECTOMY      BREAST BIOPSY Left     benign    BUNIONECTOMY Left     CATARACT EXTRACTION Bilateral     COLON SURGERY      2001    EYE SURGERY      cataract surg    HEMICOLECTOMY  2002    HERNIA REPAIR      x5    JOINT REPLACEMENT      knee    left toe surgery      joelle    PATENT DUCTUS ARTERIOUS LIGATION  1948    SALPINGOOPHORECTOMY  2002    due to colon cancer    TONSILLECTOMY, ADENOIDECTOMY         Review of Systems       Objective:   Ht 5' 8" (1.727 m)   Wt 101.6 kg (224 lb)   BMI 34.06 kg/m²     Physical Exam  LOWER EXTREMITY PHYSICAL EXAMINATION    VASCULAR:  The right dorsalis pedis pulse 2/4 and the right posterior tibial pulse 2/4.  The left dorsalis pedis pulse 2/4 and posterior tibial pulse on the left is 2/4.  Capillary refill is intact.  Pedal hair growth intact    NEUROLOGY: Protective sensation is not intact to the left and right plantar surfaces of the foot and digits, as the patient has no sensation/detection at greater than 4 distinct points of contact with 5.07 [employer] monofilament. Sensation to light touch is intact on the left and right foot. Proprioception is intact, bilateral. Sensation to pin prick is " reduced to absent. Vibratory sensation is diminished.    DERMATOLOGY:  Skin is supple, moist, intact.  There is no signs of callusing, ulcerations, other lesions identified to the dorsal or plantar aspect of the right or left foot.  The R1, 2, 5 and left L1,2, 5 are thickened, discolored dystrophic.  There is subungual debris.  Nail plates have area of dark discoloration.  The remaining nails 3-4 on the right foot and the left foot are elongated but of normal color, thickness, and texture.   There is no signs of ingrowing into the medial or lateral borders.  There is no evidence of wounds or skin breakdown.  No edema or erythema.  No obvious lacerations or fissuring.  Interdigital spaces are clean, dry, intact.  No rashes or scars appreciated.    ORTHOPEDIC: Manual Muscle Testing is 5/5 in all planes on the left and right, without pains, with and without resistance. Gait pattern is non-antalgic.    Assessment:     1. Chemotherapy-induced peripheral neuropathy    2. Onychodystrophy        Plan:     Chemotherapy-induced peripheral neuropathy    Onychodystrophy        Thorough discussion is had with the patient this afternoon, concerning the diagnosis, its etiology, and the treatment algorithm at present.  Greater than 50% of this visit spent on counseling and coordination of care. Greater than 15 minutes of a 20 minute appointment spent on education about the diabetic foot, neuropathy, and prevention of limb loss.  Shoe inspection. Diabetic Foot Education. Patient reminded of the importance of good nutrition and blood sugar control to help prevent podiatric complications of diabetes. Patient instructed on proper foot hygeine. We discussed wearing proper and supportive shoe gear, daily foot inspections, never walking barefooted or sock footed, never putting sharp instruments to feet which can cause major complications associated with infection, ulcers, lacerations.      Dystrophic nail plates, as outlined above  (R#1,2,5  ; L#1,2,5 ), are sharply debrided with double action nail nipper, and/or with the assistance of a mechanical rotary nehemias, with removal of all offending nail and nail border(s), for reduction of pains. Nails are reduced in terms of length, width and girth with removal of subungual debris to facilitate pain free weight bearing and ambulation. The elongated nails as outlined in the objective portion of this note, were trimmed to appropriate length, with a double action nail nipper, for alleviation/reduction of pains as well. Follow up in approx. 3-4 months.        Future Appointments   Date Time Provider Department Center   9/26/2023  3:00 PM Preston Frye MD [facility]   10/20/2023  8:20 AM [facility]   12/27/2023  3:00 [facility]   1/9/2024  8:00 AM Lazarus Euceda MD [facility]

## 2023-08-24 ENCOUNTER — TELEPHONE (OUTPATIENT)
Dept: INTERNAL MEDICINE | Facility: CLINIC | Age: 83
End: 2023-08-24
Payer: MEDICARE

## 2023-08-24 NOTE — TELEPHONE ENCOUNTER
----- Message from Kathleen Washington sent at 8/24/2023  3:42 PM CDT -----  Contact: Lizeth  .Type:  Patient Returning Call    Who Called: Lizeth   Who Left Message for Patient: Claire   Does the patient know what this is regarding?: Bone Density Results   Would the patient rather a call back or a response via [facility]?  Call   Best Call Back Number: .[phone].   Additional Information:

## 2023-08-25 RX ORDER — IBANDRONATE SODIUM 150 MG/1
150 TABLET, FILM COATED ORAL
Qty: 3 TABLET | Refills: 3 | Status: SHIPPED | OUTPATIENT
Start: 2023-08-25

## 2023-09-11 RX ORDER — QUETIAPINE FUMARATE 50 MG/1
50 TABLET, FILM COATED ORAL NIGHTLY
Qty: 90 TABLET | Refills: 0 | Status: SHIPPED | OUTPATIENT
Start: 2023-09-11 | End: 2023-09-26

## 2023-09-14 DIAGNOSIS — N32.81 OVERACTIVE BLADDER: Chronic | ICD-10-CM

## 2023-09-14 NOTE — TELEPHONE ENCOUNTER
Care Due:                  Date            Visit Type   Department     Provider  --------------------------------------------------------------------------------    Last Visit: None Found      None         None Found                              EP -                              PRIMARY      [facility] INTERNAL  Next Visit: 01-      CARE ([facility])   MEDICINE       Lazarus Euceda                                                            Last  Test          Frequency    Reason                     Performed    Due Date  --------------------------------------------------------------------------------    Office Visit  12 months..  atorvastatin.............  Not Found    Overdue    [employer] Embedded Care Due Messages. Reference number: [id number].   9/14/2023 11:47:25 AM CDT

## 2023-09-15 RX ORDER — MIRABEGRON 50 MG/1
TABLET, FILM COATED, EXTENDED RELEASE ORAL
Qty: 90 TABLET | Refills: 0 | Status: SHIPPED | OUTPATIENT
Start: 2023-09-15 | End: 2023-12-11

## 2023-09-21 RX ORDER — BUSPIRONE HYDROCHLORIDE 15 MG/1
15 TABLET ORAL 2 TIMES DAILY
Qty: 180 TABLET | Refills: 0 | Status: SHIPPED | OUTPATIENT
Start: 2023-09-21 | End: 2023-09-26

## 2023-09-26 ENCOUNTER — OFFICE VISIT (OUTPATIENT)
Dept: PSYCHIATRY | Facility: CLINIC | Age: 83
End: 2023-09-26
Payer: MEDICARE

## 2023-09-26 DIAGNOSIS — F33.41 MDD (MAJOR DEPRESSIVE DISORDER), RECURRENT, IN PARTIAL REMISSION: ICD-10-CM

## 2023-09-26 DIAGNOSIS — F41.1 GAD (GENERALIZED ANXIETY DISORDER): Primary | ICD-10-CM

## 2023-09-26 PROCEDURE — 99214 OFFICE O/P EST MOD 30 MIN: CPT | Mod: HCNC,95,, | Performed by: PSYCHIATRY & NEUROLOGY

## 2023-09-26 PROCEDURE — 99214 PR OFFICE/OUTPT VISIT, EST, LEVL IV, 30-39 MIN: ICD-10-PCS | Mod: HCNC,95,, | Performed by: PSYCHIATRY & NEUROLOGY

## 2023-09-26 RX ORDER — ESCITALOPRAM OXALATE 10 MG/1
10 TABLET ORAL DAILY
Qty: 90 TABLET | Refills: 0 | Status: SHIPPED | OUTPATIENT
Start: 2023-09-26 | End: 2024-01-05 | Stop reason: SDUPTHER

## 2023-09-26 RX ORDER — BUSPIRONE HYDROCHLORIDE 10 MG/1
20 TABLET ORAL 2 TIMES DAILY
Qty: 120 TABLET | Refills: 3 | Status: SHIPPED | OUTPATIENT
Start: 2023-09-26 | End: 2024-01-05 | Stop reason: SDUPTHER

## 2023-09-26 RX ORDER — BUPROPION HYDROCHLORIDE 150 MG/1
150 TABLET ORAL DAILY
Qty: 90 TABLET | Refills: 0 | Status: SHIPPED | OUTPATIENT
Start: 2023-09-26 | End: 2024-01-05 | Stop reason: SDUPTHER

## 2023-09-26 RX ORDER — ALPRAZOLAM 0.25 MG/1
TABLET ORAL
Qty: 20 TABLET | Refills: 2 | Status: SHIPPED | OUTPATIENT
Start: 2023-09-26 | End: 2024-01-05 | Stop reason: SDUPTHER
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
## 2023-10-16 ENCOUNTER — OFFICE VISIT (OUTPATIENT)
Dept: PSYCHIATRY | Facility: CLINIC | Age: 83
End: 2023-10-16
Payer: MEDICARE

## 2023-10-16 DIAGNOSIS — F41.1 GAD (GENERALIZED ANXIETY DISORDER): Primary | ICD-10-CM

## 2023-10-16 PROCEDURE — 99214 PR OFFICE/OUTPT VISIT, EST, LEVL IV, 30-39 MIN: ICD-10-PCS | Mod: HCNC,95,, | Performed by: PSYCHIATRY & NEUROLOGY

## 2023-10-16 PROCEDURE — 99214 OFFICE O/P EST MOD 30 MIN: CPT | Mod: HCNC,95,, | Performed by: PSYCHIATRY & NEUROLOGY

## 2023-10-16 PROCEDURE — 99499 UNLISTED E&M SERVICE: CPT | Mod: HCNC,95,, | Performed by: PSYCHIATRY & NEUROLOGY
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
## 2023-10-23 ENCOUNTER — OFFICE VISIT (OUTPATIENT)
Dept: INTERNAL MEDICINE | Facility: CLINIC | Age: 83
End: 2023-10-23
Payer: MEDICARE

## 2023-10-23 VITALS
TEMPERATURE: 98 F | HEIGHT: 68 IN | HEART RATE: 83 BPM | SYSTOLIC BLOOD PRESSURE: 136 MMHG | BODY MASS INDEX: 34.15 KG/M2 | OXYGEN SATURATION: 97 % | DIASTOLIC BLOOD PRESSURE: 72 MMHG | WEIGHT: 225.31 LBS

## 2023-10-23 DIAGNOSIS — R26.9 GAIT ABNORMALITY: ICD-10-CM

## 2023-10-23 DIAGNOSIS — I10 ESSENTIAL HYPERTENSION: ICD-10-CM

## 2023-10-23 DIAGNOSIS — F41.1 GAD (GENERALIZED ANXIETY DISORDER): ICD-10-CM

## 2023-10-23 DIAGNOSIS — T45.1X5A CHEMOTHERAPY-INDUCED PERIPHERAL NEUROPATHY: ICD-10-CM

## 2023-10-23 DIAGNOSIS — G62.0 CHEMOTHERAPY-INDUCED PERIPHERAL NEUROPATHY: ICD-10-CM

## 2023-10-23 DIAGNOSIS — N18.32 STAGE 3B CHRONIC KIDNEY DISEASE: Primary | ICD-10-CM

## 2023-10-23 DIAGNOSIS — R73.03 PREDIABETES: ICD-10-CM

## 2023-10-23 DIAGNOSIS — F33.9 MAJOR DEPRESSION, RECURRENT, CHRONIC: ICD-10-CM

## 2023-10-23 PROCEDURE — 3075F SYST BP GE 130 - 139MM HG: CPT | Mod: HCNC,CPTII,S$GLB,

## 2023-10-23 PROCEDURE — 1125F AMNT PAIN NOTED PAIN PRSNT: CPT | Mod: HCNC,CPTII,S$GLB,

## 2023-10-23 PROCEDURE — 99999 PR PBB SHADOW E&M-EST. PATIENT-LVL IV: ICD-10-PCS | Mod: PBBFAC,HCNC,,

## 2023-10-23 PROCEDURE — 99214 OFFICE O/P EST MOD 30 MIN: CPT | Mod: HCNC,S$GLB,,

## 2023-10-23 PROCEDURE — 3075F PR MOST RECENT SYSTOLIC BLOOD PRESS GE 130-139MM HG: ICD-10-PCS | Mod: HCNC,CPTII,S$GLB,

## 2023-10-23 PROCEDURE — 1159F PR MEDICATION LIST DOCUMENTED IN MEDICAL RECORD: ICD-10-PCS | Mod: HCNC,CPTII,S$GLB,

## 2023-10-23 PROCEDURE — 3288F FALL RISK ASSESSMENT DOCD: CPT | Mod: HCNC,CPTII,S$GLB,

## 2023-10-23 PROCEDURE — 3078F DIAST BP <80 MM HG: CPT | Mod: HCNC,CPTII,S$GLB,

## 2023-10-23 PROCEDURE — 99214 PR OFFICE/OUTPT VISIT, EST, LEVL IV, 30-39 MIN: ICD-10-PCS | Mod: HCNC,S$GLB,,

## 2023-10-23 PROCEDURE — 1100F PTFALLS ASSESS-DOCD GE2>/YR: CPT | Mod: HCNC,CPTII,S$GLB,

## 2023-10-23 PROCEDURE — 1100F PR PT FALLS ASSESS DOC 2+ FALLS/FALL W/INJURY/YR: ICD-10-PCS | Mod: HCNC,CPTII,S$GLB,

## 2023-10-23 PROCEDURE — 1159F MED LIST DOCD IN RCRD: CPT | Mod: HCNC,CPTII,S$GLB,

## 2023-10-23 PROCEDURE — 1125F PR PAIN SEVERITY QUANTIFIED, PAIN PRESENT: ICD-10-PCS | Mod: HCNC,CPTII,S$GLB,

## 2023-10-23 PROCEDURE — 3078F PR MOST RECENT DIASTOLIC BLOOD PRESSURE < 80 MM HG: ICD-10-PCS | Mod: HCNC,CPTII,S$GLB,

## 2023-10-23 PROCEDURE — 99999 PR PBB SHADOW E&M-EST. PATIENT-LVL IV: CPT | Mod: PBBFAC,HCNC,,

## 2023-10-23 PROCEDURE — 3288F PR FALLS RISK ASSESSMENT DOCUMENTED: ICD-10-PCS | Mod: HCNC,CPTII,S$GLB,

## 2023-10-23 NOTE — PROGRESS NOTES
Lizeth Henderson  10/23/2023  [id number]    Lazarus Euceda MD  Patient Care Team:  Lazarus Euceda MD as PCP - General (Family Medicine)  Katarzyna Vinson MD (Psychiatry)  Alyssa Toledo LPN (Inactive) as Care Coordinator  Senia Robert as Digital Medicine Health   Raquel Benites PharmD as Hypertension Digital Medicine Clinician (Pharmacist)  Lazarus Euceda MD as Hypertension Digital Medicine Responsible Provider (Family Medicine)  [employer] as Hypertension Digital Medicine Contract          Visit Type:an urgent visit for a new problem    Chief Complaint:  Chief Complaint   Patient presents with    Follow-up        History of Present Illness:    83 year old female presents today for a 3 month follow up appt    Pt had an annual exam with JUAN Aviles in July 2023  At visit Vitamin D was low and cholesterol level was elevated  Started on weekly vitamin d and Lipitor 10 mg   Tolerating Lipitor without SE  Labs also noted Pre- DM     DASH/MDD  Followed by psych   Last appt oct 2023    HTN  Taking losartan 100 mg  HCTZ 12.5 mg     History:  Past Medical History:   Diagnosis Date    Adrenal adenoma 2016    Anxiety     Arthritis     Benign hypertension     Colon cancer age 61    colon    Coronary artery disease     Depression     Full dentures     General anesthetics causing adverse effect in therapeutic use     yells and talks when wakes up    Hyperlipidemia     Osteopenia     Shingles     Wears glasses      Past Surgical History:   Procedure Laterality Date    APPENDECTOMY      BREAST BIOPSY Left     benign    BUNIONECTOMY Left     CATARACT EXTRACTION Bilateral     COLON SURGERY      2001    EYE SURGERY      cataract surg    HEMICOLECTOMY  2002    HERNIA REPAIR      x5    JOINT REPLACEMENT      knee    left toe surgery      hammertoe    PATENT DUCTUS ARTERIOUS LIGATION  1948    SALPINGOOPHORECTOMY  2002    due to colon cancer    TONSILLECTOMY, ADENOIDECTOMY       Family History   Problem Relation Age of Onset     Cancer Father     Hypertension Father     Arthritis Father     Alzheimer's disease Mother     Arthritis Mother     Depression Daughter     Kidney disease Daughter     Ulcerative colitis Daughter     Depression Daughter     Depression Daughter     Anxiety disorder Daughter         PTSD    Cancer Maternal Uncle     Cancer Cousin         colon cancer    Early death Maternal Grandmother     Amblyopia Neg Hx     Blindness Neg Hx     Cataracts Neg Hx     Diabetes Neg Hx     Glaucoma Neg Hx     Macular degeneration Neg Hx     Retinal detachment Neg Hx     Strabismus Neg Hx     Stroke Neg Hx     Thyroid disease Neg Hx      Social History     Socioeconomic History    Marital status:     Number of children: 3   Tobacco Use    Smoking status: Never    Smokeless tobacco: Never   Substance and Sexual Activity    Alcohol use: No    Drug use: No    Sexual activity: Never   Social History Narrative    The patient does not exercise regularly ().    Rates diet as poor.    She is not satisfied with weight.             Social Determinants of Health     Financial Resource Strain: Low Risk  (10/23/2023)    Overall Financial Resource Strain (CARDIA)     Difficulty of Paying Living Expenses: Not hard at all   Food Insecurity: No Food Insecurity (10/23/2023)    Hunger Vital Sign     Worried About Running Out of Food in the Last Year: Never true     Ran Out of Food in the Last Year: Never true   Transportation Needs: No Transportation Needs (10/23/2023)    PRAPARE - Transportation     Lack of Transportation (Medical): No     Lack of Transportation (Non-Medical): No   Physical Activity: Unknown (10/23/2023)    Exercise Vital Sign     Days of Exercise per Week: 0 days   Stress: Stress Concern Present (10/23/2023)    [employer] - Occupational Stress Questionnaire     Feeling of Stress : To some extent   Social Connections: Unknown (10/23/2023)    Social Connection and Isolation Panel [NHANES]      Frequency of Communication with Friends and Family: More than three times a week     Frequency of Social Gatherings with Friends and Family: Once a week     Active Member of Clubs or Organizations: No     Attends Club or Organization Meetings: Never     Marital Status:    Housing Stability: Low Risk  (10/23/2023)    Housing Stability Vital Sign     Unable to Pay for Housing in the Last Year: No     Number of Places Lived in the Last Year: 1     Unstable Housing in the Last Year: No     Patient Active Problem List   Diagnosis    Major depression, recurrent, chronic    Generalized anxiety disorder    Essential hypertension    Mitral regurgitation    Eating disorder    Polyarthralgia    History of colon cancer    Arthritis of both knees    Hyperlipidemia, mixed    Adrenal nodule    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    Primary insomnia    Anemia    Gait abnormality    Overactive bladder    Prediabetes    Hammer toes of both feet    Chemotherapy-induced peripheral neuropathy    Valgus deformity of both great toes    Hypersegmentation of neutrophils    Immunization deficiency    Chronic kidney disease, stage 3a    Atherosclerosis of aorta    Age-related osteoporosis without current pathological fracture     Review of patient's allergies indicates:   Allergen Reactions    Lisinopril      cough       The following were reviewed at this visit: active problem list, medication list, allergies, family history, social history, and health maintenance.    Medications:  Current Outpatient Medications on File Prior to Visit   Medication Sig Dispense Refill    ALPRAZolam (XANAX) 0.25 MG tablet TAKE 1/2 TO 1 TABLET BY MOUTH DAILY AS NEEDED FOR ANXIETY 20 tablet 2    aspirin (ECOTRIN) 81 MG EC tablet Take 81 mg by mouth once daily.      atorvastatin (LIPITOR) 10 MG tablet Take 1 tablet (10 mg total) by mouth once daily. 90 tablet 1    azelastine (ASTELIN) 137 mcg (0.1 %) nasal spray 1 spray (137 mcg  total) by Nasal route 2 (two) times daily. 90 mL 0    buPROPion (WELLBUTRIN XL) 150 MG TB24 tablet Take 1 tablet (150 mg total) by mouth once daily. 90 tablet 0    busPIRone (BUSPAR) 10 MG tablet Take 2 tablets (20 mg total) by mouth 2 (two) times daily. 120 tablet 3    ergocalciferol (ERGOCALCIFEROL) 50,000 unit Cap Take 1 capsule (50,000 Units total) by mouth every 7 days. 12 capsule 3    EScitalopram oxalate (LEXAPRO) 10 MG tablet Take 1 tablet (10 mg total) by mouth once daily. 90 tablet 0    hydroCHLOROthiazide (HYDRODIURIL) 12.5 MG Tab Take 1 tablet (12.5 mg total) by mouth once daily. 90 tablet 3    ibandronate (BONIVA) 150 mg tablet Take 1 tablet (150 mg total) by mouth every 30 days. 3 tablet 3    losartan (COZAAR) 100 MG tablet Take 1 tablet (100 mg total) by mouth once daily. 90 tablet 3    MYRBETRIQ 50 mg Tb24 TAKE 1 TABLET(50 MG) BY MOUTH EVERY DAY 90 tablet 0    suvorexant (BELSOMRA) 10 mg Tab Take 10 mg by mouth every evening. 30 tablet 2     No current facility-administered medications on file prior to visit.       Medications have been reviewed and reconciled with patient at this visit.  Barriers to medications reviewed with patient.    Adverse reactions to current medications reviewed with patient..    Over the counter medications reviewed and reconciled with patient.    Exam:  Wt Readings from Last 3 Encounters:   08/23/23 101.6 kg (224 lb)   07/06/23 101.8 kg (224 lb 6.9 oz)   02/20/23 95.5 kg (210 lb 8.6 oz)     Temp Readings from Last 3 Encounters:   07/06/23 97.4 °F (36.3 °C) (Tympanic)   06/10/22 99 °F (37.2 °C) (Temporal)   03/14/22 98.2 °F (36.8 °C) (Temporal)     BP Readings from Last 3 Encounters:   07/06/23 132/82   06/10/22 98/66   03/11/22 124/72     Pulse Readings from Last 3 Encounters:   07/06/23 110   06/10/22 84   03/11/22 107     There is no height or weight on file to calculate BMI.      Review of Systems   Cardiovascular:  Negative for chest pain and palpitations.     Physical  Exam  Vitals and nursing note reviewed.   Constitutional:       General: She is not in acute distress.     Appearance: She is well-developed. She is obese. She is not diaphoretic.   HENT:      Head: Normocephalic and atraumatic.      Right Ear: External ear normal.      Left Ear: External ear normal.   Eyes:      General:         Right eye: No discharge.         Left eye: No discharge.      Conjunctiva/sclera: Conjunctivae normal.      Pupils: Pupils are equal, round, and reactive to light.   Cardiovascular:      Rate and Rhythm: Normal rate and regular rhythm.      Heart sounds: Normal heart sounds. No murmur heard.  Pulmonary:      Effort: Pulmonary effort is normal. No respiratory distress.      Breath sounds: Normal breath sounds. No wheezing.   Musculoskeletal:      Comments: The patient is using an assistive device during the visit     Neurological:      Mental Status: She is alert.      Motor: Weakness present.      Gait: Gait abnormal.   Psychiatric:         Behavior: Behavior normal.         Thought Content: Thought content normal.         Judgment: Judgment normal.         Laboratory Reviewed ({Yes)  Lab Results   Component Value Date    WBC 6.24 07/12/2023    HGB 11.9 (L) 07/12/2023    HCT 37.1 07/12/2023     07/12/2023    CHOL 258 (H) 07/12/2023    TRIG 243 (H) 07/12/2023    HDL 32 (L) 07/12/2023    ALT 7 (L) 07/12/2023    AST 18 07/12/2023     07/12/2023    K 3.8 07/12/2023     07/12/2023    CREATININE 1.4 07/12/2023    BUN 31 (H) 07/12/2023    CO2 27 07/12/2023    TSH 1.281 07/12/2023    INR 1.0 10/12/2016    HGBA1C 6.0 (H) 07/12/2023         Lizeth was seen today for follow-up.    Diagnoses and all orders for this visit:    Stage 3b chronic kidney disease  -     COMPREHENSIVE METABOLIC PANEL; Future    Chemotherapy-induced peripheral neuropathy  -     COMPREHENSIVE METABOLIC PANEL; Future  -     HEMOGLOBIN A1C; Future  -     Lipid Panel; Future  -     CBC Auto Differential;  Future    Essential hypertension  -     COMPREHENSIVE METABOLIC PANEL; Future  -     HEMOGLOBIN A1C; Future  -     Lipid Panel; Future  -     CBC Auto Differential; Future    Prediabetes  -     COMPREHENSIVE METABOLIC PANEL; Future  -     HEMOGLOBIN A1C; Future  -     Lipid Panel; Future  -     CBC Auto Differential; Future    DASH (generalized anxiety disorder)  -     COMPREHENSIVE METABOLIC PANEL; Future  -     HEMOGLOBIN A1C; Future  -     Lipid Panel; Future  -     CBC Auto Differential; Future    Major depression, recurrent, chronic  -     COMPREHENSIVE METABOLIC PANEL; Future  -     HEMOGLOBIN A1C; Future  -     Lipid Panel; Future  -     CBC Auto Differential; Future    Gait abnormality  Patient is using an assistive device at visit. Disused fall prevention        Pt has an appt scheduled with Dr. Euceda in [place]  [facility] prior to the appt     Care Plan/Goals: Reviewed    Goals        Blood Pressure < 130/80      Exercise at least 150 minutes per week.      Take at least one BP reading per week at various times of the day             Follow up: No follow-ups on file.    After visit summary was printed and given to patient upon discharge today.  Patient goals and care plan are included in After Visit Summary.

## 2023-11-08 ENCOUNTER — OFFICE VISIT (OUTPATIENT)
Dept: PODIATRY | Facility: CLINIC | Age: 83
End: 2023-11-08
Payer: MEDICARE

## 2023-11-08 VITALS — BODY MASS INDEX: 34.1 KG/M2 | HEIGHT: 68 IN | WEIGHT: 225 LBS

## 2023-11-08 DIAGNOSIS — G62.0 CHEMOTHERAPY-INDUCED PERIPHERAL NEUROPATHY: Primary | ICD-10-CM

## 2023-11-08 DIAGNOSIS — L60.3 ONYCHODYSTROPHY: ICD-10-CM

## 2023-11-08 DIAGNOSIS — T45.1X5A CHEMOTHERAPY-INDUCED PERIPHERAL NEUROPATHY: Primary | ICD-10-CM

## 2023-11-08 PROCEDURE — 99499 NO LOS: ICD-10-PCS | Mod: HCNC,S$GLB,, | Performed by: PODIATRIST

## 2023-11-08 PROCEDURE — 99499 UNLISTED E&M SERVICE: CPT | Mod: HCNC,S$GLB,, | Performed by: PODIATRIST

## 2023-11-08 PROCEDURE — 11721 PR DEBRIDEMENT OF NAILS, 6 OR MORE: ICD-10-PCS | Mod: Q9,HCNC,S$GLB, | Performed by: PODIATRIST

## 2023-11-08 PROCEDURE — 99999 PR PBB SHADOW E&M-EST. PATIENT-LVL III: CPT | Mod: PBBFAC,HCNC,, | Performed by: PODIATRIST

## 2023-11-08 PROCEDURE — 11721 DEBRIDE NAIL 6 OR MORE: CPT | Mod: Q9,HCNC,S$GLB, | Performed by: PODIATRIST

## 2023-11-08 PROCEDURE — 99999 PR PBB SHADOW E&M-EST. PATIENT-LVL III: ICD-10-PCS | Mod: PBBFAC,HCNC,, | Performed by: PODIATRIST

## 2023-11-08 NOTE — PROGRESS NOTES
Subjective:       Patient ID: Lizeth Henderson is a 83 y.o. female.    Chief Complaint: Nail Care (Pt presents for nail care. Pain 6/10, Nondiabetic wearing closed toes shoes, last saw PCP Shikha Lu NP at 10/23/2023. )    HPI: Patient presents to the office today with the chief complaint of elongated, thickened and dystrophic nail plates to the B/L foot. This patient is a Diabetic Type II. Patient does follow with Primary Care and/or Endocrinology for management of Diabetes Mellitus. This patient's PMD is Lazarus Euceda MD. This patient last saw his/her primary care provider on 10/23/2023. Presents to clinic with daughter present.     Hemoglobin A1C   Date Value Ref Range Status   07/12/2023 6.0 (H) 4.0 - 5.6 % Final     Comment:     ADA Screening Guidelines:  5.7-6.4%  Consistent with prediabetes  >or=6.5%  Consistent with diabetes    High levels of fetal hemoglobin interfere with the HbA1C  assay. Heterozygous hemoglobin variants (HbS, HgC, etc)do  not significantly interfere with this assay.   However, presence of multiple variants may affect accuracy.     11/05/2020 5.4 4.0 - 5.6 % Final     Comment:     ADA Screening Guidelines:  5.7-6.4%  Consistent with prediabetes  >or=6.5%  Consistent with diabetes  High levels of fetal hemoglobin interfere with the HbA1C  assay. Heterozygous hemoglobin variants (HbS, HgC, etc)do  not significantly interfere with this assay.   However, presence of multiple variants may affect accuracy.     01/03/2019 5.6 4.0 - 5.6 % Final     Comment:     ADA Screening Guidelines:  5.7-6.4%  Consistent with prediabetes  >or=6.5%  Consistent with diabetes  High levels of fetal hemoglobin interfere with the HbA1C  assay. Heterozygous hemoglobin variants (HbS, HgC, etc)do  not significantly interfere with this assay.   However, presence of multiple variants may affect accuracy.     .     Review of patient's allergies indicates:   Allergen Reactions    Lisinopril      cough        Past Medical History:   Diagnosis Date    Adrenal adenoma 2016    Anxiety     Arthritis     Benign hypertension     Colon cancer age 61    colon    Coronary artery disease     Depression     Full dentures     General anesthetics causing adverse effect in therapeutic use     yells and talks when wakes up    Hyperlipidemia     Osteopenia     Shingles     Wears glasses        Family History   Problem Relation Age of Onset    Cancer Father     Hypertension Father     Arthritis Father     Alzheimer's disease Mother     Arthritis Mother     Depression Daughter     Kidney disease Daughter     Ulcerative colitis Daughter     Depression Daughter     Depression Daughter     Anxiety disorder Daughter         PTSD    Cancer Maternal Uncle     Cancer Cousin         colon cancer    Early death Maternal Grandmother     Amblyopia Neg Hx     Blindness Neg Hx     Cataracts Neg Hx     Diabetes Neg Hx     Glaucoma Neg Hx     Macular degeneration Neg Hx     Retinal detachment Neg Hx     Strabismus Neg Hx     Stroke Neg Hx     Thyroid disease Neg Hx        Social History     Socioeconomic History    Marital status:     Number of children: 3   Tobacco Use    Smoking status: Never    Smokeless tobacco: Never   Substance and Sexual Activity    Alcohol use: No    Drug use: No    Sexual activity: Never   Social History Narrative    The patient does not exercise regularly ().    Rates diet as poor.    She is not satisfied with weight.             Social Determinants of Health     Financial Resource Strain: Low Risk  (10/23/2023)    Overall Financial Resource Strain (CARDIA)     Difficulty of Paying Living Expenses: Not hard at all   Food Insecurity: No Food Insecurity (10/23/2023)    Hunger Vital Sign     Worried About Running Out of Food in the Last Year: Never true     Ran Out of Food in the Last Year: Never true   Transportation Needs: No Transportation Needs (10/23/2023)    PRAPARE - Transportation     Lack of Transportation  "(Medical): No     Lack of Transportation (Non-Medical): No   Physical Activity: Unknown (10/23/2023)    Exercise Vital Sign     Days of Exercise per Week: 0 days   Stress: Stress Concern Present (10/23/2023)    Citizen of [place] of Occupational Health - Occupational Stress Questionnaire     Feeling of Stress : To some extent   Social Connections: Unknown (10/23/2023)    Social Connection and Isolation Panel [NHANES]     Frequency of Communication with Friends and Family: More than three times a week     Frequency of Social Gatherings with Friends and Family: Once a week     Active Member of Clubs or Organizations: No     Attends Club or Organization Meetings: Never     Marital Status:    Housing Stability: Low Risk  (10/23/2023)    Housing Stability Vital Sign     Unable to Pay for Housing in the Last Year: No     Number of Places Lived in the Last Year: 1     Unstable Housing in the Last Year: No       Past Surgical History:   Procedure Laterality Date    APPENDECTOMY      BREAST BIOPSY Left     benign    BUNIONECTOMY Left     CATARACT EXTRACTION Bilateral     COLON SURGERY      2001    EYE SURGERY      cataract surg    HEMICOLECTOMY  2002    HERNIA REPAIR      x5    JOINT REPLACEMENT      knee    left toe surgery      joelle    PATENT DUCTUS ARTERIOUS LIGATION  1948    SALPINGOOPHORECTOMY  2002    due to colon cancer    TONSILLECTOMY, ADENOIDECTOMY         Review of Systems       Objective:   Ht 5' 8" (1.727 m)   Wt 102.1 kg (225 lb)   BMI 34.21 kg/m²     Physical Exam  LOWER EXTREMITY PHYSICAL EXAMINATION    VASCULAR:  The right dorsalis pedis pulse 2/4 and the right posterior tibial pulse 2/4.  The left dorsalis pedis pulse 2/4 and posterior tibial pulse on the left is 2/4.  Capillary refill is intact.  Pedal hair growth intact    NEUROLOGY: Protective sensation is not intact to the left and right plantar surfaces of the foot and digits, as the patient has no sensation/detection at greater than 4 " distinct points of contact with 5.07 [place] [employer] monofilament. Sensation to light touch is intact on the left and right foot. Proprioception is intact, bilateral. Sensation to pin prick is reduced to absent. Vibratory sensation is diminished.    DERMATOLOGY:  Skin is supple, moist, intact.  There is no signs of callusing, ulcerations, other lesions identified to the dorsal or plantar aspect of the right or left foot.  The R1, 2, 5 and left L1,2, 5 are thickened, discolored dystrophic.  There is subungual debris.  Nail plates have area of dark discoloration.  The remaining nails 3-4 on the right foot and the left foot are elongated but of normal color, thickness, and texture.   There is no signs of ingrowing into the medial or lateral borders.  There is no evidence of wounds or skin breakdown.  No edema or erythema.  No obvious lacerations or fissuring.  Interdigital spaces are clean, dry, intact.  No rashes or scars appreciated.    ORTHOPEDIC: Manual Muscle Testing is 5/5 in all planes on the left and right, without pains, with and without resistance. Gait pattern is non-antalgic.    Assessment:     1. Chemotherapy-induced peripheral neuropathy    2. Onychodystrophy        Plan:     Chemotherapy-induced peripheral neuropathy    Onychodystrophy      Thorough discussion is had with the patient this afternoon, concerning the diagnosis, its etiology, and the treatment algorithm at present.  Greater than 50% of this visit spent on counseling and coordination of care. Greater than 15 minutes of a 20 minute appointment spent on education about the diabetic foot, neuropathy, and prevention of limb loss.  Shoe inspection. Diabetic Foot Education. Patient reminded of the importance of good nutrition and blood sugar control to help prevent podiatric complications of diabetes. Patient instructed on proper foot hygeine. We discussed wearing proper and supportive shoe gear, daily foot inspections, never walking barefooted or  sock footed, never putting sharp instruments to feet which can cause major complications associated with infection, ulcers, lacerations.      Dystrophic nail plates, as outlined above (R#1,2,5  ; L#1,2,5 ), are sharply debrided with double action nail nipper, and/or with the assistance of a mechanical rotary nehemias, with removal of all offending nail and nail border(s), for reduction of pains. Nails are reduced in terms of length, width and girth with removal of subungual debris to facilitate pain free weight bearing and ambulation. The elongated nails as outlined in the objective portion of this note, were trimmed to appropriate length, with a double action nail nipper, for alleviation/reduction of pains as well. Follow up in approx. 3-4 months.    Future Appointments   Date Time Provider Department Center   1/5/2024  3:30 PM Preston Frye MD [facility]   1/9/2024  8:00 AM [facility]

## 2023-11-15 ENCOUNTER — PATIENT MESSAGE (OUTPATIENT)
Dept: ADMINISTRATIVE | Facility: OTHER | Age: 83
End: 2023-11-15
Payer: MEDICARE

## 2023-11-15 ENCOUNTER — PATIENT MESSAGE (OUTPATIENT)
Dept: INTERNAL MEDICINE | Facility: CLINIC | Age: 83
End: 2023-11-15
Payer: MEDICARE

## 2023-11-16 ENCOUNTER — TELEPHONE (OUTPATIENT)
Dept: INTERNAL MEDICINE | Facility: CLINIC | Age: 83
End: 2023-11-16
Payer: MEDICARE

## 2023-11-16 RX ORDER — PRAMIPEXOLE DIHYDROCHLORIDE 0.12 MG/1
0.12 TABLET ORAL NIGHTLY
Qty: 90 TABLET | OUTPATIENT
Start: 2023-11-16

## 2023-11-16 RX ORDER — PRAMIPEXOLE DIHYDROCHLORIDE 0.12 MG/1
0.12 TABLET ORAL NIGHTLY
Qty: 30 TABLET | Refills: 1 | Status: SHIPPED | OUTPATIENT
Start: 2023-11-16 | End: 2024-01-22

## 2023-11-16 NOTE — TELEPHONE ENCOUNTER
Returned call informed medication sent to pharmacy and to follow up in 6 weeks. Ruba voiced understanding.   No

## 2023-11-22 RX ORDER — ATORVASTATIN CALCIUM 10 MG/1
10 TABLET, FILM COATED ORAL
Qty: 90 TABLET | Refills: 1 | Status: SHIPPED | OUTPATIENT
Start: 2023-11-22

## 2023-11-22 NOTE — TELEPHONE ENCOUNTER
Care Due:                  Date            Visit Type   Department     Provider  --------------------------------------------------------------------------------    Last Visit: None Found      None         None Found                              EP -                              PRIMARY      [facility] INTERNAL  Next Visit: 01-      CARE ([facility])   MEDICINE       Lazarus Euceda                                                            Last  Test          Frequency    Reason                     Performed    Due Date  --------------------------------------------------------------------------------    Mg Level....  12 months..  ibandronate..............  Not Found    Overdue    Phosphate...  12 months..  ibandronate..............  Not Found    Overdue    [employer] Embedded Care Due Messages. Reference number: [id number].   11/22/2023 5:44:36 AM CST

## 2023-12-10 DIAGNOSIS — N32.81 OVERACTIVE BLADDER: Chronic | ICD-10-CM

## 2023-12-10 NOTE — TELEPHONE ENCOUNTER
No care due was identified.  [facility] Embedded Care Due Messages. Reference number: [id number].   12/10/2023 3:13:07 PM CST

## 2023-12-11 RX ORDER — MIRABEGRON 50 MG/1
TABLET, FILM COATED, EXTENDED RELEASE ORAL
Qty: 90 TABLET | Refills: 0 | Status: SHIPPED | OUTPATIENT
Start: 2023-12-11 | End: 2024-03-14

## 2023-12-29 RX ORDER — SUVOREXANT 10 MG/1
1 TABLET, FILM COATED ORAL NIGHTLY
Qty: 30 TABLET | Refills: 0 | Status: SHIPPED | OUTPATIENT
Start: 2023-12-29 | End: 2024-01-05 | Stop reason: SDUPTHER

## 2024-01-03 ENCOUNTER — HOSPITAL ENCOUNTER (EMERGENCY)
Facility: HOSPITAL | Age: 84
Discharge: HOME | End: 2024-01-03
Attending: FAMILY MEDICINE
Payer: MEDICARE

## 2024-01-03 ENCOUNTER — TELEPHONE (OUTPATIENT)
Dept: PSYCHIATRY | Facility: CLINIC | Age: 84
End: 2024-01-03
Payer: MEDICARE

## 2024-01-03 ENCOUNTER — NURSE TRIAGE (OUTPATIENT)
Dept: ADMINISTRATIVE | Facility: CLINIC | Age: 84
End: 2024-01-03
Payer: MEDICARE

## 2024-01-03 VITALS
DIASTOLIC BLOOD PRESSURE: 77 MMHG | SYSTOLIC BLOOD PRESSURE: 165 MMHG | RESPIRATION RATE: 16 BRPM | HEART RATE: 98 BPM | TEMPERATURE: 99 F | OXYGEN SATURATION: 98 %

## 2024-01-03 DIAGNOSIS — L03.115 CELLULITIS OF RIGHT LOWER EXTREMITY: Primary | ICD-10-CM

## 2024-01-03 DIAGNOSIS — R22.42 LOCALIZED SWELLING OF LEFT LOWER LEG: ICD-10-CM

## 2024-01-03 DIAGNOSIS — L03.116 CELLULITIS OF LEFT LOWER EXTREMITY: ICD-10-CM

## 2024-01-03 LAB
ALBUMIN SERPL BCP-MCNC: 3.9 G/DL (ref 3.5–5.2)
ALP SERPL-CCNC: 98 U/L (ref 55–135)
ALT SERPL W/O P-5'-P-CCNC: 12 U/L (ref 10–44)
ANION GAP SERPL CALC-SCNC: 14 MMOL/L (ref 8–16)
AST SERPL-CCNC: 22 U/L (ref 10–40)
BASOPHILS # BLD AUTO: 0.02 K/UL (ref 0–0.2)
BASOPHILS NFR BLD: 0.3 % (ref 0–1.9)
BILIRUB SERPL-MCNC: 0.3 MG/DL (ref 0.1–1)
BUN SERPL-MCNC: 21 MG/DL (ref 8–23)
CALCIUM SERPL-MCNC: 9.6 MG/DL (ref 8.7–10.5)
CHLORIDE SERPL-SCNC: 105 MMOL/L (ref 95–110)
CO2 SERPL-SCNC: 23 MMOL/L (ref 23–29)
CREAT SERPL-MCNC: 1.1 MG/DL (ref 0.5–1.4)
DIFFERENTIAL METHOD BLD: ABNORMAL
EOSINOPHIL # BLD AUTO: 0.1 K/UL (ref 0–0.5)
EOSINOPHIL NFR BLD: 1.5 % (ref 0–8)
ERYTHROCYTE [DISTWIDTH] IN BLOOD BY AUTOMATED COUNT: 13.4 % (ref 11.5–14.5)
EST. GFR  (NO RACE VARIABLE): 50 ML/MIN/1.73 M^2
GLUCOSE SERPL-MCNC: 103 MG/DL (ref 70–110)
HCT VFR BLD AUTO: 36.5 % (ref 37–48.5)
HGB BLD-MCNC: 11.8 G/DL (ref 12–16)
IMM GRANULOCYTES # BLD AUTO: 0.04 K/UL (ref 0–0.04)
IMM GRANULOCYTES NFR BLD AUTO: 0.6 % (ref 0–0.5)
LACTATE SERPL-SCNC: 1 MMOL/L (ref 0.5–2.2)
LYMPHOCYTES # BLD AUTO: 1.1 K/UL (ref 1–4.8)
LYMPHOCYTES NFR BLD: 15.8 % (ref 18–48)
MCH RBC QN AUTO: 27.9 PG (ref 27–31)
MCHC RBC AUTO-ENTMCNC: 32.3 G/DL (ref 32–36)
MCV RBC AUTO: 86 FL (ref 82–98)
MONOCYTES # BLD AUTO: 0.5 K/UL (ref 0.3–1)
MONOCYTES NFR BLD: 6.8 % (ref 4–15)
NEUTROPHILS # BLD AUTO: 5.1 K/UL (ref 1.8–7.7)
NEUTROPHILS NFR BLD: 75 % (ref 38–73)
NRBC BLD-RTO: 0 /100 WBC
PLATELET # BLD AUTO: 249 K/UL (ref 150–450)
PMV BLD AUTO: 10.1 FL (ref 9.2–12.9)
POTASSIUM SERPL-SCNC: 4.5 MMOL/L (ref 3.5–5.1)
PROCALCITONIN SERPL IA-MCNC: 0.02 NG/ML
PROT SERPL-MCNC: 7.8 G/DL (ref 6–8.4)
RBC # BLD AUTO: 4.23 M/UL (ref 4–5.4)
SODIUM SERPL-SCNC: 142 MMOL/L (ref 136–145)
WBC # BLD AUTO: 6.73 K/UL (ref 3.9–12.7)

## 2024-01-03 PROCEDURE — 99284 EMERGENCY DEPT VISIT MOD MDM: CPT | Mod: 25,HCNC

## 2024-01-03 PROCEDURE — 25000003 PHARM REV CODE 250: Mod: HCNC | Performed by: FAMILY MEDICINE

## 2024-01-03 PROCEDURE — 85025 COMPLETE CBC W/AUTO DIFF WBC: CPT | Mod: HCNC | Performed by: REGISTERED NURSE

## 2024-01-03 PROCEDURE — 80053 COMPREHEN METABOLIC PANEL: CPT | Mod: HCNC | Performed by: REGISTERED NURSE

## 2024-01-03 PROCEDURE — 87040 BLOOD CULTURE FOR BACTERIA: CPT | Mod: HCNC | Performed by: REGISTERED NURSE

## 2024-01-03 PROCEDURE — 83605 ASSAY OF LACTIC ACID: CPT | Mod: HCNC | Performed by: REGISTERED NURSE

## 2024-01-03 PROCEDURE — 84145 PROCALCITONIN (PCT): CPT | Mod: HCNC | Performed by: REGISTERED NURSE

## 2024-01-03 RX ORDER — CLINDAMYCIN HYDROCHLORIDE 150 MG/1
300 CAPSULE ORAL 4 TIMES DAILY
Qty: 40 CAPSULE | Refills: 0 | Status: SHIPPED | OUTPATIENT
Start: 2024-01-03 | End: 2024-01-08

## 2024-01-03 RX ORDER — CLINDAMYCIN HYDROCHLORIDE 150 MG/1
300 CAPSULE ORAL
Status: COMPLETED | OUTPATIENT
Start: 2024-01-03 | End: 2024-01-03

## 2024-01-03 RX ORDER — CLINDAMYCIN HYDROCHLORIDE 150 MG/1
300 CAPSULE ORAL 4 TIMES DAILY
Qty: 40 CAPSULE | Refills: 0 | Status: SHIPPED | OUTPATIENT
Start: 2024-01-03 | End: 2024-01-03

## 2024-01-03 RX ADMIN — CLINDAMYCIN HYDROCHLORIDE 300 MG: 150 CAPSULE ORAL at 07:01

## 2024-01-03 NOTE — TELEPHONE ENCOUNTER
Spoke with Ruba who states her mother has swelling and pain in her feet and both legs.  The swelling and pain is worse on the left side.  Patient has fluid weeping from her leg.  Patient denies having difficulty breathing.  Per Ruba the patient is not running a fever.  Advised ED per protocol.  Ruba verbalized understanding.     Reason for Disposition   Thigh, calf, or ankle swelling in both legs, but one side is definitely more swollen   SEVERE swelling (e.g., swelling extends above knee, entire leg is swollen, weeping fluid)   Patient sounds very sick or weak to the triager    Additional Information   Negative: Looks like a broken bone or dislocated joint (e.g., crooked or deformed)   Negative: Sounds like a life-threatening emergency to the triager   Negative: Chest pain   Negative: Difficulty breathing   Negative: Entire foot is cool or blue in comparison to other side   Negative: Unable to walk   Negative: Fever and red area (or area very tender to touch)   Negative: Swollen joint and fever   Negative: Thigh or calf pain in only one leg and present > 1 hour   Negative: Thigh, calf, or ankle swelling in only one leg   Negative: Sounds like a life-threatening emergency to the triager   Negative: Difficulty breathing at rest   Negative: Entire foot is cool or blue in comparison to other side   Negative: Widespread rash and bright red, sunburn-like and too weak to stand   Negative: Sounds like a life-threatening emergency to the triager   Negative: SEVERE pain in the wound   Negative: SEVERE pain with bending of finger (or toe) in wound on hand (or foot)   Negative: Bright red, wide-spread, sunburn-like rash   Negative: Fever > 103 F (39.4 C)   Negative: Black (necrotic), dark purple, or blisters develop in area of wound    Protocols used: Leg Pain-A-OH, Leg Swelling and Edema-A-OH, Wound Infection [id number]

## 2024-01-03 NOTE — FIRST PROVIDER EVALUATION
Medical screening examination initiated.  I have conducted a focused provider triage encounter, findings are as follows:    Brief history of present illness:  Left lower leg swelling x3 days    Vitals:    01/03/24 1515   BP: (!) 165/77   BP Location: Right arm   Patient Position: Sitting   Pulse: 98   Resp: 16   Temp: 99 °F (37.2 °C)   TempSrc: Oral   SpO2: 98%   Weight: (S)   Comment: bed weight needed       Pertinent physical exam:  No acute distress, patient alert and oriented    Brief workup plan:  Labs and ultrasound    Preliminary workup initiated; this workup will be continued and followed by the physician or advanced practice provider that is assigned to the patient when roomed.

## 2024-01-04 ENCOUNTER — PATIENT OUTREACH (OUTPATIENT)
Dept: EMERGENCY MEDICINE | Facility: HOSPITAL | Age: 84
End: 2024-01-04
Payer: MEDICARE

## 2024-01-04 NOTE — ED PROVIDER NOTES
SCRIBE #1 NOTE: I, Jaden Carpenter, am scribing for, and in the presence of, Chelsey Arita MD. I have scribed the entire note.       History     Chief Complaint   Patient presents with    Leg Swelling     Reports L leg swelling and redness x 2 days. Denies fever or chills. Swelling up to mid calf.     Review of patient's allergies indicates:   Allergen Reactions    Lisinopril      cough         History of Present Illness     HPI    1/3/2024, 6:47 PM  History obtained from the patient      History of Present Illness: Lizeth Henderson is a 83 y.o. female patient with a PMHx of adrenal adenoma, shingles, osteopenia, HLD, CAD who presents to the Emergency Department for evaluation of BLE swelling and redness which onset gradually 2 days ago. Patient describes having swelling and redness below the knees.  Symptoms are constant and moderate in severity. No mitigating or exacerbating factors reported. Patient denies any fever, chills, numbness, weakness, CP, SOB, and all other sxs at this time. No further complaints or concerns at this time.       Arrival mode: Personal vehicle    PCP: Lazarus Euceda MD        Past Medical History:  Past Medical History:   Diagnosis Date    Adrenal adenoma 2016    Anxiety     Arthritis     Benign hypertension     Colon cancer age 61    colon    Coronary artery disease     Depression     Full dentures     General anesthetics causing adverse effect in therapeutic use     yells and talks when wakes up    Hyperlipidemia     Osteopenia     Shingles     Wears glasses        Past Surgical History:  Past Surgical History:   Procedure Laterality Date    APPENDECTOMY      BREAST BIOPSY Left     benign    BUNIONECTOMY Left     CATARACT EXTRACTION Bilateral     COLON SURGERY      2001    EYE SURGERY      cataract surg    HEMICOLECTOMY  2002    HERNIA REPAIR      x5    JOINT REPLACEMENT      knee    left toe surgery      joelle    PATENT DUCTUS ARTERIOUS LIGATION  1948    SALPINGOOPHORECTOMY   2002    due to colon cancer    TONSILLECTOMY, ADENOIDECTOMY           Family History:  Family History   Problem Relation Age of Onset    Cancer Father     Hypertension Father     Arthritis Father     Alzheimer's disease Mother     Arthritis Mother     Depression Daughter     Kidney disease Daughter     Ulcerative colitis Daughter     Depression Daughter     Depression Daughter     Anxiety disorder Daughter         PTSD    Cancer Maternal Uncle     Cancer Cousin         colon cancer    Early death Maternal Grandmother     Amblyopia Neg Hx     Blindness Neg Hx     Cataracts Neg Hx     Diabetes Neg Hx     Glaucoma Neg Hx     Macular degeneration Neg Hx     Retinal detachment Neg Hx     Strabismus Neg Hx     Stroke Neg Hx     Thyroid disease Neg Hx        Social History:  Social History     Tobacco Use    Smoking status: Never    Smokeless tobacco: Never   Substance and Sexual Activity    Alcohol use: No    Drug use: No    Sexual activity: Never        Review of Systems     Review of Systems   Constitutional:  Negative for fever.   HENT:  Negative for sore throat.    Respiratory:  Negative for shortness of breath.    Cardiovascular:  Positive for leg swelling (BLE). Negative for chest pain.   Gastrointestinal:  Negative for nausea.   Genitourinary:  Negative for dysuria.   Musculoskeletal:  Negative for back pain.   Skin:  Positive for color change (erythema below bilateral knees). Negative for rash.   Neurological:  Negative for weakness.   Hematological:  Does not bruise/bleed easily.   All other systems reviewed and are negative.       Physical Exam     Initial Vitals [01/03/24 1515]   BP Pulse Resp Temp SpO2   (!) 165/77 98 16 99 °F (37.2 °C) 98 %      MAP       --          Physical Exam   Nursing Notes and Vital Signs Reviewed.  Constitutional: Patient is in no acute distress. Well-developed and well-nourished.  Head: Atraumatic. Normocephalic.  Eyes: PERRL. EOM intact. Conjunctivae are not pale. No scleral  icterus.  ENT: Mucous membranes are moist. Oropharynx is clear and symmetric.    Neck: Supple. Full ROM. No lymphadenopathy.  Cardiovascular: Regular rate. Regular rhythm. No murmurs, rubs, or gallops. Distal pulses are 2+ and symmetric.  Pulmonary/Chest: No respiratory distress. Clear to auscultation bilaterally. No wheezing or rales.  Abdominal: Soft and non-distended.  There is no tenderness.  No rebound, guarding, or rigidity. Good bowel sounds.  Genitourinary: No CVA tenderness  Musculoskeletal: Moves all extremities. No obvious deformities. No calf tenderness.  Skin: Warm and dry. Erythema and swelling to bilateral legs inferior to knees.  Neurological:  Alert, awake, and appropriate.  Normal speech.  No acute focal neurological deficits are appreciated.  Psychiatric: Normal affect. Good eye contact. Appropriate in content.     ED Course   Procedures  ED Vital Signs:  Vitals:    01/03/24 1515   BP: (!) 165/77   Pulse: 98   Resp: 16   Temp: 99 °F (37.2 °C)   TempSrc: Oral   SpO2: 98%       Abnormal Lab Results:  Labs Reviewed   CBC W/ AUTO DIFFERENTIAL - Abnormal; Notable for the following components:       Result Value    Hemoglobin 11.8 (*)     Hematocrit 36.5 (*)     Immature Granulocytes 0.6 (*)     Gran % 75.0 (*)     Lymph % 15.8 (*)     All other components within normal limits   COMPREHENSIVE METABOLIC PANEL - Abnormal; Notable for the following components:    eGFR 50 (*)     All other components within normal limits   CULTURE, BLOOD   CULTURE, BLOOD   LACTIC ACID, PLASMA   PROCALCITONIN        All Lab Results:  Results for orders placed or performed during the hospital encounter of 01/03/24   Blood Culture #1 **CANNOT BE ORDERED STAT**    Specimen: Peripheral, Antecubital, Left; Blood   Result Value Ref Range    Blood Culture, Routine No Growth to date     Blood Culture, Routine No Growth to date    Blood Culture #2 **CANNOT BE ORDERED STAT**    Specimen: Peripheral, Antecubital, Right; Blood   Result  Value Ref Range    Blood Culture, Routine No Growth to date     Blood Culture, Routine No Growth to date    CBC auto differential   Result Value Ref Range    WBC 6.73 3.90 - 12.70 K/uL    RBC 4.23 4.00 - 5.40 M/uL    Hemoglobin 11.8 (L) 12.0 - 16.0 g/dL    Hematocrit 36.5 (L) 37.0 - 48.5 %    MCV 86 82 - 98 fL    MCH 27.9 27.0 - 31.0 pg    MCHC 32.3 32.0 - 36.0 g/dL    RDW 13.4 11.5 - 14.5 %    Platelets 249 150 - 450 K/uL    MPV 10.1 9.2 - 12.9 fL    Immature Granulocytes 0.6 (H) 0.0 - 0.5 %    Gran # (ANC) 5.1 1.8 - 7.7 K/uL    Immature Grans (Abs) 0.04 0.00 - 0.04 K/uL    Lymph # 1.1 1.0 - 4.8 K/uL    Mono # 0.5 0.3 - 1.0 K/uL    Eos # 0.1 0.0 - 0.5 K/uL    Baso # 0.02 0.00 - 0.20 K/uL    nRBC 0 0 /100 WBC    Gran % 75.0 (H) 38.0 - 73.0 %    Lymph % 15.8 (L) 18.0 - 48.0 %    Mono % 6.8 4.0 - 15.0 %    Eosinophil % 1.5 0.0 - 8.0 %    Basophil % 0.3 0.0 - 1.9 %    Differential Method Automated    Comprehensive metabolic panel   Result Value Ref Range    Sodium 142 136 - 145 mmol/L    Potassium 4.5 3.5 - 5.1 mmol/L    Chloride 105 95 - 110 mmol/L    CO2 23 23 - 29 mmol/L    Glucose 103 70 - 110 mg/dL    BUN 21 8 - 23 mg/dL    Creatinine 1.1 0.5 - 1.4 mg/dL    Calcium 9.6 8.7 - 10.5 mg/dL    Total Protein 7.8 6.0 - 8.4 g/dL    Albumin 3.9 3.5 - 5.2 g/dL    Total Bilirubin 0.3 0.1 - 1.0 mg/dL    Alkaline Phosphatase 98 55 - 135 U/L    AST 22 10 - 40 U/L    ALT 12 10 - 44 U/L    eGFR 50 (A) >60 mL/min/1.73 m^2    Anion Gap 14 8 - 16 mmol/L   Lactic acid, plasma   Result Value Ref Range    Lactate (Lactic Acid) 1.0 0.5 - 2.2 mmol/L   Procalcitonin   Result Value Ref Range    Procalcitonin 0.02 <0.25 ng/mL         Imaging Results:  Imaging Results              US Lower Extremity Veins Left (Final result)  Result time 01/03/24 16:19:45      Final result by Memo Lea MD (01/03/24 16:19:45)                   Impression:      No evidence of deep venous thrombosis in the left lower extremity.      Electronically signed  by: Memo Lea  Date:    01/03/2024  Time:    16:19               Narrative:    EXAMINATION:  US LOWER EXTREMITY VEINS LEFT    CLINICAL HISTORY:  Localized swelling, mass and lump, left lower limb    TECHNIQUE:  Duplex and color flow Doppler evaluation and graded compression of the left lower extremity veins was performed.    COMPARISON:  None    FINDINGS:  Left thigh veins: The common femoral, femoral, popliteal, upper greater saphenous, and deep femoral veins are patent and free of thrombus. The veins are normally compressible and have normal phasic flow and augmentation response.    Left calf veins: The visualized calf veins are patent.    Contralateral CFV: The contralateral (right) common femoral vein is patent and free of thrombus.    Miscellaneous: None                                       The Emergency Provider reviewed the vital signs and test results, which are outlined above.     ED Discussion       6:53 PM: Reassessed pt at this time. Discussed with pt all pertinent ED information and results. Discussed pt dx and plan of tx. Gave pt all f/u and return to the ED instructions. All questions and concerns were addressed at this time. Pt expresses understanding of information and instructions, and is comfortable with plan to discharge. Pt is stable for discharge.    I discussed with patient and/or family/caretaker that evaluation in the ED does not suggest any emergent or life threatening medical conditions requiring immediate intervention beyond what was provided in the ED, and I believe patient is safe for discharge.  Regardless, an unremarkable evaluation in the ED does not preclude the development or presence of a serious of life threatening condition. As such, patient was instructed to return immediately for any worsening or change in current symptoms.         Medical Decision Making  Amount and/or Complexity of Data Reviewed  Labs: ordered. Decision-making details documented in ED Course.  Radiology:  ordered. Decision-making details documented in ED Course.    Risk  Prescription drug management.                 ED Medication(s):  Medications   clindamycin capsule 300 mg (300 mg Oral Given 1/3/24 1907)       Discharge Medication List as of 1/3/2024  7:10 PM           Follow-up Information       Schedule an appointment as soon as possible for a visit  with Lazarus Euceda MD.    Specialty: Family Medicine  Why: As needed  Contact information:  [address] [facility] [address] [phone]                                 Scribe Attestation:   Scribe #1: I performed the above scribed service and the documentation accurately describes the services I performed. I attest to the accuracy of the note.     Attending:   Physician Attestation Statement for Scribe #1: I, Chelsey Arita MD, personally performed the services described in this documentation, as scribed by Jaden Carpenter, in my presence, and it is both accurate and complete.           Clinical Impression       ICD-10-CM ICD-9-CM   1. Cellulitis of right lower extremity  L03.115 682.6   2. Localized swelling of left lower leg  R22.42 782.2   3. Cellulitis of left lower extremity  L03.116 682.6       Disposition:   Disposition: Discharged  Condition: Stable         Chelsey Arita MD  01/05/24 2110

## 2024-01-05 ENCOUNTER — TELEPHONE (OUTPATIENT)
Dept: PSYCHIATRY | Facility: CLINIC | Age: 84
End: 2024-01-05
Payer: MEDICARE

## 2024-01-05 ENCOUNTER — OFFICE VISIT (OUTPATIENT)
Dept: PSYCHIATRY | Facility: CLINIC | Age: 84
End: 2024-01-05
Payer: MEDICARE

## 2024-01-05 VITALS
SYSTOLIC BLOOD PRESSURE: 171 MMHG | HEART RATE: 92 BPM | WEIGHT: 233.25 LBS | BODY MASS INDEX: 35.47 KG/M2 | DIASTOLIC BLOOD PRESSURE: 92 MMHG

## 2024-01-05 DIAGNOSIS — F33.41 MDD (MAJOR DEPRESSIVE DISORDER), RECURRENT, IN PARTIAL REMISSION: ICD-10-CM

## 2024-01-05 DIAGNOSIS — G31.84 MILD COGNITIVE IMPAIRMENT: ICD-10-CM

## 2024-01-05 DIAGNOSIS — F41.1 GAD (GENERALIZED ANXIETY DISORDER): Primary | ICD-10-CM

## 2024-01-05 PROCEDURE — 3077F SYST BP >= 140 MM HG: CPT | Mod: HCNC,CPTII,S$GLB, | Performed by: PSYCHIATRY & NEUROLOGY

## 2024-01-05 PROCEDURE — 3080F DIAST BP >= 90 MM HG: CPT | Mod: HCNC,CPTII,S$GLB, | Performed by: PSYCHIATRY & NEUROLOGY

## 2024-01-05 PROCEDURE — 99214 OFFICE O/P EST MOD 30 MIN: CPT | Mod: HCNC,S$GLB,, | Performed by: PSYCHIATRY & NEUROLOGY

## 2024-01-05 PROCEDURE — 99999 PR PBB SHADOW E&M-EST. PATIENT-LVL II: CPT | Mod: PBBFAC,HCNC,, | Performed by: PSYCHIATRY & NEUROLOGY

## 2024-01-05 RX ORDER — BUSPIRONE HYDROCHLORIDE 10 MG/1
20 TABLET ORAL 2 TIMES DAILY
Qty: 120 TABLET | Refills: 3 | Status: SHIPPED | OUTPATIENT
Start: 2024-01-05 | End: 2025-01-04

## 2024-01-05 RX ORDER — SUVOREXANT 10 MG/1
1 TABLET, FILM COATED ORAL NIGHTLY
Qty: 30 TABLET | Refills: 3 | Status: SHIPPED | OUTPATIENT
Start: 2024-01-05

## 2024-01-05 RX ORDER — ESCITALOPRAM OXALATE 10 MG/1
10 TABLET ORAL DAILY
Qty: 90 TABLET | Refills: 1 | Status: SHIPPED | OUTPATIENT
Start: 2024-01-05

## 2024-01-05 RX ORDER — BUPROPION HYDROCHLORIDE 150 MG/1
150 TABLET ORAL DAILY
Qty: 90 TABLET | Refills: 3 | Status: SHIPPED | OUTPATIENT
Start: 2024-01-05

## 2024-01-05 RX ORDER — ALPRAZOLAM 0.25 MG/1
TABLET ORAL
Qty: 20 TABLET | Refills: 3 | Status: SHIPPED | OUTPATIENT
Start: 2024-01-05
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
## 2024-01-09 ENCOUNTER — NURSE TRIAGE (OUTPATIENT)
Dept: ADMINISTRATIVE | Facility: CLINIC | Age: 84
End: 2024-01-09
Payer: MEDICARE

## 2024-01-09 ENCOUNTER — HOSPITAL ENCOUNTER (EMERGENCY)
Facility: HOSPITAL | Age: 84
Discharge: HOME | End: 2024-01-09
Attending: EMERGENCY MEDICINE
Payer: MEDICARE

## 2024-01-09 VITALS
SYSTOLIC BLOOD PRESSURE: 134 MMHG | DIASTOLIC BLOOD PRESSURE: 62 MMHG | OXYGEN SATURATION: 100 % | HEART RATE: 93 BPM | HEIGHT: 68 IN | TEMPERATURE: 98 F | RESPIRATION RATE: 18 BRPM | BODY MASS INDEX: 35.47 KG/M2

## 2024-01-09 DIAGNOSIS — M79.672 PAIN OF LEFT HEEL: ICD-10-CM

## 2024-01-09 DIAGNOSIS — R19.7 DIARRHEA, UNSPECIFIED TYPE: Primary | ICD-10-CM

## 2024-01-09 LAB
ALBUMIN SERPL BCP-MCNC: 3.5 G/DL (ref 3.5–5.2)
ALP SERPL-CCNC: 90 U/L (ref 55–135)
ALT SERPL W/O P-5'-P-CCNC: 10 U/L (ref 10–44)
ANION GAP SERPL CALC-SCNC: 13 MMOL/L (ref 8–16)
AST SERPL-CCNC: 16 U/L (ref 10–40)
BACTERIA BLD CULT: NORMAL
BACTERIA BLD CULT: NORMAL
BASOPHILS # BLD AUTO: 0.02 K/UL (ref 0–0.2)
BASOPHILS NFR BLD: 0.2 % (ref 0–1.9)
BILIRUB SERPL-MCNC: 0.5 MG/DL (ref 0.1–1)
BILIRUB UR QL STRIP: NEGATIVE
BUN SERPL-MCNC: 18 MG/DL (ref 8–23)
CALCIUM SERPL-MCNC: 9.8 MG/DL (ref 8.7–10.5)
CHLORIDE SERPL-SCNC: 102 MMOL/L (ref 95–110)
CLARITY UR: CLEAR
CO2 SERPL-SCNC: 26 MMOL/L (ref 23–29)
COLOR UR: YELLOW
CREAT SERPL-MCNC: 1.2 MG/DL (ref 0.5–1.4)
DIFFERENTIAL METHOD BLD: ABNORMAL
EOSINOPHIL # BLD AUTO: 0.1 K/UL (ref 0–0.5)
EOSINOPHIL NFR BLD: 0.8 % (ref 0–8)
ERYTHROCYTE [DISTWIDTH] IN BLOOD BY AUTOMATED COUNT: 13.4 % (ref 11.5–14.5)
EST. GFR  (NO RACE VARIABLE): 45 ML/MIN/1.73 M^2
GLUCOSE SERPL-MCNC: 119 MG/DL (ref 70–110)
GLUCOSE UR QL STRIP: NEGATIVE
HCT VFR BLD AUTO: 36.2 % (ref 37–48.5)
HGB BLD-MCNC: 11.7 G/DL (ref 12–16)
HGB UR QL STRIP: NEGATIVE
IMM GRANULOCYTES # BLD AUTO: 0.02 K/UL (ref 0–0.04)
IMM GRANULOCYTES NFR BLD AUTO: 0.2 % (ref 0–0.5)
KETONES UR QL STRIP: NEGATIVE
LEUKOCYTE ESTERASE UR QL STRIP: NEGATIVE
LIPASE SERPL-CCNC: 10 U/L (ref 4–60)
LYMPHOCYTES # BLD AUTO: 1.2 K/UL (ref 1–4.8)
LYMPHOCYTES NFR BLD: 14.1 % (ref 18–48)
MAGNESIUM SERPL-MCNC: 1.6 MG/DL (ref 1.6–2.6)
MCH RBC QN AUTO: 27.4 PG (ref 27–31)
MCHC RBC AUTO-ENTMCNC: 32.3 G/DL (ref 32–36)
MCV RBC AUTO: 85 FL (ref 82–98)
MONOCYTES # BLD AUTO: 0.6 K/UL (ref 0.3–1)
MONOCYTES NFR BLD: 6.5 % (ref 4–15)
NEUTROPHILS # BLD AUTO: 6.9 K/UL (ref 1.8–7.7)
NEUTROPHILS NFR BLD: 78.2 % (ref 38–73)
NITRITE UR QL STRIP: NEGATIVE
NRBC BLD-RTO: 0 /100 WBC
PH UR STRIP: 5 [PH] (ref 5–8)
PLATELET # BLD AUTO: 252 K/UL (ref 150–450)
PMV BLD AUTO: 10.1 FL (ref 9.2–12.9)
POTASSIUM SERPL-SCNC: 5 MMOL/L (ref 3.5–5.1)
PROT SERPL-MCNC: 7.2 G/DL (ref 6–8.4)
PROT UR QL STRIP: NEGATIVE
RBC # BLD AUTO: 4.27 M/UL (ref 4–5.4)
SODIUM SERPL-SCNC: 141 MMOL/L (ref 136–145)
SP GR UR STRIP: 1.01 (ref 1–1.03)
URN SPEC COLLECT METH UR: NORMAL
UROBILINOGEN UR STRIP-ACNC: NEGATIVE EU/DL
WBC # BLD AUTO: 8.8 K/UL (ref 3.9–12.7)

## 2024-01-09 PROCEDURE — 81003 URINALYSIS AUTO W/O SCOPE: CPT | Mod: HCNC | Performed by: EMERGENCY MEDICINE

## 2024-01-09 PROCEDURE — 63600175 PHARM REV CODE 636 W HCPCS: Mod: HCNC | Performed by: EMERGENCY MEDICINE

## 2024-01-09 PROCEDURE — 99284 EMERGENCY DEPT VISIT MOD MDM: CPT | Mod: HCNC,25

## 2024-01-09 PROCEDURE — 96360 HYDRATION IV INFUSION INIT: CPT | Mod: HCNC

## 2024-01-09 PROCEDURE — 80053 COMPREHEN METABOLIC PANEL: CPT | Mod: HCNC | Performed by: EMERGENCY MEDICINE

## 2024-01-09 PROCEDURE — 85025 COMPLETE CBC W/AUTO DIFF WBC: CPT | Mod: HCNC | Performed by: EMERGENCY MEDICINE

## 2024-01-09 PROCEDURE — 83735 ASSAY OF MAGNESIUM: CPT | Mod: HCNC | Performed by: EMERGENCY MEDICINE

## 2024-01-09 PROCEDURE — 83690 ASSAY OF LIPASE: CPT | Mod: HCNC | Performed by: EMERGENCY MEDICINE

## 2024-01-09 RX ORDER — CEPHALEXIN 500 MG/1
500 CAPSULE ORAL EVERY 6 HOURS
Qty: 28 CAPSULE | Refills: 0 | Status: SHIPPED | OUTPATIENT
Start: 2024-01-09 | End: 2024-02-14

## 2024-01-09 RX ADMIN — SODIUM CHLORIDE, POTASSIUM CHLORIDE, SODIUM LACTATE AND CALCIUM CHLORIDE 1000 ML: 600; 310; 30; 20 INJECTION, SOLUTION INTRAVENOUS at 12:01

## 2024-01-09 NOTE — ED NOTES
Lab contacted for patient instructions on stool collection and drop off. Patient and family verbalized understanding. Patient provided paper work to take with stool to the clinic.

## 2024-01-09 NOTE — TELEPHONE ENCOUNTER
"Pts daughter states being treated for cellulitis to both lower legs, was given Clindamycin per ED provider, took it for 4 days and stopped 3 days ago due to getting diarrhea.  Pt states "8" episodes in the last 24 hours.   Daughter states continuing with diarrhea is what prompted her to call into the triage line and also requesting different antibiotics for her moms cellulitis.  Care advice states to go to ED now.  Family/daughter verbally understood, all questions answered, advised to call back for any worsening symptoms or further needs.    Reason for Disposition   SEVERE diarrhea (e.g., 7 or more times / day more than normal) and age > 60 years    Additional Information   Negative: Shock suspected (e.g., cold/pale/clammy skin, too weak to stand, low BP, rapid pulse)   Negative: Difficult to awaken or acting confused (e.g., disoriented, slurred speech)   Negative: Sounds like a life-threatening emergency to the triager   Negative: Vomiting also present and worse than the diarrhea   Negative: Blood in stool and without diarrhea   Negative: SEVERE abdominal pain (e.g., excruciating) and present > 1 hour   Negative: SEVERE abdominal pain and age > 60 years   Negative: Bloody, black, or tarry bowel movements  (Exception: Chronic-unchanged black-grey bowel movements and is taking iron pills or Pepto-Bismol.)    Protocols used: Diarrhea-A-OH    "

## 2024-01-09 NOTE — DISCHARGE INSTRUCTIONS
Take frequent sips of Pedialyte, Powerade, Gatorade.  Popsicles.  [place] diet, bananas, breads, rice.  Avoid red sauces, fruit juices, and dairy products as can irritate your stomach.  If drinking water, be sure to have something salty such as crackers to help restore electrolytes.  Return to emergency department for: Weakness, passing out, blood in stool, blood in vomit, fever, worsening abdominal pain, or other concerns.    Restart wearing compression hose.      Consider using a crate or other padding to prevent breakdown on heels.

## 2024-01-09 NOTE — ED PROVIDER NOTES
Emergency Medicine Provider Note - 1/9/2024       SCRIBE NOTE: I, Brian Alas, am scribing for, and in the presence of, Kristen Morin DO. I have scribed the entire note.     History     Chief Complaint   Patient presents with    Diarrhea     Patient presents to ED with c/o diarrhea. She is currently on antibiotic therapy for cellulitis since Thursday and diarrhea started on Sunday. Denies vomiting.        Allergies:  Review of patient's allergies indicates:   Allergen Reactions    Lisinopril      cough        History of Present Illness   HPI    1/9/2024, 11:13 AM  The history is provided by the patient    Lizeth Henderson is a 83 y.o. female presenting to the ED for evaluation of diarrhea which onset 4 days ago.  Patient recently had been treated with antibiotics.  She was evaluated in the emergency department on January 3, 2024 and diagnosed with lower extremity cellulitis.. (US: LLE negative for DVT, WBC/procalcitonin/lactic normal. Blood cultures: NGTD.   Rx:  Clindamycin 150 mg, 2 PO QID x 5 days.)  Pt stopped taking the Clindamycin 3 days ago secondary to several episodes of diarrhea. Multiple episodes, not associated with any abdominal pain, fever, foul-smelling stool, blood in stool, lightheadedness/dizziness.  The cellulitis has improved since the ED visit.  It is not associated with any difficulty breathing above baseline, fever, chills, chest pain, chest pressure, fever, chills, body aches, worsening swelling.  Patient does note  Pt also has pain in her heels in the morning.  Daughter noted mild redness to the left heel.      Arrival mode: Private Vehicle     PCP: Lazarus Euceda MD     Past Medical History:  Past Medical History:   Diagnosis Date    Adrenal adenoma 2016    Anxiety     Arthritis     Benign hypertension     Colon cancer age 61    colon    Coronary artery disease     Depression     Full dentures     General anesthetics causing adverse effect in therapeutic use     yells and talks  when wakes up    Hyperlipidemia     Osteopenia     Shingles     Wears glasses        Past Surgical History:  Past Surgical History:   Procedure Laterality Date    APPENDECTOMY      BREAST BIOPSY Left     benign    BUNIONECTOMY Left     CATARACT EXTRACTION Bilateral     COLON SURGERY      2001    EYE SURGERY      cataract surg    HEMICOLECTOMY  2002    HERNIA REPAIR      x5    JOINT REPLACEMENT      knee    left toe surgery      hammertoe    PATENT DUCTUS ARTERIOUS LIGATION  1948    SALPINGOOPHORECTOMY  2002    due to colon cancer    TONSILLECTOMY, ADENOIDECTOMY           Family History:  Family History   Problem Relation Age of Onset    Cancer Father     Hypertension Father     Arthritis Father     Alzheimer's disease Mother     Arthritis Mother     Depression Daughter     Kidney disease Daughter     Ulcerative colitis Daughter     Depression Daughter     Depression Daughter     Anxiety disorder Daughter         PTSD    Cancer Maternal Uncle     Cancer Cousin         colon cancer    Early death Maternal Grandmother     Amblyopia Neg Hx     Blindness Neg Hx     Cataracts Neg Hx     Diabetes Neg Hx     Glaucoma Neg Hx     Macular degeneration Neg Hx     Retinal detachment Neg Hx     Strabismus Neg Hx     Stroke Neg Hx     Thyroid disease Neg Hx        Social History:  Social History     Tobacco Use    Smoking status: Never    Smokeless tobacco: Never   Substance and Sexual Activity    Alcohol use: No    Drug use: No    Sexual activity: Never        Review of Systems   Review of Systems   Constitutional:  Negative for chills and fever.   Cardiovascular:  Negative for chest pain.   Gastrointestinal:  Positive for diarrhea. Negative for abdominal pain, nausea and vomiting.   All other systems reviewed and are negative.       Physical Exam     Initial Vitals [01/09/24 0945]   BP Pulse Resp Temp SpO2   137/65 84 18 98.8 °F (37.1 °C) 97 %      MAP       --          Physical Exam  Musculoskeletal:         "Feet:          Nursing Notes and Vital Signs Reviewed.  Constitutional: Patient is in no acute distress. Well-developed and well-nourished.  Head: Atraumatic. Normocephalic.  Eyes: PERRL. EOM intact. Conjunctivae are not pale. No scleral icterus.  ENT: Mucous membranes are moist. Oropharynx is clear and symmetric.    Neck: Supple. Full ROM.   Cardiovascular: Regular rate. Regular rhythm. No murmurs, rubs, or gallops. Distal pulses are 2+ and symmetric.  Pulmonary/Chest: No respiratory distress. Clear to auscultation bilaterally. No wheezing or rales.  Abdominal: Soft and non-distended.  There is no tenderness.  No rebound, guarding, or rigidity.   Musculoskeletal: Moves all extremities. No obvious deformities.   Left heel: Faint redness to heel.  No blisters. Toes pink, capillary refill less than 3 seconds.  Skin: Warm and dry.  Left lower extremity:  Faint redness to the anterior tibia.  No blister formation.  Neurological:  Alert, awake, and appropriate. Normal speech. No acute focal neurological deficits are appreciated.  Psychiatric: Normal affect. Good eye contact. Appropriate in content.     ED Course   ED Procedures:  Procedures    ED Vital Signs:  Vitals:    01/09/24 0945 01/09/24 1100 01/09/24 1115 01/09/24 1130   BP: 137/65 (!) 155/72 (!) 146/76 (!) 147/79   Pulse: 84 91 82 80   Resp: 18 18 18 18   Temp: 98.8 °F (37.1 °C)      TempSrc: Oral      SpO2: 97% 98% 96% 96%   Height: 5' 8" (1.727 m)       01/09/24 1145 01/09/24 1200 01/09/24 1215 01/09/24 1230   BP: (!) 147/76 (!) 157/72     Pulse: 80 83 94 87   Resp: 18 19 (!) 24 (!) 23   Temp:       TempSrc:       SpO2: 96% 96%     Height:        01/09/24 1245 01/09/24 1300 01/09/24 1315 01/09/24 1330   BP:  138/66 (!) 149/73 (!) 141/72   Pulse: 87 88 88 93   Resp: (!) 23 19 20 18   Temp:       TempSrc:       SpO2: 98% 97% 97% 96%   Height:           Abnormal Lab Results:  Labs Reviewed   CBC W/ AUTO DIFFERENTIAL - Abnormal; Notable for the following " components:       Result Value    Hemoglobin 11.7 (*)     Hematocrit 36.2 (*)     Gran % 78.2 (*)     Lymph % 14.1 (*)     All other components within normal limits   COMPREHENSIVE METABOLIC PANEL - Abnormal; Notable for the following components:    Glucose 119 (*)     eGFR 45 (*)     All other components within normal limits   LIPASE   URINALYSIS, REFLEX TO URINE CULTURE    Narrative:     Specimen Source->Urine   MAGNESIUM        All Lab Results:  Results for orders placed or performed during the hospital encounter of 01/09/24   CBC W/ AUTO DIFFERENTIAL   Result Value Ref Range    WBC 8.80 3.90 - 12.70 K/uL    RBC 4.27 4.00 - 5.40 M/uL    Hemoglobin 11.7 (L) 12.0 - 16.0 g/dL    Hematocrit 36.2 (L) 37.0 - 48.5 %    MCV 85 82 - 98 fL    MCH 27.4 27.0 - 31.0 pg    MCHC 32.3 32.0 - 36.0 g/dL    RDW 13.4 11.5 - 14.5 %    Platelets 252 150 - 450 K/uL    MPV 10.1 9.2 - 12.9 fL    Immature Granulocytes 0.2 0.0 - 0.5 %    Gran # (ANC) 6.9 1.8 - 7.7 K/uL    Immature Grans (Abs) 0.02 0.00 - 0.04 K/uL    Lymph # 1.2 1.0 - 4.8 K/uL    Mono # 0.6 0.3 - 1.0 K/uL    Eos # 0.1 0.0 - 0.5 K/uL    Baso # 0.02 0.00 - 0.20 K/uL    nRBC 0 0 /100 WBC    Gran % 78.2 (H) 38.0 - 73.0 %    Lymph % 14.1 (L) 18.0 - 48.0 %    Mono % 6.5 4.0 - 15.0 %    Eosinophil % 0.8 0.0 - 8.0 %    Basophil % 0.2 0.0 - 1.9 %    Differential Method Automated    Comp. Metabolic Panel   Result Value Ref Range    Sodium 141 136 - 145 mmol/L    Potassium 5.0 3.5 - 5.1 mmol/L    Chloride 102 95 - 110 mmol/L    CO2 26 23 - 29 mmol/L    Glucose 119 (H) 70 - 110 mg/dL    BUN 18 8 - 23 mg/dL    Creatinine 1.2 0.5 - 1.4 mg/dL    Calcium 9.8 8.7 - 10.5 mg/dL    Total Protein 7.2 6.0 - 8.4 g/dL    Albumin 3.5 3.5 - 5.2 g/dL    Total Bilirubin 0.5 0.1 - 1.0 mg/dL    Alkaline Phosphatase 90 55 - 135 U/L    AST 16 10 - 40 U/L    ALT 10 10 - 44 U/L    eGFR 45 (A) >60 mL/min/1.73 m^2    Anion Gap 13 8 - 16 mmol/L   Lipase   Result Value Ref Range    Lipase 10 4 - 60 U/L    Urinalysis, Reflex to Urine Culture Urine, Clean Catch    Specimen: Urine   Result Value Ref Range    Specimen UA Urine, Clean Catch     Color, UA Yellow Yellow, Straw, Ciara    Appearance, UA Clear Clear    pH, UA 5.0 5.0 - 8.0    Specific Gravity, UA 1.010 1.005 - 1.030    Protein, UA Negative Negative    Glucose, UA Negative Negative    Ketones, UA Negative Negative    Bilirubin (UA) Negative Negative    Occult Blood UA Negative Negative    Nitrite, UA Negative Negative    Urobilinogen, UA Negative <2.0 EU/dL    Leukocytes, UA Negative Negative   Magnesium   Result Value Ref Range    Magnesium 1.6 1.6 - 2.6 mg/dL            Imaging Results:  Imaging Results    None               The Emergency Provider reviewed the vital signs and test results, which are outlined above.     ED Discussion   ED Medication(s):  Medications   lactated ringers bolus 1,000 mL (1,000 mLs Intravenous New Bag 1/9/24 1242)             1:44 PM Reassessment: Dr. Morin reassessed the pt.  The pt is resting comfortably and is NAD.  Discussed need to follow up with Podiatry regarding patient's left foot.  Advised daily inspection, wearing well cushioned well fitted shoes, also using crates or cushions when elevating the feet.  According to the daughter, patient has been placing her heel on a chair without padding.  Patient was unable to provide stool sample in the emergency room.  Clinically, patient is lacking other features to suggest C diff-no severe abdominal pain, no fever, no severe abdominal pain.  Stool culture organ as outpatient.  Discussed indications to return to emergency department.  Pt states their sx have improved. Discussed test results, shared treatment plan, specific conditions for return, and the need for f/u.  Answered their questions at this time.  Pt understands and agrees to the plan.  The pt has remained hemodynamically stable through ED course and is stable for discharge.    I discussed with patient and/or  family/caretaker that evaluation in the ED does not suggest any emergent or life threatening medical conditions requiring immediate intervention beyond what was provided in the ED, and I believe patient is safe for discharge.  Regardless, an unremarkable evaluation in the ED does not preclude the development or presence of a serious of life threatening condition. As such, patient was instructed to return immediately for any worsening or change in current symptoms.     MIPS Measures     Smoker? No     Hypertension:  Patient has history of high blood pressure.       Medical Decision Making                 Medical Decision Making  Differential diagnosis:  Antibiotic intolerance/GI upset secondary to antibiotic, electrolyte abnormality, dehydration, C diff colitis    Patient had IV established.  Normal white cell count with no left shift.  Mild anemia noted with a hemoglobin of 11.7/36.2.  Previous hemoglobin/hematocrit 11.8/36.5.  BUN to creatinine ratio 18/1.2.  Potassium 5.0.  No anion gap.  Urinalysis negative.  Patient given 1 L of normal saline.      Amount and/or Complexity of Data Reviewed  External Data Reviewed: labs, radiology and notes.     Details: Summarized in HPI.  Labs: ordered. Decision-making details documented in ED Course.    Risk  Prescription drug management.  Risk Details: Patient advised to discontinue clindamycin.    Discharge Medication List as of 1/9/2024  1:47 PM    START taking these medications    cephALEXin (KEFLEX) 500 MG capsule  Take 1 capsule (500 mg total) by mouth every 6 (six) hours. for 7 days, Starting Tue 1/9/2024, Until Tue 1/16/2024, Normal    Outpatient stool studies ordered.            .    Coding    Prescription Management: I performed a review of the patient's current Rx medication list as input by nursing staff.    Patient's Medications   New Prescriptions    CEPHALEXIN (KEFLEX) 500 MG CAPSULE    Take 1 capsule (500 mg total) by mouth every 6 (six) hours. for 7 days  "  Previous Medications    ALPRAZOLAM (XANAX) 0.25 MG TABLET    TAKE 1/2 TO 1 TABLET BY MOUTH DAILY AS NEEDED FOR ANXIETY    ASPIRIN (ECOTRIN) 81 MG EC TABLET    Take 81 mg by mouth once daily.    ATORVASTATIN (LIPITOR) 10 MG TABLET    TAKE 1 TABLET(10 MG) BY MOUTH EVERY DAY    BUPROPION (WELLBUTRIN XL) 150 MG TB24 TABLET    Take 1 tablet (150 mg total) by mouth once daily.    BUSPIRONE (BUSPAR) 10 MG TABLET    Take 2 tablets (20 mg total) by mouth 2 (two) times daily.    ERGOCALCIFEROL (ERGOCALCIFEROL) 50,000 UNIT CAP    Take 1 capsule (50,000 Units total) by mouth every 7 days.    ESCITALOPRAM OXALATE (LEXAPRO) 10 MG TABLET    Take 1 tablet (10 mg total) by mouth once daily.    HYDROCHLOROTHIAZIDE (HYDRODIURIL) 12.5 MG TAB    Take 1 tablet (12.5 mg total) by mouth once daily.    IBANDRONATE (BONIVA) 150 MG TABLET    Take 1 tablet (150 mg total) by mouth every 30 days.    LOSARTAN (COZAAR) 100 MG TABLET    Take 1 tablet (100 mg total) by mouth once daily.    MYRBETRIQ 50 MG TB24    TAKE 1 TABLET(50 MG) BY MOUTH EVERY DAY    PRAMIPEXOLE (MIRAPEX) 0.125 MG TABLET    Take 1 tablet (0.125 mg total) by mouth every evening.    SUVOREXANT (BELSOMRA) 10 MG TAB    Take 1 tablet by mouth every evening.   Modified Medications    No medications on file   Discontinued Medications    No medications on file        Discussed case with:N/A      Portions of this note may have been created with voice recognition software. Occasional "wrong-word" or "sound-a-like" substitutions may have occurred due to the inherent limitations of voice recognition software. Please, read the note carefully and recognize, using context, where substitutions have occurred.          Clinical Impression       ICD-10-CM ICD-9-CM   1. Diarrhea, unspecified type  R19.7 787.91         ED Disposition  Disposition:   Disposition: Discharged  Condition: Fair    ED Follow-up   Follow-up Information       Lazarus Euceda MD In 2 days.    Specialty: Family " Medicine  Why: Stop clindamycin.  Return to emergency department for lightheadedness, dizziness, fever, blood in stool, redness of the leg,  Contact information:  [address] [facility] [address] [phone]                              Take frequent sips of Pedialyte, Powerade, Gatorade.  Popsicles.  [place] diet, bananas, breads, rice.  Avoid red sauces, fruit juices, and dairy products as can irritate your stomach.  If drinking water, be sure to have something salty such as crackers to help restore electrolytes.  Return to emergency department for: Weakness, passing out, blood in stool, blood in vomit, fever, worsening abdominal pain, or other concerns.    Restart wearing compression hose.      Consider using a crate or other padding to prevent breakdown on heels.           Scribe Attestation:   Scribe #1: I performed the above scribed service and the documentation accurately describes the services I performed. I attest to the accuracy of the note.     Attending:   Physician Attestation Statement for Scribe #1: I, Kristen Morin DO, personally performed the services described in this documentation, as scribed by Brian Alas, in my presence, and it is both accurate and complete.                Kristen Morin DO  01/10/24 1699

## 2024-01-10 ENCOUNTER — PATIENT OUTREACH (OUTPATIENT)
Dept: EMERGENCY MEDICINE | Facility: HOSPITAL | Age: 84
End: 2024-01-10
Payer: MEDICARE

## 2024-01-10 ENCOUNTER — TELEPHONE (OUTPATIENT)
Dept: INTERNAL MEDICINE | Facility: CLINIC | Age: 84
End: 2024-01-10
Payer: MEDICARE

## 2024-01-10 ENCOUNTER — LAB VISIT (OUTPATIENT)
Dept: LAB | Facility: HOSPITAL | Age: 84
End: 2024-01-10
Attending: EMERGENCY MEDICINE
Payer: MEDICARE

## 2024-01-10 DIAGNOSIS — R19.7 DIARRHEA, UNSPECIFIED TYPE: ICD-10-CM

## 2024-01-10 PROCEDURE — 87449 NOS EACH ORGANISM AG IA: CPT | Mod: 91,HCNC | Performed by: EMERGENCY MEDICINE

## 2024-01-10 PROCEDURE — 89055 LEUKOCYTE ASSESSMENT FECAL: CPT | Mod: HCNC | Performed by: EMERGENCY MEDICINE

## 2024-01-10 PROCEDURE — 87046 STOOL CULTR AEROBIC BACT EA: CPT | Mod: HCNC | Performed by: EMERGENCY MEDICINE

## 2024-01-10 PROCEDURE — 87798 DETECT AGENT NOS DNA AMP: CPT | Mod: HCNC | Performed by: EMERGENCY MEDICINE

## 2024-01-10 PROCEDURE — 87449 NOS EACH ORGANISM AG IA: CPT | Mod: 59,HCNC | Performed by: EMERGENCY MEDICINE

## 2024-01-10 PROCEDURE — 82272 OCCULT BLD FECES 1-3 TESTS: CPT | Mod: HCNC | Performed by: EMERGENCY MEDICINE

## 2024-01-10 PROCEDURE — 87427 SHIGA-LIKE TOXIN AG IA: CPT | Mod: HCNC | Performed by: EMERGENCY MEDICINE

## 2024-01-10 PROCEDURE — 87425 ROTAVIRUS AG IA: CPT | Mod: HCNC | Performed by: EMERGENCY MEDICINE

## 2024-01-10 PROCEDURE — 87045 FECES CULTURE AEROBIC BACT: CPT | Mod: HCNC | Performed by: EMERGENCY MEDICINE

## 2024-01-10 NOTE — TELEPHONE ENCOUNTER
No-  The ER sent in cultures, suspected could be from [facility], and switched to Cephalexin.  Need to await the results of cultures. They are still pending.    No meds to be sent in at this time.    APpt needed

## 2024-01-10 NOTE — TELEPHONE ENCOUNTER
----- Message from Demetrio De Dios MA sent at 1/10/2024  2:33 PM CST -----  Contact: [email or web address]@ [phone]  Jeferson (pt daughter) called              In regards to needing provider to send something to pharmacy for Diarrhea. Pt was in ED on yesterday for diarrhea.          [id number] - [place], [place] - [address]  [address]  [address] [address]  Phone: [phone] Fax: [phone]  Hours: Not open 24 hours

## 2024-01-10 NOTE — TELEPHONE ENCOUNTER
Spoke with Ruba who stated patient was seen in ED for diarrhea. Ruba stated she is still having episodes and OTC medication not working. Requesting medication be sent to local pharmacy.Advised appointment needed. Ruba requested message be sent first.

## 2024-01-10 NOTE — PROGRESS NOTES
Unable to reach pt after 2 attempts. Sent message to pcp requesting appt scheduling assistance. Pt was scheduled an appt on 1/9/24.     Angela Ponce

## 2024-01-11 ENCOUNTER — PATIENT MESSAGE (OUTPATIENT)
Dept: EMERGENCY MEDICINE | Facility: HOSPITAL | Age: 84
End: 2024-01-11
Payer: MEDICARE

## 2024-01-11 ENCOUNTER — TELEPHONE (OUTPATIENT)
Dept: INTERNAL MEDICINE | Facility: CLINIC | Age: 84
End: 2024-01-11
Payer: MEDICARE

## 2024-01-11 LAB
C DIFF GDH STL QL: NEGATIVE
C DIFF TOX A+B STL QL IA: NEGATIVE
OB PNL STL: POSITIVE
RV AG STL QL IA.RAPID: NEGATIVE
WBC #/AREA STL HPF: NORMAL /[HPF]

## 2024-01-11 NOTE — TELEPHONE ENCOUNTER
----- Message from Jhonatan Leigh sent at 1/11/2024  3:39 PM CST -----  Contact: Ruba/ Daughter  Ruba is calling in regards to Patient she stated that patient is  still having diarrhea, even after her cultures. Please call her back at [phone]. Thanks KB

## 2024-01-12 ENCOUNTER — TELEPHONE (OUTPATIENT)
Dept: INTERNAL MEDICINE | Facility: CLINIC | Age: 84
End: 2024-01-12
Payer: MEDICARE

## 2024-01-12 ENCOUNTER — OFFICE VISIT (OUTPATIENT)
Dept: URGENT CARE | Facility: CLINIC | Age: 84
End: 2024-01-12
Payer: MEDICARE

## 2024-01-12 ENCOUNTER — NURSE TRIAGE (OUTPATIENT)
Dept: ADMINISTRATIVE | Facility: CLINIC | Age: 84
End: 2024-01-12
Payer: MEDICARE

## 2024-01-12 VITALS
RESPIRATION RATE: 18 BRPM | TEMPERATURE: 98 F | OXYGEN SATURATION: 98 % | BODY MASS INDEX: 33.8 KG/M2 | WEIGHT: 223 LBS | SYSTOLIC BLOOD PRESSURE: 114 MMHG | HEIGHT: 68 IN | DIASTOLIC BLOOD PRESSURE: 58 MMHG | HEART RATE: 78 BPM

## 2024-01-12 DIAGNOSIS — R19.7 ACUTE DIARRHEA: Primary | ICD-10-CM

## 2024-01-12 LAB
E COLI SXT1 STL QL IA: NEGATIVE
E COLI SXT2 STL QL IA: NEGATIVE

## 2024-01-12 PROCEDURE — 99213 OFFICE O/P EST LOW 20 MIN: CPT | Mod: S$GLB,,,

## 2024-01-12 RX ORDER — BISMUTH SUBSALICYLATE 262 MG/15ML
15 LIQUID ORAL 4 TIMES DAILY
Qty: 236 ML | Refills: 0 | Status: SHIPPED | OUTPATIENT
Start: 2024-01-12 | End: 2024-01-22

## 2024-01-12 NOTE — PROGRESS NOTES
"Subjective:      Patient ID: Lizeth Henderson is a 83 y.o. female.    Vitals:  height is 5' 8" (1.727 m) and weight is 101.2 kg (223 lb). Her oral temperature is 98.3 °F (36.8 °C). Her blood pressure is 114/58 (abnormal) and her pulse is 78. Her respiration is 18 and oxygen saturation is 98%.     Chief Complaint: Diarrhea    82 y/o female here for diarrhea x 5 days, pt was placed on Clindamycin for cellulitis on 1/3, diarrhea started the 1/7th and her PCP and ER have not called in any meds for this issue after switching to Keflex for cellulitis. ER ordered stool studies results were normal. Pt is still having diarrhea "about 10 episodes a day", she is taking Imodium with no benefit. Denies nausea/vomiting, abdominal pain, urinary symptoms, bloody diarrhea, recent travel, eating suspicious food/beverage, sick contacts. Allergic to Lisinopril.     Diarrhea   This is a new problem. The current episode started in the past 7 days. The problem occurs 5 to 10 times per day. The problem has been gradually worsening. Pertinent negatives include no abdominal pain, chills, fever or vomiting.       Constitution: Negative for chills and fever.   Gastrointestinal:  Positive for history of abdominal surgery (hernia repair, hysterectomy) and diarrhea. Negative for abdominal pain, vomiting, bright red blood in stool, dark colored stools and rectal bleeding.   Genitourinary:  Negative for dysuria, frequency, urgency and flank pain.      Objective:     Vitals:    01/12/24 1723   BP: (!) 114/58   Pulse: 78   Resp: 18   Temp: 98.3 °F (36.8 °C)       Physical Exam   Constitutional: She is oriented to person, place, and time. She appears well-developed.  Non-toxic appearance. She does not appear ill. No distress.   HENT:   Head: Normocephalic and atraumatic.   Ears:   Right Ear: External ear normal.   Left Ear: External ear normal.   Nose: Nose normal.   Mouth/Throat: Mucous membranes are normal. Mucous membranes are moist.   Eyes: " Conjunctivae and lids are normal.   Neck: Trachea normal. Neck supple.   Cardiovascular: Normal rate, regular rhythm, normal heart sounds and normal pulses.   Pulmonary/Chest: Effort normal and breath sounds normal. No stridor. No respiratory distress. She has no wheezes. She has no rhonchi. She has no rales.   Abdominal: Normal appearance and bowel sounds are normal. She exhibits no distension and no mass. Soft. There is no abdominal tenderness. There is no rebound, no guarding, no tenderness at McBurney's point and negative Rovsing's sign.      Comments: Patient has no PMHx of Crohns/Diverticulosis(itis), UC, IBS. Suspect diarrhea due to recent antibiotic use. VSS, no signs of dehydration, mucous membrane moist, No TTP of abdomen.    Musculoskeletal: Normal range of motion.         General: Normal range of motion.   Neurological: She is alert and oriented to person, place, and time. She has normal strength. She displays no weakness. Gait normal.   Skin: Skin is warm, dry, intact, not diaphoretic, not pale and no rash.   Psychiatric: Her speech is normal and behavior is normal. Judgment and thought content normal.   Nursing note and vitals reviewed.      Assessment:     1. Acute diarrhea    2. Diarrhea, unspecified type        Plan:       Acute diarrhea  -     bismuth subsalicylate (KAOPECTATE, BISMUTH SUBSALICY,) 262 mg/15 mL suspension; Take 15 mLs by mouth 4 (four) times daily. for 10 days  Dispense: 236 mL; Refill: 0    Diarrhea, unspecified type        Patient Instructions   Abdominal Pain   If your condition worsens or fails to improve we recommend that you receive another evaluation at the ER immediately or contact your PCP to discuss your concerns or return here. You must understand that you've received an urgent care treatment only and that you may be released before all your medical problems are known or treated. You the patient will arrange for followup care as instructed.     PLEASE RETURN HERE OR GO TO  THE EMERGENCY DEPARTMENT FOR ANY CONCERNS OR WORSENING OF YOUR CONDITION (I.E., BLOOD IN VOMIT OR STOOL, WORSENING ABDOMINAL PAIN, FEVER, WEAKNESS, PASSING OUT, INABILITY TO PASS GAS OR STOOL)    Diarrhea    If you have diarrhea you can use Pepto Bismol.  Avoid Imodium unless you have more than 6 episodes of diarrhea in 24 hours.     Avoid any greasy, spicy, fatty or acidic foods at this time.   Do not eat dairy products while you have symptoms of diarrhea, they can make the diarrhea worse.   Increase fluids and rest is important.    Start a clear liquid diet and progress to BRAT diet - try to eat boiled starches (eg, potatoes, noodles, rice, wheat, and oat) with salt; crackers, bananas, soup, and boiled vegetables are also reasonable options.      Nausea & Vomiting    If you prescribed an anti-nausea medication, please take it as prescribed when needed. OTC anti-nausea Imitrol is available over the counter.   Increase fluids and rest is important   Start off with liquid diet and progress as tolerated (see below)  Water and clear liquids are important so you do not get dehydrated. Drink a small amount at a time.  Do not force yourself to eat, especially if you have cramps, vomiting, or diarrhea. When you finally decide to start eating, do not eat large amounts at a time, even if you are hungry.  If you eat, avoid fatty, greasy, spicy, or fried foods.      Watch for any increase pain, fever, localized pain to right lower abdomen or continued vomiting or diarrhea.     You must understand that you have received an Urgent Care treatment only and that you may be released before all of your medical problems are known or treated.  WE CANNOT RULE OUT ALL POSSIBLE CAUSES OF YOUR SYMPTOMS IN THE URGENT CARE SETTING PLEASE GO TO THE ER IF YOU FEELS YOUR CONDITION IS WORSENING OR YOU WOULD LIKE EMERGENT EVALUATION.

## 2024-01-12 NOTE — TELEPHONE ENCOUNTER
Spoke with patients daughter, she was in the ED, was prescribed at first clindamycin then changed to Keflex. Patient is still currently having diarrhea, taking OTC Imodium that is not helping. Would like to know if something could be called in to help.

## 2024-01-12 NOTE — TELEPHONE ENCOUNTER
----- Message from Pattie Medina sent at 1/12/2024  1:59 PM CST -----  Regarding: RX New  Contact: Ruba  Type:  RX Refill Request    Who Called:  Lizeth   Refill or New Rx: refill   RX Name and Strength:  How is the patient currently taking it? (ex. 1XDay):  Is this a 30 day or 90 day RX:    Preferred Pharmacy with phone number:   [id number] -    [address]  [address] [address]  Phone: [phone] Fax: [phone]     Local or Mail Order: local   Ordering Provider: EVELIA Euceda   Would the patient rather a call back or a response via [facility]? call  Best Call Back Number: [phone] (home)    Additional Information:  Ruba is requesting a new medication to treat diarrhea as soon as possible or please call and provide an update on the earlier request.

## 2024-01-12 NOTE — TELEPHONE ENCOUNTER
[place]    PCP:  Dr. Lazarus Euceda    Spoke with Dtr, Ruba Godinez, on pts behalf.  She reports pt diagnosed with cellulitis.  She states that she had diarrhea r/t antibiotic.  She was seen in the ED and antibiotic was changed.  C/O severe diarrhea.  Dtr reports that she is taking in enough fluids.  Denies abdominal pain, vomiting, fever, and blood in stool.  She Imodium AD OTC which has not been effective.  Per protocol, care advised is go to [facility] now.  Dtr VU and will take her to [facility] in [place].  Advised to call for worsening/questions/concerns.  VU.    Reason for Disposition   SEVERE diarrhea (e.g., 7 or more times / day more than normal) and age > 60 years    Additional Information   Negative: Shock suspected (e.g., cold/pale/clammy skin, too weak to stand, low BP, rapid pulse)   Negative: Difficult to awaken or acting confused (e.g., disoriented, slurred speech)   Negative: Sounds like a life-threatening emergency to the triager   Negative: SEVERE abdominal pain (e.g., excruciating) and present > 1 hour   Negative: SEVERE abdominal pain and age > 60 years   Negative: Bloody, black, or tarry bowel movements  (Exception: Chronic-unchanged black-grey bowel movements and is taking iron pills or Pepto-Bismol.)    Protocols used: Diarrhea-A-OH

## 2024-01-12 NOTE — TELEPHONE ENCOUNTER
----- Message from Madison Camarena LPN sent at 1/12/2024  1:54 PM CST -----    ----- Message -----  From: Alexsander Jenkins  Sent: 1/12/2024   8:55 AM CST  To: Kinsey CARRERA Staff    Type:  Patient Returning Call    Who Called:Pt daughter Ruba  Who Left Message for Patient:Chloé  Does the patient know what this is regarding?:YES  Would the patient rather a call back or a response via [facility]? CALL  Best Call Back Number:.Telephone Information:  Mobile          [phone]        Additional Information: Pt daughter returning call

## 2024-01-12 NOTE — PATIENT INSTRUCTIONS
Abdominal Pain   If your condition worsens or fails to improve we recommend that you receive another evaluation at the ER immediately or contact your PCP to discuss your concerns or return here. You must understand that you've received an urgent care treatment only and that you may be released before all your medical problems are known or treated. You the patient will arrange for followup care as instructed.     PLEASE RETURN HERE OR GO TO THE EMERGENCY DEPARTMENT FOR ANY CONCERNS OR WORSENING OF YOUR CONDITION (I.E., BLOOD IN VOMIT OR STOOL, WORSENING ABDOMINAL PAIN, FEVER, WEAKNESS, PASSING OUT, INABILITY TO PASS GAS OR STOOL)    Diarrhea    If you have diarrhea you can use Pepto Bismol.  Avoid Imodium unless you have more than 6 episodes of diarrhea in 24 hours.     Avoid any greasy, spicy, fatty or acidic foods at this time.   Do not eat dairy products while you have symptoms of diarrhea, they can make the diarrhea worse.   Increase fluids and rest is important.    Start a clear liquid diet and progress to BRAT diet - try to eat boiled starches (eg, potatoes, noodles, rice, wheat, and oat) with salt; crackers, bananas, soup, and boiled vegetables are also reasonable options.      Nausea & Vomiting    If you prescribed an anti-nausea medication, please take it as prescribed when needed. OTC anti-nausea Imitrol is available over the counter.   Increase fluids and rest is important   Start off with liquid diet and progress as tolerated (see below)  Water and clear liquids are important so you do not get dehydrated. Drink a small amount at a time.  Do not force yourself to eat, especially if you have cramps, vomiting, or diarrhea. When you finally decide to start eating, do not eat large amounts at a time, even if you are hungry.  If you eat, avoid fatty, greasy, spicy, or fried foods.      Watch for any increase pain, fever, localized pain to right lower abdomen or continued vomiting or diarrhea.     You must  understand that you have received an Urgent Care treatment only and that you may be released before all of your medical problems are known or treated.  WE CANNOT RULE OUT ALL POSSIBLE CAUSES OF YOUR SYMPTOMS IN THE URGENT CARE SETTING PLEASE GO TO THE ER IF YOU FEELS YOUR CONDITION IS WORSENING OR YOU WOULD LIKE EMERGENT EVALUATION.

## 2024-01-13 LAB — BACTERIA STL CULT: NORMAL

## 2024-01-14 LAB
HADV DNA SERPL QL NAA+PROBE: NEGATIVE
SPECIMEN SOURCE: NORMAL

## 2024-01-18 ENCOUNTER — OFFICE VISIT (OUTPATIENT)
Dept: PODIATRY | Facility: CLINIC | Age: 84
End: 2024-01-18
Payer: MEDICARE

## 2024-01-18 VITALS — BODY MASS INDEX: 33.8 KG/M2 | WEIGHT: 223 LBS | HEIGHT: 68 IN

## 2024-01-18 DIAGNOSIS — T45.1X5A CHEMOTHERAPY-INDUCED PERIPHERAL NEUROPATHY: Primary | ICD-10-CM

## 2024-01-18 DIAGNOSIS — L60.3 ONYCHODYSTROPHY: ICD-10-CM

## 2024-01-18 DIAGNOSIS — G62.0 CHEMOTHERAPY-INDUCED PERIPHERAL NEUROPATHY: Primary | ICD-10-CM

## 2024-01-18 PROCEDURE — 11721 DEBRIDE NAIL 6 OR MORE: CPT | Mod: Q9,HCNC,S$GLB, | Performed by: PODIATRIST

## 2024-01-18 PROCEDURE — 99499 UNLISTED E&M SERVICE: CPT | Mod: HCNC,S$GLB,, | Performed by: PODIATRIST

## 2024-01-18 PROCEDURE — 99999 PR PBB SHADOW E&M-EST. PATIENT-LVL III: CPT | Mod: PBBFAC,HCNC,, | Performed by: PODIATRIST

## 2024-01-18 NOTE — PROGRESS NOTES
Subjective:       Patient ID: Lizeth Henderson is a 83 y.o. female.    Chief Complaint: Nail Care (Coming in for nail care, pt rates pain 7/10 , pt legs are red and swollen she wears compression socks to help with the swelling, pt is non-diabetic)    HPI: Patient presents to the office today with the chief complaint of elongated, thickened and dystrophic nail plates to the B/L foot. This patient is a Diabetic Type II. Patient does follow with Primary Care and/or Endocrinology for management of Diabetes Mellitus. This patient's PMD is Lazarus Euceda MD. This patient last saw his/her primary care provider on 10/23/2023.  Previously hospitalized for cellulitis.  Currently utilizing compression socks the bilateral lower extremities.  Presents to clinic with daughter present.     Hemoglobin A1C   Date Value Ref Range Status   07/12/2023 6.0 (H) 4.0 - 5.6 % Final     Comment:     ADA Screening Guidelines:  5.7-6.4%  Consistent with prediabetes  >or=6.5%  Consistent with diabetes    High levels of fetal hemoglobin interfere with the HbA1C  assay. Heterozygous hemoglobin variants (HbS, HgC, etc)do  not significantly interfere with this assay.   However, presence of multiple variants may affect accuracy.     11/05/2020 5.4 4.0 - 5.6 % Final     Comment:     ADA Screening Guidelines:  5.7-6.4%  Consistent with prediabetes  >or=6.5%  Consistent with diabetes  High levels of fetal hemoglobin interfere with the HbA1C  assay. Heterozygous hemoglobin variants (HbS, HgC, etc)do  not significantly interfere with this assay.   However, presence of multiple variants may affect accuracy.     01/03/2019 5.6 4.0 - 5.6 % Final     Comment:     ADA Screening Guidelines:  5.7-6.4%  Consistent with prediabetes  >or=6.5%  Consistent with diabetes  High levels of fetal hemoglobin interfere with the HbA1C  assay. Heterozygous hemoglobin variants (HbS, HgC, etc)do  not significantly interfere with this assay.   However, presence of  multiple variants may affect accuracy.     .     Review of patient's allergies indicates:   Allergen Reactions    Lisinopril      cough       Past Medical History:   Diagnosis Date    Adrenal adenoma 2016    Anxiety     Arthritis     Benign hypertension     Colon cancer age 61    colon    Coronary artery disease     Depression     Full dentures     General anesthetics causing adverse effect in therapeutic use     yells and talks when wakes up    Hyperlipidemia     Osteopenia     Shingles     Wears glasses        Family History   Problem Relation Age of Onset    Cancer Father     Hypertension Father     Arthritis Father     Alzheimer's disease Mother     Arthritis Mother     Depression Daughter     Kidney disease Daughter     Ulcerative colitis Daughter     Depression Daughter     Depression Daughter     Anxiety disorder Daughter         PTSD    Cancer Maternal Uncle     Cancer Cousin         colon cancer    Early death Maternal Grandmother     Amblyopia Neg Hx     Blindness Neg Hx     Cataracts Neg Hx     Diabetes Neg Hx     Glaucoma Neg Hx     Macular degeneration Neg Hx     Retinal detachment Neg Hx     Strabismus Neg Hx     Stroke Neg Hx     Thyroid disease Neg Hx        Social History     Socioeconomic History    Marital status:     Number of children: 3   Tobacco Use    Smoking status: Never     Passive exposure: Never    Smokeless tobacco: Never   Substance and Sexual Activity    Alcohol use: No    Drug use: No    Sexual activity: Never   Social History Narrative    The patient does not exercise regularly ().    Rates diet as poor.    She is not satisfied with weight.             Social Determinants of Health     Financial Resource Strain: Low Risk  (10/23/2023)    Overall Financial Resource Strain (CARDIA)     Difficulty of Paying Living Expenses: Not hard at all   Food Insecurity: No Food Insecurity (10/23/2023)    Hunger Vital Sign     Worried About Running Out of Food in the Last Year: Never true     " Ran Out of Food in the Last Year: Never true   Transportation Needs: No Transportation Needs (10/23/2023)    PRAPARE - Transportation     Lack of Transportation (Medical): No     Lack of Transportation (Non-Medical): No   Physical Activity: Unknown (10/23/2023)    Exercise Vital Sign     Days of Exercise per Week: 0 days   Stress: Stress Concern Present (10/23/2023)    [employer] - Occupational Stress Questionnaire     Feeling of Stress : To some extent   Social Connections: Unknown (10/23/2023)    Social Connection and Isolation Panel [NHANES]     Frequency of Communication with Friends and Family: More than three times a week     Frequency of Social Gatherings with Friends and Family: Once a week     Active Member of Clubs or Organizations: No     Attends Club or Organization Meetings: Never     Marital Status:    Housing Stability: Low Risk  (10/23/2023)    Housing Stability Vital Sign     Unable to Pay for Housing in the Last Year: No     Number of Places Lived in the Last Year: 1     Unstable Housing in the Last Year: No       Past Surgical History:   Procedure Laterality Date    APPENDECTOMY      BREAST BIOPSY Left     benign    BUNIONECTOMY Left     CATARACT EXTRACTION Bilateral     COLON SURGERY      2001    EYE SURGERY      cataract surg    HEMICOLECTOMY  2002    HERNIA REPAIR      x5    JOINT REPLACEMENT      knee    left toe surgery      joelle    PATENT DUCTUS ARTERIOUS LIGATION  1948    SALPINGOOPHORECTOMY  2002    due to colon cancer    TONSILLECTOMY, ADENOIDECTOMY         Review of Systems       Objective:   Ht 5' 8" (1.727 m)   Wt 101.2 kg (223 lb)   BMI 33.91 kg/m²     Physical Exam  LOWER EXTREMITY PHYSICAL EXAMINATION    VASCULAR:  The right dorsalis pedis pulse 2/4 and the right posterior tibial pulse 2/4.  The left dorsalis pedis pulse 2/4 and posterior tibial pulse on the left is 2/4.  Capillary refill is intact.  Pedal hair growth intact    NEUROLOGY: " Protective sensation is not intact to the left and right plantar surfaces of the foot and digits, as the patient has no sensation/detection at greater than 4 distinct points of contact with 5.07 Wishek Aishwarya monofilament. Sensation to light touch is intact on the left and right foot. Proprioception is intact, bilateral. Sensation to pin prick is reduced to absent. Vibratory sensation is diminished.    DERMATOLOGY:  Skin is supple, moist, intact.  There is no signs of callusing, ulcerations, other lesions identified to the dorsal or plantar aspect of the right or left foot.  The R1, 2, 5 and left L1,2, 5 are thickened, discolored dystrophic.  There is subungual debris.  Nail plates have area of dark discoloration.  The remaining nails 3-4 on the right foot and the left foot are elongated but of normal color, thickness, and texture.   There is no signs of ingrowing into the medial or lateral borders.  There is no evidence of wounds or skin breakdown.  No edema or erythema.  No obvious lacerations or fissuring.  Interdigital spaces are clean, dry, intact.  No rashes or scars appreciated.    ORTHOPEDIC: Manual Muscle Testing is 5/5 in all planes on the left and right, without pains, with and without resistance. Gait pattern is non-antalgic.    Assessment:     1. Chemotherapy-induced peripheral neuropathy    2. Onychodystrophy        Plan:     Chemotherapy-induced peripheral neuropathy    Onychodystrophy      Thorough discussion is had with the patient this afternoon, concerning the diagnosis, its etiology, and the treatment algorithm at present.  Greater than 50% of this visit spent on counseling and coordination of care. Greater than 15 minutes of a 20 minute appointment spent on education about the diabetic foot, neuropathy, and prevention of limb loss.  Shoe inspection. Diabetic Foot Education. Patient reminded of the importance of good nutrition and blood sugar control to help prevent podiatric complications of  diabetes. Patient instructed on proper foot hygeine. We discussed wearing proper and supportive shoe gear, daily foot inspections, never walking barefooted or sock footed, never putting sharp instruments to feet which can cause major complications associated with infection, ulcers, lacerations.      Dystrophic nail plates, as outlined above (R#1,2,5  ; L#1,2,5 ), are sharply debrided with double action nail nipper, and/or with the assistance of a mechanical rotary nehemias, with removal of all offending nail and nail border(s), for reduction of pains. Nails are reduced in terms of length, width and girth with removal of subungual debris to facilitate pain free weight bearing and ambulation. The elongated nails as outlined in the objective portion of this note, were trimmed to appropriate length, with a double action nail nipper, for alleviation/reduction of pains as well. Follow up in approx. 3-4 months.    Future Appointments   Date Time Provider Department Center   1/23/2024  1:40 PM Shikha Lu NP [facility]   2/14/2024  7:30 AM Lazarus Euceda MD [facility]

## 2024-01-22 RX ORDER — PRAMIPEXOLE DIHYDROCHLORIDE 0.12 MG/1
0.12 TABLET ORAL NIGHTLY
Qty: 30 TABLET | Refills: 1 | Status: SHIPPED | OUTPATIENT
Start: 2024-01-22 | End: 2024-01-22

## 2024-01-22 RX ORDER — PRAMIPEXOLE DIHYDROCHLORIDE 0.12 MG/1
0.12 TABLET ORAL NIGHTLY
Qty: 90 TABLET | Refills: 1 | Status: SHIPPED | OUTPATIENT
Start: 2024-01-22

## 2024-01-23 ENCOUNTER — LAB VISIT (OUTPATIENT)
Dept: LAB | Facility: HOSPITAL | Age: 84
End: 2024-01-23
Payer: MEDICARE

## 2024-01-23 ENCOUNTER — OFFICE VISIT (OUTPATIENT)
Dept: INTERNAL MEDICINE | Facility: CLINIC | Age: 84
End: 2024-01-23
Payer: MEDICARE

## 2024-01-23 VITALS
HEART RATE: 84 BPM | SYSTOLIC BLOOD PRESSURE: 116 MMHG | TEMPERATURE: 98 F | DIASTOLIC BLOOD PRESSURE: 72 MMHG | BODY MASS INDEX: 33.68 KG/M2 | WEIGHT: 222.25 LBS | HEIGHT: 68 IN | OXYGEN SATURATION: 97 %

## 2024-01-23 DIAGNOSIS — G62.0 CHEMOTHERAPY-INDUCED PERIPHERAL NEUROPATHY: ICD-10-CM

## 2024-01-23 DIAGNOSIS — I70.0 ATHEROSCLEROSIS OF AORTA: ICD-10-CM

## 2024-01-23 DIAGNOSIS — L03.116 CELLULITIS OF LEFT LOWER EXTREMITY: ICD-10-CM

## 2024-01-23 DIAGNOSIS — R73.9 BLOOD GLUCOSE ELEVATED: ICD-10-CM

## 2024-01-23 DIAGNOSIS — N18.32 STAGE 3B CHRONIC KIDNEY DISEASE: ICD-10-CM

## 2024-01-23 DIAGNOSIS — R19.5 OCCULT BLOOD IN STOOLS: ICD-10-CM

## 2024-01-23 DIAGNOSIS — T45.1X5A CHEMOTHERAPY-INDUCED PERIPHERAL NEUROPATHY: ICD-10-CM

## 2024-01-23 DIAGNOSIS — E11.9 TYPE 2 DIABETES MELLITUS WITHOUT COMPLICATION, WITHOUT LONG-TERM CURRENT USE OF INSULIN: ICD-10-CM

## 2024-01-23 DIAGNOSIS — L03.115 CELLULITIS OF RIGHT LOWER EXTREMITY: ICD-10-CM

## 2024-01-23 DIAGNOSIS — R19.7 DIARRHEA, UNSPECIFIED TYPE: Primary | ICD-10-CM

## 2024-01-23 DIAGNOSIS — R19.7 DIARRHEA, UNSPECIFIED TYPE: ICD-10-CM

## 2024-01-23 DIAGNOSIS — F33.1 MDD (MAJOR DEPRESSIVE DISORDER), RECURRENT EPISODE, MODERATE: ICD-10-CM

## 2024-01-23 DIAGNOSIS — D72.0 HYPERSEGMENTATION OF NEUTROPHILS: ICD-10-CM

## 2024-01-23 DIAGNOSIS — E27.8 ADRENAL NODULE: ICD-10-CM

## 2024-01-23 PROCEDURE — 85025 COMPLETE CBC W/AUTO DIFF WBC: CPT | Mod: HCNC

## 2024-01-23 PROCEDURE — 99214 OFFICE O/P EST MOD 30 MIN: CPT | Mod: HCNC,S$GLB,,

## 2024-01-23 PROCEDURE — 1160F RVW MEDS BY RX/DR IN RCRD: CPT | Mod: HCNC,CPTII,S$GLB,

## 2024-01-23 PROCEDURE — 99999 PR PBB SHADOW E&M-EST. PATIENT-LVL V: CPT | Mod: PBBFAC,HCNC,,

## 2024-01-23 PROCEDURE — 3078F DIAST BP <80 MM HG: CPT | Mod: HCNC,CPTII,S$GLB,

## 2024-01-23 PROCEDURE — 1125F AMNT PAIN NOTED PAIN PRSNT: CPT | Mod: HCNC,CPTII,S$GLB,

## 2024-01-23 PROCEDURE — 3074F SYST BP LT 130 MM HG: CPT | Mod: HCNC,CPTII,S$GLB,

## 2024-01-23 PROCEDURE — 83036 HEMOGLOBIN GLYCOSYLATED A1C: CPT | Mod: HCNC

## 2024-01-23 PROCEDURE — 36415 COLL VENOUS BLD VENIPUNCTURE: CPT | Mod: HCNC

## 2024-01-23 PROCEDURE — 83735 ASSAY OF MAGNESIUM: CPT | Mod: HCNC

## 2024-01-23 PROCEDURE — 80053 COMPREHEN METABOLIC PANEL: CPT | Mod: HCNC

## 2024-01-23 PROCEDURE — 1159F MED LIST DOCD IN RCRD: CPT | Mod: HCNC,CPTII,S$GLB,

## 2024-01-23 RX ORDER — SULFAMETHOXAZOLE AND TRIMETHOPRIM 800; 160 MG/1; MG/1
1 TABLET ORAL 2 TIMES DAILY
Qty: 20 TABLET | Refills: 0 | Status: SHIPPED | OUTPATIENT
Start: 2024-01-23 | End: 2024-03-02

## 2024-01-23 NOTE — PROGRESS NOTES
Lizeth Henderson  01/23/2024  [id number]    Lazarus Euceda MD  Patient Care Team:  Lazarus Euceda MD as PCP - General (Family Medicine)  Katarzyna Vinson MD (Psychiatry)  Alyssa Toledo LPN (Inactive) as Care Coordinator          Visit Type: ER Visit     Chief Complaint:  Chief Complaint   Patient presents with    Hospital Follow Up     Cellulitis        History of Present Illness:    Went to ER on 1/3 for cellulitis to BLE  Started on clindamycin  Caused diarrhea  She had diarrhea for a week     She stopped clindamycin  Went to the ER for diarrhea  Antibiotic changed to keflex 500 mg Q6 for 7 days     She completed the keflex  Cellulitis has improved but has not completely resolved  Sees new red spots appearing     Diarrhea  has resolved  While in ER positive for occult blood    History:  Past Medical History:   Diagnosis Date    Adrenal adenoma 2016    Anxiety     Arthritis     Benign hypertension     Colon cancer age 61    colon    Coronary artery disease     Depression     Full dentures     General anesthetics causing adverse effect in therapeutic use     yells and talks when wakes up    Hyperlipidemia     Osteopenia     Shingles     Wears glasses      Past Surgical History:   Procedure Laterality Date    APPENDECTOMY      BREAST BIOPSY Left     benign    BUNIONECTOMY Left     CATARACT EXTRACTION Bilateral     COLON SURGERY      2001    EYE SURGERY      cataract surg    HEMICOLECTOMY  2002    HERNIA REPAIR      x5    JOINT REPLACEMENT      knee    left toe surgery      hammertoe    PATENT DUCTUS ARTERIOUS LIGATION  1948    SALPINGOOPHORECTOMY  2002    due to colon cancer    TONSILLECTOMY, ADENOIDECTOMY       Family History   Problem Relation Age of Onset    Cancer Father     Hypertension Father     Arthritis Father     Alzheimer's disease Mother     Arthritis Mother     Depression Daughter     Kidney disease Daughter     Ulcerative colitis Daughter     Depression Daughter     Depression Daughter      Anxiety disorder Daughter         PTSD    Cancer Maternal Uncle     Cancer Cousin         colon cancer    Early death Maternal Grandmother     Amblyopia Neg Hx     Blindness Neg Hx     Cataracts Neg Hx     Diabetes Neg Hx     Glaucoma Neg Hx     Macular degeneration Neg Hx     Retinal detachment Neg Hx     Strabismus Neg Hx     Stroke Neg Hx     Thyroid disease Neg Hx      Social History     Socioeconomic History    Marital status:     Number of children: 3   Tobacco Use    Smoking status: Never     Passive exposure: Never    Smokeless tobacco: Never   Substance and Sexual Activity    Alcohol use: No    Drug use: No    Sexual activity: Never   Social History Narrative    The patient does not exercise regularly ().    Rates diet as poor.    She is not satisfied with weight.             Social Determinants of Health     Financial Resource Strain: Low Risk  (10/23/2023)    Overall Financial Resource Strain (CARDIA)     Difficulty of Paying Living Expenses: Not hard at all   Food Insecurity: No Food Insecurity (10/23/2023)    Hunger Vital Sign     Worried About Running Out of Food in the Last Year: Never true     Ran Out of Food in the Last Year: Never true   Transportation Needs: No Transportation Needs (10/23/2023)    PRAPARE - Transportation     Lack of Transportation (Medical): No     Lack of Transportation (Non-Medical): No   Physical Activity: Unknown (10/23/2023)    Exercise Vital Sign     Days of Exercise per Week: 0 days   Stress: Stress Concern Present (10/23/2023)    [employer] - Occupational Stress Questionnaire     Feeling of Stress : To some extent   Social Connections: Unknown (10/23/2023)    Social Connection and Isolation Panel [NHANES]     Frequency of Communication with Friends and Family: More than three times a week     Frequency of Social Gatherings with Friends and Family: Once a week     Active Member of Clubs or Organizations: No     Attends Club or  Organization Meetings: Never     Marital Status:    Housing Stability: Low Risk  (10/23/2023)    Housing Stability Vital Sign     Unable to Pay for Housing in the Last Year: No     Number of Places Lived in the Last Year: 1     Unstable Housing in the Last Year: No     Patient Active Problem List   Diagnosis    Major depression, recurrent, chronic    Generalized anxiety disorder    Essential hypertension    Mitral regurgitation    Eating disorder    Polyarthralgia    History of colon cancer    Arthritis of both knees    Hyperlipidemia, mixed    Adrenal nodule    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    Primary insomnia    Anemia    Gait abnormality    Overactive bladder    Prediabetes    Hammer toes of both feet    Chemotherapy-induced peripheral neuropathy    Valgus deformity of both great toes    Hypersegmentation of neutrophils    Immunization deficiency    Chronic kidney disease, stage 3a    Atherosclerosis of aorta    Age-related osteoporosis without current pathological fracture     Review of patient's allergies indicates:   Allergen Reactions    Lisinopril      cough       The following were reviewed at this visit: active problem list, medication list, allergies, family history, social history, and health maintenance.    Medications:  Current Outpatient Medications on File Prior to Visit   Medication Sig Dispense Refill    ALPRAZolam (XANAX) 0.25 MG tablet TAKE 1/2 TO 1 TABLET BY MOUTH DAILY AS NEEDED FOR ANXIETY 20 tablet 3    aspirin (ECOTRIN) 81 MG EC tablet Take 81 mg by mouth once daily.      atorvastatin (LIPITOR) 10 MG tablet TAKE 1 TABLET(10 MG) BY MOUTH EVERY DAY 90 tablet 1    [] bismuth subsalicylate (KAOPECTATE, BISMUTH SUBSALICY,) 262 mg/15 mL suspension Take 15 mLs by mouth 4 (four) times daily. for 10 days 236 mL 0    buPROPion (WELLBUTRIN XL) 150 MG TB24 tablet Take 1 tablet (150 mg total) by mouth once daily. 90 tablet 3    busPIRone (BUSPAR) 10 MG tablet  Take 2 tablets (20 mg total) by mouth 2 (two) times daily. 120 tablet 3    ergocalciferol (ERGOCALCIFEROL) 50,000 unit Cap Take 1 capsule (50,000 Units total) by mouth every 7 days. 12 capsule 3    EScitalopram oxalate (LEXAPRO) 10 MG tablet Take 1 tablet (10 mg total) by mouth once daily. 90 tablet 1    hydroCHLOROthiazide (HYDRODIURIL) 12.5 MG Tab Take 1 tablet (12.5 mg total) by mouth once daily. 90 tablet 3    ibandronate (BONIVA) 150 mg tablet Take 1 tablet (150 mg total) by mouth every 30 days. 3 tablet 3    losartan (COZAAR) 100 MG tablet Take 1 tablet (100 mg total) by mouth once daily. 90 tablet 3    MYRBETRIQ 50 mg Tb24 TAKE 1 TABLET(50 MG) BY MOUTH EVERY DAY 90 tablet 0    pramipexole (MIRAPEX) 0.125 MG tablet TAKE 1 TABLET BY MOUTH EVERY EVENING 90 tablet 1    suvorexant (BELSOMRA) 10 mg Tab Take 1 tablet by mouth every evening. 30 tablet 3     No current facility-administered medications on file prior to visit.       Medications have been reviewed and reconciled with patient at this visit.  Barriers to medications reviewed with patient.    Adverse reactions to current medications reviewed with patient..    Over the counter medications reviewed and reconciled with patient.    Exam:  Wt Readings from Last 3 Encounters:   01/18/24 101.2 kg (223 lb)   01/12/24 101.2 kg (223 lb)   11/08/23 102.1 kg (225 lb)     Temp Readings from Last 3 Encounters:   01/12/24 98.3 °F (36.8 °C) (Oral)   01/09/24 98.2 °F (36.8 °C) (Oral)   01/03/24 99 °F (37.2 °C) (Oral)     BP Readings from Last 3 Encounters:   01/12/24 (!) 114/58   01/09/24 134/62   01/03/24 (!) 165/77     Pulse Readings from Last 3 Encounters:   01/12/24 78   01/09/24 93   01/03/24 98     There is no height or weight on file to calculate BMI.      Review of Systems   Cardiovascular:  Positive for leg swelling.   Gastrointestinal:  Negative for diarrhea.   Skin:  Positive for rash.     Physical Exam  Vitals and nursing note reviewed.   Constitutional:        General: She is not in acute distress.     Appearance: She is well-developed. She is obese. She is not diaphoretic.   HENT:      Head: Normocephalic and atraumatic.      Right Ear: External ear normal.      Left Ear: External ear normal.      Nose: Nose normal.   Eyes:      General:         Right eye: No discharge.         Left eye: No discharge.      Conjunctiva/sclera: Conjunctivae normal.      Pupils: Pupils are equal, round, and reactive to light.   Neck:      Thyroid: No thyromegaly.   Cardiovascular:      Rate and Rhythm: Normal rate and regular rhythm.      Heart sounds: Normal heart sounds. No murmur heard.  Pulmonary:      Effort: Pulmonary effort is normal. No respiratory distress.      Breath sounds: Normal breath sounds. No wheezing.   Musculoskeletal:      Right lower leg: Edema present.      Left lower leg: Edema present.      Comments: The patient is using an assistive device during the visit     Neurological:      Mental Status: She is alert and oriented to person, place, and time.      Motor: Weakness present.      Gait: Gait abnormal.   Psychiatric:         Behavior: Behavior normal.         Thought Content: Thought content normal.         Judgment: Judgment normal.                Laboratory Reviewed ({Yes)  Lab Results   Component Value Date    WBC 8.80 01/09/2024    HGB 11.7 (L) 01/09/2024    HCT 36.2 (L) 01/09/2024     01/09/2024    CHOL 258 (H) 07/12/2023    TRIG 243 (H) 07/12/2023    HDL 32 (L) 07/12/2023    ALT 10 01/09/2024    AST 16 01/09/2024     01/09/2024    K 5.0 01/09/2024     01/09/2024    CREATININE 1.2 01/09/2024    BUN 18 01/09/2024    CO2 26 01/09/2024    TSH 1.281 07/12/2023    INR 1.0 10/12/2016    HGBA1C 6.0 (H) 07/12/2023     Lizeth was seen today for hospital follow up.    Diagnoses and all orders for this visit:    Diarrhea, unspecified type  -     CBC Auto Differential; Future  -     HEMOGLOBIN A1C; Future  -     COMPREHENSIVE METABOLIC PANEL; Future  -      Magnesium; Future    Cellulitis of right lower extremity  -     sulfamethoxazole-trimethoprim 800-160mg (BACTRIM DS) 800-160 mg Tab; Take 1 tablet by mouth 2 (two) times daily.  -     CBC Auto Differential; Future  -     HEMOGLOBIN A1C; Future  -     COMPREHENSIVE METABOLIC PANEL; Future  -     Magnesium; Future    Cellulitis of left lower extremity  -     sulfamethoxazole-trimethoprim 800-160mg (BACTRIM DS) 800-160 mg Tab; Take 1 tablet by mouth 2 (two) times daily.  -     CBC Auto Differential; Future  -     HEMOGLOBIN A1C; Future  -     COMPREHENSIVE METABOLIC PANEL; Future  -     Magnesium; Future    Chemotherapy-induced peripheral neuropathy  -     CBC Auto Differential; Future  -     HEMOGLOBIN A1C; Future  -     COMPREHENSIVE METABOLIC PANEL; Future  -     Magnesium; Future    Occult blood in stools  -     Occult blood x 1, stool; Future  -     CBC Auto Differential; Future  -     HEMOGLOBIN A1C; Future  -     COMPREHENSIVE METABOLIC PANEL; Future  -     Magnesium; Future    Blood glucose elevated  -     CBC Auto Differential; Future  -     HEMOGLOBIN A1C; Future  -     COMPREHENSIVE METABOLIC PANEL; Future  -     Magnesium; Future    Type 2 diabetes mellitus without complication, without long-term current use of insulin  -     CBC Auto Differential; Future  -     HEMOGLOBIN A1C; Future  -     COMPREHENSIVE METABOLIC PANEL; Future  -     Magnesium; Future    MDD (major depressive disorder), recurrent episode, moderate  -     CBC Auto Differential; Future  -     HEMOGLOBIN A1C; Future  -     COMPREHENSIVE METABOLIC PANEL; Future  -     Magnesium; Future    Stage 3b chronic kidney disease  -     HEMOGLOBIN A1C; Future  -     COMPREHENSIVE METABOLIC PANEL; Future  -     Magnesium; Future    Hypersegmentation of neutrophils  -     HEMOGLOBIN A1C; Future  -     COMPREHENSIVE METABOLIC PANEL; Future  -     Magnesium; Future    Adrenal nodule  -     HEMOGLOBIN A1C; Future  -     COMPREHENSIVE METABOLIC PANEL;  Future  -     Magnesium; Future    Atherosclerosis of aorta  -     COMPREHENSIVE METABOLIC PANEL; Future  -     Magnesium; Future      Skin was marked during the visit. Will start on bactrim  Explained that if cellulitis has not improved with bactrim may need IV dose of antibiotic  Will discuss with PCP     Positive occult blood while in ER having diarrhea  Will repeat    Pt has scheduled appt with PCP at next month   Will get labs today to check WBC       Care Plan/Goals: Reviewed    Goals        Blood Pressure < 130/80      Exercise at least 150 minutes per week.      Take at least one BP reading per week at various times of the day             Follow up: No follow-ups on file.    After visit summary was printed and given to patient upon discharge today.  Patient goals and care plan are included in After Visit Summary.

## 2024-01-24 LAB
ALBUMIN SERPL BCP-MCNC: 3.6 G/DL (ref 3.5–5.2)
ALP SERPL-CCNC: 89 U/L (ref 55–135)
ALT SERPL W/O P-5'-P-CCNC: 8 U/L (ref 10–44)
ANION GAP SERPL CALC-SCNC: 11 MMOL/L (ref 8–16)
AST SERPL-CCNC: 20 U/L (ref 10–40)
BASOPHILS # BLD AUTO: 0.05 K/UL (ref 0–0.2)
BASOPHILS NFR BLD: 0.6 % (ref 0–1.9)
BILIRUB SERPL-MCNC: 0.4 MG/DL (ref 0.1–1)
BUN SERPL-MCNC: 27 MG/DL (ref 8–23)
CALCIUM SERPL-MCNC: 10 MG/DL (ref 8.7–10.5)
CHLORIDE SERPL-SCNC: 103 MMOL/L (ref 95–110)
CO2 SERPL-SCNC: 27 MMOL/L (ref 23–29)
CREAT SERPL-MCNC: 1.2 MG/DL (ref 0.5–1.4)
DIFFERENTIAL METHOD BLD: ABNORMAL
EOSINOPHIL # BLD AUTO: 0.1 K/UL (ref 0–0.5)
EOSINOPHIL NFR BLD: 1.2 % (ref 0–8)
ERYTHROCYTE [DISTWIDTH] IN BLOOD BY AUTOMATED COUNT: 13.6 % (ref 11.5–14.5)
EST. GFR  (NO RACE VARIABLE): 44.6 ML/MIN/1.73 M^2
ESTIMATED AVG GLUCOSE: 126 MG/DL (ref 68–131)
GLUCOSE SERPL-MCNC: 112 MG/DL (ref 70–110)
HBA1C MFR BLD: 6 % (ref 4–5.6)
HCT VFR BLD AUTO: 36.5 % (ref 37–48.5)
HGB BLD-MCNC: 11.4 G/DL (ref 12–16)
IMM GRANULOCYTES # BLD AUTO: 0.04 K/UL (ref 0–0.04)
IMM GRANULOCYTES NFR BLD AUTO: 0.5 % (ref 0–0.5)
LYMPHOCYTES # BLD AUTO: 1.5 K/UL (ref 1–4.8)
LYMPHOCYTES NFR BLD: 19.8 % (ref 18–48)
MAGNESIUM SERPL-MCNC: 1.9 MG/DL (ref 1.6–2.6)
MCH RBC QN AUTO: 27.5 PG (ref 27–31)
MCHC RBC AUTO-ENTMCNC: 31.2 G/DL (ref 32–36)
MCV RBC AUTO: 88 FL (ref 82–98)
MONOCYTES # BLD AUTO: 0.7 K/UL (ref 0.3–1)
MONOCYTES NFR BLD: 8.5 % (ref 4–15)
NEUTROPHILS # BLD AUTO: 5.4 K/UL (ref 1.8–7.7)
NEUTROPHILS NFR BLD: 69.4 % (ref 38–73)
NRBC BLD-RTO: 0 /100 WBC
PLATELET # BLD AUTO: 261 K/UL (ref 150–450)
PMV BLD AUTO: 10.8 FL (ref 9.2–12.9)
POTASSIUM SERPL-SCNC: 3.9 MMOL/L (ref 3.5–5.1)
PROT SERPL-MCNC: 7.3 G/DL (ref 6–8.4)
RBC # BLD AUTO: 4.15 M/UL (ref 4–5.4)
SODIUM SERPL-SCNC: 141 MMOL/L (ref 136–145)
WBC # BLD AUTO: 7.72 K/UL (ref 3.9–12.7)

## 2024-02-06 NOTE — TELEPHONE ENCOUNTER
Called pt regarding below message. Pt decreased back down to only 1 tablet at night and is still taking the morning dose. My apologies.   
Noted.  Chart updated.   
Pt called clinic regarding medication. Pt states she increased her Buspar per Jordon Vinson's recommendation x 2 night ( Wednesday and Thursday night). Pt reports unable to sleep all night and when she did sleep it was not restful. Pt discontinued Buspar on Friday and has been sleeping well. Pt is comfortable with current sleep regimen and will call if anything changes. Pt verbalized understanding with no further questions.     
To confirm, has she completely stopped Buspirone?   
ddx: electrolyte abnormality, anemia, viral illness  nausea, weakness, congestion, sick contacts.   no vomiting, exam wnl.   no cp/sob/rash  likely viral illness.   Reviewed all results and necessity for follow up. Counseled on red flags and to return for them.  Patient appears well on discharge.

## 2024-02-14 ENCOUNTER — OFFICE VISIT (OUTPATIENT)
Dept: INTERNAL MEDICINE | Facility: CLINIC | Age: 84
End: 2024-02-14
Payer: MEDICARE

## 2024-02-14 VITALS
SYSTOLIC BLOOD PRESSURE: 126 MMHG | BODY MASS INDEX: 33.45 KG/M2 | TEMPERATURE: 98 F | WEIGHT: 220.69 LBS | DIASTOLIC BLOOD PRESSURE: 82 MMHG | OXYGEN SATURATION: 96 % | HEART RATE: 102 BPM | HEIGHT: 68 IN

## 2024-02-14 DIAGNOSIS — L03.116 CELLULITIS OF LEFT LOWER EXTREMITY: ICD-10-CM

## 2024-02-14 DIAGNOSIS — N18.32 STAGE 3B CHRONIC KIDNEY DISEASE: ICD-10-CM

## 2024-02-14 DIAGNOSIS — N32.81 OVERACTIVE BLADDER: Chronic | ICD-10-CM

## 2024-02-14 DIAGNOSIS — L03.115 CELLULITIS OF RIGHT LOWER EXTREMITY: Primary | ICD-10-CM

## 2024-02-14 DIAGNOSIS — M72.2 PLANTAR FASCIITIS: ICD-10-CM

## 2024-02-14 PROCEDURE — 99214 OFFICE O/P EST MOD 30 MIN: CPT | Mod: HCNC,S$GLB,, | Performed by: FAMILY MEDICINE

## 2024-02-14 PROCEDURE — 1125F AMNT PAIN NOTED PAIN PRSNT: CPT | Mod: HCNC,CPTII,S$GLB, | Performed by: FAMILY MEDICINE

## 2024-02-14 PROCEDURE — 3074F SYST BP LT 130 MM HG: CPT | Mod: HCNC,CPTII,S$GLB, | Performed by: FAMILY MEDICINE

## 2024-02-14 PROCEDURE — 99999 PR PBB SHADOW E&M-EST. PATIENT-LVL IV: CPT | Mod: PBBFAC,HCNC,, | Performed by: FAMILY MEDICINE

## 2024-02-14 PROCEDURE — 3288F FALL RISK ASSESSMENT DOCD: CPT | Mod: HCNC,CPTII,S$GLB, | Performed by: FAMILY MEDICINE

## 2024-02-14 PROCEDURE — 3079F DIAST BP 80-89 MM HG: CPT | Mod: HCNC,CPTII,S$GLB, | Performed by: FAMILY MEDICINE

## 2024-02-14 PROCEDURE — 1101F PT FALLS ASSESS-DOCD LE1/YR: CPT | Mod: HCNC,CPTII,S$GLB, | Performed by: FAMILY MEDICINE

## 2024-02-14 PROCEDURE — 1159F MED LIST DOCD IN RCRD: CPT | Mod: HCNC,CPTII,S$GLB, | Performed by: FAMILY MEDICINE

## 2024-02-14 NOTE — PROGRESS NOTES
Subjective:       Patient ID: Lizeth Henderson is a 83 y.o. female.    Chief Complaint: Follow-up    Follow-up cellulitis both lower extremities leg swelling chemotherapy-induced peripheral neuropathy heel pain hypertension renal insufficiency.  Clindamycin caused diarrhea.  Diarrhea resolved off clindamycin.  She took Bactrim DS for cellulitis.  She reports she still has some redness and swelling of both lower extremities.  She denies fever chills cough shortness of breath.  He has had recent onset of heel pain.  She denies any changes in physical activity.  She denies chest pain palpitations.  She is chronic renal insufficiency.  She has no longer using ibuprofen.  She is on HCTZ for blood pressure control.    Follow-up  Pertinent negatives include no abdominal pain, chest pain, chills, coughing, fever, headaches, nausea or weakness.     Review of Systems   Constitutional:  Negative for appetite change, chills, fever and unexpected weight change.   Respiratory:  Negative for cough, chest tightness, shortness of breath and wheezing.    Cardiovascular:  Positive for leg swelling. Negative for chest pain and palpitations.   Gastrointestinal:  Negative for abdominal distention, abdominal pain, diarrhea and nausea.   Musculoskeletal:         Heel pain   Neurological:  Negative for dizziness, weakness, light-headedness and headaches.       Objective:      Physical Exam  Constitutional:       General: She is not in acute distress.     Appearance: She is not ill-appearing or diaphoretic.   Cardiovascular:      Rate and Rhythm: Normal rate and regular rhythm.      Heart sounds: No murmur heard.     No gallop.   Pulmonary:      Effort: Pulmonary effort is normal. No respiratory distress.      Breath sounds: No wheezing, rhonchi or rales.   Abdominal:      General: There is no distension.   Musculoskeletal:      Comments: Bilateral mild plantar tenderness no redness swelling or deformity   Lymphadenopathy:      Cervical: No  cervical adenopathy.   Skin:     General: Skin is warm.      Comments: She has some dryness and scaling of both lower extremities.  She has some mild edema and mild tenderness with some mild redness.   Neurological:      Mental Status: She is alert.         Lab Visit on 01/23/2024   Component Date Value Ref Range Status    WBC 01/23/2024 7.72  3.90 - 12.70 K/uL Final    RBC 01/23/2024 4.15  4.00 - 5.40 M/uL Final    Hemoglobin 01/23/2024 11.4 (L)  12.0 - 16.0 g/dL Final    Hematocrit 01/23/2024 36.5 (L)  37.0 - 48.5 % Final    MCV 01/23/2024 88  82 - 98 fL Final    MCH 01/23/2024 27.5  27.0 - 31.0 pg Final    MCHC 01/23/2024 31.2 (L)  32.0 - 36.0 g/dL Final    RDW 01/23/2024 13.6  11.5 - 14.5 % Final    Platelets 01/23/2024 261  150 - 450 K/uL Final    MPV 01/23/2024 10.8  9.2 - 12.9 fL Final    Immature Granulocytes 01/23/2024 0.5  0.0 - 0.5 % Final    Gran # (ANC) 01/23/2024 5.4  1.8 - 7.7 K/uL Final    Immature Grans (Abs) 01/23/2024 0.04  0.00 - 0.04 K/uL Final    Lymph # 01/23/2024 1.5  1.0 - 4.8 K/uL Final    Mono # 01/23/2024 0.7  0.3 - 1.0 K/uL Final    Eos # 01/23/2024 0.1  0.0 - 0.5 K/uL Final    Baso # 01/23/2024 0.05  0.00 - 0.20 K/uL Final    nRBC 01/23/2024 0  0 /100 WBC Final    Gran % 01/23/2024 69.4  38.0 - 73.0 % Final    Lymph % 01/23/2024 19.8  18.0 - 48.0 % Final    Mono % 01/23/2024 8.5  4.0 - 15.0 % Final    Eosinophil % 01/23/2024 1.2  0.0 - 8.0 % Final    Basophil % 01/23/2024 0.6  0.0 - 1.9 % Final    Differential Method 01/23/2024 Automated   Final    Hemoglobin A1C 01/23/2024 6.0 (H)  4.0 - 5.6 % Final    Estimated Avg Glucose 01/23/2024 126  68 - 131 mg/dL Final    Sodium 01/23/2024 141  136 - 145 mmol/L Final    Potassium 01/23/2024 3.9  3.5 - 5.1 mmol/L Final    Chloride 01/23/2024 103  95 - 110 mmol/L Final    CO2 01/23/2024 27  23 - 29 mmol/L Final    Glucose 01/23/2024 112 (H)  70 - 110 mg/dL Final    BUN 01/23/2024 27 (H)  8 - 23 mg/dL Final    Creatinine 01/23/2024 1.2  0.5 - 1.4  mg/dL Final    Calcium 01/23/2024 10.0  8.7 - 10.5 mg/dL Final    Total Protein 01/23/2024 7.3  6.0 - 8.4 g/dL Final    Albumin 01/23/2024 3.6  3.5 - 5.2 g/dL Final    Total Bilirubin 01/23/2024 0.4  0.1 - 1.0 mg/dL Final    Alkaline Phosphatase 01/23/2024 89  55 - 135 U/L Final    AST 01/23/2024 20  10 - 40 U/L Final    ALT 01/23/2024 8 (L)  10 - 44 U/L Final    eGFR 01/23/2024 44.6 (A)  >60 mL/min/1.73 m^2 Final    Anion Gap 01/23/2024 11  8 - 16 mmol/L Final    Magnesium 01/23/2024 1.9  1.6 - 2.6 mg/dL Final     Assessment:       1. Cellulitis of right lower extremity    2. Cellulitis of left lower extremity    3. Stage 3b chronic kidney disease    4. Overactive bladder    5. Plantar fasciitis        Plan:   Blood pressure controlled.  CBC CMP recently done was reviewed.  Will hold HCTZ and recheck renal function return.  Moisturize lower extremities continue CeraVe cream.  Leg elevation.  Discuss supportive shoes and ice massage for plantar fasciitis.  Follow-up one-month.      Cellulitis of right lower extremity    Cellulitis of left lower extremity    Stage 3b chronic kidney disease    Overactive bladder    Plantar fasciitis

## 2024-02-16 ENCOUNTER — TELEPHONE (OUTPATIENT)
Dept: INTERNAL MEDICINE | Facility: CLINIC | Age: 84
End: 2024-02-16
Payer: MEDICARE

## 2024-02-16 NOTE — TELEPHONE ENCOUNTER
Returned call spoke to jeferson informed to continue treatment as discussed by Dr Euceda and follow up in 1 month.

## 2024-02-16 NOTE — TELEPHONE ENCOUNTER
----- Message from Dejan Bose sent at 2/16/2024 12:28 PM CST -----  Contact: ruba/daughter  Ruba is calling reporting her mothers legs are starting to become red and swollen again. She is requesting a call back at .[phone]

## 2024-02-16 NOTE — TELEPHONE ENCOUNTER
States she is using moisturizer on legs and now they are red and swollen. She is requesting another round of ABX. Please advise. Thank you

## 2024-03-02 ENCOUNTER — HOSPITAL ENCOUNTER (INPATIENT)
Facility: HOSPITAL | Age: 84
LOS: 3 days | Discharge: HOME | DRG: 603 | End: 2024-03-05
Attending: EMERGENCY MEDICINE | Admitting: FAMILY MEDICINE
Payer: MEDICARE

## 2024-03-02 DIAGNOSIS — L03.116 BILATERAL LOWER LEG CELLULITIS: Primary | ICD-10-CM

## 2024-03-02 DIAGNOSIS — L03.115 BILATERAL LOWER LEG CELLULITIS: Primary | ICD-10-CM

## 2024-03-02 DIAGNOSIS — R07.9 CHEST PAIN: ICD-10-CM

## 2024-03-02 DIAGNOSIS — R60.0 BILATERAL LOWER EXTREMITY EDEMA: ICD-10-CM

## 2024-03-02 LAB
ALBUMIN SERPL BCP-MCNC: 3.5 G/DL (ref 3.5–5.2)
ALP SERPL-CCNC: 81 U/L (ref 55–135)
ALT SERPL W/O P-5'-P-CCNC: 5 U/L (ref 10–44)
ANION GAP SERPL CALC-SCNC: 8 MMOL/L (ref 8–16)
AST SERPL-CCNC: 17 U/L (ref 10–40)
BASOPHILS # BLD AUTO: 0.03 K/UL (ref 0–0.2)
BASOPHILS NFR BLD: 0.4 % (ref 0–1.9)
BILIRUB SERPL-MCNC: 0.4 MG/DL (ref 0.1–1)
BNP SERPL-MCNC: 19 PG/ML (ref 0–99)
BUN SERPL-MCNC: 19 MG/DL (ref 8–23)
CALCIUM SERPL-MCNC: 9.4 MG/DL (ref 8.7–10.5)
CHLORIDE SERPL-SCNC: 107 MMOL/L (ref 95–110)
CK SERPL-CCNC: 130 U/L (ref 20–180)
CO2 SERPL-SCNC: 27 MMOL/L (ref 23–29)
CREAT SERPL-MCNC: 1.1 MG/DL (ref 0.5–1.4)
CRP SERPL-MCNC: 12 MG/L (ref 0–8.2)
DIFFERENTIAL METHOD BLD: ABNORMAL
EOSINOPHIL # BLD AUTO: 0.1 K/UL (ref 0–0.5)
EOSINOPHIL NFR BLD: 1.3 % (ref 0–8)
ERYTHROCYTE [DISTWIDTH] IN BLOOD BY AUTOMATED COUNT: 14 % (ref 11.5–14.5)
ERYTHROCYTE [SEDIMENTATION RATE] IN BLOOD BY WESTERGREN METHOD: 45 MM/HR (ref 0–20)
EST. GFR  (NO RACE VARIABLE): 50 ML/MIN/1.73 M^2
GLUCOSE SERPL-MCNC: 93 MG/DL (ref 70–110)
HCT VFR BLD AUTO: 28.5 % (ref 37–48.5)
HGB BLD-MCNC: 9.1 G/DL (ref 12–16)
IMM GRANULOCYTES # BLD AUTO: 0.04 K/UL (ref 0–0.04)
IMM GRANULOCYTES NFR BLD AUTO: 0.6 % (ref 0–0.5)
LACTATE SERPL-SCNC: 1 MMOL/L (ref 0.5–2.2)
LYMPHOCYTES # BLD AUTO: 1.4 K/UL (ref 1–4.8)
LYMPHOCYTES NFR BLD: 21.3 % (ref 18–48)
MCH RBC QN AUTO: 27.7 PG (ref 27–31)
MCHC RBC AUTO-ENTMCNC: 31.9 G/DL (ref 32–36)
MCV RBC AUTO: 87 FL (ref 82–98)
MONOCYTES # BLD AUTO: 0.6 K/UL (ref 0.3–1)
MONOCYTES NFR BLD: 8.8 % (ref 4–15)
NEUTROPHILS # BLD AUTO: 4.5 K/UL (ref 1.8–7.7)
NEUTROPHILS NFR BLD: 67.6 % (ref 38–73)
NRBC BLD-RTO: 0 /100 WBC
PLATELET # BLD AUTO: 235 K/UL (ref 150–450)
PMV BLD AUTO: 9.5 FL (ref 9.2–12.9)
POTASSIUM SERPL-SCNC: 4.1 MMOL/L (ref 3.5–5.1)
PROCALCITONIN SERPL IA-MCNC: 0.05 NG/ML
PROT SERPL-MCNC: 6.9 G/DL (ref 6–8.4)
RBC # BLD AUTO: 3.29 M/UL (ref 4–5.4)
SODIUM SERPL-SCNC: 142 MMOL/L (ref 136–145)
TROPONIN I SERPL DL<=0.01 NG/ML-MCNC: <0.006 NG/ML (ref 0–0.03)
WBC # BLD AUTO: 6.68 K/UL (ref 3.9–12.7)

## 2024-03-02 PROCEDURE — 93010 ELECTROCARDIOGRAM REPORT: CPT | Mod: HCNC,,, | Performed by: INTERNAL MEDICINE

## 2024-03-02 PROCEDURE — 82550 ASSAY OF CK (CPK): CPT | Mod: HCNC | Performed by: EMERGENCY MEDICINE

## 2024-03-02 PROCEDURE — 25000003 PHARM REV CODE 250: Mod: HCNC

## 2024-03-02 PROCEDURE — 36415 COLL VENOUS BLD VENIPUNCTURE: CPT | Mod: HCNC | Performed by: EMERGENCY MEDICINE

## 2024-03-02 PROCEDURE — 83880 ASSAY OF NATRIURETIC PEPTIDE: CPT | Mod: HCNC | Performed by: EMERGENCY MEDICINE

## 2024-03-02 PROCEDURE — 84484 ASSAY OF TROPONIN QUANT: CPT | Mod: HCNC | Performed by: EMERGENCY MEDICINE

## 2024-03-02 PROCEDURE — 63600175 PHARM REV CODE 636 W HCPCS: Mod: HCNC | Performed by: NURSE PRACTITIONER

## 2024-03-02 PROCEDURE — 80053 COMPREHEN METABOLIC PANEL: CPT | Mod: HCNC | Performed by: EMERGENCY MEDICINE

## 2024-03-02 PROCEDURE — 63600175 PHARM REV CODE 636 W HCPCS: Mod: HCNC

## 2024-03-02 PROCEDURE — 21400001 HC TELEMETRY ROOM: Mod: HCNC

## 2024-03-02 PROCEDURE — 85025 COMPLETE CBC W/AUTO DIFF WBC: CPT | Mod: HCNC | Performed by: EMERGENCY MEDICINE

## 2024-03-02 PROCEDURE — 87040 BLOOD CULTURE FOR BACTERIA: CPT | Mod: HCNC | Performed by: EMERGENCY MEDICINE

## 2024-03-02 PROCEDURE — 25000003 PHARM REV CODE 250: Mod: HCNC | Performed by: NURSE PRACTITIONER

## 2024-03-02 PROCEDURE — 86140 C-REACTIVE PROTEIN: CPT | Mod: HCNC | Performed by: EMERGENCY MEDICINE

## 2024-03-02 PROCEDURE — 93005 ELECTROCARDIOGRAM TRACING: CPT | Mod: HCNC

## 2024-03-02 PROCEDURE — 85651 RBC SED RATE NONAUTOMATED: CPT | Mod: HCNC | Performed by: EMERGENCY MEDICINE

## 2024-03-02 PROCEDURE — 83605 ASSAY OF LACTIC ACID: CPT | Mod: HCNC | Performed by: EMERGENCY MEDICINE

## 2024-03-02 PROCEDURE — 84145 PROCALCITONIN (PCT): CPT | Mod: HCNC | Performed by: EMERGENCY MEDICINE

## 2024-03-02 PROCEDURE — 99285 EMERGENCY DEPT VISIT HI MDM: CPT | Mod: 25,HCNC

## 2024-03-02 RX ORDER — CEFAZOLIN SODIUM 2 G/50ML
2 SOLUTION INTRAVENOUS
Status: DISCONTINUED | OUTPATIENT
Start: 2024-03-02 | End: 2024-03-02

## 2024-03-02 RX ORDER — IBUPROFEN 200 MG
24 TABLET ORAL
Status: DISCONTINUED | OUTPATIENT
Start: 2024-03-02 | End: 2024-03-05 | Stop reason: HOSPADM

## 2024-03-02 RX ORDER — INSULIN ASPART 100 [IU]/ML
0-5 INJECTION, SOLUTION INTRAVENOUS; SUBCUTANEOUS
Status: DISCONTINUED | OUTPATIENT
Start: 2024-03-02 | End: 2024-03-05 | Stop reason: HOSPADM

## 2024-03-02 RX ORDER — TALC
6 POWDER (GRAM) TOPICAL NIGHTLY PRN
Status: DISCONTINUED | OUTPATIENT
Start: 2024-03-02 | End: 2024-03-05 | Stop reason: HOSPADM

## 2024-03-02 RX ORDER — PRAMIPEXOLE DIHYDROCHLORIDE 0.12 MG/1
0.12 TABLET ORAL NIGHTLY
Status: DISCONTINUED | OUTPATIENT
Start: 2024-03-02 | End: 2024-03-05 | Stop reason: HOSPADM

## 2024-03-02 RX ORDER — ACETAMINOPHEN 325 MG/1
650 TABLET ORAL EVERY 4 HOURS PRN
Status: DISCONTINUED | OUTPATIENT
Start: 2024-03-02 | End: 2024-03-05 | Stop reason: HOSPADM

## 2024-03-02 RX ORDER — SODIUM CHLORIDE 0.9 % (FLUSH) 0.9 %
10 SYRINGE (ML) INJECTION EVERY 12 HOURS PRN
Status: DISCONTINUED | OUTPATIENT
Start: 2024-03-02 | End: 2024-03-05 | Stop reason: HOSPADM

## 2024-03-02 RX ORDER — ONDANSETRON HYDROCHLORIDE 2 MG/ML
4 INJECTION, SOLUTION INTRAVENOUS EVERY 8 HOURS PRN
Status: DISCONTINUED | OUTPATIENT
Start: 2024-03-02 | End: 2024-03-05 | Stop reason: HOSPADM

## 2024-03-02 RX ORDER — GLUCAGON 1 MG
1 KIT INJECTION
Status: DISCONTINUED | OUTPATIENT
Start: 2024-03-02 | End: 2024-03-05 | Stop reason: HOSPADM

## 2024-03-02 RX ORDER — LOSARTAN POTASSIUM 50 MG/1
100 TABLET ORAL DAILY
Status: DISCONTINUED | OUTPATIENT
Start: 2024-03-03 | End: 2024-03-05 | Stop reason: HOSPADM

## 2024-03-02 RX ORDER — BUSPIRONE HYDROCHLORIDE 10 MG/1
20 TABLET ORAL 2 TIMES DAILY
Status: DISCONTINUED | OUTPATIENT
Start: 2024-03-02 | End: 2024-03-05 | Stop reason: HOSPADM

## 2024-03-02 RX ORDER — HYDROCODONE BITARTRATE AND ACETAMINOPHEN 5; 325 MG/1; MG/1
1 TABLET ORAL EVERY 6 HOURS PRN
Status: DISCONTINUED | OUTPATIENT
Start: 2024-03-02 | End: 2024-03-05 | Stop reason: HOSPADM

## 2024-03-02 RX ORDER — NALOXONE HCL 0.4 MG/ML
0.02 VIAL (ML) INJECTION
Status: DISCONTINUED | OUTPATIENT
Start: 2024-03-02 | End: 2024-03-05 | Stop reason: HOSPADM

## 2024-03-02 RX ORDER — ASPIRIN 81 MG/1
81 TABLET ORAL DAILY
Status: DISCONTINUED | OUTPATIENT
Start: 2024-03-03 | End: 2024-03-05 | Stop reason: HOSPADM

## 2024-03-02 RX ORDER — ATORVASTATIN CALCIUM 10 MG/1
10 TABLET, FILM COATED ORAL NIGHTLY
Status: DISCONTINUED | OUTPATIENT
Start: 2024-03-02 | End: 2024-03-05 | Stop reason: HOSPADM

## 2024-03-02 RX ORDER — ENOXAPARIN SODIUM 100 MG/ML
40 INJECTION SUBCUTANEOUS EVERY 24 HOURS
Status: DISCONTINUED | OUTPATIENT
Start: 2024-03-02 | End: 2024-03-05 | Stop reason: HOSPADM

## 2024-03-02 RX ORDER — IBUPROFEN 200 MG
16 TABLET ORAL
Status: DISCONTINUED | OUTPATIENT
Start: 2024-03-02 | End: 2024-03-05 | Stop reason: HOSPADM

## 2024-03-02 RX ORDER — BUPROPION HYDROCHLORIDE 150 MG/1
150 TABLET ORAL DAILY
Status: DISCONTINUED | OUTPATIENT
Start: 2024-03-03 | End: 2024-03-05 | Stop reason: HOSPADM

## 2024-03-02 RX ORDER — AMOXICILLIN 250 MG
1 CAPSULE ORAL 2 TIMES DAILY
Status: DISCONTINUED | OUTPATIENT
Start: 2024-03-02 | End: 2024-03-05 | Stop reason: HOSPADM

## 2024-03-02 RX ORDER — ALPRAZOLAM 0.25 MG/1
0.25 TABLET ORAL 2 TIMES DAILY PRN
Status: DISCONTINUED | OUTPATIENT
Start: 2024-03-02 | End: 2024-03-05 | Stop reason: HOSPADM

## 2024-03-02 RX ORDER — ESCITALOPRAM OXALATE 10 MG/1
10 TABLET ORAL DAILY
Status: DISCONTINUED | OUTPATIENT
Start: 2024-03-03 | End: 2024-03-05 | Stop reason: HOSPADM

## 2024-03-02 RX ADMIN — CEFAZOLIN SODIUM 2 G: 2 SOLUTION INTRAVENOUS at 03:03

## 2024-03-02 RX ADMIN — ENOXAPARIN SODIUM 40 MG: 40 INJECTION SUBCUTANEOUS at 05:03

## 2024-03-02 RX ADMIN — PRAMIPEXOLE DIHYDROCHLORIDE 0.12 MG: 0.12 TABLET ORAL at 09:03

## 2024-03-02 RX ADMIN — DOCUSATE SODIUM AND SENNOSIDES 1 TABLET: 8.6; 5 TABLET ORAL at 09:03

## 2024-03-02 RX ADMIN — ATORVASTATIN CALCIUM 10 MG: 10 TABLET, FILM COATED ORAL at 09:03

## 2024-03-02 RX ADMIN — CEFEPIME 1 G: 1 INJECTION, POWDER, FOR SOLUTION INTRAMUSCULAR; INTRAVENOUS at 09:03

## 2024-03-02 RX ADMIN — BUSPIRONE HYDROCHLORIDE 20 MG: 10 TABLET ORAL at 09:03

## 2024-03-02 NOTE — ASSESSMENT & PLAN NOTE
Creatine stable for now. BMP reviewed- noted Estimated Creatinine Clearance: 47.4 mL/min (based on SCr of 1.1 mg/dL). according to latest data. Based on current GFR, CKD stage is stage 3 - GFR 30-59.  Monitor UOP and serial BMP and adjust therapy as needed. Renally dose meds. Avoid nephrotoxic medications and procedures.

## 2024-03-02 NOTE — ED PROVIDER NOTES
SCRIBE #1 NOTE: I, Marin Rhodes, am scribing for, and in the presence of, Karyn Hicks MD. I have scribed the entire note.       History     Chief Complaint   Patient presents with    Cellulitis     States hx of cellulitis that keeps coming back to right lower leg. Also to left leg. No antibiotics at present.      Review of patient's allergies indicates:   Allergen Reactions    Clindamycin      Diarrhea      Lisinopril      cough         History of Present Illness     HPI    3/2/2024, 1:51 PM  History obtained from the patient      History of Present Illness: Lizeth Henderson is a 83 y.o. female patient with a PMHx of colon cancer, HTN, HLD, depression, CAD, shingles, osteopenia, and adrenal adenoma who presents to the Emergency Department for evaluation of possible cellulitis to bilateral lower legs which onset gradually since early January of this year. Pt was on an abx initially that she was allergic to. Pt's PCP put pt on a different abx, which the pt has finished with sxs still present. Symptoms are constant and moderate in severity. No mitigating or exacerbating factors reported. No associated sxs. Patient denies any weakness, abd pain, SOB, fever, and all other sxs at this time. Pt has a podiatrist. Pt utilizes a walker. Pt does not have a dermatologist. Contrary to PMHx records, pt denies a hx of diabetes mellitus. No further complaints or concerns at this time.       Arrival mode: Personal vehicle    PCP: Lazarus Euceda MD        Past Medical History:  Past Medical History:   Diagnosis Date    Adrenal adenoma 2016    Anxiety     Arthritis     Benign hypertension     Colon cancer age 61    colon    Coronary artery disease     Depression     Full dentures     General anesthetics causing adverse effect in therapeutic use     yells and talks when wakes up    Hyperlipidemia     Osteopenia     Shingles     Type 2 diabetes mellitus without complication, without long-term current use of insulin 1/23/2024     Wears glasses        Past Surgical History:  Past Surgical History:   Procedure Laterality Date    APPENDECTOMY      BREAST BIOPSY Left     benign    BUNIONECTOMY Left     CATARACT EXTRACTION Bilateral     COLON SURGERY      2001    EYE SURGERY      cataract surg    HEMICOLECTOMY  2002    HERNIA REPAIR      x5    JOINT REPLACEMENT      knee    left toe surgery      hammertoe    PATENT DUCTUS ARTERIOUS LIGATION  1948    SALPINGOOPHORECTOMY  2002    due to colon cancer    TONSILLECTOMY, ADENOIDECTOMY           Family History:  Family History   Problem Relation Age of Onset    Cancer Father     Hypertension Father     Arthritis Father     Alzheimer's disease Mother     Arthritis Mother     Depression Daughter     Kidney disease Daughter     Ulcerative colitis Daughter     Depression Daughter     Depression Daughter     Anxiety disorder Daughter         PTSD    Cancer Maternal Uncle     Cancer Cousin         colon cancer    Early death Maternal Grandmother     Amblyopia Neg Hx     Blindness Neg Hx     Cataracts Neg Hx     Diabetes Neg Hx     Glaucoma Neg Hx     Macular degeneration Neg Hx     Retinal detachment Neg Hx     Strabismus Neg Hx     Stroke Neg Hx     Thyroid disease Neg Hx        Social History:  Social History     Tobacco Use    Smoking status: Never     Passive exposure: Never    Smokeless tobacco: Never   Substance and Sexual Activity    Alcohol use: No    Drug use: No    Sexual activity: Never        Review of Systems     Review of Systems   Constitutional:  Negative for chills and fever.   HENT:  Negative for congestion and sore throat.    Respiratory:  Negative for cough and shortness of breath.    Cardiovascular:  Negative for chest pain.        (+) bilateral feet swelling   Gastrointestinal:  Negative for abdominal pain, nausea and vomiting.   Genitourinary:  Negative for dysuria.   Musculoskeletal:  Negative for back pain.   Skin:  Negative for rash.        (+) skin crusting and redness to  bilateral to lower legs   Neurological:  Negative for dizziness, weakness, light-headedness, numbness and headaches.   Hematological:  Does not bruise/bleed easily.   All other systems reviewed and are negative.       Physical Exam     Initial Vitals [03/02/24 1316]   BP Pulse Resp Temp SpO2   (!) 150/71 86 16 98.1 °F (36.7 °C) 99 %      MAP       --          Physical Exam  Nursing Notes and Vital Signs Reviewed.  Constitutional: Patient is in no acute distress. Well-developed and well-nourished.  Head: Atraumatic. Normocephalic.  Eyes: PERRL. EOM intact. Conjunctivae are not pale. No scleral icterus.  ENT: Mucous membranes are moist. Oropharynx is clear and symmetric.    Neck: Supple. Full ROM. No lymphadenopathy.  Cardiovascular: Regular rate. Regular rhythm. No murmurs, rubs, or gallops. Distal pulses are 2+ and symmetric.  Pulmonary/Chest: No respiratory distress. Clear to auscultation bilaterally. No wheezing or rales.  Abdominal: Soft and non-distended.  There is no tenderness.  No rebound, guarding, or rigidity. Good bowel sounds.  Genitourinary: No CVA tenderness  Musculoskeletal: Moves all extremities. No obvious deformities. No edema. No calf tenderness.   Skin: Warm and dry. Chronic edema stasis  dermatits to bilateral legs with erythema to bilateral legs with erythema. 2+ pedal edema. Warmth and crusting of skin. No obvious abscesses. Good pulses in feet. Neurovascularly intact. See picture below.  Neurological:  Alert, awake, and appropriate.  Normal speech.  No acute focal neurological deficits are appreciated.  Psychiatric: Normal affect. Good eye contact. Appropriate in content.                 ED Course   Procedures  ED Vital Signs:  Vitals:    03/02/24 1316   BP: (!) 150/71   Pulse: 86   Resp: 16   Temp: 98.1 °F (36.7 °C)   TempSrc: Oral   SpO2: 99%   Weight: 101.3 kg (223 lb 5.2 oz)       Abnormal Lab Results:  Labs Reviewed   CBC W/ AUTO DIFFERENTIAL - Abnormal; Notable for the following  components:       Result Value    RBC 3.29 (*)     Hemoglobin 9.1 (*)     Hematocrit 28.5 (*)     MCHC 31.9 (*)     Immature Granulocytes 0.6 (*)     All other components within normal limits   COMPREHENSIVE METABOLIC PANEL - Abnormal; Notable for the following components:    ALT 5 (*)     eGFR 50 (*)     All other components within normal limits   C-REACTIVE PROTEIN - Abnormal; Notable for the following components:    CRP 12.0 (*)     All other components within normal limits   SEDIMENTATION RATE - Abnormal; Notable for the following components:    Sed Rate 45 (*)     All other components within normal limits   CULTURE, BLOOD   CULTURE, BLOOD   TROPONIN I   B-TYPE NATRIURETIC PEPTIDE   CK   PROCALCITONIN   LACTIC ACID, PLASMA        All Lab Results:  Results for orders placed or performed during the hospital encounter of 03/02/24   CBC auto differential   Result Value Ref Range    WBC 6.68 3.90 - 12.70 K/uL    RBC 3.29 (L) 4.00 - 5.40 M/uL    Hemoglobin 9.1 (L) 12.0 - 16.0 g/dL    Hematocrit 28.5 (L) 37.0 - 48.5 %    MCV 87 82 - 98 fL    MCH 27.7 27.0 - 31.0 pg    MCHC 31.9 (L) 32.0 - 36.0 g/dL    RDW 14.0 11.5 - 14.5 %    Platelets 235 150 - 450 K/uL    MPV 9.5 9.2 - 12.9 fL    Immature Granulocytes 0.6 (H) 0.0 - 0.5 %    Gran # (ANC) 4.5 1.8 - 7.7 K/uL    Immature Grans (Abs) 0.04 0.00 - 0.04 K/uL    Lymph # 1.4 1.0 - 4.8 K/uL    Mono # 0.6 0.3 - 1.0 K/uL    Eos # 0.1 0.0 - 0.5 K/uL    Baso # 0.03 0.00 - 0.20 K/uL    nRBC 0 0 /100 WBC    Gran % 67.6 38.0 - 73.0 %    Lymph % 21.3 18.0 - 48.0 %    Mono % 8.8 4.0 - 15.0 %    Eosinophil % 1.3 0.0 - 8.0 %    Basophil % 0.4 0.0 - 1.9 %    Differential Method Automated    Comprehensive metabolic panel   Result Value Ref Range    Sodium 142 136 - 145 mmol/L    Potassium 4.1 3.5 - 5.1 mmol/L    Chloride 107 95 - 110 mmol/L    CO2 27 23 - 29 mmol/L    Glucose 93 70 - 110 mg/dL    BUN 19 8 - 23 mg/dL    Creatinine 1.1 0.5 - 1.4 mg/dL    Calcium 9.4 8.7 - 10.5 mg/dL    Total  Protein 6.9 6.0 - 8.4 g/dL    Albumin 3.5 3.5 - 5.2 g/dL    Total Bilirubin 0.4 0.1 - 1.0 mg/dL    Alkaline Phosphatase 81 55 - 135 U/L    AST 17 10 - 40 U/L    ALT 5 (L) 10 - 44 U/L    eGFR 50 (A) >60 mL/min/1.73 m^2    Anion Gap 8 8 - 16 mmol/L   Troponin I #1   Result Value Ref Range    Troponin I <0.006 0.000 - 0.026 ng/mL   BNP   Result Value Ref Range    BNP 19 0 - 99 pg/mL   CPK   Result Value Ref Range     20 - 180 U/L   C-reactive protein   Result Value Ref Range    CRP 12.0 (H) 0.0 - 8.2 mg/L   Sedimentation rate   Result Value Ref Range    Sed Rate 45 (H) 0 - 20 mm/Hr   Procalcitonin   Result Value Ref Range    Procalcitonin 0.05 <0.25 ng/mL   Lactic acid, plasma   Result Value Ref Range    Lactate (Lactic Acid) 1.0 0.5 - 2.2 mmol/L        Imaging Results:  Imaging Results              US Lower Extremity Veins Bilateral (Final result)  Result time 03/02/24 14:41:11      Final result by Gentry Coffey MD (03/02/24 14:41:11)                   Impression:      Negative for DVT.      Electronically signed by: Gentry Coffey MD  Date:    03/02/2024  Time:    14:41               Narrative:    EXAMINATION:  US LOWER EXTREMITY VEINS BILATERAL    CLINICAL HISTORY:  Localized edema    COMPARISON:  None    FINDINGS:  Duplex and color flow imaging with spectral wave analysis performed.    The veins demonstrate normal compressibility and phasicity. No evidence of filling defect.                                       X-Ray Chest AP Portable (Final result)  Result time 03/02/24 13:59:28      Final result by Gentry Coffey MD (03/02/24 13:59:28)                   Impression:      No acute findings.      Electronically signed by: Gentry Coffey MD  Date:    03/02/2024  Time:    13:59               Narrative:    EXAMINATION:  XR CHEST AP PORTABLE    CLINICAL HISTORY:  Pulmonary edema, edema;    COMPARISON:  03/11/2022 x-ray.    FINDINGS:  Normal heart size with mild aortic  atherosclerosis.    Clear lungs.    Bilateral shoulder arthrosis.                                       The EKG was ordered, reviewed, and independently interpreted by the ED provider.  Interpretation time: 14:00  Rate: 91 BPM  Rhythm: normal sinus rhythm  Interpretation: No acute ST changes. No STEMI.           The Emergency Provider reviewed the vital signs and test results, which are outlined above.     ED Discussion       3:09 PM: Discussed case with Yaneli Barfield NP (Hospital Medicine). Dr. Yeager agrees with current care and management of pt and accepts admission.   Admitting Service:   Admitting Physician: Dr. Yeager   Admit to: Inpatient Med Surg     3:09 PM: Re-evaluated pt. I have discussed test results, shared treatment plan, and the need for admission with patient and family at bedside. Pt and family express understanding at this time and agree with all information. All questions answered. Pt and family have no further questions or concerns at this time. Pt is ready for admit.        Medical Decision Making  DDX:  1. Cellulitis  2. DVT  3. Sepsis  4. Venous stasis changes    Patient with recurrent bilateral lower ext cellulitis, multiple failed outpatient po antibiotics, no systemic symptoms, US negative for DVT, VSS, sepsis markers negative, crp and sed rate mildly elevated, WBC normal, overall feel she would benefit from admission for iv antibiotics and wound care, hosp med consulted for admission.     Amount and/or Complexity of Data Reviewed  External Data Reviewed: notes.     Details: Seen by PCP in Jan and completed coarse of clinda but developed dirrhea so stopped then finished coarse of bactrim with no improvement, has not seen derm  Labs: ordered. Decision-making details documented in ED Course.  Radiology: ordered. Decision-making details documented in ED Course.  ECG/medicine tests: ordered and independent interpretation performed. Decision-making details documented in ED  Course.    Risk  Prescription drug management.  Decision regarding hospitalization.                ED Medication(s):  Medications   cefazolin (ANCEF) 2 gram in dextrose 5% 50 mL IVPB (premix) (has no administration in time range)   ALPRAZolam tablet 0.25 mg (has no administration in time range)   aspirin EC tablet 81 mg (has no administration in time range)   atorvastatin tablet 10 mg (has no administration in time range)   buPROPion TB24 tablet 150 mg (has no administration in time range)   busPIRone tablet 20 mg (has no administration in time range)   EScitalopram oxalate tablet 10 mg (has no administration in time range)   losartan tablet 100 mg (has no administration in time range)   pramipexole tablet 0.125 mg (has no administration in time range)   sodium chloride 0.9% flush 10 mL (has no administration in time range)   naloxone 0.4 mg/mL injection 0.02 mg (has no administration in time range)   glucose chewable tablet 16 g (has no administration in time range)   glucose chewable tablet 24 g (has no administration in time range)   glucagon (human recombinant) injection 1 mg (has no administration in time range)   enoxaparin injection 40 mg (has no administration in time range)   acetaminophen tablet 650 mg (has no administration in time range)   HYDROcodone-acetaminophen 5-325 mg per tablet 1 tablet (has no administration in time range)   senna-docusate 8.6-50 mg per tablet 1 tablet (has no administration in time range)   ondansetron injection 4 mg (has no administration in time range)   insulin aspart U-100 pen 0-5 Units (has no administration in time range)   melatonin tablet 6 mg (has no administration in time range)   dextrose 10% bolus 125 mL 125 mL (has no administration in time range)   dextrose 10% bolus 250 mL 250 mL (has no administration in time range)   vancomycin - pharmacy to dose (has no administration in time range)       New Prescriptions    No medications on file               Scribe  Attestation:   Scribe #1: I performed the above scribed service and the documentation accurately describes the services I performed. I attest to the accuracy of the note.     Attending:   Physician Attestation Statement for Scribe #1: I, Karyn Hicks MD, personally performed the services described in this documentation, as scribed by Marin Rhodes, in my presence, and it is both accurate and complete.           Clinical Impression       ICD-10-CM ICD-9-CM   1. Bilateral lower leg cellulitis  L03.116 682.6    L03.115    2. Bilateral lower extremity edema  R60.0 782.3   3. Chest pain  R07.9 786.50       Disposition:   Disposition: Admitted  Condition: Fair        Karyn Hicks MD  03/02/24 1539

## 2024-03-02 NOTE — PHARMACY MED REC
"Admission Medication History     The home medication history was taken by Destin Cordova.    You may go to "Admission" then "Reconcile Home Medications" tabs to review and/or act upon these items.     The home medication list has been updated by the Pharmacy department.   Please read ALL comments highlighted in yellow.   Please address this information as you see fit.    Feel free to contact us if you have any questions or require assistance.    Medications listed below were obtained from: Analytic software- [employer] and Medical records  (Not in a hospital admission)        Destin Cordova  [id number]    Current Outpatient Medications on File Prior to Encounter   Medication Sig Dispense Refill Last Dose    ALPRAZolam (XANAX) 0.25 MG tablet TAKE 1/2 TO 1 TABLET BY MOUTH DAILY AS NEEDED FOR ANXIETY 20 tablet 3     aspirin (ECOTRIN) 81 MG EC tablet Take 81 mg by mouth once daily.       atorvastatin (LIPITOR) 10 MG tablet TAKE 1 TABLET(10 MG) BY MOUTH EVERY DAY 90 tablet 1     buPROPion (WELLBUTRIN XL) 150 MG TB24 tablet Take 1 tablet (150 mg total) by mouth once daily. 90 tablet 3     busPIRone (BUSPAR) 10 MG tablet Take 2 tablets (20 mg total) by mouth 2 (two) times daily. 120 tablet 3     ergocalciferol (ERGOCALCIFEROL) 50,000 unit Cap Take 1 capsule (50,000 Units total) by mouth every 7 days. 12 capsule 3     EScitalopram oxalate (LEXAPRO) 10 MG tablet Take 1 tablet (10 mg total) by mouth once daily. 90 tablet 1     hydroCHLOROthiazide (HYDRODIURIL) 12.5 MG Tab Take 1 tablet (12.5 mg total) by mouth once daily. 90 tablet 3     ibandronate (BONIVA) 150 mg tablet Take 1 tablet (150 mg total) by mouth every 30 days. 3 tablet 3     losartan (COZAAR) 100 MG tablet Take 1 tablet (100 mg total) by mouth once daily. 90 tablet 3     MYRBETRIQ 50 mg Tb24 TAKE 1 TABLET(50 MG) BY MOUTH EVERY DAY 90 tablet 0     pramipexole (MIRAPEX) 0.125 MG tablet TAKE 1 TABLET BY MOUTH EVERY EVENING 90 tablet 1     " sulfamethoxazole-trimethoprim 800-160mg (BACTRIM DS) 800-160 mg Tab Take 1 tablet by mouth 2 (two) times daily. 20 tablet 0     suvorexant (BELSOMRA) 10 mg Tab Take 1 tablet by mouth every evening. 30 tablet 3                            .

## 2024-03-02 NOTE — ASSESSMENT & PLAN NOTE
Chronic, controlled. Latest blood pressure and vitals reviewed-     Temp:  [98.1 °F (36.7 °C)]   Pulse:  [86-93]   Resp:  [16-20]   BP: (140-150)/(71-84)   SpO2:  [99 %] .   Home meds for hypertension were reviewed and noted below.   Hypertension Medications               losartan (COZAAR) 100 MG tablet Take 1 tablet (100 mg total) by mouth once daily.            While in the hospital, will manage blood pressure as follows; Continue home antihypertensive regimen    Will utilize p.r.n. blood pressure medication only if patient's blood pressure greater than 180/110 and she develops symptoms such as worsening chest pain or shortness of breath.

## 2024-03-02 NOTE — ASSESSMENT & PLAN NOTE
Patient has persistent depression which is unknown and is currently controlled. Will Continue anti-depressant medications. We will not consult psychiatry at this time. Patient does not display psychosis at this time. Continue to monitor closely and adjust plan of care as needed.    - continue home Buspar, Wellbutrin, lexapro

## 2024-03-02 NOTE — HPI
Mrs. Henderson is a 83 year old female with a history of HTN, HLD, CAD, depression, adrenal adenoma, renal insufficiency who presents to the ED for evaluation of possible cellulitis to bilateral lower legs which onset gradually since early January of this year. Pt previously treated with clindamycin (gave her diarrhea) then bactrim.  Cellulitis has not improved.  Patient denies any SOB, fever,chills.  In the ED lab work notable for CRP 12. US BLE negative for DVT.  Chest xray with no acute findings.  Patient started on iv abx in ed. Blood cultures drawn. She will be admitted to hospital medicine for cellulitis that has failed outpatient abx.      Code Status Full  Surrogate decision maker daughter

## 2024-03-02 NOTE — ASSESSMENT & PLAN NOTE
- failed outpatient po therapy  - on cefepime and vanco  - trend WBC and fever curve  - follow-up blood cultures   - wound care consulted

## 2024-03-02 NOTE — H&P
[facility] - Emergency Dept.  Hospital Medicine  History & Physical    Patient Name: Lizeth Henderson  MRN: [id number]  Patient Class: IP- Inpatient  Admission Date: 3/2/2024  Attending Physician: Keon Yeager MD   Primary Care Provider: Lazarus Euceda MD         Patient information was obtained from patient, past medical records, and ER records.     Subjective:     Principal Problem:Bilateral lower leg cellulitis    Chief Complaint:   Chief Complaint   Patient presents with    Cellulitis     States hx of cellulitis that keeps coming back to right lower leg. Also to left leg. No antibiotics at present.         HPI: Mrs. Henderson is a 83 year old female with a history of HTN, HLD, CAD, depression, adrenal adenoma, renal insufficiency who presents to the ED for evaluation of possible cellulitis to bilateral lower legs which onset gradually since early January of this year. Pt previously treated with clindamycin (gave her diarrhea) then bactrim.  Cellulitis has not improved.  Patient denies any SOB, fever,chills.  In the ED lab work notable for CRP 12. US BLE negative for DVT.  Chest xray with no acute findings.  Patient started on iv abx in ed. Blood cultures drawn. She will be admitted to hospital medicine for cellulitis that has failed outpatient abx.      Code Status Full  Surrogate decision maker daughter     Past Medical History:   Diagnosis Date    Adrenal adenoma 2016    Anxiety     Arthritis     Benign hypertension     Colon cancer age 61    colon    Coronary artery disease     Depression     Full dentures     General anesthetics causing adverse effect in therapeutic use     yells and talks when wakes up    Hyperlipidemia     Osteopenia     Shingles     Type 2 diabetes mellitus without complication, without long-term current use of insulin 1/23/2024    Wears glasses        Past Surgical History:   Procedure Laterality Date    APPENDECTOMY      BREAST BIOPSY Left     benign    BUNIONECTOMY Left     CATARACT  EXTRACTION Bilateral     COLON SURGERY      2001    EYE SURGERY      cataract surg    HEMICOLECTOMY  2002    HERNIA REPAIR      x5    JOINT REPLACEMENT      knee    left toe surgery      joelle    PATENT DUCTUS ARTERIOUS LIGATION  1948    SALPINGOOPHORECTOMY  2002    due to colon cancer    TONSILLECTOMY, ADENOIDECTOMY         Review of patient's allergies indicates:   Allergen Reactions    Clindamycin      Diarrhea      Lisinopril      cough       No current facility-administered medications on file prior to encounter.     Current Outpatient Medications on File Prior to Encounter   Medication Sig    ALPRAZolam (XANAX) 0.25 MG tablet TAKE 1/2 TO 1 TABLET BY MOUTH DAILY AS NEEDED FOR ANXIETY    atorvastatin (LIPITOR) 10 MG tablet TAKE 1 TABLET(10 MG) BY MOUTH EVERY DAY    buPROPion (WELLBUTRIN XL) 150 MG TB24 tablet Take 1 tablet (150 mg total) by mouth once daily.    busPIRone (BUSPAR) 10 MG tablet Take 2 tablets (20 mg total) by mouth 2 (two) times daily.    EScitalopram oxalate (LEXAPRO) 10 MG tablet Take 1 tablet (10 mg total) by mouth once daily.    losartan (COZAAR) 100 MG tablet Take 1 tablet (100 mg total) by mouth once daily.    MYRBETRIQ 50 mg Tb24 TAKE 1 TABLET(50 MG) BY MOUTH EVERY DAY    pramipexole (MIRAPEX) 0.125 MG tablet TAKE 1 TABLET BY MOUTH EVERY EVENING    suvorexant (BELSOMRA) 10 mg Tab Take 1 tablet by mouth every evening.    aspirin (ECOTRIN) 81 MG EC tablet Take 81 mg by mouth once daily.    ergocalciferol (ERGOCALCIFEROL) 50,000 unit Cap Take 1 capsule (50,000 Units total) by mouth every 7 days.    ibandronate (BONIVA) 150 mg tablet Take 1 tablet (150 mg total) by mouth every 30 days.    [DISCONTINUED] hydroCHLOROthiazide (HYDRODIURIL) 12.5 MG Tab Take 1 tablet (12.5 mg total) by mouth once daily.    [DISCONTINUED] sulfamethoxazole-trimethoprim 800-160mg (BACTRIM DS) 800-160 mg Tab Take 1 tablet by mouth 2 (two) times daily.     Family History       Problem Relation (Age of Onset)     Alzheimer's disease Mother    Anxiety disorder Daughter    Arthritis Father, Mother    Cancer Father, Maternal Uncle, Cousin    Depression Daughter, Daughter, Daughter    Early death Maternal Grandmother    Hypertension Father    Kidney disease Daughter    Ulcerative colitis Daughter          Tobacco Use    Smoking status: Never     Passive exposure: Never    Smokeless tobacco: Never   Substance and Sexual Activity    Alcohol use: No    Drug use: No    Sexual activity: Never     Review of Systems   Cardiovascular:  Positive for leg swelling.   Skin:  Positive for color change and wound.     Objective:     Vital Signs (Most Recent):  Temp: 98.1 °F (36.7 °C) (03/02/24 1316)  Pulse: 91 (03/02/24 1507)  Resp: 20 (03/02/24 1507)  BP: (!) 146/84 (03/02/24 1507)  SpO2: 99 % (03/02/24 1507) Vital Signs (24h Range):  Temp:  [98.1 °F (36.7 °C)] 98.1 °F (36.7 °C)  Pulse:  [86-93] 91  Resp:  [16-20] 20  SpO2:  [99 %] 99 %  BP: (140-150)/(71-84) 146/84     Weight: 101.3 kg (223 lb 5.2 oz)  Body mass index is 33.96 kg/m².     Physical Exam  Vitals and nursing note reviewed.   Constitutional:       Appearance: She is obese.   Cardiovascular:      Rate and Rhythm: Normal rate and regular rhythm.      Pulses: Normal pulses.      Heart sounds: Normal heart sounds.   Pulmonary:      Effort: Pulmonary effort is normal.      Breath sounds: Normal breath sounds. No wheezing.   Abdominal:      General: Bowel sounds are normal. There is no distension.      Palpations: Abdomen is soft.      Tenderness: There is no abdominal tenderness.   Musculoskeletal:         General: Swelling: +3 BLE. Normal range of motion.   Skin:     Comments: Redness to BLE ankle to just below knee, dry peeling skin   Neurological:      Mental Status: She is alert and oriented to person, place, and time.                Significant Labs: All pertinent labs within the past 24 hours have been reviewed.    Significant Imaging: I have reviewed all pertinent imaging  "results/findings within the past 24 hours.  Assessment/Plan:     * Bilateral lower leg cellulitis  - failed outpatient po therapy  - on cefepime and vanco  - trend WBC and fever curve  - follow-up blood cultures   - wound care consulted       Stage 3b chronic kidney disease  Creatine stable for now. BMP reviewed- noted Estimated Creatinine Clearance: 47.4 mL/min (based on SCr of 1.1 mg/dL). according to latest data. Based on current GFR, CKD stage is stage 3 - GFR 30-59.  Monitor UOP and serial BMP and adjust therapy as needed. Renally dose meds. Avoid nephrotoxic medications and procedures.    Type 2 diabetes mellitus without complication, without long-term current use of insulin  Patient's FSGs are controlled on current medication regimen.  Last A1c reviewed-   Lab Results   Component Value Date    HGBA1C 6.0 (H) 01/23/2024     Most recent fingerstick glucose reviewed- No results for input(s): "POCTGLUCOSE" in the last 24 hours.  Current correctional scale  Low  Maintain anti-hyperglycemic dose as follows-   Antihyperglycemics (From admission, onward)      Start     Stop Route Frequency Ordered    03/02/24 1625  insulin aspart U-100 pen 0-5 Units         -- SubQ Before meals & nightly PRN 03/02/24 1527          Hold Oral hypoglycemics while patient is in the hospital.    Essential hypertension  Chronic, controlled. Latest blood pressure and vitals reviewed-     Temp:  [98.1 °F (36.7 °C)]   Pulse:  [86-93]   Resp:  [16-20]   BP: (140-150)/(71-84)   SpO2:  [99 %] .   Home meds for hypertension were reviewed and noted below.   Hypertension Medications               losartan (COZAAR) 100 MG tablet Take 1 tablet (100 mg total) by mouth once daily.            While in the hospital, will manage blood pressure as follows; Continue home antihypertensive regimen    Will utilize p.r.n. blood pressure medication only if patient's blood pressure greater than 180/110 and she develops symptoms such as worsening chest pain or " shortness of breath.    Major depression, recurrent, chronic  Patient has persistent depression which is unknown and is currently controlled. Will Continue anti-depressant medications. We will not consult psychiatry at this time. Patient does not display psychosis at this time. Continue to monitor closely and adjust plan of care as needed.    - continue home Buspar, Wellbutrin, lexapro        VTE Risk Mitigation (From admission, onward)           Ordered     enoxaparin injection 40 mg  Daily         03/02/24 1527     IP VTE HIGH RISK PATIENT  Once         03/02/24 1527     Place sequential compression device  Until discontinued         03/02/24 1527                                    Yaneli Barfield NP  Department of Hospital Medicine  [facility] - Emergency Dept.

## 2024-03-02 NOTE — ASSESSMENT & PLAN NOTE
"Patient's FSGs are controlled on current medication regimen.  Last A1c reviewed-   Lab Results   Component Value Date    HGBA1C 6.0 (H) 01/23/2024     Most recent fingerstick glucose reviewed- No results for input(s): "POCTGLUCOSE" in the last 24 hours.  Current correctional scale  Low  Maintain anti-hyperglycemic dose as follows-   Antihyperglycemics (From admission, onward)      Start     Stop Route Frequency Ordered    03/02/24 1625  insulin aspart U-100 pen 0-5 Units         -- SubQ Before meals & nightly PRN 03/02/24 1527          Hold Oral hypoglycemics while patient is in the hospital.  "

## 2024-03-02 NOTE — CONSULTS
"Pharmacokinetic Initial Assessment: IV Vancomycin    Assessment/Plan:    Initiate intravenous vancomycin with loading dose of 2250 mg once followed by a maintenance dose of vancomycin 1750mg IV every 24 hours  Desired empiric serum trough concentration is 10 to 15 mcg/mL  Draw vancomycin trough level 60 min prior to third dose on 03/04 at approximately 1545  Pharmacy will continue to follow and monitor vancomycin.      Please contact pharmacy at extension [phone]  with any questions regarding this assessment.     Thank you for the consult,   Azeem Garcia       Patient brief summary:  Lizeth Henderson is a 83 y.o. female initiated on antimicrobial therapy with IV Vancomycin for treatment of suspected skin & soft tissue infection    Drug Allergies:   Review of patient's allergies indicates:   Allergen Reactions    Clindamycin      Diarrhea      Lisinopril      cough       Actual Body Weight:   101.3    Renal Function:   Estimated Creatinine Clearance: 47.4 mL/min (based on SCr of 1.1 mg/dL).,     Dialysis Method (if applicable):  N/A    CBC (last 72 hours):  Recent Labs   Lab Result Units 03/02/24  1415   WBC K/uL 6.68   Hemoglobin g/dL 9.1*   Hematocrit % 28.5*   Platelets K/uL 235   Gran % % 67.6   Lymph % % 21.3   Mono % % 8.8   Eosinophil % % 1.3   Basophil % % 0.4   Differential Method  Automated       Metabolic Panel (last 72 hours):  Recent Labs   Lab Result Units 03/02/24  1415   Sodium mmol/L 142   Potassium mmol/L 4.1   Chloride mmol/L 107   CO2 mmol/L 27   Glucose mg/dL 93   BUN mg/dL 19   Creatinine mg/dL 1.1   Albumin g/dL 3.5   Total Bilirubin mg/dL 0.4   Alkaline Phosphatase U/L 81   AST U/L 17   ALT U/L 5*       Drug levels (last 3 results):  No results for input(s): "VANCOMYCINRA", "VANCORANDOM", "VANCOMYCINPE", "VANCOPEAK", "VANCOMYCINTR", "VANCOTROUGH" in the last 72 hours.    Microbiologic Results:  Microbiology Results (last 7 days)       Procedure Component Value Units Date/Time    " Blood Culture #1 **CANNOT BE ORDERED STAT** [[id number]] Collected: 03/02/24 1506    Order Status: Sent Specimen: Blood from Peripheral, Antecubital, Right Updated: 03/02/24 1507    Blood Culture #2 **CANNOT BE ORDERED STAT** [[id number]] Collected: 03/02/24 1414    Order Status: Sent Specimen: Blood from Peripheral, Antecubital, Right Updated: 03/02/24 1414

## 2024-03-02 NOTE — SUBJECTIVE & OBJECTIVE
Past Medical History:   Diagnosis Date    Adrenal adenoma 2016    Anxiety     Arthritis     Benign hypertension     Colon cancer age 61    colon    Coronary artery disease     Depression     Full dentures     General anesthetics causing adverse effect in therapeutic use     yells and talks when wakes up    Hyperlipidemia     Osteopenia     Shingles     Type 2 diabetes mellitus without complication, without long-term current use of insulin 1/23/2024    Wears glasses        Past Surgical History:   Procedure Laterality Date    APPENDECTOMY      BREAST BIOPSY Left     benign    BUNIONECTOMY Left     CATARACT EXTRACTION Bilateral     COLON SURGERY      2001    EYE SURGERY      cataract surg    HEMICOLECTOMY  2002    HERNIA REPAIR      x5    JOINT REPLACEMENT      knee    left toe surgery      hammertoe    PATENT DUCTUS ARTERIOUS LIGATION  1948    SALPINGOOPHORECTOMY  2002    due to colon cancer    TONSILLECTOMY, ADENOIDECTOMY         Review of patient's allergies indicates:   Allergen Reactions    Clindamycin      Diarrhea      Lisinopril      cough       No current facility-administered medications on file prior to encounter.     Current Outpatient Medications on File Prior to Encounter   Medication Sig    ALPRAZolam (XANAX) 0.25 MG tablet TAKE 1/2 TO 1 TABLET BY MOUTH DAILY AS NEEDED FOR ANXIETY    atorvastatin (LIPITOR) 10 MG tablet TAKE 1 TABLET(10 MG) BY MOUTH EVERY DAY    buPROPion (WELLBUTRIN XL) 150 MG TB24 tablet Take 1 tablet (150 mg total) by mouth once daily.    busPIRone (BUSPAR) 10 MG tablet Take 2 tablets (20 mg total) by mouth 2 (two) times daily.    EScitalopram oxalate (LEXAPRO) 10 MG tablet Take 1 tablet (10 mg total) by mouth once daily.    losartan (COZAAR) 100 MG tablet Take 1 tablet (100 mg total) by mouth once daily.    MYRBETRIQ 50 mg Tb24 TAKE 1 TABLET(50 MG) BY MOUTH EVERY DAY    pramipexole (MIRAPEX) 0.125 MG tablet TAKE 1 TABLET BY MOUTH EVERY EVENING    suvorexant (BELSOMRA) 10 mg Tab Take  1 tablet by mouth every evening.    aspirin (ECOTRIN) 81 MG EC tablet Take 81 mg by mouth once daily.    ergocalciferol (ERGOCALCIFEROL) 50,000 unit Cap Take 1 capsule (50,000 Units total) by mouth every 7 days.    ibandronate (BONIVA) 150 mg tablet Take 1 tablet (150 mg total) by mouth every 30 days.    [DISCONTINUED] hydroCHLOROthiazide (HYDRODIURIL) 12.5 MG Tab Take 1 tablet (12.5 mg total) by mouth once daily.    [DISCONTINUED] sulfamethoxazole-trimethoprim 800-160mg (BACTRIM DS) 800-160 mg Tab Take 1 tablet by mouth 2 (two) times daily.     Family History       Problem Relation (Age of Onset)    Alzheimer's disease Mother    Anxiety disorder Daughter    Arthritis Father, Mother    Cancer Father, Maternal Uncle, Cousin    Depression Daughter, Daughter, Daughter    Early death Maternal Grandmother    Hypertension Father    Kidney disease Daughter    Ulcerative colitis Daughter          Tobacco Use    Smoking status: Never     Passive exposure: Never    Smokeless tobacco: Never   Substance and Sexual Activity    Alcohol use: No    Drug use: No    Sexual activity: Never     Review of Systems   Cardiovascular:  Positive for leg swelling.   Skin:  Positive for color change and wound.     Objective:     Vital Signs (Most Recent):  Temp: 98.1 °F (36.7 °C) (03/02/24 1316)  Pulse: 91 (03/02/24 1507)  Resp: 20 (03/02/24 1507)  BP: (!) 146/84 (03/02/24 1507)  SpO2: 99 % (03/02/24 1507) Vital Signs (24h Range):  Temp:  [98.1 °F (36.7 °C)] 98.1 °F (36.7 °C)  Pulse:  [86-93] 91  Resp:  [16-20] 20  SpO2:  [99 %] 99 %  BP: (140-150)/(71-84) 146/84     Weight: 101.3 kg (223 lb 5.2 oz)  Body mass index is 33.96 kg/m².     Physical Exam  Vitals and nursing note reviewed.   Constitutional:       Appearance: She is obese.   Cardiovascular:      Rate and Rhythm: Normal rate and regular rhythm.      Pulses: Normal pulses.      Heart sounds: Normal heart sounds.   Pulmonary:      Effort: Pulmonary effort is normal.      Breath sounds:  Normal breath sounds. No wheezing.   Abdominal:      General: Bowel sounds are normal. There is no distension.      Palpations: Abdomen is soft.      Tenderness: There is no abdominal tenderness.   Musculoskeletal:         General: Swelling: +3 BLE. Normal range of motion.   Skin:     Comments: Redness to BLE ankle to just below knee, dry peeling skin   Neurological:      Mental Status: She is alert and oriented to person, place, and time.                Significant Labs: All pertinent labs within the past 24 hours have been reviewed.    Significant Imaging: I have reviewed all pertinent imaging results/findings within the past 24 hours.

## 2024-03-03 LAB
ANION GAP SERPL CALC-SCNC: 9 MMOL/L (ref 8–16)
BASOPHILS # BLD AUTO: 0.04 K/UL (ref 0–0.2)
BASOPHILS NFR BLD: 0.7 % (ref 0–1.9)
BUN SERPL-MCNC: 14 MG/DL (ref 8–23)
CALCIUM SERPL-MCNC: 8.6 MG/DL (ref 8.7–10.5)
CHLORIDE SERPL-SCNC: 108 MMOL/L (ref 95–110)
CO2 SERPL-SCNC: 24 MMOL/L (ref 23–29)
CREAT SERPL-MCNC: 1 MG/DL (ref 0.5–1.4)
DIFFERENTIAL METHOD BLD: ABNORMAL
EOSINOPHIL # BLD AUTO: 0.1 K/UL (ref 0–0.5)
EOSINOPHIL NFR BLD: 1.4 % (ref 0–8)
ERYTHROCYTE [DISTWIDTH] IN BLOOD BY AUTOMATED COUNT: 14.1 % (ref 11.5–14.5)
EST. GFR  (NO RACE VARIABLE): 56 ML/MIN/1.73 M^2
GLUCOSE SERPL-MCNC: 111 MG/DL (ref 70–110)
HCT VFR BLD AUTO: 26.9 % (ref 37–48.5)
HGB BLD-MCNC: 8.5 G/DL (ref 12–16)
IMM GRANULOCYTES # BLD AUTO: 0.03 K/UL (ref 0–0.04)
IMM GRANULOCYTES NFR BLD AUTO: 0.5 % (ref 0–0.5)
LYMPHOCYTES # BLD AUTO: 1.2 K/UL (ref 1–4.8)
LYMPHOCYTES NFR BLD: 20.3 % (ref 18–48)
MCH RBC QN AUTO: 27.3 PG (ref 27–31)
MCHC RBC AUTO-ENTMCNC: 31.6 G/DL (ref 32–36)
MCV RBC AUTO: 87 FL (ref 82–98)
MONOCYTES # BLD AUTO: 0.5 K/UL (ref 0.3–1)
MONOCYTES NFR BLD: 8.8 % (ref 4–15)
NEUTROPHILS # BLD AUTO: 4.1 K/UL (ref 1.8–7.7)
NEUTROPHILS NFR BLD: 68.3 % (ref 38–73)
NRBC BLD-RTO: 0 /100 WBC
OHS QRS DURATION: 86 MS
OHS QTC CALCULATION: 469 MS
PLATELET # BLD AUTO: 254 K/UL (ref 150–450)
PMV BLD AUTO: 10.3 FL (ref 9.2–12.9)
POCT GLUCOSE: 116 MG/DL (ref 70–110)
POCT GLUCOSE: 126 MG/DL (ref 70–110)
POCT GLUCOSE: 91 MG/DL (ref 70–110)
POTASSIUM SERPL-SCNC: 3.8 MMOL/L (ref 3.5–5.1)
RBC # BLD AUTO: 3.11 M/UL (ref 4–5.4)
SODIUM SERPL-SCNC: 141 MMOL/L (ref 136–145)
WBC # BLD AUTO: 5.92 K/UL (ref 3.9–12.7)

## 2024-03-03 PROCEDURE — 97116 GAIT TRAINING THERAPY: CPT | Mod: HCNC

## 2024-03-03 PROCEDURE — 97530 THERAPEUTIC ACTIVITIES: CPT | Mod: HCNC

## 2024-03-03 PROCEDURE — 36415 COLL VENOUS BLD VENIPUNCTURE: CPT | Mod: HCNC | Performed by: NURSE PRACTITIONER

## 2024-03-03 PROCEDURE — 25000003 PHARM REV CODE 250: Mod: HCNC | Performed by: NURSE PRACTITIONER

## 2024-03-03 PROCEDURE — 85025 COMPLETE CBC W/AUTO DIFF WBC: CPT | Mod: HCNC | Performed by: NURSE PRACTITIONER

## 2024-03-03 PROCEDURE — 21400001 HC TELEMETRY ROOM: Mod: HCNC

## 2024-03-03 PROCEDURE — 63600175 PHARM REV CODE 636 W HCPCS: Mod: HCNC | Performed by: FAMILY MEDICINE

## 2024-03-03 PROCEDURE — 63600175 PHARM REV CODE 636 W HCPCS: Mod: HCNC | Performed by: NURSE PRACTITIONER

## 2024-03-03 PROCEDURE — 80048 BASIC METABOLIC PNL TOTAL CA: CPT | Mod: HCNC | Performed by: NURSE PRACTITIONER

## 2024-03-03 PROCEDURE — 97166 OT EVAL MOD COMPLEX 45 MIN: CPT | Mod: HCNC

## 2024-03-03 PROCEDURE — 97162 PT EVAL MOD COMPLEX 30 MIN: CPT | Mod: HCNC

## 2024-03-03 PROCEDURE — 25000003 PHARM REV CODE 250: Mod: HCNC | Performed by: FAMILY MEDICINE

## 2024-03-03 RX ADMIN — CEFEPIME 1 G: 1 INJECTION, POWDER, FOR SOLUTION INTRAMUSCULAR; INTRAVENOUS at 09:03

## 2024-03-03 RX ADMIN — ENOXAPARIN SODIUM 40 MG: 40 INJECTION SUBCUTANEOUS at 05:03

## 2024-03-03 RX ADMIN — LOSARTAN POTASSIUM 100 MG: 50 TABLET, FILM COATED ORAL at 08:03

## 2024-03-03 RX ADMIN — VANCOMYCIN HYDROCHLORIDE 1750 MG: 500 INJECTION, POWDER, LYOPHILIZED, FOR SOLUTION INTRAVENOUS at 05:03

## 2024-03-03 RX ADMIN — DOCUSATE SODIUM AND SENNOSIDES 1 TABLET: 8.6; 5 TABLET ORAL at 08:03

## 2024-03-03 RX ADMIN — BUPROPION HYDROCHLORIDE 150 MG: 150 TABLET, FILM COATED, EXTENDED RELEASE ORAL at 08:03

## 2024-03-03 RX ADMIN — ESCITALOPRAM OXALATE 10 MG: 10 TABLET ORAL at 08:03

## 2024-03-03 RX ADMIN — BUSPIRONE HYDROCHLORIDE 20 MG: 10 TABLET ORAL at 09:03

## 2024-03-03 RX ADMIN — BUSPIRONE HYDROCHLORIDE 20 MG: 10 TABLET ORAL at 08:03

## 2024-03-03 RX ADMIN — Medication 6 MG: at 09:03

## 2024-03-03 RX ADMIN — ATORVASTATIN CALCIUM 10 MG: 10 TABLET, FILM COATED ORAL at 09:03

## 2024-03-03 RX ADMIN — ASPIRIN 81 MG: 81 TABLET, COATED ORAL at 08:03

## 2024-03-03 RX ADMIN — PRAMIPEXOLE DIHYDROCHLORIDE 0.12 MG: 0.12 TABLET ORAL at 09:03

## 2024-03-03 RX ADMIN — ALPRAZOLAM 0.25 MG: 0.25 TABLET ORAL at 03:03

## 2024-03-03 RX ADMIN — ACETAMINOPHEN 650 MG: 325 TABLET ORAL at 09:03

## 2024-03-03 RX ADMIN — DOCUSATE SODIUM AND SENNOSIDES 1 TABLET: 8.6; 5 TABLET ORAL at 09:03

## 2024-03-03 RX ADMIN — CEFEPIME 1 G: 1 INJECTION, POWDER, FOR SOLUTION INTRAMUSCULAR; INTRAVENOUS at 08:03

## 2024-03-03 RX ADMIN — ACETAMINOPHEN 650 MG: 325 TABLET ORAL at 07:03

## 2024-03-03 NOTE — PLAN OF CARE
[facility] - Telemetry (Hospital)  Initial Discharge Assessment       Primary Care Provider: Lazarus Euceda MD    Admission Diagnosis: Chest pain [R07.9]  Bilateral lower extremity edema [R60.0]  Bilateral lower leg cellulitis [L03.116, L03.115]    Admission Date: 3/2/2024  Expected Discharge Date:     Transition of Care Barriers: None    Payor: HUMA[employer] MANAGED MEDICARE / Plan: HU[employer]NA SNP HMO PPO SPECIAL NEEDS / Product Type: Medicare Advantage /     Extended Emergency Contact Information  Primary Emergency Contact: uRba Godinez  Address: [address] [place] [address] [place]  Mobile Phone: [phone]  Relation: Daughter  Preferred language: English   needed? No  Secondary Emergency Contact: Veronica Joiner  Address: [address] [place] [address] [facility]  Home Phone: [phone]  Mobile Phone: [phone]  Relation: Daughter  Preferred language: English   needed? No    Discharge Plan A: Home with family  Discharge Plan B: Home with family      [id number] - [place], [place] - [address]  [address]  [facility] [address]  Phone: [phone] Fax: [phone]      Initial Assessment (most recent)       Adult Discharge Assessment - 03/03/24 0847          Discharge Assessment    Assessment Type Discharge Planning Assessment     Confirmed/corrected address, phone number and insurance Yes     Confirmed Demographics Correct on Facesheet     Source of Information patient     People in Home child(vince), adult     Facility Arrived From: Home     Do you expect to return to your current living situation? Yes     Do you have help at home or someone to help you manage your care at home? Yes     Who are your caregiver(s) and their phone number(s)? Ruba Basurtot (daughter) [phone]     Prior to hospitilization cognitive status: Alert/Oriented     Current cognitive status: Alert/Oriented      Equipment Currently Used at Home walker, rolling     Readmission within 30 days? No     Patient currently being followed by outpatient case management? No     Do you currently have service(s) that help you manage your care at home? No     Do you take prescription medications? Yes     Do you have prescription coverage? Yes     Do you have any problems affording any of your prescribed medications? No     Is the patient taking medications as prescribed? yes     Who is going to help you get home at discharge? Ruba Godinez (daughter) [phone]     How do you get to doctors appointments? family or friend will provide     Are you on dialysis? No     Do you take coumadin? No     Discharge Plan A Home with family     Discharge Plan B Home with family     DME Needed Upon Discharge  none     Discharge Plan discussed with: Adult children     Transition of Care Barriers None

## 2024-03-03 NOTE — PLAN OF CARE
A204/A204 ANA Henderson is a 83 y.o.female admitted on 3/2/2024 for Bilateral lower leg cellulitis   Code Status: Full Code MRN: [id number]   Review of patient's allergies indicates:   Allergen Reactions    Clindamycin      Diarrhea      Lisinopril      cough     Past Medical History:   Diagnosis Date    Adrenal adenoma 2016    Anxiety     Arthritis     Benign hypertension     Colon cancer age 61    colon    Coronary artery disease     Depression     Full dentures     General anesthetics causing adverse effect in therapeutic use     yells and talks when wakes up    Hyperlipidemia     Osteopenia     Shingles     Type 2 diabetes mellitus without complication, without long-term current use of insulin 1/23/2024    Wears glasses       PRN meds    acetaminophen, 650 mg, Q4H PRN  ALPRAZolam, 0.25 mg, BID PRN  dextrose 10%, 12.5 g, PRN  dextrose 10%, 25 g, PRN  glucagon (human recombinant), 1 mg, PRN  glucose, 16 g, PRN  glucose, 24 g, PRN  HYDROcodone-acetaminophen, 1 tablet, Q6H PRN  insulin aspart U-100, 0-5 Units, QID (AC + HS) PRN  melatonin, 6 mg, Nightly PRN  naloxone, 0.02 mg, PRN  ondansetron, 4 mg, Q8H PRN  sodium chloride 0.9%, 10 mL, Q12H PRN  vancomycin - pharmacy to dose, , pharmacy to manage frequency      Patient's IV replaced and charted, waffle mattress added to bed. No falls noted duration of shift. Bed alarm In place. Chart check completed. Will continue plan of care.      Orientation: oriented x 4        Lead Monitored: Lead II Rhythm: normal sinus rhythm    Cardiac/Telemetry Box Number: [id number]    Last Bowel Movement: 03/01/24  Diet Adult Regular (IDDSI Level 7)     Michele Score: 18  Fall Risk Score: 13  Accucheck [x]   Freq? Q4     Lines/Drains/Airways       Peripheral Intravenous Line  Duration                  Peripheral IV - Single Lumen 03/03/24 1040 22 G Anterior;Distal;Left Forearm <1 day

## 2024-03-03 NOTE — SUBJECTIVE & OBJECTIVE
Interval History: No acute events overnight.     Review of Systems   Cardiovascular:  Positive for leg swelling.   Skin:  Positive for color change and wound.     Objective:     Vital Signs (Most Recent):  Temp: 98.7 °F (37.1 °C) (03/03/24 0743)  Pulse: 89 (03/03/24 0814)  Resp: 18 (03/03/24 0743)  BP: (!) 155/78 (03/03/24 0743)  SpO2: 95 % (03/03/24 0743) Vital Signs (24h Range):  Temp:  [98.1 °F (36.7 °C)-99.2 °F (37.3 °C)] 98.7 °F (37.1 °C)  Pulse:  [] 89  Resp:  [16-20] 18  SpO2:  [95 %-99 %] 95 %  BP: (140-178)/(70-87) 155/78     Weight: 101.3 kg (223 lb 5.2 oz)  Body mass index is 33.96 kg/m².    Intake/Output Summary (Last 24 hours) at 3/3/2024 0951  Last data filed at 3/3/2024 0701  Gross per 24 hour   Intake 50 ml   Output 0 ml   Net 50 ml         Physical Exam  Vitals and nursing note reviewed.   Constitutional:       Appearance: She is well-developed. She is obese.   HENT:      Head: Normocephalic and atraumatic.   Eyes:      Conjunctiva/sclera: Conjunctivae normal.      Pupils: Pupils are equal, round, and reactive to light.   Cardiovascular:      Rate and Rhythm: Normal rate and regular rhythm.      Heart sounds: Normal heart sounds.   Pulmonary:      Effort: Pulmonary effort is normal.      Breath sounds: Normal breath sounds.   Abdominal:      General: Bowel sounds are normal.      Palpations: Abdomen is soft.   Musculoskeletal:         General: Normal range of motion.      Cervical back: Normal range of motion and neck supple.   Skin:     General: Skin is warm and dry.      Comments:  Erythema, swelling, and scaly to bilateral lower extremities right >left.    Neurological:      Mental Status: She is alert and oriented to person, place, and time.   Psychiatric:         Behavior: Behavior normal.         Thought Content: Thought content normal.         Judgment: Judgment normal.             Significant Labs: All pertinent labs within the past 24 hours have been reviewed.  BMP:   Recent Labs    Lab 03/03/24  0434   *      K 3.8      CO2 24   BUN 14   CREATININE 1.0   CALCIUM 8.6*     CBC:   Recent Labs   Lab 03/02/24  1415 03/03/24  0434   WBC 6.68 5.92   HGB 9.1* 8.5*   HCT 28.5* 26.9*    254     CMP:   Recent Labs   Lab 03/02/24  1415 03/03/24  0434    141   K 4.1 3.8    108   CO2 27 24   GLU 93 111*   BUN 19 14   CREATININE 1.1 1.0   CALCIUM 9.4 8.6*   PROT 6.9  --    ALBUMIN 3.5  --    BILITOT 0.4  --    ALKPHOS 81  --    AST 17  --    ALT 5*  --    ANIONGAP 8 9       Significant Imaging: I have reviewed all pertinent imaging results/findings within the past 24 hours.

## 2024-03-03 NOTE — PLAN OF CARE
CATHERINE/CATHERINE.  Lizeth Henderson is a 83 y.o.female admitted on 3/2/2024 for Bilateral lower leg cellulitis   Code Status: Full Code MRN: [id number]   Review of patient's allergies indicates:   Allergen Reactions    Clindamycin      Diarrhea      Lisinopril      cough     Past Medical History:   Diagnosis Date    Adrenal adenoma 2016    Anxiety     Arthritis     Benign hypertension     Colon cancer age 61    colon    Coronary artery disease     Depression     Full dentures     General anesthetics causing adverse effect in therapeutic use     yells and talks when wakes up    Hyperlipidemia     Osteopenia     Shingles     Type 2 diabetes mellitus without complication, without long-term current use of insulin 1/23/2024    Wears glasses       PRN meds    acetaminophen, 650 mg, Q4H PRN  ALPRAZolam, 0.25 mg, BID PRN  dextrose 10%, 12.5 g, PRN  dextrose 10%, 25 g, PRN  glucagon (human recombinant), 1 mg, PRN  glucose, 16 g, PRN  glucose, 24 g, PRN  HYDROcodone-acetaminophen, 1 tablet, Q6H PRN  insulin aspart U-100, 0-5 Units, QID (AC + HS) PRN  melatonin, 6 mg, Nightly PRN  naloxone, 0.02 mg, PRN  ondansetron, 4 mg, Q8H PRN  sodium chloride 0.9%, 10 mL, Q12H PRN  vancomycin - pharmacy to dose, , pharmacy to manage frequency      Admit complete. Report given to Night Shift  Chart check completed. Will continue plan of care.      Orientation: oriented x 4        Lead Monitored: Lead II Rhythm: normal sinus rhythm         Last Bowel Movement: 03/01/24  Diet Adult Regular (IDDSI Level 7)     Michele Score: 19  Fall Risk Score: 11  Accucheck []   Freq?      Lines/Drains/Airways       Peripheral Intravenous Line  Duration                  Peripheral IV - Single Lumen 03/02/24 1500 20 G Right Antecubital <1 day

## 2024-03-03 NOTE — PROGRESS NOTES
[facility] Medicine  Progress Note    Patient Name: Lizeth Henderson  MRN: [id number]  Patient Class: IP- Inpatient   Admission Date: 3/2/2024  Length of Stay: 1 days  Attending Physician: Baldemar Zamora MD  Primary Care Provider: Lazarus Euceda MD        Subjective:     Principal Problem:Bilateral lower leg cellulitis        HPI:  Mrs. Henderson is a 83 year old female with a history of HTN, HLD, CAD, depression, adrenal adenoma, renal insufficiency who presents to the ED for evaluation of possible cellulitis to bilateral lower legs which onset gradually since early January of this year. Pt previously treated with clindamycin (gave her diarrhea) then bactrim.  Cellulitis has not improved.  Patient denies any SOB, fever,chills.  In the ED lab work notable for CRP 12. US BLE negative for DVT.  Chest xray with no acute findings.  Patient started on iv abx in ed. Blood cultures drawn. She will be admitted to hospital medicine for cellulitis that has failed outpatient abx.      Code Status Full  Surrogate decision maker daughter     Overview/Hospital Course:  84 y/o female admitted for bilateral LE cellulitis and was started on vancomycin and cefepime. Symptoms improving. Continue current plan.     Interval History: No acute events overnight.     Review of Systems   Cardiovascular:  Positive for leg swelling.   Skin:  Positive for color change and wound.     Objective:     Vital Signs (Most Recent):  Temp: 98.7 °F (37.1 °C) (03/03/24 0743)  Pulse: 89 (03/03/24 0814)  Resp: 18 (03/03/24 0743)  BP: (!) 155/78 (03/03/24 0743)  SpO2: 95 % (03/03/24 0743) Vital Signs (24h Range):  Temp:  [98.1 °F (36.7 °C)-99.2 °F (37.3 °C)] 98.7 °F (37.1 °C)  Pulse:  [] 89  Resp:  [16-20] 18  SpO2:  [95 %-99 %] 95 %  BP: (140-178)/(70-87) 155/78     Weight: 101.3 kg (223 lb 5.2 oz)  Body mass index is 33.96 kg/m².    Intake/Output Summary (Last 24 hours) at 3/3/2024 0951  Last data filed at 3/3/2024  0701  Gross per 24 hour   Intake 50 ml   Output 0 ml   Net 50 ml         Physical Exam  Vitals and nursing note reviewed.   Constitutional:       Appearance: She is well-developed. She is obese.   HENT:      Head: Normocephalic and atraumatic.   Eyes:      Conjunctiva/sclera: Conjunctivae normal.      Pupils: Pupils are equal, round, and reactive to light.   Cardiovascular:      Rate and Rhythm: Normal rate and regular rhythm.      Heart sounds: Normal heart sounds.   Pulmonary:      Effort: Pulmonary effort is normal.      Breath sounds: Normal breath sounds.   Abdominal:      General: Bowel sounds are normal.      Palpations: Abdomen is soft.   Musculoskeletal:         General: Normal range of motion.      Cervical back: Normal range of motion and neck supple.   Skin:     General: Skin is warm and dry.      Comments:  Erythema, swelling, and scaly to bilateral lower extremities right >left.    Neurological:      Mental Status: She is alert and oriented to person, place, and time.   Psychiatric:         Behavior: Behavior normal.         Thought Content: Thought content normal.         Judgment: Judgment normal.             Significant Labs: All pertinent labs within the past 24 hours have been reviewed.  BMP:   Recent Labs   Lab 03/03/24  0434   *      K 3.8      CO2 24   BUN 14   CREATININE 1.0   CALCIUM 8.6*     CBC:   Recent Labs   Lab 03/02/24  1415 03/03/24  0434   WBC 6.68 5.92   HGB 9.1* 8.5*   HCT 28.5* 26.9*    254     CMP:   Recent Labs   Lab 03/02/24  1415 03/03/24  0434    141   K 4.1 3.8    108   CO2 27 24   GLU 93 111*   BUN 19 14   CREATININE 1.1 1.0   CALCIUM 9.4 8.6*   PROT 6.9  --    ALBUMIN 3.5  --    BILITOT 0.4  --    ALKPHOS 81  --    AST 17  --    ALT 5*  --    ANIONGAP 8 9       Significant Imaging: I have reviewed all pertinent imaging results/findings within the past 24 hours.    Assessment/Plan:      * Bilateral lower leg cellulitis  - failed  outpatient po therapy  - on cefepime and vanco  - trend WBC and fever curve  - follow-up blood cultures   - wound care consulted     Symptoms improving  Continue current plan       Stage 3b chronic kidney disease  Creatine stable for now. BMP reviewed- noted Estimated Creatinine Clearance: 52.2 mL/min (based on SCr of 1 mg/dL). according to latest data. Based on current GFR, CKD stage is stage 3 - GFR 30-59.  Monitor UOP and serial BMP and adjust therapy as needed. Renally dose meds. Avoid nephrotoxic medications and procedures.    Type 2 diabetes mellitus without complication, without long-term current use of insulin  Patient's FSGs are controlled on current medication regimen.  Last A1c reviewed-   Lab Results   Component Value Date    HGBA1C 6.0 (H) 01/23/2024     Most recent fingerstick glucose reviewed-   Recent Labs   Lab 03/03/24  0743   POCTGLUCOSE 116*     Current correctional scale  Low  Maintain anti-hyperglycemic dose as follows-   Antihyperglycemics (From admission, onward)      Start     Stop Route Frequency Ordered    03/02/24 1625  insulin aspart U-100 pen 0-5 Units         -- SubQ Before meals & nightly PRN 03/02/24 1527          Hold Oral hypoglycemics while patient is in the hospital.    Essential hypertension  Chronic, controlled. Latest blood pressure and vitals reviewed-     Temp:  [98.1 °F (36.7 °C)-99.2 °F (37.3 °C)]   Pulse:  []   Resp:  [16-20]   BP: (140-178)/(70-87)   SpO2:  [95 %-99 %] .   Home meds for hypertension were reviewed and noted below.   Hypertension Medications               losartan (COZAAR) 100 MG tablet Take 1 tablet (100 mg total) by mouth once daily.            While in the hospital, will manage blood pressure as follows; Continue home antihypertensive regimen    Will utilize p.r.n. blood pressure medication only if patient's blood pressure greater than 180/110 and she develops symptoms such as worsening chest pain or shortness of breath.    Major depression,  recurrent, chronic  Patient has persistent depression which is unknown and is currently controlled. Will Continue anti-depressant medications. We will not consult psychiatry at this time. Patient does not display psychosis at this time. Continue to monitor closely and adjust plan of care as needed.    - continue home Buspar, Wellbutrin, lexapro        VTE Risk Mitigation (From admission, onward)           Ordered     enoxaparin injection 40 mg  Daily         03/02/24 1527     IP VTE HIGH RISK PATIENT  Once         03/02/24 1527     Place sequential compression device  Until discontinued         03/02/24 1527                    Discharge Planning   FRANCISCO:      Code Status: Full Code   Is the patient medically ready for discharge?:     Reason for patient still in hospital (select all that apply): Patient trending condition and Treatment                     Lilly Dee NP  Department of Hospital Medicine   [facility] - Telemetry ([facility])

## 2024-03-03 NOTE — ASSESSMENT & PLAN NOTE
Chronic, controlled. Latest blood pressure and vitals reviewed-     Temp:  [98.1 °F (36.7 °C)-99.2 °F (37.3 °C)]   Pulse:  []   Resp:  [16-20]   BP: (140-178)/(70-87)   SpO2:  [95 %-99 %] .   Home meds for hypertension were reviewed and noted below.   Hypertension Medications               losartan (COZAAR) 100 MG tablet Take 1 tablet (100 mg total) by mouth once daily.            While in the hospital, will manage blood pressure as follows; Continue home antihypertensive regimen    Will utilize p.r.n. blood pressure medication only if patient's blood pressure greater than 180/110 and she develops symptoms such as worsening chest pain or shortness of breath.

## 2024-03-03 NOTE — PT/OT/SLP EVAL
Occupational Therapy Evaluation and Treatment    Name: Lizeth Henderson  MRN: [id number]  Admitting Diagnosis: Bilateral lower leg cellulitis  Recent Surgery: * No surgery found *      Recommendations:     Discharge Recommendations:  HHOT  Level of Assistance Recommended: 24 hours limited assistance  Discharge Equipment Recommendations: None  Barriers to discharge: None    Assessment:     Lizeth Henderson is a 83 y.o. female with a medical diagnosis of Bilateral lower leg cellulitis. She presents with performance deficits affecting function including weakness, impaired endurance, impaired self care skills, impaired functional mobility, impaired balance, decreased lower extremity function, decreased safety awareness, impaired cardiopulmonary response to activity, impaired joint extensibility, impaired skin.     Rehab Prognosis: Fair; patient would benefit from acute OT services to address these deficits and reach maximum level of function.    Plan:     Patient to be seen 2 x/week to address the above listed problems via self-care/home management, therapeutic activities, therapeutic exercises, neuromuscular re-education  Plan of Care Expires: 03/17/24  Plan of Care Reviewed with: patient    Subjective     Chief Complaint: gangrenous left LE  Patient Comments/Goals: DC home at prior level of function.  Pain/Comfort:  Pain Rating 1: 0/10  Pain Rating Post-Intervention 1: 0/10    Patients cultural, spiritual, Jewish conflicts given the current situation: no    Social History:  Living Environment: Patient lives with their family  in a single story home with number of outside stair(s): 4 and number of inside stair(s): 0  Prior Level of Function: Prior to admission, patient was modified independent  Roles and Routines: Patient was not driving and retired prior to admission.  Equipment Used at Home: rollator, raised toilet  DME owned (not currently used): single point cane and shower chair  Assistance Upon Discharge:  family    Objective:     Communicated with nurse prior to session. Patient found HOB elevated with telemetry, klein catheter upon OT entry to room.    General Precautions: Standard, fall   Orthopedic Precautions: N/A   Braces: N/A    Respiratory Status: Room air    Occupational Performance    Gait belt applied - Yes    Bed Mobility:   Rolling/Turning to Left with stand by assistance  Scooting to HOB in supine: stand by assistance  Scooting anteriorly to EOB to have both feet planted on floor: stand by assistance  Supine to sit from left side of bed with stand by assistance    Functional Mobility/Transfers:  Sit <> Stand Transfer with stand by assistance with rolling walker  Toilet Transfer Step Transfer technique with stand by assistance with rolling walker    Activities of Daily Living:  Grooming: stand by assistance  Upper Body Dressing: stand by assistance  Lower Body Dressing: stand by assistance  Toileting: stand by assistance    Cognitive/Visual Perceptual:  Cognitive/Psychosocial Skills:    -     Oriented to: Person, Place, Time, Situation  -     Follows Commands/attention: Follows multistep  commands  -     Safety awareness/insight to disability: intact  -     Mood/Affect/Coping skills/emotional control: Appropriate to situation and Cooperative  Visual/Perceptual:    -     Intact      Physical Exam:  Balance:    -     Sitting: supervision  -     Standing: supervision  Upper Extremity Range of Motion:     -       Right Upper Extremity: WNL  -       Left Upper Extremity: WNL  Upper Extremity Strength:    -       Right Upper Extremity: WNL  -       Left Upper Extremity: WNL    AMPAC 6 Click ADL:  AMPAC Total Score: 20    Treatment & Education:  Therapist provided facilitation and instruction of proper body mechanics, energy conservation, and fall prevention strategies during tasks listed above  Patient educated on role of OT, POC, and goals for therapy  Patient educated on importance of OOB activities with  staff member assistance and sitting OOB majority of the day    Patient left up in chair with all lines intact and call button in reach.    GOALS:   Multidisciplinary Problems       Occupational Therapy Goals          Problem: Occupational Therapy    Goal Priority Disciplines Outcome Interventions   Occupational Therapy Goal     OT, PT/OT     Description: Goals to be met by: 3/17/24     Patient will increase functional independence with ADLs by performing:    LE Dressing with mod I.  Toileting from bedside commode with Mod independence for hygiene and clothing management.   Toilet transfer to bedside commode with Mod [place] using least restrictive assistive device.                         History:     Past Medical History:   Diagnosis Date    Adrenal adenoma 2016    Anxiety     Arthritis     Benign hypertension     Colon cancer age 61    colon    Coronary artery disease     Depression     Full dentures     General anesthetics causing adverse effect in therapeutic use     yells and talks when wakes up    Hyperlipidemia     Osteopenia     Shingles     Type 2 diabetes mellitus without complication, without long-term current use of insulin 1/23/2024    Wears glasses          Past Surgical History:   Procedure Laterality Date    APPENDECTOMY      BREAST BIOPSY Left     benign    BUNIONECTOMY Left     CATARACT EXTRACTION Bilateral     COLON SURGERY      2001    EYE SURGERY      cataract surg    HEMICOLECTOMY  2002    HERNIA REPAIR      x5    JOINT REPLACEMENT      knee    left toe surgery      joelle    PATENT DUCTUS ARTERIOUS LIGATION  1948    SALPINGOOPHORECTOMY  2002    due to colon cancer    TONSILLECTOMY, ADENOIDECTOMY         Time Tracking:     OT Date of Treatment: 03/03/24  OT Start Time: 0800  OT Stop Time: 0820  OT Total Time (min): 20 min    Billable Minutes: Evaluation 10 and Therapeutic Activity 10    3/3/2024

## 2024-03-03 NOTE — ASSESSMENT & PLAN NOTE
- failed outpatient po therapy  - on cefepime and vanco  - trend WBC and fever curve  - follow-up blood cultures   - wound care consulted     Symptoms improving  Continue current plan

## 2024-03-03 NOTE — HOSPITAL COURSE
84 y/o female admitted for bilateral LE cellulitis and was started on vancomycin and cefepime. Symptoms improving. Continue current plan.     3/4/24  Appearance of cellulitis unchanged per patient. Venous ultrasound negative for DVT. Arterial Ultrasound shows monophasic waveforms seen within the left-sided calf vessels below the knee as well as within the right peroneal artery suggestive of occult stenosis. Vascular Surgery and wound care has been consulted. Glucose and vitals have been stable.    3/5/24  The patient was seen by Vascular services who did not recommend further intervention for arterial studies as patient has palpable pedal pulses. For venous stasis dermatitis complicated by cellulitis it was recommended for routine skin care, and adequate moisturizers cream. She will need compression therapy and will have this managed by Vascular surgery OP. Until then, she was educated on light compression with tubi- or double layer tubi-. She will continue Keflex x 3 days. Patient is stable for discharge.

## 2024-03-03 NOTE — ASSESSMENT & PLAN NOTE
Patient's FSGs are controlled on current medication regimen.  Last A1c reviewed-   Lab Results   Component Value Date    HGBA1C 6.0 (H) 01/23/2024     Most recent fingerstick glucose reviewed-   Recent Labs   Lab 03/03/24  0743   POCTGLUCOSE 116*     Current correctional scale  Low  Maintain anti-hyperglycemic dose as follows-   Antihyperglycemics (From admission, onward)    Start     Stop Route Frequency Ordered    03/02/24 1625  insulin aspart U-100 pen 0-5 Units         -- SubQ Before meals & nightly PRN 03/02/24 1527        Hold Oral hypoglycemics while patient is in the hospital.

## 2024-03-03 NOTE — PLAN OF CARE
Description: Goals to be met by: 3/17/24     Patient will increase functional independence with ADLs by performing:    LE Dressing with mod I.  Toileting from bedside commode with Mod independence for hygiene and clothing management.   Toilet transfer to bedside commode with Mod [place] using least restrictive assistive device.    DC with low intensity OT indicated.

## 2024-03-03 NOTE — ASSESSMENT & PLAN NOTE
Creatine stable for now. BMP reviewed- noted Estimated Creatinine Clearance: 52.2 mL/min (based on SCr of 1 mg/dL). according to latest data. Based on current GFR, CKD stage is stage 3 - GFR 30-59.  Monitor UOP and serial BMP and adjust therapy as needed. Renally dose meds. Avoid nephrotoxic medications and procedures.

## 2024-03-04 LAB
ANION GAP SERPL CALC-SCNC: 8 MMOL/L (ref 8–16)
BASOPHILS # BLD AUTO: 0.03 K/UL (ref 0–0.2)
BASOPHILS NFR BLD: 0.6 % (ref 0–1.9)
BUN SERPL-MCNC: 13 MG/DL (ref 8–23)
CALCIUM SERPL-MCNC: 9.2 MG/DL (ref 8.7–10.5)
CHLORIDE SERPL-SCNC: 108 MMOL/L (ref 95–110)
CO2 SERPL-SCNC: 26 MMOL/L (ref 23–29)
CREAT SERPL-MCNC: 0.9 MG/DL (ref 0.5–1.4)
DIFFERENTIAL METHOD BLD: ABNORMAL
EOSINOPHIL # BLD AUTO: 0.1 K/UL (ref 0–0.5)
EOSINOPHIL NFR BLD: 2.7 % (ref 0–8)
ERYTHROCYTE [DISTWIDTH] IN BLOOD BY AUTOMATED COUNT: 14.2 % (ref 11.5–14.5)
EST. GFR  (NO RACE VARIABLE): >60 ML/MIN/1.73 M^2
GLUCOSE SERPL-MCNC: 120 MG/DL (ref 70–110)
HCT VFR BLD AUTO: 29.8 % (ref 37–48.5)
HGB BLD-MCNC: 9.4 G/DL (ref 12–16)
IMM GRANULOCYTES # BLD AUTO: 0.02 K/UL (ref 0–0.04)
IMM GRANULOCYTES NFR BLD AUTO: 0.4 % (ref 0–0.5)
LYMPHOCYTES # BLD AUTO: 1.2 K/UL (ref 1–4.8)
LYMPHOCYTES NFR BLD: 25.8 % (ref 18–48)
MCH RBC QN AUTO: 27.5 PG (ref 27–31)
MCHC RBC AUTO-ENTMCNC: 31.5 G/DL (ref 32–36)
MCV RBC AUTO: 87 FL (ref 82–98)
MONOCYTES # BLD AUTO: 0.4 K/UL (ref 0.3–1)
MONOCYTES NFR BLD: 8.9 % (ref 4–15)
NEUTROPHILS # BLD AUTO: 2.9 K/UL (ref 1.8–7.7)
NEUTROPHILS NFR BLD: 61.6 % (ref 38–73)
NRBC BLD-RTO: 0 /100 WBC
PLATELET # BLD AUTO: 237 K/UL (ref 150–450)
PMV BLD AUTO: 9.7 FL (ref 9.2–12.9)
POCT GLUCOSE: 105 MG/DL (ref 70–110)
POCT GLUCOSE: 119 MG/DL (ref 70–110)
POCT GLUCOSE: 97 MG/DL (ref 70–110)
POTASSIUM SERPL-SCNC: 4 MMOL/L (ref 3.5–5.1)
RBC # BLD AUTO: 3.42 M/UL (ref 4–5.4)
SODIUM SERPL-SCNC: 142 MMOL/L (ref 136–145)
VANCOMYCIN TROUGH SERPL-MCNC: 13.5 UG/ML (ref 10–22)
WBC # BLD AUTO: 4.73 K/UL (ref 3.9–12.7)

## 2024-03-04 PROCEDURE — 63600175 PHARM REV CODE 636 W HCPCS: Mod: HCNC | Performed by: FAMILY MEDICINE

## 2024-03-04 PROCEDURE — 21400001 HC TELEMETRY ROOM: Mod: HCNC

## 2024-03-04 PROCEDURE — 80202 ASSAY OF VANCOMYCIN: CPT | Mod: HCNC | Performed by: INTERNAL MEDICINE

## 2024-03-04 PROCEDURE — 25000003 PHARM REV CODE 250: Mod: HCNC | Performed by: FAMILY MEDICINE

## 2024-03-04 PROCEDURE — 36415 COLL VENOUS BLD VENIPUNCTURE: CPT | Mod: HCNC,XB | Performed by: INTERNAL MEDICINE

## 2024-03-04 PROCEDURE — 63600175 PHARM REV CODE 636 W HCPCS: Mod: HCNC | Performed by: NURSE PRACTITIONER

## 2024-03-04 PROCEDURE — 25000003 PHARM REV CODE 250: Mod: HCNC | Performed by: INTERNAL MEDICINE

## 2024-03-04 PROCEDURE — 25000003 PHARM REV CODE 250: Mod: HCNC | Performed by: NURSE PRACTITIONER

## 2024-03-04 PROCEDURE — 97530 THERAPEUTIC ACTIVITIES: CPT | Mod: HCNC

## 2024-03-04 PROCEDURE — 85025 COMPLETE CBC W/AUTO DIFF WBC: CPT | Mod: HCNC | Performed by: NURSE PRACTITIONER

## 2024-03-04 PROCEDURE — 36415 COLL VENOUS BLD VENIPUNCTURE: CPT | Mod: HCNC | Performed by: NURSE PRACTITIONER

## 2024-03-04 PROCEDURE — 97116 GAIT TRAINING THERAPY: CPT | Mod: HCNC

## 2024-03-04 PROCEDURE — 80048 BASIC METABOLIC PNL TOTAL CA: CPT | Mod: HCNC | Performed by: NURSE PRACTITIONER

## 2024-03-04 RX ORDER — SODIUM CHLORIDE 9 MG/ML
INJECTION, SOLUTION INTRAVENOUS
Status: DISCONTINUED | OUTPATIENT
Start: 2024-03-04 | End: 2024-03-05 | Stop reason: HOSPADM

## 2024-03-04 RX ORDER — HYDRALAZINE HYDROCHLORIDE 20 MG/ML
10 INJECTION INTRAMUSCULAR; INTRAVENOUS EVERY 6 HOURS PRN
Status: DISCONTINUED | OUTPATIENT
Start: 2024-03-04 | End: 2024-03-05 | Stop reason: HOSPADM

## 2024-03-04 RX ADMIN — CEFEPIME 1 G: 1 INJECTION, POWDER, FOR SOLUTION INTRAMUSCULAR; INTRAVENOUS at 10:03

## 2024-03-04 RX ADMIN — ATORVASTATIN CALCIUM 10 MG: 10 TABLET, FILM COATED ORAL at 09:03

## 2024-03-04 RX ADMIN — HYDROCODONE BITARTRATE AND ACETAMINOPHEN 1 TABLET: 5; 325 TABLET ORAL at 10:03

## 2024-03-04 RX ADMIN — ALPRAZOLAM 0.25 MG: 0.25 TABLET ORAL at 10:03

## 2024-03-04 RX ADMIN — ESCITALOPRAM OXALATE 10 MG: 10 TABLET ORAL at 08:03

## 2024-03-04 RX ADMIN — SODIUM CHLORIDE: 9 INJECTION, SOLUTION INTRAVENOUS at 10:03

## 2024-03-04 RX ADMIN — ACETAMINOPHEN 650 MG: 325 TABLET ORAL at 08:03

## 2024-03-04 RX ADMIN — ASPIRIN 81 MG: 81 TABLET, COATED ORAL at 08:03

## 2024-03-04 RX ADMIN — BUSPIRONE HYDROCHLORIDE 20 MG: 10 TABLET ORAL at 08:03

## 2024-03-04 RX ADMIN — BUSPIRONE HYDROCHLORIDE 20 MG: 10 TABLET ORAL at 09:03

## 2024-03-04 RX ADMIN — LOSARTAN POTASSIUM 100 MG: 50 TABLET, FILM COATED ORAL at 08:03

## 2024-03-04 RX ADMIN — DOCUSATE SODIUM AND SENNOSIDES 1 TABLET: 8.6; 5 TABLET ORAL at 09:03

## 2024-03-04 RX ADMIN — PRAMIPEXOLE DIHYDROCHLORIDE 0.12 MG: 0.12 TABLET ORAL at 09:03

## 2024-03-04 RX ADMIN — BUPROPION HYDROCHLORIDE 150 MG: 150 TABLET, FILM COATED, EXTENDED RELEASE ORAL at 08:03

## 2024-03-04 RX ADMIN — ENOXAPARIN SODIUM 40 MG: 40 INJECTION SUBCUTANEOUS at 06:03

## 2024-03-04 RX ADMIN — CEFEPIME 1 G: 1 INJECTION, POWDER, FOR SOLUTION INTRAMUSCULAR; INTRAVENOUS at 08:03

## 2024-03-04 RX ADMIN — VANCOMYCIN HYDROCHLORIDE 1750 MG: 500 INJECTION, POWDER, LYOPHILIZED, FOR SOLUTION INTRAVENOUS at 05:03

## 2024-03-04 NOTE — ASSESSMENT & PLAN NOTE
Chronic, controlled. Latest blood pressure and vitals reviewed-     Temp:  [97.8 °F (36.6 °C)-99.1 °F (37.3 °C)]   Pulse:  [71-88]   Resp:  [18-20]   BP: (141-163)/(71-77)   SpO2:  [97 %-100 %] .   Home meds for hypertension were reviewed and noted below.   Hypertension Medications               losartan (COZAAR) 100 MG tablet Take 1 tablet (100 mg total) by mouth once daily.            While in the hospital, will manage blood pressure as follows; Continue home antihypertensive regimen    Will utilize p.r.n. blood pressure medication only if patient's blood pressure greater than 180/110 and she develops symptoms such as worsening chest pain or shortness of breath.    3/4/24  -160s. Continue Losartan. Add hydralazine prn

## 2024-03-04 NOTE — CONSULTS
[facility] - Telemetry ([facility])  Wound Care    Patient Name:  Lizeth Henderson   MRN:  [id number]  Date: 3/4/2024  Diagnosis: Bilateral lower leg cellulitis    History:     Past Medical History:   Diagnosis Date    Adrenal adenoma 2016    Anxiety     Arthritis     Benign hypertension     Colon cancer age 61    colon    Coronary artery disease     Depression     Full dentures     General anesthetics causing adverse effect in therapeutic use     yells and talks when wakes up    Hyperlipidemia     Osteopenia     Shingles     Type 2 diabetes mellitus without complication, without long-term current use of insulin 1/23/2024    Wears glasses        Social History     Socioeconomic History    Marital status:     Number of children: 3   Tobacco Use    Smoking status: Never     Passive exposure: Never    Smokeless tobacco: Never   Substance and Sexual Activity    Alcohol use: No    Drug use: No    Sexual activity: Never   Social History Narrative    The patient does not exercise regularly ().    Rates diet as poor.    She is not satisfied with weight.             Social Determinants of Health     Financial Resource Strain: Low Risk  (10/23/2023)    Overall Financial Resource Strain (CARDIA)     Difficulty of Paying Living Expenses: Not hard at all   Food Insecurity: No Food Insecurity (10/23/2023)    Hunger Vital Sign     Worried About Running Out of Food in the Last Year: Never true     Ran Out of Food in the Last Year: Never true   Transportation Needs: No Transportation Needs (10/23/2023)    PRAPARE - Transportation     Lack of Transportation (Medical): No     Lack of Transportation (Non-Medical): No   Physical Activity: Unknown (10/23/2023)    Exercise Vital Sign     Days of Exercise per Week: 0 days   Stress: Stress Concern Present (10/23/2023)    [employer] [place] [employer] - Occupational Stress Questionnaire     Feeling of Stress : To some extent   Social Connections: Unknown (10/23/2023)    Social  Connection and Isolation Panel [NHANES]     Frequency of Communication with Friends and Family: More than three times a week     Frequency of Social Gatherings with Friends and Family: Once a week     Active Member of Clubs or Organizations: No     Attends Club or Organization Meetings: Never     Marital Status:    Housing Stability: Low Risk  (10/23/2023)    Housing Stability Vital Sign     Unable to Pay for Housing in the Last Year: No     Number of Places Lived in the Last Year: 1     Unstable Housing in the Last Year: No       Precautions:     Allergies as of 03/02/2024 - Reviewed 03/02/2024   Allergen Reaction Noted    Clindamycin  02/14/2024    Lisinopril  05/29/2017       [facility] Assessment Details/Treatment     Consulted on this 82 y/o F patient due to present on admisison altered skin integrity to BLE. She is awake and alert. BLE assessed with erythema and dry scaly patches to skin. Patient reports legs have worsened recently, were previously weeping. She also reports started as a pustule on left leg and then became red to both with weeping and now the scales.  Legs appear consistent with venous stasis dermatitis. No open ulcerations currently present. Attempted to gently remove loose scales, more adherent ones left intact, then moisturizer liberally applied to BLE. She reports she had purchased compression stockings from [employer], but quit wearing them because they were not helping and were too tight. I recommend she f/u with vascular surgery in clinic for fitting of compression stockings or farrow style wraps after arterial status is determined. Not currently a candidate for UNNAs boots or multilayer compression wrappings as arterial status is undetermined, suspected cellulitis, and no open ulcerations.         Recommendations made to primary team for moisturizer to BLE daily, f/u with vascular surgery as OP vs. IP for mangaement, fitting for compression stockings or farrow wraps if no arterial disease.  Orders placed.     03/04/2024

## 2024-03-04 NOTE — PLAN OF CARE
A204/A204 ANA Henderson is a 83 y.o.female admitted on 3/2/2024 for Bilateral lower leg cellulitis   Code Status: Full Code MRN: [id number]   Review of patient's allergies indicates:   Allergen Reactions    Clindamycin      Diarrhea      Lisinopril      cough     Past Medical History:   Diagnosis Date    Adrenal adenoma 2016    Anxiety     Arthritis     Benign hypertension     Colon cancer age 61    colon    Coronary artery disease     Depression     Full dentures     General anesthetics causing adverse effect in therapeutic use     yells and talks when wakes up    Hyperlipidemia     Osteopenia     Shingles     Type 2 diabetes mellitus without complication, without long-term current use of insulin 1/23/2024    Wears glasses       PRN meds    acetaminophen, 650 mg, Q4H PRN  ALPRAZolam, 0.25 mg, BID PRN  dextrose 10%, 12.5 g, PRN  dextrose 10%, 25 g, PRN  glucagon (human recombinant), 1 mg, PRN  glucose, 16 g, PRN  glucose, 24 g, PRN  hydrALAZINE, 10 mg, Q6H PRN  HYDROcodone-acetaminophen, 1 tablet, Q6H PRN  insulin aspart U-100, 0-5 Units, QID (AC + HS) PRN  melatonin, 6 mg, Nightly PRN  naloxone, 0.02 mg, PRN  ondansetron, 4 mg, Q8H PRN  sodium chloride 0.9%, 10 mL, Q12H PRN  vancomycin - pharmacy to dose, , pharmacy to manage frequency      Chart check completed. Will continue plan of care.      Orientation: oriented x 4  Walls Coma Scale Score: 15     Lead Monitored: Lead II Rhythm: normal sinus rhythm    Cardiac/Telemetry Box Number: [id number]  VTE Required Core Measure: Pharmacological prophylaxis initiated/maintained Last Bowel Movement: 03/04/24  Diet Adult Regular (IDDSI Level 7)     Michele Score: 20  Fall Risk Score: 13  Accucheck []   Freq?      Lines/Drains/Airways       Peripheral Intravenous Line  Duration                  Peripheral IV - Single Lumen 03/03/24 1040 22 G Anterior;Distal;Left Forearm 1 day                       Problem: Adult Inpatient Plan of Care  Goal: Plan of Care Review  3/4/2024  1646 by Jazmin Cosme RN  Outcome: Ongoing, Progressing  3/4/2024 1646 by Jazmin Cosme RN  Outcome: Ongoing, Progressing  Goal: Patient-Specific Goal (Individualized)  3/4/2024 1646 by Jazmin Cosme RN  Outcome: Ongoing, Progressing  3/4/2024 1646 by Jazmin Cosme RN  Outcome: Ongoing, Progressing  Goal: Absence of Hospital-Acquired Illness or Injury  3/4/2024 1646 by Jazmin Cosme RN  Outcome: Ongoing, Progressing  3/4/2024 1646 by Jazmin Cosme RN  Outcome: Ongoing, Progressing  Goal: Optimal Comfort and Wellbeing  3/4/2024 1646 by Jazmin Cosme RN  Outcome: Ongoing, Progressing  3/4/2024 1646 by Jazmin Cosme RN  Outcome: Ongoing, Progressing  Goal: Readiness for Transition of Care  3/4/2024 1646 by Jazmin Cosme RN  Outcome: Ongoing, Progressing  3/4/2024 1646 by Jazmin Cosme RN  Outcome: Ongoing, Progressing     Problem: Diabetes Comorbidity  Goal: Blood Glucose Level Within Targeted Range  3/4/2024 1646 by Jazmin Cosme RN  Outcome: Ongoing, Progressing  3/4/2024 1646 by Jazmin Cosme RN  Outcome: Ongoing, Progressing     Problem: Skin Injury Risk Increased  Goal: Skin Health and Integrity  3/4/2024 1646 by Jazmin Cosme RN  Outcome: Ongoing, Progressing  3/4/2024 1646 by Jazmin Cosme RN  Outcome: Ongoing, Progressing

## 2024-03-04 NOTE — ASSESSMENT & PLAN NOTE
Patient's FSGs are controlled on current medication regimen.  Last A1c reviewed-   Lab Results   Component Value Date    HGBA1C 6.0 (H) 01/23/2024     Most recent fingerstick glucose reviewed-   Recent Labs   Lab 03/04/24  1148 03/04/24  1630   POCTGLUCOSE 97 105       Current correctional scale  Low  Maintain anti-hyperglycemic dose as follows-   Antihyperglycemics (From admission, onward)    Start     Stop Route Frequency Ordered    03/02/24 1625  insulin aspart U-100 pen 0-5 Units         -- SubQ Before meals & nightly PRN 03/02/24 1527        Hold Oral hypoglycemics while patient is in the hospital.

## 2024-03-04 NOTE — PROGRESS NOTES
[facility] Medicine  Progress Note    Patient Name: Lizeth Henderson  MRN: [id number]  Patient Class: IP- Inpatient   Admission Date: 3/2/2024  Length of Stay: 2 days  Attending Physician: Katarzyna eD MD  Primary Care Provider: Lazarus Euceda MD    Subjective:     Principal Problem:Bilateral lower leg cellulitis    HPI:  Mrs. Henderson is a 83 year old female with a history of HTN, HLD, CAD, depression, adrenal adenoma, renal insufficiency who presents to the ED for evaluation of possible cellulitis to bilateral lower legs which onset gradually since early January of this year. Pt previously treated with clindamycin (gave her diarrhea) then bactrim.  Cellulitis has not improved.  Patient denies any SOB, fever,chills.  In the ED lab work notable for CRP 12. US BLE negative for DVT.  Chest xray with no acute findings.  Patient started on iv abx in ed. Blood cultures drawn. She will be admitted to hospital medicine for cellulitis that has failed outpatient abx.      Code Status Full  Surrogate decision maker daughter     Overview/Hospital Course:  84 y/o female admitted for bilateral LE cellulitis and was started on vancomycin and cefepime. Symptoms improving. Continue current plan.     3/4/24  Appearance of cellulitis unchanged per patient. Venous ultrasound negative for DVT. Arterial Ultrasound shows monophasic waveforms seen within the left-sided calf vessels below the knee as well as within the right peroneal artery suggestive of occult stenosis. Vascular Surgery and wound care has been consulted. Glucose and vitals have been stable.    Interval History: Assisted to restroom. Standby assistance. Patient tells me appearance of cellulitis and lesions have not improved. Arterial ultrasound findings were discussed with patient. Vascular Surgery has been consulted.    Review of Systems   Cardiovascular:  Positive for leg swelling.   Skin:  Positive for color change and wound.      Objective:     Vital Signs (Most Recent):  Temp: 97.8 °F (36.6 °C) (03/04/24 0729)  Pulse: 76 (03/04/24 0800)  Resp: 18 (03/04/24 0729)  BP: (!) 141/71 (03/04/24 0729)  SpO2: 98 % (03/04/24 0729) Vital Signs (24h Range):  Temp:  [97.8 °F (36.6 °C)-99.1 °F (37.3 °C)] 97.8 °F (36.6 °C)  Pulse:  [71-88] 76  Resp:  [18-20] 18  SpO2:  [97 %-100 %] 98 %  BP: (141-163)/(71-77) 141/71     Weight: 101.3 kg (223 lb 5.2 oz)  Body mass index is 33.96 kg/m².    Intake/Output Summary (Last 24 hours) at 3/4/2024 1631  Last data filed at 3/3/2024 1806  Gross per 24 hour   Intake 493.61 ml   Output 1 ml   Net 492.61 ml         Physical Exam  Vitals and nursing note reviewed.   Constitutional:       Appearance: She is well-developed. She is obese.   HENT:      Head: Normocephalic and atraumatic.   Eyes:      Conjunctiva/sclera: Conjunctivae normal.      Pupils: Pupils are equal, round, and reactive to light.   Cardiovascular:      Rate and Rhythm: Normal rate and regular rhythm.      Heart sounds: Normal heart sounds.   Pulmonary:      Effort: Pulmonary effort is normal.      Breath sounds: Normal breath sounds.   Abdominal:      General: Bowel sounds are normal.      Palpations: Abdomen is soft.   Musculoskeletal:         General: Normal range of motion.      Cervical back: Normal range of motion and neck supple.   Skin:     General: Skin is warm and dry.      Comments:  Erythema, swelling, and scaly to bilateral lower extremities right >left.    Neurological:      Mental Status: She is alert and oriented to person, place, and time.   Psychiatric:         Behavior: Behavior normal.         Thought Content: Thought content normal.         Judgment: Judgment normal.          Significant Labs: All pertinent labs within the past 24 hours have been reviewed.  BMP:   Recent Labs   Lab 03/04/24  9931   *      K 4.0      CO2 26   BUN 13   CREATININE 0.9   CALCIUM 9.2     CBC:   Recent Labs   Lab 03/03/24  8714  03/04/24  0455   WBC 5.92 4.73   HGB 8.5* 9.4*   HCT 26.9* 29.8*    237       Significant Imaging: I have reviewed all pertinent imaging results/findings within the past 24 hours.    Assessment/Plan:      * Bilateral lower leg cellulitis  - failed outpatient po therapy  - on cefepime and vanco  - trend WBC and fever curve  - follow-up blood cultures   - wound care consulted       3/4/2024  --Awaiting wound care consult  --Venous ultrasound negative  --Arterial ultrasound monophasic waves forms are seen within the left-sided calf vessels below the knee as well as within the right peroneal artery suggestive of occult stenosis---Vascular Surgery      Stage 3b chronic kidney disease  Creatine stable for now. BMP reviewed- noted Estimated Creatinine Clearance: 58 mL/min (based on SCr of 0.9 mg/dL). according to latest data. Based on current GFR, CKD stage is stage 3 - GFR 30-59.  Monitor UOP and serial BMP and adjust therapy as needed. Renally dose meds. Avoid nephrotoxic medications and procedures.    Type 2 diabetes mellitus without complication, without long-term current use of insulin  Patient's FSGs are controlled on current medication regimen.  Last A1c reviewed-   Lab Results   Component Value Date    HGBA1C 6.0 (H) 01/23/2024     Most recent fingerstick glucose reviewed-   Recent Labs   Lab 03/04/24  1148 03/04/24  1630   POCTGLUCOSE 97 105       Current correctional scale  Low  Maintain anti-hyperglycemic dose as follows-   Antihyperglycemics (From admission, onward)      Start     Stop Route Frequency Ordered    03/02/24 1625  insulin aspart U-100 pen 0-5 Units         -- SubQ Before meals & nightly PRN 03/02/24 1527          Hold Oral hypoglycemics while patient is in the hospital.    Essential hypertension  Chronic, controlled. Latest blood pressure and vitals reviewed-     Temp:  [97.8 °F (36.6 °C)-99.1 °F (37.3 °C)]   Pulse:  [71-88]   Resp:  [18-20]   BP: (141-163)/(71-77)   SpO2:  [97 %-100 %] .    Home meds for hypertension were reviewed and noted below.   Hypertension Medications               losartan (COZAAR) 100 MG tablet Take 1 tablet (100 mg total) by mouth once daily.            While in the hospital, will manage blood pressure as follows; Continue home antihypertensive regimen    Will utilize p.r.n. blood pressure medication only if patient's blood pressure greater than 180/110 and she develops symptoms such as worsening chest pain or shortness of breath.    3/4/24  -160s. Continue Losartan. Add hydralazine prn    Major depression, recurrent, chronic  Patient has persistent depression which is unknown and is currently controlled. Will Continue anti-depressant medications. We will not consult psychiatry at this time. Patient does not display psychosis at this time. Continue to monitor closely and adjust plan of care as needed.    - continue home Buspar, Wellbutrin, lexapro        VTE Risk Mitigation (From admission, onward)           Ordered     enoxaparin injection 40 mg  Daily         03/02/24 1527     IP VTE HIGH RISK PATIENT  Once         03/02/24 1527     Place sequential compression device  Until discontinued         03/02/24 1527                    Discharge Planning   FRANCISCO:      Code Status: Full Code   Is the patient medically ready for discharge?:     Reason for patient still in hospital (select all that apply): Treatment  Discharge Plan A: Home with family          Kai Estrella NP  Department of Hospital Medicine   [facility] - Telemetry ([facility])

## 2024-03-04 NOTE — PT/OT/SLP PROGRESS
Occupational Therapy   Treatment    Name: Lizeth Henderson  MRN: [id number]  Admitting Diagnosis:  Bilateral lower leg cellulitis       Recommendations:     Discharge Recommendations: Low Intensity Therapy  Discharge Equipment Recommendations:  none  Barriers to discharge:  None    Assessment:     Lizeth Henderson is a 83 y.o. female with a medical diagnosis of Bilateral lower leg cellulitis.  Performance deficits affecting function are impaired functional mobility, weakness, decreased safety awareness, pain, gait instability, impaired endurance, impaired balance, decreased upper extremity function, impaired self care skills, decreased lower extremity function.     Rehab Prognosis:  Good; patient would benefit from acute skilled OT services to address these deficits and reach maximum level of function.       Plan:     Patient to be seen 2 x/week to address the above listed problems via self-care/home management, therapeutic activities, therapeutic exercises  Plan of Care Expires: 03/17/24  Plan of Care Reviewed with: patient    Subjective     Chief Complaint: Bilateral feet following wound care  Patient/Family Comments/goals: Return to PLOF  Pain/Comfort:  Pain Rating 1: 8/10  Location - Side 1: Bilateral  Location - Orientation 1: generalized  Location 1: foot  Pain Addressed 1:  (activity pacing)  Pain Rating Post-Intervention 1: 7/10    Objective:     Communicated with: Nurse prior to session.  Patient found HOB elevated with peripheral IV, telemetry upon OT entry to room.    General Precautions: Standard, fall    Orthopedic Precautions:N/A  Braces: N/A  Respiratory Status: Room air     Occupational Performance:     Bed Mobility:    Patient completed Rolling/Turning to Right with stand by assistance  Patient completed Scooting/Bridging with stand by assistance  Patient completed Supine to Sit with stand by assistance     Functional Mobility/Transfers:  Patient completed Sit <> Stand Transfer with stand by  assistance  with  rolling walker   Patient completed Bed <> Chair Transfer using Stand Pivot technique with stand by assistance with rolling walker  Functional Mobility: Pt gait trained 2x100 feet with RW SBA      [facility] 6 Click ADL: 19    Treatment & Education:  Pt transferred to bedside chair SBA. Reviewed UE exercises to perform throughout the day. Pt demonstrated understanding. Encouraged to utilize call button for assistance when ready to return to bed. Pt verbalized understanding.    Patient left up in chair with all lines intact and call button in reach    GOALS:   Multidisciplinary Problems       Occupational Therapy Goals          Problem: Occupational Therapy    Goal Priority Disciplines Outcome Interventions   Occupational Therapy Goal     OT, PT/OT Ongoing, Progressing    Description: Goals to be met by: 3/17/24     Patient will increase functional independence with ADLs by performing:    LE Dressing with Moderate Assistance at bed level.  Toileting from bedside commode with Moderate Assistance for hygiene and clothing management.   Toilet transfer to bedside commode with Maximum Assistance using least restrictive assistive device (sliding board, RW, etc).                         Time Tracking:     OT Date of Treatment: 03/04/24  OT Start Time: 1020  OT Stop Time: 1035  OT Total Time (min): 15 min    Billable Minutes:Therapeutic Activity 15    JELANI Polo  OT/OCTAVIO: OT     Number of OCTAVIO visits since last OT visit: 0    3/4/2024

## 2024-03-04 NOTE — PLAN OF CARE
Discussed POC with pt verbalized understanding.  Patient remains free from injury.  Safety and fall  precautions maintained.   Call light and personal belongings within reach, bed in lowest position with bed wheels locked.   No s/s of acute distress.  Purposeful rounding continued this shift.  Pain and anxiety  controlled per MD order.  IV antibiotics continue  Cardiac monitoring in place, tele box number [id number]. Reading sinus rhythm   Blood glucose monitoring continued this shift.   Vital signs continued per orders this shift.   Patient mobility status standby assist w/ walker   Chart and orders review completed. Pt education about care completed.

## 2024-03-04 NOTE — SUBJECTIVE & OBJECTIVE
Interval History: Assisted to restroom. Standby assistance. Patient tells me appearance of cellulitis and lesions have not improved. Arterial ultrasound findings were discussed with patient. Vascular Surgery has been consulted.    Review of Systems   Cardiovascular:  Positive for leg swelling.   Skin:  Positive for color change and wound.     Objective:     Vital Signs (Most Recent):  Temp: 97.8 °F (36.6 °C) (03/04/24 0729)  Pulse: 76 (03/04/24 0800)  Resp: 18 (03/04/24 0729)  BP: (!) 141/71 (03/04/24 0729)  SpO2: 98 % (03/04/24 0729) Vital Signs (24h Range):  Temp:  [97.8 °F (36.6 °C)-99.1 °F (37.3 °C)] 97.8 °F (36.6 °C)  Pulse:  [71-88] 76  Resp:  [18-20] 18  SpO2:  [97 %-100 %] 98 %  BP: (141-163)/(71-77) 141/71     Weight: 101.3 kg (223 lb 5.2 oz)  Body mass index is 33.96 kg/m².    Intake/Output Summary (Last 24 hours) at 3/4/2024 1631  Last data filed at 3/3/2024 1806  Gross per 24 hour   Intake 493.61 ml   Output 1 ml   Net 492.61 ml         Physical Exam  Vitals and nursing note reviewed.   Constitutional:       Appearance: She is well-developed. She is obese.   HENT:      Head: Normocephalic and atraumatic.   Eyes:      Conjunctiva/sclera: Conjunctivae normal.      Pupils: Pupils are equal, round, and reactive to light.   Cardiovascular:      Rate and Rhythm: Normal rate and regular rhythm.      Heart sounds: Normal heart sounds.   Pulmonary:      Effort: Pulmonary effort is normal.      Breath sounds: Normal breath sounds.   Abdominal:      General: Bowel sounds are normal.      Palpations: Abdomen is soft.   Musculoskeletal:         General: Normal range of motion.      Cervical back: Normal range of motion and neck supple.   Skin:     General: Skin is warm and dry.      Comments:  Erythema, swelling, and scaly to bilateral lower extremities right >left.    Neurological:      Mental Status: She is alert and oriented to person, place, and time.   Psychiatric:         Behavior: Behavior normal.          Thought Content: Thought content normal.         Judgment: Judgment normal.          Significant Labs: All pertinent labs within the past 24 hours have been reviewed.  BMP:   Recent Labs   Lab 03/04/24  0455   *      K 4.0      CO2 26   BUN 13   CREATININE 0.9   CALCIUM 9.2     CBC:   Recent Labs   Lab 03/03/24  0434 03/04/24  0455   WBC 5.92 4.73   HGB 8.5* 9.4*   HCT 26.9* 29.8*    237       Significant Imaging: I have reviewed all pertinent imaging results/findings within the past 24 hours.

## 2024-03-04 NOTE — CONSULTS
Pharmacokinetic Assessment Follow Up: IV Vancomycin    Vancomycin serum concentration assessment(s):    The trough level was drawn correctly and can be used to guide therapy at this time. The measurement is within the desired definitive target range of 10 to 15 mcg/mL.    Vancomycin Regimen Plan:    Continue regimen to Vancomycin 1750 mg IV every 24 hours with next serum trough concentration measured at 1630 prior to 5th dose on 3/6/24.    Drug levels (last 3 results):  Recent Labs   Lab Result Units 03/04/24  1648   Vancomycin-Trough ug/mL 13.5     Pharmacy will continue to follow and monitor vancomycin.    Please contact pharmacy at extension [phone] for questions regarding this assessment.    Thank you for the consult,   Goldie Cerna PharmD       Patient brief summary:  Lizeth Henderson is a 83 y.o. female initiated on antimicrobial therapy with IV Vancomycin for treatment of skin & soft tissue infection    The patient's current regimen is Vancomycin 1750 mg IV every 24 hours.     Drug Allergies:   Review of patient's allergies indicates:   Allergen Reactions    Clindamycin      Diarrhea      Lisinopril      cough       Actual Body Weight:   101.3 kg    Renal Function:   Estimated Creatinine Clearance: 58 mL/min (based on SCr of 0.9 mg/dL).,     Dialysis Method (if applicable):  N/A    CBC (last 72 hours):  Recent Labs   Lab Result Units 03/02/24  1415 03/03/24  0434 03/04/24  0455   WBC K/uL 6.68 5.92 4.73   Hemoglobin g/dL 9.1* 8.5* 9.4*   Hematocrit % 28.5* 26.9* 29.8*   Platelets K/uL 235 254 237   Gran % % 67.6 68.3 61.6   Lymph % % 21.3 20.3 25.8   Mono % % 8.8 8.8 8.9   Eosinophil % % 1.3 1.4 2.7   Basophil % % 0.4 0.7 0.6   Differential Method  Automated Automated Automated       Metabolic Panel (last 72 hours):  Recent Labs   Lab Result Units 03/02/24  1415 03/03/24  0434 03/04/24  0455   Sodium mmol/L 142 141 142   Potassium mmol/L 4.1 3.8 4.0   Chloride mmol/L 107 108 108   CO2 mmol/L 27 24 26    Glucose mg/dL 93 111* 120*   BUN mg/dL 19 14 13   Creatinine mg/dL 1.1 1.0 0.9   Albumin g/dL 3.5  --   --    Total Bilirubin mg/dL 0.4  --   --    Alkaline Phosphatase U/L 81  --   --    AST U/L 17  --   --    ALT U/L 5*  --   --        Vancomycin Administrations:  vancomycin given in the last 96 hours                     vancomycin 1.75 g in 5 % dextrose 500 mL IVPB (mg) 1,750 mg New Bag 03/03/24 1727    vancomycin (VANCOCIN) 2,250 mg in dextrose 5 % (D5W) 500 mL IVPB (mg) 2,250 mg New Bag 03/02/24 1647                    Microbiologic Results:  Microbiology Results (last 7 days)       Procedure Component Value Units Date/Time    Blood Culture #2 **CANNOT BE ORDERED STAT** [[id number]] Collected: 03/02/24 1414    Order Status: Completed Specimen: Blood from Peripheral, Antecubital, Right Updated: 03/03/24 2212     Blood Culture, Routine No Growth to date      No Growth to date    Blood Culture #1 **CANNOT BE ORDERED STAT** [[id number]] Collected: 03/02/24 1506    Order Status: Completed Specimen: Blood from Peripheral, Antecubital, Right Updated: 03/03/24 2212     Blood Culture, Routine No Growth to date      No Growth to date          Thank you for allowing us to participate in this patient's care.     Goldie Cerna, Keshav 03/04/2024 5:45 PM

## 2024-03-04 NOTE — PLAN OF CARE
Pt bed mobility SBA. Gait SBA 2x100' with RW. Transferred to bedside chair SBA.   Rec low intensity therapy

## 2024-03-04 NOTE — ASSESSMENT & PLAN NOTE
Creatine stable for now. BMP reviewed- noted Estimated Creatinine Clearance: 58 mL/min (based on SCr of 0.9 mg/dL). according to latest data. Based on current GFR, CKD stage is stage 3 - GFR 30-59.  Monitor UOP and serial BMP and adjust therapy as needed. Renally dose meds. Avoid nephrotoxic medications and procedures.

## 2024-03-04 NOTE — PT/OT/SLP PROGRESS
Physical Therapy Treatment    Patient Name:  Lizeth Henderson   MRN:  [id number]    Recommendations:     Discharge Recommendations: Low Intensity Therapy  Discharge Equipment Recommendations: none  Barriers to discharge: None    Assessment:     Lizeth Henderson is a 83 y.o. female admitted with a medical diagnosis of Bilateral lower leg cellulitis.  She presents with the following impairments/functional limitations: weakness, impaired endurance, impaired functional mobility, gait instability, impaired balance, pain, decreased safety awareness, decreased lower extremity function, decreased coordination.    Rehab Prognosis: Good; patient would benefit from acute skilled PT services to address these deficits and reach maximum level of function.    Recent Surgery: * No surgery found *     Plan:     During this hospitalization, patient to be seen 3 x/week to address the identified rehab impairments via gait training, therapeutic activities, therapeutic exercises and progress toward the following goals:    Plan of Care Expires:  03/17/24    Subjective     Chief Complaint: B FOOT PAIN  Patient/Family Comments/goals:   Pain/Comfort:  Pain Rating 1: 8/10  Location - Side 1: Bilateral  Location - Orientation 1: generalized  Location 1: foot  Pain Addressed 1: Reposition      Objective:     Communicated with NURSE prior to session.  Patient found supine with telemetry, peripheral IV upon PT entry to room.     General Precautions: Standard, fall  Orthopedic Precautions: N/A  Braces: N/A  Respiratory Status: Room air     Functional Mobility:  Bed Mobility:     Rolling Left:  stand by assistance  Scooting: stand by assistance  Supine to Sit: stand by assistance  Transfers:     Sit to Stand:  stand by assistance with rolling walker  Bed to Chair: stand by assistance with  rolling walker  using  Step Transfer  Gait: PT ' X 2 TRIALS WITH RW AND SBA, NO LOB OR SOB ON ROOM AIR  Balance: GOOD SITTING BALANCE, FAIR+ DYNAMIC  "BALANCE DURING GAIT    AM-PAC 6 CLICK MOBILITY  Turning over in bed (including adjusting bedclothes, sheets and blankets)?: 4  Sitting down on and standing up from a chair with arms (e.g., wheelchair, bedside commode, etc.): 4  Moving from lying on back to sitting on the side of the bed?: 4  Moving to and from a bed to a chair (including a wheelchair)?: 4  Need to walk in hospital room?: 4  Climbing 3-5 steps with a railing?: 1  Basic Mobility Total Score: 21     Treatment & Education:  PT EDUCATION:  - ROLE OF P.T. AND POC IN ACUTE CARE HOSPITAL SETTING  - RW USE AND SAFETY DURING TF'S AND GAIT  - ENCOURAGED TO INCREASE TIME OOB IN CHAIR TO TOLERANCE   - TO CONTINUE THERAPUETIC EXERCISES THROUGHOUT THE DAY TO INCREASE ACTIVITY TOLERANCE AND DECREASE RISK FOR PNEUMONIA AND BLOOD CLOTS: HIP FLEX/EXT, HIP ABD/ADD, QUAD SET, HEEL SLIDE, AP  - RISK FOR FALLS DUE TO GENERALIZED WEAKNESS, EDUCATED ON "CALL DON'T FALL", ENCOURAGED TO CALL FOR ASSISTANCE WITH ALL NEEDS SUCH AS BED<>CHAIR TRANSFERS OR TRIPS TO BATHROOM, PT AGREEABLE TO SAFETY PRECAUTIONS    Patient left up in chair with all lines intact, call button in reach, chair alarm on, and NURSE notified..    GOALS:   Multidisciplinary Problems       Physical Therapy Goals          Problem: Physical Therapy    Goal Priority Disciplines Outcome Goal Variances Interventions   Physical Therapy Goal     PT, PT/OT Ongoing, Progressing     Description: Patient will be seen a minimal of 3 out of 7 days a week.    Bed mobility: Mod I  Transfers: Mod I with RW  Gait: Mod I with  feet                        Time Tracking:     PT Received On: 03/04/24  PT Start Time: 1020     PT Stop Time: 1035  PT Total Time (min): 15 min     Billable Minutes: Gait Training 15    Treatment Type: Treatment  PT/PTA: PT     Number of PTA visits since last PT visit: 0     03/04/2024  "

## 2024-03-04 NOTE — ASSESSMENT & PLAN NOTE
- failed outpatient po therapy  - on cefepime and vanco  - trend WBC and fever curve  - follow-up blood cultures   - wound care consulted       3/4/2024  --Awaiting wound care consult  --Venous ultrasound negative  --Arterial ultrasound monophasic waves forms are seen within the left-sided calf vessels below the knee as well as within the right peroneal artery suggestive of occult stenosis---Vascular Surgery

## 2024-03-05 VITALS
HEIGHT: 68 IN | OXYGEN SATURATION: 98 % | WEIGHT: 223.31 LBS | HEART RATE: 74 BPM | RESPIRATION RATE: 18 BRPM | BODY MASS INDEX: 33.84 KG/M2 | TEMPERATURE: 98 F | DIASTOLIC BLOOD PRESSURE: 73 MMHG | SYSTOLIC BLOOD PRESSURE: 156 MMHG

## 2024-03-05 LAB
ANION GAP SERPL CALC-SCNC: 9 MMOL/L (ref 8–16)
BASOPHILS # BLD AUTO: 0.03 K/UL (ref 0–0.2)
BASOPHILS NFR BLD: 0.6 % (ref 0–1.9)
BUN SERPL-MCNC: 12 MG/DL (ref 8–23)
CALCIUM SERPL-MCNC: 9 MG/DL (ref 8.7–10.5)
CHLORIDE SERPL-SCNC: 108 MMOL/L (ref 95–110)
CO2 SERPL-SCNC: 25 MMOL/L (ref 23–29)
CREAT SERPL-MCNC: 0.9 MG/DL (ref 0.5–1.4)
DIFFERENTIAL METHOD BLD: ABNORMAL
EOSINOPHIL # BLD AUTO: 0.2 K/UL (ref 0–0.5)
EOSINOPHIL NFR BLD: 3.2 % (ref 0–8)
ERYTHROCYTE [DISTWIDTH] IN BLOOD BY AUTOMATED COUNT: 14 % (ref 11.5–14.5)
EST. GFR  (NO RACE VARIABLE): >60 ML/MIN/1.73 M^2
GLUCOSE SERPL-MCNC: 107 MG/DL (ref 70–110)
HCT VFR BLD AUTO: 27.8 % (ref 37–48.5)
HGB BLD-MCNC: 8.7 G/DL (ref 12–16)
IMM GRANULOCYTES # BLD AUTO: 0.03 K/UL (ref 0–0.04)
IMM GRANULOCYTES NFR BLD AUTO: 0.6 % (ref 0–0.5)
LYMPHOCYTES # BLD AUTO: 1.5 K/UL (ref 1–4.8)
LYMPHOCYTES NFR BLD: 29.8 % (ref 18–48)
MCH RBC QN AUTO: 27.4 PG (ref 27–31)
MCHC RBC AUTO-ENTMCNC: 31.3 G/DL (ref 32–36)
MCV RBC AUTO: 88 FL (ref 82–98)
MONOCYTES # BLD AUTO: 0.5 K/UL (ref 0.3–1)
MONOCYTES NFR BLD: 9 % (ref 4–15)
NEUTROPHILS # BLD AUTO: 2.8 K/UL (ref 1.8–7.7)
NEUTROPHILS NFR BLD: 56.8 % (ref 38–73)
NRBC BLD-RTO: 0 /100 WBC
PLATELET # BLD AUTO: 226 K/UL (ref 150–450)
PMV BLD AUTO: 9.7 FL (ref 9.2–12.9)
POCT GLUCOSE: 105 MG/DL (ref 70–110)
POTASSIUM SERPL-SCNC: 4 MMOL/L (ref 3.5–5.1)
RBC # BLD AUTO: 3.17 M/UL (ref 4–5.4)
SODIUM SERPL-SCNC: 142 MMOL/L (ref 136–145)
WBC # BLD AUTO: 5 K/UL (ref 3.9–12.7)

## 2024-03-05 PROCEDURE — 99222 1ST HOSP IP/OBS MODERATE 55: CPT | Mod: HCNC,,, | Performed by: SURGERY

## 2024-03-05 PROCEDURE — 36415 COLL VENOUS BLD VENIPUNCTURE: CPT | Mod: HCNC | Performed by: NURSE PRACTITIONER

## 2024-03-05 PROCEDURE — 85025 COMPLETE CBC W/AUTO DIFF WBC: CPT | Mod: HCNC | Performed by: NURSE PRACTITIONER

## 2024-03-05 PROCEDURE — 25000003 PHARM REV CODE 250: Mod: HCNC | Performed by: NURSE PRACTITIONER

## 2024-03-05 PROCEDURE — 80048 BASIC METABOLIC PNL TOTAL CA: CPT | Mod: HCNC | Performed by: NURSE PRACTITIONER

## 2024-03-05 PROCEDURE — 63600175 PHARM REV CODE 636 W HCPCS: Mod: HCNC | Performed by: NURSE PRACTITIONER

## 2024-03-05 RX ORDER — CEPHALEXIN 500 MG/1
500 CAPSULE ORAL EVERY 12 HOURS
Qty: 6 CAPSULE | Refills: 0 | Status: SHIPPED | OUTPATIENT
Start: 2024-03-05 | End: 2024-03-08

## 2024-03-05 RX ADMIN — BUPROPION HYDROCHLORIDE 150 MG: 150 TABLET, FILM COATED, EXTENDED RELEASE ORAL at 08:03

## 2024-03-05 RX ADMIN — BUSPIRONE HYDROCHLORIDE 20 MG: 10 TABLET ORAL at 08:03

## 2024-03-05 RX ADMIN — ACETAMINOPHEN 650 MG: 325 TABLET ORAL at 04:03

## 2024-03-05 RX ADMIN — ASPIRIN 81 MG: 81 TABLET, COATED ORAL at 08:03

## 2024-03-05 RX ADMIN — ESCITALOPRAM OXALATE 10 MG: 10 TABLET ORAL at 08:03

## 2024-03-05 RX ADMIN — LOSARTAN POTASSIUM 100 MG: 50 TABLET, FILM COATED ORAL at 08:03

## 2024-03-05 NOTE — CONSULTS
Consult    Consulting Physician:  Katarzyna De MD  Reason for Consultation: cellulitis with abnormal arterial duplex in left leg    CC: Cellulitis (States hx of cellulitis that keeps coming back to right lower leg. Also to left leg. No antibiotics at present. )      HPI: Lizeth Henderson is a 83 y.o. female with PMHx as seen below, was admitted on 3/2/2024 with swelling and cellulitic changes to both lower extremities.  Has been on IV antibiotics.  Pain in both legs have improved as well as erythema, per patient.  She is not a smoker.  She is not diabetic.  Ambulates with a walker.  Wound care consulted as well.  It appears she has had right knee replacement in the past.  I have reviewed arterial duplex and previous images of lower extremities from prior clinic visits.      Past Medical History:   Diagnosis Date    Adrenal adenoma 2016    Anxiety     Arthritis     Benign hypertension     Colon cancer age 61    colon    Coronary artery disease     Depression     Full dentures     General anesthetics causing adverse effect in therapeutic use     yells and talks when wakes up    Hyperlipidemia     Osteopenia     Shingles     Type 2 diabetes mellitus without complication, without long-term current use of insulin 1/23/2024    Wears glasses        Past Surgical History:   Procedure Laterality Date    APPENDECTOMY      BREAST BIOPSY Left     benign    BUNIONECTOMY Left     CATARACT EXTRACTION Bilateral     COLON SURGERY      2001    EYE SURGERY      cataract surg    HEMICOLECTOMY  2002    HERNIA REPAIR      x5    JOINT REPLACEMENT      knee    left toe surgery      joelle    PATENT DUCTUS ARTERIOUS LIGATION  1948    SALPINGOOPHORECTOMY  2002    due to colon cancer    TONSILLECTOMY, ADENOIDECTOMY         Social History     Socioeconomic History    Marital status:     Number of children: 3   Tobacco Use    Smoking status: Never     Passive exposure:  Never    Smokeless tobacco: Never   Substance and Sexual Activity    Alcohol use: No    Drug use: No    Sexual activity: Never   Social History Narrative    The patient does not exercise regularly ().    Rates diet as poor.    She is not satisfied with weight.             Social Determinants of Health     Financial Resource Strain: Low Risk  (10/23/2023)    Overall Financial Resource Strain (CARDIA)     Difficulty of Paying Living Expenses: Not hard at all   Food Insecurity: No Food Insecurity (10/23/2023)    Hunger Vital Sign     Worried About Running Out of Food in the Last Year: Never true     Ran Out of Food in the Last Year: Never true   Transportation Needs: No Transportation Needs (10/23/2023)    PRAPARE - Transportation     Lack of Transportation (Medical): No     Lack of Transportation (Non-Medical): No   Physical Activity: Unknown (10/23/2023)    Exercise Vital Sign     Days of Exercise per Week: 0 days   Stress: Stress Concern Present (10/23/2023)    [place] [employer] - Occupational Stress Questionnaire     Feeling of Stress : To some extent   Social Connections: Unknown (10/23/2023)    Social Connection and Isolation Panel [NHANES]     Frequency of Communication with Friends and Family: More than three times a week     Frequency of Social Gatherings with Friends and Family: Once a week     Active Member of Clubs or Organizations: No     Attends Club or Organization Meetings: Never     Marital Status:    Housing Stability: Low Risk  (10/23/2023)    Housing Stability Vital Sign     Unable to Pay for Housing in the Last Year: No     Number of Places Lived in the Last Year: 1     Unstable Housing in the Last Year: No       Family History   Problem Relation Age of Onset    Cancer Father     Hypertension Father     Arthritis Father     Alzheimer's disease Mother     Arthritis Mother     Depression Daughter     Kidney disease Daughter     Ulcerative colitis Daughter     Depression  Daughter     Depression Daughter     Anxiety disorder Daughter         PTSD    Cancer Maternal Uncle     Cancer Cousin         colon cancer    Early death Maternal Grandmother     Amblyopia Neg Hx     Blindness Neg Hx     Cataracts Neg Hx     Diabetes Neg Hx     Glaucoma Neg Hx     Macular degeneration Neg Hx     Retinal detachment Neg Hx     Strabismus Neg Hx     Stroke Neg Hx     Thyroid disease Neg Hx          Current Facility-Administered Medications:     0.9%  NaCl infusion, , Intravenous, PRN, Katarzyna De MD    0.9%  NaCl infusion, , Intravenous, PRN, Katarzyna De MD, Last Rate: 5 mL/hr at 03/04/24 2211, New Bag at 03/04/24 2211    acetaminophen tablet 650 mg, 650 mg, Oral, Q4H PRN, Carolyne, Yaneli E., NP, 650 mg at 03/05/24 0426    ALPRAZolam tablet 0.25 mg, 0.25 mg, Oral, BID PRN, Carolyne, Yaneli E., NP, 0.25 mg at 03/04/24 2202    aspirin EC tablet 81 mg, 81 mg, Oral, Daily, Carolyne, Yaneli E., NP, 81 mg at 03/04/24 0816    atorvastatin tablet 10 mg, 10 mg, Oral, QHS, Carolyne, Yaneli E., NP, 10 mg at 03/04/24 2157    buPROPion TB24 tablet 150 mg, 150 mg, Oral, Daily, Carolyne, Yaneli E., NP, 150 mg at 03/04/24 0816    busPIRone tablet 20 mg, 20 mg, Oral, BID, Carolyne, Yaneli E., NP, 20 mg at 03/04/24 2157    ceFEPIme (MAXIPIME) 1 g in dextrose 5 % in water (D5W) 100 mL IVPB (MB+), 1 g, Intravenous, Q12H, Carolyne, Yaneli E., NP, Stopped at 03/04/24 2242    dextrose 10% bolus 125 mL 125 mL, 12.5 g, Intravenous, PRN, Carolyne, Yaneli E., NP    dextrose 10% bolus 250 mL 250 mL, 25 g, Intravenous, PRN, Carolyne, Yaneli E., NP    enoxaparin injection 40 mg, 40 mg, Subcutaneous, Daily, Morelia Barfielden E., NP, 40 mg at 03/04/24 1802    EScitalopram oxalate tablet 10 mg, 10 mg, Oral, Daily, Yaneli Barfield NP, 10 mg at 03/04/24 0816    glucagon (human recombinant) injection 1 mg, 1 mg, Intramuscular, PRN, Yaneli Barfield NP    glucose chewable tablet 16 g, 16 g, Oral, PRN, Yaneli Barfield  "E., NP    glucose chewable tablet 24 g, 24 g, Oral, PRN, Yaneli Barfield, NP    hydrALAZINE injection 10 mg, 10 mg, Intravenous, Q6H PRN, Kai Estrella NP    HYDROcodone-acetaminophen 5-325 mg per tablet 1 tablet, 1 tablet, Oral, Q6H PRN, Yaneli Barfield NP, 1 tablet at 03/04/24 2202    insulin aspart U-100 pen 0-5 Units, 0-5 Units, Subcutaneous, QID (AC + HS) PRN, Yaneli Barfield, ANH    losartan tablet 100 mg, 100 mg, Oral, Daily, Yaneli Barfield NP, 100 mg at 03/04/24 0816    melatonin tablet 6 mg, 6 mg, Oral, Nightly PRN, Yaneli Barfield NP, 6 mg at 03/03/24 2114    naloxone 0.4 mg/mL injection 0.02 mg, 0.02 mg, Intravenous, PRN, Yaneli Barfield NP    ondansetron injection 4 mg, 4 mg, Intravenous, Q8H PRN, Yaneli Barfield NP    pramipexole tablet 0.125 mg, 0.125 mg, Oral, QHS, Yaneli Barfield NP, 0.125 mg at 03/04/24 2157    senna-docusate 8.6-50 mg per tablet 1 tablet, 1 tablet, Oral, BID, Yaneli Barfield NP, 1 tablet at 03/04/24 2157    sodium chloride 0.9% flush 10 mL, 10 mL, Intravenous, Q12H PRN, Yaneli Barfield NP    Pharmacy to dose Vancomycin consult, , , Once **AND** vancomycin - pharmacy to dose, , Intravenous, pharmacy to manage frequency, Yaneli Barfield NP    vancomycin 1.75 g in 5 % dextrose 500 mL IVPB, 1,750 mg, Intravenous, Q24H, Keon Yeager MD, Stopped at 03/04/24 1959    Review of patient's allergies indicates:   Allergen Reactions    Clindamycin      Diarrhea      Lisinopril      cough       ROS:  10 point review of systems is negative except as mentioned in HPI.    Vitals:    03/05/24 0726   BP: (!) 161/81   Pulse: 80   Resp: 18   Temp: 97.8 °F (36.6 °C)       Objective:  General Appearance:  Comfortable, well-appearing and in no acute distress.    Vital signs: (most recent): Blood pressure (!) 161/81, pulse 80, temperature 97.8 °F (36.6 °C), temperature source Oral, resp. rate 18, height 5' 8" (1.727 m), weight 101.3 kg (223 lb 5.2 " oz), SpO2 98 %.  Vital signs are normal.    HEENT: Normal HEENT exam.    Lungs:  Normal effort and normal respiratory rate.    Heart: Normal rate.  Regular rhythm.    Abdomen: Abdomen is soft.  Bowel sounds are normal.   There is no abdominal tenderness.     Extremities: Normal range of motion.    Pulses: Distal pulses are intact.  (Right PT pulse and left DP pulse present)    Neurological: Patient is alert and oriented to person, place and time.  Normal strength.    Pupils:  Pupils are equal, round, and reactive to light.    Skin:  (Dry, scaly skin both lower extremities distal pre-tibial area.  Minimal erythema compared to prior images)      LABS:  I have reviewed all pertinent lab results within the past 24 hours.    IMAGING:  I have personally reviewed the imaging.    US Lower Extremity Arteries Bilateral   Final Result      Monophasic waves forms are seen within the left-sided calf vessels below the knee as well as within the right peroneal artery suggestive of occult stenosis.  No velocity doubling is seen.  Consider follow-up conventional angiography or CTA as clinically warranted.         Electronically signed by: Cruzito Meza MD   Date:    03/04/2024   Time:    11:39      US Lower Extremity Veins Bilateral   Final Result      Negative for DVT.         Electronically signed by: Gentry Coffey MD   Date:    03/02/2024   Time:    14:41      X-Ray Chest AP Portable   Final Result      No acute findings.         Electronically signed by: Gentry Coffey MD   Date:    03/02/2024   Time:    13:59          [facility]  Number of Diagnoses or Management Options  Bilateral lower extremity edema: new, needed workup  Bilateral lower leg cellulitis: new, needed workup     Amount and/or Complexity of Data Reviewed  Clinical lab tests: reviewed  Tests in the radiology section of CPT®: reviewed  Tests in the medicine section of CPT®: reviewed  Discussion of test results with the performing providers: yes  Decide to obtain  previous medical records or to obtain history from someone other than the patient: no  Obtain history from someone other than the patient: no  Review and summarize past medical records: yes  Discuss the patient with other providers: yes  Independent visualization of images, tracings, or specimens: yes    Risk of Complications, Morbidity, and/or Mortality  Presenting problems: moderate  Diagnostic procedures: moderate  Management options: moderate        Assessment & Plan  Lizeth Henderson is a 83 y.o. female admitted with bilateral lower extremity cellulitis.  I have reviewed her arterial duplex - this was normal on the right and showed monophasic flow in tibial vessels on the left.  She has bilateral pedal pulses.  I am not worried about her arterial perfusion.  Cellulitis seems to have improved.  I agree with wound care, I would recommend Eucerin cream to both lower extremities and light compression with tubi- or double layer tubi-.  We will sign off.  I will have patient return to our clinic at  in one month.      I discussed this plan with the patient and she understands, agrees, and appreciates our input and would like to proceed with our above stated plan.    Enrique Benjamin  3/5/2024  [facility]  Vascular Surgery  [phone] (Clinic Number)    Active Hospital Problems    Diagnosis  POA    *Bilateral lower leg cellulitis [L03.116, L03.115]  Yes    Stage 3b chronic kidney disease [N18.32]  Yes    Type 2 diabetes mellitus without complication, without long-term current use of insulin [E11.9]  Yes    Essential hypertension [I10]  Yes    Major depression, recurrent, chronic [F33.9]  Yes      Resolved Hospital Problems   No resolved problems to display.

## 2024-03-05 NOTE — PLAN OF CARE
A204/A204 ANA Henderson is a 83 y.o.female admitted on 3/2/2024 for Bilateral lower leg cellulitis   Code Status: Full Code MRN: [id number]   Review of patient's allergies indicates:   Allergen Reactions    Clindamycin      Diarrhea      Lisinopril      cough     Past Medical History:   Diagnosis Date    Adrenal adenoma 2016    Anxiety     Arthritis     Benign hypertension     Colon cancer age 61    colon    Coronary artery disease     Depression     Full dentures     General anesthetics causing adverse effect in therapeutic use     yells and talks when wakes up    Hyperlipidemia     Osteopenia     Shingles     Type 2 diabetes mellitus without complication, without long-term current use of insulin 1/23/2024    Wears glasses       PRN meds    sodium chloride 0.9%, , PRN  sodium chloride 0.9%, , PRN  acetaminophen, 650 mg, Q4H PRN  ALPRAZolam, 0.25 mg, BID PRN  dextrose 10%, 12.5 g, PRN  dextrose 10%, 25 g, PRN  glucagon (human recombinant), 1 mg, PRN  glucose, 16 g, PRN  glucose, 24 g, PRN  hydrALAZINE, 10 mg, Q6H PRN  HYDROcodone-acetaminophen, 1 tablet, Q6H PRN  insulin aspart U-100, 0-5 Units, QID (AC + HS) PRN  melatonin, 6 mg, Nightly PRN  naloxone, 0.02 mg, PRN  ondansetron, 4 mg, Q8H PRN  sodium chloride 0.9%, 10 mL, Q12H PRN  vancomycin - pharmacy to dose, , pharmacy to manage frequency      AVS Discharge instructions received and reviewed with pt and family at bedside.  Pt voiced understanding and all questions answered to satisfaction.  Stressed importance to making and keeping all follow up appointments.  Medications at bedside and reviewed with pt.  Tele monitor removed and brought to monitor tech.  IV d/c'd with tip intact, pressure dressing applied.  Pt will call when ready to be transported to front of hospital via w/c to be discharged home.      Orientation: oriented x 4  [place] Coma Scale Score: 15     Lead Monitored: Lead II Rhythm: normal sinus rhythm    Cardiac/Telemetry Box Number:  [id number]  VTE Required Core Measure: Pharmacological prophylaxis initiated/maintained Last Bowel Movement: 03/04/24  Diet Adult Regular (IDDSI Level 7)     Michele Score: 21  Fall Risk Score: 13  Accucheck []   Freq?      Lines/Drains/Airways       Peripheral Intravenous Line  Duration                  Peripheral IV - Single Lumen 03/04/24 1849 24 G Anterior;Right Wrist <1 day                       Problem: Adult Inpatient Plan of Care  Goal: Plan of Care Review  Outcome: Ongoing, Progressing  Goal: Patient-Specific Goal (Individualized)  Outcome: Ongoing, Progressing  Goal: Absence of Hospital-Acquired Illness or Injury  Outcome: Ongoing, Progressing  Goal: Optimal Comfort and Wellbeing  Outcome: Ongoing, Progressing  Goal: Readiness for Transition of Care  Outcome: Ongoing, Progressing     Problem: Diabetes Comorbidity  Goal: Blood Glucose Level Within Targeted Range  Outcome: Ongoing, Progressing     Problem: Skin Injury Risk Increased  Goal: Skin Health and Integrity  Outcome: Ongoing, Progressing     Problem: Fall Injury Risk  Goal: Absence of Fall and Fall-Related Injury  Outcome: Ongoing, Progressing

## 2024-03-05 NOTE — DISCHARGE SUMMARY
[facility] - Telemetry ([facility])  [facility] Medicine  Discharge Summary      Patient Name: Lizeth Henderson  MRN: [id number]  VELVET: [id number]  Patient Class: IP- Inpatient  Admission Date: 3/2/2024  Hospital Length of Stay: 3 days  Discharge Date and Time:  03/05/2024 3:54 PM  Attending Physician: No att. providers found   Discharging Provider: Kai Estrella NP  Primary Care Provider: Lazarus Euceda MD    Primary Care Team: Networked reference to record PCT     HPI:   Mrs. Henderson is a 83 year old female with a history of HTN, HLD, CAD, depression, adrenal adenoma, renal insufficiency who presents to the ED for evaluation of possible cellulitis to bilateral lower legs which onset gradually since early January of this year. Pt previously treated with clindamycin (gave her diarrhea) then bactrim.  Cellulitis has not improved.  Patient denies any SOB, fever,chills.  In the ED lab work notable for CRP 12. US BLE negative for DVT.  Chest xray with no acute findings.  Patient started on iv abx in ed. Blood cultures drawn. She will be admitted to hospital medicine for cellulitis that has failed outpatient abx.      Code Status Full  Surrogate decision maker daughter     * No surgery found *      Hospital Course:   84 y/o female admitted for bilateral LE cellulitis and was started on vancomycin and cefepime. Symptoms improving. Continue current plan.     3/4/24  Appearance of cellulitis unchanged per patient. Venous ultrasound negative for DVT. Arterial Ultrasound shows monophasic waveforms seen within the left-sided calf vessels below the knee as well as within the right peroneal artery suggestive of occult stenosis. Vascular Surgery and wound care has been consulted. Glucose and vitals have been stable.    3/5/24  The patient was seen by Vascular services who did not recommend further intervention for arterial studies as patient has palpable pedal pulses. For venous stasis dermatitis complicated by cellulitis it was  recommended for routine skin care, and adequate moisturizers cream. She will need compression therapy and will have this managed by Vascular surgery OP. Until then, she was educated on light compression with tubi- or double layer tubi-. She will continue Keflex x 3 days. Patient is stable for discharge.     Goals of Care Treatment Preferences:  Code Status: Full Code      Consults:   Consults (From admission, onward)          Status Ordering Provider     Inpatient consult to Vascular Surgery  Once        Provider:  Enrique Benajmin MD    Completed HIRAM WAYNE            No new Assessment & Plan notes have been filed under this hospital service since the last note was generated.  Service: Hospital Medicine    Final Active Diagnoses:    Diagnosis Date Noted POA    PRINCIPAL PROBLEM:  Bilateral lower leg cellulitis [L03.116, L03.115] 02/14/2024 Yes    Stage 3b chronic kidney disease [N18.32] 01/23/2024 Yes    Type 2 diabetes mellitus without complication, without long-term current use of insulin [E11.9] 01/23/2024 Yes    Essential hypertension [I10]  Yes    Major depression, recurrent, chronic [F33.9] 11/05/2012 Yes      Problems Resolved During this Admission:       Discharged Condition: stable    Disposition: Home or Self Care    Follow Up:    Patient Instructions:   No discharge procedures on file.    Significant Diagnostic Studies: Labs: CMP   Recent Labs   Lab 03/04/24  0455 03/05/24  0518    142   K 4.0 4.0    108   CO2 26 25   * 107   BUN 13 12   CREATININE 0.9 0.9   CALCIUM 9.2 9.0   ANIONGAP 8 9    and CBC   Recent Labs   Lab 03/04/24  0455 03/05/24  0518   WBC 4.73 5.00   HGB 9.4* 8.7*   HCT 29.8* 27.8*    226       Pending Diagnostic Studies:       None           Medications:  Reconciled Home Medications:      Medication List        START taking these medications      cephALEXin 500 MG capsule  Commonly known as: KEFLEX  Take 1 capsule (500 mg total) by mouth every  12 (twelve) hours. for 3 days            CONTINUE taking these medications      ALPRAZolam 0.25 MG tablet  Commonly known as: XANAX  TAKE 1/2 TO 1 TABLET BY MOUTH DAILY AS NEEDED FOR ANXIETY     aspirin 81 MG EC tablet  Commonly known as: ECOTRIN  Take 81 mg by mouth once daily.     atorvastatin 10 MG tablet  Commonly known as: LIPITOR  TAKE 1 TABLET(10 MG) BY MOUTH EVERY DAY     BELSOMRA 10 mg Tab  Generic drug: suvorexant  Take 1 tablet by mouth every evening.     buPROPion 150 MG TB24 tablet  Commonly known as: WELLBUTRIN XL  Take 1 tablet (150 mg total) by mouth once daily.     busPIRone 10 MG tablet  Commonly known as: BUSPAR  Take 2 tablets (20 mg total) by mouth 2 (two) times daily.     EScitalopram oxalate 10 MG tablet  Commonly known as: LEXAPRO  Take 1 tablet (10 mg total) by mouth once daily.     ibandronate 150 mg tablet  Commonly known as: BONIVA  Take 1 tablet (150 mg total) by mouth every 30 days.     losartan 100 MG tablet  Commonly known as: COZAAR  Take 1 tablet (100 mg total) by mouth once daily.     MYRBETRIQ 50 mg Tb24  Generic drug: mirabegron  TAKE 1 TABLET(50 MG) BY MOUTH EVERY DAY     pramipexole 0.125 MG tablet  Commonly known as: MIRAPEX  TAKE 1 TABLET BY MOUTH EVERY EVENING            STOP taking these medications      ergocalciferol 50,000 unit Cap  Commonly known as: ERGOCALCIFEROL              Indwelling Lines/Drains at time of discharge:   Lines/Drains/Airways       None                   Time spent on the discharge of patient: >35 minutes         Kai Estrella NP  Department of Hospital Medicine  [facility] [facility] ([facility])

## 2024-03-05 NOTE — PLAN OF CARE
[facility] - Telemetry (Hospital)  Discharge Final Note    Primary Care Provider: Lazarus Euceda MD    Expected Discharge Date: 3/5/2024    Final Discharge Note (most recent)       Final Note - 03/05/24 0848          Final Note    Assessment Type Final Discharge Note     Anticipated Discharge Disposition Home or Self Care     Hospital Resources/Appts/Education Provided Appointments scheduled and added to AVS                     Important Message from Medicare             DC Dispo: home  PCP f/u: 3/19, patient added to waitlist  Podiatry f/u: 4/1

## 2024-03-05 NOTE — DISCHARGE INSTRUCTIONS
Apply liberal layer of skin moisturizer cream...recommend Eucerin cream to both lower extremities and light compression with tubi- or double layer tubi-.

## 2024-03-06 ENCOUNTER — PATIENT OUTREACH (OUTPATIENT)
Dept: ADMINISTRATIVE | Facility: CLINIC | Age: 84
End: 2024-03-06
Payer: MEDICARE

## 2024-03-06 NOTE — PROGRESS NOTES
C3 nurse attempted to contact Lizeth Henderson  for a TCC post hospital discharge follow up call. No answer. Left voicemail with callback information. The patient does not have a scheduled HOSFU appointment. Message sent to PCP staff for assistance with scheduling visit with patient within 5-7 days post discharge date 03/05/2024.

## 2024-03-07 LAB
BACTERIA BLD CULT: NORMAL
BACTERIA BLD CULT: NORMAL

## 2024-03-13 RX ORDER — BUSPIRONE HYDROCHLORIDE 10 MG/1
TABLET ORAL
Qty: 120 TABLET | Refills: 2 | Status: SHIPPED | OUTPATIENT
Start: 2024-03-13 | End: 2024-05-15 | Stop reason: SDUPTHER

## 2024-03-14 DIAGNOSIS — N32.81 OVERACTIVE BLADDER: Chronic | ICD-10-CM

## 2024-03-14 RX ORDER — MIRABEGRON 50 MG/1
TABLET, FILM COATED, EXTENDED RELEASE ORAL
Qty: 90 TABLET | Refills: 0 | Status: SHIPPED | OUTPATIENT
Start: 2024-03-14 | End: 2024-06-14 | Stop reason: SDUPTHER

## 2024-03-14 NOTE — TELEPHONE ENCOUNTER
Care Due:                  Date            Visit Type   Department     Provider  --------------------------------------------------------------------------------                                EP -                              PRIMARY      [facility] INTERNAL  Last Visit: 02-      CARE ([facility])   MEDICINE       Lazarus Euceda  Next Visit: None Scheduled  None         None Found                                                            Last  Test          Frequency    Reason                     Performed    Due Date  --------------------------------------------------------------------------------    Phosphate...  12 months..  ibandronate..............  Not Found    Overdue    [employer] Embedded Care Due Messages. Reference number: [id number].   3/14/2024 9:38:33 AM CDT

## 2024-03-19 ENCOUNTER — OFFICE VISIT (OUTPATIENT)
Dept: PODIATRY | Facility: CLINIC | Age: 84
End: 2024-03-19
Payer: MEDICARE

## 2024-03-19 DIAGNOSIS — L60.3 ONYCHODYSTROPHY: ICD-10-CM

## 2024-03-19 DIAGNOSIS — T45.1X5A CHEMOTHERAPY-INDUCED PERIPHERAL NEUROPATHY: Primary | ICD-10-CM

## 2024-03-19 DIAGNOSIS — N18.32 STAGE 3B CHRONIC KIDNEY DISEASE: ICD-10-CM

## 2024-03-19 DIAGNOSIS — E11.9 TYPE 2 DIABETES MELLITUS WITHOUT COMPLICATION, WITHOUT LONG-TERM CURRENT USE OF INSULIN: ICD-10-CM

## 2024-03-19 DIAGNOSIS — I87.2 VENOUS INSUFFICIENCY OF BOTH LOWER EXTREMITIES: ICD-10-CM

## 2024-03-19 DIAGNOSIS — G62.0 CHEMOTHERAPY-INDUCED PERIPHERAL NEUROPATHY: Primary | ICD-10-CM

## 2024-03-19 PROCEDURE — 3288F FALL RISK ASSESSMENT DOCD: CPT | Mod: HCNC,CPTII,S$GLB, | Performed by: PODIATRIST

## 2024-03-19 PROCEDURE — 1101F PT FALLS ASSESS-DOCD LE1/YR: CPT | Mod: HCNC,CPTII,S$GLB, | Performed by: PODIATRIST

## 2024-03-19 PROCEDURE — 1111F DSCHRG MED/CURRENT MED MERGE: CPT | Mod: HCNC,CPTII,S$GLB, | Performed by: PODIATRIST

## 2024-03-19 PROCEDURE — 1160F RVW MEDS BY RX/DR IN RCRD: CPT | Mod: HCNC,CPTII,S$GLB, | Performed by: PODIATRIST

## 2024-03-19 PROCEDURE — 1159F MED LIST DOCD IN RCRD: CPT | Mod: HCNC,CPTII,S$GLB, | Performed by: PODIATRIST

## 2024-03-19 PROCEDURE — 99213 OFFICE O/P EST LOW 20 MIN: CPT | Mod: 25,HCNC,S$GLB, | Performed by: PODIATRIST

## 2024-03-19 PROCEDURE — 99999 PR PBB SHADOW E&M-EST. PATIENT-LVL III: CPT | Mod: PBBFAC,HCNC,, | Performed by: PODIATRIST

## 2024-03-19 PROCEDURE — 1125F AMNT PAIN NOTED PAIN PRSNT: CPT | Mod: HCNC,CPTII,S$GLB, | Performed by: PODIATRIST

## 2024-03-19 PROCEDURE — 11721 DEBRIDE NAIL 6 OR MORE: CPT | Mod: Q9,HCNC,S$GLB, | Performed by: PODIATRIST

## 2024-03-19 NOTE — PROGRESS NOTES
Subjective:       Patient ID: Lizeth Henderson is a 83 y.o. female.    Chief Complaint: Nail Care (Nail care, rates pain 7,nondiabetic, pt is on blood thinners )    HPI: Patient presents to the office today with the chief complaint of elongated, thickened and dystrophic nail plates to the B/L foot. This patient is a Diabetic Type II. Patient does follow with Primary Care and/or Endocrinology for management of Diabetes Mellitus. This patient's PMD is Lazarus Euceda MD. This patient last saw his/her primary care provider on 02/14/2024.  Recently discharged from hospital for cellulitis.  Currently utilizing compression socks the bilateral lower extremities.  Presents to clinic with daughter present.     Hemoglobin A1C   Date Value Ref Range Status   01/23/2024 6.0 (H) 4.0 - 5.6 % Final     Comment:     ADA Screening Guidelines:  5.7-6.4%  Consistent with prediabetes  >or=6.5%  Consistent with diabetes    High levels of fetal hemoglobin interfere with the HbA1C  assay. Heterozygous hemoglobin variants (HbS, HgC, etc)do  not significantly interfere with this assay.   However, presence of multiple variants may affect accuracy.     07/12/2023 6.0 (H) 4.0 - 5.6 % Final     Comment:     ADA Screening Guidelines:  5.7-6.4%  Consistent with prediabetes  >or=6.5%  Consistent with diabetes    High levels of fetal hemoglobin interfere with the HbA1C  assay. Heterozygous hemoglobin variants (HbS, HgC, etc)do  not significantly interfere with this assay.   However, presence of multiple variants may affect accuracy.     11/05/2020 5.4 4.0 - 5.6 % Final     Comment:     ADA Screening Guidelines:  5.7-6.4%  Consistent with prediabetes  >or=6.5%  Consistent with diabetes  High levels of fetal hemoglobin interfere with the HbA1C  assay. Heterozygous hemoglobin variants (HbS, HgC, etc)do  not significantly interfere with this assay.   However, presence of multiple variants may affect accuracy.     .     Review of patient's  allergies indicates:   Allergen Reactions    Clindamycin      Diarrhea      Lisinopril      cough       Past Medical History:   Diagnosis Date    Adrenal adenoma 2016    Anxiety     Arthritis     Benign hypertension     Colon cancer age 61    colon    Coronary artery disease     Depression     Full dentures     General anesthetics causing adverse effect in therapeutic use     yells and talks when wakes up    Hyperlipidemia     Osteopenia     Shingles     Type 2 diabetes mellitus without complication, without long-term current use of insulin 1/23/2024    Wears glasses        Family History   Problem Relation Age of Onset    Cancer Father     Hypertension Father     Arthritis Father     Alzheimer's disease Mother     Arthritis Mother     Depression Daughter     Kidney disease Daughter     Ulcerative colitis Daughter     Depression Daughter     Depression Daughter     Anxiety disorder Daughter         PTSD    Cancer Maternal Uncle     Cancer Cousin         colon cancer    Early death Maternal Grandmother     Amblyopia Neg Hx     Blindness Neg Hx     Cataracts Neg Hx     Diabetes Neg Hx     Glaucoma Neg Hx     Macular degeneration Neg Hx     Retinal detachment Neg Hx     Strabismus Neg Hx     Stroke Neg Hx     Thyroid disease Neg Hx        Social History     Socioeconomic History    Marital status:     Number of children: 3   Tobacco Use    Smoking status: Never     Passive exposure: Never    Smokeless tobacco: Never   Substance and Sexual Activity    Alcohol use: No    Drug use: No    Sexual activity: Never   Social History Narrative    The patient does not exercise regularly ().    Rates diet as poor.    She is not satisfied with weight.             Social Determinants of Health     Financial Resource Strain: Low Risk  (10/23/2023)    Overall Financial Resource Strain (CARDIA)     Difficulty of Paying Living Expenses: Not hard at all   Food Insecurity: No Food Insecurity (10/23/2023)    Hunger Vital Sign      Worried About Running Out of Food in the Last Year: Never true     Ran Out of Food in the Last Year: Never true   Transportation Needs: No Transportation Needs (10/23/2023)    PRAPARE - Transportation     Lack of Transportation (Medical): No     Lack of Transportation (Non-Medical): No   Physical Activity: Unknown (10/23/2023)    Exercise Vital Sign     Days of Exercise per Week: 0 days   Stress: Stress Concern Present (10/23/2023)    [employer] [place] [employer] - Occupational Stress Questionnaire     Feeling of Stress : To some extent   Social Connections: Unknown (10/23/2023)    Social Connection and Isolation Panel [NHANES]     Frequency of Communication with Friends and Family: More than three times a week     Frequency of Social Gatherings with Friends and Family: Once a week     Active Member of Clubs or Organizations: No     Attends Club or Organization Meetings: Never     Marital Status:    Housing Stability: Low Risk  (10/23/2023)    Housing Stability Vital Sign     Unable to Pay for Housing in the Last Year: No     Number of Places Lived in the Last Year: 1     Unstable Housing in the Last Year: No       Past Surgical History:   Procedure Laterality Date    APPENDECTOMY      BREAST BIOPSY Left     benign    BUNIONECTOMY Left     CATARACT EXTRACTION Bilateral     COLON SURGERY      2001    EYE SURGERY      cataract surg    HEMICOLECTOMY  2002    HERNIA REPAIR      x5    JOINT REPLACEMENT      knee    left toe surgery      joelle    PATENT DUCTUS ARTERIOUS LIGATION  1948    SALPINGOOPHORECTOMY  2002    due to colon cancer    TONSILLECTOMY, ADENOIDECTOMY         Review of Systems       Objective:   There were no vitals taken for this visit.    Physical Exam  LOWER EXTREMITY PHYSICAL EXAMINATION    VASCULAR:  The right dorsalis pedis pulse 2/4 and the right posterior tibial pulse 2/4.  The left dorsalis pedis pulse 2/4 and posterior tibial pulse on the left is 2/4.  Capillary refill is  intact.  Pedal hair growth intact.  Moderate pretibial edema    NEUROLOGY: Protective sensation is not intact to the left and right plantar surfaces of the foot and digits, as the patient has no sensation/detection at greater than 4 distinct points of contact with 5.07 [place] Aishwarya monofilament. Sensation to light touch is intact on the left and right foot. Proprioception is intact, bilateral. Sensation to pin prick is reduced to absent. Vibratory sensation is diminished.    DERMATOLOGY:  Skin is supple, moist, intact.  There is no signs of callusing, ulcerations, other lesions identified to the dorsal or plantar aspect of the right or left foot.  The R1, 2, 5 and left L1,2, 5 are thickened, discolored dystrophic.  There is subungual debris.  Nail plates have area of dark discoloration.  The remaining nails 3-4 on the right foot and the left foot are elongated but of normal color, thickness, and texture.   There is no signs of ingrowing into the medial or lateral borders.  There is no evidence of wounds or skin breakdown. Moderate edema noted to the bilateral lower extremities  No obvious lacerations or fissuring.  Interdigital spaces are clean, dry, intact.  No rashes or scars appreciated.    ORTHOPEDIC: Manual Muscle Testing is 5/5 in all planes on the left and right, without pains, with and without resistance. Gait pattern is non-antalgic.    Assessment:     1. Chemotherapy-induced peripheral neuropathy    2. Type 2 diabetes mellitus without complication, without long-term current use of insulin    3. Stage 3b chronic kidney disease    4. Venous insufficiency of both lower extremities    5. Onychodystrophy        Plan:     Chemotherapy-induced peripheral neuropathy    Type 2 diabetes mellitus without complication, without long-term current use of insulin    Stage 3b chronic kidney disease    Venous insufficiency of both lower extremities    Onychodystrophy      Thorough discussion is had with the patient this  afternoon, concerning the diagnosis, its etiology, and the treatment algorithm at present.  Greater than 50% of this visit spent on counseling and coordination of care. Greater than 15 minutes of a 20 minute appointment spent on education about the diabetic foot, neuropathy, and prevention of limb loss.  Shoe inspection. Diabetic Foot Education. Patient reminded of the importance of good nutrition and blood sugar control to help prevent podiatric complications of diabetes. Patient instructed on proper foot hygeine. We discussed wearing proper and supportive shoe gear, daily foot inspections, never walking barefooted or sock footed, never putting sharp instruments to feet which can cause major complications associated with infection, ulcers, lacerations.      Dystrophic nail plates, as outlined above (R#1,2,5  ; L#1,2,5 ), are sharply debrided with double action nail nipper, and/or with the assistance of a mechanical rotary nehemias, with removal of all offending nail and nail border(s), for reduction of pains. Nails are reduced in terms of length, width and girth with removal of subungual debris to facilitate pain free weight bearing and ambulation. The elongated nails as outlined in the objective portion of this note, were trimmed to appropriate length, with a double action nail nipper, for alleviation/reduction of pains as well. Follow up in approx. 3-4 months.    Did discuss significant swelling in pooling of the lower extremities.  I do recommend patient utilize elevation techniques to elevate the ankle above the level the hips when lying or sitting down.  We discussed that with elevation this could help decrease swelling which is occurring to the lower extremities.  Would also recommend patient to consider/use bilateral lower leg compression which is gradient in nature.  Would recommend initiating with 20-30 mmHg of graduated compression and advancing thereafter.  Would recommend compliance as this can lead to  significant benefits in regards of swelling and fluid accumulation which is occurring in the lower extremities.    Future Appointments   Date Time Provider Department Center   3/28/2024  4:00 PM [facility]   4/8/2024 10:45 AM Petrona Rodriguez MD [facility]

## 2024-03-28 ENCOUNTER — OFFICE VISIT (OUTPATIENT)
Dept: INTERNAL MEDICINE | Facility: CLINIC | Age: 84
End: 2024-03-28
Payer: MEDICARE

## 2024-03-28 ENCOUNTER — PATIENT MESSAGE (OUTPATIENT)
Dept: INTERNAL MEDICINE | Facility: CLINIC | Age: 84
End: 2024-03-28

## 2024-03-28 VITALS
WEIGHT: 213.5 LBS | DIASTOLIC BLOOD PRESSURE: 88 MMHG | BODY MASS INDEX: 32.46 KG/M2 | OXYGEN SATURATION: 97 % | HEART RATE: 97 BPM | SYSTOLIC BLOOD PRESSURE: 154 MMHG

## 2024-03-28 DIAGNOSIS — Z09 HOSPITAL DISCHARGE FOLLOW-UP: Primary | ICD-10-CM

## 2024-03-28 DIAGNOSIS — D64.9 ANEMIA, UNSPECIFIED TYPE: ICD-10-CM

## 2024-03-28 DIAGNOSIS — M25.562 CHRONIC PAIN OF BOTH KNEES: ICD-10-CM

## 2024-03-28 DIAGNOSIS — I10 ESSENTIAL HYPERTENSION: ICD-10-CM

## 2024-03-28 DIAGNOSIS — L03.116 BILATERAL LOWER LEG CELLULITIS: ICD-10-CM

## 2024-03-28 DIAGNOSIS — E78.2 HYPERLIPIDEMIA, MIXED: ICD-10-CM

## 2024-03-28 DIAGNOSIS — L03.115 BILATERAL LOWER LEG CELLULITIS: ICD-10-CM

## 2024-03-28 DIAGNOSIS — G89.29 CHRONIC PAIN OF BOTH KNEES: ICD-10-CM

## 2024-03-28 DIAGNOSIS — R73.03 PREDIABETES: ICD-10-CM

## 2024-03-28 DIAGNOSIS — M25.561 CHRONIC PAIN OF BOTH KNEES: ICD-10-CM

## 2024-03-28 PROCEDURE — 99214 OFFICE O/P EST MOD 30 MIN: CPT | Mod: HCNC,S$GLB,, | Performed by: PHYSICIAN ASSISTANT

## 2024-03-28 PROCEDURE — 99999 PR PBB SHADOW E&M-EST. PATIENT-LVL IV: CPT | Mod: PBBFAC,HCNC,, | Performed by: PHYSICIAN ASSISTANT

## 2024-03-28 PROCEDURE — 3077F SYST BP >= 140 MM HG: CPT | Mod: HCNC,CPTII,S$GLB, | Performed by: PHYSICIAN ASSISTANT

## 2024-03-28 PROCEDURE — 1101F PT FALLS ASSESS-DOCD LE1/YR: CPT | Mod: HCNC,CPTII,S$GLB, | Performed by: PHYSICIAN ASSISTANT

## 2024-03-28 PROCEDURE — 1160F RVW MEDS BY RX/DR IN RCRD: CPT | Mod: HCNC,CPTII,S$GLB, | Performed by: PHYSICIAN ASSISTANT

## 2024-03-28 PROCEDURE — 1111F DSCHRG MED/CURRENT MED MERGE: CPT | Mod: HCNC,CPTII,S$GLB, | Performed by: PHYSICIAN ASSISTANT

## 2024-03-28 PROCEDURE — 1159F MED LIST DOCD IN RCRD: CPT | Mod: HCNC,CPTII,S$GLB, | Performed by: PHYSICIAN ASSISTANT

## 2024-03-28 PROCEDURE — 3079F DIAST BP 80-89 MM HG: CPT | Mod: HCNC,CPTII,S$GLB, | Performed by: PHYSICIAN ASSISTANT

## 2024-03-28 PROCEDURE — 1125F AMNT PAIN NOTED PAIN PRSNT: CPT | Mod: HCNC,CPTII,S$GLB, | Performed by: PHYSICIAN ASSISTANT

## 2024-03-28 PROCEDURE — 3288F FALL RISK ASSESSMENT DOCD: CPT | Mod: HCNC,CPTII,S$GLB, | Performed by: PHYSICIAN ASSISTANT

## 2024-03-28 RX ORDER — QUETIAPINE FUMARATE 50 MG/1
50 TABLET, FILM COATED ORAL NIGHTLY
COMMUNITY
Start: 2024-01-26

## 2024-03-28 RX ORDER — MELOXICAM 7.5 MG/1
7.5 TABLET ORAL DAILY PRN
Qty: 30 TABLET | Refills: 0 | Status: SHIPPED | OUTPATIENT
Start: 2024-03-28 | End: 2024-05-15

## 2024-03-29 NOTE — PROGRESS NOTES
Transitional Care Note  Subjective:      Patient ID: Lizeth Henderson is a 83 y.o. female.  Chief Complaint: Establish Care      Patient presents to clinic today for hospital TCC visit. Patient was recently hospitalized for bilateral lower leg cellulitis.        Family and/or Caretaker present at visit?  Yes.  Diagnostic tests reviewed/disposition: No diagnosic tests pending after this hospitalization.  Disease/illness education: See A&P  Home health/community services discussion/referrals: Patient does not have home health established from hospital visit.  They do not need home health.  If needed, we will set up home health for the patient.   Establishment or re-establishment of referral orders for community resources: No other necessary community resources.   Discussion with other health care providers: No discussion with other health care providers necessary.       Patient Class: IP- Inpatient  Admission Date: 3/2/2024  Hospital Length of Stay: 3 days  Discharge Date and Time:  03/05/2024 3:54 PM  Attending Physician: No att. providers found   Discharging Provider: Kai Estrella NP  Primary Care Provider: Lazarus Euceda MD     Primary Care Team: Networked reference to record PCT      HPI:   Mrs. Henderson is a 83 year old female with a history of HTN, HLD, CAD, depression, adrenal adenoma, renal insufficiency who presents to the ED for evaluation of possible cellulitis to bilateral lower legs which onset gradually since early January of this year. Pt previously treated with clindamycin (gave her diarrhea) then bactrim.  Cellulitis has not improved.  Patient denies any SOB, fever,chills.  In the ED lab work notable for CRP 12. US BLE negative for DVT.  Chest xray with no acute findings.  Patient started on iv abx in ed. Blood cultures drawn. She will be admitted to hospital medicine for cellulitis that has failed outpatient abx.       Code Status Full  Surrogate decision maker \[facility]\""  Course:   84 y/o female admitted for bilateral LE cellulitis and was started on vancomycin and cefepime. Symptoms improving. Continue current plan.      3/4/24  Appearance of cellulitis unchanged per patient. Venous ultrasound negative for DVT. Arterial Ultrasound shows monophasic waveforms seen within the left-sided calf vessels below the knee as well as within the right peroneal artery suggestive of occult stenosis. Vascular Surgery and wound care has been consulted. Glucose and vitals have been stable.     3/5/24  The patient was seen by Vascular services who did not recommend further intervention for arterial studies as patient has palpable pedal pulses. For venous stasis dermatitis complicated by cellulitis it was recommended for routine skin care, and adequate moisturizers cream. She will need compression therapy and will have this managed by Vascular surgery OP. Until then, she was educated on light compression with tubi- or double layer tubi-. She will continue Keflex x 3 days. Patient is stable for discharge.     Review of Systems   Constitutional:  Negative for chills, fatigue, fever and unexpected weight change.   Eyes:  Negative for visual disturbance.   Respiratory:  Negative for shortness of breath.    Cardiovascular:  Negative for chest pain.   Musculoskeletal:  Positive for arthralgias (chronic, BL knee pain). Negative for myalgias.   Neurological:  Negative for headaches.        Objective:      Physical Exam  Vitals and nursing note reviewed.   Constitutional:       General: She is not in acute distress.     Appearance: She is well-developed.      Comments: Ambulatory with walker   HENT:      Head: Normocephalic and atraumatic.   Eyes:      General: Lids are normal. No scleral icterus.     Extraocular Movements: Extraocular movements intact.      Conjunctiva/sclera: Conjunctivae normal.   Cardiovascular:      Rate and Rhythm: Normal rate and regular rhythm.   Pulmonary:      Effort:  Pulmonary effort is normal.      Breath sounds: Normal breath sounds. No decreased breath sounds, wheezing, rhonchi or rales.   Skin:         Neurological:      Mental Status: She is alert.      Cranial Nerves: No cranial nerve deficit.   Psychiatric:         Mood and Affect: Mood and affect normal.         Assessment:       1. Hospital discharge follow-up    2. Bilateral lower leg cellulitis    3. Essential hypertension    4. Hyperlipidemia, mixed    5. Anemia, unspecified type    6. Prediabetes    7. Chronic pain of both knees        Plan:   1. Hospital discharge follow-up    2. Bilateral lower leg cellulitis    3. Essential hypertension  -     TSH; Future; Expected date: 03/28/2024    4. Hyperlipidemia, mixed  -     Comprehensive Metabolic Panel; Future; Expected date: 03/28/2024  -     Lipid Panel; Future; Expected date: 03/28/2024    5. Anemia, unspecified type  -     CBC Auto Differential; Future; Expected date: 03/28/2024    6. Prediabetes  -     Hemoglobin A1C; Future; Expected date: 03/28/2024    7. Chronic pain of both knees  -     meloxicam (MOBIC) 7.5 MG tablet; Take 1 tablet (7.5 mg total) by mouth daily as needed for Pain.  Dispense: 30 tablet; Refill: 0      Continue follow-up with vascular surgeon next month  Continue wound care and compression at home and monitor for signs of cellulitis

## 2024-04-08 ENCOUNTER — OFFICE VISIT (OUTPATIENT)
Dept: VASCULAR SURGERY | Facility: CLINIC | Age: 84
End: 2024-04-08
Payer: MEDICARE

## 2024-04-08 VITALS — SYSTOLIC BLOOD PRESSURE: 150 MMHG | DIASTOLIC BLOOD PRESSURE: 80 MMHG

## 2024-04-08 DIAGNOSIS — I10 ESSENTIAL HYPERTENSION: ICD-10-CM

## 2024-04-08 DIAGNOSIS — L03.115 CELLULITIS OF RIGHT LOWER EXTREMITY: ICD-10-CM

## 2024-04-08 DIAGNOSIS — I89.0 LYMPHEDEMA: Primary | ICD-10-CM

## 2024-04-08 DIAGNOSIS — E78.2 HYPERLIPIDEMIA, MIXED: ICD-10-CM

## 2024-04-08 PROCEDURE — 3077F SYST BP >= 140 MM HG: CPT | Mod: HCNC,CPTII,S$GLB, | Performed by: STUDENT IN AN ORGANIZED HEALTH CARE EDUCATION/TRAINING PROGRAM

## 2024-04-08 PROCEDURE — 1159F MED LIST DOCD IN RCRD: CPT | Mod: HCNC,CPTII,S$GLB, | Performed by: STUDENT IN AN ORGANIZED HEALTH CARE EDUCATION/TRAINING PROGRAM

## 2024-04-08 PROCEDURE — 3079F DIAST BP 80-89 MM HG: CPT | Mod: HCNC,CPTII,S$GLB, | Performed by: STUDENT IN AN ORGANIZED HEALTH CARE EDUCATION/TRAINING PROGRAM

## 2024-04-08 PROCEDURE — 1160F RVW MEDS BY RX/DR IN RCRD: CPT | Mod: HCNC,CPTII,S$GLB, | Performed by: STUDENT IN AN ORGANIZED HEALTH CARE EDUCATION/TRAINING PROGRAM

## 2024-04-08 PROCEDURE — 99999 PR PBB SHADOW E&M-EST. PATIENT-LVL III: CPT | Mod: PBBFAC,HCNC,, | Performed by: STUDENT IN AN ORGANIZED HEALTH CARE EDUCATION/TRAINING PROGRAM

## 2024-04-08 PROCEDURE — 99214 OFFICE O/P EST MOD 30 MIN: CPT | Mod: HCNC,S$GLB,, | Performed by: STUDENT IN AN ORGANIZED HEALTH CARE EDUCATION/TRAINING PROGRAM

## 2024-04-08 NOTE — PROGRESS NOTES
Petrona Rodriguez    OFFICE NOTE    DATE OF VISIT: 2024  PATIENT NAME: Lizeth Henderson  : 1940  MRN: [id number]  PRIMARY CARE PHYSICIAN: Lazarus Euceda MD  CARDIOLOGIST:   REFERRING PROVIDER: No ref. provider found    CHIEF COMPLAINT   Chief Complaint   Patient presents with    Follow-up     Follow up of BLE cellulitis.        HISTORY OF PRESENT ILLNESS:  Lizeth Henderson is a 83 y.o. female who presents to clinic today was seen in hospital for bilateral leg swelling and cellulitis she was given iv abx and her swelling and cellulitis improved. Vascular consulted during that time felt she had adequate perfusion to her legs. She is doing well now and able to ambulate with walker. She has not tried compression stockings and her leg swelling does intermittently cause pain especially at night    ALLERGIES:  Review of patient's allergies indicates:   Allergen Reactions    Clindamycin      Diarrhea      Lisinopril      cough       PAST MEDICAL HISTORY:  Past Medical History:   Diagnosis Date    Adrenal adenoma 2016    Anxiety     Arthritis     Benign hypertension     Colon cancer age 61    colon    Coronary artery disease     Depression     Full dentures     General anesthetics causing adverse effect in therapeutic use     yells and talks when wakes up    Hyperlipidemia     Osteopenia     Shingles     Type 2 diabetes mellitus without complication, without long-term current use of insulin 2024    Wears glasses        PAST SURGICAL HISTORY:  Past Surgical History:   Procedure Laterality Date    APPENDECTOMY      BREAST BIOPSY Left     benign    BUNIONECTOMY Left     CATARACT EXTRACTION Bilateral     COLON SURGERY      2001    EYE SURGERY      cataract surg    HEMICOLECTOMY  2002    HERNIA REPAIR      x5    JOINT REPLACEMENT      knee    left toe surgery      hammertoe    PATENT DUCTUS ARTERIOUS LIGATION  1948    SALPINGOOPHORECTOMY  2002    due to colon cancer    TONSILLECTOMY, ADENOIDECTOMY          SOCIAL HISTORY:   Social History     Tobacco Use    Smoking status: Never     Passive exposure: Never    Smokeless tobacco: Never   Substance Use Topics    Alcohol use: No    Drug use: Never       FAMILY HISTORY:  Family History   Problem Relation Name Age of Onset    Cancer Father price     Hypertension Father price     Arthritis Father price     Hearing loss Father [place]ank     Alzheimer's disease Mother Tracy     Arthritis Mother [place]     Depression Daughter Connie     Kidney disease Daughter Ruba     Ulcerative colitis Daughter Ruba     Depression Daughter Ruba     Depression Daughter Veronica     Anxiety disorder Daughter Veronica         PTSD    Cancer Maternal Uncle Nikolas     Cancer Cousin          colon cancer    Early death Maternal Grandmother Chata     Amblyopia Neg Hx      Blindness Neg Hx      Cataracts Neg Hx      Diabetes Neg Hx      Glaucoma Neg Hx      Macular degeneration Neg Hx      Retinal detachment Neg Hx      Strabismus Neg Hx      Stroke Neg Hx      Thyroid disease Neg Hx         REVIEW OF SYSTEMS:  ROS  10 pt ros otherwise negative    PHYSICAL EXAM:  Vitals:    04/08/24 1053   BP: (!) 150/80      Physical Exam      Vss  Gen nad alert oriented  Cv rrr  Lung ctab  Legs palpable pedal pulses b/l, no evidence of cellulitis, chronic lymphedema noted    VASCULAR LAB STUDIES:  none    ASSESSMENT AND PLAN:  Essential hypertension  Medical therapy    Hyperlipidemia, mixed  Medical therapy    Cellulitis of right lower extremity  Has resolved. Recommend compression wraps be worn daily. I would like her to return in 3 months after compression stocking use and see our vein center. Appears to have some degree of lymphedema b/l lower ext may need referral to lymphedema clinic in the future    Lymphedema  Patient has evidence of lymphedema there is component of lymphedema as swelling does involve the foot and ankle I believe this will benefit from compression therapy      Lizeth was seen today for  follow-up.    Diagnoses and all orders for this visit:    Lymphedema    Cellulitis of right lower extremity    Essential hypertension    Hyperlipidemia, mixed        Follow up in about 3 months (around 7/8/2024).        Petrona Rodriguez  [facility]  Vascular Surgery  [phone] (Clinic Number)

## 2024-04-09 NOTE — ASSESSMENT & PLAN NOTE
Has resolved. Recommend compression wraps be worn daily. I would like her to return in 3 months after compression stocking use and see our vein center. Appears to have some degree of lymphedema b/l lower ext may need referral to lymphedema clinic in the future

## 2024-04-19 DIAGNOSIS — I89.0 LYMPHEDEMA: Primary | ICD-10-CM

## 2024-05-09 ENCOUNTER — PATIENT MESSAGE (OUTPATIENT)
Dept: VASCULAR SURGERY | Facility: CLINIC | Age: 84
End: 2024-05-09
Payer: MEDICARE

## 2024-05-13 ENCOUNTER — TELEPHONE (OUTPATIENT)
Dept: PSYCHIATRY | Facility: CLINIC | Age: 84
End: 2024-05-13
Payer: MEDICARE

## 2024-05-15 ENCOUNTER — OFFICE VISIT (OUTPATIENT)
Dept: PSYCHIATRY | Facility: CLINIC | Age: 84
End: 2024-05-15
Payer: MEDICARE

## 2024-05-15 DIAGNOSIS — F33.41 MDD (MAJOR DEPRESSIVE DISORDER), RECURRENT, IN PARTIAL REMISSION: ICD-10-CM

## 2024-05-15 DIAGNOSIS — G89.29 CHRONIC PAIN OF BOTH KNEES: ICD-10-CM

## 2024-05-15 DIAGNOSIS — M25.562 CHRONIC PAIN OF BOTH KNEES: ICD-10-CM

## 2024-05-15 DIAGNOSIS — F41.1 GAD (GENERALIZED ANXIETY DISORDER): Primary | ICD-10-CM

## 2024-05-15 DIAGNOSIS — M25.561 CHRONIC PAIN OF BOTH KNEES: ICD-10-CM

## 2024-05-15 PROCEDURE — 99214 OFFICE O/P EST MOD 30 MIN: CPT | Mod: HCNC,95,, | Performed by: PSYCHIATRY & NEUROLOGY

## 2024-05-15 RX ORDER — BUSPIRONE HYDROCHLORIDE 10 MG/1
20 TABLET ORAL 2 TIMES DAILY
Qty: 120 TABLET | Refills: 3 | Status: SHIPPED | OUTPATIENT
Start: 2024-05-15

## 2024-05-15 RX ORDER — ALPRAZOLAM 0.25 MG/1
TABLET ORAL
Qty: 20 TABLET | Refills: 3 | Status: SHIPPED | OUTPATIENT
Start: 2024-05-15

## 2024-05-15 RX ORDER — SUVOREXANT 10 MG/1
1 TABLET, FILM COATED ORAL NIGHTLY
Qty: 30 TABLET | Refills: 3 | Status: SHIPPED | OUTPATIENT
Start: 2024-05-15

## 2024-05-15 RX ORDER — ESCITALOPRAM OXALATE 10 MG/1
10 TABLET ORAL DAILY
Qty: 90 TABLET | Refills: 1 | Status: SHIPPED | OUTPATIENT
Start: 2024-05-15

## 2024-05-15 RX ORDER — MELOXICAM 7.5 MG/1
7.5 TABLET ORAL DAILY PRN
Qty: 30 TABLET | Refills: 0 | Status: SHIPPED | OUTPATIENT
Start: 2024-05-15 | End: 2024-06-13
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
## 2024-06-11 ENCOUNTER — PATIENT MESSAGE (OUTPATIENT)
Dept: VASCULAR SURGERY | Facility: CLINIC | Age: 84
End: 2024-06-11
Payer: MEDICARE

## 2024-06-11 DIAGNOSIS — G89.29 CHRONIC PAIN OF BOTH KNEES: ICD-10-CM

## 2024-06-11 DIAGNOSIS — M25.561 CHRONIC PAIN OF BOTH KNEES: ICD-10-CM

## 2024-06-11 DIAGNOSIS — M25.562 CHRONIC PAIN OF BOTH KNEES: ICD-10-CM

## 2024-06-11 PROBLEM — I89.0 LYMPHEDEMA: Status: ACTIVE | Noted: 2024-06-11

## 2024-06-11 NOTE — ASSESSMENT & PLAN NOTE
Patient has evidence of lymphedema there is component of lymphedema as swelling does involve the foot and ankle I believe this will benefit from compression therapy

## 2024-06-13 RX ORDER — MELOXICAM 7.5 MG/1
7.5 TABLET ORAL DAILY PRN
Qty: 30 TABLET | Refills: 0 | Status: SHIPPED | OUTPATIENT
Start: 2024-06-13

## 2024-06-14 DIAGNOSIS — N32.81 OVERACTIVE BLADDER: Chronic | ICD-10-CM

## 2024-06-14 RX ORDER — MIRABEGRON 50 MG/1
50 TABLET, EXTENDED RELEASE ORAL DAILY
Qty: 90 TABLET | Refills: 0 | Status: SHIPPED | OUTPATIENT
Start: 2024-06-14

## 2024-06-14 NOTE — TELEPHONE ENCOUNTER
Care Due:                  Date            Visit Type   Department     Provider  --------------------------------------------------------------------------------                                EP -                              PRIMARY      [facility] INTER[facility]  Last Visit: 02-      CARE ([facility])   ZAHEER Euceda                              EP -                              PRIMARY      [facility] INTER[facility]  Next Visit: 07-      CARE ([facility])   MEDICINE       Desi Maddox                                                            Last  Test          Frequency    Reason                     Performed    Due Date  --------------------------------------------------------------------------------    Lipid Panel.  12 months..  atorvastatin.............  07- 07-    Phosphate...  12 months..  ibandronate..............  Not Found    Overdue    Vitamin D...  12 months..  ibandronate..............  07- 07-    [employer] Embedded Care Due Messages. Reference number: [id number].   6/14/2024 11:58:04 AM CDT

## 2024-06-19 ENCOUNTER — OFFICE VISIT (OUTPATIENT)
Dept: PODIATRY | Facility: CLINIC | Age: 84
End: 2024-06-19
Payer: MEDICARE

## 2024-06-19 VITALS — HEIGHT: 68 IN | WEIGHT: 213.63 LBS | BODY MASS INDEX: 32.38 KG/M2

## 2024-06-19 DIAGNOSIS — N18.32 STAGE 3B CHRONIC KIDNEY DISEASE: ICD-10-CM

## 2024-06-19 DIAGNOSIS — L60.3 ONYCHODYSTROPHY: ICD-10-CM

## 2024-06-19 DIAGNOSIS — G62.0 CHEMOTHERAPY-INDUCED PERIPHERAL NEUROPATHY: Primary | ICD-10-CM

## 2024-06-19 DIAGNOSIS — E11.9 TYPE 2 DIABETES MELLITUS WITHOUT COMPLICATION, WITHOUT LONG-TERM CURRENT USE OF INSULIN: ICD-10-CM

## 2024-06-19 DIAGNOSIS — T45.1X5A CHEMOTHERAPY-INDUCED PERIPHERAL NEUROPATHY: Primary | ICD-10-CM

## 2024-06-19 PROCEDURE — 99999 PR PBB SHADOW E&M-EST. PATIENT-LVL III: CPT | Mod: PBBFAC,HCNC,, | Performed by: PODIATRIST

## 2024-06-19 NOTE — PROGRESS NOTES
Subjective:       Patient ID: Lizeth Henderson is a 83 y.o. female.    Chief Complaint: Nail Care (Nail care, pt rates pain 9/10 , pt is diabetic, pt last seen pcp Traci Patterson PA-C 3/28/24)    HPI: Patient presents to the office today with the chief complaint of elongated, thickened and dystrophic nail plates to the B/L foot. This patient is a Diabetic Type II. Patient does follow with Primary Care and/or Endocrinology for management of Diabetes Mellitus. This patient's PMD is Lazarus Euceda MD. This patient last saw his/her primary care provider on 03/28/2024.   Reporting 9/10 pain bilaterally.  Presents to clinic with daughter present.     Hemoglobin A1C   Date Value Ref Range Status   01/23/2024 6.0 (H) 4.0 - 5.6 % Final     Comment:     ADA Screening Guidelines:  5.7-6.4%  Consistent with prediabetes  >or=6.5%  Consistent with diabetes    High levels of fetal hemoglobin interfere with the HbA1C  assay. Heterozygous hemoglobin variants (HbS, HgC, etc)do  not significantly interfere with this assay.   However, presence of multiple variants may affect accuracy.     07/12/2023 6.0 (H) 4.0 - 5.6 % Final     Comment:     ADA Screening Guidelines:  5.7-6.4%  Consistent with prediabetes  >or=6.5%  Consistent with diabetes    High levels of fetal hemoglobin interfere with the HbA1C  assay. Heterozygous hemoglobin variants (HbS, HgC, etc)do  not significantly interfere with this assay.   However, presence of multiple variants may affect accuracy.     11/05/2020 5.4 4.0 - 5.6 % Final     Comment:     ADA Screening Guidelines:  5.7-6.4%  Consistent with prediabetes  >or=6.5%  Consistent with diabetes  High levels of fetal hemoglobin interfere with the HbA1C  assay. Heterozygous hemoglobin variants (HbS, HgC, etc)do  not significantly interfere with this assay.   However, presence of multiple variants may affect accuracy.     .     Review of patient's allergies indicates:   Allergen Reactions    Clindamycin       Diarrhea      Lisinopril      cough       Past Medical History:   Diagnosis Date    Adrenal adenoma 2016    Anxiety     Arthritis     Benign hypertension     Colon cancer age 61    colon    Coronary artery disease     Depression     Full dentures     General anesthetics causing adverse effect in therapeutic use     yells and talks when wakes up    Hyperlipidemia     Osteopenia     Shingles     Type 2 diabetes mellitus without complication, without long-term current use of insulin 1/23/2024    Wears glasses        Family History   Problem Relation Name Age of Onset    Cancer Father price     Hypertension Father price     Arthritis Father price     Hearing loss Father mayank     Alzheimer's disease Mother [place]     Arthritis Mother Tracy     Depression Daughter Connie     Kidney disease Daughter Ruba     Ulcerative colitis Daughter Ruba     Depression Daughter Ruba     Depression Daughter Veronica     Anxiety disorder Daughter Veronica         PTSD    Cancer Maternal Uncle Nikolas     Cancer Cousin          colon cancer    Early death Maternal Grandmother Chata     Amblyopia Neg Hx      Blindness Neg Hx      Cataracts Neg Hx      Diabetes Neg Hx      Glaucoma Neg Hx      Macular degeneration Neg Hx      Retinal detachment Neg Hx      Strabismus Neg Hx      Stroke Neg Hx      Thyroid disease Neg Hx         Social History     Socioeconomic History    Marital status:     Number of children: 3   Tobacco Use    Smoking status: Never     Passive exposure: Never    Smokeless tobacco: Never   Substance and Sexual Activity    Alcohol use: No    Drug use: Never    Sexual activity: Never   Social History Narrative    The patient does not exercise regularly ().    Rates diet as poor.    She is not satisfied with weight.             Social Determinants of Health     Financial Resource Strain: Low Risk  (3/28/2024)    Overall Financial Resource Strain (CARDIA)     Difficulty of Paying Living Expenses: Not hard at all   Food  "Insecurity: No Food Insecurity (3/28/2024)    Hunger Vital Sign     Worried About Running Out of Food in the Last Year: Never true     Ran Out of Food in the Last Year: Never true   Transportation Needs: No Transportation Needs (3/28/2024)    PRAPARE - Transportation     Lack of Transportation (Medical): No     Lack of Transportation (Non-Medical): No   Physical Activity: Inactive (3/28/2024)    Exercise Vital Sign     Days of Exercise per Week: 0 days     Minutes of Exercise per Session: 0 min   Stress: Stress Concern Present (3/28/2024)    [facility] Occupational [facility] - Occupational Stress Questionnaire     Feeling of Stress : To some extent   Housing Stability: Low Risk  (3/28/2024)    Housing Stability Vital Sign     Unable to Pay for Housing in the Last Year: No     Number of Places Lived in the Last Year: 1     Unstable Housing in the Last Year: No       Past Surgical History:   Procedure Laterality Date    APPENDECTOMY      BREAST BIOPSY Left     benign    BUNIONECTOMY Left     CATARACT EXTRACTION Bilateral     COLON SURGERY      2001    EYE SURGERY      cataract surg    HEMICOLECTOMY  2002    HERNIA REPAIR      x5    JOINT REPLACEMENT      knee    left toe surgery      joelle    PATENT DUCTUS ARTERIOUS LIGATION  1948    SALPINGOOPHORECTOMY  2002    due to colon cancer    TONSILLECTOMY, ADENOIDECTOMY         Review of Systems       Objective:   Ht 5' 8" (1.727 m)   Wt 96.9 kg (213 lb 10 oz)   BMI 32.48 kg/m²     Physical Exam  LOWER EXTREMITY PHYSICAL EXAMINATION    VASCULAR:  The right dorsalis pedis pulse 2/4 and the right posterior tibial pulse 2/4.  The left dorsalis pedis pulse 2/4 and posterior tibial pulse on the left is 2/4.  Capillary refill is intact.  Pedal hair growth intact.  Moderate pretibial edema    NEUROLOGY: Protective sensation is not intact to the left and right plantar surfaces of the foot and digits, as the patient has no sensation/detection at greater than 4 distinct " points of contact with 5.07 [place] Aishwarya monofilament. Sensation to light touch is intact on the left and right foot. Proprioception is intact, bilateral. Sensation to pin prick is reduced to absent. Vibratory sensation is diminished.    DERMATOLOGY:  Skin is supple, moist, intact.  There is no signs of callusing, ulcerations, other lesions identified to the dorsal or plantar aspect of the right or left foot.  The R1, 2, 5 and left L1,2, 5 are thickened, discolored dystrophic.  There is subungual debris.  Nail plates have area of dark discoloration.  The remaining nails 3-4 on the right foot and the left foot are elongated but of normal color, thickness, and texture.   There is no signs of ingrowing into the medial or lateral borders.  There is no evidence of wounds or skin breakdown. Moderate edema noted to the bilateral lower extremities  No obvious lacerations or fissuring.  Interdigital spaces are clean, dry, intact.  No rashes or scars appreciated.    ORTHOPEDIC: Manual Muscle Testing is 5/5 in all planes on the left and right, without pains, with and without resistance. Gait pattern is non-antalgic.    Assessment:     1. Chemotherapy-induced peripheral neuropathy    2. Type 2 diabetes mellitus without complication, without long-term current use of insulin    3. Stage 3b chronic kidney disease    4. Onychodystrophy        Plan:     Chemotherapy-induced peripheral neuropathy    Type 2 diabetes mellitus without complication, without long-term current use of insulin    Stage 3b chronic kidney disease    Onychodystrophy      Thorough discussion is had with the patient this afternoon, concerning the diagnosis, its etiology, and the treatment algorithm at present.  Greater than 50% of this visit spent on counseling and coordination of care. Greater than 15 minutes of a 20 minute appointment spent on education about the diabetic foot, neuropathy, and prevention of limb loss.  Shoe inspection. Diabetic Foot  Education. Patient reminded of the importance of good nutrition and blood sugar control to help prevent podiatric complications of diabetes. Patient instructed on proper foot hygeine. We discussed wearing proper and supportive shoe gear, daily foot inspections, never walking barefooted or sock footed, never putting sharp instruments to feet which can cause major complications associated with infection, ulcers, lacerations.      Dystrophic nail plates, as outlined above (R#1,2,5  ; L#1,2,5 ), are sharply debrided with double action nail nipper, and/or with the assistance of a mechanical rotary nehemias, with removal of all offending nail and nail border(s), for reduction of pains. Nails are reduced in terms of length, width and girth with removal of subungual debris to facilitate pain free weight bearing and ambulation. The elongated nails as outlined in the objective portion of this note, were trimmed to appropriate length, with a double action nail nipper, for alleviation/reduction of pains as well. Follow up in approx. 3-4 months.    Continue to encourage bilateral lower extremity compression    Future Appointments   Date Time Provider Department [place]   7/18/2024  8:10 AM [facility]   7/23/2024  3:20 PM Desi Maddox MD ON [facility]   9/19/2024  1:00 PM Danyell Nails DPM ON POD [facility]

## 2024-07-11 RX ORDER — ATORVASTATIN CALCIUM 10 MG/1
10 TABLET, FILM COATED ORAL
Qty: 90 TABLET | Refills: 1 | Status: SHIPPED | OUTPATIENT
Start: 2024-07-11

## 2024-07-11 NOTE — TELEPHONE ENCOUNTER
LOV 02/14/2024   [facility] ambulatory encounter  FAMILY PRACTICE OFFICE VISIT    CHIEF COMPLAINT:    Chief Complaint   Patient presents with   • Physical       SUBJECTIVE:  Ling Perez is a 17 year old female who presented requesting evaluation for pre program physical  Normal energy levels.  No medical illness.  She sleeps well, no anxiety depression.  She is entering CNA program.    Review of systems:   Constitutional: Negative for fever and chills.   Skin: Negative for rash.   HEENT: Negative for eye drainage, ear pain, sore throat.  Respiratory: Negative for cough or shortness of breath.    Cardiovascular: Negative for chest pain or chest pressure.   Gastrointestinal: Negative for nausea, vomiting, diarrhea or abdominal pain.   Genitourinary: Negative for dysuria, frequency or hematuria.  Extremities: Negative for joint swelling or joint pain.  Endocrine: Negative for heat or cold intolerance.  Psychiatric: Negative for change in mood or mentation.     OBJECTIVE:  PROBLEM LIST:   Patient Active Problem List   Diagnosis   • Patella-femoral syndrome   • Overweight child   • Common wart       PAST HISTORIES:   I have reviewed the past medical history, family history, social history, medications and allergies listed in the medical record as obtained by my nursing staff and support staff and agree with their documentation.  Current Outpatient Medications   Medication Sig Dispense Refill   • Melatonin 1 MG Cap        No current facility-administered medications for this visit.      History reviewed. No pertinent past medical history.    PHYSICAL EXAM:   Vital Signs:    Visit Vitals  /78   Pulse 88   Temp 98.3 °F (36.8 °C) (Oral)   Resp 18   Ht 5' 5.35\" (1.66 m)   Wt 99.8 kg   LMP 01/10/2021   BMI 36.22 kg/m²     Pulse Ox Interpretation:  Pulse ox not performed  General:   Alert, cooperative, conversive in no acute distress.  Skin:  Warm and dry without rash.    Head:  Normocephalic, atraumatic.   Neck:  Trachea is midline.    Supple, no nodes  Eyes:  Normal conjunctivae and sclerae.  ENT:  Mucous membranes are moist.  Throat clear, no PND  Cardiovascular:  Symmetrical pulses.   Respiratory:  Normal respiratory effort.   Gastrointestinal:  Non-distended.    Musculoskeletal:  No deformity or edema.   Neurologic:  Oriented x4.  No focal deficits.    LAB RESULTS:   Lab Results   Component Value Date    SODIUM 141 08/21/2017    POTASSIUM 3.8 08/21/2017    CHLORIDE 105 08/21/2017    CO2 28 08/21/2017    BUN 8 08/21/2017    CREATININE 0.50 08/21/2017    GLUCOSE 83 08/21/2017     Lab Results   Component Value Date    WBC 8.8 08/21/2017    HCT 40.1 08/21/2017    HGB 13.9 08/21/2017     08/21/2017     TSH (mcUnits/mL)   Date Value   08/21/2017 3.105       ASSESSMENT:   1. Annual physical exam    2. Need for meningococcal vaccination    3. Need for influenza vaccination    4. Healthcare maintenance        PLAN:   Orders Placed This Encounter   • INFLUENZA QUADRIVALENT SPLIT PRES FREE 0.5 ML VACC, IM (FLULAVAL,FLUARIX,FLUZONE)   • MENINGOCOCCAL CONJUGATE MCV4P VACC (MENACTRA)   • Quantiferon TB Plus     Update shots today.  Order required tests.  Forms filled out.    No follow-ups on file.    Instructions provided as documented in the after visit summary.    The patient indicated understanding of the diagnosis and agreed with the plan of care.

## 2024-07-11 NOTE — TELEPHONE ENCOUNTER
No care due was identified.  Health [facility] Embedded Care Due Messages. Reference number: [id number].   7/11/2024 5:50:41 AM CDT

## 2024-07-12 ENCOUNTER — PATIENT OUTREACH (OUTPATIENT)
Dept: ADMINISTRATIVE | Facility: HOSPITAL | Age: 84
End: 2024-07-12
Payer: MEDICARE

## 2024-07-12 DIAGNOSIS — E11.9 TYPE 2 DIABETES MELLITUS WITHOUT COMPLICATION, WITHOUT LONG-TERM CURRENT USE OF INSULIN: Primary | ICD-10-CM

## 2024-07-12 NOTE — PROGRESS NOTES
Working Diabetes Lab.    Pt has lab appt scheduled 7/18/24.  Micro albumin urine ordered and linked to appt.

## 2024-07-18 ENCOUNTER — LAB VISIT (OUTPATIENT)
Dept: LAB | Facility: HOSPITAL | Age: 84
End: 2024-07-18
Attending: FAMILY MEDICINE
Payer: MEDICARE

## 2024-07-18 DIAGNOSIS — I10 ESSENTIAL HYPERTENSION: ICD-10-CM

## 2024-07-18 DIAGNOSIS — E11.9 TYPE 2 DIABETES MELLITUS WITHOUT COMPLICATION, WITHOUT LONG-TERM CURRENT USE OF INSULIN: ICD-10-CM

## 2024-07-18 DIAGNOSIS — E78.2 HYPERLIPIDEMIA, MIXED: ICD-10-CM

## 2024-07-18 DIAGNOSIS — D64.9 ANEMIA, UNSPECIFIED TYPE: ICD-10-CM

## 2024-07-18 DIAGNOSIS — R73.03 PREDIABETES: ICD-10-CM

## 2024-07-18 LAB
ALBUMIN SERPL BCP-MCNC: 3.4 G/DL (ref 3.5–5.2)
ALP SERPL-CCNC: 84 U/L (ref 55–135)
ALT SERPL W/O P-5'-P-CCNC: 11 U/L (ref 10–44)
ANION GAP SERPL CALC-SCNC: 10 MMOL/L (ref 8–16)
AST SERPL-CCNC: 19 U/L (ref 10–40)
BASOPHILS # BLD AUTO: 0.04 K/UL (ref 0–0.2)
BASOPHILS NFR BLD: 0.6 % (ref 0–1.9)
BILIRUB SERPL-MCNC: 0.3 MG/DL (ref 0.1–1)
BUN SERPL-MCNC: 16 MG/DL (ref 8–23)
CALCIUM SERPL-MCNC: 9.4 MG/DL (ref 8.7–10.5)
CHLORIDE SERPL-SCNC: 104 MMOL/L (ref 95–110)
CHOLEST SERPL-MCNC: 169 MG/DL (ref 120–199)
CHOLEST/HDLC SERPL: 4.6 {RATIO} (ref 2–5)
CO2 SERPL-SCNC: 27 MMOL/L (ref 23–29)
CREAT SERPL-MCNC: 1.1 MG/DL (ref 0.5–1.4)
DIFFERENTIAL METHOD BLD: ABNORMAL
EOSINOPHIL # BLD AUTO: 0.1 K/UL (ref 0–0.5)
EOSINOPHIL NFR BLD: 1.9 % (ref 0–8)
ERYTHROCYTE [DISTWIDTH] IN BLOOD BY AUTOMATED COUNT: 16 % (ref 11.5–14.5)
EST. GFR  (NO RACE VARIABLE): 49.5 ML/MIN/1.73 M^2
ESTIMATED AVG GLUCOSE: 126 MG/DL (ref 68–131)
GLUCOSE SERPL-MCNC: 131 MG/DL (ref 70–110)
HBA1C MFR BLD: 6 % (ref 4–5.6)
HCT VFR BLD AUTO: 27.7 % (ref 37–48.5)
HDLC SERPL-MCNC: 37 MG/DL (ref 40–75)
HDLC SERPL: 21.9 % (ref 20–50)
HGB BLD-MCNC: 8.3 G/DL (ref 12–16)
IMM GRANULOCYTES # BLD AUTO: 0.06 K/UL (ref 0–0.04)
IMM GRANULOCYTES NFR BLD AUTO: 0.9 % (ref 0–0.5)
LDLC SERPL CALC-MCNC: 98.6 MG/DL (ref 63–159)
LYMPHOCYTES # BLD AUTO: 1.7 K/UL (ref 1–4.8)
LYMPHOCYTES NFR BLD: 25.9 % (ref 18–48)
MCH RBC QN AUTO: 25.3 PG (ref 27–31)
MCHC RBC AUTO-ENTMCNC: 30 G/DL (ref 32–36)
MCV RBC AUTO: 85 FL (ref 82–98)
MONOCYTES # BLD AUTO: 0.6 K/UL (ref 0.3–1)
MONOCYTES NFR BLD: 9.6 % (ref 4–15)
NEUTROPHILS # BLD AUTO: 3.9 K/UL (ref 1.8–7.7)
NEUTROPHILS NFR BLD: 61.1 % (ref 38–73)
NONHDLC SERPL-MCNC: 132 MG/DL
NRBC BLD-RTO: 0 /100 WBC
PLATELET # BLD AUTO: 297 K/UL (ref 150–450)
PMV BLD AUTO: 10.6 FL (ref 9.2–12.9)
POTASSIUM SERPL-SCNC: 4.3 MMOL/L (ref 3.5–5.1)
PROT SERPL-MCNC: 6.7 G/DL (ref 6–8.4)
RBC # BLD AUTO: 3.28 M/UL (ref 4–5.4)
SODIUM SERPL-SCNC: 141 MMOL/L (ref 136–145)
TRIGL SERPL-MCNC: 167 MG/DL (ref 30–150)
TSH SERPL DL<=0.005 MIU/L-ACNC: 1.69 UIU/ML (ref 0.4–4)
WBC # BLD AUTO: 6.38 K/UL (ref 3.9–12.7)

## 2024-07-18 PROCEDURE — 83036 HEMOGLOBIN GLYCOSYLATED A1C: CPT | Mod: HCNC | Performed by: PHYSICIAN ASSISTANT

## 2024-07-18 PROCEDURE — 85025 COMPLETE CBC W/AUTO DIFF WBC: CPT | Mod: HCNC | Performed by: PHYSICIAN ASSISTANT

## 2024-07-18 PROCEDURE — 80061 LIPID PANEL: CPT | Mod: HCNC | Performed by: PHYSICIAN ASSISTANT

## 2024-07-18 PROCEDURE — 36415 COLL VENOUS BLD VENIPUNCTURE: CPT | Mod: HCNC,PO | Performed by: PHYSICIAN ASSISTANT

## 2024-07-18 PROCEDURE — 80053 COMPREHEN METABOLIC PANEL: CPT | Mod: HCNC | Performed by: PHYSICIAN ASSISTANT

## 2024-07-18 PROCEDURE — 84443 ASSAY THYROID STIM HORMONE: CPT | Mod: HCNC | Performed by: PHYSICIAN ASSISTANT

## 2024-07-19 ENCOUNTER — PATIENT OUTREACH (OUTPATIENT)
Dept: ADMINISTRATIVE | Facility: HOSPITAL | Age: 84
End: 2024-07-19
Payer: MEDICARE

## 2024-07-19 NOTE — PROGRESS NOTES
VBHM Score: 2     Eye Exam  Uncontrolled BP    RSV Vaccine            Health Maintenance Topic(s) Outreach Outcomes & Actions Taken:    Blood Pressure - Outreach Outcomes & Actions Taken  : spoke with Ms Ruba (daughter), said pt has not checked her bp in a while, will need to charge battery. Pt does have appt for follow up, 7/23/24..    Eye Exam - Outreach Outcomes & Actions Taken  : Ruba ([facility]) said she would discuss eye exam with pt and schedule appt on-line.          Care Management, Digital Medicine, and/or Education Referrals    OPCM Risk Score: 71.2         Next Steps - Referral Actions: No referrals placed.        Additional Notes:  [facility] reviewed March 2024.

## 2024-07-23 ENCOUNTER — HOSPITAL ENCOUNTER (OUTPATIENT)
Dept: RADIOLOGY | Facility: HOSPITAL | Age: 84
Discharge: HOME | End: 2024-07-23
Attending: FAMILY MEDICINE
Payer: MEDICARE

## 2024-07-23 ENCOUNTER — OFFICE VISIT (OUTPATIENT)
Dept: INTERNAL MEDICINE | Facility: CLINIC | Age: 84
End: 2024-07-23
Payer: MEDICARE

## 2024-07-23 VITALS
DIASTOLIC BLOOD PRESSURE: 78 MMHG | SYSTOLIC BLOOD PRESSURE: 138 MMHG | HEART RATE: 100 BPM | BODY MASS INDEX: 34.17 KG/M2 | WEIGHT: 225.44 LBS | OXYGEN SATURATION: 96 % | HEIGHT: 68 IN

## 2024-07-23 DIAGNOSIS — G25.81 RLS (RESTLESS LEGS SYNDROME): ICD-10-CM

## 2024-07-23 DIAGNOSIS — E78.2 HYPERLIPIDEMIA, MIXED: ICD-10-CM

## 2024-07-23 DIAGNOSIS — I87.8 VENOUS STASIS OF BOTH LOWER EXTREMITIES: ICD-10-CM

## 2024-07-23 DIAGNOSIS — N32.81 OVERACTIVE BLADDER: Chronic | ICD-10-CM

## 2024-07-23 DIAGNOSIS — R06.09 DYSPNEA ON EXERTION: ICD-10-CM

## 2024-07-23 DIAGNOSIS — D64.9 ANEMIA, UNSPECIFIED TYPE: ICD-10-CM

## 2024-07-23 DIAGNOSIS — E11.9 TYPE 2 DIABETES MELLITUS WITHOUT COMPLICATION, WITHOUT LONG-TERM CURRENT USE OF INSULIN: ICD-10-CM

## 2024-07-23 DIAGNOSIS — I10 ESSENTIAL HYPERTENSION: ICD-10-CM

## 2024-07-23 DIAGNOSIS — N18.31 CHRONIC KIDNEY DISEASE, STAGE 3A: ICD-10-CM

## 2024-07-23 DIAGNOSIS — Z79.899 POLYPHARMACY: Primary | ICD-10-CM

## 2024-07-23 PROBLEM — G62.0 DRUG-INDUCED POLYNEUROPATHY: Status: ACTIVE | Noted: 2024-07-23

## 2024-07-23 PROBLEM — F33.41 MAJOR DEPRESSIVE DISORDER, RECURRENT, IN PARTIAL REMISSION: Status: ACTIVE | Noted: 2024-07-23

## 2024-07-23 PROCEDURE — 3078F DIAST BP <80 MM HG: CPT | Mod: HCNC,CPTII,S$GLB, | Performed by: FAMILY MEDICINE

## 2024-07-23 PROCEDURE — 1160F RVW MEDS BY RX/DR IN RCRD: CPT | Mod: HCNC,CPTII,S$GLB, | Performed by: FAMILY MEDICINE

## 2024-07-23 PROCEDURE — 1101F PT FALLS ASSESS-DOCD LE1/YR: CPT | Mod: HCNC,CPTII,S$GLB, | Performed by: FAMILY MEDICINE

## 2024-07-23 PROCEDURE — 71046 X-RAY EXAM CHEST 2 VIEWS: CPT | Mod: TC,HCNC

## 2024-07-23 PROCEDURE — 71046 X-RAY EXAM CHEST 2 VIEWS: CPT | Mod: 26,HCNC,, | Performed by: RADIOLOGY

## 2024-07-23 PROCEDURE — 3075F SYST BP GE 130 - 139MM HG: CPT | Mod: HCNC,CPTII,S$GLB, | Performed by: FAMILY MEDICINE

## 2024-07-23 PROCEDURE — 1159F MED LIST DOCD IN RCRD: CPT | Mod: HCNC,CPTII,S$GLB, | Performed by: FAMILY MEDICINE

## 2024-07-23 PROCEDURE — 1126F AMNT PAIN NOTED NONE PRSNT: CPT | Mod: HCNC,CPTII,S$GLB, | Performed by: FAMILY MEDICINE

## 2024-07-23 PROCEDURE — G2211 COMPLEX E/M VISIT ADD ON: HCPCS | Mod: HCNC,S$GLB,, | Performed by: FAMILY MEDICINE

## 2024-07-23 PROCEDURE — 99999 PR PBB SHADOW E&M-EST. PATIENT-LVL V: CPT | Mod: PBBFAC,HCNC,, | Performed by: FAMILY MEDICINE

## 2024-07-23 PROCEDURE — 3288F FALL RISK ASSESSMENT DOCD: CPT | Mod: HCNC,CPTII,S$GLB, | Performed by: FAMILY MEDICINE

## 2024-07-23 PROCEDURE — 99214 OFFICE O/P EST MOD 30 MIN: CPT | Mod: HCNC,S$GLB,, | Performed by: FAMILY MEDICINE

## 2024-07-23 NOTE — PATIENT INSTRUCTIONS
Check with your pharmacist regarding RSV vaccine.      You are eligible for a covid booster which can be done at most pharmacies.

## 2024-07-26 DIAGNOSIS — D50.9 IRON DEFICIENCY ANEMIA, UNSPECIFIED IRON DEFICIENCY ANEMIA TYPE: Primary | ICD-10-CM

## 2024-07-30 PROBLEM — G25.81 RLS (RESTLESS LEGS SYNDROME): Status: ACTIVE | Noted: 2024-07-30

## 2024-07-30 NOTE — ASSESSMENT & PLAN NOTE
Improving, continue atorvastatin    Lab Results   Component Value Date    CHOL 169 07/18/2024    LDLCALC 98.6 07/18/2024    TRIG 167 (H) 07/18/2024    HDL 37 (L) 07/18/2024    ALT 11 07/18/2024    AST 19 07/18/2024    ALKPHOS 84 07/18/2024

## 2024-07-30 NOTE — PROGRESS NOTES
Subjective:       Patient ID: Lizeth Henderson is a 83 y.o. female.    Chief Complaint: Establish Care    History of Present Illness    Patient presents today for follow up. She reports experiencing shortness of breath that has been worsening over the past several months, particularly when walking from the living room to the kitchen due to a bad knee. She expresses concern about this symptom, stating it scares her when it occurs. She denies any recent cough. She mentions having leg swelling in January, which required a 4-day hospitalization, but denies recent recurrence. She has venous stasis changes in her legs, for which her podiatrist, Dr. Nails, has prescribed compression stockings. Her daughter manages her medications which include losartan for blood pressure, Myrbetriq for bladder control (considering discontinuing), Mirapex for restless leg syndrome, Seroquel, Xanax, Lexapro, Wellbutrin, and BuSpar for mental health, Boniva for bone health, Lipitor for cholesterol, and Mobic as needed twice weekly for pain. She does not take any diabetes medications. She reports difficulty sleeping, often taking 3-4 hours to fall asleep and waking for 3-4 hour periods during the night. She frequently naps during the day. She acknowledges consuming a significant amount of sweets but is trying to reduce intake. She uses a walker both at home and when out. Her blood pressure fluctuates, often running high in the office setting, but is generally well-controlled at home. She had cellulitis in January requiring a 4-day hospitalization. In addition to her PCP, she sees Dr. Nails (podiatrist) and Dr. GARCIA (psychiatrist). She had labs done last week. She denies any previous surgeries. She denies any relevant family history. She currently lives with her daughter. They are in the process of moving out of their current house by the end of September due to a government buyout of the property, [place]. They are  actively looking to purchase a new house that meets their needs. She denies any substance use. She denies having any allergies. Patient is otherwise without concerns today.      Review of Systems   Constitutional:  Positive for fatigue. Negative for chills, fever and unexpected weight change.   HENT:  Positive for hearing loss and rhinorrhea. Negative for congestion, dental problem, ear pain and trouble swallowing.    Eyes:  Negative for pain and visual disturbance.   Respiratory:  Positive for shortness of breath. Negative for cough.    Cardiovascular:  Positive for leg swelling. Negative for chest pain and palpitations.   Gastrointestinal:  Negative for abdominal distention, abdominal pain, blood in stool, constipation, diarrhea, nausea and vomiting.   Genitourinary:  Negative for difficulty urinating and vaginal discharge.   Musculoskeletal:  Positive for arthralgias. Negative for myalgias.   Skin:  Negative for rash.   Neurological:  Positive for weakness. Negative for dizziness, numbness and headaches.   Hematological:  Negative for adenopathy. Does not bruise/bleed easily.   Psychiatric/Behavioral:  Positive for dysphoric mood and sleep disturbance. The patient is nervous/anxious.          Objective:      Physical Exam  Vitals reviewed.   Constitutional:       General: She is not in acute distress.     Appearance: She is well-developed.   HENT:      Head: Normocephalic and atraumatic.   Eyes:      General: Lids are normal. No scleral icterus.     Extraocular Movements: Extraocular movements intact.      Conjunctiva/sclera: Conjunctivae normal.      Pupils: Pupils are equal, round, and reactive to light.   Cardiovascular:      Rate and Rhythm: Normal rate and regular rhythm.      Heart sounds: No murmur heard.     No friction rub. No gallop.   Pulmonary:      Effort: Pulmonary effort is normal.      Breath sounds: Normal breath sounds. No decreased breath sounds, wheezing, rhonchi or rales.   Neurological:       Mental Status: She is alert and oriented to person, place, and time.      Cranial Nerves: No cranial nerve deficit.      Gait: Gait normal.   Psychiatric:         Mood and Affect: Mood and affect normal.         Assessment:       1. Polypharmacy    2. Anemia, unspecified type    3. Dyspnea on exertion    4. Venous stasis of both lower extremities    5. Essential hypertension    6. Hyperlipidemia, mixed    7. Overactive bladder    8. Chronic kidney disease, stage 3a    9. Type 2 diabetes mellitus without complication, without long-term current use of insulin    10. RLS (restless legs syndrome)        Plan:   1. Polypharmacy    2. Anemia, unspecified type  -     Ferritin; Future; Expected date: 07/23/2024  -     Folate; Future; Expected date: 07/23/2024  -     Vitamin B12; Future; Expected date: 07/23/2024  -     Iron and TIBC; Future; Expected date: 07/23/2024    3. Dyspnea on exertion  -     X-Ray Chest PA And Lateral; Future; Expected date: 07/23/2024  -     Ambulatory referral/consult to Cardiology; Future; Expected date: 07/30/2024    4. Venous stasis of both lower extremities  -     Ambulatory referral/consult to Cardiology; Future; Expected date: 07/30/2024    5. Essential hypertension  Assessment & Plan:  Controlled, continue losartan      6. Hyperlipidemia, mixed  Assessment & Plan:  Improving, continue atorvastatin    Lab Results   Component Value Date    CHOL 169 07/18/2024    LDLCALC 98.6 07/18/2024    TRIG 167 (H) 07/18/2024    HDL 37 (L) 07/18/2024    ALT 11 07/18/2024    AST 19 07/18/2024    ALKPHOS 84 07/18/2024           7. Overactive bladder    8. Chronic kidney disease, stage 3a  Assessment & Plan:  On ARB, monitor    Lab Results   Component Value Date    BUN 16 07/18/2024    CREATININE 1.1 07/18/2024    EGFRNORACEVR 49.5 (A) 07/18/2024           9. Type 2 diabetes mellitus without complication, without long-term current use of insulin  Assessment & Plan:  Diet controlled    Lab Results   Component  Value Date    HGBA1C 6.0 (H) 07/18/2024    HGBA1C 6.0 (H) 01/23/2024    LDLCALC 98.6 07/18/2024    LABMICR 31 08/19/2013    CREATRANDUR 295 08/19/2013    MICALBCREAT 10.5 08/19/2013           10. RLS (restless legs syndrome)  Assessment & Plan:  Stable on mirapex         Concerned about polypharmacy and increased fall risk due to multiple medications   Deferred changes to psychiatric medications to her psychiatrist.   Recommend discontinuing myrbetriq due to increased fall risk in older patients   Acknowledged restless leg symptoms may be more complex to manage without pramipexole   Attributed poor sleep to napping during the day   Suspected peripheral vascular disease based on exam, will include in cardiology referral   Continued current management of chronic stage III kidney disease with losartan for renal protection and hydration   Advised avoiding chronic NSAID use due to cardiovascular and renal risks, Tylenol preferred for pain   Will check iron studies, B12 and folate to evaluate chronic anemia   Explained risks of chronic NSAID use including kidney injury and increased cardiovascular events.   Avoid taking meloxicam (Mobic) routinely, limit to 1-2 times per week at most.   Take with food if using.   Prefer Tylenol for pain when possible.   Discussed gradual sleep schedule adjustments to reduce daytime napping and improve nighttime sleep.   Discussed anxiety management techniques including therapy, meditation and breathing exercises.   Reviewed compression stocking use, hydration and leg elevation for venous stasis.   Patient to elevate legs as needed for swelling.   Recommend hydrating well.   Patient to put compression stockings on in the morning before getting out of bed once received.   Referred to Cardiology for evaluation of shortness of breath, fluctuating blood pressure, and peripheral vascular disease.  LIFESTYLE CHANGES:   Discussed risks of polypharmacy and certain medication classes in older  patients, including increased fall risk, dizziness and somnolence.   Explained importance of staying upright for 1 hour after taking Boniva to avoid acid reflux.   Reviewed lifestyle modifications for overactive bladder including pads, limiting fluids before bedtime, avoiding caffeine.   Discontinued mirabegron (Myrbetriq) for overactive bladder.   Contact the office if not tolerating discontinuation to consider trying lower 25 mg dose.   Ordered chest XR to assess for cardiomegaly or other abnormalities in the setting of shortness of breath and leg swelling.    Patient expressed understanding and agreement with plan.    Visit today included increased complexity associated with the care of the episodic problem hypertension, which was addressed while instituting co-management of the longitudinal care of the patient due to the serious and/or complex managed problem(s) .    I have evaluated and discussed management associated with medical care services that serve as the continuing focal point for all needed health care services and/or with medical care services that are part of ongoing care related to my patient's single, serious condition or a complex condition(s).    I am providing ongoing care and I am the primary care provider for this patient, and they are being managed, monitored, and/or observed for their chronic conditions over time.     I have addressed their ongoing health maintenance requirements and needs for all health care services and reviewed co-management plans provided by specialty providers when available.    Health Maintenance Due   Topic Date Due    RSV Vaccine (Age 60+ and Pregnant patients) (1 - 1-dose 60+ series) Never done    Diabetes Urine Screening  08/19/2014    COVID-19 Vaccine (5 - 2023-24 season) 09/01/2023    Eye Exam  02/24/2024     Health Maintenance reviewed/updated.    Follow up in about 6 months (around 1/23/2025), or if symptoms worsen or fail to improve, for EP/f/u 6 month with  JUAN, labs today.    This note was generated with the assistance of ambient listening technology. Verbal consent was obtained by the patient and accompanying visitor(s) for the recording of patient appointment to facilitate this note. I attest to having reviewed and edited the generated note for accuracy, though some syntax or spelling errors may persist. Please contact the author of this note for any clarification.

## 2024-07-30 NOTE — ASSESSMENT & PLAN NOTE
Diet controlled    Lab Results   Component Value Date    HGBA1C 6.0 (H) 07/18/2024    HGBA1C 6.0 (H) 01/23/2024    LDLCALC 98.6 07/18/2024    LABMICR 31 08/19/2013    CREATRANDUR 295 08/19/2013    MICALBCREAT 10.5 08/19/2013

## 2024-07-30 NOTE — ASSESSMENT & PLAN NOTE
On ARB, monitor    Lab Results   Component Value Date    BUN 16 07/18/2024    CREATININE 1.1 07/18/2024    EGFRNORACEVR 49.5 (A) 07/18/2024

## 2024-08-01 ENCOUNTER — TELEPHONE (OUTPATIENT)
Dept: INTERNAL MEDICINE | Facility: CLINIC | Age: 84
End: 2024-08-01
Payer: MEDICARE

## 2024-08-01 NOTE — TELEPHONE ENCOUNTER
----- Message from Desi Maddox MD sent at 8/1/2024  9:50 AM CDT -----  Please contact patient regarding results/recommendations. It appears they have not reviewed results in [employer].

## 2024-08-02 ENCOUNTER — TELEPHONE (OUTPATIENT)
Dept: HEMATOLOGY/ONCOLOGY | Facility: CLINIC | Age: 84
End: 2024-08-02
Payer: MEDICARE

## 2024-08-02 DIAGNOSIS — D64.9 ANEMIA: Primary | ICD-10-CM

## 2024-08-02 NOTE — TELEPHONE ENCOUNTER
Pt's daughter returned call Hematology appt scheduled per request on [facility] notice via pt portal.

## 2024-08-07 ENCOUNTER — TELEPHONE (OUTPATIENT)
Dept: INTERNAL MEDICINE | Facility: CLINIC | Age: 84
End: 2024-08-07
Payer: MEDICARE

## 2024-08-15 DIAGNOSIS — G89.29 CHRONIC PAIN OF BOTH KNEES: ICD-10-CM

## 2024-08-15 DIAGNOSIS — M25.561 CHRONIC PAIN OF BOTH KNEES: ICD-10-CM

## 2024-08-15 DIAGNOSIS — M25.562 CHRONIC PAIN OF BOTH KNEES: ICD-10-CM

## 2024-08-16 RX ORDER — MELOXICAM 7.5 MG/1
7.5 TABLET ORAL DAILY PRN
Qty: 30 TABLET | Refills: 0 | Status: SHIPPED | OUTPATIENT
Start: 2024-08-16

## 2024-08-23 DIAGNOSIS — I50.42 CHRONIC COMBINED SYSTOLIC AND DIASTOLIC HEART FAILURE: Primary | ICD-10-CM

## 2024-08-24 NOTE — TELEPHONE ENCOUNTER
Care Due:                  Date            Visit Type   Department     Provider  --------------------------------------------------------------------------------                                EP -                              PRIMARY      [facility] INTER[facility]  Last Visit: 07-      CARE ([facility])   ZAHEER Maddox                              EP -                              PRIMARY      [facility] INTER[facility]  Next Visit: 01-      CARE ([facility])   ZAHEER Maddox                                                            Last  Test          Frequency    Reason                     Performed    Due Date  --------------------------------------------------------------------------------    Phosphate...  12 months..  ibandronate..............  Not Found    Overdue    [employer] Embedded Care Due Messages. Reference number: [id number].   8/24/2024 10:53:20 AM CDT

## 2024-08-26 ENCOUNTER — HOSPITAL ENCOUNTER (OUTPATIENT)
Dept: CARDIOLOGY | Facility: HOSPITAL | Age: 84
Discharge: HOME | End: 2024-08-26
Attending: INTERNAL MEDICINE
Payer: MEDICARE

## 2024-08-26 ENCOUNTER — OFFICE VISIT (OUTPATIENT)
Dept: CARDIOLOGY | Facility: CLINIC | Age: 84
End: 2024-08-26
Payer: MEDICARE

## 2024-08-26 VITALS
BODY MASS INDEX: 34.72 KG/M2 | DIASTOLIC BLOOD PRESSURE: 86 MMHG | SYSTOLIC BLOOD PRESSURE: 136 MMHG | RESPIRATION RATE: 16 BRPM | WEIGHT: 229.06 LBS | HEIGHT: 68 IN | OXYGEN SATURATION: 97 % | HEART RATE: 93 BPM

## 2024-08-26 DIAGNOSIS — I20.89 ANGINAL EQUIVALENT: ICD-10-CM

## 2024-08-26 DIAGNOSIS — E78.2 HYPERLIPIDEMIA, MIXED: ICD-10-CM

## 2024-08-26 DIAGNOSIS — I87.8 VENOUS STASIS OF BOTH LOWER EXTREMITIES: ICD-10-CM

## 2024-08-26 DIAGNOSIS — I10 ESSENTIAL HYPERTENSION: ICD-10-CM

## 2024-08-26 DIAGNOSIS — R06.09 DYSPNEA ON EXERTION: ICD-10-CM

## 2024-08-26 DIAGNOSIS — I34.0 NONRHEUMATIC MITRAL VALVE REGURGITATION: ICD-10-CM

## 2024-08-26 DIAGNOSIS — E11.9 TYPE 2 DIABETES MELLITUS WITHOUT COMPLICATION, WITHOUT LONG-TERM CURRENT USE OF INSULIN: ICD-10-CM

## 2024-08-26 DIAGNOSIS — I70.0 ATHEROSCLEROSIS OF AORTA: Primary | ICD-10-CM

## 2024-08-26 DIAGNOSIS — I50.42 CHRONIC COMBINED SYSTOLIC AND DIASTOLIC HEART FAILURE: ICD-10-CM

## 2024-08-26 PROCEDURE — 93005 ELECTROCARDIOGRAM TRACING: CPT | Mod: HCNC

## 2024-08-26 PROCEDURE — 1159F MED LIST DOCD IN RCRD: CPT | Mod: HCNC,CPTII,S$GLB, | Performed by: INTERNAL MEDICINE

## 2024-08-26 PROCEDURE — 1101F PT FALLS ASSESS-DOCD LE1/YR: CPT | Mod: HCNC,CPTII,S$GLB, | Performed by: INTERNAL MEDICINE

## 2024-08-26 PROCEDURE — 93010 ELECTROCARDIOGRAM REPORT: CPT | Mod: HCNC,,, | Performed by: INTERNAL MEDICINE

## 2024-08-26 PROCEDURE — 1160F RVW MEDS BY RX/DR IN RCRD: CPT | Mod: HCNC,CPTII,S$GLB, | Performed by: INTERNAL MEDICINE

## 2024-08-26 PROCEDURE — 3079F DIAST BP 80-89 MM HG: CPT | Mod: HCNC,CPTII,S$GLB, | Performed by: INTERNAL MEDICINE

## 2024-08-26 PROCEDURE — 99205 OFFICE O/P NEW HI 60 MIN: CPT | Mod: HCNC,S$GLB,, | Performed by: INTERNAL MEDICINE

## 2024-08-26 PROCEDURE — 99999 PR PBB SHADOW E&M-EST. PATIENT-LVL IV: CPT | Mod: PBBFAC,HCNC,, | Performed by: INTERNAL MEDICINE

## 2024-08-26 PROCEDURE — 3288F FALL RISK ASSESSMENT DOCD: CPT | Mod: HCNC,CPTII,S$GLB, | Performed by: INTERNAL MEDICINE

## 2024-08-26 PROCEDURE — 3075F SYST BP GE 130 - 139MM HG: CPT | Mod: HCNC,CPTII,S$GLB, | Performed by: INTERNAL MEDICINE

## 2024-08-26 NOTE — PROGRESS NOTES
Subjective:   Patient ID:  Lizeth Henderson is a 84 y.o. female who presents for follow-up of No chief complaint on file.  Pt referred for SOB/CAMACHO x few weeks. Pt with chronic LE edema - venous stasis since 1-24 seeing vascular surgery.  Pt with 20 lb weight gain since 2020. No CP. EKG is normal.BP ok at home. Pt sleeps at an angle.    CRF- age/female, DM, HTN, HLP    Shortness of Breath  This is a new problem. The current episode started 1 to 4 weeks ago. The problem occurs intermittently. The problem has been waxing and waning. Associated symptoms include leg swelling. Pertinent negatives include no chest pain. Nothing aggravates the symptoms. The patient has no known risk factors for DVT/PE. She has tried rest for the symptoms. The treatment provided mild relief.   Edema  This is a chronic problem. The current episode started more than 1 year ago. The problem occurs intermittently. The problem has been waxing and waning. Pertinent negatives include no chest pain. The symptoms are aggravated by standing. She has tried rest for the symptoms. The treatment provided mild relief.       Review of Systems   Constitutional: Negative. Negative for weight gain.   HENT: Negative.     Eyes: Negative.    Cardiovascular:  Positive for leg swelling. Negative for chest pain and palpitations.   Respiratory:  Positive for shortness of breath.    Endocrine: Negative.    Hematologic/Lymphatic: Negative.    Skin: Negative.    Musculoskeletal:  Negative for muscle weakness.   Gastrointestinal: Negative.    Genitourinary: Negative.    Neurological: Negative.  Negative for dizziness.   Psychiatric/Behavioral: Negative.     Allergic/Immunologic: Negative.    All other systems reviewed and are negative.    Family History   Problem Relation Name Age of Onset    Cancer Father price     Hypertension Father price     Arthritis Father price     Hearing loss Father mayank     Alzheimer's disease Mother Tracy     Arthritis Mother Tracy      Depression Daughter Connie     Kidney disease Daughter Ruba     Ulcerative colitis Daughter Ruba     Depression Daughter Ruba     Depression Daughter Veronica     Anxiety disorder Daughter Veronica         PTSD    Cancer Maternal Uncle Nikolas     Cancer Cousin          colon cancer    Early death Maternal Grandmother Chata     Amblyopia Neg Hx      Blindness Neg Hx      Cataracts Neg Hx      Diabetes Neg Hx      Glaucoma Neg Hx      Macular degeneration Neg Hx      Retinal detachment Neg Hx      Strabismus Neg Hx      Stroke Neg Hx      Thyroid disease Neg Hx       Past Medical History:   Diagnosis Date    Adrenal adenoma 2016    Anxiety     Arthritis     Benign hypertension     Colon cancer age 61    colon    Coronary artery disease     Depression     Full dentures     General anesthetics causing adverse effect in therapeutic use     yells and talks when wakes up    Hyperlipidemia     Osteopenia     Shingles     Type 2 diabetes mellitus without complication, without long-term current use of insulin 1/23/2024    Wears glasses      Social History     Socioeconomic History    Marital status:     Number of children: 3   Tobacco Use    Smoking status: Never     Passive exposure: Never    Smokeless tobacco: Never   Substance and Sexual Activity    Alcohol use: No    Drug use: Never    Sexual activity: Never   Social History Narrative    The patient does not exercise regularly ().    Rates diet as poor.    She is not satisfied with weight.             Social Determinants of Health     Financial Resource Strain: Low Risk  (3/28/2024)    Overall Financial Resource Strain (CARDIA)     Difficulty of Paying Living Expenses: Not hard at all   Food Insecurity: No Food Insecurity (3/28/2024)    Hunger Vital Sign     Worried About Running Out of Food in the Last Year: Never true     Ran Out of Food in the Last Year: Never true   Transportation Needs: No Transportation Needs (3/28/2024)    PRAPARE - Transportation     Lack of  Transportation (Medical): No     Lack of Transportation (Non-Medical): No   Physical Activity: Inactive (3/28/2024)    Exercise Vital Sign     Days of Exercise per Week: 0 days     Minutes of Exercise per Session: 0 min   Stress: Stress Concern Present (3/28/2024)    [employer] - Occupational Stress Questionnaire     Feeling of Stress : To some extent   Housing Stability: Low Risk  (3/28/2024)    Housing Stability Vital Sign     Unable to Pay for Housing in the Last Year: No     Number of Places Lived in the Last Year: 1     Unstable Housing in the Last Year: No     Current Outpatient Medications on File Prior to Visit   Medication Sig Dispense Refill    ALPRAZolam (XANAX) 0.25 MG tablet TAKE 1/2 TO 1 TABLET BY MOUTH DAILY AS NEEDED FOR ANXIETY 20 tablet 3    aspirin (ECOTRIN) 81 MG EC tablet Take 81 mg by mouth once daily.      atorvastatin (LIPITOR) 10 MG tablet TAKE 1 TABLET(10 MG) BY MOUTH EVERY DAY 90 tablet 1    buPROPion (WELLBUTRIN XL) 150 MG TB24 tablet Take 1 tablet (150 mg total) by mouth once daily. 90 tablet 3    busPIRone (BUSPAR) 10 MG tablet Take 2 tablets (20 mg total) by mouth 2 (two) times daily. TAKE 2 TABLETS(20 MG) BY MOUTH TWICE DAILY 120 tablet 3    EScitalopram oxalate (LEXAPRO) 10 MG tablet Take 1 tablet (10 mg total) by mouth once daily. 90 tablet 1    ibandronate (BONIVA) 150 mg tablet Take 1 tablet (150 mg total) by mouth every 30 days. 3 tablet 3    meloxicam (MOBIC) 7.5 MG tablet TAKE 1 TABLET(7.5 MG) BY MOUTH DAILY AS NEEDED FOR PAIN 30 tablet 0    pramipexole (MIRAPEX) 0.125 MG tablet TAKE 1 TABLET BY MOUTH EVERY EVENING 90 tablet 1    QUEtiapine (SEROQUEL) 50 MG tablet Take 50 mg by mouth every evening.      suvorexant (BELSOMRA) 10 mg Tab Take 1 tablet by mouth every evening. 30 tablet 3    losartan (COZAAR) 100 MG tablet Take 1 tablet (100 mg total) by mouth once daily. 90 tablet 3     No current facility-administered medications on file prior to  visit.     Review of patient's allergies indicates:   Allergen Reactions    Clindamycin      Diarrhea      Lisinopril      cough       Objective:     Physical Exam  Musculoskeletal:      Right lower leg: Edema present.      Left lower leg: Edema present.         Assessment:     1. Atherosclerosis of aorta    2. Dyspnea on exertion    3. Venous stasis of both lower extremities    4. Hyperlipidemia, mixed    5. Nonrheumatic mitral valve regurgitation    6. Essential hypertension    7. Type 2 diabetes mellitus without complication, without long-term current use of insulin        Plan:     Atherosclerosis of aorta    Dyspnea on exertion  -     Ambulatory referral/consult to Cardiology    Venous stasis of both lower extremities  -     Ambulatory referral/consult to Cardiology    Hyperlipidemia, mixed    Nonrheumatic mitral valve regurgitation    Essential hypertension    Type 2 diabetes mellitus without complication, without long-term current use of insulin        Continue statin-HLP  Continue losartan-HTN  Continue asa- primary prevention  Echo, NMT/stress test

## 2024-08-26 NOTE — TELEPHONE ENCOUNTER
Refill Routing Note   Medication(s) are not appropriate for processing by [facility] for the following reason(s):        No active prescription written by provider    [facility] action(s):  Defer               Appointments  past 12m or future 3m with PCP    Date Provider   Last Visit   7/23/2024 Desi Maddox MD   Next Visit   1/28/2025 Desi Maddox MD   ED visits in past 90 days: 0        Note composed:10:02 AM 08/26/2024

## 2024-08-27 LAB
OHS QRS DURATION: 76 MS
OHS QTC CALCULATION: 455 MS

## 2024-08-27 RX ORDER — PRAMIPEXOLE DIHYDROCHLORIDE 0.12 MG/1
0.12 TABLET ORAL NIGHTLY
Qty: 90 TABLET | Refills: 3 | Status: SHIPPED | OUTPATIENT
Start: 2024-08-27

## 2024-09-05 ENCOUNTER — PATIENT MESSAGE (OUTPATIENT)
Dept: INTERNAL MEDICINE | Facility: CLINIC | Age: 84
End: 2024-09-05
Payer: MEDICARE

## 2024-09-07 DIAGNOSIS — I10 ESSENTIAL HYPERTENSION: ICD-10-CM

## 2024-09-09 ENCOUNTER — PATIENT OUTREACH (OUTPATIENT)
Dept: ADMINISTRATIVE | Facility: HOSPITAL | Age: 84
End: 2024-09-09
Payer: MEDICARE

## 2024-09-09 DIAGNOSIS — E11.9 TYPE 2 DIABETES MELLITUS WITHOUT COMPLICATION, WITHOUT LONG-TERM CURRENT USE OF INSULIN: Primary | ICD-10-CM

## 2024-09-09 NOTE — PROGRESS NOTES
DM LABS: per chart review pt is overdue for MICROALBUMIN, order placed and linked to lab appt 9.13.24

## 2024-09-10 RX ORDER — LOSARTAN POTASSIUM 100 MG/1
100 TABLET ORAL
Qty: 90 TABLET | Refills: 3 | Status: SHIPPED | OUTPATIENT
Start: 2024-09-10

## 2024-09-11 ENCOUNTER — PATIENT MESSAGE (OUTPATIENT)
Dept: CARDIOLOGY | Facility: HOSPITAL | Age: 84
End: 2024-09-11
Payer: MEDICARE

## 2024-09-12 ENCOUNTER — HOSPITAL ENCOUNTER (OUTPATIENT)
Dept: CARDIOLOGY | Facility: HOSPITAL | Age: 84
Discharge: HOME | End: 2024-09-12
Attending: INTERNAL MEDICINE

## 2024-09-16 ENCOUNTER — PATIENT MESSAGE (OUTPATIENT)
Dept: INTERNAL MEDICINE | Facility: CLINIC | Age: 84
End: 2024-09-16
Payer: MEDICARE

## 2024-09-16 DIAGNOSIS — N32.81 OVERACTIVE BLADDER: Primary | ICD-10-CM

## 2024-09-16 DIAGNOSIS — G89.29 CHRONIC PAIN OF BOTH KNEES: ICD-10-CM

## 2024-09-16 DIAGNOSIS — M25.562 CHRONIC PAIN OF BOTH KNEES: ICD-10-CM

## 2024-09-16 DIAGNOSIS — M25.561 CHRONIC PAIN OF BOTH KNEES: ICD-10-CM

## 2024-09-16 RX ORDER — MIRABEGRON 50 MG/1
50 TABLET, EXTENDED RELEASE ORAL DAILY
Qty: 90 TABLET | Refills: 3 | Status: SHIPPED | OUTPATIENT
Start: 2024-09-16 | End: 2025-09-16

## 2024-09-16 RX ORDER — MELOXICAM 7.5 MG/1
7.5 TABLET ORAL DAILY PRN
Qty: 30 TABLET | Refills: 3 | Status: SHIPPED | OUTPATIENT
Start: 2024-09-16

## 2024-09-18 ENCOUNTER — PATIENT MESSAGE (OUTPATIENT)
Dept: CARDIOLOGY | Facility: CLINIC | Age: 84
End: 2024-09-18
Payer: MEDICARE

## 2024-09-19 ENCOUNTER — OFFICE VISIT (OUTPATIENT)
Dept: PSYCHIATRY | Facility: CLINIC | Age: 84
End: 2024-09-19
Payer: MEDICARE

## 2024-09-19 DIAGNOSIS — G31.84 MILD COGNITIVE IMPAIRMENT: ICD-10-CM

## 2024-09-19 DIAGNOSIS — F41.1 GAD (GENERALIZED ANXIETY DISORDER): Primary | ICD-10-CM

## 2024-09-19 DIAGNOSIS — F33.41 MDD (MAJOR DEPRESSIVE DISORDER), RECURRENT, IN PARTIAL REMISSION: ICD-10-CM

## 2024-09-19 DIAGNOSIS — G47.00 INSOMNIA, UNSPECIFIED TYPE: ICD-10-CM

## 2024-09-19 PROCEDURE — 99214 OFFICE O/P EST MOD 30 MIN: CPT | Mod: HCNC,95,, | Performed by: PSYCHIATRY & NEUROLOGY

## 2024-09-19 PROCEDURE — 90833 PSYTX W PT W E/M 30 MIN: CPT | Mod: HCNC,95,, | Performed by: PSYCHIATRY & NEUROLOGY

## 2024-09-19 RX ORDER — BUSPIRONE HYDROCHLORIDE 10 MG/1
20 TABLET ORAL 2 TIMES DAILY
Qty: 120 TABLET | Refills: 3 | Status: SHIPPED | OUTPATIENT
Start: 2024-09-19

## 2024-09-19 RX ORDER — SUVOREXANT 10 MG/1
1 TABLET, FILM COATED ORAL NIGHTLY
Qty: 30 TABLET | Refills: 3 | Status: SHIPPED | OUTPATIENT
Start: 2024-09-19

## 2024-09-19 RX ORDER — ALPRAZOLAM 0.25 MG/1
TABLET ORAL
Qty: 30 TABLET | Refills: 3 | Status: SHIPPED | OUTPATIENT
Start: 2024-09-19

## 2024-09-19 RX ORDER — ESCITALOPRAM OXALATE 10 MG/1
10 TABLET ORAL DAILY
Qty: 90 TABLET | Refills: 1 | Status: SHIPPED | OUTPATIENT
Start: 2024-09-19

## 2024-09-19 RX ORDER — BUPROPION HYDROCHLORIDE 150 MG/1
150 TABLET ORAL DAILY
Qty: 90 TABLET | Refills: 3 | Status: SHIPPED | OUTPATIENT
Start: 2024-09-19
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
## 2024-09-25 ENCOUNTER — OFFICE VISIT (OUTPATIENT)
Dept: PODIATRY | Facility: CLINIC | Age: 84
End: 2024-09-25
Payer: MEDICARE

## 2024-09-25 DIAGNOSIS — G62.0 CHEMOTHERAPY-INDUCED PERIPHERAL NEUROPATHY: Primary | ICD-10-CM

## 2024-09-25 DIAGNOSIS — N18.32 STAGE 3B CHRONIC KIDNEY DISEASE: ICD-10-CM

## 2024-09-25 DIAGNOSIS — T45.1X5A CHEMOTHERAPY-INDUCED PERIPHERAL NEUROPATHY: Primary | ICD-10-CM

## 2024-09-25 DIAGNOSIS — L60.3 ONYCHODYSTROPHY: ICD-10-CM

## 2024-09-25 DIAGNOSIS — E11.9 TYPE 2 DIABETES MELLITUS WITHOUT COMPLICATION, WITHOUT LONG-TERM CURRENT USE OF INSULIN: ICD-10-CM

## 2024-09-25 PROCEDURE — 99999 PR PBB SHADOW E&M-EST. PATIENT-LVL III: CPT | Mod: PBBFAC,HCNC,, | Performed by: PODIATRIST

## 2024-09-25 NOTE — PROGRESS NOTES
Subjective:       Patient ID: Lizeth Henderson is a 84 y.o. female.    Chief Complaint: Diabetic Foot Exam (DFE: Pt rates 9/10 pain, pt is diabetic, was last seen on 7.23.24 by Desi Maddox. Patient states of swelling and heel pain, also of having plantar )    HPI: Patient presents to the office today with the chief complaint of elongated, thickened and dystrophic nail plates to the B/L foot. This patient is a Diabetic Type II. Patient does follow with Primary Care and/or Endocrinology for management of Diabetes Mellitus. This patient's PMD is Desi Maddox MD. This patient last saw his/her primary care provider on 07/23/2024.   Reporting 9/10 pain bilaterally.  Presents to clinic with daughter present.     Hemoglobin A1C   Date Value Ref Range Status   07/18/2024 6.0 (H) 4.0 - 5.6 % Final     Comment:     ADA Screening Guidelines:  5.7-6.4%  Consistent with prediabetes  >or=6.5%  Consistent with diabetes    High levels of fetal hemoglobin interfere with the HbA1C  assay. Heterozygous hemoglobin variants (HbS, HgC, etc)do  not significantly interfere with this assay.   However, presence of multiple variants may affect accuracy.     01/23/2024 6.0 (H) 4.0 - 5.6 % Final     Comment:     ADA Screening Guidelines:  5.7-6.4%  Consistent with prediabetes  >or=6.5%  Consistent with diabetes    High levels of fetal hemoglobin interfere with the HbA1C  assay. Heterozygous hemoglobin variants (HbS, HgC, etc)do  not significantly interfere with this assay.   However, presence of multiple variants may affect accuracy.     07/12/2023 6.0 (H) 4.0 - 5.6 % Final     Comment:     ADA Screening Guidelines:  5.7-6.4%  Consistent with prediabetes  >or=6.5%  Consistent with diabetes    High levels of fetal hemoglobin interfere with the HbA1C  assay. Heterozygous hemoglobin variants (HbS, HgC, etc)do  not significantly interfere with this assay.   However, presence of multiple variants may affect accuracy.     .     Review  of patient's allergies indicates:   Allergen Reactions    Clindamycin      Diarrhea      Lisinopril      cough       Past Medical History:   Diagnosis Date    Adrenal adenoma 2016    Anxiety     Arthritis     Benign hypertension     Colon cancer age 61    colon    Coronary artery disease     Depression     Full dentures     General anesthetics causing adverse effect in therapeutic use     yells and talks when wakes up    Hyperlipidemia     Osteopenia     Shingles     Type 2 diabetes mellitus without complication, without long-term current use of insulin 1/23/2024    Wears glasses        Family History   Problem Relation Name Age of Onset    Cancer Father price     Hypertension Father price     Arthritis Father price     Hearing loss Father mayank     Alzheimer's disease Mother Tracy     Arthritis Mother Tracy     Depression Daughter Connie     Kidney disease Daughter Ruba     Ulcerative colitis Daughter Ruba     Depression Daughter Ruba     Depression Daughter Veronica     Anxiety disorder Daughter Veronica         PTSD    Cancer Maternal Uncle Nikolas     Cancer Cousin          colon cancer    Early death Maternal Grandmother Chata     Amblyopia Neg Hx      Blindness Neg Hx      Cataracts Neg Hx      Diabetes Neg Hx      Glaucoma Neg Hx      Macular degeneration Neg Hx      Retinal detachment Neg Hx      Strabismus Neg Hx      Stroke Neg Hx      Thyroid disease Neg Hx         Social History     Socioeconomic History    Marital status:     Number of children: 3   Tobacco Use    Smoking status: Never     Passive exposure: Never    Smokeless tobacco: Never   Substance and Sexual Activity    Alcohol use: No    Drug use: Never    Sexual activity: Never   Social History Narrative    The patient does not exercise regularly ().    Rates diet as poor.    She is not satisfied with weight.             Social Determinants of Health     Financial Resource Strain: Low Risk  (3/28/2024)    Overall Financial Resource Strain (CARDIA)      Difficulty of Paying Living Expenses: Not hard at all   Food Insecurity: No Food Insecurity (3/28/2024)    Hunger Vital Sign     Worried About Running Out of Food in the Last Year: Never true     Ran Out of Food in the Last Year: Never true   Transportation Needs: No Transportation Needs (3/28/2024)    PRAPARE - Transportation     Lack of Transportation (Medical): No     Lack of Transportation (Non-Medical): No   Physical Activity: Inactive (3/28/2024)    Exercise Vital Sign     Days of Exercise per Week: 0 days     Minutes of Exercise per Session: 0 min   Stress: Stress Concern Present (3/28/2024)    [employer] [place] [employer] - Occupational Stress Questionnaire     Feeling of Stress : To some extent   Housing Stability: Low Risk  (3/28/2024)    Housing Stability Vital Sign     Unable to Pay for Housing in the Last Year: No     Number of Places Lived in the Last Year: 1     Unstable Housing in the Last Year: No       Past Surgical History:   Procedure Laterality Date    APPENDECTOMY      BREAST BIOPSY Left     benign    BUNIONECTOMY Left     CATARACT EXTRACTION Bilateral     COLON SURGERY      2001    EYE SURGERY      cataract surg    HEMICOLECTOMY  2002    HERNIA REPAIR      x5    JOINT REPLACEMENT      knee    left toe surgery      joelle    PATENT DUCTUS ARTERIOUS LIGATION  1948    SALPINGOOPHORECTOMY  2002    due to colon cancer    TONSILLECTOMY, ADENOIDECTOMY         Review of Systems       Objective:   There were no vitals taken for this visit.    Physical Exam  LOWER EXTREMITY PHYSICAL EXAMINATION    VASCULAR:  The right dorsalis pedis pulse 2/4 and the right posterior tibial pulse 2/4.  The left dorsalis pedis pulse 2/4 and posterior tibial pulse on the left is 2/4.  Capillary refill is intact.  Pedal hair growth intact.  Moderate pretibial edema    NEUROLOGY: Protective sensation is not intact to the left and right plantar surfaces of the foot and digits, as the patient has no  sensation/detection at greater than 4 distinct points of contact with 5.07 [place] monofilament. Sensation to light touch is intact on the left and right foot. Proprioception is intact, bilateral. Sensation to pin prick is reduced to absent. Vibratory sensation is diminished.    DERMATOLOGY:  Skin is supple, moist, intact.  There is no signs of callusing, ulcerations, other lesions identified to the dorsal or plantar aspect of the right or left foot.  The R1, 2, 5 and left L1,2, 5 are thickened, discolored dystrophic.  There is subungual debris.  Nail plates have area of dark discoloration.  The remaining nails 3-4 on the right foot and the left foot are elongated but of normal color, thickness, and texture.   There is no signs of ingrowing into the medial or lateral borders.  There is no evidence of wounds or skin breakdown. Moderate edema noted to the bilateral lower extremities  No obvious lacerations or fissuring.  Interdigital spaces are clean, dry, intact.  No rashes or scars appreciated.    ORTHOPEDIC: Manual Muscle Testing is 5/5 in all planes on the left and right, without pains, with and without resistance. Gait pattern is non-antalgic.    Assessment:     1. Chemotherapy-induced peripheral neuropathy    2. Type 2 diabetes mellitus without complication, without long-term current use of insulin    3. Stage 3b chronic kidney disease    4. Onychodystrophy        Plan:     Chemotherapy-induced peripheral neuropathy    Type 2 diabetes mellitus without complication, without long-term current use of insulin    Stage 3b chronic kidney disease    Onychodystrophy      Thorough discussion is had with the patient this afternoon, concerning the diagnosis, its etiology, and the treatment algorithm at present.  Greater than 50% of this visit spent on counseling and coordination of care. Greater than 15 minutes of a 20 minute appointment spent on education about the diabetic foot, neuropathy, and prevention of  limb loss.  Shoe inspection. Diabetic Foot Education. Patient reminded of the importance of good nutrition and blood sugar control to help prevent podiatric complications of diabetes. Patient instructed on proper foot hygeine. We discussed wearing proper and supportive shoe gear, daily foot inspections, never walking barefooted or sock footed, never putting sharp instruments to feet which can cause major complications associated with infection, ulcers, lacerations.      Dystrophic nail plates, as outlined above (R#1,2,5  ; L#1,2,5 ), are sharply debrided with double action nail nipper, and/or with the assistance of a mechanical rotary nehemias, with removal of all offending nail and nail border(s), for reduction of pains. Nails are reduced in terms of length, width and girth with removal of subungual debris to facilitate pain free weight bearing and ambulation. The elongated nails as outlined in the objective portion of this note, were trimmed to appropriate length, with a double action nail nipper, for alleviation/reduction of pains as well. Follow up in approx. 3-4 months.    Continue to encourage bilateral lower extremity compression    Future Appointments   Date Time Provider Department Center   10/15/2024  8:00 AM Preston Frye MD [facility]   11/22/2024 10:00 AM Abel Agrawal MD ON CARDIO [facility]   12/30/2024  1:00 PM Danyell Nails DPM ON POD [facility]   1/28/2025  3:20 PM Desi Maddox MD ON IM [facility]

## 2024-09-26 ENCOUNTER — PATIENT OUTREACH (OUTPATIENT)
Dept: ADMINISTRATIVE | Facility: HOSPITAL | Age: 84
End: 2024-09-26
Payer: MEDICARE

## 2024-09-27 ENCOUNTER — PATIENT MESSAGE (OUTPATIENT)
Dept: PODIATRY | Facility: CLINIC | Age: 84
End: 2024-09-27
Payer: MEDICARE

## 2024-10-11 ENCOUNTER — OFFICE VISIT (OUTPATIENT)
Dept: INTERNAL MEDICINE | Facility: CLINIC | Age: 84
End: 2024-10-11
Payer: MEDICARE

## 2024-10-11 ENCOUNTER — PATIENT MESSAGE (OUTPATIENT)
Dept: INTERNAL MEDICINE | Facility: CLINIC | Age: 84
End: 2024-10-11

## 2024-10-11 VITALS
BODY MASS INDEX: 34.71 KG/M2 | HEIGHT: 68 IN | TEMPERATURE: 99 F | HEART RATE: 87 BPM | OXYGEN SATURATION: 99 % | DIASTOLIC BLOOD PRESSURE: 70 MMHG | SYSTOLIC BLOOD PRESSURE: 160 MMHG | RESPIRATION RATE: 18 BRPM | WEIGHT: 229 LBS

## 2024-10-11 DIAGNOSIS — M25.562 ACUTE PAIN OF LEFT KNEE: Primary | ICD-10-CM

## 2024-10-11 DIAGNOSIS — I10 ESSENTIAL HYPERTENSION: ICD-10-CM

## 2024-10-11 PROCEDURE — 1125F AMNT PAIN NOTED PAIN PRSNT: CPT | Mod: HCNC,CPTII,S$GLB, | Performed by: PHYSICIAN ASSISTANT

## 2024-10-11 PROCEDURE — 1101F PT FALLS ASSESS-DOCD LE1/YR: CPT | Mod: HCNC,CPTII,S$GLB, | Performed by: PHYSICIAN ASSISTANT

## 2024-10-11 PROCEDURE — 1160F RVW MEDS BY RX/DR IN RCRD: CPT | Mod: HCNC,CPTII,S$GLB, | Performed by: PHYSICIAN ASSISTANT

## 2024-10-11 PROCEDURE — 1159F MED LIST DOCD IN RCRD: CPT | Mod: HCNC,CPTII,S$GLB, | Performed by: PHYSICIAN ASSISTANT

## 2024-10-11 PROCEDURE — 3078F DIAST BP <80 MM HG: CPT | Mod: HCNC,CPTII,S$GLB, | Performed by: PHYSICIAN ASSISTANT

## 2024-10-11 PROCEDURE — 3288F FALL RISK ASSESSMENT DOCD: CPT | Mod: HCNC,CPTII,S$GLB, | Performed by: PHYSICIAN ASSISTANT

## 2024-10-11 PROCEDURE — 99214 OFFICE O/P EST MOD 30 MIN: CPT | Mod: HCNC,S$GLB,, | Performed by: PHYSICIAN ASSISTANT

## 2024-10-11 PROCEDURE — 3077F SYST BP >= 140 MM HG: CPT | Mod: HCNC,CPTII,S$GLB, | Performed by: PHYSICIAN ASSISTANT

## 2024-10-11 PROCEDURE — 99999 PR PBB SHADOW E&M-EST. PATIENT-LVL V: CPT | Mod: PBBFAC,HCNC,, | Performed by: PHYSICIAN ASSISTANT

## 2024-10-11 RX ORDER — TRAMADOL HYDROCHLORIDE 50 MG/1
50 TABLET ORAL EVERY 6 HOURS PRN
Qty: 15 TABLET | Refills: 0 | Status: SHIPPED | OUTPATIENT
Start: 2024-10-11

## 2024-10-11 NOTE — PROGRESS NOTES
"Subjective:       Patient ID: Lizeth Henderson is a 84 y.o. female.    Chief Complaint: Knee Pain        Knee Pain:     Patient presents with knee pain involving the  left knee. Onset of the symptoms was several days ago. Inciting event: none known. Current symptoms include giving out, pain located lateral, stiffness, and swelling. Pain is aggravated by any weight bearing.  Patient has had prior knee problems. Evaluation to date: plain films: abnormal "moderate joint space narrowing and marginal osteophyte formation in all 3 compartments, greatest laterally - 10/13/2020 XR . Treatment to date: prescription NSAIDS which are ineffective and rest.               Review of Systems   Constitutional:  Negative for chills and fever.   Musculoskeletal:  Positive for arthralgias, gait problem and joint swelling.   Neurological:  Negative for weakness and numbness.       Objective:      Physical Exam  Vitals and nursing note reviewed.   Constitutional:       General: She is not in acute distress.     Appearance: She is well-developed.      Comments: Seated in wheelchair   HENT:      Head: Normocephalic and atraumatic.   Eyes:      General: Lids are normal. No scleral icterus.     Extraocular Movements: Extraocular movements intact.      Conjunctiva/sclera: Conjunctivae normal.   Cardiovascular:      Pulses: Normal pulses.   Pulmonary:      Effort: Pulmonary effort is normal.   Musculoskeletal:      Left knee: Swelling present. Decreased range of motion. Tenderness present over the lateral joint line. Normal pulse.      Right lower le+ Edema present.      Left lower le+ Edema present.   Neurological:      Mental Status: She is alert.      Cranial Nerves: No cranial nerve deficit.   Psychiatric:         Mood and Affect: Mood and affect normal.         Assessment:       1. Acute pain of left knee    2. Essential hypertension        Plan:   1. Acute pain of left knee  -     Ambulatory referral/consult to Orthopedics; " Future; Expected date: 10/18/2024  -     traMADoL (ULTRAM) 50 mg tablet; Take 1 tablet (50 mg total) by mouth every 6 (six) hours as needed for Pain.  Dispense: 15 tablet; Refill: 0    2. Essential hypertension  Assessment & Plan:  Elevated, continue losartan, RTC BP and RTC 2 weeks for BP recheck          Recommend rest, ice and elevation  Referred to ortho  No injury, will defer imaging to specialty  Tramadol for pain  F/U PRN                 This note was generated with the assistance of ambient listening technology. I attest to having reviewed and edited the generated note for accuracy, though some syntax or spelling errors may persist. Please contact the author of this note for any clarification.

## 2024-10-18 DIAGNOSIS — M17.12 PRIMARY OSTEOARTHRITIS OF LEFT KNEE: Primary | ICD-10-CM

## 2024-10-21 ENCOUNTER — HOSPITAL ENCOUNTER (OUTPATIENT)
Dept: RADIOLOGY | Facility: HOSPITAL | Age: 84
Discharge: HOME | End: 2024-10-21
Attending: STUDENT IN AN ORGANIZED HEALTH CARE EDUCATION/TRAINING PROGRAM
Payer: MEDICARE

## 2024-10-21 ENCOUNTER — OFFICE VISIT (OUTPATIENT)
Dept: SPORTS MEDICINE | Facility: CLINIC | Age: 84
End: 2024-10-21
Payer: MEDICARE

## 2024-10-21 VITALS — HEIGHT: 68 IN | WEIGHT: 220 LBS | BODY MASS INDEX: 33.34 KG/M2

## 2024-10-21 DIAGNOSIS — M17.12 PRIMARY OSTEOARTHRITIS OF LEFT KNEE: Primary | ICD-10-CM

## 2024-10-21 DIAGNOSIS — M17.12 PRIMARY OSTEOARTHRITIS OF LEFT KNEE: ICD-10-CM

## 2024-10-21 PROCEDURE — 1125F AMNT PAIN NOTED PAIN PRSNT: CPT | Mod: HCNC,CPTII,S$GLB, | Performed by: STUDENT IN AN ORGANIZED HEALTH CARE EDUCATION/TRAINING PROGRAM

## 2024-10-21 PROCEDURE — 1100F PTFALLS ASSESS-DOCD GE2>/YR: CPT | Mod: HCNC,CPTII,S$GLB, | Performed by: STUDENT IN AN ORGANIZED HEALTH CARE EDUCATION/TRAINING PROGRAM

## 2024-10-21 PROCEDURE — 99204 OFFICE O/P NEW MOD 45 MIN: CPT | Mod: 25,HCNC,S$GLB, | Performed by: STUDENT IN AN ORGANIZED HEALTH CARE EDUCATION/TRAINING PROGRAM

## 2024-10-21 PROCEDURE — 20611 DRAIN/INJ JOINT/BURSA W/US: CPT | Mod: HCNC,LT,S$GLB, | Performed by: STUDENT IN AN ORGANIZED HEALTH CARE EDUCATION/TRAINING PROGRAM

## 2024-10-21 PROCEDURE — 99999 PR PBB SHADOW E&M-EST. PATIENT-LVL IV: CPT | Mod: PBBFAC,HCNC,, | Performed by: STUDENT IN AN ORGANIZED HEALTH CARE EDUCATION/TRAINING PROGRAM

## 2024-10-21 PROCEDURE — 73562 X-RAY EXAM OF KNEE 3: CPT | Mod: 26,HCNC,LT, | Performed by: RADIOLOGY

## 2024-10-21 PROCEDURE — 73560 X-RAY EXAM OF KNEE 1 OR 2: CPT | Mod: TC,HCNC,RT

## 2024-10-21 PROCEDURE — 3288F FALL RISK ASSESSMENT DOCD: CPT | Mod: HCNC,CPTII,S$GLB, | Performed by: STUDENT IN AN ORGANIZED HEALTH CARE EDUCATION/TRAINING PROGRAM

## 2024-10-21 PROCEDURE — 1159F MED LIST DOCD IN RCRD: CPT | Mod: HCNC,CPTII,S$GLB, | Performed by: STUDENT IN AN ORGANIZED HEALTH CARE EDUCATION/TRAINING PROGRAM

## 2024-10-21 RX ORDER — BUPIVACAINE HYDROCHLORIDE 5 MG/ML
2 INJECTION, SOLUTION PERINEURAL
Status: DISCONTINUED | OUTPATIENT
Start: 2024-10-21 | End: 2024-10-21 | Stop reason: HOSPADM

## 2024-10-21 RX ORDER — TRIAMCINOLONE ACETONIDE 40 MG/ML
40 INJECTION, SUSPENSION INTRA-ARTICULAR; INTRAMUSCULAR
Status: DISCONTINUED | OUTPATIENT
Start: 2024-10-21 | End: 2024-10-21 | Stop reason: HOSPADM

## 2024-10-21 RX ORDER — LIDOCAINE HYDROCHLORIDE 10 MG/ML
2 INJECTION, SOLUTION INFILTRATION; PERINEURAL
Status: DISCONTINUED | OUTPATIENT
Start: 2024-10-21 | End: 2024-10-21 | Stop reason: HOSPADM

## 2024-10-21 RX ADMIN — LIDOCAINE HYDROCHLORIDE 2 ML: 10 INJECTION, SOLUTION INFILTRATION; PERINEURAL at 03:10

## 2024-10-21 RX ADMIN — BUPIVACAINE HYDROCHLORIDE 2 ML: 5 INJECTION, SOLUTION PERINEURAL at 03:10

## 2024-10-21 RX ADMIN — TRIAMCINOLONE ACETONIDE 40 MG: 40 INJECTION, SUSPENSION INTRA-ARTICULAR; INTRAMUSCULAR at 03:10

## 2024-10-21 NOTE — PROGRESS NOTES
Patient ID: Lizeth Henderson  YOB: 1940  MRN: [id number]    Chief Complaint: Pain and Injury of the Left Knee      Referred By:  Primary care for left knee pain    History of Present Illness: Lizeth Henderson is a  84 y.o. female who presents today with left knee pain.     84-year-old female presenting today for left knee pain worsening over last couple months.  Has had a few falls with a if had to call the fire department to help lift her back up and she has been reliant on a walker at home and has felt more and more weak and less active over last couple weeks to months.  She is accompanied by her daughter today who she lives with currently.  She has tried over-the-counter Tylenol is not a good candidate for anti-inflammatories secondary to chronic kidney disease and has seen providers in the past for her knees a few years ago and had multiple injections with poor outcomes and significant pain following the injections.  She is not interested in a surgical procedure.  Pain is worse with increased weight-bearing and mostly over the medial joint line.    Past Medical History:   Past Medical History:   Diagnosis Date    Adrenal adenoma 2016    Anxiety     Arthritis     Benign hypertension     Colon cancer age 61    colon    Coronary artery disease     Depression     Full dentures     General anesthetics causing adverse effect in therapeutic use     yells and talks when wakes up    Hyperlipidemia     Osteopenia     Shingles     Type 2 diabetes mellitus without complication, without long-term current use of insulin 1/23/2024    Wears glasses      Past Surgical History:   Procedure Laterality Date    APPENDECTOMY      BREAST BIOPSY Left     benign    BUNIONECTOMY Left     CATARACT EXTRACTION Bilateral     COLON SURGERY      2001    EYE SURGERY      cataract surg    HEMICOLECTOMY  2002    HERNIA REPAIR      x5    JOINT REPLACEMENT      knee    left toe surgery      joelle    PATENT DUCTUS  ARTERIOUS LIGATION  1948    SALPINGOOPHORECTOMY  2002    due to colon cancer    TONSILLECTOMY, ADENOIDECTOMY       Family History   Problem Relation Name Age of Onset    Cancer Father price     Hypertension Father price     Arthritis Father price     Hearing loss Father mayank     Alzheimer's disease Mother Tracy     Arthritis Mother Tracy     Depression Daughter Connie     Kidney disease Daughter Ruba     Ulcerative colitis Daughter Ruba     Depression Daughter Ruba     Depression Daughter Veronica     Anxiety disorder Daughter Veronica         PTSD    Cancer Maternal Uncle Nikolas     Cancer Cousin          colon cancer    Early death Maternal Grandmother Chata     Amblyopia Neg Hx      Blindness Neg Hx      Cataracts Neg Hx      Diabetes Neg Hx      Glaucoma Neg Hx      Macular degeneration Neg Hx      Retinal detachment Neg Hx      Strabismus Neg Hx      Stroke Neg Hx      Thyroid disease Neg Hx       Social History     Socioeconomic History    Marital status:     Number of children: 3   Tobacco Use    Smoking status: Never     Passive exposure: Never    Smokeless tobacco: Never   Substance and Sexual Activity    Alcohol use: No    Drug use: Never    Sexual activity: Never   Social History Narrative    The patient does not exercise regularly ().    Rates diet as poor.    She is not satisfied with weight.             Social Drivers of Health     Financial Resource Strain: Low Risk  (3/28/2024)    Overall Financial Resource Strain (CARDIA)     Difficulty of Paying Living Expenses: Not hard at all   Food Insecurity: No Food Insecurity (3/28/2024)    Hunger Vital Sign     Worried About Running Out of Food in the Last Year: Never true     Ran Out of Food in the Last Year: Never true   Transportation Needs: No Transportation Needs (3/28/2024)    PRAPARE - Transportation     Lack of Transportation (Medical): No     Lack of Transportation (Non-Medical): No   Physical Activity: Inactive (3/28/2024)    Exercise Vital Sign      Days of Exercise per Week: 0 days     Minutes of Exercise per Session: 0 min   Stress: Stress Concern Present (3/28/2024)    [employer] - Occupational Stress Questionnaire     Feeling of Stress : To some extent   Housing Stability: Low Risk  (3/28/2024)    Housing Stability Vital Sign     Unable to Pay for Housing in the Last Year: No     Number of Places Lived in the Last Year: 1     Unstable Housing in the Last Year: No     Medication List with Changes/Refills   Current Medications    ALPRAZOLAM (XANAX) 0.25 MG TABLET    TAKE 1/2 TO 1 TABLET BY MOUTH DAILY AS NEEDED FOR ANXIETY    ASPIRIN (ECOTRIN) 81 MG EC TABLET    Take 81 mg by mouth once daily.    ATORVASTATIN (LIPITOR) 10 MG TABLET    TAKE 1 TABLET(10 MG) BY MOUTH EVERY DAY    BUPROPION (WELLBUTRIN XL) 150 MG TB24 TABLET    Take 1 tablet (150 mg total) by mouth once daily.    BUSPIRONE (BUSPAR) 10 MG TABLET    Take 2 tablets (20 mg total) by mouth 2 (two) times daily. TAKE 2 TABLETS(20 MG) BY MOUTH TWICE DAILY    ESCITALOPRAM OXALATE (LEXAPRO) 10 MG TABLET    Take 1 tablet (10 mg total) by mouth once daily.    IBANDRONATE (BONIVA) 150 MG TABLET    Take 1 tablet (150 mg total) by mouth every 30 days.    LOSARTAN (COZAAR) 100 MG TABLET    TAKE 1 TABLET(100 MG) BY MOUTH EVERY DAY    MELOXICAM (MOBIC) 7.5 MG TABLET    TAKE 1 TABLET(7.5 MG) BY MOUTH DAILY AS NEEDED FOR PAIN    MIRABEGRON (MYRBETRIQ) 50 MG TB24    Take 1 tablet (50 mg total) by mouth once daily.    PRAMIPEXOLE (MIRAPEX) 0.125 MG TABLET    TAKE 1 TABLET BY MOUTH EVERY EVENING    QUETIAPINE (SEROQUEL) 50 MG TABLET    Take 50 mg by mouth every evening.    SUVOREXANT (BELSOMRA) 10 MG TAB    Take 1 tablet by mouth every evening.    TRAMADOL (ULTRAM) 50 MG TABLET    Take 1 tablet (50 mg total) by mouth every 6 (six) hours as needed for Pain.     Review of patient's allergies indicates:   Allergen Reactions    Clindamycin      Diarrhea      Lisinopril      cough        Physical Exam:   Body mass index is 33.45 kg/m².    GENERAL: Well appearing, in no acute distress.  HEAD: Normocephalic and atraumatic.  ENT: External ears and nose grossly normal.  EYES: EOMI bilaterally  PULMONARY: Respirations are grossly even and non-labored.  NEURO: Awake, alert, and oriented x 3.  SKIN: No obvious rashes appreciated.  PSYCH: Mood & affect are appropriate.    Detailed MSK exam:     Left knee exam crepitance active range of motion no large effusion limited exam as she was in a wheelchair tenderness over the medial and lateral joint line.    Imaging:  X-Ray Chest PA And Lateral  Narrative: EXAM:  XR CHEST PA AND LATERAL    CLINICAL HISTORY:   Dyspnea.    2 views of the chest.    FINDINGS: The lungs are clear.  The cardiac silhouette and mediastinum are within normal limits.  Osseous structures and soft tissues are within normal limits.  Tortuous thoracic aorta.  Calcified aortic knob.  Impression:   No acute chest findings.    Finalized on: 7/23/2024 5:25 PM By:  Miguel Archuleta MD  [facility]# [id number]      2024-07-23 17:27:[id number]    [facility]  Per my read left knee x-ray consistent with tricompartmental osteoarthritis of the knee.    Relevant imaging results were reviewed and interpreted by me and per my read as above.  This was discussed with the patient and / or family today.     Assessment:  Lizeth Henderson is a 84 y.o. female presents today for tricompartmental osteoarthritis of left knee.  Discussed the role of continued conservative management versus surgical referral.  She is not interested in any surgery referral at this time and is interested in continue with conservative treatment and possible injections as well as home PT.  Home health order placed today continue walker at home continue strengthening for activities of daily living and fall prevention.  She is interested in some symptomatic relief we discussed corticosteroid injection today.  Please refer to procedure note for further  details.  Follow-up every 3 months or sooner if needed.    Primary osteoarthritis of left knee  -     Sports Medicine [facility] - Guidance for Needle Placement  -     Ambulatory referral/consult to Home Health; Future; Expected date: 10/22/2024         A copy of today's visit note has been sent to the referring provider.       Elia Carreon MD    Disclaimer: This note was prepared using a voice recognition system and is likely to have sound alike errors within the text.

## 2024-10-21 NOTE — PATIENT INSTRUCTIONS
General Arthritis info:    -shiny white stuff at end of a chicken bones is cartilage    -arthritis is wearing away of the cartilage that lines the end of your bones    -osteoarthritis is thought to be a wear and tear phenomenon    -symptoms are due to inflammation of joint causing stiffness, aching, and sometimes swelling    -occasionally sharp pain will occur causing a give way sensation    -Risk factors: genetic, weight, female > male, age    Treatment options:    -maintain healthy weight (every pound is 4 pounds of pressure on the knee)    -daily moderate exercise (walk, bike, swim 30 minutes per day) to keep joints moving    -daily strengthening exercises (through therapy or on own) to keep muscles supporting joint healthy and strong    -glucosamine 1500mg daily (look for USP label on bottle)    -tylenol as needed for pain (follow directions on the bottle)    -anti-inflammatory medication such as alleve may be helpful- take 1-2 tabs twice daily for 7 days. If it helps your pain, continue. If you do not feel any change, you may stop and then take it as needed.    (you may be given a once daily anti-inflammatory such as MOBIC. If given, avoid other anti-inflammatory medications such as advil, ibuprofen, motrin, naprosyn, alleve, etc)    -if swollen and painful, ice, decrease activity, and take anti-inflammatory daily for 5-7 days and if no relief call your doctor for further options    -consider cortisone injection (every 3-4 months at most)- anti- inflammatory steroid medication that can be injected directly into the joint to reduce inflammation    -consider hyaluronic acid injections (eufflexxa, hyalgan, synvisc, supartz) (every 6 months at most)- protein injection that helps decrease pain and irritability in the joint. It is best used to help prolong intervals between cortisone injections to minimize steroid injections. These are currently approved for knee injections. Discuss with your doctor if other joint  involved. Call to seek approval prior to the injections.    -long-term treatment may include a total joint replacement (keep diary of good days and bad days, then evaluate as to when you are ready)   Supplements for pain, inflammation, arthritis.    Supplements for pain, inflammation, arthritis:    Glucosamine sulfate/chondroitin sulfate has been shown to be safe and effective for knee arthritis (and possibly other joints affected with arthritis). This is an over the counter medication/supplement and usually needs to be taken for 1-2 months before results are seen. If you do not see any results after this time, consider stopping it. The dose is usually found on the bottle, and is 1500mg to start (first 1-2 months) then you can back down to 1000 mg (current recommendations are 1500mg per day, all at once).    Some people can have stomach problems with this and if you do, you may stop taking it or divide up the doses. If you are ALLERGIC TO SHELLFISH, please do not take. Follow the instructions on the bottle.    Other supplements that have been shown to help with joint and muscle pain include:    (Unless otherwise noted, as these supplements are not FDA controlled, please follow the recommendations on the bottle)    Turmeric 500mg PO BID    Flax seed oil    Avocado soybean unsaponifiables    JUAN JOSE-e    MSM    Cherry juice extract (tart)    Sharon Hoffman    If there are any concerns about taking these supplements with other medications you may already be taking, you can speak to your pharmacist, physician, health care provider, or research other medical information available to you. Side effects and interactions are possible with any supplement or medication - be safe!

## 2024-10-21 NOTE — PROCEDURES
Large Joint Aspiration/Injection: L knee    Date/Time: 10/21/2024 3:00 PM    Performed by: Elia Carreon MD  Authorized by: Elia Carreon MD    Consent Done?:  Yes (Verbal)  Indications:  Pain and joint swelling  Site marked: the procedure site was marked    Timeout: prior to procedure the correct patient, procedure, and site was verified    Prep: patient was prepped and draped in usual sterile fashion      Local anesthesia used?: Yes    Local anesthetic:  Topical anesthetic    Details:  Needle Size:  22 G  Ultrasonic Guidance for needle placement?: Yes    Images are saved and documented.  Approach:  Anterolateral  Location:  Knee  Site:  L knee  Medications:  40 mg triamcinolone acetonide 40 mg/mL; 2 mL LIDOcaine HCL 10 mg/ml (1%) 10 mg/mL (1 %); 2 mL BUPivacaine 0.5 % (5 mg/mL)  Patient tolerance:  Patient tolerated the procedure well with no immediate complications     Ultrasound guidance was used for needle localization. Images were saved and stored for documentation. The appropriate structures were visualized. Dynamic visualization of the needle was continuous throughout the procedures and maintained good position.     We discussed the proper protocols after the injection such as no submerging pools, baths tubs, or hot tubs for 24 hr.  Showering is okay today.  We also discussed that blood sugars can be elevated after an injection and asked patient to properly checked her sugars over the next few days and contact their PCP if there are any concerns.  We discussed red flags such as fevers, chills, red, warm, tender joint at the area of injection to please seek medical care immediately.

## 2024-10-24 ENCOUNTER — PATIENT MESSAGE (OUTPATIENT)
Dept: SPORTS MEDICINE | Facility: CLINIC | Age: 84
End: 2024-10-24
Payer: MEDICARE

## 2024-11-29 ENCOUNTER — PATIENT OUTREACH (OUTPATIENT)
Dept: ADMINISTRATIVE | Facility: HOSPITAL | Age: 84
End: 2024-11-29
Payer: MEDICARE

## 2024-12-03 ENCOUNTER — EXTERNAL HOME HEALTH (OUTPATIENT)
Dept: HOME HEALTH SERVICES | Facility: HOSPITAL | Age: 84
End: 2024-12-03
Payer: MEDICARE

## 2024-12-30 ENCOUNTER — OFFICE VISIT (OUTPATIENT)
Dept: PODIATRY | Facility: CLINIC | Age: 84
End: 2024-12-30
Payer: MEDICARE

## 2024-12-30 DIAGNOSIS — T45.1X5A CHEMOTHERAPY-INDUCED PERIPHERAL NEUROPATHY: Primary | ICD-10-CM

## 2024-12-30 DIAGNOSIS — I87.2 VENOUS INSUFFICIENCY OF BOTH LOWER EXTREMITIES: ICD-10-CM

## 2024-12-30 DIAGNOSIS — G62.0 CHEMOTHERAPY-INDUCED PERIPHERAL NEUROPATHY: Primary | ICD-10-CM

## 2024-12-30 DIAGNOSIS — E11.9 TYPE 2 DIABETES MELLITUS WITHOUT COMPLICATION, WITHOUT LONG-TERM CURRENT USE OF INSULIN: ICD-10-CM

## 2024-12-30 DIAGNOSIS — N18.32 STAGE 3B CHRONIC KIDNEY DISEASE: ICD-10-CM

## 2024-12-30 PROCEDURE — 99213 OFFICE O/P EST LOW 20 MIN: CPT | Mod: 25,HCNC,S$GLB, | Performed by: PODIATRIST

## 2024-12-30 PROCEDURE — 11721 DEBRIDE NAIL 6 OR MORE: CPT | Mod: Q9,HCNC,S$GLB, | Performed by: PODIATRIST

## 2024-12-30 PROCEDURE — 1101F PT FALLS ASSESS-DOCD LE1/YR: CPT | Mod: HCNC,CPTII,S$GLB, | Performed by: PODIATRIST

## 2024-12-30 PROCEDURE — 1160F RVW MEDS BY RX/DR IN RCRD: CPT | Mod: HCNC,CPTII,S$GLB, | Performed by: PODIATRIST

## 2024-12-30 PROCEDURE — 99999 PR PBB SHADOW E&M-EST. PATIENT-LVL III: CPT | Mod: PBBFAC,HCNC,, | Performed by: PODIATRIST

## 2024-12-30 PROCEDURE — 1159F MED LIST DOCD IN RCRD: CPT | Mod: HCNC,CPTII,S$GLB, | Performed by: PODIATRIST

## 2024-12-30 PROCEDURE — 1125F AMNT PAIN NOTED PAIN PRSNT: CPT | Mod: HCNC,CPTII,S$GLB, | Performed by: PODIATRIST

## 2024-12-30 PROCEDURE — 3288F FALL RISK ASSESSMENT DOCD: CPT | Mod: HCNC,CPTII,S$GLB, | Performed by: PODIATRIST

## 2024-12-30 NOTE — PROGRESS NOTES
Subjective:       Patient ID: Lizeth Henderson is a 84 y.o. female.    Chief Complaint: Nail Care (Nail care: c/o pain rate 9/10, pt is diabetic, pt states of heel pain, last seen on 7.23.24 by Desi Maddox )    HPI: Patient presents to the office today with the chief complaint of elongated, thickened and dystrophic nail plates to the B/L foot. This patient is a Diabetic Type II. Patient does follow with Primary Care and/or Endocrinology for management of Diabetes Mellitus. This patient's PMD is Desi Maddox MD. This patient last saw his/her primary care provider on 07/23/2024.   Reporting 9/10 pain bilaterally.  Presents to clinic with daughter present.     Hemoglobin A1C   Date Value Ref Range Status   07/18/2024 6.0 (H) 4.0 - 5.6 % Final     Comment:     ADA Screening Guidelines:  5.7-6.4%  Consistent with prediabetes  >or=6.5%  Consistent with diabetes    High levels of fetal hemoglobin interfere with the HbA1C  assay. Heterozygous hemoglobin variants (HbS, HgC, etc)do  not significantly interfere with this assay.   However, presence of multiple variants may affect accuracy.     01/23/2024 6.0 (H) 4.0 - 5.6 % Final     Comment:     ADA Screening Guidelines:  5.7-6.4%  Consistent with prediabetes  >or=6.5%  Consistent with diabetes    High levels of fetal hemoglobin interfere with the HbA1C  assay. Heterozygous hemoglobin variants (HbS, HgC, etc)do  not significantly interfere with this assay.   However, presence of multiple variants may affect accuracy.     07/12/2023 6.0 (H) 4.0 - 5.6 % Final     Comment:     ADA Screening Guidelines:  5.7-6.4%  Consistent with prediabetes  >or=6.5%  Consistent with diabetes    High levels of fetal hemoglobin interfere with the HbA1C  assay. Heterozygous hemoglobin variants (HbS, HgC, etc)do  not significantly interfere with this assay.   However, presence of multiple variants may affect accuracy.     .     Review of patient's allergies indicates:   Allergen  Reactions    Clindamycin      Diarrhea      Lisinopril      cough       Past Medical History:   Diagnosis Date    Adrenal adenoma 2016    Anxiety     Arthritis     Benign hypertension     Colon cancer age 61    colon    Coronary artery disease     Depression     Full dentures     General anesthetics causing adverse effect in therapeutic use     yells and talks when wakes up    Hyperlipidemia     Osteopenia     Shingles     Type 2 diabetes mellitus without complication, without long-term current use of insulin 1/23/2024    Wears glasses        Family History   Problem Relation Name Age of Onset    Cancer Father price     Hypertension Father price     Arthritis Father price     Hearing loss Father mayank     Alzheimer's disease Mother Milton     Arthritis Mother Milton     Depression Daughter Connie     Kidney disease Daughter Ruba     Ulcerative colitis Daughter Ruba     Depression Daughter Ruba     Depression Daughter Veronica     Anxiety disorder Daughter Veronica         PTSD    Cancer Maternal Uncle Nikolas     Cancer Cousin          colon cancer    Early death Maternal Grandmother Chata     Amblyopia Neg Hx      Blindness Neg Hx      Cataracts Neg Hx      Diabetes Neg Hx      Glaucoma Neg Hx      Macular degeneration Neg Hx      Retinal detachment Neg Hx      Strabismus Neg Hx      Stroke Neg Hx      Thyroid disease Neg Hx         Social History     Socioeconomic History    Marital status:     Number of children: 3   Tobacco Use    Smoking status: Never     Passive exposure: Never    Smokeless tobacco: Never   Substance and Sexual Activity    Alcohol use: No    Drug use: Never    Sexual activity: Never   Social History Narrative    The patient does not exercise regularly ().    Rates diet as poor.    She is not satisfied with weight.             Social Drivers of Health     Financial Resource Strain: Low Risk  (3/28/2024)    Overall Financial Resource Strain (CARDIA)     Difficulty of Paying Living Expenses: Not hard  at all   Food Insecurity: No Food Insecurity (3/28/2024)    Hunger Vital Sign     Worried About Running Out of Food in the Last Year: Never true     Ran Out of Food in the Last Year: Never true   Transportation Needs: No Transportation Needs (3/28/2024)    PRAPARE - Transportation     Lack of Transportation (Medical): No     Lack of Transportation (Non-Medical): No   Physical Activity: Inactive (3/28/2024)    Exercise Vital Sign     Days of Exercise per Week: 0 days     Minutes of Exercise per Session: 0 min   Stress: Stress Concern Present (3/28/2024)    [facility] [employer] - Occupational Stress Questionnaire     Feeling of Stress : To some extent   Housing Stability: Low Risk  (3/28/2024)    Housing Stability Vital Sign     Unable to Pay for Housing in the Last Year: No     Number of Places Lived in the Last Year: 1     Unstable Housing in the Last Year: No       Past Surgical History:   Procedure Laterality Date    APPENDECTOMY      BREAST BIOPSY Left     benign    BUNIONECTOMY Left     CATARACT EXTRACTION Bilateral     COLON SURGERY      2001    EYE SURGERY      cataract surg    HEMICOLECTOMY  2002    HERNIA REPAIR      x5    JOINT REPLACEMENT      knee    left toe surgery      joelle    PATENT DUCTUS ARTERIOUS LIGATION  1948    SALPINGOOPHORECTOMY  2002    due to colon cancer    TONSILLECTOMY, ADENOIDECTOMY         Review of Systems       Objective:   There were no vitals taken for this visit.    Physical Exam  LOWER EXTREMITY PHYSICAL EXAMINATION    VASCULAR:  The right dorsalis pedis pulse 2/4 and the right posterior tibial pulse 2/4.  The left dorsalis pedis pulse 2/4 and posterior tibial pulse on the left is 2/4.  Capillary refill is intact.  Pedal hair growth intact.  Moderate pretibial edema    NEUROLOGY: Protective sensation is not intact to the left and right plantar surfaces of the foot and digits, as the patient has no sensation/detection at greater than 4 distinct points of  contact with 5.07 [employer] Aishwarya monofilament. Sensation to light touch is intact on the left and right foot. Proprioception is intact, bilateral. Sensation to pin prick is reduced to absent. Vibratory sensation is diminished.    DERMATOLOGY:  Skin is supple, moist, intact.  There is no signs of callusing, ulcerations, other lesions identified to the dorsal or plantar aspect of the right or left foot.  The R1, 2, 5 and left L1,2, 5 are thickened, discolored dystrophic.  There is subungual debris.  Nail plates have area of dark discoloration.  The remaining nails 3-4 on the right foot and the left foot are elongated but of normal color, thickness, and texture.   There is no signs of ingrowing into the medial or lateral borders.  Skin breakdown present to the anterior medial aspect of the lower leg associated with venous blister.  Clear underlying drainage noted.  3+ pitting edema noted to the bilateral lower extremities  No obvious lacerations or fissuring.  Interdigital spaces are clean, dry, intact.  No rashes or scars appreciated.    ORTHOPEDIC: Manual Muscle Testing is 5/5 in all planes on the left and right, without pains, with and without resistance. Gait pattern is non-antalgic.    Assessment:     1. Chemotherapy-induced peripheral neuropathy    2. Venous insufficiency of both lower extremities    3. Type 2 diabetes mellitus without complication, without long-term current use of insulin    4. Stage 3b chronic kidney disease        Plan:     Chemotherapy-induced peripheral neuropathy    Venous insufficiency of both lower extremities    Type 2 diabetes mellitus without complication, without long-term current use of insulin    Stage 3b chronic kidney disease      Thorough discussion is had with the patient this afternoon, concerning the diagnosis, its etiology, and the treatment algorithm at present.  Greater than 50% of this visit spent on counseling and coordination of care. Greater than 15 minutes of a 20  minute appointment spent on education about the diabetic foot, neuropathy, and prevention of limb loss.  Shoe inspection. Diabetic Foot Education. Patient reminded of the importance of good nutrition and blood sugar control to help prevent podiatric complications of diabetes. Patient instructed on proper foot hygeine. We discussed wearing proper and supportive shoe gear, daily foot inspections, never walking barefooted or sock footed, never putting sharp instruments to feet which can cause major complications associated with infection, ulcers, lacerations.      Dystrophic nail plates, as outlined above (R#1,2,5  ; L#1,2,5 ), are sharply debrided with double action nail nipper, and/or with the assistance of a mechanical rotary nehemias, with removal of all offending nail and nail border(s), for reduction of pains. Nails are reduced in terms of length, width and girth with removal of subungual debris to facilitate pain free weight bearing and ambulation. The elongated nails as outlined in the objective portion of this note, were trimmed to appropriate length, with a double action nail nipper, for alleviation/reduction of pains as well. Follow up in approx. 3-4 months.    Did discuss significant swelling in pooling of the lower extremities.  I do recommend patient utilize elevation techniques to elevate the ankle above the level the hips when lying or sitting down.  We discussed that with elevation this could help decrease swelling which is occurring to the lower extremities.  Would also recommend patient to consider/use bilateral lower leg compression which is gradient in nature.  Would recommend initiating with 20-30 mmHg of graduated compression and advancing thereafter.  Would recommend compliance as this can lead to significant benefits in regards of swelling and fluid accumulation which is occurring in the lower extremities.    Patient to follow-up in 2 weeks to assess swelling and blister formation.  Discussed the  importance of compliance with the utilizing compressive devices    Future Appointments   Date Time Provider Department Center   1/13/2025  2:45 PM Danyell Nails DPM [facility]   1/14/2025  8:00 AM Preston Frye MD [facility]   1/21/2025  9:00 AM Jennifer Flynn MD [facility]   2/17/2025  4:00 PM Elia Carreon MD [facility]   2/28/2025  3:00 PM Abel Agrawal MD [facility]   4/1/2025  2:00 PM Danyell Nails DPM [facility]

## 2024-12-31 NOTE — TELEPHONE ENCOUNTER
Refill Routing Note   Medication(s) are not appropriate for processing by [facility] for the following reason(s):        No active prescription written by provider    [facility] action(s):  Defer     Requires labs : Yes      Medication Therapy Plan: Last med order prior to updating PCP      Appointments  past 12m or future 3m with PCP    Date Provider   Last Visit   7/23/2024 Desi Maddox MD   Next Visit   Visit date not found Desi Maddox MD   ED visits in past 90 days: 0        Note composed:1:34 PM 12/31/2024

## 2024-12-31 NOTE — TELEPHONE ENCOUNTER
Care Due:                  Date            Visit Type   Department     Provider  --------------------------------------------------------------------------------                                EP -                              PRIMARY      [facility] INTER[facility]  Last Visit: 07-      CARE ([facility])   MEDICINE       Desi Maddox  Next Visit: None Scheduled  None         None Found                                                            Last  Test          Frequency    Reason                     Performed    Due Date  --------------------------------------------------------------------------------    Mg Level....  12 months..  ibandronate..............  01- 01-    Phosphate...  12 months..  ibandronate..............  Not Found    Overdue    [employer] Embedded Care Due Messages. Reference number: [id number].   12/31/2024 5:44:42 AM CST

## 2025-01-02 ENCOUNTER — PATIENT MESSAGE (OUTPATIENT)
Dept: PODIATRY | Facility: CLINIC | Age: 85
End: 2025-01-02
Payer: MEDICARE

## 2025-01-02 RX ORDER — ATORVASTATIN CALCIUM 10 MG/1
10 TABLET, FILM COATED ORAL
Qty: 90 TABLET | Refills: 0 | Status: SHIPPED | OUTPATIENT
Start: 2025-01-02

## 2025-01-06 RX ORDER — BUPROPION HYDROCHLORIDE 150 MG/1
150 TABLET ORAL
Qty: 90 TABLET | Refills: 1 | Status: SHIPPED | OUTPATIENT
Start: 2025-01-06 | End: 2025-01-08 | Stop reason: SDUPTHER

## 2025-01-08 ENCOUNTER — OFFICE VISIT (OUTPATIENT)
Dept: PSYCHIATRY | Facility: CLINIC | Age: 85
End: 2025-01-08
Payer: MEDICARE

## 2025-01-08 DIAGNOSIS — G31.84 MILD COGNITIVE IMPAIRMENT: ICD-10-CM

## 2025-01-08 DIAGNOSIS — F41.1 GAD (GENERALIZED ANXIETY DISORDER): Primary | ICD-10-CM

## 2025-01-08 DIAGNOSIS — G47.00 INSOMNIA, UNSPECIFIED TYPE: ICD-10-CM

## 2025-01-08 RX ORDER — ALPRAZOLAM 0.25 MG/1
TABLET ORAL
Qty: 30 TABLET | Refills: 3 | Status: SHIPPED | OUTPATIENT
Start: 2025-01-08

## 2025-01-08 RX ORDER — SUVOREXANT 10 MG/1
1 TABLET, FILM COATED ORAL NIGHTLY
Qty: 30 TABLET | Refills: 3 | Status: SHIPPED | OUTPATIENT
Start: 2025-01-08

## 2025-01-08 RX ORDER — ESCITALOPRAM OXALATE 10 MG/1
10 TABLET ORAL DAILY
Qty: 90 TABLET | Refills: 0 | Status: SHIPPED | OUTPATIENT
Start: 2025-01-08

## 2025-01-08 RX ORDER — BUSPIRONE HYDROCHLORIDE 10 MG/1
20 TABLET ORAL 2 TIMES DAILY
Qty: 120 TABLET | Refills: 3 | Status: SHIPPED | OUTPATIENT
Start: 2025-01-08

## 2025-01-08 RX ORDER — BUPROPION HYDROCHLORIDE 150 MG/1
150 TABLET ORAL DAILY
Qty: 90 TABLET | Refills: 0 | Status: SHIPPED | OUTPATIENT
Start: 2025-01-08
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
## 2025-01-10 ENCOUNTER — PATIENT MESSAGE (OUTPATIENT)
Dept: PSYCHIATRY | Facility: CLINIC | Age: 85
End: 2025-01-10
Payer: MEDICARE

## 2025-01-10 NOTE — TELEPHONE ENCOUNTER
Spoke with patient.  Per her report she called 911 and the EMS determined she was having a Panic Attack.  At first her BP was 190/100 but the EMS stayed with her for a while and it steadily went down until she felt better and they were comfortable leaving her.  She was not transported to the ED.  She is calling today to speak with the doctor per the recommendation of EMS.  She sounds well and is more understanding now of what a panic attack is and what it feels like.  I explained that I will forward her message to the doctor but that he may not be able to call her today.  I also told her that calling 911 was the correct step to take and if she feels similar again to call 911 again.

## 2025-01-13 ENCOUNTER — OFFICE VISIT (OUTPATIENT)
Dept: PODIATRY | Facility: CLINIC | Age: 85
End: 2025-01-13
Payer: MEDICARE

## 2025-01-13 DIAGNOSIS — E11.9 TYPE 2 DIABETES MELLITUS WITHOUT COMPLICATION, WITHOUT LONG-TERM CURRENT USE OF INSULIN: ICD-10-CM

## 2025-01-13 DIAGNOSIS — I87.2 VENOUS INSUFFICIENCY OF BOTH LOWER EXTREMITIES: Primary | ICD-10-CM

## 2025-01-13 DIAGNOSIS — N18.32 STAGE 3B CHRONIC KIDNEY DISEASE: ICD-10-CM

## 2025-01-13 PROCEDURE — 1125F AMNT PAIN NOTED PAIN PRSNT: CPT | Mod: HCNC,CPTII,S$GLB, | Performed by: PODIATRIST

## 2025-01-13 PROCEDURE — 99213 OFFICE O/P EST LOW 20 MIN: CPT | Mod: HCNC,S$GLB,, | Performed by: PODIATRIST

## 2025-01-13 PROCEDURE — 3288F FALL RISK ASSESSMENT DOCD: CPT | Mod: HCNC,CPTII,S$GLB, | Performed by: PODIATRIST

## 2025-01-13 PROCEDURE — 99999 PR PBB SHADOW E&M-EST. PATIENT-LVL III: CPT | Mod: PBBFAC,HCNC,, | Performed by: PODIATRIST

## 2025-01-13 PROCEDURE — 1160F RVW MEDS BY RX/DR IN RCRD: CPT | Mod: HCNC,CPTII,S$GLB, | Performed by: PODIATRIST

## 2025-01-13 PROCEDURE — 1101F PT FALLS ASSESS-DOCD LE1/YR: CPT | Mod: HCNC,CPTII,S$GLB, | Performed by: PODIATRIST

## 2025-01-13 PROCEDURE — 1159F MED LIST DOCD IN RCRD: CPT | Mod: HCNC,CPTII,S$GLB, | Performed by: PODIATRIST

## 2025-01-13 NOTE — PROGRESS NOTES
Subjective:       Patient ID: Lizeth Henderson is a 84 y.o. female.    Chief Complaint: Follow-up (Swelling: c/o pain rate 8/10, pt is diabetic, redness at present. )    HPI: Patient presents to the office today to follow-up on bilateral lower extremity edema with previous history of wound development.  States 8/10 pain. Decrease in redness to the lower extremities.  This patient is a Diabetic Type II. Patient does follow with Primary Care and/or Endocrinology for management of Diabetes Mellitus. This patient's PMD is Desi Maddox MD.   Presents to clinic with daughter present.     Hemoglobin A1C   Date Value Ref Range Status   07/18/2024 6.0 (H) 4.0 - 5.6 % Final     Comment:     ADA Screening Guidelines:  5.7-6.4%  Consistent with prediabetes  >or=6.5%  Consistent with diabetes    High levels of fetal hemoglobin interfere with the HbA1C  assay. Heterozygous hemoglobin variants (HbS, HgC, etc)do  not significantly interfere with this assay.   However, presence of multiple variants may affect accuracy.     01/23/2024 6.0 (H) 4.0 - 5.6 % Final     Comment:     ADA Screening Guidelines:  5.7-6.4%  Consistent with prediabetes  >or=6.5%  Consistent with diabetes    High levels of fetal hemoglobin interfere with the HbA1C  assay. Heterozygous hemoglobin variants (HbS, HgC, etc)do  not significantly interfere with this assay.   However, presence of multiple variants may affect accuracy.     07/12/2023 6.0 (H) 4.0 - 5.6 % Final     Comment:     ADA Screening Guidelines:  5.7-6.4%  Consistent with prediabetes  >or=6.5%  Consistent with diabetes    High levels of fetal hemoglobin interfere with the HbA1C  assay. Heterozygous hemoglobin variants (HbS, HgC, etc)do  not significantly interfere with this assay.   However, presence of multiple variants may affect accuracy.     .     Review of patient's allergies indicates:   Allergen Reactions    Clindamycin      Diarrhea      Lisinopril      cough       Past Medical  History:   Diagnosis Date    Adrenal adenoma 2016    Anxiety     Arthritis     Benign hypertension     Colon cancer age 61    colon    Coronary artery disease     Depression     Full dentures     General anesthetics causing adverse effect in therapeutic use     yells and talks when wakes up    Hyperlipidemia     Osteopenia     Shingles     Type 2 diabetes mellitus without complication, without long-term current use of insulin 1/23/2024    Wears glasses        Family History   Problem Relation Name Age of Onset    Cancer Father price     Hypertension Father price     Arthritis Father price     Hearing loss Father mayank     Alzheimer's disease Mother Tracy     Arthritis Mother McClure     Depression Daughter Connie     Kidney disease Daughter Ruba     Ulcerative colitis Daughter Ruba     Depression Daughter Ruba     Depression Daughter Veronica     Anxiety disorder Daughter Veronica         PTSD    Cancer Maternal Uncle Nikolas     Cancer Cousin          colon cancer    Early death Maternal Grandmother Chata     Amblyopia Neg Hx      Blindness Neg Hx      Cataracts Neg Hx      Diabetes Neg Hx      Glaucoma Neg Hx      Macular degeneration Neg Hx      Retinal detachment Neg Hx      Strabismus Neg Hx      Stroke Neg Hx      Thyroid disease Neg Hx         Social History     Socioeconomic History    Marital status:     Number of children: 3   Tobacco Use    Smoking status: Never     Passive exposure: Never    Smokeless tobacco: Never   Substance and Sexual Activity    Alcohol use: No    Drug use: Never    Sexual activity: Never   Social History Narrative    The patient does not exercise regularly ().    Rates diet as poor.    She is not satisfied with weight.             Social Drivers of Health     Financial Resource Strain: Low Risk  (3/28/2024)    Overall Financial Resource Strain (CARDIA)     Difficulty of Paying Living Expenses: Not hard at all   Food Insecurity: No Food Insecurity (3/28/2024)    Hunger Vital Sign      Worried About Running Out of Food in the Last Year: Never true     Ran Out of Food in the Last Year: Never true   Transportation Needs: No Transportation Needs (3/28/2024)    PRAPARE - Transportation     Lack of Transportation (Medical): No     Lack of Transportation (Non-Medical): No   Physical Activity: Inactive (3/28/2024)    Exercise Vital Sign     Days of Exercise per Week: 0 days     Minutes of Exercise per Session: 0 min   Stress: Stress Concern Present (3/28/2024)    [facility] Occupational [facility] - Occupational Stress Questionnaire     Feeling of Stress : To some extent   Housing Stability: Low Risk  (3/28/2024)    Housing Stability Vital Sign     Unable to Pay for Housing in the Last Year: No     Number of Places Lived in the Last Year: 1     Unstable Housing in the Last Year: No       Past Surgical History:   Procedure Laterality Date    APPENDECTOMY      BREAST BIOPSY Left     benign    BUNIONECTOMY Left     CATARACT EXTRACTION Bilateral     COLON SURGERY      2001    EYE SURGERY      cataract surg    HEMICOLECTOMY  2002    HERNIA REPAIR      x5    JOINT REPLACEMENT      knee    left toe surgery      joelle    PATENT DUCTUS ARTERIOUS LIGATION  1948    SALPINGOOPHORECTOMY  2002    due to colon cancer    TONSILLECTOMY, ADENOIDECTOMY         Review of Systems       Objective:   There were no vitals taken for this visit.    Physical Exam  LOWER EXTREMITY PHYSICAL EXAMINATION    VASCULAR:  The right dorsalis pedis pulse 2/4 and the right posterior tibial pulse 2/4.  The left dorsalis pedis pulse 2/4 and posterior tibial pulse on the left is 2/4.  Capillary refill is intact.  Pedal hair growth intact.  Moderate pretibial edema    NEUROLOGY: Protective sensation is not intact to the left and right plantar surfaces of the foot and digits, as the patient has no sensation/detection at greater than 4 distinct points of contact with 5.07 [place] Aishw[employer]a monofilament. Sensation to light touch is intact  on the left and right foot. Proprioception is intact, bilateral. Sensation to pin prick is reduced to absent. Vibratory sensation is diminished.    DERMATOLOGY:  Skin is dry xerotic.  Previous skin breakdown to the anterior medial aspect of the left lower extremity appears to be well healed.  3+ pitting edema noted to the bilateral lower extremities  No obvious lacerations or fissuring.  Interdigital spaces are clean, dry, intact.  No rashes or scars appreciated.    ORTHOPEDIC: Manual Muscle Testing is 5/5 in all planes on the left and right, without pains, with and without resistance. Gait pattern is non-antalgic.    Assessment:     1. Venous insufficiency of both lower extremities    2. Type 2 diabetes mellitus without complication, without long-term current use of insulin    3. Stage 3b chronic kidney disease          Plan:     Venous insufficiency of both lower extremities    Type 2 diabetes mellitus without complication, without long-term current use of insulin    Stage 3b chronic kidney disease        Thorough discussion is had with the patient today, concerning the diagnosis, its etiology, and the treatment algorithm at present.    Did discuss significant swelling in pooling of the lower extremities.  I do recommend patient utilize elevation techniques to elevate the ankle above the level the hips when lying or sitting down.  We discussed that with elevation this could help decrease swelling which is occurring to the lower extremities.  Would also recommend patient to consider/use bilateral lower leg compression which is gradient in nature.  Would recommend initiating with 20-30 mmHg of graduated compression and advancing thereafter.  Would recommend compliance as this can lead to significant benefits in regards of swelling and fluid accumulation which is occurring in the lower extremities.    Discussed the importance of compliance with the utilizing compressive devices  Watch and monitor for possible  cellulitis.    Future Appointments   Date Time Provider Department Center   1/21/2025  9:00 AM Jennifer Flynn MD [facility]   2/17/2025  4:00 PM Elia Carreon MD [facility]   2/28/2025  3:00 PM Abel Agrawal MD [facility]   4/1/2025  2:00 PM Danyell Nails DPM [facility] POD [facility]

## 2025-01-15 ENCOUNTER — PATIENT OUTREACH (OUTPATIENT)
Dept: ADMINISTRATIVE | Facility: HOSPITAL | Age: 85
End: 2025-01-15
Payer: MEDICARE

## 2025-01-15 NOTE — PROGRESS NOTES
"  [facility] Score: 2     Eye Exam  Uncontrolled BP    RSV Vaccine            Pt has appt to [facility] new pcp Dr Deluna, 1/21/25.  Discussed bp. States she does have a machine that "sends it to her chart", but has not checked it in a while.  Review of chart showed last BP taken was June 2023. She was instructed to start using it again.  Discussed eye exam and offered to schedule appt. She requested to schedule appt when in clinic on 1/21/25.  "

## 2025-01-16 ENCOUNTER — TELEPHONE (OUTPATIENT)
Dept: PSYCHIATRY | Facility: CLINIC | Age: 85
End: 2025-01-16
Payer: MEDICARE

## 2025-01-16 NOTE — TELEPHONE ENCOUNTER
----- Message from [employer] sent at 1/16/2025 12:58 PM CST -----  Contact: self  .Type:  Needs Medical Advice    Who Called: Lizeth Henderson  Symptoms (please be specific): panic attack    How long has patient had these symptoms:  last week  Pharmacy name and phone #:  .  [id number] - [place], [place] - [address] AT [facility] [address]  [address]  [place] [address]  Phone: [phone] Fax: [phone]  Would the patient rather a call back or a response via [facility]? Call back  Best Call Back Number: [phone]  Additional Information: Pt has called a few times and is needing a call back asap. Pt would like to talk about her medication due to her going to the ER for panic attacts.

## 2025-01-16 NOTE — TELEPHONE ENCOUNTER
----- Message from ditlo sent at 1/16/2025 12:58 PM CST -----  Contact: self  .Type:  Needs Medical Advice    Who Called: Lizeth Henderson  Symptoms (please be specific): panic attack    How long has patient had these symptoms:  last week  Pharmacy name and phone #:  .  [id number] - [place], [place] - [address] AT [facility] [address]  [address]  [place] [address]  Phone: [phone] Fax: [phone]  Would the patient rather a call back or a response via [facility]? Call back  Best Call Back Number: [phone]  Additional Information: Pt has called a few times and is needing a call back asap. Pt would like to talk about her medication due to her going to the ER for panic attacts.

## 2025-01-29 ENCOUNTER — PATIENT MESSAGE (OUTPATIENT)
Dept: PODIATRY | Facility: CLINIC | Age: 85
End: 2025-01-29
Payer: MEDICARE

## 2025-01-29 RX ORDER — CEPHALEXIN 500 MG/1
500 CAPSULE ORAL 4 TIMES DAILY
Qty: 28 CAPSULE | Refills: 0 | Status: SHIPPED | OUTPATIENT
Start: 2025-01-29 | End: 2025-02-05

## 2025-01-30 DIAGNOSIS — Z00.00 ENCOUNTER FOR MEDICARE ANNUAL WELLNESS EXAM: ICD-10-CM

## 2025-01-31 ENCOUNTER — TELEPHONE (OUTPATIENT)
Dept: PSYCHIATRY | Facility: CLINIC | Age: 85
End: 2025-01-31
Payer: MEDICARE

## 2025-01-31 NOTE — TELEPHONE ENCOUNTER
----- Message from Medardo Macias sent at 1/31/2025 11:10 AM CST -----  Contact: Ruba/ Daughter  Type:  Needing Return Call    Who Called: Ruba  Needs Call Back For: To discuss concerns  Would the patient rather a call back or a response via [facility]?  Call  Best Call Back Number: [phone]  Additional Information: Ruba stated multiple messages have been sent.

## 2025-01-31 NOTE — TELEPHONE ENCOUNTER
----- Message from Medardo Macias sent at 1/31/2025 11:10 AM CST -----  Contact: Ruba/ Daughter  Type:  Needing Return Call    Who Called: Ruba  Needs Call Back For: To discuss concerns  Would the patient rather a call back or a response via [employer]?  Call  Best Call Back Number: [phone]  Additional Information: Ruba stated multiple messages have been sent.

## 2025-01-31 NOTE — TELEPHONE ENCOUNTER
Spoke with daughter and told her I will reach out to Dr GARCIA and call her back    Dr GARCIA messaged me back he will call the patient this afternoon so I called back and let Ms Ruba know.

## 2025-02-06 DIAGNOSIS — G47.00 INSOMNIA, UNSPECIFIED TYPE: ICD-10-CM

## 2025-02-06 RX ORDER — SUVOREXANT 10 MG/1
1 TABLET, FILM COATED ORAL NIGHTLY
Qty: 30 TABLET | Refills: 2 | Status: SHIPPED | OUTPATIENT
Start: 2025-02-06

## 2025-02-19 ENCOUNTER — OFFICE VISIT (OUTPATIENT)
Dept: PODIATRY | Facility: CLINIC | Age: 85
End: 2025-02-19
Payer: MEDICARE

## 2025-02-19 DIAGNOSIS — T45.1X5A CHEMOTHERAPY-INDUCED PERIPHERAL NEUROPATHY: ICD-10-CM

## 2025-02-19 DIAGNOSIS — I87.2 VENOUS INSUFFICIENCY OF BOTH LOWER EXTREMITIES: ICD-10-CM

## 2025-02-19 DIAGNOSIS — L90.9 FAT PAD ATROPHY OF FOOT: ICD-10-CM

## 2025-02-19 DIAGNOSIS — L60.3 ONYCHODYSTROPHY: ICD-10-CM

## 2025-02-19 DIAGNOSIS — E11.9 TYPE 2 DIABETES MELLITUS WITHOUT COMPLICATION, WITHOUT LONG-TERM CURRENT USE OF INSULIN: Primary | ICD-10-CM

## 2025-02-19 DIAGNOSIS — G62.0 CHEMOTHERAPY-INDUCED PERIPHERAL NEUROPATHY: ICD-10-CM

## 2025-02-19 PROCEDURE — 99999 PR PBB SHADOW E&M-EST. PATIENT-LVL III: CPT | Mod: PBBFAC,HCNC,, | Performed by: PODIATRIST

## 2025-02-19 NOTE — PROGRESS NOTES
Subjective:       Patient ID: Lizeth Henderson is a 84 y.o. female.    Chief Complaint: Nail Care (Nail care: c/o denies pain, pt is diabetic, pt states of right heel pain, pain is 8/10, pt was last seen 10.11.24 Yesenia Aviles/)    HPI: Patient presents to the office today to follow-up on bilateral lower extremity edema with previous history of wound development.  Decrease in redness to the lower extremities. Having right heel pain without new wounds.   This patient is a Diabetic Type II. Patient does follow with Primary Care and/or Endocrinology for management of Diabetes Mellitus. This patient's PMD is Desi Maddox MD.   Presents to clinic with daughter present.     Hemoglobin A1C   Date Value Ref Range Status   07/18/2024 6.0 (H) 4.0 - 5.6 % Final     Comment:     ADA Screening Guidelines:  5.7-6.4%  Consistent with prediabetes  >or=6.5%  Consistent with diabetes    High levels of fetal hemoglobin interfere with the HbA1C  assay. Heterozygous hemoglobin variants (HbS, HgC, etc)do  not significantly interfere with this assay.   However, presence of multiple variants may affect accuracy.     01/23/2024 6.0 (H) 4.0 - 5.6 % Final     Comment:     ADA Screening Guidelines:  5.7-6.4%  Consistent with prediabetes  >or=6.5%  Consistent with diabetes    High levels of fetal hemoglobin interfere with the HbA1C  assay. Heterozygous hemoglobin variants (HbS, HgC, etc)do  not significantly interfere with this assay.   However, presence of multiple variants may affect accuracy.     07/12/2023 6.0 (H) 4.0 - 5.6 % Final     Comment:     ADA Screening Guidelines:  5.7-6.4%  Consistent with prediabetes  >or=6.5%  Consistent with diabetes    High levels of fetal hemoglobin interfere with the HbA1C  assay. Heterozygous hemoglobin variants (HbS, HgC, etc)do  not significantly interfere with this assay.   However, presence of multiple variants may affect accuracy.     .     Review of patient's allergies indicates:    Allergen Reactions    Clindamycin      Diarrhea      Lisinopril      cough       Past Medical History:   Diagnosis Date    Adrenal adenoma 2016    Anxiety     Arthritis     Benign hypertension     Colon cancer age 61    colon    Coronary artery disease     Depression     Full dentures     General anesthetics causing adverse effect in therapeutic use     yells and talks when wakes up    Hyperlipidemia     Osteopenia     Shingles     Type 2 diabetes mellitus without complication, without long-term current use of insulin 1/23/2024    Wears glasses        Family History   Problem Relation Name Age of Onset    Cancer Father price     Hypertension Father price     Arthritis Father price     Hearing loss Father mayank     Alzheimer's disease Mother Tracy     Arthritis Mother Tracy     Depression Daughter Connie     Kidney disease Daughter Ruba     Ulcerative colitis Daughter Ruba     Depression Daughter Ruba     Depression Daughter Veronica     Anxiety disorder Daughter Veronica         PTSD    Cancer Maternal Uncle Nikolas     Cancer Cousin          colon cancer    Early death Maternal Grandmother hCata     Amblyopia Neg Hx      Blindness Neg Hx      Cataracts Neg Hx      Diabetes Neg Hx      Glaucoma Neg Hx      Macular degeneration Neg Hx      Retinal detachment Neg Hx      Strabismus Neg Hx      Stroke Neg Hx      Thyroid disease Neg Hx         Social History     Socioeconomic History    Marital status:     Number of children: 3   Tobacco Use    Smoking status: Never     Passive exposure: Never    Smokeless tobacco: Never   Substance and Sexual Activity    Alcohol use: No    Drug use: Never    Sexual activity: Never   Social History Narrative    The patient does not exercise regularly ().    Rates diet as poor.    She is not satisfied with weight.             Social Drivers of Health     Financial Resource Strain: Low Risk  (3/28/2024)    Overall Financial Resource Strain (CARDIA)     Difficulty of Paying Living Expenses:  Not hard at all   Food Insecurity: No Food Insecurity (3/28/2024)    Hunger Vital Sign     Worried About Running Out of Food in the Last Year: Never true     Ran Out of Food in the Last Year: Never true   Transportation Needs: No Transportation Needs (3/28/2024)    PRAPARE - Transportation     Lack of Transportation (Medical): No     Lack of Transportation (Non-Medical): No   Physical Activity: Inactive (3/28/2024)    Exercise Vital Sign     Days of Exercise per Week: 0 days     Minutes of Exercise per Session: 0 min   Stress: Stress Concern Present (3/28/2024)    [employer] [employer] - Occupational Stress Questionnaire     Feeling of Stress : To some extent   Housing Stability: Low Risk  (3/28/2024)    Housing Stability Vital Sign     Unable to Pay for Housing in the Last Year: No     Number of Places Lived in the Last Year: 1     Unstable Housing in the Last Year: No       Past Surgical History:   Procedure Laterality Date    APPENDECTOMY      BREAST BIOPSY Left     benign    BUNIONECTOMY Left     CATARACT EXTRACTION Bilateral     COLON SURGERY      2001    EYE SURGERY      cataract surg    HEMICOLECTOMY  2002    HERNIA REPAIR      x5    JOINT REPLACEMENT      knee    left toe surgery      joelle    PATENT DUCTUS ARTERIOUS LIGATION  1948    SALPINGOOPHORECTOMY  2002    due to colon cancer    TONSILLECTOMY, ADENOIDECTOMY         Review of Systems       Objective:   There were no vitals taken for this visit.    Physical Exam  LOWER EXTREMITY PHYSICAL EXAMINATION    VASCULAR:  The right dorsalis pedis pulse 2/4 and the right posterior tibial pulse 2/4.  The left dorsalis pedis pulse 2/4 and posterior tibial pulse on the left is 2/4.  Capillary refill is intact.  Pedal hair growth intact.  Moderate pretibial edema    NEUROLOGY: Protective sensation is not intact to the left and right plantar surfaces of the foot and digits, as the patient has no sensation/detection at greater than 4 distinct  points of contact with 5.07 Lowell Aishwarya monofilament. Sensation to light touch is intact on the left and right foot. Proprioception is intact, bilateral. Sensation to pin prick is reduced to absent. Vibratory sensation is diminished.    DERMATOLOGY:  Skin is dry xerotic.  Plantar fat pad atrophy noted to the right and left heel.  Small area of flaking tissue to the right heel.  No open ulcerations or wounds.  The R1, 2, 5 and left L1,2, 5 are thickened, discolored dystrophic.  There is subungual debris.  Nail plates have area of dark discoloration.  The remaining nails on the right foot and the left foot are elongated but of normal color, thickness, and texture.   There is no signs of ingrowing into the medial or lateral borders.  There is no evidence of wounds or skin breakdown.  No edema or erythema.  No obvious lacerations or fissuring.  Interdigital spaces are clean, dry, intact.  No rashes or scars appreciated.    ORTHOPEDIC: Manual Muscle Testing is 5/5 in all planes on the left and right, without pains, with and without resistance.  Ambulating with walker for mobility    Assessment:     1. Type 2 diabetes mellitus without complication, without long-term current use of insulin    2. Chemotherapy-induced peripheral neuropathy    3. Venous insufficiency of both lower extremities    4. Fat pad atrophy of foot    5. Onychodystrophy      Plan:     Type 2 diabetes mellitus without complication, without long-term current use of insulin    Chemotherapy-induced peripheral neuropathy    Venous insufficiency of both lower extremities    Fat pad atrophy of foot    Onychodystrophy        Thorough discussion is had with the patient today, concerning the diagnosis, its etiology, and the treatment algorithm at present.    Continue with bilateral lower extremity compression    Dystrophic nail plates, as outlined above (R#1,2,5  ; L#1,2,5 ), are sharply debrided with double action nail nipper, and/or with the assistance of  a mechanical rotary nehemias, with removal of all offending nail and nail border(s), for reduction of pains. Nails are reduced in terms of length, width and girth with removal of subungual debris to facilitate pain free weight bearing and ambulation. The elongated nails as outlined in the objective portion of this note, were trimmed to appropriate length, with a double action nail nipper, for alleviation/reduction of pains as well. Follow up in approx. 3-4 months.    Discussed plantar fat pad atrophy.  Recommend patient have the heels elevated and reduce times the heel/foot is pressing against the weight-bearing surface.    Also discussed the importance of hydrating the skin as dry xerotic skin can easily crack and can lead to healing issues considering her past history of venous insufficiency and multiple wounds.    Future Appointments   Date Time Provider Department Center   3/6/2025  9:20 AM Jennifer Flynn MD [facility]   3/21/2025  2:30 PM Miesha Leavitt MD [facility]   3/27/2025  4:00 PM Preston Frye MD [facility]   5/26/2025  1:40 PM Abel Agrawal MD [facility]   7/23/2025  2:30 PM Danyell Nails DPM [facility]

## 2025-03-06 ENCOUNTER — RESULTS FOLLOW-UP (OUTPATIENT)
Dept: FAMILY MEDICINE | Facility: CLINIC | Age: 85
End: 2025-03-06

## 2025-03-06 ENCOUNTER — OFFICE VISIT (OUTPATIENT)
Dept: FAMILY MEDICINE | Facility: CLINIC | Age: 85
End: 2025-03-06
Attending: FAMILY MEDICINE
Payer: MEDICARE

## 2025-03-06 ENCOUNTER — HOSPITAL ENCOUNTER (OUTPATIENT)
Dept: RADIOLOGY | Facility: HOSPITAL | Age: 85
Discharge: HOME | End: 2025-03-06
Attending: FAMILY MEDICINE
Payer: MEDICARE

## 2025-03-06 VITALS
OXYGEN SATURATION: 95 % | TEMPERATURE: 99 F | WEIGHT: 224.44 LBS | DIASTOLIC BLOOD PRESSURE: 76 MMHG | HEIGHT: 68 IN | SYSTOLIC BLOOD PRESSURE: 150 MMHG | BODY MASS INDEX: 34.01 KG/M2 | HEART RATE: 102 BPM

## 2025-03-06 DIAGNOSIS — R06.02 SOB (SHORTNESS OF BREATH): Primary | ICD-10-CM

## 2025-03-06 DIAGNOSIS — R73.03 PRE-DIABETES: ICD-10-CM

## 2025-03-06 DIAGNOSIS — D64.9 ANEMIA, UNSPECIFIED TYPE: Primary | ICD-10-CM

## 2025-03-06 DIAGNOSIS — F33.1 MDD (MAJOR DEPRESSIVE DISORDER), RECURRENT EPISODE, MODERATE: ICD-10-CM

## 2025-03-06 DIAGNOSIS — M81.0 AGE-RELATED OSTEOPOROSIS WITHOUT CURRENT PATHOLOGICAL FRACTURE: ICD-10-CM

## 2025-03-06 DIAGNOSIS — Z87.74 H/O REPAIR OF PATENT DUCTUS ARTERIOSUS: ICD-10-CM

## 2025-03-06 DIAGNOSIS — I10 ESSENTIAL HYPERTENSION: ICD-10-CM

## 2025-03-06 DIAGNOSIS — R06.02 SOB (SHORTNESS OF BREATH): ICD-10-CM

## 2025-03-06 DIAGNOSIS — R93.3 ABNORMAL FINDINGS ON DIAGNOSTIC IMAGING OF OTHER PARTS OF DIGESTIVE TRACT: ICD-10-CM

## 2025-03-06 DIAGNOSIS — I87.8 VENOUS STASIS OF BOTH LOWER EXTREMITIES: ICD-10-CM

## 2025-03-06 DIAGNOSIS — E78.5 DYSLIPIDEMIA: ICD-10-CM

## 2025-03-06 DIAGNOSIS — Z85.038 HISTORY OF COLON CANCER: ICD-10-CM

## 2025-03-06 PROBLEM — R06.09 DYSPNEA ON EXERTION: Status: RESOLVED | Noted: 2024-08-26 | Resolved: 2025-03-06

## 2025-03-06 PROBLEM — L03.116 BILATERAL LOWER LEG CELLULITIS: Status: RESOLVED | Noted: 2024-02-14 | Resolved: 2025-03-06

## 2025-03-06 PROBLEM — D72.0: Status: RESOLVED | Noted: 2020-11-06 | Resolved: 2025-03-06

## 2025-03-06 PROBLEM — E11.9 TYPE 2 DIABETES MELLITUS WITHOUT COMPLICATION, WITHOUT LONG-TERM CURRENT USE OF INSULIN: Status: RESOLVED | Noted: 2024-01-23 | Resolved: 2025-03-06

## 2025-03-06 PROBLEM — L03.115 CELLULITIS OF RIGHT LOWER EXTREMITY: Status: RESOLVED | Noted: 2024-02-14 | Resolved: 2025-03-06

## 2025-03-06 PROBLEM — L03.115 BILATERAL LOWER LEG CELLULITIS: Status: RESOLVED | Noted: 2024-02-14 | Resolved: 2025-03-06

## 2025-03-06 PROBLEM — M72.2 PLANTAR FASCIITIS: Status: RESOLVED | Noted: 2024-02-14 | Resolved: 2025-03-06

## 2025-03-06 PROCEDURE — 3288F FALL RISK ASSESSMENT DOCD: CPT | Mod: HCNC,CPTII,S$GLB, | Performed by: FAMILY MEDICINE

## 2025-03-06 PROCEDURE — 3078F DIAST BP <80 MM HG: CPT | Mod: HCNC,CPTII,S$GLB, | Performed by: FAMILY MEDICINE

## 2025-03-06 PROCEDURE — 99999 PR PBB SHADOW E&M-EST. PATIENT-LVL V: CPT | Mod: PBBFAC,HCNC,, | Performed by: FAMILY MEDICINE

## 2025-03-06 PROCEDURE — 99214 OFFICE O/P EST MOD 30 MIN: CPT | Mod: HCNC,S$GLB,, | Performed by: FAMILY MEDICINE

## 2025-03-06 PROCEDURE — 1101F PT FALLS ASSESS-DOCD LE1/YR: CPT | Mod: HCNC,CPTII,S$GLB, | Performed by: FAMILY MEDICINE

## 2025-03-06 PROCEDURE — 1160F RVW MEDS BY RX/DR IN RCRD: CPT | Mod: HCNC,CPTII,S$GLB, | Performed by: FAMILY MEDICINE

## 2025-03-06 PROCEDURE — 1126F AMNT PAIN NOTED NONE PRSNT: CPT | Mod: HCNC,CPTII,S$GLB, | Performed by: FAMILY MEDICINE

## 2025-03-06 PROCEDURE — 71046 X-RAY EXAM CHEST 2 VIEWS: CPT | Mod: TC,HCNC,PO

## 2025-03-06 PROCEDURE — 1159F MED LIST DOCD IN RCRD: CPT | Mod: HCNC,CPTII,S$GLB, | Performed by: FAMILY MEDICINE

## 2025-03-06 PROCEDURE — 71046 X-RAY EXAM CHEST 2 VIEWS: CPT | Mod: 26,HCNC,, | Performed by: RADIOLOGY

## 2025-03-06 PROCEDURE — 3077F SYST BP >= 140 MM HG: CPT | Mod: HCNC,CPTII,S$GLB, | Performed by: FAMILY MEDICINE

## 2025-03-06 PROCEDURE — G2211 COMPLEX E/M VISIT ADD ON: HCPCS | Mod: HCNC,S$GLB,, | Performed by: FAMILY MEDICINE

## 2025-03-06 NOTE — PROGRESS NOTES
Subjective:       Patient ID: Lizeth Henderson is a 84 y.o. female.    Chief Complaint: Establish Care    84 y old female with obesity , htn , dlp , osteoporosis , hx of colon cancer,  hx of Patent ductus arteriosus repair, pre dm , depression and DASH here to get est . Sedentary . Experiencing CAMACHO for 1 y . She was evaluated by card last spring . She did not have Echocardiogram , nor Nuke stress test as recommended. Sleeps on a recliner. She has chronic BRANDON due to venous insufficiency . No falls. On Boniva for 2 years . She sees Psych for depression  and anxiety . Last colo  was in 2009 . She was told she did not need to repeat it . Wears hearing aids. No memory loss. She will see opth in 2 weeks .       Review of Systems   Constitutional: Negative.    HENT: Negative.     Eyes: Negative.    Respiratory:  Positive for shortness of breath.    Cardiovascular:  Positive for leg swelling.   Gastrointestinal: Negative.    Genitourinary: Negative.    Musculoskeletal: Negative.    Skin: Negative.    Hematological: Negative.        Objective:      Physical Exam  Vitals and nursing note reviewed.   Constitutional:       General: She is not in acute distress.     Appearance: She is well-developed. She is not diaphoretic.   HENT:      Head: Normocephalic and atraumatic.      Right Ear: External ear normal.      Left Ear: External ear normal.      Nose: Nose normal.   Eyes:      General: No scleral icterus.        Left eye: No discharge.      Conjunctiva/sclera: Conjunctivae normal.      Pupils: Pupils are equal, round, and reactive to light.   Neck:      Thyroid: No thyromegaly.      Vascular: No JVD.      Trachea: No tracheal deviation.   Cardiovascular:      Rate and Rhythm: Normal rate and regular rhythm.      Heart sounds: Normal heart sounds. No murmur heard.     No friction rub. No gallop.   Pulmonary:      Effort: Pulmonary effort is normal. No respiratory distress.      Breath sounds: Normal breath sounds. No wheezing  or rales.   Chest:      Chest wall: No tenderness.   Abdominal:      General: Bowel sounds are normal. There is no distension.      Palpations: Abdomen is soft. There is no mass.      Tenderness: There is no abdominal tenderness. There is no guarding.   Musculoskeletal:      Cervical back: Normal range of motion and neck supple.   Lymphadenopathy:      Cervical: No cervical adenopathy.         Assessment:         Lizeth was seen today for establish care.    Diagnoses and all orders for this visit:    SOB (shortness of breath)  -     CBC Auto Differential; Future  -     Comprehensive Metabolic Panel; Future  -     BNP; Future  -     X-Ray Chest PA And Lateral; Future  -     Complete PFT with bronchodilator; Future    MDD (major depressive disorder), recurrent episode, moderate    Pre-diabetes  -     Hemoglobin A1C; Future  -     Lipid Panel; Future    Abnormal findings on diagnostic imaging of other parts of digestive tract  -     Lipid Panel; Future    Essential hypertension    Dyslipidemia    History of colon cancer    Age-related osteoporosis without current pathological fracture    Venous stasis of both lower extremities    H/O repair of patent ductus arteriosus       Plan:     1.- We will continue to follow up .   2.-In remission . Continue meds  3.-Diet and exercise     5.-Uncontrolled. She has white coat syndrome . She will check BP at home and will email us readings in 1- 2 weeks.   6.-  Statin   7.-In remission   8.- Continue Boniva .   9.- Wound care.   10.- Resolved.

## 2025-03-07 ENCOUNTER — HOSPITAL ENCOUNTER (EMERGENCY)
Facility: HOSPITAL | Age: 85
Discharge: HOME | End: 2025-03-08
Attending: EMERGENCY MEDICINE
Payer: MEDICARE

## 2025-03-07 ENCOUNTER — TELEPHONE (OUTPATIENT)
Dept: FAMILY MEDICINE | Facility: CLINIC | Age: 85
End: 2025-03-07
Payer: MEDICARE

## 2025-03-07 DIAGNOSIS — R53.83 FATIGUE, UNSPECIFIED TYPE: ICD-10-CM

## 2025-03-07 DIAGNOSIS — D64.9 ANEMIA, UNSPECIFIED TYPE: Primary | ICD-10-CM

## 2025-03-07 LAB
ABO + RH BLD: NORMAL
BASOPHILS # BLD AUTO: 0.04 K/UL (ref 0–0.2)
BASOPHILS NFR BLD: 0.5 % (ref 0–1.9)
BLD GP AB SCN CELLS X3 SERPL QL: NORMAL
BLD PROD TYP BPU: NORMAL
BLD PROD TYP BPU: NORMAL
BLOOD UNIT EXPIRATION DATE: NORMAL
BLOOD UNIT EXPIRATION DATE: NORMAL
BLOOD UNIT TYPE CODE: 6200
BLOOD UNIT TYPE CODE: 6200
BLOOD UNIT TYPE: NORMAL
BLOOD UNIT TYPE: NORMAL
CODING SYSTEM: NORMAL
CODING SYSTEM: NORMAL
CROSSMATCH INTERPRETATION: NORMAL
CROSSMATCH INTERPRETATION: NORMAL
DIFFERENTIAL METHOD BLD: ABNORMAL
DISPENSE STATUS: NORMAL
DISPENSE STATUS: NORMAL
EOSINOPHIL # BLD AUTO: 0.1 K/UL (ref 0–0.5)
EOSINOPHIL NFR BLD: 1.3 % (ref 0–8)
ERYTHROCYTE [DISTWIDTH] IN BLOOD BY AUTOMATED COUNT: 18.6 % (ref 11.5–14.5)
HCT VFR BLD AUTO: 25.9 % (ref 37–48.5)
HGB BLD-MCNC: 6.7 G/DL (ref 12–16)
IMM GRANULOCYTES # BLD AUTO: 0.03 K/UL (ref 0–0.04)
IMM GRANULOCYTES NFR BLD AUTO: 0.4 % (ref 0–0.5)
LYMPHOCYTES # BLD AUTO: 1.2 K/UL (ref 1–4.8)
LYMPHOCYTES NFR BLD: 14.6 % (ref 18–48)
MCH RBC QN AUTO: 17.4 PG (ref 27–31)
MCHC RBC AUTO-ENTMCNC: 25.9 G/DL (ref 32–36)
MCV RBC AUTO: 67 FL (ref 82–98)
MONOCYTES # BLD AUTO: 0.7 K/UL (ref 0.3–1)
MONOCYTES NFR BLD: 8.9 % (ref 4–15)
NEUTROPHILS # BLD AUTO: 5.8 K/UL (ref 1.8–7.7)
NEUTROPHILS NFR BLD: 74.3 % (ref 38–73)
NRBC BLD-RTO: 0 /100 WBC
NUM UNITS TRANS PACKED RBC: NORMAL
NUM UNITS TRANS PACKED RBC: NORMAL
OB PNL STL: NEGATIVE
PLATELET # BLD AUTO: 349 K/UL (ref 150–450)
PMV BLD AUTO: 9.7 FL (ref 9.2–12.9)
RBC # BLD AUTO: 3.86 M/UL (ref 4–5.4)
SPECIMEN OUTDATE: NORMAL
WBC # BLD AUTO: 7.86 K/UL (ref 3.9–12.7)

## 2025-03-07 PROCEDURE — 86920 COMPATIBILITY TEST SPIN: CPT | Mod: HCNC | Performed by: EMERGENCY MEDICINE

## 2025-03-07 PROCEDURE — 63600175 PHARM REV CODE 636 W HCPCS: Mod: HCNC | Performed by: EMERGENCY MEDICINE

## 2025-03-07 PROCEDURE — 25000003 PHARM REV CODE 250: Mod: HCNC | Performed by: EMERGENCY MEDICINE

## 2025-03-07 PROCEDURE — 36430 TRANSFUSION BLD/BLD COMPNT: CPT | Mod: HCNC

## 2025-03-07 PROCEDURE — 82272 OCCULT BLD FECES 1-3 TESTS: CPT | Mod: HCNC | Performed by: EMERGENCY MEDICINE

## 2025-03-07 PROCEDURE — P9016 RBC LEUKOCYTES REDUCED: HCPCS | Mod: HCNC | Performed by: EMERGENCY MEDICINE

## 2025-03-07 PROCEDURE — 85025 COMPLETE CBC W/AUTO DIFF WBC: CPT | Mod: HCNC | Performed by: EMERGENCY MEDICINE

## 2025-03-07 PROCEDURE — 86850 RBC ANTIBODY SCREEN: CPT | Mod: HCNC | Performed by: EMERGENCY MEDICINE

## 2025-03-07 PROCEDURE — 99285 EMERGENCY DEPT VISIT HI MDM: CPT | Mod: 25,HCNC

## 2025-03-07 RX ORDER — HYDRALAZINE HYDROCHLORIDE 20 MG/ML
10 INJECTION INTRAMUSCULAR; INTRAVENOUS
Status: COMPLETED | OUTPATIENT
Start: 2025-03-07 | End: 2025-03-07

## 2025-03-07 RX ORDER — ALPRAZOLAM 0.25 MG/1
0.25 TABLET ORAL
Status: COMPLETED | OUTPATIENT
Start: 2025-03-07 | End: 2025-03-07

## 2025-03-07 RX ORDER — LORAZEPAM 2 MG/ML
0.5 INJECTION INTRAMUSCULAR
Status: COMPLETED | OUTPATIENT
Start: 2025-03-07 | End: 2025-03-07

## 2025-03-07 RX ORDER — CLONIDINE HYDROCHLORIDE 0.1 MG/1
0.1 TABLET ORAL
Status: COMPLETED | OUTPATIENT
Start: 2025-03-07 | End: 2025-03-07

## 2025-03-07 RX ORDER — HYDROCODONE BITARTRATE AND ACETAMINOPHEN 500; 5 MG/1; MG/1
TABLET ORAL
Status: DISCONTINUED | OUTPATIENT
Start: 2025-03-07 | End: 2025-03-08 | Stop reason: HOSPADM

## 2025-03-07 RX ADMIN — HYDRALAZINE HYDROCHLORIDE 10 MG: 20 INJECTION, SOLUTION INTRAMUSCULAR; INTRAVENOUS at 11:03

## 2025-03-07 RX ADMIN — ALPRAZOLAM 0.25 MG: 0.25 TABLET ORAL at 10:03

## 2025-03-07 RX ADMIN — LORAZEPAM 0.5 MG: 2 INJECTION INTRAMUSCULAR; INTRAVENOUS at 03:03

## 2025-03-07 RX ADMIN — CLONIDINE HYDROCHLORIDE 0.1 MG: 0.1 TABLET ORAL at 03:03

## 2025-03-07 NOTE — ED PROVIDER NOTES
"SCRIBE #1 NOTE: I, Philomena Flores, am scribing for, and in the presence of, Mele Olsen MD. I have scribed the HPI, ROS, and PEx.     SCRIBE #2 NOTE: I, Karmen Haynes, am scribing for, and in the presence of,  London Zuñiga Jr., MD. I have scribed the remaining portions of the note not scribed by Scribe #1.      History     Chief Complaint   Patient presents with    Abnormal Labs     Pt sent to ED for blood transfusion. Pt had labs drawn yesterday with a hemoglobin of 6.8 and hematocrit of 25.7. Pt c/o increase in weakness for several weeks. Denies SOB.      Review of patient's allergies indicates:   Allergen Reactions    Clindamycin      Diarrhea      Lisinopril      cough         History of Present Illness     HPI    3/7/2025, 12:25 PM  History obtained from the patient and medical records      History of Present Illness: Lizeth Henderson is a 84 y.o. female patient with a PMHx of CAD, anxiety, colon CA, osteopenia, HTN, adrenal adenoma, and type 2 DM who presents to the Emergency Department for blood transfusion. Patient sent to the ED per PCP for blood transfusion following the return of abnormal blood results. CBC from blood drawn yesterday showing Hb of 6.8 and Hct of 25.7. The patient explains she has been very short of breath and weak for the past 2 weeks. She had initially attributed this to her anxiety, reporting multiple recent personal stressors that had exacerbated her anxiety. Symptoms are constant and moderate in severity. No mitigating or exacerbating factors reported. Patient denies any black or bloody stools, "coffee-ground" emesis, or other known source of bleeding. She has been in remission from colon CA for quite some time, s/p chemotherapy. She also notes BLE edema and tenderness at baseline given her chronic venous insufficiency, reporting this has been unchanged.  Pt reports history of open heart surgery in her childhood. Patient affirms taking medications for her anxiety which have been " helpful, noting that her prescription was recently changed due to stressors. No further complaints or concerns at this time.       Arrival mode: Personal Transportation    PCP: Jennifer Flynn MD        Past Medical History:  Past Medical History:   Diagnosis Date    Adrenal adenoma 2016    Anxiety     Arthritis     Benign hypertension     Colon cancer age 61    colon    Coronary artery disease     Depression     Full dentures     General anesthetics causing adverse effect in therapeutic use     yells and talks when wakes up    Hyperlipidemia     Osteopenia     Shingles     Type 2 diabetes mellitus without complication, without long-term current use of insulin 1/23/2024    Wears glasses        Past Surgical History:  Past Surgical History:   Procedure Laterality Date    APPENDECTOMY      BREAST BIOPSY Left     benign    BUNIONECTOMY Left     CATARACT EXTRACTION Bilateral     COLON SURGERY      2001    EYE SURGERY      cataract surg    HEMICOLECTOMY  2002    HERNIA REPAIR      x5    JOINT REPLACEMENT      knee    left toe surgery      hammertoe    PATENT DUCTUS ARTERIOUS LIGATION  1948    SALPINGOOPHORECTOMY  2002    due to colon cancer    TONSILLECTOMY, ADENOIDECTOMY           Family History:  Family History   Problem Relation Name Age of Onset    Alzheimer's disease Mother Tracy     Arthritis Mother Tracy     Cancer Father price         Colon    Hypertension Father price     Arthritis Father price     Hearing loss Father price     Depression Daughter Connie     Kidney disease Daughter Ruba     Ulcerative colitis Daughter Ruba     Depression Daughter Ruba     Depression Daughter Veronica     Anxiety disorder Daughter Veronica         PTSD    Cancer Maternal Uncle Nikolas         Colon    Early death Maternal Grandmother Chata     Cancer Cousin          colon cancer    Amblyopia Neg Hx      Blindness Neg Hx      Cataracts Neg Hx      Diabetes Neg Hx      Glaucoma Neg Hx      Macular degeneration Neg Hx      Retinal  detachment Neg Hx      Strabismus Neg Hx      Stroke Neg Hx      Thyroid disease Neg Hx         Social History:  Social History     Tobacco Use    Smoking status: Never     Passive exposure: Never    Smokeless tobacco: Never   Substance and Sexual Activity    Alcohol use: No    Drug use: Never    Sexual activity: Not Currently        Review of Systems     Review of Systems   Constitutional:  Negative for fever.   HENT:  Negative for sore throat.    Respiratory:  Positive for shortness of breath.    Cardiovascular:  Positive for leg swelling (with tenderness at baseline, no change). Negative for chest pain.   Gastrointestinal:  Negative for anal bleeding, blood in stool, nausea and vomiting.        (-) blood in vomit   Genitourinary:  Negative for dysuria and hematuria.   Musculoskeletal:  Negative for back pain.   Skin:  Negative for rash.   Neurological:  Positive for weakness (global).   Hematological:  Does not bruise/bleed easily.   Psychiatric/Behavioral:  The patient is nervous/anxious.    All other systems reviewed and are negative.     Physical Exam     Initial Vitals [03/07/25 1058]   BP Pulse Resp Temp SpO2   (!) 182/78 89 14 98.5 °F (36.9 °C) (!) 94 %      MAP       --          Physical Exam  Nursing Notes and Vital Signs Reviewed.  Constitutional: Patient is in no apparent distress. Well-developed and well-nourished.  Head: Atraumatic. Normocephalic.  Eyes: PERRL. EOM intact. Conjunctivae are not pale. No scleral icterus.  ENT: Mucous membranes are moist. Oropharynx is clear and symmetric.    Neck: Supple. Full ROM. No lymphadenopathy.  Cardiovascular: Regular rate. Regular rhythm. No murmurs, rubs, or gallops. Distal pulses are 2+ and symmetric.  Pulmonary/Chest: No respiratory distress. Clear to auscultation bilaterally. No wheezing or rales.  Abdominal: Soft and non-distended.  There is no tenderness.  No rebound, guarding, or rigidity. Good bowel sounds.  Genitourinary: No CVA  tenderness  Musculoskeletal: Moves all extremities. No obvious deformities. Pitting edema to bilateral lower extremities with venous stasis changes of skin. Legs are wrapped in Ace bandages. Patient reports calf tenderness at baseline is unchanged.  Skin: Warm and dry.   Neurological:  Alert, awake, and appropriate.  Normal speech.  No acute focal neurological deficits are appreciated.  Psychiatric: Normal affect. Good eye contact. Appropriate in content.     ED Course   Critical Care    Date/Time: 3/7/2025 2:07 PM    Performed by: Mele Olsen MD  Authorized by: Mele Olsen MD  Direct patient critical care time: 12 minutes  Additional history critical care time: 9 minutes  Ordering / reviewing critical care time: 8 minutes  Documentation critical care time: 11 minutes  Consulting other physicians critical care time: 5 minutes  Total critical care time (exclusive of procedural time) : 45 minutes  Critical care was necessary to treat or prevent imminent or life-threatening deterioration of the following conditions: anemia.  Critical care was time spent personally by me on the following activities: blood draw for specimens, development of treatment plan with patient or surrogate, discussions with consultants, interpretation of cardiac output measurements, evaluation of patient's response to treatment, examination of patient, obtaining history from patient or surrogate, ordering and performing treatments and interventions, ordering and review of radiographic studies, ordering and review of laboratory studies, pulse oximetry, re-evaluation of patient's condition and review of old charts.        ED Vital Signs:  Vitals:    03/07/25 1730 03/07/25 1800 03/07/25 1830 03/07/25 1842   BP: (!) 182/74 (!) 175/100 (!) 199/86 (!) 198/82   Pulse: 81 84 87 86   Resp: 19 19 20 20   Temp:   98.2 °F (36.8 °C)    TempSrc:   Oral    SpO2: 95% 95% 95% 95%   Weight:       Height:        03/07/25 1934 03/07/25 1947 03/07/25 2002  "03/07/25 2102   BP: (!) 200/88 (!) 187/74 (S) (!) 182/86 (S) (!) 195/92   Pulse: 86 81 84 87   Resp: 20 20 20 20   Temp: 98.4 °F (36.9 °C) 98.4 °F (36.9 °C) 99.1 °F (37.3 °C) 98.9 °F (37.2 °C)   TempSrc: Oral  Oral Oral   SpO2: 95% 95% 95% 95%   Weight:       Height:        03/07/25 2222 03/07/25 2318 03/07/25 2323 03/07/25 2333   BP: (!) 220/109 (S) (!) 197/97 (S) (!) 224/91 (S) (!) 207/88   Pulse: 94 82 85 84   Resp: (S) (!) 31 20 20 20   Temp: 98.8 °F (37.1 °C)      TempSrc: Oral      SpO2: 95% 96% 95% 95%   Weight:       Height:        03/07/25 2344 03/08/25 0003 03/08/25 0020   BP: (S) (!) 191/84 (S) (!) 200/82    Pulse: 87 93    Resp: 20 20    Temp:      TempSrc:      SpO2: 96% 96% 96%   Weight:   101.6 kg (224 lb)   Height:   5' 8" (1.727 m)       Abnormal Lab Results:  Labs Reviewed   CBC W/ AUTO DIFFERENTIAL - Abnormal       Result Value    WBC 7.86      RBC 3.86 (*)     Hemoglobin 6.7 (*)     Hematocrit 25.9 (*)     MCV 67 (*)     MCH 17.4 (*)     MCHC 25.9 (*)     RDW 18.6 (*)     Platelets 349      MPV 9.7      Immature Granulocytes 0.4      Gran # (ANC) 5.8      Immature Grans (Abs) 0.03      Lymph # 1.2      Mono # 0.7      Eos # 0.1      Baso # 0.04      nRBC 0      Gran % 74.3 (*)     Lymph % 14.6 (*)     Mono % 8.9      Eosinophil % 1.3      Basophil % 0.5      Differential Method Automated     OCCULT BLOOD X 1, STOOL    Occult Blood Negative     TYPE & SCREEN    Group & Rh A POS      Indirect Marcia NEG      Specimen Outdate 03/10/2025 23:59     PREPARE RBC SOFT    UNIT NUMBER [id number]      Product Code R6044Z65      DISPENSE STATUS TRANSFUSED      CODING SYSTEM [id number]      Unit Blood Type Code 6200      Unit Blood Type A POS      Unit Expiration [id number]      CROSSMATCH INTERPRETATION Compatible      UNIT NUMBER [id number]      Product Code A0055R35      DISPENSE STATUS TRANSFUSED      CODING SYSTEM TAEW241      Unit Blood Type Code 6200      Unit Blood Type A POS      Unit Expiration " [id number]      CROSSMATCH INTERPRETATION Compatible          All Lab Results:  Results for orders placed or performed during the hospital encounter of 03/07/25   CBC auto differential    Collection Time: 03/07/25  1:05 PM   Result Value Ref Range    WBC 7.86 3.90 - 12.70 K/uL    RBC 3.86 (L) 4.00 - 5.40 M/uL    Hemoglobin 6.7 (L) 12.0 - 16.0 g/dL    Hematocrit 25.9 (L) 37.0 - 48.5 %    MCV 67 (L) 82 - 98 fL    MCH 17.4 (L) 27.0 - 31.0 pg    MCHC 25.9 (L) 32.0 - 36.0 g/dL    RDW 18.6 (H) 11.5 - 14.5 %    Platelets 349 150 - 450 K/uL    MPV 9.7 9.2 - 12.9 fL    Immature Granulocytes 0.4 0.0 - 0.5 %    Gran # (ANC) 5.8 1.8 - 7.7 K/uL    Immature Grans (Abs) 0.03 0.00 - 0.04 K/uL    Lymph # 1.2 1.0 - 4.8 K/uL    Mono # 0.7 0.3 - 1.0 K/uL    Eos # 0.1 0.0 - 0.5 K/uL    Baso # 0.04 0.00 - 0.20 K/uL    nRBC 0 0 /100 WBC    Gran % 74.3 (H) 38.0 - 73.0 %    Lymph % 14.6 (L) 18.0 - 48.0 %    Mono % 8.9 4.0 - 15.0 %    Eosinophil % 1.3 0.0 - 8.0 %    Basophil % 0.5 0.0 - 1.9 %    Differential Method Automated    Type & Screen    Collection Time: 03/07/25  1:05 PM   Result Value Ref Range    Group & Rh A POS     Indirect Marcia NEG     Specimen Outdate 03/10/2025 23:59    Prepare RBC 2 Units; Anemia    Collection Time: 03/07/25  1:05 PM   Result Value Ref Range    UNIT NUMBER [id number]     Product Code M2754S55     DISPENSE STATUS TRANSFUSED     CODING SYSTEM ULMN936     Unit Blood Type Code 6200     Unit Blood Type A POS     Unit Expiration [id number]     CROSSMATCH INTERPRETATION Compatible     UNIT NUMBER [id number]     Product Code B4569T96     DISPENSE STATUS TRANSFUSED     CODING SYSTEM DSXS936     Unit Blood Type Code 6200     Unit Blood Type A POS     Unit Expiration [id number]     CROSSMATCH INTERPRETATION Compatible    Occult blood x 1, stool    Collection Time: 03/07/25  2:36 PM    Specimen: Stool   Result Value Ref Range    Occult Blood Negative Negative     *Note: Due to a large number of results  and/or encounters for the requested time period, some results have not been displayed. A complete set of results can be found in Results Review.       Imaging Results:  Imaging Results              X-Ray Chest AP Portable (Final result)  Result time 03/07/25 12:29:55      Final result by Miguel Schneider MD (03/07/25 12:29:55)                   Impression:      No acute findings.      Electronically signed by: Miguel Schneider  Date:    03/07/2025  Time:    12:29               Narrative:    EXAMINATION:  XR CHEST AP PORTABLE    CLINICAL HISTORY:  Fatigue;    TECHNIQUE:  Portable chest x-ray    COMPARISON:  03/06/2025    FINDINGS:  Lung fields are clear.    The aorta is atherosclerotic.    No pleural fluid or pneumothorax.    No adenopathy is identified.    Arthritic changes involving both shoulders.                                       No EKG ordered.           The Emergency Provider reviewed the vital signs and test results, which are outlined above.     ED Discussion     4:08 PM: Reassessed pt at this time. Discussed with patient and/or family/caretaker all pertinent ED information and results. Discussed pt dx and plan of tx. Gave the patient all f/u and return to the ED instructions. All questions and concerns were addressed at this time. Patient and/or family/caretaker expresses understanding of information and instructions, and is comfortable with plan to discharge. Pt is stable for discharge after receiving 2 units.     I discussed with patient and/or family/caretaker that evaluation in the ED does not suggest any emergent or life threatening medical conditions requiring immediate intervention beyond what was provided in the ED, and I believe patient is safe for discharge.  Regardless, an unremarkable evaluation in the ED does not preclude the development or presence of a serious of life threatening condition. As such, I instructed that the patient is to return immediately for any worsening or change in current  symptoms.    11:20 PM: Dr. Zuñiga accepts care of pt and agrees with Dr. Olsen's assessment due to pt's bp increasing and pt getting more agitated.    11:25 PM: Re-evaluated pt after blood-transfusion. Pt's blood-pressure remains elevated. Will treat and continue to monitor. Discharge orders are cancelled.    12:31 AM: Pt is A&O x3, appropriate, and of capacity at this time. Pt voices desire to leave hospital. D/w pt in length need for further evaluation and treatment due to HPI and PEx. Pt declines any further evaluation or tx at this time. All risks, including worsening sx, permanent bodily harm and death, were discussed in length. Pt acknowledges all risk at this time and agrees to sign AMA form. Pt given RTER instructions. All questions and concerns addressed at this time. Pt leaving AMA.     The patient is over the age of 18 years, exhibits no evidence of an altered level of consciousness, and possesses appropriate decision-making capacity based on their ability to understand and communicate rationally.  Patient was advised, that, for an optimal recovery, they should be responsible for complying with the individualized treatment plan provided today by the medical professionals at [facility].  This includes taking medications as directed, reviewing and understanding all discharge instructions, and scheduling any appointments that were recommended.  I informed the patient that failing to take responsibility for their health and safety and not complying with the recommendations provided to today is deemed to be against our medical advice. The patient was provided clear and verbal details regarding alternatives, benefits, risks, and complications related to their condition. In addition, we reiterated the seriousness of their condition and our recommendations for urgent follow-up care with a qualified healthcare provider capable of addressing their specific needs.  Furthermore, the patient was made aware of the  serious complications that may be associated with their condition, including: stroke, permanent disability, paralysis, loss of limb(s), and death.  I have personally explained to the patient that choosing to leave against medical advise may result in permanent bodily harm or death. I again discussed at great length that without further evaluation and monitoring there may be unforeseen circumstances and/or deterioration causing permanent bodily harm or death as a result of his/her choice. The patient verbalized these risks back to me in laymans terms.  The patient was able to discuss the treatment that was recommended and, was aware of specific risks associated with the refusal of care relating to their specific condition.  Patient was instructed to return to the emergency department if their condition changes or they wish to comply with our treatment recommendations.        Medical Decision Making  Amount and/or Complexity of Data Reviewed  External Data Reviewed: labs.     Details: Independently reviewed patient medical records. Chronic but stable anemia, with Hb and Hct within ranges of 8-9.4 and 27-29.8 respectively over the past year. In comparison to yesterday's labs, blood count now acutely decreased.  Labs: ordered. Decision-making details documented in ED Course.  Radiology: ordered and independent interpretation performed. Decision-making details documented in ED Course.  ECG/medicine tests: ordered and independent interpretation performed. Decision-making details documented in ED Course.    Risk  OTC drugs.  Prescription drug management.  Parenteral controlled substances.  Risk Details: OTC drugs, prescription drugs and controlled substances considered.  Due to patient's symptoms improving and pain controlled pain medications ordered appropriately.  DDX: MI, CAD, PUlmonary disease, PE, AAA, Pneumonia, Costochondritis, PTX, Liver disease among others.                  ED Medication(s):  Medications    LORazepam injection 0.5 mg (0.5 mg Intravenous Given 3/7/25 1530)   cloNIDine tablet 0.1 mg (0.1 mg Oral Given 3/7/25 1538)   ALPRAZolam tablet 0.25 mg (0.25 mg Oral Given 3/7/25 2228)   hydrALAZINE injection 10 mg (10 mg Intravenous Given 3/7/25 2327)       Discharge Medication List as of 3/7/2025  4:07 PM           Follow-up Information       Jennifer Flynn MD. Call in 1 day.    Specialty: Family Medicine  Why: For re-evaluation and further treatment  Contact information:  [address] [place] [id number]  [phone]               [facility] - Emergency Dept.. Go today.    Specialty: Emergency Medicine  Why: If symptoms worsen, For re-evaluation and further treatment, As needed  Contact information:  [id number] [place] [id number] [phone]                               Scribe Attestation:   Scribe #1: I performed the above scribed service and the documentation accurately describes the services I performed. I attest to the accuracy of the note.     Attending:   Physician Attestation Statement for Scribe #1: I, Mele Olsen MD, personally performed the services described in this documentation, as scribed by Philomena Flores, in my presence, and it is both accurate and complete.       Scribe Attestation:   Scribe #2: I performed the above scribed service and the documentation accurately describes the services I performed. I attest to the accuracy of the note.    Attending Attestation:           Physician Attestation for Scribe:    Physician Attestation Statement for Scribe #2: I, London Zuñiga Jr., MD, reviewed documentation, as scribed by Karmen Haynes in my presence, and it is both accurate and complete. I also acknowledge and confirm the content of the note done by Williamibe #1.           Clinical Impression       ICD-10-CM ICD-9-CM   1. Anemia, unspecified type  D64.9 285.9   2. Fatigue, unspecified type  R53.83 780.79       Disposition:   Disposition:  AMA  Condition: London Alba Jr., MD  03/09/25 4073

## 2025-03-07 NOTE — TELEPHONE ENCOUNTER
Conveyed lab results and recommendations to pt's daughter. She verbalized understanding.   Will send Hematology a message to see if pt can be seen soon. If not, pt will have to go to ER for possible transfusion.

## 2025-03-07 NOTE — TELEPHONE ENCOUNTER
----- Message from Monique Mcguire MA sent at 3/7/2025  9:11 AM CST -----      ----- Message -----  From: Jennifer Flynn MD  Sent: 3/6/2025  11:30 AM CST  To: Zuleika Mendez LPN    She needs to have us reschedule echocardiogram ordered  by Dr. Agrawal last year . TY!  ----- Message -----  From: Zuleika Mendez LPN  Sent: 3/6/2025  11:26 AM CST  To: Jennifer Flynn MD    ----- Message from Zuleika Mendez LPN sent at 3/6/2025 11:26 AM CST -----      ----- Message -----  From: Jennifer Flynn MD  Sent: 3/6/2025  10:45 AM CST  To: Trevon Martinez    Your heart might be  enlarged. Please reconsider getting the heart ultrasound .   ----- Message -----  From: Kirti, Rad Results In  Sent: 3/6/2025  10:29 AM CST  To: Jennifer Flynn MD

## 2025-03-07 NOTE — TELEPHONE ENCOUNTER
----- Message from Jennifer Flynn MD sent at 3/6/2025 10:45 AM CST -----  Your heart might be  enlarged. Please reconsider getting the heart ultrasound .   ----- Message -----  From: Interface, Rad Results In  Sent: 3/6/2025  10:29 AM CST  To: Jennifer Flynn MD

## 2025-03-07 NOTE — TELEPHONE ENCOUNTER
Attempted unsuccessfully to reach patient. Left voicemail and Mychart message for patient to call back to discuss  scheduling her ECHO.

## 2025-03-08 VITALS
DIASTOLIC BLOOD PRESSURE: 82 MMHG | HEART RATE: 93 BPM | BODY MASS INDEX: 33.95 KG/M2 | RESPIRATION RATE: 20 BRPM | TEMPERATURE: 99 F | HEIGHT: 68 IN | SYSTOLIC BLOOD PRESSURE: 200 MMHG | WEIGHT: 224 LBS | OXYGEN SATURATION: 96 %

## 2025-03-08 NOTE — ED NOTES
Pt noted to be anxious, crying, and requesting to leave. Instructed pt on importance of managing BP while in ED. Pt verbalized understanding of risks and states she would rather follow up with her PCP and wants to leave AMA.

## 2025-03-09 ENCOUNTER — PATIENT MESSAGE (OUTPATIENT)
Dept: FAMILY MEDICINE | Facility: CLINIC | Age: 85
End: 2025-03-09
Payer: MEDICARE

## 2025-03-10 ENCOUNTER — PATIENT OUTREACH (OUTPATIENT)
Facility: OTHER | Age: 85
End: 2025-03-10
Payer: MEDICARE

## 2025-03-10 NOTE — PROGRESS NOTES
Angela Ponce  ED Navigator  Emergency Department    Project: [facility] ED Navigator  Role: Community Health Worker    Date: 03/10/2025  Patient Name: Lizeth Henderson  MRN: [id number]  PCP: Jennifer Flynn MD    Assessment:     Lizeth Henderson is a 84 y.o. female who has presented to ED for Anemia, unspecified type;Fatigue, unspecified type. Patient has visited the ED 1 times in the past 3 months. Patient did contact PCP.     ED Navigator Initial Assessment    ED Navigator Enrollment Documentation  Consent to Services  Does patient consent to completing the assessment?: Yes  Contact  Method of Initial Contact: Phone  Transportation  Insurance Coverage  Do you have coverage/adequate coverage?: Yes  Specialist Appointment  Did the patient come to the ED to see a specialist?: No  Does the patient have a pending specialist referral?: No  Does the patient have a specialist appointment made?: No  PCP Follow Up Appointment  Medications  Is patient able to afford medication?: Yes  Psychological  Food  Communication/Education  Other Financial Concerns  Other Social Barriers/Concerns  Primary Barrier  Plan: Provided information for [facility] 24/7 Nurse triage line, [phone] or [phone] ([phone])         Social History     Socioeconomic History    Marital status:     Number of children: 3   Occupational History    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    Rates diet as poor.    She is not satisfied with weight.             Social Drivers of Health     Financial Resource Strain: Low Risk  (3/10/2025)    Overall Financial Resource Strain (CARDIA)     Difficulty of Paying Living Expenses: Not hard at  all   Food Insecurity: No Food Insecurity (3/10/2025)    Hunger Vital Sign     Worried About Running Out of Food in the Last Year: Never true     Ran Out of Food in the Last Year: Never true   Transportation Needs: No Transportation Needs (3/10/2025)    PRAPARE - Transportation     Lack of Transportation (Medical): No     Lack of Transportation (Non-Medical): No   Physical Activity: Inactive (3/10/2025)    Exercise Vital Sign     Days of Exercise per Week: 0 days     Minutes of Exercise per Session: 0 min   Stress: Stress Concern Present (3/10/2025)    Citizen of [place] of Occupational Health - Occupational Stress Questionnaire     Feeling of Stress : Rather much   Housing Stability: Low Risk  (3/10/2025)    Housing Stability Vital Sign     Unable to Pay for Housing in the Last Year: No     Homeless in the Last Year: No       Plan:     Pt was seen in the ED on 3/8/25 for Anemia, unspecified type; Fatigue, unspecified type. ED Navigator spoke with pt's daughter for Post ED visit follow up navigation to assist with scheduling a 7-day Post ED visit follow up appt. Pt's daughter states that she had contacted pcp to schedule a 7-day Post ED visit follow up appt and was scheduled on 3/20/24. Pt does not have transportation issues and has no additional needs at this time.  Closing Encounter.    Angela Ponce      Appointment made with: Jennifer Flynn MD

## 2025-03-11 ENCOUNTER — HOSPITAL ENCOUNTER (OUTPATIENT)
Dept: CARDIOLOGY | Facility: HOSPITAL | Age: 85
Discharge: HOME | End: 2025-03-11
Attending: INTERNAL MEDICINE
Payer: MEDICARE

## 2025-03-11 VITALS
HEIGHT: 68 IN | WEIGHT: 224 LBS | BODY MASS INDEX: 33.95 KG/M2 | SYSTOLIC BLOOD PRESSURE: 184 MMHG | DIASTOLIC BLOOD PRESSURE: 84 MMHG | HEART RATE: 80 BPM

## 2025-03-11 DIAGNOSIS — E78.2 HYPERLIPIDEMIA, MIXED: ICD-10-CM

## 2025-03-11 DIAGNOSIS — I34.0 MITRAL REGURGITATION: ICD-10-CM

## 2025-03-11 DIAGNOSIS — I70.0 ATHEROSCLEROSIS OF AORTA: ICD-10-CM

## 2025-03-11 DIAGNOSIS — I20.89 ANGINAL EQUIVALENT: ICD-10-CM

## 2025-03-11 DIAGNOSIS — I10 ESSENTIAL HYPERTENSION: Primary | ICD-10-CM

## 2025-03-11 DIAGNOSIS — I10 ESSENTIAL HYPERTENSION: ICD-10-CM

## 2025-03-11 LAB
AORTIC ROOT ANNULUS: 3.22 CM
ASCENDING AORTA: 2.59 CM
AV INDEX (PROSTH): 0.75
AV MEAN GRADIENT: 7 MMHG
AV PEAK GRADIENT: 12 MMHG
AV VALVE AREA BY VELOCITY RATIO: 2.6 CM²
AV VALVE AREA: 2.3 CM²
AV VELOCITY RATIO: 0.82
BSA FOR ECHO PROCEDURE: 2.21 M2
CV ECHO LV RWT: 0.51 CM
DOP CALC AO PEAK VEL: 1.7 M/S
DOP CALC AO VTI: 36.2 CM
DOP CALC LVOT AREA: 3.1 CM2
DOP CALC LVOT DIAMETER: 2 CM
DOP CALC LVOT PEAK VEL: 1.4 M/S
DOP CALC LVOT STROKE VOLUME: 84.8 CM3
DOP CALC RVOT PEAK VEL: 0.76 M/S
DOP CALC RVOT VTI: 19.1 CM
DOP CALCLVOT PEAK VEL VTI: 27 CM
E WAVE DECELERATION TIME: 219 MSEC
E/A RATIO: 0.86
E/E' RATIO: 15 M/S
ECHO LV POSTERIOR WALL: 1.2 CM (ref 0.6–1.1)
EJECTION FRACTION: 50 %
FRACTIONAL SHORTENING: 25.5 % (ref 28–44)
INTERVENTRICULAR SEPTUM: 1.5 CM (ref 0.6–1.1)
IVC DIAMETER: 1.92 CM
IVRT: 103 MSEC
LA MAJOR: 4.6 CM
LA MINOR: 5.3 CM
LA WIDTH: 4.1 CM
LEFT ATRIUM AREA SYSTOLIC (APICAL 2 CHAMBER): 22.46 CM2
LEFT ATRIUM AREA SYSTOLIC (APICAL 4 CHAMBER): 18.56 CM2
LEFT ATRIUM SIZE: 5.3 CM
LEFT ATRIUM VOLUME INDEX MOD: 30 ML/M2
LEFT ATRIUM VOLUME INDEX: 43 ML/M2
LEFT ATRIUM VOLUME MOD: 64 ML
LEFT ATRIUM VOLUME: 91 CM3
LEFT INTERNAL DIMENSION IN SYSTOLE: 3.5 CM (ref 2.1–4)
LEFT VENTRICLE DIASTOLIC VOLUME INDEX: 47.66 ML/M2
LEFT VENTRICLE DIASTOLIC VOLUME: 102 ML
LEFT VENTRICLE END SYSTOLIC VOLUME APICAL 2 CHAMBER: 71.7 ML
LEFT VENTRICLE END SYSTOLIC VOLUME APICAL 4 CHAMBER: 52.87 ML
LEFT VENTRICLE MASS INDEX: 117.5 G/M2
LEFT VENTRICLE SYSTOLIC VOLUME INDEX: 23.8 ML/M2
LEFT VENTRICLE SYSTOLIC VOLUME: 51 ML
LEFT VENTRICULAR INTERNAL DIMENSION IN DIASTOLE: 4.7 CM (ref 3.5–6)
LEFT VENTRICULAR MASS: 251.4 G
LV LATERAL E/E' RATIO: 13.8 M/S
LV SEPTAL E/E' RATIO: 15.5 M/S
LVED V (TEICH): 102.2 ML
LVES V (TEICH): 51.13 ML
LVOT MG: 4.46 MMHG
LVOT MV: 1.01 CM/S
MV PEAK A VEL: 1.44 M/S
MV PEAK E VEL: 1.24 M/S
MV STENOSIS PRESSURE HALF TIME: 63.61 MS
MV VALVE AREA P 1/2 METHOD: 3.46 CM2
OHS CV RV/LV RATIO: 0.7 CM
PISA TR MAX VEL: 3.4 M/S
PV MEAN GRADIENT: 2 MMHG
PV PEAK GRADIENT: 6 MMHG
PV PEAK VELOCITY: 1.22 M/S
RA MAJOR: 4.54 CM
RA PRESSURE ESTIMATED: 3 MMHG
RA WIDTH: 3.66 CM
RIGHT VENTRICLE DIASTOLIC BASEL DIMENSION: 3.3 CM
RIGHT VENTRICULAR END-DIASTOLIC DIMENSION: 3.34 CM
RV TB RVSP: 6 MMHG
SINUS: 2.49 CM
STJ: 2.49 CM
TDI LATERAL: 0.09 M/S
TDI SEPTAL: 0.08 M/S
TDI: 0.09 M/S
TR MAX PG: 46 MMHG
TRICUSPID ANNULAR PLANE SYSTOLIC EXCURSION: 3.02 CM
TV REST PULMONARY ARTERY PRESSURE: 49 MMHG
Z-SCORE OF LEFT VENTRICULAR DIMENSION IN END DIASTOLE: -3.81
Z-SCORE OF LEFT VENTRICULAR DIMENSION IN END SYSTOLE: -1.43

## 2025-03-11 PROCEDURE — 93306 TTE W/DOPPLER COMPLETE: CPT | Mod: 26,HCNC,, | Performed by: INTERNAL MEDICINE

## 2025-03-11 PROCEDURE — 93306 TTE W/DOPPLER COMPLETE: CPT | Mod: HCNC

## 2025-03-12 ENCOUNTER — RESULTS FOLLOW-UP (OUTPATIENT)
Dept: CARDIOLOGY | Facility: CLINIC | Age: 85
End: 2025-03-12

## 2025-03-17 ENCOUNTER — OFFICE VISIT (OUTPATIENT)
Dept: SPORTS MEDICINE | Facility: CLINIC | Age: 85
End: 2025-03-17
Payer: MEDICARE

## 2025-03-17 DIAGNOSIS — M70.51 PES ANSERINUS BURSITIS OF RIGHT KNEE: ICD-10-CM

## 2025-03-17 DIAGNOSIS — M17.12 PRIMARY OSTEOARTHRITIS OF LEFT KNEE: Primary | ICD-10-CM

## 2025-03-17 PROCEDURE — 1125F AMNT PAIN NOTED PAIN PRSNT: CPT | Mod: HCNC,CPTII,S$GLB, | Performed by: STUDENT IN AN ORGANIZED HEALTH CARE EDUCATION/TRAINING PROGRAM

## 2025-03-17 PROCEDURE — 99999 PR PBB SHADOW E&M-EST. PATIENT-LVL III: CPT | Mod: PBBFAC,HCNC,, | Performed by: STUDENT IN AN ORGANIZED HEALTH CARE EDUCATION/TRAINING PROGRAM

## 2025-03-17 PROCEDURE — 99214 OFFICE O/P EST MOD 30 MIN: CPT | Mod: 25,HCNC,S$GLB, | Performed by: STUDENT IN AN ORGANIZED HEALTH CARE EDUCATION/TRAINING PROGRAM

## 2025-03-17 PROCEDURE — 20611 DRAIN/INJ JOINT/BURSA W/US: CPT | Mod: HCNC,LT,S$GLB, | Performed by: STUDENT IN AN ORGANIZED HEALTH CARE EDUCATION/TRAINING PROGRAM

## 2025-03-17 PROCEDURE — 1159F MED LIST DOCD IN RCRD: CPT | Mod: HCNC,CPTII,S$GLB, | Performed by: STUDENT IN AN ORGANIZED HEALTH CARE EDUCATION/TRAINING PROGRAM

## 2025-03-17 RX ORDER — BUPIVACAINE HYDROCHLORIDE 5 MG/ML
2 INJECTION, SOLUTION PERINEURAL
Status: DISCONTINUED | OUTPATIENT
Start: 2025-03-17 | End: 2025-03-17 | Stop reason: HOSPADM

## 2025-03-17 RX ORDER — LIDOCAINE HYDROCHLORIDE 10 MG/ML
2 INJECTION, SOLUTION INFILTRATION; PERINEURAL
Status: DISCONTINUED | OUTPATIENT
Start: 2025-03-17 | End: 2025-03-17 | Stop reason: HOSPADM

## 2025-03-17 RX ORDER — TRIAMCINOLONE ACETONIDE 40 MG/ML
40 INJECTION, SUSPENSION INTRA-ARTICULAR; INTRAMUSCULAR
Status: DISCONTINUED | OUTPATIENT
Start: 2025-03-17 | End: 2025-03-17 | Stop reason: HOSPADM

## 2025-03-17 RX ADMIN — BUPIVACAINE HYDROCHLORIDE 2 ML: 5 INJECTION, SOLUTION PERINEURAL at 04:03

## 2025-03-17 RX ADMIN — TRIAMCINOLONE ACETONIDE 40 MG: 40 INJECTION, SUSPENSION INTRA-ARTICULAR; INTRAMUSCULAR at 04:03

## 2025-03-17 RX ADMIN — LIDOCAINE HYDROCHLORIDE 2 ML: 10 INJECTION, SOLUTION INFILTRATION; PERINEURAL at 04:03

## 2025-03-17 NOTE — PROGRESS NOTES
Patient ID: Lizeth Henderson  YOB: 1940  MRN: [id number]    Chief Complaint: Pain and Swelling of the Left Knee      History of Present Illness: Lizeth Henderson is a 84-year-old female presenting today with her daughter for left knee pain consistent with primary osteoarthritis of the knee.  She had an injection with corticosteroid back in October and is requesting repeat injection today.  Feels like helps at least 3-4 months.  She also notes some right medial proximal tibial pain has been slightly more bothersome over last couple months as well.  Denies any falls injuries traumas associated.  Does have a total knee arthroplasty on the right knee in the past.  Denies any swelling redness warmth to the joint    Past Medical History:   Past Medical History:   Diagnosis Date    Adrenal adenoma 2016    Anxiety     Arthritis     Benign hypertension     Colon cancer age 61    colon    Coronary artery disease     Depression     Full dentures     General anesthetics causing adverse effect in therapeutic use     yells and talks when wakes up    Hyperlipidemia     Osteopenia     Shingles     Type 2 diabetes mellitus without complication, without long-term current use of insulin 1/23/2024    Wears glasses      Past Surgical History:   Procedure Laterality Date    APPENDECTOMY      BREAST BIOPSY Left     benign    BUNIONECTOMY Left     CATARACT EXTRACTION Bilateral     COLON SURGERY      2001    EYE SURGERY      cataract surg    HEMICOLECTOMY  2002    HERNIA REPAIR      x5    JOINT REPLACEMENT      knee    left toe surgery      joelle    PATENT DUCTUS ARTERIOUS LIGATION  1948    SALPINGOOPHORECTOMY  2002    due to colon cancer    TONSILLECTOMY, ADENOIDECTOMY       Family History   Problem Relation Name Age of Onset    Alzheimer's disease Mother Tracy     Arthritis Mother [place]     Cancer Father price         Colon    Hypertension Father price     Arthritis Father price     Hearing loss Father price      Depression Daughter Connie     Kidney disease Daughter Ruba     Ulcerative colitis Daughter Ruba     Depression Daughter Ruba     Depression Daughter Veronica     Anxiety disorder Daughter Veronica         PTSD    Cancer Maternal Uncle Nikolas         Colon    Early death Maternal Grandmother Chata     Cancer Cousin          colon cancer    Amblyopia Neg Hx      Blindness Neg Hx      Cataracts Neg Hx      Diabetes Neg Hx      Glaucoma Neg Hx      Macular degeneration Neg Hx      Retinal detachment Neg Hx      Strabismus Neg Hx      Stroke Neg Hx      Thyroid disease Neg Hx       Social History[1]  Medication List with Changes/Refills   Current Medications    ALPRAZOLAM (XANAX) 0.25 MG TABLET    TAKE 1/2 TO 1 TABLET BY MOUTH DAILY AS NEEDED FOR ANXIETY    ASPIRIN (ECOTRIN) 81 MG EC TABLET    Take 81 mg by mouth once daily.    ATORVASTATIN (LIPITOR) 10 MG TABLET    TAKE 1 TABLET(10 MG) BY MOUTH EVERY DAY    BUPROPION (WELLBUTRIN XL) 150 MG TB24 TABLET    Take 1 tablet (150 mg total) by mouth once daily.    BUSPIRONE (BUSPAR) 10 MG TABLET    Take 2 tablets (20 mg total) by mouth 2 (two) times daily.    CLONAZEPAM (KLONOPIN) 0.25 MG TBDL    Take 1 tablet (0.25 mg total) by mouth 2 (two) times daily as needed (anxiety).    ESCITALOPRAM OXALATE (LEXAPRO) 10 MG TABLET    Take 1 tablet (10 mg total) by mouth once daily.    ESCITALOPRAM OXALATE (LEXAPRO) 10 MG TABLET    Take 1.5 tablets (15 mg total) by mouth once daily.    IBANDRONATE (BONIVA) 150 MG TABLET    Take 1 tablet (150 mg total) by mouth every 30 days.    LOSARTAN (COZAAR) 100 MG TABLET    TAKE 1 TABLET(100 MG) BY MOUTH EVERY DAY    MELOXICAM (MOBIC) 7.5 MG TABLET    TAKE 1 TABLET(7.5 MG) BY MOUTH DAILY AS NEEDED FOR PAIN    MIRABEGRON (MYRBETRIQ) 50 MG TB24    Take 1 tablet (50 mg total) by mouth once daily.    PRAMIPEXOLE (MIRAPEX) 0.125 MG TABLET    TAKE 1 TABLET BY MOUTH EVERY EVENING    QUETIAPINE (SEROQUEL) 50 MG TABLET    Take 50 mg by mouth every evening.     SUVOREXANT (BELSOMRA) 10 MG TAB    TAKE 1 TABLET BY MOUTH EVERY EVENING    TRAMADOL (ULTRAM) 50 MG TABLET    Take 1 tablet (50 mg total) by mouth every 6 (six) hours as needed for Pain.     Review of patient's allergies indicates:   Allergen Reactions    Clindamycin      Diarrhea      Lisinopril      cough       Physical Exam:   There is no height or weight on file to calculate BMI.    GENERAL: Well appearing, in no acute distress.  HEAD: Normocephalic and atraumatic.  ENT: External ears and nose grossly normal.  EYES: EOMI bilaterally  PULMONARY: Respirations are grossly even and non-labored.  NEURO: Awake, alert, and oriented x 3.  SKIN: No obvious rashes appreciated.  PSYCH: Mood & affect are appropriate.    Detailed MSK exam:     Point tenderness over the pes bursa of the right knee with pain with resisted hamstring strength testing and resisted knee flexion.  No redness warmth or swelling appreciated of the right knee joint  Left knee tenderness over the medial and lateral joint line.  No large effusion.  Antalgic gait uses walker for ambulation      Imaging:    No new images.     Assessment:  Lizeth Henderson is a 84 y.o. female presents today for left knee primary osteoarthritis and right knee pes bursitis hamstring tendinopathy.  We discussed the diagnosis prognosis as well as conservative treatment options moving forward.  We discussed an injection into the left knee today for primary arthritis and discussed potential corticosteroid injection into her pes bursa in the future if indicated.  All questions answered today.  Please refer to procedure note for the details.  Follow-up every 3 months for left knee intra-articular injections    Primary osteoarthritis of left knee  -     Sports Medicine US - Guidance for Needle Placement           Elia Carreon MD    Disclaimer: This note was prepared using a voice recognition system and is likely to have sound alike errors within the text.           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    Rates diet as poor.    She is not satisfied with weight.             Social Drivers of Health     Financial Resource Strain: Low Risk  (3/10/2025)    Overall Financial Resource Strain (CARDIA)     Difficulty of Paying Living Expenses: Not hard at all   Food Insecurity: No Food Insecurity (3/10/2025)    Hunger Vital Sign     Worried About Running Out of Food in the Last Year: Never true     Ran Out of Food in the Last Year: Never true   Transportation Needs: No Transportation Needs (3/10/2025)    PRAPARE - Transportation     Lack of Transportation (Medical): No     Lack of Transportation (Non-Medical): No   Physical Activity: Inactive (3/10/2025)    Exercise Vital Sign     Days of Exercise per Week: 0 days     Minutes of Exercise per Session: 0 min   Stress: Stress Concern Present (3/10/2025)    [place] of Occupational Health - Occupational Stress Questionnaire     Feeling of Stress : Rather much   Housing Stability: Low Risk  (3/10/2025)    Housing Stability Vital Sign     Unable to Pay for Housing in the Last Year: No     Homeless in the Last Year: No

## 2025-03-17 NOTE — PROCEDURES
Large Joint Aspiration/Injection: L knee    Date/Time: 3/17/2025 4:00 PM    Performed by: Elia Carreon MD  Authorized by: Elia Carreon MD    Consent Done?:  Yes (Verbal)  Indications:  Pain and joint swelling  Site marked: the procedure site was marked    Timeout: prior to procedure the correct patient, procedure, and site was verified    Prep: patient was prepped and draped in usual sterile fashion      Local anesthesia used?: Yes    Local anesthetic:  Topical anesthetic    Details:  Needle Size:  22 G  Ultrasonic Guidance for needle placement?: Yes    Images are saved and documented.  Approach:  Anterolateral  Location:  Knee  Site:  L knee  Medications:  40 mg triamcinolone acetonide 40 mg/mL; 2 mL LIDOcaine HCL 10 mg/ml (1%) 10 mg/mL (1 %); 2 mL BUPivacaine 0.5 % (5 mg/mL)  Patient tolerance:  Patient tolerated the procedure well with no immediate complications     Ultrasound guidance was used for needle localization. Images were saved and stored for documentation. The appropriate structures were visualized. Dynamic visualization of the needle was continuous throughout the procedures and maintained good position.     We discussed the proper protocols after the injection such as no submerging pools, baths tubs, or hot tubs for 24 hr.  Showering is okay today.  We also discussed that blood sugars can be elevated after an injection and asked patient to properly checked her sugars over the next few days and contact their PCP if there are any concerns.  We discussed red flags such as fevers, chills, red, warm, tender joint at the area of injection to please seek medical care immediately.

## 2025-03-20 ENCOUNTER — OFFICE VISIT (OUTPATIENT)
Dept: FAMILY MEDICINE | Facility: CLINIC | Age: 85
End: 2025-03-20
Attending: FAMILY MEDICINE
Payer: MEDICARE

## 2025-03-20 VITALS
OXYGEN SATURATION: 95 % | HEART RATE: 99 BPM | HEIGHT: 68 IN | DIASTOLIC BLOOD PRESSURE: 88 MMHG | TEMPERATURE: 99 F | SYSTOLIC BLOOD PRESSURE: 146 MMHG | WEIGHT: 218.69 LBS | BODY MASS INDEX: 33.15 KG/M2

## 2025-03-20 DIAGNOSIS — I10 WHITE COAT SYNDROME WITH DIAGNOSIS OF HYPERTENSION: ICD-10-CM

## 2025-03-20 DIAGNOSIS — D64.9 ANEMIA, UNSPECIFIED TYPE: Primary | ICD-10-CM

## 2025-03-20 DIAGNOSIS — I50.32 CHRONIC DIASTOLIC CONGESTIVE HEART FAILURE: ICD-10-CM

## 2025-03-20 DIAGNOSIS — R79.89 ELEVATED BRAIN NATRIURETIC PEPTIDE (BNP) LEVEL: ICD-10-CM

## 2025-03-20 DIAGNOSIS — Z85.038 HISTORY OF COLON CANCER: ICD-10-CM

## 2025-03-20 DIAGNOSIS — I10 ESSENTIAL HYPERTENSION: ICD-10-CM

## 2025-03-20 PROCEDURE — 1126F AMNT PAIN NOTED NONE PRSNT: CPT | Mod: HCNC,CPTII,S$GLB, | Performed by: FAMILY MEDICINE

## 2025-03-20 PROCEDURE — 99214 OFFICE O/P EST MOD 30 MIN: CPT | Mod: HCNC,S$GLB,, | Performed by: FAMILY MEDICINE

## 2025-03-20 PROCEDURE — 99999 PR PBB SHADOW E&M-EST. PATIENT-LVL IV: CPT | Mod: PBBFAC,HCNC,, | Performed by: FAMILY MEDICINE

## 2025-03-20 PROCEDURE — 1101F PT FALLS ASSESS-DOCD LE1/YR: CPT | Mod: HCNC,CPTII,S$GLB, | Performed by: FAMILY MEDICINE

## 2025-03-20 PROCEDURE — G2211 COMPLEX E/M VISIT ADD ON: HCPCS | Mod: HCNC,S$GLB,, | Performed by: FAMILY MEDICINE

## 2025-03-20 PROCEDURE — 3079F DIAST BP 80-89 MM HG: CPT | Mod: HCNC,CPTII,S$GLB, | Performed by: FAMILY MEDICINE

## 2025-03-20 PROCEDURE — 1159F MED LIST DOCD IN RCRD: CPT | Mod: HCNC,CPTII,S$GLB, | Performed by: FAMILY MEDICINE

## 2025-03-20 PROCEDURE — 1160F RVW MEDS BY RX/DR IN RCRD: CPT | Mod: HCNC,CPTII,S$GLB, | Performed by: FAMILY MEDICINE

## 2025-03-20 PROCEDURE — 3077F SYST BP >= 140 MM HG: CPT | Mod: HCNC,CPTII,S$GLB, | Performed by: FAMILY MEDICINE

## 2025-03-20 PROCEDURE — 3288F FALL RISK ASSESSMENT DOCD: CPT | Mod: HCNC,CPTII,S$GLB, | Performed by: FAMILY MEDICINE

## 2025-03-20 NOTE — PROGRESS NOTES
Subjective:       Patient ID: Lizeth Henderson is a 84 y.o. female.    Chief Complaint: Follow-up (ER for blood transfusion )    84 y old female with hx of Colon ca  f.u on ER visit on 3/7 due to severe anemia . She was transfused 2 units of PRBC. SOB has improved. She is scheduled to see hem . Denies melena , hematochezia . Also brings her home BP logs . Most of her readings are in the 170/90 . Enrolled on the D HTN program . Lastly , she will like to go over rest of her labs done on 3/6   : Elevated BNP . Denies BRANDON. She does have Diastolic CHF .     Follow-up      Review of Systems   Constitutional: Negative.    HENT: Negative.     Eyes: Negative.    Respiratory: Negative.     Cardiovascular: Negative.    Gastrointestinal: Negative.    Genitourinary: Negative.    Musculoskeletal: Negative.    Skin: Negative.    Hematological: Negative.        Objective:      Physical Exam  Vitals and nursing note reviewed.   Constitutional:       General: She is not in acute distress.     Appearance: She is well-developed. She is not diaphoretic.   HENT:      Head: Normocephalic and atraumatic.      Right Ear: External ear normal.      Left Ear: External ear normal.      Nose: Nose normal.   Eyes:      General: No scleral icterus.        Left eye: No discharge.      Conjunctiva/sclera: Conjunctivae normal.      Pupils: Pupils are equal, round, and reactive to light.   Neck:      Thyroid: No thyromegaly.      Vascular: No JVD.      Trachea: No tracheal deviation.   Cardiovascular:      Rate and Rhythm: Normal rate and regular rhythm.      Heart sounds: Normal heart sounds. No murmur heard.     No friction rub. No gallop.   Pulmonary:      Effort: Pulmonary effort is normal. No respiratory distress.      Breath sounds: Normal breath sounds. No wheezing or rales.   Chest:      Chest wall: No tenderness.   Abdominal:      General: Bowel sounds are normal. There is no distension.      Palpations: Abdomen is soft. There is no mass.       Tenderness: There is no abdominal tenderness. There is no guarding.   Musculoskeletal:      Cervical back: Normal range of motion and neck supple.   Lymphadenopathy:      Cervical: No cervical adenopathy.         Assessment:      Lizeth was seen today for follow-up.    Diagnoses and all orders for this visit:    Anemia, unspecified type  -     CBC Auto Differential; Future    Elevated brain natriuretic peptide (BNP) level  -     BNP; Future    Essential hypertension    Chronic diastolic congestive heart failure    History of colon cancer    White coat syndrome with diagnosis of hypertension       Plan:   1.- Repeat Labs . Keep Hem ramiro   2.- Repeat BNP   3.- Uncontrolled. Monitor at home with Omron or Reli on BP cuff . Email readings in 1 week .   4.-Fluid restrictions   5.- Declined GI ref .   6.-She will BP  monitor at home .

## 2025-03-21 ENCOUNTER — LAB VISIT (OUTPATIENT)
Dept: LAB | Facility: HOSPITAL | Age: 85
End: 2025-03-21
Attending: FAMILY MEDICINE
Payer: MEDICARE

## 2025-03-21 ENCOUNTER — OFFICE VISIT (OUTPATIENT)
Dept: OPHTHALMOLOGY | Facility: CLINIC | Age: 85
End: 2025-03-21
Payer: MEDICARE

## 2025-03-21 DIAGNOSIS — H52.7 REFRACTIVE ERROR: ICD-10-CM

## 2025-03-21 DIAGNOSIS — H26.491 PCO (POSTERIOR CAPSULAR OPACIFICATION), RIGHT: ICD-10-CM

## 2025-03-21 DIAGNOSIS — R79.89 ELEVATED BRAIN NATRIURETIC PEPTIDE (BNP) LEVEL: ICD-10-CM

## 2025-03-21 DIAGNOSIS — H04.123 DRY EYES, BILATERAL: Primary | ICD-10-CM

## 2025-03-21 DIAGNOSIS — H43.813 PVD (POSTERIOR VITREOUS DETACHMENT), BOTH EYES: ICD-10-CM

## 2025-03-21 DIAGNOSIS — D64.9 ANEMIA, UNSPECIFIED TYPE: ICD-10-CM

## 2025-03-21 DIAGNOSIS — L98.9 LEG SKIN LESION, LEFT: ICD-10-CM

## 2025-03-21 LAB
BASOPHILS # BLD AUTO: 0.06 K/UL (ref 0–0.2)
BASOPHILS NFR BLD: 0.6 % (ref 0–1.9)
BNP SERPL-MCNC: 105 PG/ML (ref 0–99)
DIFFERENTIAL METHOD BLD: ABNORMAL
EOSINOPHIL # BLD AUTO: 0.1 K/UL (ref 0–0.5)
EOSINOPHIL NFR BLD: 1.3 % (ref 0–8)
ERYTHROCYTE [DISTWIDTH] IN BLOOD BY AUTOMATED COUNT: 25.2 % (ref 11.5–14.5)
HCT VFR BLD AUTO: 35.7 % (ref 37–48.5)
HGB BLD-MCNC: 9.7 G/DL (ref 12–16)
IMM GRANULOCYTES # BLD AUTO: 0.05 K/UL (ref 0–0.04)
IMM GRANULOCYTES NFR BLD AUTO: 0.5 % (ref 0–0.5)
LYMPHOCYTES # BLD AUTO: 1.4 K/UL (ref 1–4.8)
LYMPHOCYTES NFR BLD: 14.1 % (ref 18–48)
MCH RBC QN AUTO: 20.1 PG (ref 27–31)
MCHC RBC AUTO-ENTMCNC: 27.2 G/DL (ref 32–36)
MCV RBC AUTO: 74 FL (ref 82–98)
MONOCYTES # BLD AUTO: 1 K/UL (ref 0.3–1)
MONOCYTES NFR BLD: 9.7 % (ref 4–15)
NEUTROPHILS # BLD AUTO: 7.3 K/UL (ref 1.8–7.7)
NEUTROPHILS NFR BLD: 73.8 % (ref 38–73)
NRBC BLD-RTO: 0 /100 WBC
PLATELET # BLD AUTO: 367 K/UL (ref 150–450)
PMV BLD AUTO: 10.7 FL (ref 9.2–12.9)
RBC # BLD AUTO: 4.83 M/UL (ref 4–5.4)
WBC # BLD AUTO: 9.95 K/UL (ref 3.9–12.7)

## 2025-03-21 PROCEDURE — 83880 ASSAY OF NATRIURETIC PEPTIDE: CPT | Mod: HCNC | Performed by: FAMILY MEDICINE

## 2025-03-21 PROCEDURE — 36415 COLL VENOUS BLD VENIPUNCTURE: CPT | Mod: HCNC,PO | Performed by: FAMILY MEDICINE

## 2025-03-21 PROCEDURE — 85025 COMPLETE CBC W/AUTO DIFF WBC: CPT | Mod: HCNC | Performed by: FAMILY MEDICINE

## 2025-03-21 PROCEDURE — 99999 PR PBB SHADOW E&M-EST. PATIENT-LVL III: CPT | Mod: PBBFAC,HCNC,, | Performed by: STUDENT IN AN ORGANIZED HEALTH CARE EDUCATION/TRAINING PROGRAM

## 2025-03-21 NOTE — PROGRESS NOTES
HPI     Annual Exam     Additional comments: Pt in today for a annual eye exam. Pt states his   vision has been worse since last visit. No pain or discomfort.             Last edited by Elisabeth Cordova on 3/21/2025  2:48 PM.            Assessment /Plan     For exam results, see Encounter Report.    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 Yag laser treatment as needed if visual loss occurs.       PVD (posterior vitreous detachment), both eyes- No tears or breaks were seen after careful retinal evaluation. Present >3 months per patient report.   RT/RD precautions    Discussed retinal detachment signs and symptoms including flashes of lights, floaters, perceived curtains or veils. Advised to patient to monitor visual status including increase in flashes and floaters, or the development of visual field changes including curtain and /or veils. Advised patient to RTC urgently if these symptoms occur. Explained the need for follow up exams to the patient even if there are no changes in the symptoms.      Leg skin lesion, left- Photo saved into media, lesion is smaller on exam today    MRx dispensed    RTC  1 year DFE or PRN                    [facility] 30 Month Well Child Check    ASSESSMENT & PLAN  Dereje Cifuentes is a 1  y.o. 5  m.o. female who has abnormal growth: no weight gain in 6 months; grew 2+ inches and normal development.  We discussed ways that they can try to increase the fat content in her diet.  I think she is also been somewhat ill and she is cognitively advanced so I think she is burning a lot of her calories with all of her activity.  We will check her again in 6 months and certainly I Beata see her again next month for preop and we will see if she is starting to gain weight at that time.    Diagnoses and all orders for this visit:    Cough  -     cefdinir (OMNICEF) 125 mg/5 mL suspension; Take 4 mL (100 mg total) by mouth 2 (two) times a day for 10 days.  Dispense: 80 mL; Refill: 0    Sore throat  -     Rapid Strep A Screen- Throat Swab  -     Group A Strep, RNA Direct Detection, Throat        Return to clinic at 3 years or sooner as needed    IMMUNIZATIONS  No immunizations due today.    REFERRALS  Dental:  The patient has already established care with a dentist.  Other:  No additional referrals were made at this time.    ANTICIPATORY GUIDANCE  I have reviewed age appropriate anticipatory guidance.    HEALTH HISTORY  Do you have any concerns that you'd like to discuss today?: Having surgery at the end of February       Roomed by: Brea        Do you have any significant health concerns in your family history?: No  Family History   Problem Relation Age of Onset     Hyperlipidemia Maternal Grandmother         Copied from mother's family history at birth     Hypertension Maternal Grandmother         Copied from mother's family history at birth     Since your last visit, have there been any major changes in your family, such as a move, job change, separation, divorce, or death in the family?: No  Has a lack of transportation kept you from medical appointments?: No    Who lives in your home?:  Mom and dad   Social History     Social History  Narrative     Not on file     Do you have any concerns about losing your housing?: No  Is your housing safe and comfortable?: Yes  Who provides care for your child?:   home  How much screen time does your child have each day (phone, TV, laptop, tablet, computer)?: less than 1 hour    Feeding/Nutrition:  Does your child use a bottle?:  No  What is your child drinking (cow's milk, breast milk, sports drinks, water, soda, juice, etc)?: water, juice and almond milk  How many ounces of cow's milk does your child drink in 24 hours?:  2-3 cups  What type of water does your child drink?:  city water  Do you give your child vitamins?: no  Have you been worried that you don't have enough food?: No  Do you have any questions about feeding your child?:  No    Sleep:  What time does your child go to bed?: 8-8:30pm   What time does your child wake up?: 6:30am   How many naps does your child take during the day?: 1     Elimination:  Do you have any concerns about your child's bowels or bladder (peeing, pooping, constipation?):  No    TB Risk Assessment:  Has your child had any of the following?:  no known risk of TB    Dental  When was the last time your child saw the dentist?: Patient has not been seen by a dentist yet   Parent/Guardian declines the fluoride varnish application today. Fluoride not applied today.    VISION/HEARING  Do you have any concerns about your child's hearing?  No  Do you have any concerns about your child's vision?  No    DEVELOPMENT  Do you have any concerns about your child's development?  No  Screening tool used, reviewed with parent or guardian:   ASQ   30 M Communication Gross Motor Fine Motor Problem Solving Personal-social   Score 60 50 60 55 55   Cutoff 33.30 36.14 19.25 27.08 32.01   Result Passed Passed Passed Passed Passed       Milestones (by observation/ exam/ report) 75-90% ile  PERSONAL/ SOCIAL/COGNITIVE:    Urinate in potty or toilet    Spear food with a fork  Wash and dry hands     "Engage in imaginary play, such as with dolls and toys  LANGUAGE:    Uses pronouns correctly    Explain the reasons for things, such as needing a sweater when it's cold    Name at least one color  GROSS MOTOR:    Walk up steps, alternating feet    Run well without falling  FINE MOTOR/ ADAPTIVE:    Copy a vertical line    Grasp crayon with thumb and fingers instead of fist    Catch large balls    Patient Active Problem List   Diagnosis     Functional diarrhea     S/p bilateral myringotomy with tube placement     Tonsillar hypertrophy     Acute suppurative otitis media of right ear without spontaneous rupture of tympanic membrane, recurrence not specified     Cough in pediatric patient       MEASUREMENTS  Height:  3' 1.5\" (0.953 m) (92 %, Z= 1.41, Source: [facility] (Girls, 2-20 Years))  Weight: 30 lb 5 oz (13.7 kg) (69 %, Z= 0.51, Source: [facility] (Girls, 2-20 Years))  BMI: Body mass index is 15.16 kg/m .  OFC: 48.7 cm (19.17\") (65 %, Z= 0.38, Source: [facility] (Girls, 0-36 Months))    PHYSICAL EXAM  Constitutional: She appears well-developed and well-nourished.   HEENT: Head: Normocephalic.    Right Ear: Tympanic membrane congested, external ear and canal normal.    Left Ear: Tympanic membrane congested, external ear and canal normal.    Nose: Nose normal.    Mouth/Throat: Mucous membranes are moist. Dentition is normal. Oropharynx is clear.  Some redness in her posterior pharynx   Eyes: Conjunctivae and lids are normal. Red reflex is present bilaterally. Pupils are equal, round, and reactive to light.   Neck: Neck supple. No tenderness is present.   Cardiovascular: Normal rate and regular rhythm. No murmur heard.  Pulses: Femoral pulses are 2+ bilaterally.   Pulmonary/Chest: Effort normal and breath sounds normal. There is normal air entry. Occasional Rhonchi  Abdominal: Soft. Bowel sounds are normal. There is no hepatosplenomegaly. No umbilical or inguinal hernia.   Genitourinary: Normal external female genitalia.   Musculoskeletal: " Normal range of motion. Normal strength and tone. Spine without abnormalities.   Neurological: She is alert. She has normal reflexes. No cranial nerve deficit.   Skin: No rashes.       Recent Results (from the past 24 hour(s))   Rapid Strep A Screen- Throat Swab   Result Value Ref Range    Rapid Strep A Antigen No Group A Strep detected, presumptive negative No Group A Strep detected, presumptive negative

## 2025-03-24 ENCOUNTER — RESULTS FOLLOW-UP (OUTPATIENT)
Dept: FAMILY MEDICINE | Facility: CLINIC | Age: 85
End: 2025-03-24

## 2025-03-26 ENCOUNTER — PATIENT MESSAGE (OUTPATIENT)
Dept: PODIATRY | Facility: CLINIC | Age: 85
End: 2025-03-26
Payer: MEDICARE

## 2025-03-26 RX ORDER — CEPHALEXIN 500 MG/1
500 CAPSULE ORAL 4 TIMES DAILY
Qty: 28 CAPSULE | Refills: 0 | Status: SHIPPED | OUTPATIENT
Start: 2025-03-26

## 2025-03-27 ENCOUNTER — OFFICE VISIT (OUTPATIENT)
Dept: PSYCHIATRY | Facility: CLINIC | Age: 85
End: 2025-03-27
Payer: MEDICARE

## 2025-03-27 DIAGNOSIS — G47.00 INSOMNIA, UNSPECIFIED TYPE: ICD-10-CM

## 2025-03-27 DIAGNOSIS — F41.1 GAD (GENERALIZED ANXIETY DISORDER): ICD-10-CM

## 2025-03-27 RX ORDER — ESCITALOPRAM OXALATE 10 MG/1
15 TABLET ORAL DAILY
Qty: 135 TABLET | Refills: 0 | Status: SHIPPED | OUTPATIENT
Start: 2025-03-27 | End: 2026-03-27

## 2025-03-27 RX ORDER — BUSPIRONE HYDROCHLORIDE 10 MG/1
20 TABLET ORAL 2 TIMES DAILY
Qty: 120 TABLET | Refills: 2 | Status: SHIPPED | OUTPATIENT
Start: 2025-03-27

## 2025-03-27 RX ORDER — CLONAZEPAM 0.25 MG/1
0.25 TABLET, ORALLY DISINTEGRATING ORAL 2 TIMES DAILY PRN
Qty: 60 TABLET | Refills: 1 | Status: SHIPPED | OUTPATIENT
Start: 2025-03-27 | End: 2026-03-27

## 2025-03-27 RX ORDER — BUPROPION HYDROCHLORIDE 150 MG/1
150 TABLET ORAL DAILY
Qty: 90 TABLET | Refills: 0 | Status: SHIPPED | OUTPATIENT
Start: 2025-03-27

## 2025-03-27 RX ORDER — SUVOREXANT 10 MG/1
1 TABLET, FILM COATED ORAL NIGHTLY
Qty: 30 TABLET | Refills: 2 | Status: SHIPPED | OUTPATIENT
Start: 2025-03-27
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
## 2025-03-29 NOTE — TELEPHONE ENCOUNTER
No care due was identified.  [facility] Embedded Care Due Messages. Reference number: [id number].   3/29/2025 1:16:40 PM CDT

## 2025-03-29 NOTE — TELEPHONE ENCOUNTER
Care Due:                  Date            Visit Type   Department     Provider  --------------------------------------------------------------------------------                                MYCHART                              FOLLOWUP/OF  [facility] INTERNAL  Jennifer VILLASEÑOR  Last Visit: 03-      FICE VISIT   [facility]       Gee  Next Visit: None Scheduled  None         None Found                                                            Last  Test          Frequency    Reason                     Performed    Due Date  --------------------------------------------------------------------------------    Mg Level....  12 months..  ibandronate..............  01- 01-    Phosphate...  12 months..  ibandronate..............  Not Found    Overdue    [employer] Embedded Care Due Messages. Reference number: [id number].   3/29/2025 5:38:13 AM CDT

## 2025-03-30 RX ORDER — ATORVASTATIN CALCIUM 10 MG/1
10 TABLET, FILM COATED ORAL
Qty: 90 TABLET | Refills: 0 | OUTPATIENT
Start: 2025-03-30

## 2025-03-30 NOTE — TELEPHONE ENCOUNTER
Refill Routing Note   Medication(s) are not appropriate for processing by [facility] for the following reason(s):        No active prescription written by provider    [facility] action(s):  Defer     Requires labs : Yes             Appointments  past 12m or future 3m with PCP    Date Provider   Last Visit   3/20/2025 Jennifer Flynn MD   Next Visit   Visit date not found Jennifer Flynn MD   ED visits in past 90 days: 1        Note composed:10:39 AM 03/30/2025

## 2025-03-30 NOTE — TELEPHONE ENCOUNTER
Refill Decision Note   Lizeth Henderson  is requesting a refill authorization.  Brief Assessment and Rationale for Refill:  Quick Discontinue     Medication Therapy Plan:         Comments:     Note composed:10:36 AM 03/30/2025

## 2025-03-31 RX ORDER — ATORVASTATIN CALCIUM 10 MG/1
10 TABLET, FILM COATED ORAL
Qty: 90 TABLET | Refills: 0 | Status: SHIPPED | OUTPATIENT
Start: 2025-03-31

## 2025-04-09 ENCOUNTER — E-CONSULT (OUTPATIENT)
Dept: GASTROENTEROLOGY | Facility: HOSPITAL | Age: 85
End: 2025-04-09
Payer: MEDICARE

## 2025-04-09 ENCOUNTER — LAB VISIT (OUTPATIENT)
Dept: LAB | Facility: HOSPITAL | Age: 85
End: 2025-04-09
Attending: NURSE PRACTITIONER
Payer: MEDICARE

## 2025-04-09 ENCOUNTER — OFFICE VISIT (OUTPATIENT)
Dept: HEMATOLOGY/ONCOLOGY | Facility: CLINIC | Age: 85
End: 2025-04-09
Attending: FAMILY MEDICINE
Payer: MEDICARE

## 2025-04-09 VITALS
WEIGHT: 225.5 LBS | TEMPERATURE: 97 F | BODY MASS INDEX: 34.17 KG/M2 | DIASTOLIC BLOOD PRESSURE: 81 MMHG | HEART RATE: 98 BPM | OXYGEN SATURATION: 95 % | SYSTOLIC BLOOD PRESSURE: 159 MMHG | HEIGHT: 68 IN

## 2025-04-09 DIAGNOSIS — D50.9 IRON DEFICIENCY ANEMIA, UNSPECIFIED IRON DEFICIENCY ANEMIA TYPE: Primary | ICD-10-CM

## 2025-04-09 DIAGNOSIS — D64.9 ANEMIA, UNSPECIFIED TYPE: ICD-10-CM

## 2025-04-09 DIAGNOSIS — D53.9 NUTRITIONAL ANEMIA, UNSPECIFIED: ICD-10-CM

## 2025-04-09 DIAGNOSIS — D64.9 ANEMIA, UNSPECIFIED TYPE: Primary | ICD-10-CM

## 2025-04-09 LAB
ABSOLUTE EOSINOPHIL (OHS): 0.21 K/UL
ABSOLUTE MONOCYTE (OHS): 0.6 K/UL (ref 0.3–1)
ABSOLUTE NEUTROPHIL COUNT (OHS): 5.73 K/UL (ref 1.8–7.7)
ALBUMIN SERPL BCP-MCNC: 3.3 G/DL (ref 3.5–5.2)
ALP SERPL-CCNC: 90 UNIT/L (ref 40–150)
ALT SERPL W/O P-5'-P-CCNC: 10 UNIT/L (ref 10–44)
ANION GAP (OHS): 8 MMOL/L (ref 8–16)
ANISOCYTOSIS BLD QL SMEAR: SLIGHT
AST SERPL-CCNC: 16 UNIT/L (ref 11–45)
BASOPHILS # BLD AUTO: 0.05 K/UL
BASOPHILS NFR BLD AUTO: 0.6 %
BILIRUB SERPL-MCNC: 0.3 MG/DL (ref 0.1–1)
BUN SERPL-MCNC: 18 MG/DL (ref 8–23)
CALCIUM SERPL-MCNC: 9 MG/DL (ref 8.7–10.5)
CHLORIDE SERPL-SCNC: 107 MMOL/L (ref 95–110)
CO2 SERPL-SCNC: 23 MMOL/L (ref 23–29)
CREAT SERPL-MCNC: 0.8 MG/DL (ref 0.5–1.4)
DIRECT COOMBS (DAT): NORMAL
ERYTHROCYTE [DISTWIDTH] IN BLOOD BY AUTOMATED COUNT: ABNORMAL %
GFR SERPLBLD CREATININE-BSD FMLA CKD-EPI: >60 ML/MIN/1.73/M2
GLUCOSE SERPL-MCNC: 108 MG/DL (ref 70–110)
HCT VFR BLD AUTO: 32.3 % (ref 37–48.5)
HGB BLD-MCNC: 9.2 GM/DL (ref 12–16)
HYPOCHROMIA BLD QL SMEAR: NORMAL
IGA SERPL-MCNC: 148 MG/DL (ref 40–350)
IGG SERPL-MCNC: 762 MG/DL (ref 650–1600)
IGM SERPL-MCNC: 111 MG/DL (ref 50–300)
IMM GRANULOCYTES # BLD AUTO: 0.04 K/UL (ref 0–0.04)
IMM GRANULOCYTES NFR BLD AUTO: 0.5 % (ref 0–0.5)
IRON SATN MFR SERPL: 6 % (ref 20–50)
IRON SERPL-MCNC: 27 UG/DL (ref 30–160)
LDH SERPL-CCNC: 254 U/L (ref 110–260)
LYMPHOCYTES # BLD AUTO: 1.17 K/UL (ref 1–4.8)
MCH RBC QN AUTO: 22.1 PG (ref 27–31)
MCHC RBC AUTO-ENTMCNC: 28.5 G/DL (ref 32–36)
MCV RBC AUTO: 78 FL (ref 82–98)
NUCLEATED RBC (/100WBC) (OHS): 0 /100 WBC
OVALOCYTES BLD QL SMEAR: NORMAL
PLATELET # BLD AUTO: 246 K/UL (ref 150–450)
PLATELET BLD QL SMEAR: NORMAL
PMV BLD AUTO: 10 FL (ref 9.2–12.9)
POTASSIUM SERPL-SCNC: 4.4 MMOL/L (ref 3.5–5.1)
PROT SERPL-MCNC: 6.8 GM/DL (ref 6–8.4)
RBC # BLD AUTO: 4.17 M/UL (ref 4–5.4)
RELATIVE EOSINOPHIL (OHS): 2.7 %
RELATIVE LYMPHOCYTE (OHS): 15 % (ref 18–48)
RELATIVE MONOCYTE (OHS): 7.7 % (ref 4–15)
RELATIVE NEUTROPHIL (OHS): 73.5 % (ref 38–73)
SODIUM SERPL-SCNC: 138 MMOL/L (ref 136–145)
TIBC SERPL-MCNC: 438 UG/DL (ref 250–450)
TRANSFERRIN SERPL-MCNC: 296 MG/DL (ref 200–375)
TSH SERPL-ACNC: 0.85 UIU/ML (ref 0.4–4)
WBC # BLD AUTO: 7.8 K/UL (ref 3.9–12.7)

## 2025-04-09 PROCEDURE — 3288F FALL RISK ASSESSMENT DOCD: CPT | Mod: HCNC,CPTII,S$GLB, | Performed by: NURSE PRACTITIONER

## 2025-04-09 PROCEDURE — 83021 HEMOGLOBIN CHROMOTOGRAPHY: CPT | Mod: HCNC

## 2025-04-09 PROCEDURE — 1160F RVW MEDS BY RX/DR IN RCRD: CPT | Mod: HCNC,CPTII,S$GLB, | Performed by: NURSE PRACTITIONER

## 2025-04-09 PROCEDURE — 82668 ASSAY OF ERYTHROPOIETIN: CPT | Mod: HCNC

## 2025-04-09 PROCEDURE — 36415 COLL VENOUS BLD VENIPUNCTURE: CPT | Mod: HCNC

## 2025-04-09 PROCEDURE — 1101F PT FALLS ASSESS-DOCD LE1/YR: CPT | Mod: HCNC,CPTII,S$GLB, | Performed by: NURSE PRACTITIONER

## 2025-04-09 PROCEDURE — 82040 ASSAY OF SERUM ALBUMIN: CPT | Mod: HCNC

## 2025-04-09 PROCEDURE — 3079F DIAST BP 80-89 MM HG: CPT | Mod: HCNC,CPTII,S$GLB, | Performed by: NURSE PRACTITIONER

## 2025-04-09 PROCEDURE — 85025 COMPLETE CBC W/AUTO DIFF WBC: CPT | Mod: HCNC

## 2025-04-09 PROCEDURE — 99205 OFFICE O/P NEW HI 60 MIN: CPT | Mod: HCNC,S$GLB,, | Performed by: NURSE PRACTITIONER

## 2025-04-09 PROCEDURE — 86880 COOMBS TEST DIRECT: CPT | Mod: HCNC | Performed by: NURSE PRACTITIONER

## 2025-04-09 PROCEDURE — 86334 IMMUNOFIX E-PHORESIS SERUM: CPT | Mod: HCNC

## 2025-04-09 PROCEDURE — 99446 NTRPROF PH1/NTRNET/EHR 5-10: CPT | Mod: ,,, | Performed by: STUDENT IN AN ORGANIZED HEALTH CARE EDUCATION/TRAINING PROGRAM

## 2025-04-09 PROCEDURE — 83521 IG LIGHT CHAINS FREE EACH: CPT | Mod: HCNC

## 2025-04-09 PROCEDURE — 1159F MED LIST DOCD IN RCRD: CPT | Mod: HCNC,CPTII,S$GLB, | Performed by: NURSE PRACTITIONER

## 2025-04-09 PROCEDURE — 84443 ASSAY THYROID STIM HORMONE: CPT | Mod: HCNC

## 2025-04-09 PROCEDURE — 83789 MASS SPECTROMETRY QUAL/QUAN: CPT | Mod: HCNC

## 2025-04-09 PROCEDURE — 84466 ASSAY OF TRANSFERRIN: CPT | Mod: HCNC

## 2025-04-09 PROCEDURE — 84165 PROTEIN E-PHORESIS SERUM: CPT | Mod: HCNC

## 2025-04-09 PROCEDURE — 82784 ASSAY IGA/IGD/IGG/IGM EACH: CPT | Mod: HCNC

## 2025-04-09 PROCEDURE — 99999 PR PBB SHADOW E&M-EST. PATIENT-LVL IV: CPT | Mod: PBBFAC,HCNC,, | Performed by: NURSE PRACTITIONER

## 2025-04-09 PROCEDURE — 1125F AMNT PAIN NOTED PAIN PRSNT: CPT | Mod: HCNC,CPTII,S$GLB, | Performed by: NURSE PRACTITIONER

## 2025-04-09 PROCEDURE — 83615 LACTATE (LD) (LDH) ENZYME: CPT | Mod: HCNC

## 2025-04-09 PROCEDURE — 3077F SYST BP >= 140 MM HG: CPT | Mod: HCNC,CPTII,S$GLB, | Performed by: NURSE PRACTITIONER

## 2025-04-09 PROCEDURE — 84165 PROTEIN E-PHORESIS SERUM: CPT | Mod: HCNC,,, | Performed by: PATHOLOGY

## 2025-04-09 NOTE — ASSESSMENT & PLAN NOTE
Anemia evaluation today. F/u 2 weeks to review. H/o iron deficiency noted on labs 7/2024. She denies any iron supplementation. Currently with microcytic anemia. She notes remotes h/o colon cancer dx 2000 s/p surgery and chemotherapy. Last colonoscopy 15 years ago per patient she was told no additional screening Colonoscopy needed     E consult to GI for consideration of EGD/Colonoscopy. Given age may need formal GI consult prior to endoscopic evaluation

## 2025-04-09 NOTE — PROGRESS NOTES
Subjective:       Patient ID: Lizeth Henderson is a 84 y.o. female.    Chief Complaint: referral for anemia    HPI: 84 y.o female with medical history adrenal adenoma, anxiety, HTN, CAD, referred by PCP for anemia evaluation. She notes recent h/o cellulitis and notes this is a recurrent intermittent issue do to vascular insufficiency. She notes remote h/o colon cancer dx around 2000 s/o surgery and chemotherapy. Review of medical record reveal chronic mild anemia with decline in hemoglobin 3/2024 during hospitalization to 8 range. Most recently decline in hemoglobin 6 range requiring 2 units PRBCs 3/7/2025. She was seen by PCP 3/6/2025 for c/o SOB and found to have hemoglobin 6.7, referred to ER for blood transfusion.     Review of medical record reveal iron deficiency noted on 7/2024 labs. Patient denies any iron supplementation. She denies noting abnormal bleeding. She notes resolution of SOB following blood transfusion. Today she feels well. Occult stool 37/2025 negative.   Social History[1]    Past Medical History:   Diagnosis Date    Adrenal adenoma 2016    Anxiety     Arthritis     Benign hypertension     Colon cancer age 61    colon    Coronary artery disease     Depression     Full dentures     General anesthetics causing adverse effect in therapeutic use     yells and talks when wakes up    Hyperlipidemia     Osteopenia     Shingles     Type 2 diabetes mellitus without complication, without long-term current use of insulin 1/23/2024    Wears glasses        Family History   Problem Relation Name Age of Onset    Alzheimer's disease Mother [place]     Arthritis Mother Tracy     Cancer Father talley         Colon    Hypertension Father price     Arthritis Father price     Hearing loss Father talley     Depression Daughter Connie     Kidney disease Daughter Ruba     Ulcerative colitis Daughter Ruba     Depression Daughter Ruba     Depression Daughter Veronica     Anxiety disorder Daughter Veronica          PTSD    Cancer Maternal Uncle Nikolas         Colon    Early death Maternal Grandmother Chata     Cancer Cousin          colon cancer    Amblyopia Neg Hx      Blindness Neg Hx      Cataracts Neg Hx      Diabetes Neg Hx      Glaucoma Neg Hx      Macular degeneration Neg Hx      Retinal detachment Neg Hx      Strabismus Neg Hx      Stroke Neg Hx      Thyroid disease Neg Hx         Past Surgical History:   Procedure Laterality Date    APPENDECTOMY      BREAST BIOPSY Left     benign    BUNIONECTOMY Left     CATARACT EXTRACTION Bilateral     COLON SURGERY      2001    EYE SURGERY      cataract surg    HEMICOLECTOMY  2002    HERNIA REPAIR      x5    JOINT REPLACEMENT      knee    left toe surgery      hammertoe    PATENT DUCTUS ARTERIOUS LIGATION  1948    SALPINGOOPHORECTOMY  2002    due to colon cancer    TONSILLECTOMY, ADENOIDECTOMY         Review of Systems   Constitutional:  Negative for appetite change and unexpected weight change.   HENT:  Negative for mouth sores.    Eyes:  Negative for visual disturbance.   Respiratory:  Negative for cough and shortness of breath.    Cardiovascular:  Negative for chest pain.   Gastrointestinal:  Negative for abdominal pain and diarrhea.   Genitourinary:  Negative for frequency.   Musculoskeletal:  Negative for back pain.   Skin:  Negative for rash.   Neurological:  Negative for headaches.   Hematological:  Negative for adenopathy.   Psychiatric/Behavioral:  The patient is nervous/anxious.          Medication List with Changes/Refills   Current Medications    ASPIRIN (ECOTRIN) 81 MG EC TABLET    Take 81 mg by mouth once daily.    ATORVASTATIN (LIPITOR) 10 MG TABLET    TAKE 1 TABLET(10 MG) BY MOUTH EVERY DAY    BUPROPION (WELLBUTRIN XL) 150 MG TB24 TABLET    Take 1 tablet (150 mg total) by mouth once daily.    BUSPIRONE (BUSPAR) 10 MG TABLET    Take 2 tablets (20 mg total) by mouth 2 (two) times daily.    CEPHALEXIN (KEFLEX) 500 MG CAPSULE    Take 1  capsule (500 mg total) by mouth 4 (four) times daily.    CLONAZEPAM (KLONOPIN) 0.25 MG TBDL    Take 1 tablet (0.25 mg total) by mouth 2 (two) times daily as needed (anxiety).    ESCITALOPRAM OXALATE (LEXAPRO) 10 MG TABLET    Take 1.5 tablets (15 mg total) by mouth once daily.    IBANDRONATE (BONIVA) 150 MG TABLET    Take 1 tablet (150 mg total) by mouth every 30 days.    LOSARTAN (COZAAR) 100 MG TABLET    TAKE 1 TABLET(100 MG) BY MOUTH EVERY DAY    MELOXICAM (MOBIC) 7.5 MG TABLET    TAKE 1 TABLET(7.5 MG) BY MOUTH DAILY AS NEEDED FOR PAIN    MIRABEGRON (MYRBETRIQ) 50 MG TB24    Take 1 tablet (50 mg total) by mouth once daily.    PRAMIPEXOLE (MIRAPEX) 0.125 MG TABLET    TAKE 1 TABLET BY MOUTH EVERY EVENING    SUVOREXANT (BELSOMRA) 10 MG TAB    Take 1 tablet by mouth every evening.    TRAMADOL (ULTRAM) 50 MG TABLET    Take 1 tablet (50 mg total) by mouth every 6 (six) hours as needed for Pain.     Objective:     Vitals:    04/09/25 1401   BP: (!) 159/81   Pulse: 98   Temp: 97.4 °F (36.3 °C)     Lab Results   Component Value Date    WBC 9.95 03/21/2025    HGB 9.7 (L) 03/21/2025    HCT 35.7 (L) 03/21/2025    MCV 74 (L) 03/21/2025     03/21/2025       BMP  Lab Results   Component Value Date     03/06/2025    K 3.5 03/06/2025     03/06/2025    CO2 25 03/06/2025    BUN 13 03/06/2025    CREATININE 0.8 03/06/2025    CALCIUM 9.0 03/06/2025    ANIONGAP 10 03/06/2025    EGFRNORACEVR >60.0 03/06/2025     Lab Results   Component Value Date    ALT 6 (L) 03/06/2025    AST 19 03/06/2025    ALKPHOS 82 03/06/2025    BILITOT 0.4 03/06/2025     Lab Results   Component Value Date    IRON 21 (L) 07/23/2024    TRANSFERRIN 324 07/23/2024    TIBC 480 (H) 07/23/2024    FESATURATED 4 (L) 07/23/2024      Lab Results   Component Value Date    CQCLCBZQ54 555 07/23/2024     Lab Results   Component Value Date    FOLATE 14.7 07/23/2024     Physical Exam  Vitals reviewed.   Constitutional:       Appearance: She is well-developed.    HENT:      Head: Normocephalic.      Right Ear: External ear normal.      Left Ear: External ear normal.      Nose: Nose normal.   Eyes:      General: Lids are normal. No scleral icterus.        Right eye: No discharge.         Left eye: No discharge.      Conjunctiva/sclera: Conjunctivae normal.   Neck:      Thyroid: No thyroid mass.   Cardiovascular:      Rate and Rhythm: Normal rate and regular rhythm.      Heart sounds: Normal heart sounds.   Pulmonary:      Effort: Pulmonary effort is normal. No respiratory distress.      Breath sounds: Normal breath sounds. No wheezing or rales.   Abdominal:      General: There is no distension.   Genitourinary:     Comments: deferred  Musculoskeletal:         General: Normal range of motion.      Cervical back: Normal range of motion.   Skin:     General: Skin is warm and dry.   Neurological:      Mental Status: She is alert and oriented to person, place, and time.   Psychiatric:         Speech: Speech normal.         Behavior: Behavior normal. Behavior is cooperative.         Thought Content: Thought content normal.        Assessment:     Problem List Items Addressed This Visit          Oncology    Anemia    Anemia evaluation today. F/u 2 weeks to review. H/o iron deficiency noted on labs 7/2024. She denies any iron supplementation. Currently with microcytic anemia. She notes remotes h/o colon cancer dx 2000 s/p surgery and chemotherapy. Last colonoscopy 15 years ago per patient she was told no additional screening Colonoscopy needed     E consult to GI for consideration of EGD/Colonoscopy. Given age may need formal GI consult prior to endoscopic evaluation          Relevant Orders    Urinalysis    Beta 2 Microglobulin, Serum    CBC Auto Differential    Comprehensive Metabolic Panel    Copper, Serum    Direct antiglobulin test    Erythropoietin    Ferritin    Folate    Haptoglobin    Immunofixation, Serum    Immunoglobulin Free LT Chains Blood    Immunoglobulins (IgG, IgA,  IgM) Quantitative    Iron and TIBC    Lactate Dehydrogenase    Protein Electrophoresis, Serum    Vitamin B12    TSH    Thalasseima and Hemoglobinopathy Eval    E-Consult to Gastroenterology     Other Visit Diagnoses         Nutritional anemia, unspecified        Relevant Orders    Folate    Vitamin B12              Plan:     Anemia, unspecified type  -     Ambulatory referral/consult to Hematology / Oncology  -     Urinalysis; Future; Expected date: 04/09/2025  -     Beta 2 Microglobulin, Serum; Future; Expected date: 04/09/2025  -     CBC Auto Differential; Future; Expected date: 04/09/2025  -     Comprehensive Metabolic Panel; Future; Expected date: 04/09/2025  -     Copper, Serum; Future; Expected date: 04/09/2025  -     Direct antiglobulin test; Future; Expected date: 04/09/2025  -     Erythropoietin; Future; Expected date: 04/09/2025  -     Ferritin; Future; Expected date: 04/09/2025  -     Folate; Future; Expected date: 04/09/2025  -     Haptoglobin; Future; Expected date: 04/09/2025  -     Immunofixation, Serum; Future; Expected date: 04/09/2025  -     Immunoglobulin Free LT Chains Blood; Future; Expected date: 04/09/2025  -     Immunoglobulins (IgG, IgA, IgM) Quantitative; Future; Expected date: 04/09/2025  -     Iron and TIBC; Future; Expected date: 04/09/2025  -     Lactate Dehydrogenase; Future; Expected date: 04/09/2025  -     Protein Electrophoresis, Serum; Future; Expected date: 04/09/2025  -     Vitamin B12; Future; Expected date: 04/09/2025  -     TSH; Future; Expected date: 04/09/2025  -     Thalasseima and Hemoglobinopathy Eval; Future; Expected date: 04/09/2025  -     E-Consult to Gastroenterology    Nutritional anemia, unspecified  -     Folate; Future; Expected date: 04/09/2025  -     Vitamin B12; Future; Expected date: 04/09/2025          Med Onc Chart Routing      Follow up with physician    Follow up with JUAN 2 weeks. 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    Rates diet as poor.    She is not satisfied with weight.             Social Drivers of Health     Financial Resource Strain: Low Risk  (3/10/2025)    Overall Financial Resource Strain (CARDIA)     Difficulty of Paying Living Expenses: Not hard at all   Food Insecurity: No Food Insecurity (3/10/2025)    Hunger Vital Sign     Worried About Running Out of Food in the Last Year: Never true     Ran Out of Food in the Last Year: Never true   Transportation Needs: No Transportation Needs (3/10/2025)    PRAPARE - Transportation     Lack of Transportation (Medical): No     Lack of Transportation (Non-Medical): No   Physical Activity: Inactive (3/10/2025)    Exercise Vital Sign     Days of Exercise per Week: 0 days     Minutes of Exercise per Session: 0 min   Stress: Stress Concern Present (3/10/2025)    [employer] - Occupational Stress Questionnaire     Feeling of Stress : Rather much   Housing Stability: Low Risk  (3/10/2025)    Housing Stability Vital Sign     Unable to Pay for Housing in the Last Year: No     Homeless in the Last Year: No

## 2025-04-09 NOTE — CONSULTS
[facility] GASTROENTEROLOGY  Response for E-Consult     Patient Name: Lizeth Henderson  MRN: [id number]  Primary Care Provider: Jennifer Flynn MD   Requesting Provider: Tara Dickerson NP  E-Consult to Gastroenterology  Consult performed by: Booker Hatch MD  Consult ordered by: Tara Dickerson NP  Reason for consult: JOSSUE w/hx of colon cancer          Recommendation:   1) Will arrange EGD/Colonoscopy - please sign pended constult    An Ambulatory Referral for an Endoscopy Order will be pended to the referring provider with the following:    Procedure: EGD/Colonoscopy    Diagnosis: Iron deficiency anemia    Procedure Timing: Within 4 weeks (Urgent)      83yo woman with a history of colon cancer who is seen by her PCP for JOSSUE.  No recent EGD or colonoscopy for review in our system.  Recent cardiac studies reviewed and seems to be a reasonable candidate for an endoscopic evaluation.    Additional future steps to consider:     Total time of Consultation: 10 minute    I did not speak to the requesting provider verbally about this.     *This eConsult is based on the clinical data available to me and is furnished without benefit of a physical examination. The eConsult will need to be interpreted in light of any clinical issues or changes in patient status not available to me at the time of filing this eConsults. Significant changes in patient condition or level of acuity should result in immediate formal consultation and reevaluation. Please alert me if you have further questions.    Thank you for this eConsult referral.     Booker Hatch MD  [facility] GASTROENTEROLOGY

## 2025-04-10 LAB
ALBUMIN, SPE (OHS): 3.06 G/DL (ref 3.35–5.55)
ALPHA 1 GLOB (OHS): 0.49 GM/DL (ref 0.17–0.41)
ALPHA 2 GLOB (OHS): 0.82 GM/DL (ref 0.43–0.99)
BETA GLOB (OHS): 1.04 GM/DL (ref 0.5–1.1)
GAMMA GLOBULIN (OHS): 0.79 GM/DL (ref 0.67–1.58)
KAPPA LC FREE SER-MCNC: 1.44 MG/L (ref 0.26–1.65)
KAPPA LC FREE/LAMBDA FREE SER: 2.7 MG/DL (ref 0.33–1.94)
LAMBDA LC FREE SERPL-MCNC: 1.87 MG/DL (ref 0.57–2.63)
PATHOLOGIST INTERPRETATION - IFE SERUM (OHS): NORMAL
PATHOLOGIST REVIEW - SPE (OHS): NORMAL
PROT SERPL-MCNC: 6.2 GM/DL (ref 6–8.4)

## 2025-04-11 ENCOUNTER — TELEPHONE (OUTPATIENT)
Dept: ENDOSCOPY | Facility: HOSPITAL | Age: 85
End: 2025-04-11
Payer: MEDICARE

## 2025-04-11 VITALS — WEIGHT: 225 LBS | HEIGHT: 68 IN | BODY MASS INDEX: 34.1 KG/M2

## 2025-04-14 LAB — W ERYTHROPOIETIN (EPO): 30.1 MIU/ML

## 2025-04-15 LAB — HGB OTHER BLD: NORMAL

## 2025-04-16 ENCOUNTER — PATIENT MESSAGE (OUTPATIENT)
Dept: VASCULAR SURGERY | Facility: CLINIC | Age: 85
End: 2025-04-16
Payer: MEDICARE

## 2025-04-16 LAB
FERRITIN SERPL IA-MCNC: 28 MCG/L (ref 11–328)
HGB A MFR BLD ELPH: 97.8 % (ref 95.8–98)
HGB A2 MFR BLD ELPH: 2.2 % (ref 2–3.3)
HGB F MFR BLD ELPH: 0 % (ref 0–0.9)
HGB FRACT BLD ELPH-IMP: NORMAL
M HPLC HB VARIANT, B: NORMAL
PROVIDER SIGNING NAME: NORMAL

## 2025-04-21 ENCOUNTER — DOCUMENT SCAN (OUTPATIENT)
Dept: HOME HEALTH SERVICES | Facility: HOSPITAL | Age: 85
End: 2025-04-21
Payer: MEDICARE

## 2025-04-23 ENCOUNTER — TELEPHONE (OUTPATIENT)
Dept: GASTROENTEROLOGY | Facility: CLINIC | Age: 85
End: 2025-04-23
Payer: MEDICARE

## 2025-04-23 NOTE — TELEPHONE ENCOUNTER
Spoke to pt and her daughter, Ruba Godinez. Pt is scheduled with Ms Swift for a virtual for 79+ EGD/Colonoscopy consult. However Ms Swift reviewed pts chart and pt does not need to be seen by GI.   Pts ordering provider, Dr Dickerson did an e-consult with a GI provider, Booker Hatch(Physician)  Gastroenterology So an appt is not needed per Ms Swift.     Scheduled a PAT phone call appt for pt on 04/29/2025. They agreed to plan and verbalized understanding.

## 2025-04-28 ENCOUNTER — OFFICE VISIT (OUTPATIENT)
Dept: VASCULAR SURGERY | Facility: CLINIC | Age: 85
End: 2025-04-28
Payer: MEDICARE

## 2025-04-28 VITALS — DIASTOLIC BLOOD PRESSURE: 89 MMHG | HEART RATE: 96 BPM | SYSTOLIC BLOOD PRESSURE: 185 MMHG

## 2025-04-28 DIAGNOSIS — I70.0 ATHEROSCLEROSIS OF AORTA: ICD-10-CM

## 2025-04-28 DIAGNOSIS — I89.0 LYMPHEDEMA: Primary | ICD-10-CM

## 2025-04-28 PROCEDURE — 99999 PR PBB SHADOW E&M-EST. PATIENT-LVL III: CPT | Mod: PBBFAC,HCNC,, | Performed by: STUDENT IN AN ORGANIZED HEALTH CARE EDUCATION/TRAINING PROGRAM

## 2025-04-28 PROCEDURE — 99214 OFFICE O/P EST MOD 30 MIN: CPT | Mod: HCNC,S$GLB,, | Performed by: STUDENT IN AN ORGANIZED HEALTH CARE EDUCATION/TRAINING PROGRAM

## 2025-04-28 PROCEDURE — 3077F SYST BP >= 140 MM HG: CPT | Mod: CPTII,HCNC,S$GLB, | Performed by: STUDENT IN AN ORGANIZED HEALTH CARE EDUCATION/TRAINING PROGRAM

## 2025-04-28 PROCEDURE — 1159F MED LIST DOCD IN RCRD: CPT | Mod: CPTII,HCNC,S$GLB, | Performed by: STUDENT IN AN ORGANIZED HEALTH CARE EDUCATION/TRAINING PROGRAM

## 2025-04-28 PROCEDURE — 1160F RVW MEDS BY RX/DR IN RCRD: CPT | Mod: CPTII,HCNC,S$GLB, | Performed by: STUDENT IN AN ORGANIZED HEALTH CARE EDUCATION/TRAINING PROGRAM

## 2025-04-28 PROCEDURE — 3079F DIAST BP 80-89 MM HG: CPT | Mod: CPTII,HCNC,S$GLB, | Performed by: STUDENT IN AN ORGANIZED HEALTH CARE EDUCATION/TRAINING PROGRAM

## 2025-04-28 NOTE — PROGRESS NOTES
Petrona Rodriguez  [facility]    OFFICE NOTE    DATE OF VISIT: 2025  PATIENT NAME: Lizeth Henderson  : 1940  MRN: [id number]  PRIMARY CARE PHYSICIAN: Jennifer Flynn MD  CARDIOLOGIST:   REFERRING PROVIDER: No ref. provider found    CHIEF COMPLAINT   Chief Complaint   Patient presents with    Follow-up     Pt complains of redness and swelling in ble       HISTORY OF PRESENT ILLNESS:  Lizeth Henderson is a 84 y.o. female who presents to clinic today to check her swelling she was recommended compression wraps last year and at that time had an active ulcer on the left anterior shin. This has healed. She was also doing physical therapy for quite some time but stopped a few months back. Around this time she noticed that her leg swelling has gotten worse. Overall she has had more debility.    ALLERGIES:  Review of patient's allergies indicates:   Allergen Reactions    Clindamycin      Diarrhea      Lisinopril      cough       PAST MEDICAL HISTORY:  Past Medical History:   Diagnosis Date    Adrenal adenoma 2016    Anxiety     Arthritis     Benign hypertension     Colon cancer age 61    colon    Coronary artery disease     Depression     Full dentures     General anesthetics causing adverse effect in therapeutic use     yells and talks when wakes up    Hyperlipidemia     Osteopenia     Shingles     Type 2 diabetes mellitus without complication, without long-term current use of insulin 2024    Wears glasses        PAST SURGICAL HISTORY:  Past Surgical History:   Procedure Laterality Date    APPENDECTOMY      BREAST BIOPSY Left     benign    BUNIONECTOMY Left     CATARACT EXTRACTION Bilateral     COLON SURGERY      2001    EYE SURGERY      cataract surg    HEMICOLECTOMY  2002    HERNIA REPAIR      x5    JOINT REPLACEMENT      knee    left toe surgery      joelle    PATENT DUCTUS ARTERIOUS LIGATION  1948    SALPINGOOPHORECTOMY  2002    due to colon cancer    TONSILLECTOMY,  ADENOIDECTOMY         SOCIAL HISTORY:   Social History[1]    FAMILY HISTORY:  Family History   Problem Relation Name Age of Onset    Alzheimer's disease Mother [place]     Arthritis Mother Tracy     Cancer Father price         Colon    Hypertension Father price     Arthritis Father price     Hearing loss Father price     Depression Daughter Connie     Kidney disease Daughter Ruba     Ulcerative colitis Daughter Ruba     Depression Daughter Ruba     Depression Daughter Veronica     Anxiety disorder Daughter Veronica         PTSD    Cancer Maternal Uncle Nikolas         Colon    Early death Maternal Grandmother Chata     Cancer Cousin          colon cancer    Amblyopia Neg Hx      Blindness Neg Hx      Cataracts Neg Hx      Diabetes Neg Hx      Glaucoma Neg Hx      Macular degeneration Neg Hx      Retinal detachment Neg Hx      Strabismus Neg Hx      Stroke Neg Hx      Thyroid disease Neg Hx         REVIEW OF SYSTEMS:  ROS  10 pt ros otherwise negative    PHYSICAL EXAM:  Vitals:    04/28/25 1103   BP: (!) 185/89   Pulse: 96      Physical Exam      Vss  Gen nad alert oriented  Cv rrr  Legs moderate lymphedema b/l lower ext, no open wounds    VASCULAR LAB STUDIES:  none    ASSESSMENT AND PLAN:  Atherosclerosis of aorta  Asymptomatic. Follow for symptomatic progression    Lymphedema  Moderate lymphedema despite compression. I will refer to lymphedema clinic she would be a good candidate for home pump therapy eventually. Recommend activity as tolerated. Will resume physical therapy to increase her activity and help with leg swelling. She can follow with me on as needed basis      Lizeth was seen today for follow-up.    Diagnoses and all orders for this visit:    Lymphedema    Atherosclerosis of aorta        No follow-ups on file.        Petrona Rodriguez  [facility]  Vascular Surgery  [phone] (Clinic Number)         [1]   Social History  Tobacco Use    Smoking status: Never     Passive exposure: Never    Smokeless  tobacco: Never   Substance Use Topics    Alcohol use: No    Drug use: Never

## 2025-04-28 NOTE — ASSESSMENT & PLAN NOTE
Moderate lymphedema despite compression. I will refer to lymphedema clinic she would be a good candidate for home pump therapy eventually. Recommend activity as tolerated. Will resume physical therapy to increase her activity and help with leg swelling. She can follow with me on as needed basis

## 2025-04-29 ENCOUNTER — E-CONSULT (OUTPATIENT)
Dept: CARDIOLOGY | Facility: CLINIC | Age: 85
End: 2025-04-29
Payer: MEDICARE

## 2025-04-29 ENCOUNTER — HOSPITAL ENCOUNTER (OUTPATIENT)
Dept: PREADMISSION TESTING | Facility: HOSPITAL | Age: 85
Discharge: HOME | End: 2025-04-29
Attending: COLON & RECTAL SURGERY
Payer: MEDICARE

## 2025-04-29 DIAGNOSIS — Z01.810 PREOP CARDIOVASCULAR EXAM: Primary | ICD-10-CM

## 2025-04-29 DIAGNOSIS — D64.9 ANEMIA, UNSPECIFIED TYPE: Primary | ICD-10-CM

## 2025-04-29 RX ORDER — SODIUM, POTASSIUM,MAG SULFATES 17.5-3.13G
1 SOLUTION, RECONSTITUTED, ORAL ORAL DAILY
Qty: 1 KIT | Refills: 0 | Status: SHIPPED | OUTPATIENT
Start: 2025-04-29 | End: 2025-05-01

## 2025-04-29 NOTE — CONSULTS
[facility] Cardiology [facility]  Response for E-Consult     Patient Name: Lizeth Henderson  MRN: [id number]  Primary Care Provider: Jennifer Flynn MD   Requesting Provider: Kai Estrella NP  E-Consult to General Cardiology  Consult performed by: Chase Vieira MD  Consult ordered by: Kai Estrella NP          E consult for preop clearance of colonoscopy and egd  The chart reviewed.  PMH chf h/o PDA repair hld  03/25 The Echo showed normal function and mild valvular leaking.      Plan  Elevated periop risk of CV events for non-high risk procedure.  Ok to proceed the scheduled procedure without further cardiac study.  OK to hold ASA 5 days before the procedure and resume postop once hemodynamically stable      Total time of Consultation: 10 minute    I did not speak to the requesting provider verbally about this.     *This eConsult is based on the clinical data available to me and is furnished without benefit of a physical examination. The eConsult will need to be interpreted in light of any clinical issues or changes in patient status not available to me at the time of filing this eConsults. Significant changes in patient condition or level of acuity should result in immediate formal consultation and reevaluation. Please alert me if you have further questions.    Thank you for this eConsult referral.     Chase Vieira MD  The [address]

## 2025-04-30 ENCOUNTER — OFFICE VISIT (OUTPATIENT)
Dept: PODIATRY | Facility: CLINIC | Age: 85
End: 2025-04-30
Payer: MEDICARE

## 2025-04-30 DIAGNOSIS — L60.3 ONYCHODYSTROPHY: ICD-10-CM

## 2025-04-30 DIAGNOSIS — G62.0 CHEMOTHERAPY-INDUCED PERIPHERAL NEUROPATHY: ICD-10-CM

## 2025-04-30 DIAGNOSIS — T45.1X5A CHEMOTHERAPY-INDUCED PERIPHERAL NEUROPATHY: ICD-10-CM

## 2025-04-30 DIAGNOSIS — I87.2 VENOUS INSUFFICIENCY OF BOTH LOWER EXTREMITIES: ICD-10-CM

## 2025-04-30 DIAGNOSIS — E11.9 TYPE 2 DIABETES MELLITUS WITHOUT COMPLICATION, WITHOUT LONG-TERM CURRENT USE OF INSULIN: Primary | ICD-10-CM

## 2025-04-30 PROCEDURE — 99999 PR PBB SHADOW E&M-EST. PATIENT-LVL III: CPT | Mod: PBBFAC,HCNC,, | Performed by: PODIATRIST

## 2025-04-30 NOTE — PROGRESS NOTES
Subjective:       Patient ID: Lizeth Henderson is a 84 y.o. female.    Chief Complaint: Nail Care (Nail Care: c/o pain rate 8/10, pt is diabetic, pt states of soreness in toes and swelling feet, pt was last seen on 3.20.25 by Jennifer Segundo)    HPI: Patient presents to the office today with the chief complaint of elongated, thickened and dystrophic nail plates to the B/L foot. This patient is a Diabetic Type II. Patient does follow with Primary Care and/or Endocrinology for management of Diabetes Mellitus. This patient's PMD is Jennifer Flynn MD. This patient last saw his/her primary care provider on 3/20/2025.   Reporting 8/10 pain bilaterally.  Presents to clinic with daughter present. Scheduled to follow up with lymphedema clinic.     Hemoglobin A1C   Date Value Ref Range Status   03/06/2025 5.9 (H) 4.0 - 5.6 % Final     Comment:     ADA Screening Guidelines:  5.7-6.4%  Consistent with prediabetes  >or=6.5%  Consistent with diabetes    High levels of fetal hemoglobin interfere with the HbA1C  assay. Heterozygous hemoglobin variants (HbS, HgC, etc)do  not significantly interfere with this assay.   However, presence of multiple variants may affect accuracy.     07/18/2024 6.0 (H) 4.0 - 5.6 % Final     Comment:     ADA Screening Guidelines:  5.7-6.4%  Consistent with prediabetes  >or=6.5%  Consistent with diabetes    High levels of fetal hemoglobin interfere with the HbA1C  assay. Heterozygous hemoglobin variants (HbS, HgC, etc)do  not significantly interfere with this assay.   However, presence of multiple variants may affect accuracy.     01/23/2024 6.0 (H) 4.0 - 5.6 % Final     Comment:     ADA Screening Guidelines:  5.7-6.4%  Consistent with prediabetes  >or=6.5%  Consistent with diabetes    High levels of fetal hemoglobin interfere with the HbA1C  assay. Heterozygous hemoglobin variants (HbS, HgC, etc)do  not significantly interfere with this assay.   However, presence of multiple  variants may affect accuracy.     .     Review of patient's allergies indicates:   Allergen Reactions    Clindamycin      Diarrhea      Lisinopril      cough       Past Medical History:   Diagnosis Date    Adrenal adenoma 2016    Anxiety     Arthritis     Benign hypertension     Colon cancer age 61    colon    Coronary artery disease     Depression     Full dentures     General anesthetics causing adverse effect in therapeutic use     yells and talks when wakes up    Hyperlipidemia     Osteopenia     Shingles     Type 2 diabetes mellitus without complication, without long-term current use of insulin 1/23/2024    Wears glasses        Family History   Problem Relation Name Age of Onset    Alzheimer's disease Mother Otis     Arthritis Mother Tracy     Cancer Father price         Colon    Hypertension Father price     Arthritis Father price     Hearing loss Father price     Depression Daughter Connie     Kidney disease Daughter Ruba     Ulcerative colitis Daughter Ruba     Depression Daughter Ruba     Depression Daughter Veronica     Anxiety disorder Daughter Veronica         PTSD    Cancer Maternal Uncle Nikolas         Colon    Early death Maternal Grandmother Chata     Cancer Cousin          colon cancer    Amblyopia Neg Hx      Blindness Neg Hx      Cataracts Neg Hx      Diabetes Neg Hx      Glaucoma Neg Hx      Macular degeneration Neg Hx      Retinal detachment Neg Hx      Strabismus Neg Hx      Stroke Neg Hx      Thyroid disease Neg Hx         Social History     Socioeconomic History    Marital status:     Number of children: 3   Occupational History    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    Rates diet as poor.    She is  not satisfied with weight.             Social Drivers of Health     Financial Resource Strain: Low Risk  (3/10/2025)    Overall Financial Resource Strain (CARDIA)     Difficulty of Paying Living Expenses: Not hard at all   Food Insecurity: No Food Insecurity (3/10/2025)    Hunger Vital Sign     Worried About Running Out of Food in the Last Year: Never true     Ran Out of Food in the Last Year: Never true   Transportation Needs: No Transportation Needs (3/10/2025)    PRAPARE - Transportation     Lack of Transportation (Medical): No     Lack of Transportation (Non-Medical): No   Physical Activity: Inactive (3/10/2025)    Exercise Vital Sign     Days of Exercise per Week: 0 days     Minutes of Exercise per Session: 0 min   Stress: Stress Concern Present (3/10/2025)    [employer] [place] [employer] - Occupational Stress Questionnaire     Feeling of Stress : Rather much   Housing Stability: Low Risk  (3/10/2025)    Housing Stability Vital Sign     Unable to Pay for Housing in the Last Year: No     Homeless in the Last Year: No       Past Surgical History:   Procedure Laterality Date    APPENDECTOMY      BREAST BIOPSY Left     benign    BUNIONECTOMY Left     CATARACT EXTRACTION Bilateral     COLON SURGERY      2001    EYE SURGERY      cataract surg    HEMICOLECTOMY  2002    HERNIA REPAIR      x5    JOINT REPLACEMENT      knee    left toe surgery      joelle    PATENT DUCTUS ARTERIOUS LIGATION  1948    SALPINGOOPHORECTOMY  2002    due to colon cancer    TONSILLECTOMY, ADENOIDECTOMY         Review of Systems       Objective:   There were no vitals taken for this visit.    Physical Exam  LOWER EXTREMITY PHYSICAL EXAMINATION    VASCULAR:  The right dorsalis pedis pulse 2/4 and the right posterior tibial pulse 2/4.  The left dorsalis pedis pulse 2/4 and posterior tibial pulse on the left is 2/4.  Capillary refill is intact.  Pedal hair growth intact.  Moderate pretibial edema    NEUROLOGY: Protective sensation is  not intact to the left and right plantar surfaces of the foot and digits, as the patient has no sensation/detection at greater than 4 distinct points of contact with 5.07 [place] Aishwarya monofilament. Sensation to light touch is intact on the left and right foot. Proprioception is intact, bilateral. Sensation to pin prick is reduced to absent. Vibratory sensation is diminished.    DERMATOLOGY:  Skin is supple, moist, intact.  There is no signs of callusing, ulcerations, other lesions identified to the dorsal or plantar aspect of the right or left foot.  The R1, 2, 5 and left L1,2, 5 are thickened, discolored dystrophic.  There is subungual debris.  Nail plates have area of dark discoloration.  The remaining nails 3-4 on the right foot and the left foot are elongated but of normal color, thickness, and texture.   There is no signs of ingrowing into the medial or lateral borders.  Skin breakdown present to the anterior medial aspect of the lower leg associated with venous blister.  Clear underlying drainage noted.  3+ pitting edema noted to the bilateral lower extremities  No obvious lacerations or fissuring.  Interdigital spaces are clean, dry, intact.  No rashes or scars appreciated.    ORTHOPEDIC: Manual Muscle Testing is 5/5 in all planes on the left and right, without pains, with and without resistance. Gait pattern is non-antalgic.    Assessment:     1. Type 2 diabetes mellitus without complication, without long-term current use of insulin    2. Chemotherapy-induced peripheral neuropathy    3. Venous insufficiency of both lower extremities    4. Onychodystrophy      Plan:     Type 2 diabetes mellitus without complication, without long-term current use of insulin    Chemotherapy-induced peripheral neuropathy    Venous insufficiency of both lower extremities    Onychodystrophy      Thorough discussion is had with the patient concerning the diagnosis, its etiology, and the treatment algorithm at present. Shoe  inspection. Foot Education. Patient reminded of the importance of good nutrition and blood sugar control to help prevent podiatric complications of diabetes. Patient instructed on proper foot hygeine. We discussed wearing proper and supportive shoe gear, daily foot inspections, never walking barefooted or sock footed, never putting sharp instruments to feet which can cause major complications associated with infection, ulcers, lacerations.      Dystrophic nail plates, as outlined above (R#1,2,5  ; L#1,2,5 ), are sharply debrided with double action nail nipper, and/or with the assistance of a mechanical rotary nehemias, with removal of all offending nail and nail border(s), for reduction of pains. Nails are reduced in terms of length, width and girth with removal of subungual debris to facilitate pain free weight bearing and ambulation. The elongated nails as outlined in the objective portion of this note, were trimmed to appropriate length, with a double action nail nipper, for alleviation/reduction of pains as well. Follow up in approx. 3-4 months.    Future Appointments   Date Time Provider Department Center   5/2/2025 10:00 AM Tara Dickerson NP [facility] HEM ONC [facility]   5/20/2025  9:00 AM [facility] ENDO 03 [facility] ENDO [place]   5/26/2025  1:40 PM Abel Agrawal MD [facility] CARDIO  Medical    7/23/2025  2:30 PM Danyell Nails DPM ON POD [facility]   10/27/2025 11:00 AM Petrona Rodriguez MD [facility] VASSGY [facility]

## 2025-05-02 ENCOUNTER — OFFICE VISIT (OUTPATIENT)
Dept: HEMATOLOGY/ONCOLOGY | Facility: CLINIC | Age: 85
End: 2025-05-02
Payer: MEDICARE

## 2025-05-02 VITALS
TEMPERATURE: 97 F | SYSTOLIC BLOOD PRESSURE: 176 MMHG | HEART RATE: 90 BPM | OXYGEN SATURATION: 96 % | HEIGHT: 68 IN | WEIGHT: 224.88 LBS | BODY MASS INDEX: 34.08 KG/M2 | DIASTOLIC BLOOD PRESSURE: 85 MMHG

## 2025-05-02 DIAGNOSIS — D53.9 NUTRITIONAL ANEMIA, UNSPECIFIED: ICD-10-CM

## 2025-05-02 DIAGNOSIS — D64.9 ANEMIA, UNSPECIFIED TYPE: Primary | ICD-10-CM

## 2025-05-02 PROCEDURE — 99999 PR PBB SHADOW E&M-EST. PATIENT-LVL IV: CPT | Mod: PBBFAC,HCNC,, | Performed by: NURSE PRACTITIONER

## 2025-05-02 RX ORDER — FERROUS SULFATE 325(65) MG
325 TABLET ORAL EVERY OTHER DAY
Qty: 60 TABLET | Refills: 2 | Status: SHIPPED | OUTPATIENT
Start: 2025-05-02

## 2025-05-02 NOTE — PROGRESS NOTES
Subjective:       Patient ID: Lizeth Henderson is a 84 y.o. female.    Chief Complaint: anemia evaluation review    HPI: 84 y.o female with medical history adrenal adenoma, anxiety, HTN, CAD, referred by PCP for anemia evaluation. She notes recent h/o cellulitis and notes this is a recurrent intermittent issue do to vascular insufficiency. She notes remote h/o colon cancer dx around 2000 s/o surgery and chemotherapy. Review of medical record reveal chronic mild anemia with decline in hemoglobin 3/2024 during hospitalization to 8 range. Most recently decline in hemoglobin 6 range requiring 2 units PRBCs 3/7/2025. She was seen by PCP 3/6/2025 for c/o SOB and found to have hemoglobin 6.7, referred to ER for blood transfusion.     Review of medical record reveal iron deficiency noted on 7/2024 labs. Patient denies any iron supplementation. She denies noting abnormal bleeding. She notes resolution of SOB following blood transfusion. Occult stool 37/2025 negative.     Today she presents for anemia evaluation review. She endorses fatigue but otherwise feels well.  Social History[1]    Past Medical History:   Diagnosis Date    Adrenal adenoma 2016    Anxiety     Arthritis     Benign hypertension     Colon cancer age 61    colon    Coronary artery disease     Depression     Full dentures     General anesthetics causing adverse effect in therapeutic use     yells and talks when wakes up    Hyperlipidemia     Osteopenia     Shingles     Type 2 diabetes mellitus without complication, without long-term current use of insulin 1/23/2024    Wears glasses        Family History   Problem Relation Name Age of Onset    Alzheimer's disease Mother Tracy     Arthritis Mother [place]     Cancer Father price         Colon    Hypertension Father price     Arthritis Father price     Hearing loss Father price     Depression Daughter Connie     Kidney disease Daughter Ruba     Ulcerative colitis Daughter Ruba     Depression Daughter Ruba      Depression Keisha Martin     Anxiety disorder Keisha Martin         PTSD    Cancer Maternal Uncle Nikolas         Colon    Early death Maternal Grandmother Chata     Cancer Cousin          colon cancer    Amblyopia Neg Hx      Blindness Neg Hx      Cataracts Neg Hx      Diabetes Neg Hx      Glaucoma Neg Hx      Macular degeneration Neg Hx      Retinal detachment Neg Hx      Strabismus Neg Hx      Stroke Neg Hx      Thyroid disease Neg Hx         Past Surgical History:   Procedure Laterality Date    APPENDECTOMY      BREAST BIOPSY Left     benign    BUNIONECTOMY Left     CATARACT EXTRACTION Bilateral     COLON SURGERY      2001    EYE SURGERY      cataract surg    HEMICOLECTOMY  2002    HERNIA REPAIR      x5    JOINT REPLACEMENT      knee    left toe surgery      hammertoe    PATENT DUCTUS ARTERIOUS LIGATION  1948    SALPINGOOPHORECTOMY  2002    due to colon cancer    TONSILLECTOMY, ADENOIDECTOMY         Review of Systems   Constitutional:  Positive for fatigue. Negative for appetite change and unexpected weight change.   HENT:  Negative for mouth sores.    Eyes:  Negative for visual disturbance.   Respiratory:  Negative for cough and shortness of breath.    Cardiovascular:  Negative for chest pain.   Gastrointestinal:  Negative for abdominal pain and diarrhea.   Genitourinary:  Negative for frequency.   Musculoskeletal:  Negative for back pain.   Skin:  Negative for rash.   Neurological:  Negative for headaches.   Hematological:  Negative for adenopathy.   Psychiatric/Behavioral:  The patient is nervous/anxious.          Medication List with Changes/Refills   New Medications    FERROUS SULFATE (FEOSOL) 325 MG (65 MG IRON) TAB TABLET    Take 1 tablet (325 mg total) by mouth every other day.   Current Medications    ASPIRIN (ECOTRIN) 81 MG EC TABLET    Take 81 mg by mouth once daily.    ATORVASTATIN (LIPITOR) 10 MG TABLET    TAKE 1 TABLET(10 MG) BY MOUTH EVERY DAY    BUPROPION (WELLBUTRIN XL) 150 MG TB24 TABLET    Take  1 tablet (150 mg total) by mouth once daily.    BUSPIRONE (BUSPAR) 10 MG TABLET    Take 2 tablets (20 mg total) by mouth 2 (two) times daily.    CEPHALEXIN (KEFLEX) 500 MG CAPSULE    Take 1 capsule (500 mg total) by mouth 4 (four) times daily.    CLONAZEPAM (KLONOPIN) 0.25 MG TBDL    Take 1 tablet (0.25 mg total) by mouth 2 (two) times daily as needed (anxiety).    ESCITALOPRAM OXALATE (LEXAPRO) 10 MG TABLET    Take 1.5 tablets (15 mg total) by mouth once daily.    IBANDRONATE (BONIVA) 150 MG TABLET    Take 1 tablet (150 mg total) by mouth every 30 days.    LOSARTAN (COZAAR) 100 MG TABLET    TAKE 1 TABLET(100 MG) BY MOUTH EVERY DAY    MELOXICAM (MOBIC) 7.5 MG TABLET    TAKE 1 TABLET(7.5 MG) BY MOUTH DAILY AS NEEDED FOR PAIN    MIRABEGRON (MYRBETRIQ) 50 MG TB24    Take 1 tablet (50 mg total) by mouth once daily.    PRAMIPEXOLE (MIRAPEX) 0.125 MG TABLET    TAKE 1 TABLET BY MOUTH EVERY EVENING    SUVOREXANT (BELSOMRA) 10 MG TAB    Take 1 tablet by mouth every evening.    TRAMADOL (ULTRAM) 50 MG TABLET    Take 1 tablet (50 mg total) by mouth every 6 (six) hours as needed for Pain.     Objective:     Vitals:    05/02/25 1008   BP: (!) 176/85   Pulse: 90   Temp: 97.1 °F (36.2 °C)     Lab Results   Component Value Date    WBC 7.80 04/09/2025    HGB 9.2 (L) 04/09/2025    HCT 32.3 (L) 04/09/2025    MCV 78 (L) 04/09/2025     04/09/2025       BMP  Lab Results   Component Value Date     04/09/2025    K 4.4 04/09/2025     04/09/2025    CO2 23 04/09/2025    BUN 18 04/09/2025    CREATININE 0.8 04/09/2025    CALCIUM 9.0 04/09/2025    ANIONGAP 8 04/09/2025    EGFRNORACEVR >60 04/09/2025     Lab Results   Component Value Date    ALT 10 04/09/2025    AST 16 04/09/2025    ALKPHOS 90 04/09/2025    BILITOT 0.3 04/09/2025     Lab Results   Component Value Date    IRON 27 (L) 04/09/2025    TRANSFERRIN 296 04/09/2025    TIBC 438 04/09/2025    LABIRON 6 (L) 04/09/2025    FESATURATED 4 (L) 07/23/2024      Lab Results    Component Value Date    WKVSVVNC14 555 07/23/2024     Lab Results   Component Value Date    FOLATE 14.7 07/23/2024     Physical Exam  Vitals reviewed.   Constitutional:       Appearance: She is well-developed.   HENT:      Head: Normocephalic.      Right Ear: External ear normal.      Left Ear: External ear normal.      Nose: Nose normal.   Eyes:      General: Lids are normal. No scleral icterus.        Right eye: No discharge.         Left eye: No discharge.      Conjunctiva/sclera: Conjunctivae normal.   Neck:      Thyroid: No thyroid mass.   Cardiovascular:      Rate and Rhythm: Normal rate and regular rhythm.      Heart sounds: Normal heart sounds.   Pulmonary:      Effort: Pulmonary effort is normal. No respiratory distress.      Breath sounds: Normal breath sounds. No wheezing or rales.   Abdominal:      General: There is no distension.   Genitourinary:     Comments: deferred  Musculoskeletal:         General: Normal range of motion.      Cervical back: Normal range of motion.   Skin:     General: Skin is warm and dry.   Neurological:      Mental Status: She is alert and oriented to person, place, and time.   Psychiatric:         Speech: Speech normal.         Behavior: Behavior normal. Behavior is cooperative.         Thought Content: Thought content normal.        Assessment:     Problem List Items Addressed This Visit          Oncology    Anemia - Primary    Anemia evaluation significant for iron deficiency. Discussed possible etiologies including cellulitis/GI. She declines endoscopic evaluation at present time. Discussed IV iron versus oral iron replacement. She would like to trial oral iron. Discussed best absorption and constipation prevention. Trial ferrous sulfate EOD. F/u 2-3 months with repeat labs. Discussed S&S to report sooner         Relevant Medications    ferrous sulfate (FEOSOL) 325 mg (65 mg iron) Tab tablet    Other Relevant Orders    CBC Auto Differential    Basic Metabolic Panel    Iron  and TIBC    Ferritin     Other Visit Diagnoses         Nutritional anemia, unspecified        Relevant Medications    ferrous sulfate (FEOSOL) 325 mg (65 mg iron) Tab tablet    Other Relevant Orders    CBC Auto Differential    Basic Metabolic Panel    Iron and TIBC    Ferritin              Plan:     Anemia, unspecified type  -     ferrous sulfate (FEOSOL) 325 mg (65 mg iron) Tab tablet; Take 1 tablet (325 mg total) by mouth every other day.  Dispense: 60 tablet; Refill: 2  -     CBC Auto Differential; Future; Expected date: 05/02/2025  -     Basic Metabolic Panel; Future; Expected date: 05/02/2025  -     Iron and TIBC; Future; Expected date: 05/02/2025  -     Ferritin; Future; Expected date: 05/02/2025    Nutritional anemia, unspecified  -     ferrous sulfate (FEOSOL) 325 mg (65 mg iron) Tab tablet; Take 1 tablet (325 mg total) by mouth every other day.  Dispense: 60 tablet; Refill: 2  -     CBC Auto Differential; Future; Expected date: 05/02/2025  -     Basic Metabolic Panel; Future; Expected date: 05/02/2025  -     Iron and TIBC; Future; Expected date: 05/02/2025  -     Ferritin; Future; Expected date: 05/02/2025          Med Onc Chart Routing      Follow up with physician    Follow up with JUAN 3 months. 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    Rates diet as poor.    She is not satisfied with weight.             Social Drivers of Health     Financial Resource Strain: Low Risk  (5/1/2025)    Overall Financial Resource Strain (CARDIA)     Difficulty of Paying Living Expenses: Not hard at all   Food Insecurity: No Food Insecurity (5/1/2025)    Hunger Vital Sign     Worried About Running Out of Food in the Last Year: Never true     Ran Out of Food in the Last Year: Never true   Transportation Needs: No Transportation Needs (5/1/2025)    PRAPARE - Transportation     Lack of Transportation (Medical): No     Lack of Transportation (Non-Medical): No   Physical Activity: Inactive (5/1/2025)    Exercise Vital Sign     Days of Exercise per Week: 0 days     Minutes of Exercise per Session: 0 min   Stress: Stress Concern Present (5/1/2025)    [employer] [place] [employer] - Occupational Stress Questionnaire     Feeling of Stress : Rather much   Housing Stability: Low Risk  (5/1/2025)    Housing Stability Vital Sign     Unable to Pay for Housing in the Last Year: No     Number of Times Moved in the Last Year: 1     Homeless in the Last Year: No

## 2025-05-02 NOTE — ASSESSMENT & PLAN NOTE
Anemia evaluation significant for iron deficiency. Discussed possible etiologies including cellulitis/GI. She declines endoscopic evaluation at present time. Discussed IV iron versus oral iron replacement. She would like to trial oral iron. Discussed best absorption and constipation prevention. Trial ferrous sulfate EOD. F/u 2-3 months with repeat labs. Discussed S&S to report sooner

## 2025-05-05 ENCOUNTER — PATIENT MESSAGE (OUTPATIENT)
Dept: VASCULAR SURGERY | Facility: CLINIC | Age: 85
End: 2025-05-05
Payer: MEDICARE

## 2025-05-21 ENCOUNTER — EXTERNAL HOME HEALTH (OUTPATIENT)
Dept: HOME HEALTH SERVICES | Facility: HOSPITAL | Age: 85
End: 2025-05-21
Payer: MEDICARE

## 2025-06-16 ENCOUNTER — HOSPITAL ENCOUNTER (EMERGENCY)
Facility: HOSPITAL | Age: 85
Discharge: HOME | End: 2025-06-16
Attending: EMERGENCY MEDICINE
Payer: MEDICARE

## 2025-06-16 VITALS
OXYGEN SATURATION: 98 % | TEMPERATURE: 99 F | DIASTOLIC BLOOD PRESSURE: 84 MMHG | HEIGHT: 68 IN | HEART RATE: 93 BPM | WEIGHT: 227.69 LBS | SYSTOLIC BLOOD PRESSURE: 178 MMHG | RESPIRATION RATE: 16 BRPM | BODY MASS INDEX: 34.51 KG/M2
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
## 2025-06-16 DIAGNOSIS — I10 UNCONTROLLED HYPERTENSION: ICD-10-CM

## 2025-06-16 DIAGNOSIS — S30.1XXA CONTUSION OF ABDOMINAL WALL, INITIAL ENCOUNTER: ICD-10-CM

## 2025-06-16 DIAGNOSIS — I10 HYPERTENSION: ICD-10-CM

## 2025-06-16 DIAGNOSIS — I87.2 LYMPHEDEMA DUE TO VENOUS INSUFFICIENCY: ICD-10-CM

## 2025-06-16 DIAGNOSIS — I89.0 LYMPHEDEMA DUE TO VENOUS INSUFFICIENCY: ICD-10-CM

## 2025-06-16 LAB
ABSOLUTE EOSINOPHIL (OHS): 0.06 K/UL
ABSOLUTE MONOCYTE (OHS): 0.7 K/UL (ref 0.3–1)
ABSOLUTE NEUTROPHIL COUNT (OHS): 7.99 K/UL (ref 1.8–7.7)
ALBUMIN SERPL BCP-MCNC: 3.5 G/DL (ref 3.5–5.2)
ALP SERPL-CCNC: 92 UNIT/L (ref 40–150)
ALT SERPL W/O P-5'-P-CCNC: 10 UNIT/L (ref 10–44)
ANION GAP (OHS): 8 MMOL/L (ref 8–16)
AST SERPL-CCNC: 20 UNIT/L (ref 11–45)
BASOPHILS # BLD AUTO: 0.03 K/UL
BASOPHILS NFR BLD AUTO: 0.3 %
BILIRUB SERPL-MCNC: 0.4 MG/DL (ref 0.1–1)
BNP SERPL-MCNC: 26 PG/ML (ref 0–99)
BUN SERPL-MCNC: 21 MG/DL (ref 8–23)
CALCIUM SERPL-MCNC: 9.4 MG/DL (ref 8.7–10.5)
CHLORIDE SERPL-SCNC: 105 MMOL/L (ref 95–110)
CO2 SERPL-SCNC: 26 MMOL/L (ref 23–29)
CREAT SERPL-MCNC: 1 MG/DL (ref 0.5–1.4)
ERYTHROCYTE [DISTWIDTH] IN BLOOD BY AUTOMATED COUNT: 16.5 % (ref 11.5–14.5)
GFR SERPLBLD CREATININE-BSD FMLA CKD-EPI: 55 ML/MIN/1.73/M2
GLUCOSE SERPL-MCNC: 123 MG/DL (ref 70–110)
HCT VFR BLD AUTO: 34.9 % (ref 37–48.5)
HGB BLD-MCNC: 10.7 GM/DL (ref 12–16)
IMM GRANULOCYTES # BLD AUTO: 0.04 K/UL (ref 0–0.04)
IMM GRANULOCYTES NFR BLD AUTO: 0.4 % (ref 0–0.5)
LIPASE SERPL-CCNC: 26 U/L (ref 4–60)
LYMPHOCYTES # BLD AUTO: 1.19 K/UL (ref 1–4.8)
MCH RBC QN AUTO: 25.7 PG (ref 27–31)
MCHC RBC AUTO-ENTMCNC: 30.7 G/DL (ref 32–36)
MCV RBC AUTO: 84 FL (ref 82–98)
NUCLEATED RBC (/100WBC) (OHS): 0 /100 WBC
OHS QRS DURATION: 94 MS
OHS QTC CALCULATION: 464 MS
PLATELET # BLD AUTO: 273 K/UL (ref 150–450)
PMV BLD AUTO: 9.8 FL (ref 9.2–12.9)
POTASSIUM SERPL-SCNC: 4.6 MMOL/L (ref 3.5–5.1)
PROT SERPL-MCNC: 7.5 GM/DL (ref 6–8.4)
RBC # BLD AUTO: 4.17 M/UL (ref 4–5.4)
RELATIVE EOSINOPHIL (OHS): 0.6 %
RELATIVE LYMPHOCYTE (OHS): 11.9 % (ref 18–48)
RELATIVE MONOCYTE (OHS): 7 % (ref 4–15)
RELATIVE NEUTROPHIL (OHS): 79.8 % (ref 38–73)
SODIUM SERPL-SCNC: 139 MMOL/L (ref 136–145)
TROPONIN I SERPL DL<=0.01 NG/ML-MCNC: 0.03 NG/ML
WBC # BLD AUTO: 10.01 K/UL (ref 3.9–12.7)

## 2025-06-16 PROCEDURE — 25000003 PHARM REV CODE 250: Mod: HCNC | Performed by: EMERGENCY MEDICINE

## 2025-06-16 PROCEDURE — 93010 ELECTROCARDIOGRAM REPORT: CPT | Mod: HCNC,,, | Performed by: INTERNAL MEDICINE

## 2025-06-16 PROCEDURE — 83690 ASSAY OF LIPASE: CPT | Mod: HCNC | Performed by: EMERGENCY MEDICINE

## 2025-06-16 PROCEDURE — 82374 ASSAY BLOOD CARBON DIOXIDE: CPT | Mod: HCNC | Performed by: EMERGENCY MEDICINE

## 2025-06-16 PROCEDURE — 93005 ELECTROCARDIOGRAM TRACING: CPT | Mod: HCNC

## 2025-06-16 PROCEDURE — 96374 THER/PROPH/DIAG INJ IV PUSH: CPT | Mod: HCNC

## 2025-06-16 PROCEDURE — 96375 TX/PRO/DX INJ NEW DRUG ADDON: CPT | Mod: HCNC

## 2025-06-16 PROCEDURE — 63600175 PHARM REV CODE 636 W HCPCS: Mod: HCNC | Performed by: EMERGENCY MEDICINE

## 2025-06-16 PROCEDURE — 83880 ASSAY OF NATRIURETIC PEPTIDE: CPT | Mod: HCNC | Performed by: EMERGENCY MEDICINE

## 2025-06-16 PROCEDURE — 85025 COMPLETE CBC W/AUTO DIFF WBC: CPT | Mod: HCNC | Performed by: EMERGENCY MEDICINE

## 2025-06-16 PROCEDURE — 84484 ASSAY OF TROPONIN QUANT: CPT | Mod: HCNC | Performed by: EMERGENCY MEDICINE

## 2025-06-16 PROCEDURE — 99285 EMERGENCY DEPT VISIT HI MDM: CPT | Mod: 25,HCNC

## 2025-06-16 RX ORDER — MORPHINE SULFATE 4 MG/ML
4 INJECTION, SOLUTION INTRAMUSCULAR; INTRAVENOUS
Status: COMPLETED | OUTPATIENT
Start: 2025-06-16 | End: 2025-06-16

## 2025-06-16 RX ORDER — METHOCARBAMOL 500 MG/1
1000 TABLET, FILM COATED ORAL 3 TIMES DAILY
Qty: 30 TABLET | Refills: 0 | Status: SHIPPED | OUTPATIENT
Start: 2025-06-16 | End: 2025-06-21

## 2025-06-16 RX ORDER — LABETALOL HYDROCHLORIDE 5 MG/ML
20 INJECTION, SOLUTION INTRAVENOUS
Status: COMPLETED | OUTPATIENT
Start: 2025-06-16 | End: 2025-06-16

## 2025-06-16 RX ORDER — ONDANSETRON HYDROCHLORIDE 2 MG/ML
4 INJECTION, SOLUTION INTRAVENOUS
Status: COMPLETED | OUTPATIENT
Start: 2025-06-16 | End: 2025-06-16

## 2025-06-16 RX ORDER — HYDROCODONE BITARTRATE AND ACETAMINOPHEN 5; 325 MG/1; MG/1
1 TABLET ORAL EVERY 6 HOURS PRN
Qty: 12 TABLET | Refills: 0 | Status: SHIPPED | OUTPATIENT
Start: 2025-06-16 | End: 2025-06-19

## 2025-06-16 RX ORDER — LOSARTAN POTASSIUM 50 MG/1
100 TABLET ORAL ONCE
Status: COMPLETED | OUTPATIENT
Start: 2025-06-16 | End: 2025-06-16

## 2025-06-16 RX ADMIN — LOSARTAN POTASSIUM 100 MG: 50 TABLET, FILM COATED ORAL at 09:06

## 2025-06-16 RX ADMIN — MORPHINE SULFATE 4 MG: 4 INJECTION INTRAVENOUS at 09:06

## 2025-06-16 RX ADMIN — LABETALOL HYDROCHLORIDE 20 MG: 5 INJECTION INTRAVENOUS at 09:06

## 2025-06-16 RX ADMIN — ONDANSETRON 4 MG: 2 INJECTION INTRAMUSCULAR; INTRAVENOUS at 09:06

## 2025-06-16 NOTE — ED PROVIDER NOTES
SCRIBE #1 NOTE: I, Nati Britton, am scribing for, and in the presence of, Karyn Hicks MD. I have scribed the entire note.       History     Chief Complaint   Patient presents with    Hip Pain    Abdominal Pain     Lt side and Lt hip pain from fall yesterday. Has bruising on left side and Low back pain. States no blood thinners and denies LOC      Review of patient's allergies indicates:   Allergen Reactions    Clindamycin      Diarrhea      Lisinopril      cough         History of Present Illness     HPI    6/16/2025, 8:57 AM  History obtained from the patient      History of Present Illness: Lizeth Henderson is a 84 y.o. female patient with a PMHx of HTN, HLD, DM, CAD, and colon cancer who presents to the Emergency Department for evaluation of injuries from a fall which occurred yesterday morning. Patient state she was walking with her walker when her foot got caught under a rug and she fell. She states she fell onto a bench and hit the left side of her body. Most of her pain is in her side. Symptoms are constant and moderate in severity. Movement exacerbates her pain. Associated sxs include bruising to left side and left arm. Patient denies any hip pain, difficulty breathing or hitting her head. No syncope or presyncope. No prior Tx included. No further complaints or concerns at this time.       Arrival mode: Ambulance Service    PCP: Jennifer Flynn MD        Past Medical History:  Past Medical History:   Diagnosis Date    Adrenal adenoma 2016    Anxiety     Arthritis     Benign hypertension     Colon cancer age 61    colon    Coronary artery disease     Depression     Full dentures     General anesthetics causing adverse effect in therapeutic use     yells and talks when wakes up    Hyperlipidemia     Osteopenia     Shingles     Type 2 diabetes mellitus without complication, without long-term current use of insulin 1/23/2024    Wears glasses        Past Surgical History:  Past Surgical  History:   Procedure Laterality Date    APPENDECTOMY      BREAST BIOPSY Left     benign    BUNIONECTOMY Left     CATARACT EXTRACTION Bilateral     COLON SURGERY      2001    EYE SURGERY      cataract surg    HEMICOLECTOMY  2002    HERNIA REPAIR      x5    JOINT REPLACEMENT      knee    left toe surgery      hammertoe    PATENT DUCTUS ARTERIOUS LIGATION  1948    SALPINGOOPHORECTOMY  2002    due to colon cancer    TONSILLECTOMY, ADENOIDECTOMY           Family History:  Family History   Problem Relation Name Age of Onset    Alzheimer's disease Mother Boise     Arthritis Mother Tracy     Cancer Father price         Colon    Hypertension Father price     Arthritis Father price     Hearing loss Father price     Depression Daughter Connie     Kidney disease Daughter Ruba     Ulcerative colitis Daughter Ruba     Depression Daughter Ruba     Depression Daughter Veronica     Anxiety disorder Daughter Veronica         PTSD    Cancer Maternal Uncle Nikolas         Colon    Early death Maternal Grandmother Chata     Cancer Cousin          colon cancer    Amblyopia Neg Hx      Blindness Neg Hx      Cataracts Neg Hx      Diabetes Neg Hx      Glaucoma Neg Hx      Macular degeneration Neg Hx      Retinal detachment Neg Hx      Strabismus Neg Hx      Stroke Neg Hx      Thyroid disease Neg Hx         Social History:  Social History     Tobacco Use    Smoking status: Never     Passive exposure: Never    Smokeless tobacco: Never   Substance and Sexual Activity    Alcohol use: No    Drug use: Never    Sexual activity: Not Currently        Review of Systems     Review of Systems   Constitutional:  Negative for fever.   HENT:  Negative for sore throat.    Respiratory:  Negative for shortness of breath.    Cardiovascular:  Negative for chest pain.   Gastrointestinal:  Negative for nausea.   Genitourinary:  Negative for dysuria.   Musculoskeletal:  Negative for arthralgias (hip pain) and back pain.        (+) left side pain   Skin:  Positive for color  change (bruising to left arm and side). Negative for rash.   Neurological:  Negative for weakness.   Hematological:  Does not bruise/bleed easily.   All other systems reviewed and are negative.     Physical Exam     Initial Vitals [06/16/25 0842]   BP Pulse Resp Temp SpO2   (!) 194/103 96 20 99.3 °F (37.4 °C) 97 %      MAP       --          Physical Exam  Nursing Notes and Vital Signs Reviewed.  Constitutional: Patient is in no acute distress. Well-developed and well-nourished.  Head: Atraumatic. Normocephalic.  Eyes: PERRL. EOM intact. Conjunctivae are not pale. No scleral icterus.  ENT: Mucous membranes are moist. Oropharynx is clear and symmetric.    Neck: Supple. Full ROM. No lymphadenopathy.  Cardiovascular: Regular rate. Regular rhythm. No murmurs, rubs, or gallops. Distal pulses are 2+ and symmetric.  Pulmonary/Chest: No respiratory distress. Clear to auscultation bilaterally. No wheezing or rales. No obvious chest wall trauma.  Abdominal: Soft and non-distended.  There is no tenderness.  No rebound, guarding, or rigidity. Good bowel sounds. No obvious reproducible tenderness over left hip.  Genitourinary: No CVA tenderness. Pelvis is stable.  Musculoskeletal: Moves all extremities. No obvious deformities. Chronic lymphedema to bilateral lower extremities. No calf tenderness.  Skin: Warm and dry. Superficial soft tissue bruising to left lower flank region.  Neurological:  Alert, awake, and appropriate.  Normal speech.  No acute focal neurological deficits are appreciated.  Psychiatric: Normal affect. Good eye contact. Appropriate in content.     ED Course   Critical Care    Date/Time: 6/16/2025 11:47 AM    Performed by: Karyn Hicks MD  Authorized by: Karyn Hicks MD  Direct patient critical care time: 30 minutes  Additional history critical care time: 5 minutes  Ordering / reviewing critical care time: 5 minutes  Documentation critical care time: 5 minutes  Total critical care time (exclusive of  "procedural time) : 45 minutes  Critical care was necessary to treat or prevent imminent or life-threatening deterioration of the following conditions: htn urgency.  Critical care was time spent personally by me on the following activities: blood draw for specimens, development of treatment plan with patient or surrogate, re-evaluation of patient's condition, review of old charts, pulse oximetry, ordering and review of radiographic studies, ordering and review of laboratory studies, ordering and performing treatments and interventions, obtaining history from patient or surrogate, examination of patient, evaluation of patient's response to treatment and interpretation of cardiac output measurements.        ED Vital Signs:  Vitals:    06/16/25 0842 06/16/25 0855 06/16/25 0900 06/16/25 0947   BP: (!) 194/103  (!) 195/90 (!) 194/81   Pulse: 96  93    Resp: 20  18    Temp: 99.3 °F (37.4 °C)      TempSrc: Oral      SpO2: 97%  97%    Weight:  103.3 kg (227 lb 11.2 oz)     Height:  5' 8" (1.727 m)      06/16/25 0951 06/16/25 0952 06/16/25 1015 06/16/25 1030   BP:  (!) 213/91 (!) 165/73 (!) 185/76   Pulse:   82 80   Resp: 20  16 19   Temp:    98.6 °F (37 °C)   TempSrc:    Oral   SpO2:   96% 96%   Weight:       Height:        06/16/25 1045 06/16/25 1100   BP: (!) 212/80 (!) 177/75   Pulse: 82 79   Resp: 18 18   Temp:     TempSrc:     SpO2: 97% 98%   Weight:     Height:         Abnormal Lab Results:  Labs Reviewed   COMPREHENSIVE METABOLIC PANEL - Abnormal       Result Value    Sodium 139      Potassium 4.6      Chloride 105      CO2 26      Glucose 123 (*)     BUN 21      Creatinine 1.0      Calcium 9.4      Protein Total 7.5      Albumin 3.5      Bilirubin Total 0.4      ALP 92      AST 20      ALT 10      Anion Gap 8      eGFR 55 (*)    CBC WITH DIFFERENTIAL - Abnormal    WBC 10.01      RBC 4.17      HGB 10.7 (*)     HCT 34.9 (*)     MCV 84      MCH 25.7 (*)     MCHC 30.7 (*)     RDW 16.5 (*)     Platelet Count 273      MPV " 9.8      Nucleated RBC 0      Neut % 79.8 (*)     Lymph % 11.9 (*)     Mono % 7.0      Eos % 0.6      Basophil % 0.3      Imm Grans % 0.4      Neut # 7.99 (*)     Lymph # 1.19      Mono # 0.70      Eos # 0.06      Baso # 0.03      Imm Grans # 0.04     TROPONIN I - Normal    Troponin-I 0.025     B-TYPE NATRIURETIC PEPTIDE - Normal    BNP 26     LIPASE - Normal    Lipase Level 26     CBC W/ AUTO DIFFERENTIAL    Narrative:     The following orders were created for panel order CBC auto differential.  Procedure                               Abnormality         Status                     ---------                               -----------         ------                     CBC with Differential[[id number]]       Abnormal            Final result                 Please view results for these tests on the individual orders.        All Lab Results:  Results for orders placed or performed during the hospital encounter of 06/16/25   EKG 12-lead    Collection Time: 06/16/25  9:08 AM   Result Value Ref Range    QRS Duration 94 ms    [facility] QTC Calculation 464 ms   Comprehensive metabolic panel    Collection Time: 06/16/25  9:40 AM   Result Value Ref Range    Sodium 139 136 - 145 mmol/L    Potassium 4.6 3.5 - 5.1 mmol/L    Chloride 105 95 - 110 mmol/L    CO2 26 23 - 29 mmol/L    Glucose 123 (H) 70 - 110 mg/dL    BUN 21 8 - 23 mg/dL    Creatinine 1.0 0.5 - 1.4 mg/dL    Calcium 9.4 8.7 - 10.5 mg/dL    Protein Total 7.5 6.0 - 8.4 gm/dL    Albumin 3.5 3.5 - 5.2 g/dL    Bilirubin Total 0.4 0.1 - 1.0 mg/dL    ALP 92 40 - 150 unit/L    AST 20 11 - 45 unit/L    ALT 10 10 - 44 unit/L    Anion Gap 8 8 - 16 mmol/L    eGFR 55 (L) >60 mL/min/1.73/m2   Troponin I #1    Collection Time: 06/16/25  9:40 AM   Result Value Ref Range    Troponin-I 0.025 <=0.026 ng/mL   BNP    Collection Time: 06/16/25  9:40 AM   Result Value Ref Range    BNP 26 0 - 99 pg/mL   Lipase    Collection Time: 06/16/25  9:40 AM   Result Value Ref Range    Lipase Level 26 4 - 60  U/L   CBC with Differential    Collection Time: 06/16/25  9:40 AM   Result Value Ref Range    WBC 10.01 3.90 - 12.70 K/uL    RBC 4.17 4.00 - 5.40 M/uL    HGB 10.7 (L) 12.0 - 16.0 gm/dL    HCT 34.9 (L) 37.0 - 48.5 %    MCV 84 82 - 98 fL    MCH 25.7 (L) 27.0 - 31.0 pg    MCHC 30.7 (L) 32.0 - 36.0 g/dL    RDW 16.5 (H) 11.5 - 14.5 %    Platelet Count 273 150 - 450 K/uL    MPV 9.8 9.2 - 12.9 fL    Nucleated RBC 0 <=0 /100 WBC    Neut % 79.8 (H) 38 - 73 %    Lymph % 11.9 (L) 18 - 48 %    Mono % 7.0 4 - 15 %    Eos % 0.6 <=8 %    Basophil % 0.3 <=1.9 %    Imm Grans % 0.4 0.0 - 0.5 %    Neut # 7.99 (H) 1.8 - 7.7 K/uL    Lymph # 1.19 1 - 4.8 K/uL    Mono # 0.70 0.3 - 1 K/uL    Eos # 0.06 <=0.5 K/uL    Baso # 0.03 <=0.2 K/uL    Imm Grans # 0.04 0.00 - 0.04 K/uL     *Note: Due to a large number of results and/or encounters for the requested time period, some results have not been displayed. A complete set of results can be found in Results Review.       Imaging Results:  Imaging Results              X-Ray Chest AP Portable (Final result)  Result time 06/16/25 09:41:38      Final result by KAYLA Jenkins Sr., MD (06/16/25 09:41:38)                   Impression:      There is no focal pulmonary infiltrate visualized. .      Electronically signed by: Lamberto Jenkins MD  Date:    06/16/2025  Time:    09:41               Narrative:    EXAMINATION:  XR CHEST AP PORTABLE    CLINICAL HISTORY:  Chest Pain;    COMPARISON:  03/07/2025    FINDINGS:  The size of the heart is normal.  There is no focal pulmonary infiltrate visualized.  There is no pneumothorax.  The costophrenic angles are sharp.                                       X-Ray Hip 2 or 3 views Left with Pelvis when performed (Final result)  Result time 06/16/25 09:40:37      Final result by KAYLA Jenkins Sr., MD (06/16/25 09:40:37)                   Impression:      1. No fracture or dislocation  2. There is mild narrowing of the joint space of both  hips.      Electronically signed by: Lamberto Jenkins MD  Date:    06/16/2025  Time:    09:40               Narrative:    EXAMINATION:  XR HIP WITH PELVIS WHEN PERFORMED 2 OR 3 VIEWS LEFT    CLINICAL HISTORY:  Unspecified fall, initial encounter    COMPARISON:  09/17/2010    FINDINGS:  There is no fracture. There is no dislocation.  There is mild narrowing of the joint space of both hips.  There are surgical clips projected over the abdomen and pelvis.                                       The EKG was ordered, reviewed, and independently interpreted by the ED provider.  Interpretation time: 09:08  Rate: 95 BPM  Rhythm: Sinus rhythm with premature supraventricular complexes  Interpretation: Nonspecific T wave abnormality. No STEMI.           The Emergency Provider reviewed the vital signs and test results, which are outlined above.     ED Discussion     11:10 AM: Questioned patient about blood pressure. She states that it runs high at baseline regardless of her medication. Her PCP is aware.    11:26 AM: Reassessed pt at this time. Discussed with patient and/or family/caretaker all pertinent ED information and results. Discussed pt dx and plan of tx. Gave the patient all f/u and return to the ED instructions. All questions and concerns were addressed at this time. Patient and/or family/caretaker expresses understanding of information and instructions, and is comfortable with plan to discharge. Pt is stable for discharge.     I discussed with patient and/or family/caretaker that evaluation in the ED does not suggest any emergent or life threatening medical conditions requiring immediate intervention beyond what was provided in the ED, and I believe patient is safe for discharge. Regardless, an unremarkable evaluation in the ED does not preclude the development or presence of a serious or life threatening condition. As such, I instructed that the patient is to return immediately for any worsening or change in current  symptoms.       Medical Decision Making  DDX: 1. Left hip strain 2. Left hip Fx 3. Abdominal wall contusion 4. HTN urgency    Lab work reviewed and otherwise normal, CXR normal, xray of left hip normal, ECG NSR, no acute ischemic changes, no indication for obtaining CT scan not on blood thinners, no concerns for acute blood loss    Amount and/or Complexity of Data Reviewed  Labs: ordered. Decision-making details documented in ED Course.  Radiology: ordered. Decision-making details documented in ED Course.  ECG/medicine tests: ordered and independent interpretation performed. Decision-making details documented in ED Course.    Risk  Prescription drug management.                ED Medication(s):  Medications   morphine injection 4 mg (4 mg Intravenous Given 6/16/25 0951)   ondansetron injection 4 mg (4 mg Intravenous Given 6/16/25 0949)   losartan tablet 100 mg (100 mg Oral Given 6/16/25 0947)   labetaloL injection 20 mg (20 mg Intravenous Given 6/16/25 0952)       Current Discharge Medication List                  Scribe Attestation:   Scribe #1: I performed the above scribed service and the documentation accurately describes the services I performed. I attest to the accuracy of the note.     Attending:   Physician Attestation Statement for Scribe #1: I, Karyn Hicks MD, personally performed the services described in this documentation, as scribed by Nati Britton, in my presence, and it is both accurate and complete.           Clinical Impression       ICD-10-CM ICD-9-CM   1. Fall  W19.XXXA E888.9   2. Hypertension  I10 401.9   3. Contusion of abdominal wall, initial encounter  S30.1XXA 922.2   4. Lymphedema due to venous insufficiency  I89.0 457.1    I87.2 459.81   5. Uncontrolled hypertension  I10 401.9       Disposition:   Disposition: Discharged  Condition: Stable       Karyn Hicks MD  06/21/25 [id number]

## 2025-06-17 ENCOUNTER — PATIENT OUTREACH (OUTPATIENT)
Facility: OTHER | Age: 85
End: 2025-06-17
Payer: MEDICARE

## 2025-06-17 NOTE — PROGRESS NOTES
Patient was contacted for post ED discharge navigation. They have an appointment scheduled with Tara Dickerson NP on 6/26/25 at 4:00. ED navigator will remind patient of appointment.

## 2025-06-19 ENCOUNTER — PATIENT MESSAGE (OUTPATIENT)
Dept: FAMILY MEDICINE | Facility: CLINIC | Age: 85
End: 2025-06-19

## 2025-06-19 ENCOUNTER — OFFICE VISIT (OUTPATIENT)
Dept: FAMILY MEDICINE | Facility: CLINIC | Age: 85
End: 2025-06-19
Attending: FAMILY MEDICINE
Payer: MEDICARE

## 2025-06-19 DIAGNOSIS — M79.18 LEFT BUTTOCK PAIN: ICD-10-CM

## 2025-06-19 DIAGNOSIS — M54.50 ACUTE MIDLINE LOW BACK PAIN WITHOUT SCIATICA: Primary | ICD-10-CM

## 2025-06-19 PROBLEM — Z01.810 PREOP CARDIOVASCULAR EXAM: Status: RESOLVED | Noted: 2025-04-29 | Resolved: 2025-06-19

## 2025-06-19 PROCEDURE — 1159F MED LIST DOCD IN RCRD: CPT | Mod: CPTII,HCNC,95, | Performed by: FAMILY MEDICINE

## 2025-06-19 PROCEDURE — 98005 SYNCH AUDIO-VIDEO EST LOW 20: CPT | Mod: HCNC,95,, | Performed by: FAMILY MEDICINE

## 2025-06-19 PROCEDURE — G2211 COMPLEX E/M VISIT ADD ON: HCPCS | Mod: HCNC,95,, | Performed by: FAMILY MEDICINE

## 2025-06-19 PROCEDURE — 1160F RVW MEDS BY RX/DR IN RCRD: CPT | Mod: CPTII,HCNC,95, | Performed by: FAMILY MEDICINE

## 2025-06-19 RX ORDER — HYDROCODONE BITARTRATE AND ACETAMINOPHEN 7.5; 325 MG/1; MG/1
1 TABLET ORAL EVERY 6 HOURS PRN
Qty: 20 TABLET | Refills: 0 | Status: SHIPPED | OUTPATIENT
Start: 2025-06-19

## 2025-06-19 NOTE — PROGRESS NOTES
Subjective:       Patient ID: Lizeth Henderson is a 84 y.o. female.    Chief Complaint: No chief complaint on file.    84 y old female with Hips OA f.u today via TM after being evaluated  at ER on 6/16 due to a fall .She landed on L side of her body . Now with hip and lower back pain . X rays  of the hips did not demonstrate any acute findings . She was prescribed hydrocodone and methocarbamol which have not provided substantial  relief. She is unable to ambulate due to lower back pain . No g/I , g/u symptoms . Non irradiated       Review of Systems   Constitutional: Negative.    HENT: Negative.     Eyes: Negative.    Respiratory: Negative.     Cardiovascular: Negative.    Gastrointestinal: Negative.    Genitourinary: Negative.    Musculoskeletal:  Positive for arthralgias.   Skin: Negative.    Hematological: Negative.        Objective:      Physical Exam  Constitutional:       Appearance: Normal appearance.   HENT:      Head: Normocephalic and atraumatic.   Eyes:      Extraocular Movements: Extraocular movements intact.      Pupils: Pupils are equal, round, and reactive to light.   Pulmonary:      Effort: Pulmonary effort is normal.   Skin:     Coloration: Skin is not jaundiced or pale.   Neurological:      General: No focal deficit present.      Mental Status: She is alert and oriented to person, place, and time.   Psychiatric:         Mood and Affect: Mood normal.         Behavior: Behavior normal.         Thought Content: Thought content normal.         Judgment: Judgment normal.         Assessment:       1. Acute midline low back pain without sciatica    2. Left buttock pain        Plan:     Diagnoses and all orders for this visit:    Acute midline low back pain without sciatica  -     HYDROcodone-acetaminophen (NORCO) 7.5-325 mg per tablet; Take 1 tablet by mouth every 6 (six) hours as needed for Pain.  -     X-Ray Lumbar Spine AP And Lateral; Future    Left buttock pain  -     HYDROcodone-acetaminophen  (NORCO) 7.5-325 mg per tablet; Take 1 tablet by mouth every 6 (six) hours as needed for Pain.  -     X-Ray Lumbar Spine AP And Lateral; Future     X rays . Increase hydrocodone dose. Side effects discussed. Start stool softener .   The patient location is: home   The chief complaint leading to consultation is: back pain     Visit type: audiovisual    Face to Face time with patient: 20 minutes of total time spent on the encounter, which includes face to face time and non-face to face time preparing to see the patient (eg, review of tests), Obtaining and/or reviewing separately obtained history, Documenting clinical information in the electronic or other health record, Independently interpreting results (not separately reported) and communicating results to the patient/family/caregiver, or Care coordination (not separately reported).         Each patient to whom he or she provides medical services by telemedicine is:  (1) informed of the relationship between the physician and patient and the respective role of any other health care provider with respect to management of the patient; and (2) notified that he or she may decline to receive medical services by telemedicine and may withdraw from such care at any time.

## 2025-06-20 ENCOUNTER — TELEPHONE (OUTPATIENT)
Dept: PSYCHIATRY | Facility: CLINIC | Age: 85
End: 2025-06-20
Payer: MEDICARE

## 2025-06-20 ENCOUNTER — HOSPITAL ENCOUNTER (OUTPATIENT)
Dept: RADIOLOGY | Facility: HOSPITAL | Age: 85
Discharge: HOME | End: 2025-06-20
Attending: FAMILY MEDICINE
Payer: MEDICARE

## 2025-06-20 ENCOUNTER — PATIENT MESSAGE (OUTPATIENT)
Dept: PSYCHIATRY | Facility: CLINIC | Age: 85
End: 2025-06-20
Payer: MEDICARE

## 2025-06-20 DIAGNOSIS — M79.18 LEFT BUTTOCK PAIN: ICD-10-CM

## 2025-06-20 DIAGNOSIS — M54.50 ACUTE MIDLINE LOW BACK PAIN WITHOUT SCIATICA: ICD-10-CM

## 2025-06-20 DIAGNOSIS — G47.00 INSOMNIA, UNSPECIFIED TYPE: ICD-10-CM

## 2025-06-20 DIAGNOSIS — F41.1 GAD (GENERALIZED ANXIETY DISORDER): ICD-10-CM

## 2025-06-20 PROCEDURE — 72100 X-RAY EXAM L-S SPINE 2/3 VWS: CPT | Mod: 26,HCNC,, | Performed by: STUDENT IN AN ORGANIZED HEALTH CARE EDUCATION/TRAINING PROGRAM

## 2025-06-20 PROCEDURE — 72100 X-RAY EXAM L-S SPINE 2/3 VWS: CPT | Mod: TC,HCNC,PO

## 2025-06-20 RX ORDER — BUPROPION HYDROCHLORIDE 150 MG/1
150 TABLET ORAL DAILY
Qty: 90 TABLET | Refills: 0 | Status: SHIPPED | OUTPATIENT
Start: 2025-06-20

## 2025-06-20 RX ORDER — BUSPIRONE HYDROCHLORIDE 10 MG/1
20 TABLET ORAL 2 TIMES DAILY
Qty: 120 TABLET | Refills: 1 | Status: SHIPPED | OUTPATIENT
Start: 2025-06-20

## 2025-06-20 RX ORDER — CLONAZEPAM 0.25 MG/1
0.25 TABLET, ORALLY DISINTEGRATING ORAL 2 TIMES DAILY PRN
Qty: 60 TABLET | Refills: 1 | Status: SHIPPED | OUTPATIENT
Start: 2025-06-20 | End: 2026-06-20

## 2025-06-20 RX ORDER — SUVOREXANT 10 MG/1
1 TABLET, FILM COATED ORAL NIGHTLY
Qty: 30 TABLET | Refills: 1 | Status: SHIPPED | OUTPATIENT
Start: 2025-06-20

## 2025-06-20 RX ORDER — ESCITALOPRAM OXALATE 10 MG/1
15 TABLET ORAL DAILY
Qty: 135 TABLET | Refills: 0 | Status: SHIPPED | OUTPATIENT
Start: 2025-06-20 | End: 2026-06-20

## 2025-06-23 ENCOUNTER — DOCUMENTATION ONLY (OUTPATIENT)
Dept: PSYCHIATRY | Facility: CLINIC | Age: 85
End: 2025-06-23
Payer: MEDICARE

## 2025-06-23 ENCOUNTER — TELEPHONE (OUTPATIENT)
Dept: PSYCHIATRY | Facility: CLINIC | Age: 85
End: 2025-06-23
Payer: MEDICARE

## 2025-06-23 ENCOUNTER — PATIENT MESSAGE (OUTPATIENT)
Dept: FAMILY MEDICINE | Facility: CLINIC | Age: 85
End: 2025-06-23
Payer: MEDICARE
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
## 2025-06-23 NOTE — PROGRESS NOTES
Sent in referral    CASE MANAGEMENT REFERRAL    MRN: [id number]  : 1940  Reason for referral: supportive counseling check-ins to ease her into being along again once daughter returns to work ; she does virtual visits
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
## 2025-06-23 NOTE — TELEPHONE ENCOUNTER
CRM # [id number]  Owner: None  Status: Unresolved  Open  Priority: Routine Created on: 06/23/2025 08:40 AM By: Roni Villar     Primary Information    Source   Lizeth Henderson (Patient)    Subject   Lizeth Henderson (Patient)    Topic   General Inquiry - Patient Advice      Communication   .Type:  Needs Medical Advice            Who Called: daughter      Would the patient rather a call back or a response via [facility]? Call back      Best Call Back Number: [phone] - patient's number      Additional Information: requesting that dr give pt a call due to she is having some issues.

## 2025-06-24 ENCOUNTER — PATIENT OUTREACH (OUTPATIENT)
Dept: ADMINISTRATIVE | Facility: HOSPITAL | Age: 85
End: 2025-06-24
Payer: MEDICARE

## 2025-06-24 ENCOUNTER — RESULTS FOLLOW-UP (OUTPATIENT)
Dept: FAMILY MEDICINE | Facility: CLINIC | Age: 85
End: 2025-06-24
Payer: MEDICARE

## 2025-06-24 DIAGNOSIS — M54.9 DORSALGIA, UNSPECIFIED: Primary | ICD-10-CM

## 2025-06-25 ENCOUNTER — PATIENT OUTREACH (OUTPATIENT)
Facility: OTHER | Age: 85
End: 2025-06-25
Payer: MEDICARE

## 2025-06-29 ENCOUNTER — HOSPITAL ENCOUNTER (INPATIENT)
Facility: HOSPITAL | Age: 85
LOS: 2 days | Discharge: HOME | DRG: 308 | End: 2025-07-01
Attending: EMERGENCY MEDICINE | Admitting: FAMILY MEDICINE
Payer: MEDICARE

## 2025-06-29 DIAGNOSIS — I48.92 ATRIAL FLUTTER: ICD-10-CM

## 2025-06-29 DIAGNOSIS — R06.02 SHORTNESS OF BREATH: ICD-10-CM

## 2025-06-29 DIAGNOSIS — I48.0 PAF (PAROXYSMAL ATRIAL FIBRILLATION): ICD-10-CM

## 2025-06-29 DIAGNOSIS — R07.9 CHEST PAIN: ICD-10-CM

## 2025-06-29 DIAGNOSIS — J81.0 ACUTE PULMONARY EDEMA: ICD-10-CM

## 2025-06-29 DIAGNOSIS — I50.9 ACUTE CONGESTIVE HEART FAILURE, UNSPECIFIED HEART FAILURE TYPE: ICD-10-CM

## 2025-06-29 DIAGNOSIS — I48.3 TYPICAL ATRIAL FLUTTER: Primary | ICD-10-CM

## 2025-06-29 PROBLEM — I50.33 ACUTE ON CHRONIC DIASTOLIC CONGESTIVE HEART FAILURE: Status: ACTIVE | Noted: 2025-06-29

## 2025-06-29 PROBLEM — I50.43 ACUTE ON CHRONIC COMBINED SYSTOLIC AND DIASTOLIC CONGESTIVE HEART FAILURE: Status: ACTIVE | Noted: 2025-06-29

## 2025-06-29 LAB
ABSOLUTE EOSINOPHIL (OHS): 0.02 K/UL
ABSOLUTE EOSINOPHIL (OHS): 0.04 K/UL
ABSOLUTE MONOCYTE (OHS): 0.84 K/UL (ref 0.3–1)
ABSOLUTE MONOCYTE (OHS): 0.94 K/UL (ref 0.3–1)
ABSOLUTE NEUTROPHIL COUNT (OHS): 9.19 K/UL (ref 1.8–7.7)
ABSOLUTE NEUTROPHIL COUNT (OHS): 9.74 K/UL (ref 1.8–7.7)
ALBUMIN SERPL BCP-MCNC: 3.4 G/DL (ref 3.5–5.2)
ALP SERPL-CCNC: 110 UNIT/L (ref 40–150)
ALT SERPL W/O P-5'-P-CCNC: 13 UNIT/L (ref 10–44)
ANION GAP (OHS): 12 MMOL/L (ref 8–16)
AORTIC ROOT ANNULUS: 3.4 CM
AORTIC SIZE INDEX: 1.1 CM/M2
APTT PPP: 26.3 SECONDS (ref 21–32)
APTT PPP: 30.9 SECONDS (ref 21–32)
APTT PPP: 86.2 SECONDS (ref 21–32)
ASCENDING AORTA: 2.4 CM
AST SERPL-CCNC: 19 UNIT/L (ref 11–45)
AV INDEX (PROSTH): 0.88
AV MEAN GRADIENT: 5 MMHG
AV PEAK GRADIENT: 10 MMHG
AV VALVE AREA BY VELOCITY RATIO: 2.4 CM²
AV VALVE AREA: 2.8 CM²
AV VELOCITY RATIO: 0.75
BASOPHILS # BLD AUTO: 0.05 K/UL
BASOPHILS # BLD AUTO: 0.06 K/UL
BASOPHILS NFR BLD AUTO: 0.5 %
BASOPHILS NFR BLD AUTO: 0.5 %
BILIRUB SERPL-MCNC: 0.5 MG/DL (ref 0.1–1)
BNP SERPL-MCNC: 304 PG/ML (ref 0–99)
BSA FOR ECHO PROCEDURE: 2.32 M2
BUN SERPL-MCNC: 18 MG/DL (ref 8–23)
CALCIUM SERPL-MCNC: 9 MG/DL (ref 8.7–10.5)
CHLORIDE SERPL-SCNC: 106 MMOL/L (ref 95–110)
CO2 SERPL-SCNC: 21 MMOL/L (ref 23–29)
CREAT SERPL-MCNC: 1 MG/DL (ref 0.5–1.4)
CV ECHO LV RWT: 0.61 CM
DOP CALC AO PEAK VEL: 1.6 M/S
DOP CALC AO VTI: 27.2 CM
DOP CALC LVOT AREA: 3.1 CM2
DOP CALC LVOT DIAMETER: 2 CM
DOP CALC LVOT PEAK VEL: 1.2 M/S
DOP CALC LVOT STROKE VOLUME: 75 CM3
DOP CALC RVOT PEAK VEL: 1.06 M/S
DOP CALC RVOT VTI: 18.5 CM
DOP CALCLVOT PEAK VEL VTI: 23.9 CM
E WAVE DECELERATION TIME: 214 MSEC
E/A RATIO: 1.85
E/E' RATIO: 16 M/S
ECHO LV POSTERIOR WALL: 1.7 CM (ref 0.6–1.1)
ERYTHROCYTE [DISTWIDTH] IN BLOOD BY AUTOMATED COUNT: 15.9 % (ref 11.5–14.5)
ERYTHROCYTE [DISTWIDTH] IN BLOOD BY AUTOMATED COUNT: 16 % (ref 11.5–14.5)
FRACTIONAL SHORTENING: 16.1 % (ref 28–44)
GFR SERPLBLD CREATININE-BSD FMLA CKD-EPI: 55 ML/MIN/1.73/M2
GLUCOSE SERPL-MCNC: 136 MG/DL (ref 70–110)
HCT VFR BLD AUTO: 34.8 % (ref 37–48.5)
HCT VFR BLD AUTO: 35 % (ref 37–48.5)
HCV AB SERPL QL IA: NEGATIVE
HGB BLD-MCNC: 10.7 GM/DL (ref 12–16)
HGB BLD-MCNC: 10.9 GM/DL (ref 12–16)
HIV 1+2 AB+HIV1 P24 AG SERPL QL IA: NEGATIVE
IMM GRANULOCYTES # BLD AUTO: 0.05 K/UL (ref 0–0.04)
IMM GRANULOCYTES # BLD AUTO: 0.06 K/UL (ref 0–0.04)
IMM GRANULOCYTES NFR BLD AUTO: 0.5 % (ref 0–0.5)
IMM GRANULOCYTES NFR BLD AUTO: 0.5 % (ref 0–0.5)
INR PPP: 1.1 (ref 0.8–1.2)
INTERVENTRICULAR SEPTUM: 1.2 CM (ref 0.6–1.1)
IVC DIAMETER: 2.14 CM
IVRT: 46 MSEC
LA MAJOR: 5.8 CM
LA MINOR: 6.1 CM
LEFT ATRIUM SIZE: 4.7 CM
LEFT INTERNAL DIMENSION IN SYSTOLE: 4.7 CM (ref 2.1–4)
LEFT VENTRICLE DIASTOLIC VOLUME INDEX: 69.2 ML/M2
LEFT VENTRICLE DIASTOLIC VOLUME: 155 ML
LEFT VENTRICLE MASS INDEX: 163.1 G/M2
LEFT VENTRICLE SYSTOLIC VOLUME INDEX: 45.5 ML/M2
LEFT VENTRICLE SYSTOLIC VOLUME: 102 ML
LEFT VENTRICULAR INTERNAL DIMENSION IN DIASTOLE: 5.6 CM (ref 3.5–6)
LEFT VENTRICULAR MASS: 365.4 G
LV LATERAL E/E' RATIO: 15.5 M/S
LV SEPTAL E/E' RATIO: 15.5 M/S
LVED V (TEICH): 154.75 ML
LVES V (TEICH): 101.71 ML
LVOT MG: 3.53 MMHG
LVOT MV: 0.88 CM/S
LYMPHOCYTES # BLD AUTO: 0.96 K/UL (ref 1–4.8)
LYMPHOCYTES # BLD AUTO: 1.14 K/UL (ref 1–4.8)
MAGNESIUM SERPL-MCNC: 1.8 MG/DL (ref 1.6–2.6)
MCH RBC QN AUTO: 25.7 PG (ref 27–31)
MCH RBC QN AUTO: 26.2 PG (ref 27–31)
MCHC RBC AUTO-ENTMCNC: 30.6 G/DL (ref 32–36)
MCHC RBC AUTO-ENTMCNC: 31.3 G/DL (ref 32–36)
MCV RBC AUTO: 84 FL (ref 82–98)
MCV RBC AUTO: 84 FL (ref 82–98)
MV PEAK A VEL: 0.67 M/S
MV PEAK E VEL: 1.24 M/S
MV STENOSIS PRESSURE HALF TIME: 61.97 MS
MV VALVE AREA P 1/2 METHOD: 3.55 CM2
NUCLEATED RBC (/100WBC) (OHS): 0 /100 WBC
NUCLEATED RBC (/100WBC) (OHS): 0 /100 WBC
OHS QRS DURATION: 120 MS
OHS QTC CALCULATION: 408 MS
PISA TR MAX VEL: 2.8 M/S
PLATELET # BLD AUTO: 290 K/UL (ref 150–450)
PLATELET # BLD AUTO: 319 K/UL (ref 150–450)
PMV BLD AUTO: 10.3 FL (ref 9.2–12.9)
PMV BLD AUTO: 9.7 FL (ref 9.2–12.9)
POTASSIUM SERPL-SCNC: 4.6 MMOL/L (ref 3.5–5.1)
PROT SERPL-MCNC: 7.4 GM/DL (ref 6–8.4)
PROTHROMBIN TIME: 11.9 SECONDS (ref 9–12.5)
PV MEAN GRADIENT: 2 MMHG
PV MV: 0.87 M/S
PV PEAK GRADIENT: 8 MMHG
PV PEAK VELOCITY: 1.37 M/S
RA MAJOR: 4.94 CM
RA PRESSURE ESTIMATED: 3 MMHG
RBC # BLD AUTO: 4.16 M/UL (ref 4–5.4)
RBC # BLD AUTO: 4.17 M/UL (ref 4–5.4)
RELATIVE EOSINOPHIL (OHS): 0.2 %
RELATIVE EOSINOPHIL (OHS): 0.3 %
RELATIVE LYMPHOCYTE (OHS): 8.6 % (ref 18–48)
RELATIVE LYMPHOCYTE (OHS): 9.5 % (ref 18–48)
RELATIVE MONOCYTE (OHS): 7.6 % (ref 4–15)
RELATIVE MONOCYTE (OHS): 7.8 % (ref 4–15)
RELATIVE NEUTROPHIL (OHS): 81.4 % (ref 38–73)
RELATIVE NEUTROPHIL (OHS): 82.6 % (ref 38–73)
RV TB RVSP: 6 MMHG
SODIUM SERPL-SCNC: 139 MMOL/L (ref 136–145)
STJ: 3.4 CM
TDI LATERAL: 0.08 M/S
TDI SEPTAL: 0.08 M/S
TDI: 0.08 M/S
TR MAX PG: 32 MMHG
TROPONIN I SERPL DL<=0.01 NG/ML-MCNC: 0.02 NG/ML
TROPONIN I SERPL DL<=0.01 NG/ML-MCNC: 0.02 NG/ML
TSH SERPL-ACNC: 1.18 UIU/ML (ref 0.4–4)
TV REST PULMONARY ARTERY PRESSURE: 34 MMHG
WBC # BLD AUTO: 11.11 K/UL (ref 3.9–12.7)
WBC # BLD AUTO: 11.98 K/UL (ref 3.9–12.7)
Z-SCORE OF LEFT VENTRICULAR DIMENSION IN END DIASTOLE: -3.58
Z-SCORE OF LEFT VENTRICULAR DIMENSION IN END SYSTOLE: -0.26

## 2025-06-29 PROCEDURE — 63600175 PHARM REV CODE 636 W HCPCS: Mod: HCNC | Performed by: NURSE PRACTITIONER

## 2025-06-29 PROCEDURE — 21400001 HC TELEMETRY ROOM: Mod: HCNC

## 2025-06-29 PROCEDURE — 84484 ASSAY OF TROPONIN QUANT: CPT | Mod: HCNC | Performed by: EMERGENCY MEDICINE

## 2025-06-29 PROCEDURE — 87389 HIV-1 AG W/HIV-1&-2 AB AG IA: CPT | Mod: HCNC | Performed by: EMERGENCY MEDICINE

## 2025-06-29 PROCEDURE — 63600175 PHARM REV CODE 636 W HCPCS: Mod: HCNC | Performed by: EMERGENCY MEDICINE

## 2025-06-29 PROCEDURE — 99291 CRITICAL CARE FIRST HOUR: CPT | Mod: HCNC

## 2025-06-29 PROCEDURE — 27000221 HC OXYGEN, UP TO 24 HOURS: Mod: HCNC

## 2025-06-29 PROCEDURE — 93005 ELECTROCARDIOGRAM TRACING: CPT | Mod: HCNC

## 2025-06-29 PROCEDURE — 85025 COMPLETE CBC W/AUTO DIFF WBC: CPT | Mod: HCNC | Performed by: EMERGENCY MEDICINE

## 2025-06-29 PROCEDURE — 25500020 PHARM REV CODE 255: Mod: HCNC | Performed by: EMERGENCY MEDICINE

## 2025-06-29 PROCEDURE — 96375 TX/PRO/DX INJ NEW DRUG ADDON: CPT | Mod: HCNC

## 2025-06-29 PROCEDURE — 94761 N-INVAS EAR/PLS OXIMETRY MLT: CPT | Mod: HCNC

## 2025-06-29 PROCEDURE — 25000003 PHARM REV CODE 250: Mod: HCNC | Performed by: NURSE PRACTITIONER

## 2025-06-29 PROCEDURE — 85730 THROMBOPLASTIN TIME PARTIAL: CPT | Mod: HCNC | Performed by: EMERGENCY MEDICINE

## 2025-06-29 PROCEDURE — 83735 ASSAY OF MAGNESIUM: CPT | Mod: HCNC | Performed by: EMERGENCY MEDICINE

## 2025-06-29 PROCEDURE — 84484 ASSAY OF TROPONIN QUANT: CPT | Mod: HCNC | Performed by: NURSE PRACTITIONER

## 2025-06-29 PROCEDURE — 99223 1ST HOSP IP/OBS HIGH 75: CPT | Mod: HCNC,,, | Performed by: INTERNAL MEDICINE

## 2025-06-29 PROCEDURE — 86803 HEPATITIS C AB TEST: CPT | Mod: HCNC | Performed by: EMERGENCY MEDICINE

## 2025-06-29 PROCEDURE — 96365 THER/PROPH/DIAG IV INF INIT: CPT | Mod: HCNC

## 2025-06-29 PROCEDURE — 85730 THROMBOPLASTIN TIME PARTIAL: CPT | Mod: HCNC | Performed by: FAMILY MEDICINE

## 2025-06-29 PROCEDURE — 99900035 HC TECH TIME PER 15 MIN (STAT): Mod: HCNC

## 2025-06-29 PROCEDURE — 84443 ASSAY THYROID STIM HORMONE: CPT | Mod: HCNC | Performed by: EMERGENCY MEDICINE

## 2025-06-29 PROCEDURE — 25000003 PHARM REV CODE 250: Mod: HCNC | Performed by: EMERGENCY MEDICINE

## 2025-06-29 PROCEDURE — 83880 ASSAY OF NATRIURETIC PEPTIDE: CPT | Mod: HCNC | Performed by: EMERGENCY MEDICINE

## 2025-06-29 PROCEDURE — 96376 TX/PRO/DX INJ SAME DRUG ADON: CPT | Mod: HCNC

## 2025-06-29 PROCEDURE — 25000003 PHARM REV CODE 250: Mod: HCNC | Performed by: FAMILY MEDICINE

## 2025-06-29 PROCEDURE — 80053 COMPREHEN METABOLIC PANEL: CPT | Mod: HCNC | Performed by: EMERGENCY MEDICINE

## 2025-06-29 PROCEDURE — 85610 PROTHROMBIN TIME: CPT | Mod: HCNC | Performed by: EMERGENCY MEDICINE

## 2025-06-29 PROCEDURE — 36415 COLL VENOUS BLD VENIPUNCTURE: CPT | Mod: HCNC | Performed by: FAMILY MEDICINE

## 2025-06-29 PROCEDURE — 93010 ELECTROCARDIOGRAM REPORT: CPT | Mod: HCNC,,, | Performed by: INTERNAL MEDICINE

## 2025-06-29 RX ORDER — IBUPROFEN 200 MG
16 TABLET ORAL
Status: DISCONTINUED | OUTPATIENT
Start: 2025-06-29 | End: 2025-07-01 | Stop reason: HOSPADM

## 2025-06-29 RX ORDER — BUPROPION HYDROCHLORIDE 150 MG/1
150 TABLET ORAL DAILY
Status: DISCONTINUED | OUTPATIENT
Start: 2025-06-29 | End: 2025-07-01 | Stop reason: HOSPADM

## 2025-06-29 RX ORDER — HEPARIN SODIUM,PORCINE/D5W 25000/250
0-40 INTRAVENOUS SOLUTION INTRAVENOUS CONTINUOUS
Status: DISCONTINUED | OUTPATIENT
Start: 2025-06-29 | End: 2025-06-30

## 2025-06-29 RX ORDER — FUROSEMIDE 10 MG/ML
40 INJECTION INTRAMUSCULAR; INTRAVENOUS
Status: DISCONTINUED | OUTPATIENT
Start: 2025-06-29 | End: 2025-07-01

## 2025-06-29 RX ORDER — PRAMIPEXOLE DIHYDROCHLORIDE 0.12 MG/1
0.12 TABLET ORAL NIGHTLY
Status: DISCONTINUED | OUTPATIENT
Start: 2025-06-29 | End: 2025-07-01 | Stop reason: HOSPADM

## 2025-06-29 RX ORDER — ATORVASTATIN CALCIUM 10 MG/1
10 TABLET, FILM COATED ORAL DAILY
Status: DISCONTINUED | OUTPATIENT
Start: 2025-06-29 | End: 2025-07-01 | Stop reason: HOSPADM

## 2025-06-29 RX ORDER — GLUCAGON 1 MG
1 KIT INJECTION
Status: DISCONTINUED | OUTPATIENT
Start: 2025-06-29 | End: 2025-07-01 | Stop reason: HOSPADM

## 2025-06-29 RX ORDER — HYDROCODONE BITARTRATE AND ACETAMINOPHEN 7.5; 325 MG/1; MG/1
1 TABLET ORAL EVERY 6 HOURS PRN
Refills: 0 | Status: DISCONTINUED | OUTPATIENT
Start: 2025-06-29 | End: 2025-07-01 | Stop reason: HOSPADM

## 2025-06-29 RX ORDER — NALOXONE HCL 0.4 MG/ML
0.02 VIAL (ML) INJECTION
Status: DISCONTINUED | OUTPATIENT
Start: 2025-06-29 | End: 2025-07-01 | Stop reason: HOSPADM

## 2025-06-29 RX ORDER — FUROSEMIDE 10 MG/ML
60 INJECTION INTRAMUSCULAR; INTRAVENOUS
Status: COMPLETED | OUTPATIENT
Start: 2025-06-29 | End: 2025-06-29

## 2025-06-29 RX ORDER — SIMETHICONE 80 MG
1 TABLET,CHEWABLE ORAL 4 TIMES DAILY PRN
Status: DISCONTINUED | OUTPATIENT
Start: 2025-06-29 | End: 2025-07-01 | Stop reason: HOSPADM

## 2025-06-29 RX ORDER — LORAZEPAM 2 MG/ML
1 INJECTION INTRAMUSCULAR
Status: COMPLETED | OUTPATIENT
Start: 2025-06-29 | End: 2025-06-29

## 2025-06-29 RX ORDER — TALC
6 POWDER (GRAM) TOPICAL NIGHTLY PRN
Status: DISCONTINUED | OUTPATIENT
Start: 2025-06-29 | End: 2025-07-01 | Stop reason: HOSPADM

## 2025-06-29 RX ORDER — DILTIAZEM HYDROCHLORIDE 5 MG/ML
20 INJECTION INTRAVENOUS
Status: COMPLETED | OUTPATIENT
Start: 2025-06-29 | End: 2025-06-29

## 2025-06-29 RX ORDER — LOSARTAN POTASSIUM 50 MG/1
100 TABLET ORAL DAILY
Status: DISCONTINUED | OUTPATIENT
Start: 2025-06-29 | End: 2025-07-01 | Stop reason: HOSPADM

## 2025-06-29 RX ORDER — ACETAMINOPHEN 325 MG/1
650 TABLET ORAL EVERY 4 HOURS PRN
Status: DISCONTINUED | OUTPATIENT
Start: 2025-06-29 | End: 2025-07-01 | Stop reason: HOSPADM

## 2025-06-29 RX ORDER — DILTIAZEM HCL/D5W 125 MG/125
10 PLASTIC BAG, INJECTION (ML) INTRAVENOUS CONTINUOUS
Status: DISCONTINUED | OUTPATIENT
Start: 2025-06-29 | End: 2025-06-30

## 2025-06-29 RX ORDER — IBUPROFEN 200 MG
24 TABLET ORAL
Status: DISCONTINUED | OUTPATIENT
Start: 2025-06-29 | End: 2025-07-01 | Stop reason: HOSPADM

## 2025-06-29 RX ORDER — ONDANSETRON HYDROCHLORIDE 2 MG/ML
8 INJECTION, SOLUTION INTRAVENOUS EVERY 8 HOURS PRN
Status: DISCONTINUED | OUTPATIENT
Start: 2025-06-29 | End: 2025-07-01 | Stop reason: HOSPADM

## 2025-06-29 RX ORDER — IPRATROPIUM BROMIDE AND ALBUTEROL SULFATE 2.5; .5 MG/3ML; MG/3ML
3 SOLUTION RESPIRATORY (INHALATION) EVERY 4 HOURS PRN
Status: DISCONTINUED | OUTPATIENT
Start: 2025-06-29 | End: 2025-07-01 | Stop reason: HOSPADM

## 2025-06-29 RX ORDER — BUSPIRONE HYDROCHLORIDE 10 MG/1
20 TABLET ORAL 2 TIMES DAILY
Status: DISCONTINUED | OUTPATIENT
Start: 2025-06-29 | End: 2025-07-01 | Stop reason: HOSPADM

## 2025-06-29 RX ORDER — ALUMINUM HYDROXIDE, MAGNESIUM HYDROXIDE, AND SIMETHICONE 1200; 120; 1200 MG/30ML; MG/30ML; MG/30ML
30 SUSPENSION ORAL 4 TIMES DAILY PRN
Status: DISCONTINUED | OUTPATIENT
Start: 2025-06-29 | End: 2025-07-01 | Stop reason: HOSPADM

## 2025-06-29 RX ORDER — AMOXICILLIN 250 MG
1 CAPSULE ORAL 2 TIMES DAILY PRN
Status: DISCONTINUED | OUTPATIENT
Start: 2025-06-29 | End: 2025-07-01 | Stop reason: HOSPADM

## 2025-06-29 RX ORDER — MICONAZOLE NITRATE 2 G/100G
POWDER TOPICAL 2 TIMES DAILY
Status: DISCONTINUED | OUTPATIENT
Start: 2025-06-29 | End: 2025-07-01 | Stop reason: HOSPADM

## 2025-06-29 RX ADMIN — HEPARIN SODIUM 12 UNITS/KG/HR: 10000 INJECTION, SOLUTION INTRAVENOUS at 09:06

## 2025-06-29 RX ADMIN — LOSARTAN POTASSIUM 100 MG: 50 TABLET, FILM COATED ORAL at 12:06

## 2025-06-29 RX ADMIN — ESCITALOPRAM OXALATE 15 MG: 5 TABLET, FILM COATED ORAL at 12:06

## 2025-06-29 RX ADMIN — HEPARIN SODIUM 12 UNITS/KG/HR: 10000 INJECTION, SOLUTION INTRAVENOUS at 11:06

## 2025-06-29 RX ADMIN — MICONAZOLE NITRATE: 20 POWDER TOPICAL at 09:06

## 2025-06-29 RX ADMIN — FUROSEMIDE 40 MG: 10 INJECTION, SOLUTION INTRAVENOUS at 04:06

## 2025-06-29 RX ADMIN — BUPROPION HYDROCHLORIDE 150 MG: 150 TABLET, EXTENDED RELEASE ORAL at 12:06

## 2025-06-29 RX ADMIN — IOHEXOL 100 ML: 350 INJECTION, SOLUTION INTRAVENOUS at 07:06

## 2025-06-29 RX ADMIN — MICONAZOLE NITRATE: 20 POWDER TOPICAL at 12:06

## 2025-06-29 RX ADMIN — Medication 10 MG/HR: at 07:06

## 2025-06-29 RX ADMIN — LORAZEPAM 1 MG: 2 INJECTION INTRAMUSCULAR; INTRAVENOUS at 08:06

## 2025-06-29 RX ADMIN — Medication 10 MG/HR: at 04:06

## 2025-06-29 RX ADMIN — BUSPIRONE HYDROCHLORIDE 20 MG: 10 TABLET ORAL at 12:06

## 2025-06-29 RX ADMIN — DILTIAZEM HYDROCHLORIDE 20 MG: 5 INJECTION INTRAVENOUS at 06:06

## 2025-06-29 RX ADMIN — BUSPIRONE HYDROCHLORIDE 20 MG: 10 TABLET ORAL at 08:06

## 2025-06-29 RX ADMIN — AMIODARONE HYDROCHLORIDE 0.5 MG/MIN: 1.8 INJECTION, SOLUTION INTRAVENOUS at 03:06

## 2025-06-29 RX ADMIN — AMIODARONE HYDROCHLORIDE 1 MG/MIN: 1.8 INJECTION, SOLUTION INTRAVENOUS at 10:06

## 2025-06-29 RX ADMIN — FUROSEMIDE 60 MG: 10 INJECTION, SOLUTION INTRAMUSCULAR; INTRAVENOUS at 07:06

## 2025-06-29 RX ADMIN — ATORVASTATIN CALCIUM 10 MG: 10 TABLET, FILM COATED ORAL at 12:06

## 2025-06-29 RX ADMIN — PRAMIPEXOLE DIHYDROCHLORIDE 0.12 MG: 0.12 TABLET ORAL at 09:06

## 2025-06-29 RX ADMIN — AMIODARONE HYDROCHLORIDE 150 MG: 1.5 INJECTION, SOLUTION INTRAVENOUS at 10:06

## 2025-06-29 NOTE — ASSESSMENT & PLAN NOTE
Anemia is likely due to Iron deficiency. Most recent hemoglobin and hematocrit are listed below.  Recent Labs     06/29/25  0648 06/29/25  0925   HGB 10.9* 10.7*   HCT 34.8* 35.0*     Plan  - Monitor serial CBC: Daily  - Transfuse PRBC if patient becomes hemodynamically unstable, symptomatic or H/H drops below 7/21.  - Patient has not received any PRBC transfusions to date  - Patient's anemia is currently stable

## 2025-06-29 NOTE — H&P
[facility] Medicine  History & Physical    Patient Name: Lizeth Henderson  MRN: [id number]  Patient Class: IP- Inpatient  Admission Date: 6/29/2025  Attending Physician: No att. providers found   Primary Care Provider: Jennifer Flynn MD         Patient information was obtained from patient and ER records.     Subjective:     Principal Problem:Atrial flutter    Chief Complaint:   Chief Complaint   Patient presents with    Shortness of Breath     Pt complaining of sob that started at 0600. Pt denies any chest pain    Tachycardia        HPI: Lizeth Henderson is an 84 year old female with history of HTN, CAD, HLD, DM, and lymphedema who presented to ED for further evaluation of progressive dyspnea over the last 2-3 days. She endorses exertional fatigue and PND. She denies fever, chest pain, or palpitations. She started compression therapy for lymphedema about one week ago.     In ED, imaging consistent with CHF and small bilateral pleural effusion. EKG also uncontrolled atrial flutter (-150s). She has received a total of 40 mg Cardizem IV and 60 mg Lasix without rate control. She was subsequently placed on Cardizem infusion triturating to 15 ml/hr. . Labs were unremarkable including initial troponin, TSH, and electrolytes. Cardiology has been consulted. She will be admitted to  for further management.    Patient is a full code.     Past Medical History:   Diagnosis Date    Adrenal adenoma 2016    Anxiety     Arthritis     Benign hypertension     Colon cancer age 61    colon    Coronary artery disease     Depression     Full dentures     General anesthetics causing adverse effect in therapeutic use     yells and talks when wakes up    Hyperlipidemia     Osteopenia     Shingles     Type 2 diabetes mellitus without complication, without long-term current use of insulin 1/23/2024    Wears glasses        Past Surgical History:   Procedure Laterality Date     APPENDECTOMY      BREAST BIOPSY Left     benign    BUNIONECTOMY Left     CATARACT EXTRACTION Bilateral     COLON SURGERY      2001    EYE SURGERY      cataract surg    HEMICOLECTOMY  2002    HERNIA REPAIR      x5    JOINT REPLACEMENT      knee    left toe surgery      joelle    PATENT DUCTUS ARTERIOUS LIGATION  1948    SALPINGOOPHORECTOMY  2002    due to colon cancer    TONSILLECTOMY, ADENOIDECTOMY         Review of patient's allergies indicates:   Allergen Reactions    Clindamycin      Diarrhea      Lisinopril      cough       No current facility-administered medications on file prior to encounter.     Current Outpatient Medications on File Prior to Encounter   Medication Sig    aspirin (ECOTRIN) 81 MG EC tablet Take 81 mg by mouth once daily.    atorvastatin (LIPITOR) 10 MG tablet TAKE 1 TABLET(10 MG) BY MOUTH EVERY DAY    buPROPion (WELLBUTRIN XL) 150 MG TB24 tablet Take 1 tablet (150 mg total) by mouth once daily.    busPIRone (BUSPAR) 10 MG tablet Take 2 tablets (20 mg total) by mouth 2 (two) times daily.    clonazePAM (KLONOPIN) 0.25 MG TbDL Take 1 tablet (0.25 mg total) by mouth 2 (two) times daily as needed (anxiety).    EScitalopram oxalate (LEXAPRO) 10 MG tablet Take 1.5 tablets (15 mg total) by mouth once daily.    ferrous sulfate (FEOSOL) 325 mg (65 mg iron) Tab tablet Take 1 tablet (325 mg total) by mouth every other day.    HYDROcodone-acetaminophen (NORCO) 7.5-325 mg per tablet Take 1 tablet by mouth every 6 (six) hours as needed for Pain.    ibandronate (BONIVA) 150 mg tablet Take 1 tablet (150 mg total) by mouth every 30 days.    losartan (COZAAR) 100 MG tablet TAKE 1 TABLET(100 MG) BY MOUTH EVERY DAY    meloxicam (MOBIC) 7.5 MG tablet TAKE 1 TABLET(7.5 MG) BY MOUTH DAILY AS NEEDED FOR PAIN    mirabegron (MYRBETRIQ) 50 mg Tb24 Take 1 tablet (50 mg total) by mouth once daily.    pramipexole (MIRAPEX) 0.125 MG tablet TAKE 1 TABLET BY MOUTH EVERY EVENING    suvorexant (BELSOMRA) 10 mg Tab Take 1  tablet by mouth every evening.     Family History       Problem Relation (Age of Onset)    Alzheimer's disease Mother    Anxiety disorder Daughter    Arthritis Mother, Father    Cancer Father, Maternal Uncle, Cousin    Depression Daughter, Daughter, Daughter    Early death Maternal Grandmother    Hearing loss Father    Hypertension Father    Kidney disease Daughter    Ulcerative colitis Daughter          Tobacco Use    Smoking status: Never     Passive exposure: Never    Smokeless tobacco: Never   Substance and Sexual Activity    Alcohol use: No    Drug use: Never    Sexual activity: Not Currently     Review of Systems  Objective:     Vital Signs (Most Recent):  Temp: 97.4 °F (36.3 °C) (06/29/25 0637)  Pulse: (!) 154 (06/29/25 0930)  Resp: (!) 31 (06/29/25 0930)  BP: 132/75 (06/29/25 0930)  SpO2: 98 % (06/29/25 0930) Vital Signs (24h Range):  Temp:  [97.4 °F (36.3 °C)] 97.4 °F (36.3 °C)  Pulse:  [106-162] 154  Resp:  [25-34] 31  SpO2:  [89 %-98 %] 98 %  BP: (131-175)/(71-99) 132/75     Weight: 112.1 kg (247 lb 3.2 oz)  Body mass index is 37.59 kg/m².     Physical Exam  Constitutional:       Appearance: She is well-developed.   HENT:      Head: Normocephalic and atraumatic.   Cardiovascular:      Rate and Rhythm: Regular rhythm. Tachycardia present.      Heart sounds: Normal heart sounds. No murmur heard.  Pulmonary:      Effort: Pulmonary effort is normal. No respiratory distress.      Breath sounds: Rales present.      Comments: Communicative dyspnea  Abdominal:      General: Bowel sounds are normal. There is no distension.      Palpations: Abdomen is soft.      Tenderness: There is no abdominal tenderness.   Musculoskeletal:         General: Normal range of motion.      Cervical back: Normal range of motion and neck supple.   Skin:     General: Skin is warm and dry.      Comments: Compression wrap in placed bilateral lower extremities   Neurological:      Mental Status: She is alert and oriented to person, place,  and time.             Significant Labs: All pertinent labs within the past 24 hours have been reviewed.  CBC:   Recent Labs   Lab 06/29/25  0648   WBC 11.98   HGB 10.9*   HCT 34.8*        CMP:   Recent Labs   Lab 06/29/25  0648      K 4.6      CO2 21*   *   BUN 18   CREATININE 1.0   CALCIUM 9.0   PROT 7.4   ALBUMIN 3.4*   BILITOT 0.5   ALKPHOS 110   AST 19   ALT 13   ANIONGAP 12       Significant Imaging:   Imaging Results              CTA Chest Non-Coronary (PE Studies) (Final result)  Result time 06/29/25 08:28:18      Final result by Miguel Lu MD (06/29/25 08:28:18)                   Impression:      Negative for pulmonary emboli.    CHF.  Interstitial congestion with very small bilateral pleural effusions.            All CT scans at [this location] are performed using dose modulation techniques as appropriate to a performed exam including the following: automated exposure control; adjustment of the mA and/or kV according to patient size (this includes techniques or standardized protocols for targeted exams where dose is matched to indication / reason for exam; i.e. extremities or head); use of iterative reconstruction technique.    Finalized on: 6/29/2025 8:28 AM By:  Miguel Lu MD  [facility]# [id number]      2025-06-29 08:30:23.195     [facility]               Narrative:    EXAM:  CTA CHEST NON CORONARY (PE STUDIES)    CLINICAL HISTORY:  Pulmonary embolism (PE) suspected, unknown D-dimer;    COMPARISON STUDIES: Chest x-ray 06/29/2025    TECHNIQUE: CT angiogram performed of the chest following administration of IV contrast.  Multiplanar MIP reformations performed.    FINDINGS:    Heart:  Cardiomegaly.  No pericardial effusions.  Minimal coronary arterial calcifications.    Mediastinum:  There is no mediastinal adenopathy.    Vasculature:  Negative for pulmonary emboli.  Moderate aortic atherosclerosis.    Lungs:  Very small bilateral pleural effusions.  Vascular congestion with  interstitial thickening in the lower lungs suggesting interstitial edema.    Visualized Upper Abdomen:  Unremarkable.    Bones:  No acute osseous findings.    Chest Wall:  Unremarkable.                                         X-Ray Chest AP Portable (Final result)  Result time 06/29/25 07:12:14      Final result by Negro Hinson MD (06/29/25 07:12:14)                   Impression:      1.  Interval development of vascular congestion versus reticular interstitial changes throughout the mid lower lungs with cardiac silhouette size enlargement and a small left effusion, most consistent with CHF.  2.  Fullness of the right hilum, likely vascular nature.  Nonemergent chest CT scan recommended to further evaluate.  3.  Stable findings as noted above.    Finalized on: 6/29/2025 7:12 AM By:  Negro Hinson MD  [facility]# [id number]      2025-06-29 07:14:19.035     [facility]               Narrative:    EXAM: XR CHEST AP PORTABLE    CLINICAL INDICATION: Shortness of breath.  Dyspnea    FINDINGS: Comparisons are made to 06/16/2025. EKG was July the chest.  Interval development of vascular congestion/reticular interstitial changes throughout the mid and lower lungs.  Small left effusion.  Cardiac silhouette size enlargement again seen.  Right hilar fullness again seen.  There are atherosclerotic calcifications of the aortic knob and tortuosity of the aorta.  There are degenerative changes of the spine and both shoulder girdles.                                        Assessment/Plan:     Assessment & Plan  Atrial flutter  HR currently 150s. S/p 40 mg Cardizem IV followed by Cardizem infusion at 15/ml/hr. Rate still uncontrolled.     Plan:   --Amiodarone infusion with loading dose.  --Cardiology will consider cardioversion if she becomes hemodynamically unstable  --Heparin infusion for CVA prophylaxis  -- TSH wnl  -- Serial Troponin  Essential hypertension  Patient's blood pressure range in the last 24 hours was: BP  Min: 114/70  Max:  175/99.The patient's inpatient anti-hypertensive regimen is listed below:  Current Antihypertensives  diltiaZEM 125 mg in dextrose 5% 125 mL infusion (non-titrating), Continuous, Intravenous  furosemide injection 40 mg, 2 times daily before meals, Intravenous  losartan tablet 100 mg, Daily, Oral  Losartan 100 mg daily  Plan  - BP is controlled, no changes needed to their regimen    Anemia  Anemia is likely due to Iron deficiency. Most recent hemoglobin and hematocrit are listed below.  Recent Labs     06/29/25  0648 06/29/25  0925   HGB 10.9* 10.7*   HCT 34.8* 35.0*     Plan  - Monitor serial CBC: Daily  - Transfuse PRBC if patient becomes hemodynamically unstable, symptomatic or H/H drops below 7/21.  - Patient has not received any PRBC transfusions to date  - Patient's anemia is currently stable  Chronic kidney disease, stage 3a  Creatine stable for now. BMP reviewed- noted Estimated Creatinine Clearance: 54 mL/min (based on SCr of 1 mg/dL). according to latest data. Based on current GFR, CKD stage is stage 3 - GFR 30-59.  Monitor UOP and serial BMP and adjust therapy as needed. Renally dose meds. Avoid nephrotoxic medications and procedures.  Lymphedema  Compression therapy outpatient  Keep dressing intact    Acute on chronic combined systolic and diastolic congestive heart failure  Patient has Combined Systolic and Diastolic heart failure that is Acute on chronic. On presentation their CHF was decompensated. Evidence of decompensated CHF on presentation includes: edema, crackles on lung auscultation, paroxysmal nocturnal dyspnea (PND), and dyspnea on exertion (CAMACHO). The etiology of their decompensation is likely atrial flutter. Most recent BNP and echo results are listed below.  Recent Labs     06/29/25  0648   *     Latest ECHO  Results for orders placed during the hospital encounter of 03/11/25    Echo    Interpretation Summary    Left Ventricle: The left ventricle is normal in size. There is mild to  moderate concentric hypertrophy. There is low normal systolic function. Ejection fraction is approximately 50%. Grade I diastolic dysfunction.    Right Ventricle: The right ventricle is normal in size. Systolic function is normal.    Left Atrium: Moderately dilated    Aortic Valve: There is mild aortic valve sclerosis.    Mitral Valve: There is mild bileaflet sclerosis. There is moderate posterior mitral annular calcification. There is mild to moderate regurgitation.    Tricuspid Valve: There is mild to moderate regurgitation.    Pulmonary Artery: There is moderate pulmonary hypertension. The estimated pulmonary artery systolic pressure is 49 mmHg.    IVC/SVC: Normal venous pressure at 3 mmHg.    Current Heart Failure Medications  furosemide injection 40 mg, 2 times daily before meals, Intravenous  losartan tablet 100 mg, Daily, Oral    Plan    - Lasix 40 mg IV BID  - Control HR  - Monitor strict I&Os and daily weights.    - Place on telemetry  - Low sodium diet  - Place on fluid restriction of 1.5 L.   - Cardiology has been consulted  - The patient's volume status is worsening as indicated by edema, paroxysmal nocturnal dyspnea (PND), dyspnea on exertion (CAMACHO), and shortness of breath. Will continue management as above  -   VTE Risk Mitigation (From admission, onward)           Ordered     IP VTE HIGH RISK PATIENT  Once         06/29/25 1057     Place sequential compression device  Until discontinued         06/29/25 1057     heparin 25,000 units in dextrose 5% (100 units/ml) IV bolus from bag LOW INTENSITY nomogram - [facility]  As needed (PRN)        Question:  Heparin Infusion Adjustment (DO NOT MODIFY ANSWER)  Answer:  [email or web address]\Images\Pharmacy\HeparinInfusions\heparin LOW INTENSITY nomogram for [facility] [id number]    06/29/25 0901     heparin 25,000 units in dextrose 5% (100 units/ml) IV bolus from bag LOW INTENSITY nomogram - [facility]  As needed (PRN)        Question:  Heparin Infusion Adjustment (DO NOT MODIFY  ANSWER)  Answer:  [email or web address]\epic\Images\Pharmacy\HeparinInfusions\heparin LOW INTENSITY nomogram for [facility] [id number]    06/29/25 0901     heparin 25,000 units in dextrose 5% 250 mL (100 units/mL) infusion LOW INTENSITY nomogram - [facility]  Continuous        Question:  Begin at (units/kg/hr)  Answer:  12    06/29/25 0901                    Kai Estrella NP  Department of Hospital Medicine  [facility] - Telemetry ([facility])

## 2025-06-29 NOTE — ED PROVIDER NOTES
SCRIBE #1 NOTE: I, Guille Reynolds and Katie Freitas, am scribing for, and in the presence of, Heron Joy Jr., MD. I have scribed the entire note.       History     Chief Complaint   Patient presents with    Shortness of Breath     Pt complaining of sob that started at 0600. Pt denies any chest pain    Tachycardia     Review of patient's allergies indicates:   Allergen Reactions    Clindamycin      Diarrhea      Lisinopril      cough         History of Present Illness     HPI    6/29/2025, 6:43 AM  History obtained from the patient, medical records, and daughter      History of Present Illness: Lizeth Henderson is a 84 y.o. female patient with a PMHx of lymphedema, colon cancer, anxiety, depression, HLD, CAD, and DM Type 2 who presents to the Emergency Department for evaluation of SOB which began at 6 PM last night. Pt has had open heart surgery in the past, but reports no issue with blood clots. Pt had previously came to the ED for a left-sided fall that occurred 2 weeks ago. Symptoms are constant and moderate in severity. No mitigating or exacerbating factors reported. Patient denies any fever, chills, chest pain, wheezing, or cough. No prior Tx specified.  No further complaints or concerns at this time.       Arrival mode: Personal Transportation    PCP: Jennifer Flynn MD        Past Medical History:  Past Medical History:   Diagnosis Date    Adrenal adenoma 2016    Anxiety     Arthritis     Benign hypertension     Colon cancer age 61    colon    Coronary artery disease     Depression     Full dentures     General anesthetics causing adverse effect in therapeutic use     yells and talks when wakes up    Hyperlipidemia     Osteopenia     Shingles     Type 2 diabetes mellitus without complication, without long-term current use of insulin 1/23/2024    Wears glasses        Past Surgical History:  Past Surgical History:   Procedure Laterality Date    APPENDECTOMY      BREAST BIOPSY Left     benign     BUNIONECTOMY Left     CATARACT EXTRACTION Bilateral     COLON SURGERY      2001    EYE SURGERY      cataract surg    HEMICOLECTOMY  2002    HERNIA REPAIR      x5    JOINT REPLACEMENT      knee    left toe surgery      hammertoe    PATENT DUCTUS ARTERIOUS LIGATION  1948    SALPINGOOPHORECTOMY  2002    due to colon cancer    TONSILLECTOMY, ADENOIDECTOMY           Family History:  Family History   Problem Relation Name Age of Onset    Alzheimer's disease Mother Tracy     Arthritis Mother Tracy     Cancer Father price         Colon    Hypertension Father price     Arthritis Father price     Hearing loss Father price     Depression Daughter Connie     Kidney disease Daughter Ruba     Ulcerative colitis Daughter Ruba     Depression Daughter Ruba     Depression Daughter Veronica     Anxiety disorder Daughter Veronica         PTSD    Cancer Maternal Uncle Nikolas         Colon    Early death Maternal Grandmother Chata     Cancer Cousin          colon cancer    Amblyopia Neg Hx      Blindness Neg Hx      Cataracts Neg Hx      Diabetes Neg Hx      Glaucoma Neg Hx      Macular degeneration Neg Hx      Retinal detachment Neg Hx      Strabismus Neg Hx      Stroke Neg Hx      Thyroid disease Neg Hx         Social History:  Social History     Tobacco Use    Smoking status: Never     Passive exposure: Never    Smokeless tobacco: Never   Substance and Sexual Activity    Alcohol use: No    Drug use: Never    Sexual activity: Not Currently        Review of Systems     Review of Systems   Constitutional:  Negative for chills and fever.   HENT:  Negative for sore throat.    Respiratory:  Positive for shortness of breath. Negative for cough and wheezing.    Cardiovascular:  Negative for chest pain.   Gastrointestinal:  Negative for nausea.   Genitourinary:  Negative for dysuria.   Musculoskeletal:  Negative for back pain.   Skin:  Negative for rash.   Neurological:  Negative for weakness.   Hematological:  Does not bruise/bleed easily.   All other  systems reviewed and are negative.     Physical Exam     Initial Vitals [06/29/25 0637]   BP Pulse Resp Temp SpO2   (!) 175/99 (!) 162 (!) 25 97.4 °F (36.3 °C) 95 %      MAP       --          Physical Exam  Nursing Notes and Vital Signs Reviewed.  Constitutional: Patient is in no acute distress. Well-developed and well-nourished.  Head: Atraumatic. Normocephalic.  Eyes:  EOM intact.  No scleral icterus.  ENT: Mucous membranes are moist.  Nares clear   Neck:  Full ROM. No JVD.  Cardiovascular: Regular rate. Regular rhythm No murmurs, rubs, or gallops. Distal pulses are 2+ and symmetric  Pulmonary/Chest: No respiratory distress. Clear to auscultation bilaterally. No wheezing or rales.  Equal chest wall rise bilaterally  Abdominal: Soft and non-distended.  There is no tenderness.  No rebound, guarding, or rigidity. Good bowel sounds.  Genitourinary: No CVA tenderness.  No suprapubic tenderness  Musculoskeletal: Moves all extremities. No obvious deformities.  5 x 5 strength in all extremities   Skin: Warm and dry.  Neurological:  Alert, awake, and appropriate.  Normal speech.  No acute focal neurological deficits are appreciated.  Two through 12 intact bilaterally.  Psychiatric: Normal affect. Good eye contact. Appropriate in content.     ED Course   Critical Care    Date/Time: 6/29/2025 10:00 AM    Performed by: Heron Joy Jr., MD  Authorized by: Heron Joy Jr., MD  Direct patient critical care time: 16 minutes  Additional history critical care time: 8 minutes  Ordering / reviewing critical care time: 12 minutes  Documentation critical care time: 12 minutes  Consulting other physicians critical care time: 6 minutes  Consult with family critical care time: 5 minutes  Total critical care time (exclusive of procedural time) : 59 minutes  Critical care time was exclusive of separately billable procedures and treating other patients and teaching time.  Critical care was necessary to treat or prevent imminent  or life-threatening deterioration of the following conditions: circulatory failure and cardiac failure.  Critical care was time spent personally by me on the following activities: development of treatment plan with patient or surrogate, discussions with consultants, interpretation of cardiac output measurements, evaluation of patient's response to treatment, examination of patient, obtaining history from patient or surrogate, ordering and review of laboratory studies, ordering and performing treatments and interventions, ordering and review of radiographic studies, pulse oximetry, re-evaluation of patient's condition, review of old charts and transcutaneous pacing.        ED Vital Signs:  Vitals:    06/29/25 0650 06/29/25 0658 06/29/25 0702 06/29/25 0703   BP: (!) 168/85 (!) 140/76 (!) 140/76    Pulse: (!) 153 (!) 138 107 (!) 125   Resp: (!) 33 (!) 33 (!) 30 (!) 29   Temp:       TempSrc:       SpO2: 96% 95% 95% 95%   Weight:       Height:        06/29/25 0708 06/29/25 0715 06/29/25 0725 06/29/25 0730   BP:  (!) 140/71  131/78   Pulse: 106 (!) 115 (!) 143 (!) 135   Resp: (!) 30 (!) 30 (!) 31 (!) 34   Temp:       TempSrc:       SpO2: 95% 96% 95% 95%   Weight:       Height:        06/29/25 0800 06/29/25 0802 06/29/25 0830 06/29/25 0838   BP: (!) 145/88 (!) 152/86 (!) 148/86    Pulse: (!) 150 (!) 151 (!) 153 (!) 153   Resp: (!) 29 (!) 30 (!) 34 (!) 34   Temp:       TempSrc:       SpO2: 95% (!) 93% (!) 89% 95%   Weight:       Height:        06/29/25 0845 06/29/25 0900 06/29/25 0930   BP: (!) 148/86 (!) 150/89 132/75   Pulse: (!) 154 (!) 154 (!) 154   Resp: (!) 33 (!) 30 (!) 31   Temp:      TempSrc:      SpO2: 97% 97% 98%   Weight:      Height:          Abnormal Lab Results:  Labs Reviewed   B-TYPE NATRIURETIC PEPTIDE - Abnormal       Result Value     (*)    COMPREHENSIVE METABOLIC PANEL - Abnormal    Sodium 139      Potassium 4.6      Chloride 106      CO2 21 (*)     Glucose 136 (*)     BUN 18      Creatinine 1.0       Calcium 9.0      Protein Total 7.4      Albumin 3.4 (*)     Bilirubin Total 0.5            AST 19      ALT 13      Anion Gap 12      eGFR 55 (*)    CBC WITH DIFFERENTIAL - Abnormal    WBC 11.98      RBC 4.16      HGB 10.9 (*)     HCT 34.8 (*)     MCV 84      MCH 26.2 (*)     MCHC 31.3 (*)     RDW 16.0 (*)     Platelet Count 290      MPV 9.7      Nucleated RBC 0      Neut % 81.4 (*)     Lymph % 9.5 (*)     Mono % 7.8      Eos % 0.3      Basophil % 0.5      Imm Grans % 0.5      Neut # 9.74 (*)     Lymph # 1.14      Mono # 0.94      Eos # 0.04      Baso # 0.06      Imm Grans # 0.06 (*)    CBC WITH DIFFERENTIAL - Abnormal    WBC 11.11      RBC 4.17      HGB 10.7 (*)     HCT 35.0 (*)     MCV 84      MCH 25.7 (*)     MCHC 30.6 (*)     RDW 15.9 (*)     Platelet Count 319      MPV 10.3      Nucleated RBC 0      Neut % 82.6 (*)     Lymph % 8.6 (*)     Mono % 7.6      Eos % 0.2      Basophil % 0.5      Imm Grans % 0.5      Neut # 9.19 (*)     Lymph # 0.96 (*)     Mono # 0.84      Eos # 0.02      Baso # 0.05      Imm Grans # 0.05 (*)    HEPATITIS C ANTIBODY - Normal    Hep C Ab Interp Negative     HIV 1 / 2 ANTIBODY - Normal    HIV 1/2 Ag/Ab Negative     TROPONIN I - Normal    Troponin-I 0.018     TSH - Normal    TSH 1.184     CBC W/ AUTO DIFFERENTIAL    Narrative:     The following orders were created for panel order CBC auto differential.  Procedure                               Abnormality         Status                     ---------                               -----------         ------                     CBC with Differential[[id number]]       Abnormal            Final result                 Please view results for these tests on the individual orders.   CBC W/ AUTO DIFFERENTIAL    Narrative:     The following orders were created for panel order CBC auto differential.  Procedure                               Abnormality         Status                     ---------                               -----------          ------                     CBC with Differential[[id number]]       Abnormal            Final result                 Please view results for these tests on the individual orders.   HEP C VIRUS HOLD SPECIMEN   APTT   PROTIME-INR        All Lab Results:  Results for orders placed or performed during the hospital encounter of 06/29/25   Hepatitis C Antibody    Collection Time: 06/29/25  6:48 AM   Result Value Ref Range    Hep C Ab Interp Negative Negative   HIV 1/2 Ag/Ab (4th Gen)    Collection Time: 06/29/25  6:48 AM   Result Value Ref Range    HIV 1/2 Ag/Ab Negative Negative   Brain natriuretic peptide    Collection Time: 06/29/25  6:48 AM   Result Value Ref Range     (H) 0 - 99 pg/mL   Comprehensive metabolic panel    Collection Time: 06/29/25  6:48 AM   Result Value Ref Range    Sodium 139 136 - 145 mmol/L    Potassium 4.6 3.5 - 5.1 mmol/L    Chloride 106 95 - 110 mmol/L    CO2 21 (L) 23 - 29 mmol/L    Glucose 136 (H) 70 - 110 mg/dL    BUN 18 8 - 23 mg/dL    Creatinine 1.0 0.5 - 1.4 mg/dL    Calcium 9.0 8.7 - 10.5 mg/dL    Protein Total 7.4 6.0 - 8.4 gm/dL    Albumin 3.4 (L) 3.5 - 5.2 g/dL    Bilirubin Total 0.5 0.1 - 1.0 mg/dL     40 - 150 unit/L    AST 19 11 - 45 unit/L    ALT 13 10 - 44 unit/L    Anion Gap 12 8 - 16 mmol/L    eGFR 55 (L) >60 mL/min/1.73/m2   Troponin I    Collection Time: 06/29/25  6:48 AM   Result Value Ref Range    Troponin-I 0.018 <=0.026 ng/mL   TSH    Collection Time: 06/29/25  6:48 AM   Result Value Ref Range    TSH 1.184 0.400 - 4.000 uIU/mL   CBC with Differential    Collection Time: 06/29/25  6:48 AM   Result Value Ref Range    WBC 11.98 3.90 - 12.70 K/uL    RBC 4.16 4.00 - 5.40 M/uL    HGB 10.9 (L) 12.0 - 16.0 gm/dL    HCT 34.8 (L) 37.0 - 48.5 %    MCV 84 82 - 98 fL    MCH 26.2 (L) 27.0 - 31.0 pg    MCHC 31.3 (L) 32.0 - 36.0 g/dL    RDW 16.0 (H) 11.5 - 14.5 %    Platelet Count 290 150 - 450 K/uL    MPV 9.7 9.2 - 12.9 fL    Nucleated RBC 0 <=0 /100 WBC    Neut % 81.4  (H) 38 - 73 %    Lymph % 9.5 (L) 18 - 48 %    Mono % 7.8 4 - 15 %    Eos % 0.3 <=8 %    Basophil % 0.5 <=1.9 %    Imm Grans % 0.5 0.0 - 0.5 %    Neut # 9.74 (H) 1.8 - 7.7 K/uL    Lymph # 1.14 1 - 4.8 K/uL    Mono # 0.94 0.3 - 1 K/uL    Eos # 0.04 <=0.5 K/uL    Baso # 0.06 <=0.2 K/uL    Imm Grans # 0.06 (H) 0.00 - 0.04 K/uL   CBC with Differential    Collection Time: 06/29/25  9:25 AM   Result Value Ref Range    WBC 11.11 3.90 - 12.70 K/uL    RBC 4.17 4.00 - 5.40 M/uL    HGB 10.7 (L) 12.0 - 16.0 gm/dL    HCT 35.0 (L) 37.0 - 48.5 %    MCV 84 82 - 98 fL    MCH 25.7 (L) 27.0 - 31.0 pg    MCHC 30.6 (L) 32.0 - 36.0 g/dL    RDW 15.9 (H) 11.5 - 14.5 %    Platelet Count 319 150 - 450 K/uL    MPV 10.3 9.2 - 12.9 fL    Nucleated RBC 0 <=0 /100 WBC    Neut % 82.6 (H) 38 - 73 %    Lymph % 8.6 (L) 18 - 48 %    Mono % 7.6 4 - 15 %    Eos % 0.2 <=8 %    Basophil % 0.5 <=1.9 %    Imm Grans % 0.5 0.0 - 0.5 %    Neut # 9.19 (H) 1.8 - 7.7 K/uL    Lymph # 0.96 (L) 1 - 4.8 K/uL    Mono # 0.84 0.3 - 1 K/uL    Eos # 0.02 <=0.5 K/uL    Baso # 0.05 <=0.2 K/uL    Imm Grans # 0.05 (H) 0.00 - 0.04 K/uL     *Note: Due to a large number of results and/or encounters for the requested time period, some results have not been displayed. A complete set of results can be found in Results Review.       Imaging Results:  Imaging Results              CTA Chest Non-Coronary (PE Studies) (Final result)  Result time 06/29/25 08:28:18      Final result by Miguel Lu MD (06/29/25 08:28:18)                   Impression:      Negative for pulmonary emboli.    CHF.  Interstitial congestion with very small bilateral pleural effusions.            All CT scans at [this location] are performed using dose modulation techniques as appropriate to a performed exam including the following: automated exposure control; adjustment of the mA and/or kV according to patient size (this includes techniques or standardized protocols for targeted exams where dose is matched  to indication / reason for exam; i.e. extremities or head); use of iterative reconstruction technique.    Finalized on: 6/29/2025 8:28 AM By:  Miguel Lu MD  [facility]# [id number]      2025-06-29 08:30:23.195     [facility]               Narrative:    EXAM:  CTA CHEST NON CORONARY (PE STUDIES)    CLINICAL HISTORY:  Pulmonary embolism (PE) suspected, unknown D-dimer;    COMPARISON STUDIES: Chest x-ray 06/29/2025    TECHNIQUE: CT angiogram performed of the chest following administration of IV contrast.  Multiplanar MIP reformations performed.    FINDINGS:    Heart:  Cardiomegaly.  No pericardial effusions.  Minimal coronary arterial calcifications.    Mediastinum:  There is no mediastinal adenopathy.    Vasculature:  Negative for pulmonary emboli.  Moderate aortic atherosclerosis.    Lungs:  Very small bilateral pleural effusions.  Vascular congestion with interstitial thickening in the lower lungs suggesting interstitial edema.    Visualized Upper Abdomen:  Unremarkable.    Bones:  No acute osseous findings.    Chest Wall:  Unremarkable.                                         X-Ray Chest AP Portable (Final result)  Result time 06/29/25 07:12:14      Final result by Negro Hinson MD (06/29/25 07:12:14)                   Impression:      1.  Interval development of vascular congestion versus reticular interstitial changes throughout the mid lower lungs with cardiac silhouette size enlargement and a small left effusion, most consistent with CHF.  2.  Fullness of the right hilum, likely vascular nature.  Nonemergent chest CT scan recommended to further evaluate.  3.  Stable findings as noted above.    Finalized on: 6/29/2025 7:12 AM By:  Negro Hinson MD  [facility]# [id number]      2025-06-29 07:14:19.035     [facility]               Narrative:    EXAM: XR CHEST AP PORTABLE    CLINICAL INDICATION: Shortness of breath.  Dyspnea    FINDINGS: Comparisons are made to 06/16/2025. EKG was July the chest.  Interval development of vascular  congestion/reticular interstitial changes throughout the mid and lower lungs.  Small left effusion.  Cardiac silhouette size enlargement again seen.  Right hilar fullness again seen.  There are atherosclerotic calcifications of the aortic knob and tortuosity of the aorta.  There are degenerative changes of the spine and both shoulder girdles.                                         The EKG was ordered, reviewed, and independently interpreted by the ED provider.  Interpretation time: 6:37  Rate: 155 BPM  Rhythm: Wide QRS tachycardia with occasional Premature ventricular complexes  Interpretation: Nonspecific intraventricular conduction delay. Nonspecific ST and T wave abnormality. No STEMI.    The EKG was ordered, reviewed, and independently interpreted by the ED provider.  Interpretation time: 6:47  Rate: 129 BPM  Rhythm: Atrial flutter with variable A-V block  Interpretation: Abnormal EKG. No STEMI.         The Emergency Provider reviewed the vital signs and test results, which are outlined above.     ED Discussion     8:50 AM: Discussed case with Kai Estrella NP ([facility]). Dr. Yeager agrees with current care and management of pt and accepts admission.   Admitting Service: Hospital Medicine  Admitting Physician: Dr. Yeager  Admit to: Inpatient/ Tele    8:50 AM: Re-evaluated pt.  I have discussed test results, shared treatment plan, and the need for admission with patient/family/caretaker at bedside. Pt and/or family/caretaker express understanding at this time and agree with all information. All questions answered. Pt/caretaker/family member(s) have no further questions or concerns at this time. Pt is ready for admit.    8:58 AM: Discussed pt's case with Gerald Benites MD (Interventional Cardiology) who recommends starting heparin gtt and Cardizem gtt. He says if Cardizem gtt is not effective, to start Amiodarone gtt. Also Lasix IV. States if she becomes more hemodynamically unstable, to cardiovert her  urgently.              Medical Decision Making  Differential diagnosis includes pneumonia, COPD exacerbation, asthma exacerbation, heart failure, anemia, ACS, bronchitis, viral syndrome, influenza, covid, RSV, pneumothorax, pleural effusion, pulmonary embolism, mucus plugging, bronchiectasis, diaphragmatic paralysis, pneumoconiosis CHF.  Atrial flutter    84-year-old white female with a history of PDA repair at a young age presenting with shortness breath worsening since yesterday.  She had an accelerated heart rate in the 150s.  The patient is treated with Cardizem and that has noted that this was atrial flutter.  The patient is heart rate was difficult to control and she went up on a Cardizem drip.  She has a pulmonary edema with congestive heart failure.  Cardiology has evaluated as has a Hospital Medicine she had being admitted for further evaluation and treatment      Amount and/or Complexity of Data Reviewed  Independent Historian: friend     Details: Daughter at bedside  Labs: ordered. Decision-making details documented in ED Course.     Details: The patient is with a hemoglobin 10.7.  CMP is benign.  Troponin is normal.  BNP is 304.  Radiology: ordered. Decision-making details documented in ED Course.     Details: Chest x-ray benign.  CTA shows no clot.  Pulmonary edema noted  ECG/medicine tests: ordered and independent interpretation performed. Decision-making details documented in ED Course.     Details: Atrial flutter x2  Discussion of management or test interpretation with external provider(s): Dr. Benites with the cardiology has evaluated the patient is has a hospital Medicine graciously accepted for admission    Risk  OTC drugs.  Prescription drug management.  Drug therapy requiring intensive monitoring for toxicity.  Decision regarding hospitalization.    Critical Care  Total time providing critical care: 59 minutes                ED Medication(s):  Medications   diltiaZEM 125 mg in dextrose 5% 125 mL  infusion (non-titrating) (15 mg/hr Intravenous Rate/Dose Change 6/29/25 0846)   heparin 25,000 units in dextrose 5% 250 mL (100 units/mL) infusion LOW INTENSITY nomogram - [facility] (12 Units/kg/hr × 112.1 kg Intravenous New Bag 6/29/25 0938)   heparin 25,000 units in dextrose 5% (100 units/ml) IV bolus from bag LOW INTENSITY nomogram - [facility] (has no administration in time range)   heparin 25,000 units in dextrose 5% (100 units/ml) IV bolus from bag LOW INTENSITY nomogram - [facility] (has no administration in time range)   amiodarone in dextrose 150 mg/100 mL (1.5 mg/mL) loading dose 150 mg (150 mg Intravenous New Bag 6/29/25 1000)   amiodarone 360 mg/200 mL (1.8 mg/mL) infusion (has no administration in time range)   amiodarone 360 mg/200 mL (1.8 mg/mL) infusion (has no administration in time range)   diltiaZEM injection 20 mg (20 mg Intravenous Given 6/29/25 0650)   diltiaZEM injection 20 mg (20 mg Intravenous Given 6/29/25 0658)   furosemide injection 60 mg (60 mg Intravenous Given 6/29/25 0730)   iohexoL (OMNIPAQUE 350) injection 100 mL (100 mLs Intravenous Given 6/29/25 0753)   LORazepam injection 1 mg (1 mg Intravenous Given 6/29/25 0819)   heparin 25,000 units in dextrose 5% (100 units/ml) IV bolus from bag LOW INTENSITY nomogram - [facility] (6,720 Units Intravenous Bolus from Bag 6/29/25 0938)       New Prescriptions    No medications on file               Scribe Attestation:   Scribe #1: I performed the above scribed service and the documentation accurately describes the services I performed. I attest to the accuracy of the note.     Attending:   Physician Attestation Statement for Scribe #1: I, Heron Joy Jr., MD, personally performed the services described in this documentation, as scribed by Guille Reynolds and Katie Freitas, in my presence, and it is both accurate and complete.           Clinical Impression       ICD-10-CM ICD-9-CM   1. Typical atrial flutter  I48.3 427.32   2. Shortness of breath  R06.02 786.05   3.  Acute congestive heart failure, unspecified heart failure type  I50.9 428.0   4. Acute pulmonary edema  J81.0 518.4       Disposition:   Disposition: Admitted  Condition: Heron Mcdonnell Jr., MD  06/29/25 1001

## 2025-06-29 NOTE — ASSESSMENT & PLAN NOTE
Patient's blood pressure range in the last 24 hours was: BP  Min: 114/70  Max: 175/99.The patient's inpatient anti-hypertensive regimen is listed below:  Current Antihypertensives  diltiaZEM 125 mg in dextrose 5% 125 mL infusion (non-titrating), Continuous, Intravenous  furosemide injection 40 mg, 2 times daily before meals, Intravenous  losartan tablet 100 mg, Daily, Oral  Losartan 100 mg daily  Plan  - BP is controlled, no changes needed to their regimen

## 2025-06-29 NOTE — ASSESSMENT & PLAN NOTE
Atrial Flutter w RVR.  New dx.    Discussed mgt w pt.  Cardizem gtt initiated on admit.  If HR fails to improve, start Amiodarone tx.  IV heparin gtt.  Will need to consider MAGO guided cardioversion this admission if fails to improve and convert to sinus.    Echocardiogram.  Will need DOAC prior to discharge.

## 2025-06-29 NOTE — ASSESSMENT & PLAN NOTE
Latest ECHO  Results for orders placed during the hospital encounter of 03/11/25    Echo    Interpretation Summary    Left Ventricle: The left ventricle is normal in size. There is mild to moderate concentric hypertrophy. There is low normal systolic function. Ejection fraction is approximately 50%. Grade I diastolic dysfunction.    Right Ventricle: The right ventricle is normal in size. Systolic function is normal.    Left Atrium: Moderately dilated    Aortic Valve: There is mild aortic valve sclerosis.    Mitral Valve: There is mild bileaflet sclerosis. There is moderate posterior mitral annular calcification. There is mild to moderate regurgitation.    Tricuspid Valve: There is mild to moderate regurgitation.    Pulmonary Artery: There is moderate pulmonary hypertension. The estimated pulmonary artery systolic pressure is 49 mmHg.    IVC/SVC: Normal venous pressure at 3 mmHg.    Current Heart Failure Medications  furosemide injection 40 mg, 2 times daily before meals, Intravenous  losartan tablet 100 mg, Daily, Oral    Plan  - Monitor strict I&Os and daily weights.    - Place on telemetry  - Low sodium diet  - Place on fluid restriction of 1.5 L    Diastolic CHF exacerbation triggered by atrial flutter w RVR.  IV Lasix diuresis.  GDMT for CHF.

## 2025-06-29 NOTE — ASSESSMENT & PLAN NOTE
HR currently 150s. S/p 40 mg Cardizem IV followed by Cardizem infusion at 15/ml/hr. Rate still uncontrolled.     Plan:   --Amiodarone infusion with loading dose.  --Cardiology will consider cardioversion if she becomes hemodynamically unstable  --Heparin infusion for CVA prophylaxis  -- TSH wnl  -- Serial Troponin

## 2025-06-29 NOTE — SUBJECTIVE & OBJECTIVE
Past Medical History:   Diagnosis Date    Adrenal adenoma 2016    Anxiety     Arthritis     Benign hypertension     Colon cancer age 61    colon    Coronary artery disease     Depression     Full dentures     General anesthetics causing adverse effect in therapeutic use     yells and talks when wakes up    Hyperlipidemia     Osteopenia     Shingles     Type 2 diabetes mellitus without complication, without long-term current use of insulin 1/23/2024    Wears glasses        Past Surgical History:   Procedure Laterality Date    APPENDECTOMY      BREAST BIOPSY Left     benign    BUNIONECTOMY Left     CATARACT EXTRACTION Bilateral     COLON SURGERY      2001    EYE SURGERY      cataract surg    HEMICOLECTOMY  2002    HERNIA REPAIR      x5    JOINT REPLACEMENT      knee    left toe surgery      hammertoe    PATENT DUCTUS ARTERIOUS LIGATION  1948    SALPINGOOPHORECTOMY  2002    due to colon cancer    TONSILLECTOMY, ADENOIDECTOMY         Review of patient's allergies indicates:   Allergen Reactions    Clindamycin      Diarrhea      Lisinopril      cough       No current facility-administered medications on file prior to encounter.     Current Outpatient Medications on File Prior to Encounter   Medication Sig    aspirin (ECOTRIN) 81 MG EC tablet Take 81 mg by mouth once daily.    atorvastatin (LIPITOR) 10 MG tablet TAKE 1 TABLET(10 MG) BY MOUTH EVERY DAY    buPROPion (WELLBUTRIN XL) 150 MG TB24 tablet Take 1 tablet (150 mg total) by mouth once daily.    busPIRone (BUSPAR) 10 MG tablet Take 2 tablets (20 mg total) by mouth 2 (two) times daily.    clonazePAM (KLONOPIN) 0.25 MG TbDL Take 1 tablet (0.25 mg total) by mouth 2 (two) times daily as needed (anxiety).    EScitalopram oxalate (LEXAPRO) 10 MG tablet Take 1.5 tablets (15 mg total) by mouth once daily.    ferrous sulfate (FEOSOL) 325 mg (65 mg iron) Tab tablet Take 1 tablet (325 mg total) by mouth every other day.    HYDROcodone-acetaminophen (NORCO) 7.5-325 mg per tablet  Take 1 tablet by mouth every 6 (six) hours as needed for Pain.    ibandronate (BONIVA) 150 mg tablet Take 1 tablet (150 mg total) by mouth every 30 days.    losartan (COZAAR) 100 MG tablet TAKE 1 TABLET(100 MG) BY MOUTH EVERY DAY    meloxicam (MOBIC) 7.5 MG tablet TAKE 1 TABLET(7.5 MG) BY MOUTH DAILY AS NEEDED FOR PAIN    mirabegron (MYRBETRIQ) 50 mg Tb24 Take 1 tablet (50 mg total) by mouth once daily.    pramipexole (MIRAPEX) 0.125 MG tablet TAKE 1 TABLET BY MOUTH EVERY EVENING    suvorexant (BELSOMRA) 10 mg Tab Take 1 tablet by mouth every evening.     Family History       Problem Relation (Age of Onset)    Alzheimer's disease Mother    Anxiety disorder Daughter    Arthritis Mother, Father    Cancer Father, Maternal Uncle, Cousin    Depression Daughter, Daughter, Daughter    Early death Maternal Grandmother    Hearing loss Father    Hypertension Father    Kidney disease Daughter    Ulcerative colitis Daughter          Tobacco Use    Smoking status: Never     Passive exposure: Never    Smokeless tobacco: Never   Substance and Sexual Activity    Alcohol use: No    Drug use: Never    Sexual activity: Not Currently     Review of Systems  Objective:     Vital Signs (Most Recent):  Temp: 97.4 °F (36.3 °C) (06/29/25 0637)  Pulse: (!) 154 (06/29/25 0930)  Resp: (!) 31 (06/29/25 0930)  BP: 132/75 (06/29/25 0930)  SpO2: 98 % (06/29/25 0930) Vital Signs (24h Range):  Temp:  [97.4 °F (36.3 °C)] 97.4 °F (36.3 °C)  Pulse:  [106-162] 154  Resp:  [25-34] 31  SpO2:  [89 %-98 %] 98 %  BP: (131-175)/(71-99) 132/75     Weight: 112.1 kg (247 lb 3.2 oz)  Body mass index is 37.59 kg/m².     Physical Exam  Constitutional:       Appearance: She is well-developed.   HENT:      Head: Normocephalic and atraumatic.   Cardiovascular:      Rate and Rhythm: Regular rhythm. Tachycardia present.      Heart sounds: Normal heart sounds. No murmur heard.  Pulmonary:      Effort: Pulmonary effort is normal. No respiratory distress.      Breath  sounds: Rales present.      Comments: Communicative dyspnea  Abdominal:      General: Bowel sounds are normal. There is no distension.      Palpations: Abdomen is soft.      Tenderness: There is no abdominal tenderness.   Musculoskeletal:         General: Normal range of motion.      Cervical back: Normal range of motion and neck supple.   Skin:     General: Skin is warm and dry.      Comments: Compression wrap in placed bilateral lower extremities   Neurological:      Mental Status: She is alert and oriented to person, place, and time.             Significant Labs: All pertinent labs within the past 24 hours have been reviewed.  CBC:   Recent Labs   Lab 06/29/25  0648   WBC 11.98   HGB 10.9*   HCT 34.8*        CMP:   Recent Labs   Lab 06/29/25  0648      K 4.6      CO2 21*   *   BUN 18   CREATININE 1.0   CALCIUM 9.0   PROT 7.4   ALBUMIN 3.4*   BILITOT 0.5   ALKPHOS 110   AST 19   ALT 13   ANIONGAP 12       Significant Imaging:   Imaging Results              CTA Chest Non-Coronary (PE Studies) (Final result)  Result time 06/29/25 08:28:18      Final result by Miguel Lu MD (06/29/25 08:28:18)                   Impression:      Negative for pulmonary emboli.    CHF.  Interstitial congestion with very small bilateral pleural effusions.            All CT scans at [this location] are performed using dose modulation techniques as appropriate to a performed exam including the following: automated exposure control; adjustment of the mA and/or kV according to patient size (this includes techniques or standardized protocols for targeted exams where dose is matched to indication / reason for exam; i.e. extremities or head); use of iterative reconstruction technique.    Finalized on: 6/29/2025 8:28 AM By:  Miguel Lu MD  [facility]# [id number]      2025-06-29 08:30:23.195     [facility]               Narrative:    EXAM:  CTA CHEST NON CORONARY (PE STUDIES)    CLINICAL HISTORY:  Pulmonary embolism (PE)  suspected, unknown D-dimer;    COMPARISON STUDIES: Chest x-ray 06/29/2025    TECHNIQUE: CT angiogram performed of the chest following administration of IV contrast.  Multiplanar MIP reformations performed.    FINDINGS:    Heart:  Cardiomegaly.  No pericardial effusions.  Minimal coronary arterial calcifications.    Mediastinum:  There is no mediastinal adenopathy.    Vasculature:  Negative for pulmonary emboli.  Moderate aortic atherosclerosis.    Lungs:  Very small bilateral pleural effusions.  Vascular congestion with interstitial thickening in the lower lungs suggesting interstitial edema.    Visualized Upper Abdomen:  Unremarkable.    Bones:  No acute osseous findings.    Chest Wall:  Unremarkable.                                         X-Ray Chest AP Portable (Final result)  Result time 06/29/25 07:12:14      Final result by Negro Hinson MD (06/29/25 07:12:14)                   Impression:      1.  Interval development of vascular congestion versus reticular interstitial changes throughout the mid lower lungs with cardiac silhouette size enlargement and a small left effusion, most consistent with CHF.  2.  Fullness of the right hilum, likely vascular nature.  Nonemergent chest CT scan recommended to further evaluate.  3.  Stable findings as noted above.    Finalized on: 6/29/2025 7:12 AM By:  Negro Hinson MD  [facility]# [id number]      2025-06-29 07:14:19.035     [facility]               Narrative:    EXAM: XR CHEST AP PORTABLE    CLINICAL INDICATION: Shortness of breath.  Dyspnea    FINDINGS: Comparisons are made to 06/16/2025. EKG was July the chest.  Interval development of vascular congestion/reticular interstitial changes throughout the mid and lower lungs.  Small left effusion.  Cardiac silhouette size enlargement again seen.  Right hilar fullness again seen.  There are atherosclerotic calcifications of the aortic knob and tortuosity of the aorta.  There are degenerative changes of the spine and both shoulder  girdles.

## 2025-06-29 NOTE — HPI
Cardiology consulted for atrial flutter.  Pt has h/o PDA repair (Dr. Francesco [place], [place], 1948), DM, chronic lymphedema, PHTN, MR, TR, DD, HTN.  Nonsmoker.  Admitted today with new onset atrial flutter w RVR and associated CHF sxs.  Noted dyspnea in last day or so.  No angina.    - troponin  CTA negative for PE.  Ecg on admit a flutter w variable rate.  Echo March 2025 EF 50%, DD, mild-mod MR/TR, PAP 49 mmhg.  
Lizeth Henderson is an 84 year old female with history of HTN, CAD, HLD, DM, and lymphedema who presented to ED for further evaluation of progressive dyspnea over the last 2-3 days. She endorses exertional fatigue and PND. She denies fever, chest pain, or palpitations. She started compression therapy for lymphedema about one week ago.     In ED, imaging consistent with CHF and small bilateral pleural effusion. EKG also uncontrolled atrial flutter (-150s). She has received a total of 40 mg Cardizem IV and 60 mg Lasix without rate control. She was subsequently placed on Cardizem infusion triturating to 15 ml/hr. . Labs were unremarkable including initial troponin, TSH, and electrolytes. Cardiology has been consulted. She will be admitted to  for further management.    Patient is a full code.   
Raoul Kuhn

## 2025-06-29 NOTE — ASSESSMENT & PLAN NOTE
Creatine stable for now. BMP reviewed- noted Estimated Creatinine Clearance: 54 mL/min (based on SCr of 1 mg/dL). according to latest data. Based on current GFR, CKD stage is stage 3 - GFR 30-59.  Monitor UOP and serial BMP and adjust therapy as needed. Renally dose meds. Avoid nephrotoxic medications and procedures.   abdominal pain

## 2025-06-29 NOTE — CONSULTS
[facility] ([facility]  Cardiology  Consult Note    Patient Name: Lizeth Henderson  MRN: [id number]  Admission Date: 6/29/2025  Hospital Length of Stay: 0 days  Code Status: Full Code   Attending Provider: Keon Yeager MD   Consulting Provider: Jimi Benites MD  Primary Care Physician: Jennifer Flynn MD  Principal Problem:Atrial flutter    Patient information was obtained from patient, past medical records, ER records, and primary team.     Inpatient consult to Cardiology  Consult performed by: Jimi Benites MD  Consult ordered by: Kai Estrella NP  Reason for consult: Atrial flutter        Subjective:     Chief Complaint:  Dyspnea    HPI:   Cardiology consulted for atrial flutter.  Pt has h/o PDA repair (Dr. Francesco [place], [place], 1948), DM, chronic lymphedema, PHTN, MR, TR, DD, HTN.  Nonsmoker.  Admitted today with new onset atrial flutter w RVR and associated CHF sxs.  Noted dyspnea in last day or so.  No angina.    - troponin  CTA negative for PE.  Ecg on admit a flutter w variable rate.  Echo March 2025 EF 50%, DD, mild-mod MR/TR, PAP 49 mmhg.    Past Medical History:   Diagnosis Date    Adrenal adenoma 2016    Anxiety     Arthritis     Benign hypertension     Colon cancer age 61    colon    Coronary artery disease     Depression     Full dentures     General anesthetics causing adverse effect in therapeutic use     yells and talks when wakes up    Hyperlipidemia     Osteopenia     Shingles     Type 2 diabetes mellitus without complication, without long-term current use of insulin 1/23/2024    Wears glasses        Past Surgical History:   Procedure Laterality Date    APPENDECTOMY      BREAST BIOPSY Left     benign    BUNIONECTOMY Left     CATARACT EXTRACTION Bilateral     COLON SURGERY      2001    EYE SURGERY      cataract surg    HEMICOLECTOMY  2002    HERNIA REPAIR      x5    JOINT REPLACEMENT      knee    left toe surgery      hammertoe    PATENT DUCTUS ARTERIOUS LIGATION  1948     SALPINGOOPHORECTOMY  2002    due to colon cancer    TONSILLECTOMY, ADENOIDECTOMY         Review of patient's allergies indicates:   Allergen Reactions    Clindamycin      Diarrhea      Lisinopril      cough       No current facility-administered medications on file prior to encounter.     Current Outpatient Medications on File Prior to Encounter   Medication Sig    aspirin (ECOTRIN) 81 MG EC tablet Take 81 mg by mouth once daily.    atorvastatin (LIPITOR) 10 MG tablet TAKE 1 TABLET(10 MG) BY MOUTH EVERY DAY    buPROPion (WELLBUTRIN XL) 150 MG TB24 tablet Take 1 tablet (150 mg total) by mouth once daily.    busPIRone (BUSPAR) 10 MG tablet Take 2 tablets (20 mg total) by mouth 2 (two) times daily.    clonazePAM (KLONOPIN) 0.25 MG TbDL Take 1 tablet (0.25 mg total) by mouth 2 (two) times daily as needed (anxiety).    EScitalopram oxalate (LEXAPRO) 10 MG tablet Take 1.5 tablets (15 mg total) by mouth once daily.    ferrous sulfate (FEOSOL) 325 mg (65 mg iron) Tab tablet Take 1 tablet (325 mg total) by mouth every other day.    HYDROcodone-acetaminophen (NORCO) 7.5-325 mg per tablet Take 1 tablet by mouth every 6 (six) hours as needed for Pain.    ibandronate (BONIVA) 150 mg tablet Take 1 tablet (150 mg total) by mouth every 30 days.    losartan (COZAAR) 100 MG tablet TAKE 1 TABLET(100 MG) BY MOUTH EVERY DAY    meloxicam (MOBIC) 7.5 MG tablet TAKE 1 TABLET(7.5 MG) BY MOUTH DAILY AS NEEDED FOR PAIN    mirabegron (MYRBETRIQ) 50 mg Tb24 Take 1 tablet (50 mg total) by mouth once daily.    pramipexole (MIRAPEX) 0.125 MG tablet TAKE 1 TABLET BY MOUTH EVERY EVENING    suvorexant (BELSOMRA) 10 mg Tab Take 1 tablet by mouth every evening.     Family History       Problem Relation (Age of Onset)    Alzheimer's disease Mother    Anxiety disorder Daughter    Arthritis Mother, Father    Cancer Father, Maternal Uncle, Cousin    Depression Daughter, Daughter, Daughter    Early death Maternal Grandmother    Hearing loss Father     Hypertension Father    Kidney disease Daughter    Ulcerative colitis Daughter          Tobacco Use    Smoking status: Never     Passive exposure: Never    Smokeless tobacco: Never   Substance and Sexual Activity    Alcohol use: No    Drug use: Never    Sexual activity: Not Currently     Review of Systems   Constitutional: Negative.   HENT: Negative.     Eyes: Negative.    Cardiovascular:  Positive for dyspnea on exertion, leg swelling, orthopnea and palpitations.   Respiratory:  Positive for shortness of breath.    Endocrine: Negative.    Hematologic/Lymphatic: Negative.    Skin: Negative.    Musculoskeletal: Negative.    Gastrointestinal: Negative.    Genitourinary: Negative.    Neurological: Negative.    Psychiatric/Behavioral: Negative.     Allergic/Immunologic: Negative.      Objective:     Vital Signs (Most Recent):  Temp: 97.9 °F (36.6 °C) (06/29/25 1047)  Pulse: (!) 123 (06/29/25 1047)  Resp: 16 (06/29/25 1047)  BP: 138/74 (06/29/25 1047)  SpO2: 95 % (06/29/25 1047) Vital Signs (24h Range):  Temp:  [97.4 °F (36.3 °C)-97.9 °F (36.6 °C)] 97.9 °F (36.6 °C)  Pulse:  [100-162] 123  Resp:  [16-35] 16  SpO2:  [89 %-98 %] 95 %  BP: (114-175)/(49-99) 138/74     Weight: 112.1 kg (247 lb 3.2 oz)  Body mass index is 37.59 kg/m².    SpO2: 95 %         Intake/Output Summary (Last 24 hours) at 6/29/2025 1126  Last data filed at 6/29/2025 1010  Gross per 24 hour   Intake 100 ml   Output 2200 ml   Net -2100 ml       Lines/Drains/Airways       Drain  Duration             Female External Urinary Catheter w/ Suction 06/29/25 0737 <1 day              Peripheral Intravenous Line  Duration             Peripheral IV Single Lumen 06/29/25 0647 20 G Right Antecubital <1 day    Peripheral IV Single Lumen 06/29/25 0925 20 G 1 3/4 in Anterior;Left Forearm <1 day                     Physical Exam  Vitals and nursing note reviewed.   Constitutional:       General: She is not in acute distress.     Appearance: Normal appearance. She is  "well-developed. She is ill-appearing. She is not diaphoretic.   HENT:      Head: Normocephalic.   Neck:      Thyroid: No thyromegaly.      Vascular: No carotid bruit or JVD.   Cardiovascular:      Rate and Rhythm: Tachycardia present. Rhythm irregularly irregular.      Pulses: Normal pulses.           Radial pulses are 2+ on the right side and 2+ on the left side.      Heart sounds: Normal heart sounds, S1 normal and S2 normal. No murmur heard.     No friction rub. No gallop.   Pulmonary:      Effort: Tachypnea present.      Breath sounds: Examination of the right-lower field reveals decreased breath sounds. Examination of the left-lower field reveals decreased breath sounds. Decreased breath sounds present. No wheezing or rales.   Abdominal:      General: Bowel sounds are normal. There is no abdominal bruit.      Palpations: Abdomen is soft.      Tenderness: There is no abdominal tenderness.   Musculoskeletal:      Cervical back: Neck supple.      Right lower leg: Edema present.      Left lower leg: Edema present.   Lymphadenopathy:      Cervical: No cervical adenopathy.   Skin:     General: Skin is warm.   Neurological:      Mental Status: She is alert and oriented to person, place, and time.   Psychiatric:         Behavior: Behavior normal. Behavior is cooperative.          Significant Labs: ABG: No results for input(s): "PH", "PCO2", "HCO3", "POCSATURATED", "BE" in the last 48 hours., Blood Culture: No results for input(s): "LABBLOO" in the last 48 hours., BMP:   Recent Labs   Lab 06/29/25  0648   *      K 4.6      CO2 21*   BUN 18   CREATININE 1.0   CALCIUM 9.0   MG 1.8   , CMP   Recent Labs   Lab 06/29/25  0648      K 4.6      CO2 21*   *   BUN 18   CREATININE 1.0   CALCIUM 9.0   PROT 7.4   ALBUMIN 3.4*   BILITOT 0.5   ALKPHOS 110   AST 19   ALT 13   ANIONGAP 12   , CBC   Recent Labs   Lab 06/29/25  0648 06/29/25  0925   WBC 11.98 11.11   HGB 10.9* 10.7*   HCT 34.8* 35.0* " "   319   , INR   Recent Labs   Lab 06/29/25  0925   INR 1.1   PROTIME 11.9   , Lipid Panel No results for input(s): "CHOL", "HDL", "LDLCALC", "TRIG", "CHOLHDL" in the last 48 hours., and Troponin No results for input(s): "TROPONINIHS" in the last 48 hours.    Significant Imaging: Echocardiogram: Transthoracic echo (TTE) complete ([facility] Only):   Results for orders placed or performed during the hospital encounter of 03/11/25   Echo   Result Value Ref Range    RA Width 3.66 cm    LA Vol (MOD) 64 mL    LV ESV A2C 71.70 mL    LA area A4C 18.56 cm2    LA area A2C 22.46 cm2    LV ESV A4C 52.87 mL    Triscuspid Valve Regurgitation Peak Gradient 46 mmHg    Left Atrium Major Axis 4.6 cm    Left Atrium Minor Axis 5.3 cm    RA Major Axis 4.54 cm    LV Diastolic Volume 102 mL    LV Systolic Volume 51 mL    MV Peak A Deonte 1.44 m/s    MV stenosis pressure 1/2 time 63.61 ms    TR Max Deonte 3.4 m/s    MV Peak E Deonte 1.24 m/s    PV mean gradient 2 mmHg    RVOT peak VTI 19.1 cm    RVOT peak deonte 0.76 m/s    Ao VTI 36.2 cm    Ao peak deonte 1.7 m/s    LVOT peak VTI 27.0 cm    LVOT peak deonte 1.4 m/s    LVOT diameter 2.0 cm    IVRT 103 msec    E wave deceleration time 219 msec    PV peak gradient 6 mmHg    AV mean gradient 7 mmHg    RV- cui basal diam 3.3 cm    TAPSE 3.02 cm    RVDD 3.34 cm    LA size 5.3 cm    Ascending aorta 2.59 cm    STJ 2.49 cm    Sinus 2.49 cm    LVIDs 3.5 2.1 - 4.0 cm    Ao root annulus 3.22 cm    PW 1.2 (A) 0.6 - 1.1 cm    IVS 1.5 (A) 0.6 - 1.1 cm    LVIDd 4.7 3.5 - 6.0 cm    PV PEAK VELOCITY 1.22 m/s    TDI LATERAL 0.09 m/s    Left Ventricular Outflow Tract Mean Gradient 4.46 mmHg    Left Ventricular Outflow Tract Mean Velocity 1.01 cm/s    Left Ventricular End Systolic Volume by Teichholz Method 51.13 mL    Left Ventricular End Diastolic Volume by Teichholz Method 102.20 mL    IVC diameter 1.92 cm    TDI SEPTAL 0.08 m/s    LV LATERAL E/E' RATIO 13.8 m/s    LV SEPTAL E/E' RATIO 15.5 m/s    RV/LV Ratio 0.70 cm    " FS 25.5 (A) 28 - 44 %    LV mass 251.4 g    Left Ventricle Relative Wall Thickness 0.51 cm    AV valve area 2.3 cm²    AV Velocity Ratio 0.82     AV index (prosthetic) 0.75     MV valve area p 1/2 method 3.46 cm2    E/A ratio 0.86     Mean e' 0.09 m/s    LVOT area 3.1 cm2    LVOT stroke volume 84.8 cm3    AV peak gradient 12 mmHg    E/E' ratio 15 m/s    PATY by Velocity Ratio 2.6 cm²    BSA 2.21 m2    LV Systolic Volume Index 23.8 mL/m2    LV Diastolic Volume Index 47.66 mL/m2    LV Mass Index 117.5 g/m2    GERMAN (MOD) 30 mL/m2    ZLVIDS -1.43     ZLVIDD -3.81     GERMAN 43 mL/m2    LA Vol 91 cm3    LA WIDTH 4.1 cm    TV resting pulmonary artery pressure 49 mmHg    RV TB RVSP 6 mmHg    Est. RA pres 3 mmHg    EF 50 %    Narrative      Left Ventricle: The left ventricle is normal in size. There is mild to   moderate concentric hypertrophy. There is low normal systolic function.   Ejection fraction is approximately 50%. Grade I diastolic dysfunction.    Right Ventricle: The right ventricle is normal in size. Systolic   function is normal.    Left Atrium: Moderately dilated    Aortic Valve: There is mild aortic valve sclerosis.    Mitral Valve: There is mild bileaflet sclerosis. There is moderate   posterior mitral annular calcification. There is mild to moderate   regurgitation.    Tricuspid Valve: There is mild to moderate regurgitation.    Pulmonary Artery: There is moderate pulmonary hypertension. The   estimated pulmonary artery systolic pressure is 49 mmHg.    IVC/SVC: Normal venous pressure at 3 mmHg.       Assessment and Plan:     * Atrial flutter  Atrial Flutter w RVR.  New dx.    Discussed mgt w pt.  Cardizem gtt initiated on admit.  If HR fails to improve, start Amiodarone tx.  IV heparin gtt.  Will need to consider MAGO guided cardioversion this admission if fails to improve and convert to sinus.    Echocardiogram.  Will need DOAC prior to discharge.    Acute on chronic diastolic congestive heart  failure      Latest ECHO  Results for orders placed during the hospital encounter of 03/11/25    Echo    Interpretation Summary    Left Ventricle: The left ventricle is normal in size. There is mild to moderate concentric hypertrophy. There is low normal systolic function. Ejection fraction is approximately 50%. Grade I diastolic dysfunction.    Right Ventricle: The right ventricle is normal in size. Systolic function is normal.    Left Atrium: Moderately dilated    Aortic Valve: There is mild aortic valve sclerosis.    Mitral Valve: There is mild bileaflet sclerosis. There is moderate posterior mitral annular calcification. There is mild to moderate regurgitation.    Tricuspid Valve: There is mild to moderate regurgitation.    Pulmonary Artery: There is moderate pulmonary hypertension. The estimated pulmonary artery systolic pressure is 49 mmHg.    IVC/SVC: Normal venous pressure at 3 mmHg.    Current Heart Failure Medications  furosemide injection 40 mg, 2 times daily before meals, Intravenous  losartan tablet 100 mg, Daily, Oral    Plan  - Monitor strict I&Os and daily weights.    - Place on telemetry  - Low sodium diet  - Place on fluid restriction of 1.5 L    Diastolic CHF exacerbation triggered by atrial flutter w RVR.  IV Lasix diuresis.  GDMT for CHF.             Lymphedema  Per Hospital med mgt.  Leg wraps.    Chronic kidney disease, stage 3a  Monitor.    Essential hypertension  HTN control.        VTE Risk Mitigation (From admission, onward)           Ordered     IP VTE HIGH RISK PATIENT  Once         06/29/25 1057     Place sequential compression device  Until discontinued         06/29/25 1057     heparin 25,000 units in dextrose 5% (100 units/ml) IV bolus from bag LOW INTENSITY nomogram - [facility]  As needed (PRN)        Question:  Heparin Infusion Adjustment (DO NOT MODIFY ANSWER)  Answer:  [email or web address]\Images\Pharmacy\HeparinInfusions\heparin LOW INTENSITY nomogram for [facility] [id number]    06/29/25 0901      heparin 25,000 units in dextrose 5% (100 units/ml) IV bolus from bag LOW INTENSITY nomogram - [facility]  As needed (PRN)        Question:  Heparin Infusion Adjustment (DO NOT MODIFY ANSWER)  Answer:  [email or web address]\Images\Pharmacy\HeparinInfusions\heparin LOW INTENSITY nomogram for [facility] [id number]    06/29/25 0901     heparin 25,000 units in dextrose 5% 250 mL (100 units/mL) infusion LOW INTENSITY nomogram - [facility]  Continuous        Question:  Begin at (units/kg/hr)  Answer:  12    06/29/25 0901                    Thank you for your consult. I will follow-up with patient. Please contact us if you have any additional questions.    Jimi Benites MD  Cardiology   [facility] Tele[facility] ([facility])

## 2025-06-29 NOTE — PLAN OF CARE
A225/A225 ANA Henderson is a 84 y.o.female admitted on 6/29/2025 for Atrial flutter   Code Status: Full Code MRN: [id number]   Review of patient's allergies indicates:   Allergen Reactions    Clindamycin      Diarrhea      Lisinopril      cough     Past Medical History:   Diagnosis Date    Adrenal adenoma 2016    Anxiety     Arthritis     Benign hypertension     Colon cancer age 61    colon    Coronary artery disease     Depression     Full dentures     General anesthetics causing adverse effect in therapeutic use     yells and talks when wakes up    Hyperlipidemia     Osteopenia     Shingles     Type 2 diabetes mellitus without complication, without long-term current use of insulin 1/23/2024    Wears glasses       PRN meds    acetaminophen, 650 mg, Q4H PRN  albuterol-ipratropium, 3 mL, Q4H PRN  aluminum-magnesium hydroxide-simethicone, 30 mL, QID PRN  dextrose 50%, 12.5 g, PRN  dextrose 50%, 25 g, PRN  glucagon (human recombinant), 1 mg, PRN  glucose, 16 g, PRN  glucose, 24 g, PRN  heparin (PORCINE), 60 Units/kg, PRN  heparin (PORCINE), 30 Units/kg, PRN  HYDROcodone-acetaminophen, 1 tablet, Q6H PRN  melatonin, 6 mg, Nightly PRN  naloxone, 0.02 mg, PRN  ondansetron, 8 mg, Q8H PRN  senna-docusate, 1 tablet, BID PRN  simethicone, 1 tablet, QID PRN           Pt oriented x3, forgetful.  VSS.  Pt remained afebrile throughout this shift.   All meds administered per order.   Pt remained free of falls this shift.   Plan of care reviewed. Patient verbalizes understanding.   Pt moving/turning independently.   Bed low, side rails up x 2, wheels locked, call light in reach.   Patient instructed to call for assistance.  Patient education provided    Remains on heparin gtt, Cardizem, and amio.                     Douglas Coma Scale Score: 15     Lead Monitored: Lead II Rhythm: atrial rhythm    Cardiac/Telemetry Box Number: [id number]    Last Bowel Movement: 06/26/25  Diet Heart Healthy  Diet NPO  Voiding Characteristics: external  catheter  Michele Score: 15  Fall Risk Score: 23  Accucheck []   Freq?      Lines/Drains/Airways       Drain  Duration             Female External Urinary Catheter w/ Suction 06/29/25 0737 <1 day              Peripheral Intravenous Line  Duration             Peripheral IV Single Lumen 06/29/25 0925 20 G 1 3/4 in Anterior;Left Forearm <1 day    Peripheral IV Single Lumen 06/29/25 1137 22 G Right Antecubital <1 day

## 2025-06-29 NOTE — ASSESSMENT & PLAN NOTE
Patient has Combined Systolic and Diastolic heart failure that is Acute on chronic. On presentation their CHF was decompensated. Evidence of decompensated CHF on presentation includes: edema, crackles on lung auscultation, paroxysmal nocturnal dyspnea (PND), and dyspnea on exertion (CAMACHO). The etiology of their decompensation is likely atrial flutter. Most recent BNP and echo results are listed below.  Recent Labs     06/29/25  0648   *     Latest ECHO  Results for orders placed during the hospital encounter of 03/11/25    Echo    Interpretation Summary    Left Ventricle: The left ventricle is normal in size. There is mild to moderate concentric hypertrophy. There is low normal systolic function. Ejection fraction is approximately 50%. Grade I diastolic dysfunction.    Right Ventricle: The right ventricle is normal in size. Systolic function is normal.    Left Atrium: Moderately dilated    Aortic Valve: There is mild aortic valve sclerosis.    Mitral Valve: There is mild bileaflet sclerosis. There is moderate posterior mitral annular calcification. There is mild to moderate regurgitation.    Tricuspid Valve: There is mild to moderate regurgitation.    Pulmonary Artery: There is moderate pulmonary hypertension. The estimated pulmonary artery systolic pressure is 49 mmHg.    IVC/SVC: Normal venous pressure at 3 mmHg.    Current Heart Failure Medications  furosemide injection 40 mg, 2 times daily before meals, Intravenous  losartan tablet 100 mg, Daily, Oral    Plan    - Lasix 40 mg IV BID  - Control HR  - Monitor strict I&Os and daily weights.    - Place on telemetry  - Low sodium diet  - Place on fluid restriction of 1.5 L.   - Cardiology has been consulted  - The patient's volume status is worsening as indicated by edema, paroxysmal nocturnal dyspnea (PND), dyspnea on exertion (CAMACHO), and shortness of breath. Will continue management as above  -

## 2025-06-29 NOTE — SUBJECTIVE & OBJECTIVE
Past Medical History:   Diagnosis Date    Adrenal adenoma 2016    Anxiety     Arthritis     Benign hypertension     Colon cancer age 61    colon    Coronary artery disease     Depression     Full dentures     General anesthetics causing adverse effect in therapeutic use     yells and talks when wakes up    Hyperlipidemia     Osteopenia     Shingles     Type 2 diabetes mellitus without complication, without long-term current use of insulin 1/23/2024    Wears glasses        Past Surgical History:   Procedure Laterality Date    APPENDECTOMY      BREAST BIOPSY Left     benign    BUNIONECTOMY Left     CATARACT EXTRACTION Bilateral     COLON SURGERY      2001    EYE SURGERY      cataract surg    HEMICOLECTOMY  2002    HERNIA REPAIR      x5    JOINT REPLACEMENT      knee    left toe surgery      hammertoe    PATENT DUCTUS ARTERIOUS LIGATION  1948    SALPINGOOPHORECTOMY  2002    due to colon cancer    TONSILLECTOMY, ADENOIDECTOMY         Review of patient's allergies indicates:   Allergen Reactions    Clindamycin      Diarrhea      Lisinopril      cough       No current facility-administered medications on file prior to encounter.     Current Outpatient Medications on File Prior to Encounter   Medication Sig    aspirin (ECOTRIN) 81 MG EC tablet Take 81 mg by mouth once daily.    atorvastatin (LIPITOR) 10 MG tablet TAKE 1 TABLET(10 MG) BY MOUTH EVERY DAY    buPROPion (WELLBUTRIN XL) 150 MG TB24 tablet Take 1 tablet (150 mg total) by mouth once daily.    busPIRone (BUSPAR) 10 MG tablet Take 2 tablets (20 mg total) by mouth 2 (two) times daily.    clonazePAM (KLONOPIN) 0.25 MG TbDL Take 1 tablet (0.25 mg total) by mouth 2 (two) times daily as needed (anxiety).    EScitalopram oxalate (LEXAPRO) 10 MG tablet Take 1.5 tablets (15 mg total) by mouth once daily.    ferrous sulfate (FEOSOL) 325 mg (65 mg iron) Tab tablet Take 1 tablet (325 mg total) by mouth every other day.    HYDROcodone-acetaminophen (NORCO) 7.5-325 mg per tablet  Take 1 tablet by mouth every 6 (six) hours as needed for Pain.    ibandronate (BONIVA) 150 mg tablet Take 1 tablet (150 mg total) by mouth every 30 days.    losartan (COZAAR) 100 MG tablet TAKE 1 TABLET(100 MG) BY MOUTH EVERY DAY    meloxicam (MOBIC) 7.5 MG tablet TAKE 1 TABLET(7.5 MG) BY MOUTH DAILY AS NEEDED FOR PAIN    mirabegron (MYRBETRIQ) 50 mg Tb24 Take 1 tablet (50 mg total) by mouth once daily.    pramipexole (MIRAPEX) 0.125 MG tablet TAKE 1 TABLET BY MOUTH EVERY EVENING    suvorexant (BELSOMRA) 10 mg Tab Take 1 tablet by mouth every evening.     Family History       Problem Relation (Age of Onset)    Alzheimer's disease Mother    Anxiety disorder Daughter    Arthritis Mother, Father    Cancer Father, Maternal Uncle, Cousin    Depression Daughter, Daughter, Daughter    Early death Maternal Grandmother    Hearing loss Father    Hypertension Father    Kidney disease Daughter    Ulcerative colitis Daughter          Tobacco Use    Smoking status: Never     Passive exposure: Never    Smokeless tobacco: Never   Substance and Sexual Activity    Alcohol use: No    Drug use: Never    Sexual activity: Not Currently     Review of Systems   Constitutional: Negative.   HENT: Negative.     Eyes: Negative.    Cardiovascular:  Positive for dyspnea on exertion, leg swelling, orthopnea and palpitations.   Respiratory:  Positive for shortness of breath.    Endocrine: Negative.    Hematologic/Lymphatic: Negative.    Skin: Negative.    Musculoskeletal: Negative.    Gastrointestinal: Negative.    Genitourinary: Negative.    Neurological: Negative.    Psychiatric/Behavioral: Negative.     Allergic/Immunologic: Negative.      Objective:     Vital Signs (Most Recent):  Temp: 97.9 °F (36.6 °C) (06/29/25 1047)  Pulse: (!) 123 (06/29/25 1047)  Resp: 16 (06/29/25 1047)  BP: 138/74 (06/29/25 1047)  SpO2: 95 % (06/29/25 1047) Vital Signs (24h Range):  Temp:  [97.4 °F (36.3 °C)-97.9 °F (36.6 °C)] 97.9 °F (36.6 °C)  Pulse:  [100-162]  123  Resp:  [16-35] 16  SpO2:  [89 %-98 %] 95 %  BP: (114-175)/(49-99) 138/74     Weight: 112.1 kg (247 lb 3.2 oz)  Body mass index is 37.59 kg/m².    SpO2: 95 %         Intake/Output Summary (Last 24 hours) at 6/29/2025 1126  Last data filed at 6/29/2025 1010  Gross per 24 hour   Intake 100 ml   Output 2200 ml   Net -2100 ml       Lines/Drains/Airways       Drain  Duration             Female External Urinary Catheter w/ Suction 06/29/25 0737 <1 day              Peripheral Intravenous Line  Duration             Peripheral IV Single Lumen 06/29/25 0647 20 G Right Antecubital <1 day    Peripheral IV Single Lumen 06/29/25 0925 20 G 1 3/4 in Anterior;Left Forearm <1 day                     Physical Exam  Vitals and nursing note reviewed.   Constitutional:       General: She is not in acute distress.     Appearance: Normal appearance. She is well-developed. She is ill-appearing. She is not diaphoretic.   HENT:      Head: Normocephalic.   Neck:      Thyroid: No thyromegaly.      Vascular: No carotid bruit or JVD.   Cardiovascular:      Rate and Rhythm: Tachycardia present. Rhythm irregularly irregular.      Pulses: Normal pulses.           Radial pulses are 2+ on the right side and 2+ on the left side.      Heart sounds: Normal heart sounds, S1 normal and S2 normal. No murmur heard.     No friction rub. No gallop.   Pulmonary:      Effort: Tachypnea present.      Breath sounds: Examination of the right-lower field reveals decreased breath sounds. Examination of the left-lower field reveals decreased breath sounds. Decreased breath sounds present. No wheezing or rales.   Abdominal:      General: Bowel sounds are normal. There is no abdominal bruit.      Palpations: Abdomen is soft.      Tenderness: There is no abdominal tenderness.   Musculoskeletal:      Cervical back: Neck supple.      Right lower leg: Edema present.      Left lower leg: Edema present.   Lymphadenopathy:      Cervical: No cervical adenopathy.   Skin:      "General: Skin is warm.   Neurological:      Mental Status: She is alert and oriented to person, place, and time.   Psychiatric:         Behavior: Behavior normal. Behavior is cooperative.          Significant Labs: ABG: No results for input(s): "PH", "PCO2", "HCO3", "POCSATURATED", "BE" in the last 48 hours., Blood Culture: No results for input(s): "LABBLOO" in the last 48 hours., BMP:   Recent Labs   Lab 06/29/25  0648   *      K 4.6      CO2 21*   BUN 18   CREATININE 1.0   CALCIUM 9.0   MG 1.8   , CMP   Recent Labs   Lab 06/29/25  0648      K 4.6      CO2 21*   *   BUN 18   CREATININE 1.0   CALCIUM 9.0   PROT 7.4   ALBUMIN 3.4*   BILITOT 0.5   ALKPHOS 110   AST 19   ALT 13   ANIONGAP 12   , CBC   Recent Labs   Lab 06/29/25  0648 06/29/25  0925   WBC 11.98 11.11   HGB 10.9* 10.7*   HCT 34.8* 35.0*    319   , INR   Recent Labs   Lab 06/29/25  0925   INR 1.1   PROTIME 11.9   , Lipid Panel No results for input(s): "CHOL", "HDL", "LDLCALC", "TRIG", "CHOLHDL" in the last 48 hours., and Troponin No results for input(s): "TROPONINIHS" in the last 48 hours.    Significant Imaging: Echocardiogram: Transthoracic echo (TTE) complete ([facility] Only):   Results for orders placed or performed during the hospital encounter of 03/11/25   Echo   Result Value Ref Range    RA Width 3.66 cm    LA Vol (MOD) 64 mL    LV ESV A2C 71.70 mL    LA area A4C 18.56 cm2    LA area A2C 22.46 cm2    LV ESV A4C 52.87 mL    Triscuspid Valve Regurgitation Peak Gradient 46 mmHg    Left Atrium Major Axis 4.6 cm    Left Atrium Minor Axis 5.3 cm    RA Major Axis 4.54 cm    LV Diastolic Volume 102 mL    LV Systolic Volume 51 mL    MV Peak A Deonte 1.44 m/s    MV stenosis pressure 1/2 time 63.61 ms    TR Max Deonte 3.4 m/s    MV Peak E Deonte 1.24 m/s    PV mean gradient 2 mmHg    RVOT peak VTI 19.1 cm    RVOT peak deonte 0.76 m/s    Ao VTI 36.2 cm    Ao peak deonte 1.7 m/s    LVOT peak VTI 27.0 cm    LVOT peak deonte 1.4 m/s    " LVOT diameter 2.0 cm    IVRT 103 msec    E wave deceleration time 219 msec    PV peak gradient 6 mmHg    AV mean gradient 7 mmHg    RV- cui basal diam 3.3 cm    TAPSE 3.02 cm    RVDD 3.34 cm    LA size 5.3 cm    Ascending aorta 2.59 cm    STJ 2.49 cm    Sinus 2.49 cm    LVIDs 3.5 2.1 - 4.0 cm    Ao root annulus 3.22 cm    PW 1.2 (A) 0.6 - 1.1 cm    IVS 1.5 (A) 0.6 - 1.1 cm    LVIDd 4.7 3.5 - 6.0 cm    PV PEAK VELOCITY 1.22 m/s    TDI LATERAL 0.09 m/s    Left Ventricular Outflow Tract Mean Gradient 4.46 mmHg    Left Ventricular Outflow Tract Mean Velocity 1.01 cm/s    Left Ventricular End Systolic Volume by Teichholz Method 51.13 mL    Left Ventricular End Diastolic Volume by Teichholz Method 102.20 mL    IVC diameter 1.92 cm    TDI SEPTAL 0.08 m/s    LV LATERAL E/E' RATIO 13.8 m/s    LV SEPTAL E/E' RATIO 15.5 m/s    RV/LV Ratio 0.70 cm    FS 25.5 (A) 28 - 44 %    LV mass 251.4 g    Left Ventricle Relative Wall Thickness 0.51 cm    AV valve area 2.3 cm²    AV Velocity Ratio 0.82     AV index (prosthetic) 0.75     MV valve area p 1/2 method 3.46 cm2    E/A ratio 0.86     Mean e' 0.09 m/s    LVOT area 3.1 cm2    LVOT stroke volume 84.8 cm3    AV peak gradient 12 mmHg    E/E' ratio 15 m/s    PATY by Velocity Ratio 2.6 cm²    BSA 2.21 m2    LV Systolic Volume Index 23.8 mL/m2    LV Diastolic Volume Index 47.66 mL/m2    LV Mass Index 117.5 g/m2    GERMAN (MOD) 30 mL/m2    ZLVIDS -1.43     ZLVIDD -3.81     GERMAN 43 mL/m2    LA Vol 91 cm3    LA WIDTH 4.1 cm    TV resting pulmonary artery pressure 49 mmHg    RV TB RVSP 6 mmHg    Est. RA pres 3 mmHg    EF 50 %    Narrative      Left Ventricle: The left ventricle is normal in size. There is mild to   moderate concentric hypertrophy. There is low normal systolic function.   Ejection fraction is approximately 50%. Grade I diastolic dysfunction.    Right Ventricle: The right ventricle is normal in size. Systolic   function is normal.    Left Atrium: Moderately dilated    Aortic  Valve: There is mild aortic valve sclerosis.    Mitral Valve: There is mild bileaflet sclerosis. There is moderate   posterior mitral annular calcification. There is mild to moderate   regurgitation.    Tricuspid Valve: There is mild to moderate regurgitation.    Pulmonary Artery: There is moderate pulmonary hypertension. The   estimated pulmonary artery systolic pressure is 49 mmHg.    IVC/SVC: Normal venous pressure at 3 mmHg.

## 2025-06-30 LAB
ABSOLUTE EOSINOPHIL (OHS): 0.1 K/UL
ABSOLUTE MONOCYTE (OHS): 0.68 K/UL (ref 0.3–1)
ABSOLUTE NEUTROPHIL COUNT (OHS): 4.05 K/UL (ref 1.8–7.7)
ALBUMIN SERPL BCP-MCNC: 3 G/DL (ref 3.5–5.2)
ALP SERPL-CCNC: 91 UNIT/L (ref 40–150)
ALT SERPL W/O P-5'-P-CCNC: 9 UNIT/L (ref 10–44)
ANION GAP (OHS): 10 MMOL/L (ref 8–16)
APTT PPP: 61.6 SECONDS (ref 21–32)
AST SERPL-CCNC: 12 UNIT/L (ref 11–45)
BASOPHILS # BLD AUTO: 0.04 K/UL
BASOPHILS NFR BLD AUTO: 0.6 %
BILIRUB SERPL-MCNC: 0.4 MG/DL (ref 0.1–1)
BUN SERPL-MCNC: 19 MG/DL (ref 8–23)
CALCIUM SERPL-MCNC: 8.6 MG/DL (ref 8.7–10.5)
CHLORIDE SERPL-SCNC: 100 MMOL/L (ref 95–110)
CO2 SERPL-SCNC: 29 MMOL/L (ref 23–29)
CREAT SERPL-MCNC: 1.1 MG/DL (ref 0.5–1.4)
ERYTHROCYTE [DISTWIDTH] IN BLOOD BY AUTOMATED COUNT: 16.2 % (ref 11.5–14.5)
GFR SERPLBLD CREATININE-BSD FMLA CKD-EPI: 49 ML/MIN/1.73/M2
GLUCOSE SERPL-MCNC: 118 MG/DL (ref 70–110)
HCT VFR BLD AUTO: 31.8 % (ref 37–48.5)
HGB BLD-MCNC: 9.6 GM/DL (ref 12–16)
IMM GRANULOCYTES # BLD AUTO: 0.02 K/UL (ref 0–0.04)
IMM GRANULOCYTES NFR BLD AUTO: 0.3 % (ref 0–0.5)
LYMPHOCYTES # BLD AUTO: 1.51 K/UL (ref 1–4.8)
MAGNESIUM SERPL-MCNC: 1.9 MG/DL (ref 1.6–2.6)
MCH RBC QN AUTO: 25.8 PG (ref 27–31)
MCHC RBC AUTO-ENTMCNC: 30.2 G/DL (ref 32–36)
MCV RBC AUTO: 86 FL (ref 82–98)
NUCLEATED RBC (/100WBC) (OHS): 0 /100 WBC
OHS QRS DURATION: 100 MS
OHS QRS DURATION: 98 MS
OHS QTC CALCULATION: 534 MS
OHS QTC CALCULATION: 535 MS
PLATELET # BLD AUTO: 258 K/UL (ref 150–450)
PMV BLD AUTO: 10 FL (ref 9.2–12.9)
POTASSIUM SERPL-SCNC: 3.8 MMOL/L (ref 3.5–5.1)
PROT SERPL-MCNC: 6.6 GM/DL (ref 6–8.4)
RBC # BLD AUTO: 3.72 M/UL (ref 4–5.4)
RELATIVE EOSINOPHIL (OHS): 1.6 %
RELATIVE LYMPHOCYTE (OHS): 23.6 % (ref 18–48)
RELATIVE MONOCYTE (OHS): 10.6 % (ref 4–15)
RELATIVE NEUTROPHIL (OHS): 63.3 % (ref 38–73)
SODIUM SERPL-SCNC: 139 MMOL/L (ref 136–145)
WBC # BLD AUTO: 6.4 K/UL (ref 3.9–12.7)

## 2025-06-30 PROCEDURE — 85025 COMPLETE CBC W/AUTO DIFF WBC: CPT | Mod: HCNC | Performed by: EMERGENCY MEDICINE

## 2025-06-30 PROCEDURE — 21400001 HC TELEMETRY ROOM: Mod: HCNC

## 2025-06-30 PROCEDURE — 83735 ASSAY OF MAGNESIUM: CPT | Mod: HCNC | Performed by: NURSE PRACTITIONER

## 2025-06-30 PROCEDURE — 80053 COMPREHEN METABOLIC PANEL: CPT | Mod: HCNC | Performed by: NURSE PRACTITIONER

## 2025-06-30 PROCEDURE — 85730 THROMBOPLASTIN TIME PARTIAL: CPT | Mod: HCNC | Performed by: FAMILY MEDICINE

## 2025-06-30 PROCEDURE — 25000003 PHARM REV CODE 250: Mod: HCNC | Performed by: EMERGENCY MEDICINE

## 2025-06-30 PROCEDURE — 93010 ELECTROCARDIOGRAM REPORT: CPT | Mod: 76,HCNC,, | Performed by: INTERNAL MEDICINE

## 2025-06-30 PROCEDURE — 63600175 PHARM REV CODE 636 W HCPCS: Mod: HCNC | Performed by: NURSE PRACTITIONER

## 2025-06-30 PROCEDURE — 93005 ELECTROCARDIOGRAM TRACING: CPT | Mod: HCNC

## 2025-06-30 PROCEDURE — 25000003 PHARM REV CODE 250: Mod: HCNC | Performed by: NURSE PRACTITIONER

## 2025-06-30 PROCEDURE — 27000221 HC OXYGEN, UP TO 24 HOURS: Mod: HCNC

## 2025-06-30 PROCEDURE — 99233 SBSQ HOSP IP/OBS HIGH 50: CPT | Mod: 25,HCNC,, | Performed by: INTERNAL MEDICINE

## 2025-06-30 PROCEDURE — 36415 COLL VENOUS BLD VENIPUNCTURE: CPT | Mod: HCNC | Performed by: FAMILY MEDICINE

## 2025-06-30 PROCEDURE — 63600175 PHARM REV CODE 636 W HCPCS: Mod: HCNC | Performed by: INTERNAL MEDICINE

## 2025-06-30 PROCEDURE — 99900035 HC TECH TIME PER 15 MIN (STAT): Mod: HCNC

## 2025-06-30 PROCEDURE — 25000003 PHARM REV CODE 250: Mod: HCNC

## 2025-06-30 PROCEDURE — 25000003 PHARM REV CODE 250: Mod: HCNC | Performed by: INTERNAL MEDICINE

## 2025-06-30 PROCEDURE — 93010 ELECTROCARDIOGRAM REPORT: CPT | Mod: HCNC,,, | Performed by: INTERNAL MEDICINE

## 2025-06-30 PROCEDURE — 25000003 PHARM REV CODE 250: Mod: HCNC | Performed by: FAMILY MEDICINE

## 2025-06-30 PROCEDURE — 94761 N-INVAS EAR/PLS OXIMETRY MLT: CPT | Mod: HCNC

## 2025-06-30 RX ORDER — AMIODARONE HYDROCHLORIDE 200 MG/1
200 TABLET ORAL 2 TIMES DAILY
Status: DISCONTINUED | OUTPATIENT
Start: 2025-06-30 | End: 2025-07-01 | Stop reason: HOSPADM

## 2025-06-30 RX ORDER — POTASSIUM CHLORIDE 20 MEQ/1
40 TABLET, EXTENDED RELEASE ORAL ONCE
Status: COMPLETED | OUTPATIENT
Start: 2025-06-30 | End: 2025-06-30

## 2025-06-30 RX ORDER — DILTIAZEM HYDROCHLORIDE 180 MG/1
180 CAPSULE, COATED, EXTENDED RELEASE ORAL DAILY
Status: DISCONTINUED | OUTPATIENT
Start: 2025-06-30 | End: 2025-07-01 | Stop reason: HOSPADM

## 2025-06-30 RX ORDER — MAGNESIUM SULFATE HEPTAHYDRATE 40 MG/ML
2 INJECTION, SOLUTION INTRAVENOUS ONCE
Status: COMPLETED | OUTPATIENT
Start: 2025-06-30 | End: 2025-06-30

## 2025-06-30 RX ADMIN — APIXABAN 5 MG: 2.5 TABLET, FILM COATED ORAL at 08:06

## 2025-06-30 RX ADMIN — PRAMIPEXOLE DIHYDROCHLORIDE 0.12 MG: 0.12 TABLET ORAL at 08:06

## 2025-06-30 RX ADMIN — BUSPIRONE HYDROCHLORIDE 20 MG: 10 TABLET ORAL at 08:06

## 2025-06-30 RX ADMIN — AMIODARONE HYDROCHLORIDE 200 MG: 200 TABLET ORAL at 08:06

## 2025-06-30 RX ADMIN — APIXABAN 5 MG: 2.5 TABLET, FILM COATED ORAL at 10:06

## 2025-06-30 RX ADMIN — FUROSEMIDE 40 MG: 10 INJECTION, SOLUTION INTRAVENOUS at 04:06

## 2025-06-30 RX ADMIN — BUPROPION HYDROCHLORIDE 150 MG: 150 TABLET, EXTENDED RELEASE ORAL at 08:06

## 2025-06-30 RX ADMIN — Medication 6 MG: at 10:06

## 2025-06-30 RX ADMIN — MICONAZOLE NITRATE: 20 POWDER TOPICAL at 08:06

## 2025-06-30 RX ADMIN — MAGNESIUM SULFATE HEPTAHYDRATE 2 G: 40 INJECTION, SOLUTION INTRAVENOUS at 08:06

## 2025-06-30 RX ADMIN — DILTIAZEM HYDROCHLORIDE 180 MG: 180 CAPSULE, COATED, EXTENDED RELEASE ORAL at 08:06

## 2025-06-30 RX ADMIN — ESCITALOPRAM OXALATE 15 MG: 5 TABLET, FILM COATED ORAL at 08:06

## 2025-06-30 RX ADMIN — MICONAZOLE NITRATE: 20 POWDER TOPICAL at 10:06

## 2025-06-30 RX ADMIN — Medication 10 MG/HR: at 07:06

## 2025-06-30 RX ADMIN — AMIODARONE HYDROCHLORIDE 0.5 MG/MIN: 1.8 INJECTION, SOLUTION INTRAVENOUS at 05:06

## 2025-06-30 RX ADMIN — ATORVASTATIN CALCIUM 10 MG: 10 TABLET, FILM COATED ORAL at 08:06

## 2025-06-30 RX ADMIN — POTASSIUM CHLORIDE 40 MEQ: 1500 TABLET, EXTENDED RELEASE ORAL at 08:06

## 2025-06-30 RX ADMIN — FUROSEMIDE 40 MG: 10 INJECTION, SOLUTION INTRAVENOUS at 05:06

## 2025-06-30 RX ADMIN — LOSARTAN POTASSIUM 100 MG: 50 TABLET, FILM COATED ORAL at 08:06

## 2025-06-30 NOTE — ASSESSMENT & PLAN NOTE
Patient has Diastolic (HFpEF) heart failure that is Acute on chronic. On presentation their CHF was decompensated. Evidence of decompensated CHF on presentation includes: edema, crackles on lung auscultation, orthopnea, paroxysmal nocturnal dyspnea (PND), dyspnea on exertion (CAMACHO), and shortness of breath. The etiology of their decompensation is likely AFib with a RVR. Most recent BNP and echo results are listed below.  Recent Labs     06/29/25  0648   *     Latest ECHO  Results for orders placed during the hospital encounter of 06/29/25    Echo    Interpretation Summary    Left Ventricle: The left ventricle is mildly dilated. There is moderate eccentric hypertrophy. There is mildly reduced systolic function with a visually estimated ejection fraction of 45 - 50%.    Right Ventricle: The right ventricle is normal in size Systolic function is normal.    Left Atrium: The left atrium is moderately dilated    Mitral Valve: There is mild anterior leaflet sclerosis. There is mild posterior mitral annular calcification. There is mild to moderate regurgitation.    Tricuspid Valve: There is mild to moderate regurgitation.    Pulmonary Artery: There is mild pulmonary hypertension. The estimated pulmonary artery systolic pressure is 34 mmHg.    IVC/SVC: Normal venous pressure at 3 mmHg.    Current Heart Failure Medications  furosemide injection 40 mg, 2 times daily before meals, Intravenous  losartan tablet 100 mg, Daily, Oral    Plan  - Monitor strict I&Os and daily weights.    - Place on telemetry  - Low sodium diet  - Place on fluid restriction of 1.5 L.   - Cardiology has been consulted  - The patient's volume status is improving but not at their baseline as indicated by edema and shortness of breath  -

## 2025-06-30 NOTE — CONSULTS
[facility] - Telemetry ([facility])  Wound Care    Patient Name:  Lizeth Henderson   MRN:  [id number]  Date: 6/30/2025  Diagnosis: Atrial flutter    History:     Past Medical History:   Diagnosis Date    Adrenal adenoma 2016    Anxiety     Arthritis     Benign hypertension     Colon cancer age 61    colon    Coronary artery disease     Depression     Full dentures     General anesthetics causing adverse effect in therapeutic use     yells and talks when wakes up    Hyperlipidemia     Osteopenia     Shingles     Type 2 diabetes mellitus without complication, without long-term current use of insulin 1/23/2024    Wears glasses        Social History[1]    Precautions:     Allergies as of 06/29/2025 - Reviewed 06/29/2025   Allergen Reaction Noted    Clindamycin  02/14/2024    Lisinopril  05/29/2017       [facility] Assessment Details/Treatment     Consulted on this 85 y/o female patient for wounds to groin and BLE. Patient was admitted 6/29/25 for atrial flutter, PMH significant for HTN, CAD, HLD, DM, and lymphedema.     Patient resting in bed, awake and alert. Wound care nurse gave introduction and reason for visit.     --Patient states she has specialty lymphedema wraps in place to her BLE that she usually has changed in an outpatient [facility] facility in [place] twice weekly. She is unable to fully remember when they were due to be changed, she believes it was the day after all this started. Wound care nurse informed her that wound care team is not trained to apply Lymphedema wraps, but would like to remove the wraps to assess her BLE. Patient states her daughter is aware how to apply dressings and she would prefer that we not remove them until she can speak with her daughter when she returns this evening after work. Wound care nurse in agreement and will plan to F/U tomorrow morning to reassess and see what patient has decided.         --Gently lifted blankets to assess patient's bilateral groin and abdominal  folds. Noted MASD to the folds with scattered partial thickness breakdown. Areas are moist pink/red, per chart review areas being treated with miconazole nitrate 2% powder BID. Cleansed areas with bath wipes. Patted dry. Sprinkled powder as ordered onto affected areas.     Orders placed. Plan to F/U tomorrow.      06/30/25 0911   WOCN Assessment   WOCN Total Time (mins) 45   Visit Date 06/30/25   Visit Time 0911   Consult Type New   WOCN Speciality Wound   WOCN List compression wraps   Wound moisture;other  (lymphedema to BLE)   Intervention assessed;changed;applied;chart review;orders   Teaching on-going        Wound 06/29/25 1100 Moisture associated dermatitis Groin   Date First Assessed/Time First Assessed: 06/29/25 1100   Present on Original Admission: Yes  Primary Wound Type: Moisture associated dermatitis  Location: (c) Groin   Wound Image     Dressing Appearance Open to air   Drainage Amount None   Drainage Characteristics/Odor No odor   Appearance Pink;Red;Moist   Tissue loss description Partial thickness   Periwound Area Intact;Dry   Care Cleansed with:;Soap and water;Applied:;Other (see comments)  (sprinkled small amount of ordered topical powder)   Periwound Care Dry periwound area maintained   Dressing Change Due 06/30/25 06/30/2025         [1]   Social History  Socioeconomic History    Marital status:     Number of children: 3   Occupational History    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    Rates diet as poor.    She is not satisfied with weight.             Social Drivers of Health     Financial Resource Strain: Low Risk  (5/1/2025)    Overall Financial Resource Strain (CARDIA)     Difficulty of  Paying Living Expenses: Not hard at all   Food Insecurity: No Food Insecurity (5/1/2025)    Hunger Vital Sign     Worried About Running Out of Food in the Last Year: Never true     Ran Out of Food in the Last Year: Never true   Transportation Needs: No Transportation Needs (5/1/2025)    PRAPARE - Transportation     Lack of Transportation (Medical): No     Lack of Transportation (Non-Medical): No   Physical Activity: Inactive (5/1/2025)    Exercise Vital Sign     Days of Exercise per Week: 0 days     Minutes of Exercise per Session: 0 min   Stress: Stress Concern Present (5/1/2025)    [employer] - Occupational Stress Questionnaire     Feeling of Stress : Rather much   Housing Stability: Low Risk  (5/1/2025)    Housing Stability Vital Sign     Unable to Pay for Housing in the Last Year: No     Number of Times Moved in the Last Year: 1     Homeless in the Last Year: No

## 2025-06-30 NOTE — SUBJECTIVE & OBJECTIVE
Past Medical History:   Diagnosis Date    Adrenal adenoma 2016    Anxiety     Arthritis     Benign hypertension     Colon cancer age 61    colon    Coronary artery disease     Depression     Full dentures     General anesthetics causing adverse effect in therapeutic use     yells and talks when wakes up    Hyperlipidemia     Osteopenia     Shingles     Type 2 diabetes mellitus without complication, without long-term current use of insulin 1/23/2024    Wears glasses        Past Surgical History:   Procedure Laterality Date    APPENDECTOMY      BREAST BIOPSY Left     benign    BUNIONECTOMY Left     CATARACT EXTRACTION Bilateral     COLON SURGERY      2001    EYE SURGERY      cataract surg    HEMICOLECTOMY  2002    HERNIA REPAIR      x5    JOINT REPLACEMENT      knee    left toe surgery      hammertoe    PATENT DUCTUS ARTERIOUS LIGATION  1948    SALPINGOOPHORECTOMY  2002    due to colon cancer    TONSILLECTOMY, ADENOIDECTOMY         Review of patient's allergies indicates:   Allergen Reactions    Clindamycin      Diarrhea      Lisinopril      cough       No current facility-administered medications on file prior to encounter.     Current Outpatient Medications on File Prior to Encounter   Medication Sig    aspirin (ECOTRIN) 81 MG EC tablet Take 81 mg by mouth once daily.    atorvastatin (LIPITOR) 10 MG tablet TAKE 1 TABLET(10 MG) BY MOUTH EVERY DAY    buPROPion (WELLBUTRIN XL) 150 MG TB24 tablet Take 1 tablet (150 mg total) by mouth once daily.    busPIRone (BUSPAR) 10 MG tablet Take 2 tablets (20 mg total) by mouth 2 (two) times daily.    clonazePAM (KLONOPIN) 0.25 MG TbDL Take 1 tablet (0.25 mg total) by mouth 2 (two) times daily as needed (anxiety).    EScitalopram oxalate (LEXAPRO) 10 MG tablet Take 1.5 tablets (15 mg total) by mouth once daily.    ferrous sulfate (FEOSOL) 325 mg (65 mg iron) Tab tablet Take 1 tablet (325 mg total) by mouth every other day.    HYDROcodone-acetaminophen (NORCO) 7.5-325 mg per tablet  Take 1 tablet by mouth every 6 (six) hours as needed for Pain.    ibandronate (BONIVA) 150 mg tablet Take 1 tablet (150 mg total) by mouth every 30 days.    losartan (COZAAR) 100 MG tablet TAKE 1 TABLET(100 MG) BY MOUTH EVERY DAY    meloxicam (MOBIC) 7.5 MG tablet TAKE 1 TABLET(7.5 MG) BY MOUTH DAILY AS NEEDED FOR PAIN    mirabegron (MYRBETRIQ) 50 mg Tb24 Take 1 tablet (50 mg total) by mouth once daily.    pramipexole (MIRAPEX) 0.125 MG tablet TAKE 1 TABLET BY MOUTH EVERY EVENING    suvorexant (BELSOMRA) 10 mg Tab Take 1 tablet by mouth every evening.     Family History       Problem Relation (Age of Onset)    Alzheimer's disease Mother    Anxiety disorder Daughter    Arthritis Mother, Father    Cancer Father, Maternal Uncle, Cousin    Depression Daughter, Daughter, Daughter    Early death Maternal Grandmother    Hearing loss Father    Hypertension Father    Kidney disease Daughter    Ulcerative colitis Daughter          Tobacco Use    Smoking status: Never     Passive exposure: Never    Smokeless tobacco: Never   Substance and Sexual Activity    Alcohol use: No    Drug use: Never    Sexual activity: Not Currently     Review of Systems   Constitutional: Negative.   HENT: Negative.     Eyes: Negative.    Cardiovascular: Negative.    Respiratory: Negative.     Endocrine: Negative.    Hematologic/Lymphatic: Negative.    Skin: Negative.    Musculoskeletal: Negative.    Gastrointestinal: Negative.    Genitourinary: Negative.    Neurological: Negative.    Psychiatric/Behavioral: Negative.     Allergic/Immunologic: Negative.      Objective:     Vital Signs (Most Recent):  Temp: 97.8 °F (36.6 °C) (06/30/25 1158)  Pulse: 68 (06/30/25 1158)  Resp: 18 (06/30/25 1158)  BP: 139/60 (06/30/25 1158)  SpO2: 96 % (06/30/25 1158) Vital Signs (24h Range):  Temp:  [97.8 °F (36.6 °C)-98.3 °F (36.8 °C)] 97.8 °F (36.6 °C)  Pulse:  [] 68  Resp:  [18] 18  SpO2:  [94 %-99 %] 96 %  BP: (121-139)/(60-73) 139/60     Weight: 101.2 kg (223  lb 1.7 oz)  Body mass index is 33.92 kg/m².    SpO2: 96 %         Intake/Output Summary (Last 24 hours) at 6/30/2025 1322  Last data filed at 6/30/2025 0730  Gross per 24 hour   Intake --   Output 3400 ml   Net -3400 ml       Lines/Drains/Airways       Drain  Duration             Female External Urinary Catheter w/ Suction 06/29/25 0737 1 day              Peripheral Intravenous Line  Duration             Peripheral IV Single Lumen 06/29/25 0925 20 G 1 3/4 in Anterior;Left Forearm 1 day    Peripheral IV Single Lumen 06/29/25 1137 22 G Right Antecubital 1 day                     Physical Exam  Vitals and nursing note reviewed.   Constitutional:       General: She is not in acute distress.     Appearance: Normal appearance. She is well-developed. She is not diaphoretic.   HENT:      Head: Normocephalic.   Neck:      Thyroid: No thyromegaly.      Vascular: No carotid bruit or JVD.   Cardiovascular:      Rate and Rhythm: Normal rate and regular rhythm.      Pulses: Normal pulses.           Radial pulses are 2+ on the right side and 2+ on the left side.      Heart sounds: Normal heart sounds, S1 normal and S2 normal. No murmur heard.     No friction rub. No gallop.   Pulmonary:      Effort: Tachypnea present.      Breath sounds: Examination of the right-lower field reveals decreased breath sounds. Examination of the left-lower field reveals decreased breath sounds. Decreased breath sounds present. No wheezing or rales.   Abdominal:      General: Bowel sounds are normal. There is no abdominal bruit.      Palpations: Abdomen is soft.      Tenderness: There is no abdominal tenderness.   Musculoskeletal:         General: Normal range of motion.      Cervical back: Neck supple.   Lymphadenopathy:      Cervical: No cervical adenopathy.   Skin:     General: Skin is warm.   Neurological:      Mental Status: She is alert and oriented to person, place, and time.   Psychiatric:         Behavior: Behavior normal. Behavior is  "cooperative.          Significant Labs: ABG: No results for input(s): "PH", "PCO2", "HCO3", "POCSATURATED", "BE" in the last 48 hours., Blood Culture: No results for input(s): "LABBLOO" in the last 48 hours., BMP:   Recent Labs   Lab 06/29/25  0648 06/30/25  0527   * 118*    139   K 4.6 3.8    100   CO2 21* 29   BUN 18 19   CREATININE 1.0 1.1   CALCIUM 9.0 8.6*   MG 1.8 1.9   , CMP   Recent Labs   Lab 06/29/25  0648 06/30/25  0527    139   K 4.6 3.8    100   CO2 21* 29   * 118*   BUN 18 19   CREATININE 1.0 1.1   CALCIUM 9.0 8.6*   PROT 7.4 6.6   ALBUMIN 3.4* 3.0*   BILITOT 0.5 0.4   ALKPHOS 110 91   AST 19 12   ALT 13 9*   ANIONGAP 12 10   , CBC   Recent Labs   Lab 06/29/25  0648 06/29/25  0925 06/30/25  0527   WBC 11.98 11.11 6.40   HGB 10.9* 10.7* 9.6*   HCT 34.8* 35.0* 31.8*    319 258   , INR   Recent Labs   Lab 06/29/25  0925   INR 1.1   PROTIME 11.9   , Lipid Panel No results for input(s): "CHOL", "HDL", "LDLCALC", "TRIG", "CHOLHDL" in the last 48 hours., and Troponin No results for input(s): "TROPONINIHS" in the last 48 hours.    Significant Imaging: Echocardiogram: Transthoracic echo (TTE) complete (Cupid Only):   Results for orders placed or performed during the hospital encounter of 06/29/25   Echo   Result Value Ref Range    BSA 2.32 m2    LVOT stroke volume 75.0 cm3    LVIDd 5.6 3.5 - 6.0 cm    LV Systolic Volume 102 mL    LV Systolic Volume Index 45.5 mL/m2    LVIDs 4.7 (A) 2.1 - 4.0 cm    LV Diastolic Volume 155 mL    LV Diastolic Volume Index 69.20 mL/m2    Left Ventricular End Systolic Volume by Teichholz Method 101.71 mL    Left Ventricular End Diastolic Volume by Teichholz Method 154.75 mL    IVS 1.2 (A) 0.6 - 1.1 cm    LVOT diameter 2.0 cm    LVOT area 3.1 cm2    FS 16.1 (A) 28 - 44 %    Left Ventricle Relative Wall Thickness 0.61 cm    PW 1.7 (A) 0.6 - 1.1 cm    LV mass 365.4 g    LV Mass Index 163.1 g/m2    MV Peak E Deonte 1.24 m/s    TDI LATERAL 0.08 " m/s    TDI SEPTAL 0.08 m/s    E/E' ratio 16 m/s    MV Peak A Deonte 0.67 m/s    TR Max Deonte 2.8 m/s    E/A ratio 1.85     IVRT 46 msec    E wave deceleration time 214 msec    LV SEPTAL E/E' RATIO 15.5 m/s    LV LATERAL E/E' RATIO 15.5 m/s    LVOT peak deonte 1.2 m/s    Left Ventricular Outflow Tract Mean Velocity 0.88 cm/s    Left Ventricular Outflow Tract Mean Gradient 3.53 mmHg    RVOT peak VTI 18.5 cm    LA size 4.7 cm    Left Atrium Minor Axis 6.1 cm    Left Atrium Major Axis 5.8 cm    RA Major Axis 4.94 cm    AV mean gradient 5 mmHg    AV peak gradient 10 mmHg    Ao peak deonte 1.6 m/s    Ao VTI 27.2 cm    LVOT peak VTI 23.9 cm    AV valve area 2.8 cm²    AV Velocity Ratio 0.75     AV index (prosthetic) 0.88     PATY by Velocity Ratio 2.4 cm²    MV stenosis pressure 1/2 time 61.97 ms    MV valve area p 1/2 method 3.55 cm2    Triscuspid Valve Regurgitation Peak Gradient 32 mmHg    PV mean gradient 2 mmHg    PV PEAK VELOCITY 1.37 m/s    PV peak gradient 8 mmHg    Pulmonary Valve Mean Velocity 0.87 m/s    RVOT peak deonte 1.06 m/s    Ao root annulus 3.4 cm    STJ 3.4 cm    Ascending aorta 2.4 cm    ASI 1.1 cm/m2    IVC diameter 2.14 cm    Mean e' 0.08 m/s    ZLVIDS -0.26     ZLVIDD -3.58     TV resting pulmonary artery pressure 34 mmHg    RV TB RVSP 6 mmHg    Est. RA pres 3 mmHg    Narrative      Left Ventricle: The left ventricle is mildly dilated. There is moderate   eccentric hypertrophy. There is mildly reduced systolic function with a   visually estimated ejection fraction of 45 - 50%.    Right Ventricle: The right ventricle is normal in size Systolic   function is normal.    Left Atrium: The left atrium is moderately dilated    Mitral Valve: There is mild anterior leaflet sclerosis. There is mild   posterior mitral annular calcification. There is mild to moderate   regurgitation.    Tricuspid Valve: There is mild to moderate regurgitation.    Pulmonary Artery: There is mild pulmonary hypertension. The estimated    pulmonary artery systolic pressure is 34 mmHg.    IVC/SVC: Normal venous pressure at 3 mmHg.

## 2025-06-30 NOTE — ASSESSMENT & PLAN NOTE
Patient has Combined Systolic and Diastolic heart failure that is Acute on chronic. On presentation their CHF was decompensated. Evidence of decompensated CHF on presentation includes: edema, crackles on lung auscultation, paroxysmal nocturnal dyspnea (PND), and dyspnea on exertion (CAMACHO). The etiology of their decompensation is likely atrial flutter. Most recent BNP and echo results are listed below.  Recent Labs     06/29/25  0648   *     Latest ECHO  Results for orders placed during the hospital encounter of 03/11/25    Echo    Interpretation Summary    Left Ventricle: The left ventricle is normal in size. There is mild to moderate concentric hypertrophy. There is low normal systolic function. Ejection fraction is approximately 50%. Grade I diastolic dysfunction.    Right Ventricle: The right ventricle is normal in size. Systolic function is normal.    Left Atrium: Moderately dilated    Aortic Valve: There is mild aortic valve sclerosis.    Mitral Valve: There is mild bileaflet sclerosis. There is moderate posterior mitral annular calcification. There is mild to moderate regurgitation.    Tricuspid Valve: There is mild to moderate regurgitation.    Pulmonary Artery: There is moderate pulmonary hypertension. The estimated pulmonary artery systolic pressure is 49 mmHg.    IVC/SVC: Normal venous pressure at 3 mmHg.    Current Heart Failure Medications  furosemide injection 40 mg, 2 times daily before meals, Intravenous  losartan tablet 100 mg, Daily, Oral    Plan    - Lasix 40 mg IV BID  - Control HR  - Monitor strict I&Os and daily weights.    - Place on telemetry  - Low sodium diet  - Place on fluid restriction of 1.5 L.   - Cardiology has been consulted  - The patient's volume status is improving

## 2025-06-30 NOTE — ASSESSMENT & PLAN NOTE
Latest ECHO  Results for orders placed during the hospital encounter of 03/11/25    Echo    Interpretation Summary    Left Ventricle: The left ventricle is normal in size. There is mild to moderate concentric hypertrophy. There is low normal systolic function. Ejection fraction is approximately 50%. Grade I diastolic dysfunction.    Right Ventricle: The right ventricle is normal in size. Systolic function is normal.    Left Atrium: Moderately dilated    Aortic Valve: There is mild aortic valve sclerosis.    Mitral Valve: There is mild bileaflet sclerosis. There is moderate posterior mitral annular calcification. There is mild to moderate regurgitation.    Tricuspid Valve: There is mild to moderate regurgitation.    Pulmonary Artery: There is moderate pulmonary hypertension. The estimated pulmonary artery systolic pressure is 49 mmHg.    IVC/SVC: Normal venous pressure at 3 mmHg.    Current Heart Failure Medications  furosemide injection 40 mg, 2 times daily before meals, Intravenous  losartan tablet 100 mg, Daily, Oral    Plan  - Monitor strict I&Os and daily weights.    - Place on telemetry  - Low sodium diet  - Place on fluid restriction of 1.5 L    Diastolic CHF exacerbation triggered by atrial flutter w RVR.  IV Lasix diuresis.  GDMT for CHF.

## 2025-06-30 NOTE — PROGRESS NOTES
[facility] Medicine  Progress Note    Patient Name: Lizeth Henderson  MRN: [id number]  Patient Class: IP- Inpatient   Admission Date: 6/29/2025  Length of Stay: 1 days  Attending Physician: Marimar Monzon MD  Primary Care Provider: Jennifer Flynn MD        Subjective     Principal Problem:Atrial flutter        HPI:  Lizeth Henderson is an 84 year old female with history of HTN, CAD, HLD, DM, and lymphedema who presented to ED for further evaluation of progressive dyspnea over the last 2-3 days. She endorses exertional fatigue and PND. She denies fever, chest pain, or palpitations. She started compression therapy for lymphedema about one week ago.     In ED, imaging consistent with CHF and small bilateral pleural effusion. EKG also uncontrolled atrial flutter (-150s). She has received a total of 40 mg Cardizem IV and 60 mg Lasix without rate control. She was subsequently placed on Cardizem infusion triturating to 15 ml/hr. . Labs were unremarkable including initial troponin, TSH, and electrolytes. Cardiology has been consulted. She will be admitted to  for further management.    Patient is a full code.     Overview/Hospital Course:  Patient is 84-year-old female with a history of hypertension, coronary artery disease, hyperlipidemia, diabetes mellitus in lymphedema who presented to the emergency department due to progressive dyspnea over 2-3 days prior to admission.  She also complained of exertional fatigue and PND.  In the emergency department she was noted to have CHF with bilateral pleural effusions, EKG revealed uncontrolled a flutter.  Patient was initially placed on IV Cardizem and then subsequently on in non tried treating infusion at 15 mL/hr.  This did not control patient and therefore she was started on amiodarone.  Early on 06/30 patient converted to sinus rhythm with a rate of 62.  She reports that her shortness breath has dramatically  improved.  Patient seen in consultation by Cardiology.    Interval History:  Patient seen and examined with family in room.  She reports that her respiratory status has markedly improved.    Review of Systems   Constitutional:  Positive for activity change and fatigue. Negative for fever.   Respiratory:  Negative for cough and shortness of breath.    Cardiovascular:  Negative for chest pain and leg swelling.   Gastrointestinal:  Negative for nausea and vomiting.   Neurological:  Positive for weakness.   All other systems reviewed and are negative.    Objective:     Vital Signs (Most Recent):  Temp: 97.7 °F (36.5 °C) (06/30/25 1650)  Pulse: 62 (06/30/25 1650)  Resp: 18 (06/30/25 1650)  BP: 121/64 (06/30/25 1650)  SpO2: 100 % (06/30/25 1650) Vital Signs (24h Range):  Temp:  [97.7 °F (36.5 °C)-98.3 °F (36.8 °C)] 97.7 °F (36.5 °C)  Pulse:  [] 62  Resp:  [18] 18  SpO2:  [94 %-100 %] 100 %  BP: (121-139)/(60-73) 121/64     Weight: 101.2 kg (223 lb 1.7 oz)  Body mass index is 33.92 kg/m².    Intake/Output Summary (Last 24 hours) at 6/30/2025 1748  Last data filed at 6/30/2025 1230  Gross per 24 hour   Intake 240 ml   Output 2100 ml   Net -1860 ml         Physical Exam  Vitals reviewed.   Constitutional:       Appearance: Normal appearance. She is ill-appearing.   HENT:      Head: Normocephalic and atraumatic.      Mouth/Throat:      Mouth: Mucous membranes are moist.      Pharynx: Oropharynx is clear.   Eyes:      Extraocular Movements: Extraocular movements intact.      Conjunctiva/sclera: Conjunctivae normal.   Cardiovascular:      Rate and Rhythm: Normal rate and regular rhythm.      Pulses: Normal pulses.      Heart sounds: Normal heart sounds.   Pulmonary:      Effort: Pulmonary effort is normal.      Breath sounds: Normal breath sounds.   Abdominal:      General: Bowel sounds are normal.      Palpations: Abdomen is soft.   Musculoskeletal:         General: Normal range of motion.      Cervical back: Normal  range of motion and neck supple.      Right lower leg: Edema present.      Left lower leg: Edema present.   Skin:     General: Skin is warm and dry.   Neurological:      General: No focal deficit present.      Mental Status: She is alert and oriented to person, place, and time. Mental status is at baseline.             Significant Labs: All pertinent labs within the past 24 hours have been reviewed.  CBC:   Recent Labs   Lab 06/29/25  0648 06/29/25  0925 06/30/25  0527   WBC 11.98 11.11 6.40   HGB 10.9* 10.7* 9.6*   HCT 34.8* 35.0* 31.8*    319 258     CMP:   Recent Labs   Lab 06/29/25  0648 06/30/25  0527    139   K 4.6 3.8    100   CO2 21* 29   * 118*   BUN 18 19   CREATININE 1.0 1.1   CALCIUM 9.0 8.6*   PROT 7.4 6.6   ALBUMIN 3.4* 3.0*   BILITOT 0.5 0.4   ALKPHOS 110 91   AST 19 12   ALT 13 9*   ANIONGAP 12 10     Cardiac Markers:   Recent Labs   Lab 06/29/25  0648   *     Coagulation:   Recent Labs   Lab 06/29/25  0925 06/29/25  1309 06/30/25  0527   INR 1.1  --   --    APTT 26.3   < > 61.6*    < > = values in this interval not displayed.     Magnesium:   Recent Labs   Lab 06/29/25  0648 06/30/25  0527   MG 1.8 1.9     Troponin:   Recent Labs   Lab 06/29/25  0648 06/29/25  1309   TROPONINI 0.018 0.024     TSH:   Recent Labs   Lab 06/29/25  0648   TSH 1.184       Significant Imaging: I have reviewed all pertinent imaging results/findings within the past 24 hours.      Assessment & Plan  Atrial flutter  HR currently 150s. S/p 40 mg Cardizem IV followed by Cardizem infusion at 15/ml/hr. Rate still uncontrolled.     Plan:   --Amiodarone infusion with loading dose.  --Cardiology will consider cardioversion if she becomes hemodynamically unstable  --Heparin infusion for CVA prophylaxis  -- TSH wnl  -- Serial Troponin  Essential hypertension  Patient's blood pressure range in the last 24 hours was: BP  Min: 121/64  Max: 139/60.The patient's inpatient anti-hypertensive regimen is listed  below:  Current Antihypertensives  furosemide injection 40 mg, 2 times daily before meals, Intravenous  losartan tablet 100 mg, Daily, Oral  diltiaZEM 24 hr capsule 180 mg, Daily, Oral  Losartan 100 mg daily  Plan  - BP is controlled, no changes needed to their regimen    Anemia  Anemia is likely due to Iron deficiency. Most recent hemoglobin and hematocrit are listed below.  Recent Labs     06/29/25  0648 06/29/25  0925 06/30/25  0527   HGB 10.9* 10.7* 9.6*   HCT 34.8* 35.0* 31.8*     Plan  - Monitor serial CBC: Daily  - Transfuse PRBC if patient becomes hemodynamically unstable, symptomatic or H/H drops below 7/21.  - Patient has not received any PRBC transfusions to date  - Patient's anemia is currently stable  Chronic kidney disease, stage 3a  Creatine stable for now. BMP reviewed- noted Estimated Creatinine Clearance: 46.5 mL/min (based on SCr of 1.1 mg/dL). according to latest data. Based on current GFR, CKD stage is stage 3 - GFR 30-59.  Monitor UOP and serial BMP and adjust therapy as needed. Renally dose meds. Avoid nephrotoxic medications and procedures.  Lymphedema  Compression therapy outpatient  Keep dressing intact    Acute on chronic combined systolic and diastolic congestive heart failure  Patient has Combined Systolic and Diastolic heart failure that is Acute on chronic. On presentation their CHF was decompensated. Evidence of decompensated CHF on presentation includes: edema, crackles on lung auscultation, paroxysmal nocturnal dyspnea (PND), and dyspnea on exertion (CAMACHO). The etiology of their decompensation is likely atrial flutter. Most recent BNP and echo results are listed below.  Recent Labs     06/29/25  0648   *     Latest ECHO  Results for orders placed during the hospital encounter of 03/11/25    Echo    Interpretation Summary    Left Ventricle: The left ventricle is normal in size. There is mild to moderate concentric hypertrophy. There is low normal systolic function. Ejection  fraction is approximately 50%. Grade I diastolic dysfunction.    Right Ventricle: The right ventricle is normal in size. Systolic function is normal.    Left Atrium: Moderately dilated    Aortic Valve: There is mild aortic valve sclerosis.    Mitral Valve: There is mild bileaflet sclerosis. There is moderate posterior mitral annular calcification. There is mild to moderate regurgitation.    Tricuspid Valve: There is mild to moderate regurgitation.    Pulmonary Artery: There is moderate pulmonary hypertension. The estimated pulmonary artery systolic pressure is 49 mmHg.    IVC/SVC: Normal venous pressure at 3 mmHg.    Current Heart Failure Medications  furosemide injection 40 mg, 2 times daily before meals, Intravenous  losartan tablet 100 mg, Daily, Oral    Plan    - Lasix 40 mg IV BID  - Control HR  - Monitor strict I&Os and daily weights.    - Place on telemetry  - Low sodium diet  - Place on fluid restriction of 1.5 L.   - Cardiology has been consulted  - The patient's volume status is improving  Acute on chronic diastolic congestive heart failure  Patient has Diastolic (HFpEF) heart failure that is Acute on chronic. On presentation their CHF was decompensated. Evidence of decompensated CHF on presentation includes: edema, crackles on lung auscultation, orthopnea, paroxysmal nocturnal dyspnea (PND), dyspnea on exertion (CAMACHO), and shortness of breath. The etiology of their decompensation is likely AFib with a RVR. Most recent BNP and echo results are listed below.  Recent Labs     06/29/25  0648   *     Latest ECHO  Results for orders placed during the hospital encounter of 06/29/25    Echo    Interpretation Summary    Left Ventricle: The left ventricle is mildly dilated. There is moderate eccentric hypertrophy. There is mildly reduced systolic function with a visually estimated ejection fraction of 45 - 50%.    Right Ventricle: The right ventricle is normal in size Systolic function is normal.    Left  Atrium: The left atrium is moderately dilated    Mitral Valve: There is mild anterior leaflet sclerosis. There is mild posterior mitral annular calcification. There is mild to moderate regurgitation.    Tricuspid Valve: There is mild to moderate regurgitation.    Pulmonary Artery: There is mild pulmonary hypertension. The estimated pulmonary artery systolic pressure is 34 mmHg.    IVC/SVC: Normal venous pressure at 3 mmHg.    Current Heart Failure Medications  furosemide injection 40 mg, 2 times daily before meals, Intravenous  losartan tablet 100 mg, Daily, Oral    Plan  - Monitor strict I&Os and daily weights.    - Place on telemetry  - Low sodium diet  - Place on fluid restriction of 1.5 L.   - Cardiology has been consulted  - The patient's volume status is improving but not at their baseline as indicated by edema and shortness of breath  -           VTE Risk Mitigation (From admission, onward)           Ordered     apixaban tablet 5 mg  2 times daily         06/30/25 0924     IP VTE HIGH RISK PATIENT  Once         06/29/25 1057     Place sequential compression device  Until discontinued         06/29/25 1057                    Discharge Planning   FRANCISCO:      Code Status: Full Code   Medical Readiness for Discharge Date:                            Marimar Armas MD  Department of Hospital Medicine   [facility] - Telemetry ([facility])

## 2025-06-30 NOTE — ASSESSMENT & PLAN NOTE
Patient's blood pressure range in the last 24 hours was: BP  Min: 121/64  Max: 139/60.The patient's inpatient anti-hypertensive regimen is listed below:  Current Antihypertensives  furosemide injection 40 mg, 2 times daily before meals, Intravenous  losartan tablet 100 mg, Daily, Oral  diltiaZEM 24 hr capsule 180 mg, Daily, Oral  Losartan 100 mg daily  Plan  - BP is controlled, no changes needed to their regimen

## 2025-06-30 NOTE — ASSESSMENT & PLAN NOTE
Creatine stable for now. BMP reviewed- noted Estimated Creatinine Clearance: 46.5 mL/min (based on SCr of 1.1 mg/dL). according to latest data. Based on current GFR, CKD stage is stage 3 - GFR 30-59.  Monitor UOP and serial BMP and adjust therapy as needed. Renally dose meds. Avoid nephrotoxic medications and procedures.

## 2025-06-30 NOTE — HOSPITAL COURSE
Cardiology consulted for atrial flutter.  Pt has h/o PDA repair (Dr. Francesco San, [place], 1948), DM, chronic lymphedema, PHTN, MR, TR, DD, HTN.  Nonsmoker.  Admitted today with new onset atrial flutter w RVR and associated CHF sxs.  Noted dyspnea in last day or so.  No angina.    - troponin  CTA negative for PE.  Ecg on admit a flutter w variable rate.  Echo March 2025 EF 50%, DD, mild-mod MR/TR, PAP 49 mmhg.    6/30/25 Pt seen and examined today resting in bed, denies any CP or CHF sxs. No acute CV events reported, SR on tele. Labs reviewed, chart reviewed. PO meds    7/1/2025-Patient seen and examined today, sitting up in bed. Feels great. No CV complaints. Denies CP/SOB, on minimal supplemental O2. Labs reviewed. Lasix transitioned to po.  EKG from yesterday reviewed, QT prolonged/borderline, amiodarone d/c'd.

## 2025-06-30 NOTE — ASSESSMENT & PLAN NOTE
Atrial Flutter w RVR.  New dx.    Discussed mgt w pt.  Cardizem gtt initiated on admit.  If HR fails to improve, start Amiodarone tx.  IV heparin gtt.  Will need to consider MAGO guided cardioversion this admission if fails to improve and convert to sinus.    Echocardiogram.  Will need DOAC prior to discharge.    6/30/25  SR on exam  PO cardizem and Amio started  Eliquis, monitor H/H  F/u as OP

## 2025-06-30 NOTE — PROGRESS NOTES
[facility] ([facility])  Cardiology  Progress Note    Patient Name: Lizeth Henderson  MRN: [id number]  Admission Date: 6/29/2025  Hospital Length of Stay: 1 days  Code Status: Full Code   Attending Physician: Marimar Monzon MD   Primary Care Physician: Jennifer Flynn MD  Expected Discharge Date:   Principal Problem:Atrial flutter    Subjective:     Hospital Course:   Cardiology consulted for atrial flutter.  Pt has h/o PDA repair (Dr. Francesco San, [place], 1949), DM, chronic lymphedema, PHTN, MR, TR, DD, HTN.  Nonsmoker.  Admitted today with new onset atrial flutter w RVR and associated CHF sxs.  Noted dyspnea in last day or so.  No angina.    - troponin  CTA negative for PE.  Ecg on admit a flutter w variable rate.  Echo March 2025 EF 50%, DD, mild-mod MR/TR, PAP 49 mmhg.    6/30/25 Pt seen and examined today resting in bed, denies any CP or CHF sxs. No acute CV events reported, SR on tele. Labs reviewed, chart reviewed. PO meds    Past Medical History:   Diagnosis Date    Adrenal adenoma 2016    Anxiety     Arthritis     Benign hypertension     Colon cancer age 61    colon    Coronary artery disease     Depression     Full dentures     General anesthetics causing adverse effect in therapeutic use     yells and talks when wakes up    Hyperlipidemia     Osteopenia     Shingles     Type 2 diabetes mellitus without complication, without long-term current use of insulin 1/23/2024    Wears glasses        Past Surgical History:   Procedure Laterality Date    APPENDECTOMY      BREAST BIOPSY Left     benign    BUNIONECTOMY Left     CATARACT EXTRACTION Bilateral     COLON SURGERY      2001    EYE SURGERY      cataract surg    HEMICOLECTOMY  2002    HERNIA REPAIR      x5    JOINT REPLACEMENT      knee    left toe surgery      hammertoe    PATENT DUCTUS ARTERIOUS LIGATION  1948    SALPINGOOPHORECTOMY  2002    due to colon cancer    TONSILLECTOMY, ADENOIDECTOMY         Review of patient's allergies  indicates:   Allergen Reactions    Clindamycin      Diarrhea      Lisinopril      cough       No current facility-administered medications on file prior to encounter.     Current Outpatient Medications on File Prior to Encounter   Medication Sig    aspirin (ECOTRIN) 81 MG EC tablet Take 81 mg by mouth once daily.    atorvastatin (LIPITOR) 10 MG tablet TAKE 1 TABLET(10 MG) BY MOUTH EVERY DAY    buPROPion (WELLBUTRIN XL) 150 MG TB24 tablet Take 1 tablet (150 mg total) by mouth once daily.    busPIRone (BUSPAR) 10 MG tablet Take 2 tablets (20 mg total) by mouth 2 (two) times daily.    clonazePAM (KLONOPIN) 0.25 MG TbDL Take 1 tablet (0.25 mg total) by mouth 2 (two) times daily as needed (anxiety).    EScitalopram oxalate (LEXAPRO) 10 MG tablet Take 1.5 tablets (15 mg total) by mouth once daily.    ferrous sulfate (FEOSOL) 325 mg (65 mg iron) Tab tablet Take 1 tablet (325 mg total) by mouth every other day.    HYDROcodone-acetaminophen (NORCO) 7.5-325 mg per tablet Take 1 tablet by mouth every 6 (six) hours as needed for Pain.    ibandronate (BONIVA) 150 mg tablet Take 1 tablet (150 mg total) by mouth every 30 days.    losartan (COZAAR) 100 MG tablet TAKE 1 TABLET(100 MG) BY MOUTH EVERY DAY    meloxicam (MOBIC) 7.5 MG tablet TAKE 1 TABLET(7.5 MG) BY MOUTH DAILY AS NEEDED FOR PAIN    mirabegron (MYRBETRIQ) 50 mg Tb24 Take 1 tablet (50 mg total) by mouth once daily.    pramipexole (MIRAPEX) 0.125 MG tablet TAKE 1 TABLET BY MOUTH EVERY EVENING    suvorexant (BELSOMRA) 10 mg Tab Take 1 tablet by mouth every evening.     Family History       Problem Relation (Age of Onset)    Alzheimer's disease Mother    Anxiety disorder Daughter    Arthritis Mother, Father    Cancer Father, Maternal Uncle, Cousin    Depression Daughter, Daughter, Daughter    Early death Maternal Grandmother    Hearing loss Father    Hypertension Father    Kidney disease Daughter    Ulcerative colitis Daughter          Tobacco Use    Smoking status: Never      Passive exposure: Never    Smokeless tobacco: Never   Substance and Sexual Activity    Alcohol use: No    Drug use: Never    Sexual activity: Not Currently     Review of Systems   Constitutional: Negative.   HENT: Negative.     Eyes: Negative.    Cardiovascular: Negative.    Respiratory: Negative.     Endocrine: Negative.    Hematologic/Lymphatic: Negative.    Skin: Negative.    Musculoskeletal: Negative.    Gastrointestinal: Negative.    Genitourinary: Negative.    Neurological: Negative.    Psychiatric/Behavioral: Negative.     Allergic/Immunologic: Negative.      Objective:     Vital Signs (Most Recent):  Temp: 97.8 °F (36.6 °C) (06/30/25 1158)  Pulse: 68 (06/30/25 1158)  Resp: 18 (06/30/25 1158)  BP: 139/60 (06/30/25 1158)  SpO2: 96 % (06/30/25 1158) Vital Signs (24h Range):  Temp:  [97.8 °F (36.6 °C)-98.3 °F (36.8 °C)] 97.8 °F (36.6 °C)  Pulse:  [] 68  Resp:  [18] 18  SpO2:  [94 %-99 %] 96 %  BP: (121-139)/(60-73) 139/60     Weight: 101.2 kg (223 lb 1.7 oz)  Body mass index is 33.92 kg/m².    SpO2: 96 %         Intake/Output Summary (Last 24 hours) at 6/30/2025 1322  Last data filed at 6/30/2025 0730  Gross per 24 hour   Intake --   Output 3400 ml   Net -3400 ml       Lines/Drains/Airways       Drain  Duration             Female External Urinary Catheter w/ Suction 06/29/25 0737 1 day              Peripheral Intravenous Line  Duration             Peripheral IV Single Lumen 06/29/25 0925 20 G 1 3/4 in Anterior;Left Forearm 1 day    Peripheral IV Single Lumen 06/29/25 1137 22 G Right Antecubital 1 day                     Physical Exam  Vitals and nursing note reviewed.   Constitutional:       General: She is not in acute distress.     Appearance: Normal appearance. She is well-developed. She is not diaphoretic.   HENT:      Head: Normocephalic.   Neck:      Thyroid: No thyromegaly.      Vascular: No carotid bruit or JVD.   Cardiovascular:      Rate and Rhythm: Normal rate and regular rhythm.       "Pulses: Normal pulses.           Radial pulses are 2+ on the right side and 2+ on the left side.      Heart sounds: Normal heart sounds, S1 normal and S2 normal. No murmur heard.     No friction rub. No gallop.   Pulmonary:      Effort: Tachypnea present.      Breath sounds: Examination of the right-lower field reveals decreased breath sounds. Examination of the left-lower field reveals decreased breath sounds. Decreased breath sounds present. No wheezing or rales.   Abdominal:      General: Bowel sounds are normal. There is no abdominal bruit.      Palpations: Abdomen is soft.      Tenderness: There is no abdominal tenderness.   Musculoskeletal:         General: Normal range of motion.      Cervical back: Neck supple.   Lymphadenopathy:      Cervical: No cervical adenopathy.   Skin:     General: Skin is warm.   Neurological:      Mental Status: She is alert and oriented to person, place, and time.   Psychiatric:         Behavior: Behavior normal. Behavior is cooperative.          Significant Labs: ABG: No results for input(s): "PH", "PCO2", "HCO3", "POCSATURATED", "BE" in the last 48 hours., Blood Culture: No results for input(s): "LABBLOO" in the last 48 hours., BMP:   Recent Labs   Lab 06/29/25  0648 06/30/25  0527   * 118*    139   K 4.6 3.8    100   CO2 21* 29   BUN 18 19   CREATININE 1.0 1.1   CALCIUM 9.0 8.6*   MG 1.8 1.9   , CMP   Recent Labs   Lab 06/29/25  0648 06/30/25  0527    139   K 4.6 3.8    100   CO2 21* 29   * 118*   BUN 18 19   CREATININE 1.0 1.1   CALCIUM 9.0 8.6*   PROT 7.4 6.6   ALBUMIN 3.4* 3.0*   BILITOT 0.5 0.4   ALKPHOS 110 91   AST 19 12   ALT 13 9*   ANIONGAP 12 10   , CBC   Recent Labs   Lab 06/29/25  0648 06/29/25  0925 06/30/25  0527   WBC 11.98 11.11 6.40   HGB 10.9* 10.7* 9.6*   HCT 34.8* 35.0* 31.8*    319 258   , INR   Recent Labs   Lab 06/29/25 0925   INR 1.1   PROTIME 11.9   , Lipid Panel No results for input(s): "CHOL", "HDL", " ""LDLCALC", "TRIG", "CHOLHDL" in the last 48 hours., and Troponin No results for input(s): "TROPONINIHS" in the last 48 hours.    Significant Imaging: Echocardiogram: Transthoracic echo (TTE) complete ([facility]upid Only):   Results for orders placed or performed during the hospital encounter of 06/29/25   Echo   Result Value Ref Range    BSA 2.32 m2    LVOT stroke volume 75.0 cm3    LVIDd 5.6 3.5 - 6.0 cm    LV Systolic Volume 102 mL    LV Systolic Volume Index 45.5 mL/m2    LVIDs 4.7 (A) 2.1 - 4.0 cm    LV Diastolic Volume 155 mL    LV Diastolic Volume Index 69.20 mL/m2    Left Ventricular End Systolic Volume by Teichholz Method 101.71 mL    Left Ventricular End Diastolic Volume by Teichholz Method 154.75 mL    IVS 1.2 (A) 0.6 - 1.1 cm    LVOT diameter 2.0 cm    LVOT area 3.1 cm2    FS 16.1 (A) 28 - 44 %    Left Ventricle Relative Wall Thickness 0.61 cm    PW 1.7 (A) 0.6 - 1.1 cm    LV mass 365.4 g    LV Mass Index 163.1 g/m2    MV Peak E Deonte 1.24 m/s    TDI LATERAL 0.08 m/s    TDI SEPTAL 0.08 m/s    E/E' ratio 16 m/s    MV Peak A Deonte 0.67 m/s    TR Max Deonte 2.8 m/s    E/A ratio 1.85     IVRT 46 msec    E wave deceleration time 214 msec    LV SEPTAL E/E' RATIO 15.5 m/s    LV LATERAL E/E' RATIO 15.5 m/s    LVOT peak deonte 1.2 m/s    Left Ventricular Outflow Tract Mean Velocity 0.88 cm/s    Left Ventricular Outflow Tract Mean Gradient 3.53 mmHg    RVOT peak VTI 18.5 cm    LA size 4.7 cm    Left Atrium Minor Axis 6.1 cm    Left Atrium Major Axis 5.8 cm    RA Major Axis 4.94 cm    AV mean gradient 5 mmHg    AV peak gradient 10 mmHg    Ao peak deonte 1.6 m/s    Ao VTI 27.2 cm    LVOT peak VTI 23.9 cm    AV valve area 2.8 cm²    AV Velocity Ratio 0.75     AV index (prosthetic) 0.88     PATY by Velocity Ratio 2.4 cm²    MV stenosis pressure 1/2 time 61.97 ms    MV valve area p 1/2 method 3.55 cm2    Triscuspid Valve Regurgitation Peak Gradient 32 mmHg    PV mean gradient 2 mmHg    PV PEAK VELOCITY 1.37 m/s    PV peak gradient 8 mmHg    " Pulmonary Valve Mean Velocity 0.87 m/s    RVOT peak venkata 1.06 m/s    Ao root annulus 3.4 cm    STJ 3.4 cm    Ascending aorta 2.4 cm    ASI 1.1 cm/m2    IVC diameter 2.14 cm    Mean e' 0.08 m/s    ZLVIDS -0.26     ZLVIDD -3.58     TV resting pulmonary artery pressure 34 mmHg    RV TB RVSP 6 mmHg    Est. RA pres 3 mmHg    Narrative      Left Ventricle: The left ventricle is mildly dilated. There is moderate   eccentric hypertrophy. There is mildly reduced systolic function with a   visually estimated ejection fraction of 45 - 50%.    Right Ventricle: The right ventricle is normal in size Systolic   function is normal.    Left Atrium: The left atrium is moderately dilated    Mitral Valve: There is mild anterior leaflet sclerosis. There is mild   posterior mitral annular calcification. There is mild to moderate   regurgitation.    Tricuspid Valve: There is mild to moderate regurgitation.    Pulmonary Artery: There is mild pulmonary hypertension. The estimated   pulmonary artery systolic pressure is 34 mmHg.    IVC/SVC: Normal venous pressure at 3 mmHg.       Assessment and Plan:       * Atrial flutter  Atrial Flutter w RVR.  New dx.    Discussed mgt w pt.  Cardizem gtt initiated on admit.  If HR fails to improve, start Amiodarone tx.  IV heparin gtt.  Will need to consider MAGO guided cardioversion this admission if fails to improve and convert to sinus.    Echocardiogram.  Will need DOAC prior to discharge.    6/30/25  SR on exam  PO cardizem and Amio started  Eliquis, monitor H/H  F/u as OP    Acute on chronic diastolic congestive heart failure      Latest ECHO  Results for orders placed during the hospital encounter of 03/11/25    Echo    Interpretation Summary    Left Ventricle: The left ventricle is normal in size. There is mild to moderate concentric hypertrophy. There is low normal systolic function. Ejection fraction is approximately 50%. Grade I diastolic dysfunction.    Right Ventricle: The right ventricle is  normal in size. Systolic function is normal.    Left Atrium: Moderately dilated    Aortic Valve: There is mild aortic valve sclerosis.    Mitral Valve: There is mild bileaflet sclerosis. There is moderate posterior mitral annular calcification. There is mild to moderate regurgitation.    Tricuspid Valve: There is mild to moderate regurgitation.    Pulmonary Artery: There is moderate pulmonary hypertension. The estimated pulmonary artery systolic pressure is 49 mmHg.    IVC/SVC: Normal venous pressure at 3 mmHg.    Current Heart Failure Medications  furosemide injection 40 mg, 2 times daily before meals, Intravenous  losartan tablet 100 mg, Daily, Oral    Plan  - Monitor strict I&Os and daily weights.    - Place on telemetry  - Low sodium diet  - Place on fluid restriction of 1.5 L    Diastolic CHF exacerbation triggered by atrial flutter w RVR.  IV Lasix diuresis.  GDMT for CHF.             Lymphedema  Per Hospital med mgt.  Leg wraps.    Chronic kidney disease, stage 3a  Monitor.    Essential hypertension  HTN control.        VTE Risk Mitigation (From admission, onward)           Ordered     apixaban tablet 5 mg  2 times daily         06/30/25 0924     IP VTE HIGH RISK PATIENT  Once         06/29/25 1057     Place sequential compression device  Until discontinued         06/29/25 1057                    Monique Gaxiola NP  Cardiology  [facility] ([facility])

## 2025-06-30 NOTE — ASSESSMENT & PLAN NOTE
Anemia is likely due to Iron deficiency. Most recent hemoglobin and hematocrit are listed below.  Recent Labs     06/29/25  0648 06/29/25  0925 06/30/25  0527   HGB 10.9* 10.7* 9.6*   HCT 34.8* 35.0* 31.8*     Plan  - Monitor serial CBC: Daily  - Transfuse PRBC if patient becomes hemodynamically unstable, symptomatic or H/H drops below 7/21.  - Patient has not received any PRBC transfusions to date  - Patient's anemia is currently stable

## 2025-06-30 NOTE — SUBJECTIVE & OBJECTIVE
Interval History:  Patient seen and examined with family in room.  She reports that her respiratory status has markedly improved.    Review of Systems   Constitutional:  Positive for activity change and fatigue. Negative for fever.   Respiratory:  Negative for cough and shortness of breath.    Cardiovascular:  Negative for chest pain and leg swelling.   Gastrointestinal:  Negative for nausea and vomiting.   Neurological:  Positive for weakness.   All other systems reviewed and are negative.    Objective:     Vital Signs (Most Recent):  Temp: 97.7 °F (36.5 °C) (06/30/25 1650)  Pulse: 62 (06/30/25 1650)  Resp: 18 (06/30/25 1650)  BP: 121/64 (06/30/25 1650)  SpO2: 100 % (06/30/25 1650) Vital Signs (24h Range):  Temp:  [97.7 °F (36.5 °C)-98.3 °F (36.8 °C)] 97.7 °F (36.5 °C)  Pulse:  [] 62  Resp:  [18] 18  SpO2:  [94 %-100 %] 100 %  BP: (121-139)/(60-73) 121/64     Weight: 101.2 kg (223 lb 1.7 oz)  Body mass index is 33.92 kg/m².    Intake/Output Summary (Last 24 hours) at 6/30/2025 1748  Last data filed at 6/30/2025 1230  Gross per 24 hour   Intake 240 ml   Output 2100 ml   Net -1860 ml         Physical Exam  Vitals reviewed.   Constitutional:       Appearance: Normal appearance. She is ill-appearing.   HENT:      Head: Normocephalic and atraumatic.      Mouth/Throat:      Mouth: Mucous membranes are moist.      Pharynx: Oropharynx is clear.   Eyes:      Extraocular Movements: Extraocular movements intact.      Conjunctiva/sclera: Conjunctivae normal.   Cardiovascular:      Rate and Rhythm: Normal rate and regular rhythm.      Pulses: Normal pulses.      Heart sounds: Normal heart sounds.   Pulmonary:      Effort: Pulmonary effort is normal.      Breath sounds: Normal breath sounds.   Abdominal:      General: Bowel sounds are normal.      Palpations: Abdomen is soft.   Musculoskeletal:         General: Normal range of motion.      Cervical back: Normal range of motion and neck supple.      Right lower leg: Edema  present.      Left lower leg: Edema present.   Skin:     General: Skin is warm and dry.   Neurological:      General: No focal deficit present.      Mental Status: She is alert and oriented to person, place, and time. Mental status is at baseline.             Significant Labs: All pertinent labs within the past 24 hours have been reviewed.  CBC:   Recent Labs   Lab 06/29/25  0648 06/29/25  0925 06/30/25  0527   WBC 11.98 11.11 6.40   HGB 10.9* 10.7* 9.6*   HCT 34.8* 35.0* 31.8*    319 258     CMP:   Recent Labs   Lab 06/29/25  0648 06/30/25  0527    139   K 4.6 3.8    100   CO2 21* 29   * 118*   BUN 18 19   CREATININE 1.0 1.1   CALCIUM 9.0 8.6*   PROT 7.4 6.6   ALBUMIN 3.4* 3.0*   BILITOT 0.5 0.4   ALKPHOS 110 91   AST 19 12   ALT 13 9*   ANIONGAP 12 10     Cardiac Markers:   Recent Labs   Lab 06/29/25  0648   *     Coagulation:   Recent Labs   Lab 06/29/25  0925 06/29/25  1309 06/30/25  0527   INR 1.1  --   --    APTT 26.3   < > 61.6*    < > = values in this interval not displayed.     Magnesium:   Recent Labs   Lab 06/29/25 0648 06/30/25  0527   MG 1.8 1.9     Troponin:   Recent Labs   Lab 06/29/25  0648 06/29/25  1309   TROPONINI 0.018 0.024     TSH:   Recent Labs   Lab 06/29/25  0648   TSH 1.184       Significant Imaging: I have reviewed all pertinent imaging results/findings within the past 24 hours.

## 2025-06-30 NOTE — HOSPITAL COURSE
Patient is 84-year-old female with a history of hypertension, coronary artery disease, hyperlipidemia, diabetes mellitus in lymphedema who presented to the emergency department due to progressive dyspnea over 2-3 days prior to admission.  She also complained of exertional fatigue and PND.  In the emergency department she was noted to have CHF with bilateral pleural effusions, EKG revealed uncontrolled a flutter.  Patient was initially placed on IV Cardizem and then subsequently on in non tried treating infusion at 15 mL/hr.  This did not control patient and therefore she was started on amiodarone.  Early on 06/30 patient converted to sinus rhythm with a rate of 62.  She reports that her shortness breath has dramatically improved.  Patient seen in consultation by Cardiology.

## 2025-06-30 NOTE — PLAN OF CARE
A225/A225 GIOVANYJesús Henderson is a 84 y.o.female admitted on 6/29/2025 for Atrial flutter   Code Status: Full Code MRN: [id number]   Review of patient's allergies indicates:   Allergen Reactions    Clindamycin      Diarrhea      Lisinopril      cough     Past Medical History:   Diagnosis Date    Adrenal adenoma 2016    Anxiety     Arthritis     Benign hypertension     Colon cancer age 61    colon    Coronary artery disease     Depression     Full dentures     General anesthetics causing adverse effect in therapeutic use     yells and talks when wakes up    Hyperlipidemia     Osteopenia     Shingles     Type 2 diabetes mellitus without complication, without long-term current use of insulin 1/23/2024    Wears glasses       PRN meds    acetaminophen, 650 mg, Q4H PRN  albuterol-ipratropium, 3 mL, Q4H PRN  aluminum-magnesium hydroxide-simethicone, 30 mL, QID PRN  dextrose 50%, 12.5 g, PRN  dextrose 50%, 25 g, PRN  glucagon (human recombinant), 1 mg, PRN  glucose, 16 g, PRN  glucose, 24 g, PRN  HYDROcodone-acetaminophen, 1 tablet, Q6H PRN  melatonin, 6 mg, Nightly PRN  naloxone, 0.02 mg, PRN  ondansetron, 8 mg, Q8H PRN  senna-docusate, 1 tablet, BID PRN  simethicone, 1 tablet, QID PRN           Pt oriented x4.  VSS.  Pt remained afebrile throughout this shift.   All meds administered per order.   Pt remained free of falls this shift.   Plan of care reviewed. Patient verbalizes understanding.   Pt moving/turning independently.   Bed low, side rails up x 2, wheels locked, call light in reach.   Patient instructed to call for assistance.  Patient education provided    Heparin, cardizem and amio dc'd  Transitioned to orals  Denies pain      Problem: Adult Inpatient Plan of Care  Goal: Readiness for Transition of Care  Outcome: Progressing                   [place] Coma Scale Score: 15     Lead Monitored: Lead II Rhythm: sinus arrhythmia    Cardiac/Telemetry Box Number: [id number]    Last Bowel Movement: 06/26/25  Diet Heart  Healthy  Voiding Characteristics: external catheter  Michele Score: 15  Fall Risk Score: 23  Accucheck []   Freq?      Lines/Drains/Airways       Drain  Duration             Female External Urinary Catheter w/ Suction 06/29/25 0737 1 day              Peripheral Intravenous Line  Duration             Peripheral IV Single Lumen 06/29/25 0925 20 G 1 3/4 in Anterior;Left Forearm 1 day    Peripheral IV Single Lumen 06/29/25 1137 22 G Right Antecubital 1 day

## 2025-07-01 ENCOUNTER — TELEPHONE (OUTPATIENT)
Dept: HOME HEALTH SERVICES | Facility: CLINIC | Age: 85
End: 2025-07-01
Payer: MEDICARE

## 2025-07-01 ENCOUNTER — DOCUMENTATION ONLY (OUTPATIENT)
Dept: PSYCHIATRY | Facility: CLINIC | Age: 85
End: 2025-07-01
Payer: MEDICARE

## 2025-07-01 VITALS
RESPIRATION RATE: 16 BRPM | DIASTOLIC BLOOD PRESSURE: 69 MMHG | SYSTOLIC BLOOD PRESSURE: 129 MMHG | TEMPERATURE: 98 F | OXYGEN SATURATION: 96 % | WEIGHT: 219.38 LBS | HEIGHT: 68 IN | HEART RATE: 64 BPM | BODY MASS INDEX: 33.25 KG/M2

## 2025-07-01 PROBLEM — I48.92 ATRIAL FLUTTER: Status: RESOLVED | Noted: 2025-06-29 | Resolved: 2025-07-01

## 2025-07-01 PROBLEM — I50.33 ACUTE ON CHRONIC DIASTOLIC CONGESTIVE HEART FAILURE: Status: RESOLVED | Noted: 2025-06-29 | Resolved: 2025-07-01

## 2025-07-01 LAB
ABSOLUTE EOSINOPHIL (OHS): 0.08 K/UL
ABSOLUTE MONOCYTE (OHS): 0.66 K/UL (ref 0.3–1)
ABSOLUTE NEUTROPHIL COUNT (OHS): 5.32 K/UL (ref 1.8–7.7)
ANION GAP (OHS): 12 MMOL/L (ref 8–16)
BASOPHILS # BLD AUTO: 0.04 K/UL
BASOPHILS NFR BLD AUTO: 0.6 %
BUN SERPL-MCNC: 18 MG/DL (ref 8–23)
CALCIUM SERPL-MCNC: 9 MG/DL (ref 8.7–10.5)
CHLORIDE SERPL-SCNC: 102 MMOL/L (ref 95–110)
CO2 SERPL-SCNC: 26 MMOL/L (ref 23–29)
CREAT SERPL-MCNC: 1 MG/DL (ref 0.5–1.4)
ERYTHROCYTE [DISTWIDTH] IN BLOOD BY AUTOMATED COUNT: 15.5 % (ref 11.5–14.5)
GFR SERPLBLD CREATININE-BSD FMLA CKD-EPI: 55 ML/MIN/1.73/M2
GLUCOSE SERPL-MCNC: 111 MG/DL (ref 70–110)
HCT VFR BLD AUTO: 34 % (ref 37–48.5)
HGB BLD-MCNC: 10.1 GM/DL (ref 12–16)
IMM GRANULOCYTES # BLD AUTO: 0.06 K/UL (ref 0–0.04)
IMM GRANULOCYTES NFR BLD AUTO: 0.9 % (ref 0–0.5)
IRON SATN MFR SERPL: 8 % (ref 20–50)
IRON SERPL-MCNC: 30 UG/DL (ref 30–160)
LYMPHOCYTES # BLD AUTO: 0.83 K/UL (ref 1–4.8)
MAGNESIUM SERPL-MCNC: 2.2 MG/DL (ref 1.6–2.6)
MCH RBC QN AUTO: 25.6 PG (ref 27–31)
MCHC RBC AUTO-ENTMCNC: 29.7 G/DL (ref 32–36)
MCV RBC AUTO: 86 FL (ref 82–98)
NUCLEATED RBC (/100WBC) (OHS): 0 /100 WBC
PLATELET # BLD AUTO: 242 K/UL (ref 150–450)
PMV BLD AUTO: 10.5 FL (ref 9.2–12.9)
POTASSIUM SERPL-SCNC: 4.1 MMOL/L (ref 3.5–5.1)
RBC # BLD AUTO: 3.95 M/UL (ref 4–5.4)
RELATIVE EOSINOPHIL (OHS): 1.1 %
RELATIVE LYMPHOCYTE (OHS): 11.9 % (ref 18–48)
RELATIVE MONOCYTE (OHS): 9.4 % (ref 4–15)
RELATIVE NEUTROPHIL (OHS): 76.1 % (ref 38–73)
SODIUM SERPL-SCNC: 140 MMOL/L (ref 136–145)
TIBC SERPL-MCNC: 398 UG/DL (ref 250–450)
TRANSFERRIN SERPL-MCNC: 269 MG/DL (ref 200–375)
WBC # BLD AUTO: 6.99 K/UL (ref 3.9–12.7)

## 2025-07-01 PROCEDURE — 63600175 PHARM REV CODE 636 W HCPCS: Mod: HCNC | Performed by: NURSE PRACTITIONER

## 2025-07-01 PROCEDURE — 25000003 PHARM REV CODE 250: Mod: HCNC | Performed by: FAMILY MEDICINE

## 2025-07-01 PROCEDURE — 80048 BASIC METABOLIC PNL TOTAL CA: CPT | Mod: HCNC | Performed by: INTERNAL MEDICINE

## 2025-07-01 PROCEDURE — 27000221 HC OXYGEN, UP TO 24 HOURS: Mod: HCNC

## 2025-07-01 PROCEDURE — 94761 N-INVAS EAR/PLS OXIMETRY MLT: CPT | Mod: HCNC

## 2025-07-01 PROCEDURE — 99233 SBSQ HOSP IP/OBS HIGH 50: CPT | Mod: HCNC,,, | Performed by: INTERNAL MEDICINE

## 2025-07-01 PROCEDURE — 83540 ASSAY OF IRON: CPT | Mod: HCNC | Performed by: INTERNAL MEDICINE

## 2025-07-01 PROCEDURE — 25000003 PHARM REV CODE 250: Mod: HCNC | Performed by: INTERNAL MEDICINE

## 2025-07-01 PROCEDURE — 25000003 PHARM REV CODE 250: Mod: HCNC

## 2025-07-01 PROCEDURE — 36415 COLL VENOUS BLD VENIPUNCTURE: CPT | Mod: HCNC | Performed by: INTERNAL MEDICINE

## 2025-07-01 PROCEDURE — 83735 ASSAY OF MAGNESIUM: CPT | Mod: HCNC | Performed by: INTERNAL MEDICINE

## 2025-07-01 PROCEDURE — 99900035 HC TECH TIME PER 15 MIN (STAT): Mod: HCNC

## 2025-07-01 PROCEDURE — 85025 COMPLETE CBC W/AUTO DIFF WBC: CPT | Mod: HCNC | Performed by: INTERNAL MEDICINE

## 2025-07-01 PROCEDURE — 25000003 PHARM REV CODE 250: Mod: HCNC | Performed by: NURSE PRACTITIONER

## 2025-07-01 RX ORDER — DILTIAZEM HYDROCHLORIDE 180 MG/1
180 CAPSULE, COATED, EXTENDED RELEASE ORAL DAILY
Qty: 30 CAPSULE | Refills: 0 | Status: SHIPPED | OUTPATIENT
Start: 2025-07-02 | End: 2025-08-01

## 2025-07-01 RX ORDER — AMIODARONE HYDROCHLORIDE 200 MG/1
TABLET ORAL
Qty: 43 TABLET | Refills: 0 | Status: SHIPPED | OUTPATIENT
Start: 2025-07-01 | End: 2025-07-01 | Stop reason: HOSPADM

## 2025-07-01 RX ORDER — FUROSEMIDE 40 MG/1
40 TABLET ORAL DAILY
Qty: 30 TABLET | Refills: 0 | Status: SHIPPED | OUTPATIENT
Start: 2025-07-02 | End: 2025-08-01

## 2025-07-01 RX ORDER — FUROSEMIDE 40 MG/1
40 TABLET ORAL DAILY
Status: DISCONTINUED | OUTPATIENT
Start: 2025-07-01 | End: 2025-07-01 | Stop reason: HOSPADM

## 2025-07-01 RX ORDER — MICONAZOLE NITRATE 2 G/100G
POWDER TOPICAL 2 TIMES DAILY
Qty: 85 G | Refills: 0 | Status: SHIPPED | OUTPATIENT
Start: 2025-07-01 | End: 2025-07-31

## 2025-07-01 RX ADMIN — APIXABAN 5 MG: 2.5 TABLET, FILM COATED ORAL at 09:07

## 2025-07-01 RX ADMIN — AMIODARONE HYDROCHLORIDE 200 MG: 200 TABLET ORAL at 09:07

## 2025-07-01 RX ADMIN — FUROSEMIDE 40 MG: 10 INJECTION, SOLUTION INTRAVENOUS at 06:07

## 2025-07-01 RX ADMIN — MICONAZOLE NITRATE: 20 POWDER TOPICAL at 09:07

## 2025-07-01 RX ADMIN — DILTIAZEM HYDROCHLORIDE 180 MG: 180 CAPSULE, COATED, EXTENDED RELEASE ORAL at 08:07

## 2025-07-01 RX ADMIN — ATORVASTATIN CALCIUM 10 MG: 10 TABLET, FILM COATED ORAL at 09:07

## 2025-07-01 RX ADMIN — FUROSEMIDE 40 MG: 40 TABLET ORAL at 09:07

## 2025-07-01 RX ADMIN — ESCITALOPRAM OXALATE 15 MG: 5 TABLET, FILM COATED ORAL at 08:07

## 2025-07-01 RX ADMIN — BUSPIRONE HYDROCHLORIDE 20 MG: 10 TABLET ORAL at 09:07

## 2025-07-01 RX ADMIN — BUPROPION HYDROCHLORIDE 150 MG: 150 TABLET, EXTENDED RELEASE ORAL at 09:07

## 2025-07-01 RX ADMIN — LOSARTAN POTASSIUM 100 MG: 50 TABLET, FILM COATED ORAL at 08:07

## 2025-07-01 NOTE — PROGRESS NOTES
Department of Psychiatry  Case Management Follow-Up      The patient location is: Residence  The chief complaint leading to consultation is: Supportive Therapy Recommended  Visit type: audio only  60 minutes of total time spent on the encounter, which includes face to face time and non-face to face time preparing to see the patient (eg, review of referral), Obtaining and/or reviewing separately obtained history, Documenting information in the electronic or other health record, Independently interpreting results of research (not separately reported) and communicating results to the patient/family/caregiver.  Each patient to whom he or she provides medical services by telemedicine is:  (1) informed of the relationship between the physician and patient and the respective role of any other health care provider with respect to management of the patient; and (2) notified that he or she may decline to receive medical services by telemedicine and may withdraw from such care at any time.      Patient Name and   Lizeth Henderson, 1940    Referring Provider  Dr. Cheryl Otero    Diagnosis  1. Need for case management follow-up        Notes    SW met with the patient via telehealth on 2025 to follow up after Pt was seen by the psychiatric physician. SW explained role and reviewed the referral.      The patient agreed with the referral to case management. SW provided supportive therapy to address the Pt's anxiety about living alone, particularly following a recent fall. Pt shared that she receives physical therapy at home, which is beneficial for her recovery. Pt mentioned reestablishing a relationship with her cousin, who is retired and living nearby. Pt also added that she will start getting regular visits from her daughter and adoptive grandson in [place]. These connections have improved her sense of security and well-being regarding her living situation. SW will continue to remain available if  needed.      Resources  N/A    Total Time  60 minutes      River Bill LMSW  - Dept of Psychiatry   [facility]  [address] [place] [address]

## 2025-07-01 NOTE — PROGRESS NOTES
[facility] - Telemetry ([facility])  Cardiology  Progress Note    Patient Name: Lizeth Henderson  MRN: [id number]  Admission Date: 6/29/2025  Hospital Length of Stay: 2 days  Code Status: Full Code   Attending Physician: Marimar Monzon MD   Primary Care Physician: Jennifer Flynn MD  Expected Discharge Date: 7/1/2025  Principal Problem:Atrial flutter    Subjective:     Hospital Course:   Cardiology consulted for atrial flutter.  Pt has h/o PDA repair (Dr. Francesco [place], [place], 1948), DM, chronic lymphedema, PHTN, MR, TR, DD, HTN.  Nonsmoker.  Admitted today with new onset atrial flutter w RVR and associated CHF sxs.  Noted dyspnea in last day or so.  No angina.    - troponin  CTA negative for PE.  Ecg on admit a flutter w variable rate.  Echo March 2025 EF 50%, DD, mild-mod MR/TR, PAP 49 mmhg.    6/30/25 Pt seen and examined today resting in bed, denies any CP or CHF sxs. No acute CV events reported, SR on tele. Labs reviewed, chart reviewed. PO meds    7/1/2025-Patient seen and examined today, sitting up in bed. Feels great. No CV complaints. Denies CP/SOB, on minimal supplemental O2. Labs reviewed. Lasix transitioned to po.  EKG from yesterday reviewed, QT prolonged/borderline, amiodarone d/c'd.        Review of Systems   Constitutional: Negative.   HENT: Negative.     Eyes: Negative.    Cardiovascular: Negative.    Respiratory: Negative.     Endocrine: Negative.    Hematologic/Lymphatic: Negative.    Skin: Negative.    Musculoskeletal: Negative.    Gastrointestinal: Negative.    Genitourinary: Negative.    Neurological: Negative.    Psychiatric/Behavioral: Negative.     Allergic/Immunologic: Negative.      Objective:     Vital Signs (Most Recent):  Temp: 98.3 °F (36.8 °C) (07/01/25 1116)  Pulse: 64 (07/01/25 1116)  Resp: 16 (07/01/25 1116)  BP: 129/69 (07/01/25 1116)  SpO2: 96 % (07/01/25 1116) Vital Signs (24h Range):  Temp:  [97.7 °F (36.5 °C)-98.4 °F (36.9 °C)] 98.3 °F (36.8 °C)  Pulse:   [60-83] 64  Resp:  [16-20] 16  SpO2:  [96 %-100 %] 96 %  BP: (121-138)/(59-69) 129/69     Weight: 99.5 kg (219 lb 5.7 oz)  Body mass index is 33.35 kg/m².     SpO2: 96 %         Intake/Output Summary (Last 24 hours) at 7/1/2025 1322  Last data filed at 6/30/2025 2159  Gross per 24 hour   Intake --   Output 750 ml   Net -750 ml       Lines/Drains/Airways       Drain  Duration             Female External Urinary Catheter w/ Suction 06/29/25 0737 2 days              Peripheral Intravenous Line  Duration             Peripheral IV Single Lumen 06/29/25 0925 20 G 1 3/4 in Anterior;Left Forearm 2 days    Peripheral IV Single Lumen 06/29/25 1137 22 G Right Antecubital 2 days                       Physical Exam  Vitals and nursing note reviewed.   Constitutional:       General: She is not in acute distress.     Appearance: Normal appearance. She is well-developed. She is not diaphoretic.   HENT:      Head: Normocephalic and atraumatic.   Eyes:      General:         Right eye: No discharge.         Left eye: No discharge.      Pupils: Pupils are equal, round, and reactive to light.   Cardiovascular:      Rate and Rhythm: Normal rate and regular rhythm.      Heart sounds: Normal heart sounds, S1 normal and S2 normal. No murmur heard.  Pulmonary:      Effort: Pulmonary effort is normal. No respiratory distress.      Breath sounds: Normal breath sounds.   Skin:     General: Skin is warm and dry.      Findings: No erythema.   Neurological:      Mental Status: She is alert and oriented to person, place, and time.   Psychiatric:         Mood and Affect: Mood normal.         Behavior: Behavior normal.         Thought Content: Thought content normal.            Significant Labs: CMP   Recent Labs   Lab 06/30/25  0527 07/01/25  0458    140   K 3.8 4.1    102   CO2 29 26   * 111*   BUN 19 18   CREATININE 1.1 1.0   CALCIUM 8.6* 9.0   PROT 6.6  --    ALBUMIN 3.0*  --    BILITOT 0.4  --    ALKPHOS 91  --    AST 12  --   "  ALT 9*  --    ANIONGAP 10 12   , CBC   Recent Labs   Lab 06/30/25  0527 07/01/25  0458   WBC 6.40 6.99   HGB 9.6* 10.1*   HCT 31.8* 34.0*    242   , Troponin No results for input(s): "TROPONINIHS" in the last 48 hours., and All pertinent lab results from the last 24 hours have been reviewed.    Significant Imaging: Echocardiogram: Transthoracic echo (TTE) complete (Cupid Only):   Results for orders placed or performed during the hospital encounter of 06/29/25   Echo   Result Value Ref Range    BSA 2.32 m2    LVOT stroke volume 75.0 cm3    LVIDd 5.6 3.5 - 6.0 cm    LV Systolic Volume 102 mL    LV Systolic Volume Index 45.5 mL/m2    LVIDs 4.7 (A) 2.1 - 4.0 cm    LV Diastolic Volume 155 mL    LV Diastolic Volume Index 69.20 mL/m2    Left Ventricular End Systolic Volume by Teichholz Method 101.71 mL    Left Ventricular End Diastolic Volume by Teichholz Method 154.75 mL    IVS 1.2 (A) 0.6 - 1.1 cm    LVOT diameter 2.0 cm    LVOT area 3.1 cm2    FS 16.1 (A) 28 - 44 %    Left Ventricle Relative Wall Thickness 0.61 cm    PW 1.7 (A) 0.6 - 1.1 cm    LV mass 365.4 g    LV Mass Index 163.1 g/m2    MV Peak E Deonte 1.24 m/s    TDI LATERAL 0.08 m/s    TDI SEPTAL 0.08 m/s    E/E' ratio 16 m/s    MV Peak A Deonte 0.67 m/s    TR Max Deonte 2.8 m/s    E/A ratio 1.85     IVRT 46 msec    E wave deceleration time 214 msec    LV SEPTAL E/E' RATIO 15.5 m/s    LV LATERAL E/E' RATIO 15.5 m/s    LVOT peak deonte 1.2 m/s    Left Ventricular Outflow Tract Mean Velocity 0.88 cm/s    Left Ventricular Outflow Tract Mean Gradient 3.53 mmHg    RVOT peak VTI 18.5 cm    LA size 4.7 cm    Left Atrium Minor Axis 6.1 cm    Left Atrium Major Axis 5.8 cm    RA Major Axis 4.94 cm    AV mean gradient 5 mmHg    AV peak gradient 10 mmHg    Ao peak deonte 1.6 m/s    Ao VTI 27.2 cm    LVOT peak VTI 23.9 cm    AV valve area 2.8 cm²    AV Velocity Ratio 0.75     AV index (prosthetic) 0.88     PATY by Velocity Ratio 2.4 cm²    MV stenosis pressure 1/2 time 61.97 ms    MV " valve area p 1/2 method 3.55 cm2    Triscuspid Valve Regurgitation Peak Gradient 32 mmHg    PV mean gradient 2 mmHg    PV PEAK VELOCITY 1.37 m/s    PV peak gradient 8 mmHg    Pulmonary Valve Mean Velocity 0.87 m/s    RVOT peak venkata 1.06 m/s    Ao root annulus 3.4 cm    STJ 3.4 cm    Ascending aorta 2.4 cm    ASI 1.1 cm/m2    IVC diameter 2.14 cm    Mean e' 0.08 m/s    ZLVIDS -0.26     ZLVIDD -3.58     TV resting pulmonary artery pressure 34 mmHg    RV TB RVSP 6 mmHg    Est. RA pres 3 mmHg    Narrative      Left Ventricle: The left ventricle is mildly dilated. There is moderate   eccentric hypertrophy. There is mildly reduced systolic function with a   visually estimated ejection fraction of 45 - 50%.    Right Ventricle: The right ventricle is normal in size Systolic   function is normal.    Left Atrium: The left atrium is moderately dilated    Mitral Valve: There is mild anterior leaflet sclerosis. There is mild   posterior mitral annular calcification. There is mild to moderate   regurgitation.    Tricuspid Valve: There is mild to moderate regurgitation.    Pulmonary Artery: There is mild pulmonary hypertension. The estimated   pulmonary artery systolic pressure is 34 mmHg.    IVC/SVC: Normal venous pressure at 3 mmHg.      and EKG: Reviewed  Assessment and Plan:   Patient who presents with atrial flutter with RVR/CHF. Remains in SR. Close f/u in EP clinic.    * Atrial flutter  -Remains in SR  -EKG's reviewed, QT prolonged/borderline, amiodarone d/c'd  -Continue CCB, Eliquis  -Needs close EP f/u     Acute on chronic combined systolic and diastolic congestive heart failure  -TTE with EF of 45-50%  -Volume status improved  -Lasix switched to po    Lymphedema  -Per Hospital med mgt.  -Leg wraps.    Chronic kidney disease, stage 3a  -Monitor.    Essential hypertension  -HTN control.        VTE Risk Mitigation (From admission, onward)           Ordered     apixaban tablet 5 mg  2 times daily         06/30/25 0924     IP  VTE HIGH RISK PATIENT  Once         06/29/25 1057     Place sequential compression device  Until discontinued         06/29/25 1057                    Ct Leavitt PA-C  Cardiology  [facility] - Telemetry ([facility])

## 2025-07-01 NOTE — PROGRESS NOTES
AVS virtually reviewed with patient in its entirety with emphasis on diet, medications, follow-up appointments and reasons to return to the ED. Patient also encouraged to utilize their patient portal. Ease and convenience of use reiterated. Education complete and patient voiced understanding. All questions answered. Discharge teaching complete.    Our goal at [facility] is to always give you outstanding care and exceptional service. You may receive a survey from [employer] by mail, text or e-mail in the next 24-48 hours asking about the care you received with us. The survey should only take 5-10 minutes to complete and is very important to us.     Your feedback provides us with a way to recognize our staff who work tirelessly to provide the best care! Also, your responses help us learn how to improve when your experience was below our aspiration of excellence. We are always looking for ways to improve your stay. We WILL use your feedback to continue making improvements to help us provide the highest quality care. We keep your personal information and feedback confidential. We appreciate your time completing this survey and can't wait to hear from you!!!    We look forward to your continued care with us! Thanks so much for choosing [facility] for your healthcare needs!

## 2025-07-01 NOTE — ASSESSMENT & PLAN NOTE
-Remains in SR  -EKG's reviewed, QT prolonged/borderline, amiodarone d/c'd  -Continue CCB, Eliquis  -Needs close EP f/u

## 2025-07-01 NOTE — SUBJECTIVE & OBJECTIVE
Review of Systems   Constitutional: Negative.   HENT: Negative.     Eyes: Negative.    Cardiovascular: Negative.    Respiratory: Negative.     Endocrine: Negative.    Hematologic/Lymphatic: Negative.    Skin: Negative.    Musculoskeletal: Negative.    Gastrointestinal: Negative.    Genitourinary: Negative.    Neurological: Negative.    Psychiatric/Behavioral: Negative.     Allergic/Immunologic: Negative.      Objective:     Vital Signs (Most Recent):  Temp: 98.3 °F (36.8 °C) (07/01/25 1116)  Pulse: 64 (07/01/25 1116)  Resp: 16 (07/01/25 1116)  BP: 129/69 (07/01/25 1116)  SpO2: 96 % (07/01/25 1116) Vital Signs (24h Range):  Temp:  [97.7 °F (36.5 °C)-98.4 °F (36.9 °C)] 98.3 °F (36.8 °C)  Pulse:  [60-83] 64  Resp:  [16-20] 16  SpO2:  [96 %-100 %] 96 %  BP: (121-138)/(59-69) 129/69     Weight: 99.5 kg (219 lb 5.7 oz)  Body mass index is 33.35 kg/m².     SpO2: 96 %         Intake/Output Summary (Last 24 hours) at 7/1/2025 1322  Last data filed at 6/30/2025 2159  Gross per 24 hour   Intake --   Output 750 ml   Net -750 ml       Lines/Drains/Airways       Drain  Duration             Female External Urinary Catheter w/ Suction 06/29/25 0737 2 days              Peripheral Intravenous Line  Duration             Peripheral IV Single Lumen 06/29/25 0925 20 G 1 3/4 in Anterior;Left Forearm 2 days    Peripheral IV Single Lumen 06/29/25 1137 22 G Right Antecubital 2 days                       Physical Exam  Vitals and nursing note reviewed.   Constitutional:       General: She is not in acute distress.     Appearance: Normal appearance. She is well-developed. She is not diaphoretic.   HENT:      Head: Normocephalic and atraumatic.   Eyes:      General:         Right eye: No discharge.         Left eye: No discharge.      Pupils: Pupils are equal, round, and reactive to light.   Cardiovascular:      Rate and Rhythm: Normal rate and regular rhythm.      Heart sounds: Normal heart sounds, S1 normal and S2 normal. No murmur  "heard.  Pulmonary:      Effort: Pulmonary effort is normal. No respiratory distress.      Breath sounds: Normal breath sounds.   Skin:     General: Skin is warm and dry.      Findings: No erythema.   Neurological:      Mental Status: She is alert and oriented to person, place, and time.   Psychiatric:         Mood and Affect: Mood normal.         Behavior: Behavior normal.         Thought Content: Thought content normal.            Significant Labs: CMP   Recent Labs   Lab 06/30/25 0527 07/01/25 0458    140   K 3.8 4.1    102   CO2 29 26   * 111*   BUN 19 18   CREATININE 1.1 1.0   CALCIUM 8.6* 9.0   PROT 6.6  --    ALBUMIN 3.0*  --    BILITOT 0.4  --    ALKPHOS 91  --    AST 12  --    ALT 9*  --    ANIONGAP 10 12   , CBC   Recent Labs   Lab 06/30/25 0527 07/01/25 0458   WBC 6.40 6.99   HGB 9.6* 10.1*   HCT 31.8* 34.0*    242   , Troponin No results for input(s): "TROPONINIHS" in the last 48 hours., and All pertinent lab results from the last 24 hours have been reviewed.    Significant Imaging: Echocardiogram: Transthoracic echo (TTE) complete (Cupid Only):   Results for orders placed or performed during the hospital encounter of 06/29/25   Echo   Result Value Ref Range    BSA 2.32 m2    LVOT stroke volume 75.0 cm3    LVIDd 5.6 3.5 - 6.0 cm    LV Systolic Volume 102 mL    LV Systolic Volume Index 45.5 mL/m2    LVIDs 4.7 (A) 2.1 - 4.0 cm    LV Diastolic Volume 155 mL    LV Diastolic Volume Index 69.20 mL/m2    Left Ventricular End Systolic Volume by Teichholz Method 101.71 mL    Left Ventricular End Diastolic Volume by Teichholz Method 154.75 mL    IVS 1.2 (A) 0.6 - 1.1 cm    LVOT diameter 2.0 cm    LVOT area 3.1 cm2    FS 16.1 (A) 28 - 44 %    Left Ventricle Relative Wall Thickness 0.61 cm    PW 1.7 (A) 0.6 - 1.1 cm    LV mass 365.4 g    LV Mass Index 163.1 g/m2    MV Peak E Deonte 1.24 m/s    TDI LATERAL 0.08 m/s    TDI SEPTAL 0.08 m/s    E/E' ratio 16 m/s    MV Peak A Deonte 0.67 m/s    TR " Max Deonte 2.8 m/s    E/A ratio 1.85     IVRT 46 msec    E wave deceleration time 214 msec    LV SEPTAL E/E' RATIO 15.5 m/s    LV LATERAL E/E' RATIO 15.5 m/s    LVOT peak deonte 1.2 m/s    Left Ventricular Outflow Tract Mean Velocity 0.88 cm/s    Left Ventricular Outflow Tract Mean Gradient 3.53 mmHg    RVOT peak VTI 18.5 cm    LA size 4.7 cm    Left Atrium Minor Axis 6.1 cm    Left Atrium Major Axis 5.8 cm    RA Major Axis 4.94 cm    AV mean gradient 5 mmHg    AV peak gradient 10 mmHg    Ao peak deonte 1.6 m/s    Ao VTI 27.2 cm    LVOT peak VTI 23.9 cm    AV valve area 2.8 cm²    AV Velocity Ratio 0.75     AV index (prosthetic) 0.88     PATY by Velocity Ratio 2.4 cm²    MV stenosis pressure 1/2 time 61.97 ms    MV valve area p 1/2 method 3.55 cm2    Triscuspid Valve Regurgitation Peak Gradient 32 mmHg    PV mean gradient 2 mmHg    PV PEAK VELOCITY 1.37 m/s    PV peak gradient 8 mmHg    Pulmonary Valve Mean Velocity 0.87 m/s    RVOT peak deonte 1.06 m/s    Ao root annulus 3.4 cm    STJ 3.4 cm    Ascending aorta 2.4 cm    ASI 1.1 cm/m2    IVC diameter 2.14 cm    Mean e' 0.08 m/s    ZLVIDS -0.26     ZLVIDD -3.58     TV resting pulmonary artery pressure 34 mmHg    RV TB RVSP 6 mmHg    Est. RA pres 3 mmHg    Narrative      Left Ventricle: The left ventricle is mildly dilated. There is moderate   eccentric hypertrophy. There is mildly reduced systolic function with a   visually estimated ejection fraction of 45 - 50%.    Right Ventricle: The right ventricle is normal in size Systolic   function is normal.    Left Atrium: The left atrium is moderately dilated    Mitral Valve: There is mild anterior leaflet sclerosis. There is mild   posterior mitral annular calcification. There is mild to moderate   regurgitation.    Tricuspid Valve: There is mild to moderate regurgitation.    Pulmonary Artery: There is mild pulmonary hypertension. The estimated   pulmonary artery systolic pressure is 34 mmHg.    IVC/SVC: Normal venous pressure at 3  mmHg.      and EKG: Reviewed

## 2025-07-01 NOTE — PLAN OF CARE
[facility] - Telemetry (Hospital)  Initial Discharge Assessment       Primary Care Provider: Jennifer Flynn MD    Admission Diagnosis: Shortness of breath [R06.02]  Acute pulmonary edema [J81.0]  Typical atrial flutter [I48.3]  Acute congestive heart failure, unspecified heart failure type [I50.9]    Admission Date: 6/29/2025  Expected Discharge Date: 7/1/2025         Payor: [employer] MEDICARE / Plan: [employer] HMO PPO SPECIAL NEEDS / Product Type: Medicare Advantage /     Extended Emergency Contact Information  Primary Emergency Contact: Ruba Godinez  Address: [address] [place] [address] [place]  Mobile Phone: [phone]  Relation: Daughter  Preferred language: English   needed? No  Secondary Emergency Contact: Veronica Joiner  Address: [address] [place] [address] [facility]  Home Phone: [phone]  Mobile Phone: [phone]  Relation: Daughter  Preferred language: English   needed? No    Discharge Plan A: Home with family         [id number] - [place] - [address] AT [facility] [address]  [address]  [place] [address]  Phone: [phone] Fax: [phone]      Initial Assessment (most recent)       Adult Discharge Assessment - 07/01/25 1441          Discharge Assessment    Assessment Type Discharge Planning Assessment     Confirmed/corrected address, phone number and insurance Yes     Confirmed Demographics Correct on Facesheet     Source of Information patient     Communicated FRANCISCO with patient/caregiver Date not available/Unable to determine     People in Home child(vince), adult     Name(s) of People in Home Ruba stevens     Do you expect to return to your current living situation? Yes     Do you have help at home or someone to help you manage your care at home? No     Prior to hospitilization cognitive status: Alert/Oriented     Current  cognitive status: Alert/Oriented     Walking or Climbing Stairs Difficulty yes     Walking or Climbing Stairs ambulation difficulty, requires equipment     Mobility Management RW     Dressing/Bathing Difficulty no     Home Accessibility stairs to enter home     Equipment Currently Used at Home walker, rolling     Readmission within 30 days? No     Patient currently being followed by outpatient case management? No     Do you currently have service(s) that help you manage your care at home? No     Do you take prescription medications? Yes     Do you have prescription coverage? Yes     Do you have any problems affording any of your prescribed medications? No     Is the patient taking medications as prescribed? yes     Who is going to help you get home at discharge? daughter     How do you get to doctors appointments? family or friend will provide     Are you on dialysis? No     Do you take coumadin? No     Discharge Plan A Home with family     DME Needed Upon Discharge  none                   [facility] - Telemetry (Hospital)  Discharge Final Note    Primary Care Provider: Jennifer Flynn MD    Expected Discharge Date: 7/1/2025    Final Discharge Note (most recent)       Final Note - 07/01/25 1445          Final Note    Assessment Type Final Discharge Note     Anticipated Discharge Disposition Home or Self Care     Hospital Resources/Appts/Education Provided Appointments scheduled and added to AVS        Post-Acute Status    Discharge Delays None known at this time                     Important Message from Medicare  Important Message from Medicare regarding Discharge Appeal Rights: Given to patient/caregiver, Explained to patient/caregiver, Signed/date by patient/caregiver     Date IMM was signed: 07/01/25  Time IMM was signed: 1345    Contact Info       Jennifer Flynn MD   Specialty: Family Medicine   Relationship: PCP - General    [address] [place] [address]   Phone:  [phone]       Next Steps: Follow up in 3 day(s)    [facility] - Cardiology   Specialty: Cardiology    [address] [place] [address] [address] [place] [address]   Phone: [phone]       Next Steps: Follow up in 1 week(s)          DC disposition:  PCP hospital follow up scheduled for:  Hospital Follow Up with Marie Amado NP  Wednesday Jul 9, 2025    Cardiology follow up:  Thursday Jul 3, 2025 7:30 AM     No other CM needs noted.

## 2025-07-01 NOTE — TELEPHONE ENCOUNTER
Contact pt daughter Ruba to schedule an appointment regarding a referral placed for a tcc home visit with a nurse practitioner.    Patient has been scheduled

## 2025-07-01 NOTE — PROGRESS NOTES
[facility] - Tele[facility] ([facility])  Wound Care    Patient Name:  Lizeth Henderson   MRN:  [id number]  Date: 7/1/2025  Diagnosis: Atrial flutter    History:     Past Medical History:   Diagnosis Date    Adrenal adenoma 2016    Anxiety     Arthritis     Benign hypertension     Colon cancer age 61    colon    Coronary artery disease     Depression     Full dentures     General anesthetics causing adverse effect in therapeutic use     yells and talks when wakes up    Hyperlipidemia     Osteopenia     Shingles     Type 2 diabetes mellitus without complication, without long-term current use of insulin 1/23/2024    Wears glasses        Social History[1]    Precautions:     Allergies as of 06/29/2025 - Reviewed 06/29/2025   Allergen Reaction Noted    Clindamycin  02/14/2024    Lisinopril  05/29/2017       WO Assessment Details/Treatment     F/U visit with this 83 y/o female patient to discuss removal of lymphedema wraps to BLE after she was able to speak with her daughter yesterday evening.     Patient resting in bed, awake and alert. Wound care nurse gave introduction and reason for visit.     Patient states her wraps ended up becoming soiled yesterday evening due to urine incontinence and daughter advised her to just have them removed. Patient states she is supposed to F/U with the clinic on Thursday to have her legs rewrapped so nothing needs to be done to them at this time. Patient verbalized agreement to allow wound care nurse to assess BLE to ensure no wounds present. No wounds present to BLE, only noted scattered flaky dry skin with areas of intact pink scar tissue.     Right leg:        Left leg:            Wound care team will sign off for now, re-consult as needed.      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    Rates diet as poor.    She is not satisfied with weight.             Social Drivers of Health     Financial Resource Strain: Patient Declined (7/1/2025)    Overall Financial Resource Strain (CARDIA)     Difficulty of Paying Living Expenses: Patient declined   Food Insecurity: Patient Declined (7/1/2025)    Hunger Vital Sign     Worried About Running Out of Food in the Last Year: Patient declined     Ran Out of Food in the Last Year: Patient declined   Transportation Needs: Patient Declined (7/1/2025)    PRAPARE - Transportation     Lack of Transportation (Medical): Patient declined     Lack of Transportation (Non-Medical): Patient declined   Physical Activity: Inactive (5/1/2025)    Exercise Vital Sign     Days of Exercise per Week: 0 days     Minutes of Exercise per Session: 0 min   Stress: Patient Declined (7/1/2025)    [place] [employer] - Occupational Stress Questionnaire     Feeling of Stress : Patient declined   Recent Concern: Stress - Stress Concern Present (5/1/2025)    [place] [employer] - Occupational Stress Questionnaire     Feeling of Stress : Rather much   Housing Stability: Patient Declined (7/1/2025)    Housing Stability Vital Sign     Unable to Pay for Housing in the Last Year: Patient declined     Number of Times Moved in the Last Year: 1     Homeless in the Last Year: Patient declined

## 2025-07-02 ENCOUNTER — PATIENT MESSAGE (OUTPATIENT)
Dept: FAMILY MEDICINE | Facility: CLINIC | Age: 85
End: 2025-07-02
Payer: MEDICARE

## 2025-07-02 ENCOUNTER — TELEPHONE (OUTPATIENT)
Dept: FAMILY MEDICINE | Facility: CLINIC | Age: 85
End: 2025-07-02
Payer: MEDICARE

## 2025-07-02 ENCOUNTER — TELEPHONE (OUTPATIENT)
Dept: CARDIOLOGY | Facility: CLINIC | Age: 85
End: 2025-07-02
Payer: MEDICARE

## 2025-07-02 DIAGNOSIS — I10 ESSENTIAL HYPERTENSION: Primary | ICD-10-CM

## 2025-07-02 LAB — HOLD SPECIMEN: NORMAL

## 2025-07-02 RX ORDER — AMIODARONE HYDROCHLORIDE 200 MG/1
200 TABLET ORAL DAILY
Qty: 30 TABLET | Refills: 0 | Status: SHIPPED | OUTPATIENT
Start: 2025-07-02 | End: 2025-07-02 | Stop reason: SDUPTHER

## 2025-07-02 RX ORDER — AMIODARONE HYDROCHLORIDE 200 MG/1
200 TABLET ORAL DAILY
Qty: 30 TABLET | Refills: 0 | Status: SHIPPED | OUTPATIENT
Start: 2025-07-02 | End: 2025-07-03 | Stop reason: SINTOL

## 2025-07-02 RX ORDER — AMIODARONE HYDROCHLORIDE 200 MG/1
200 TABLET ORAL DAILY
COMMUNITY
End: 2025-07-02 | Stop reason: SDUPTHER

## 2025-07-02 NOTE — TELEPHONE ENCOUNTER
Copied from CRM #[id number]. Topic: Medications - New Medication Request  >> Jul 2, 2025 12:16 PM Frank wrote:  ...Type:  RX Refill Request    Who Called: Michell Henderson  Refill or New Rx:New   RX Name and Strength: amiodarone (PACERONE) 200 MG Tab  How is the patient currently taking it? (ex. 1XDay):  Is this a 30 day or 90 day RX:  Preferred Pharmacy with phone number:.  [id number] - [place], [place] - [address] [facility] [address]  [address] [facility] [address]  Phone: [phone] Fax: [phone]  Local or Mail Order:local   Ordering Provider:  Would the patient rather a call back or a response via [facility]? Call back   Best Call Back Number:.[phone]  Additional Information:  pt daughter states pt was just discharged from hospital states pt has missed two dosages of this medication ,states its urgent medication be called in.

## 2025-07-02 NOTE — TELEPHONE ENCOUNTER
Copied from CRM #[id number]. Topic: Medications - Medication Refill  >> Jul 2, 2025  8:13 AM Chelsey wrote:  Type:  RX Refill Request    Who Called: pt/daughter   Refill or New Rx:new   RX Name and Strength:Pacerone 200mg    How is the patient currently taking it? (ex. 1XDay):  Is this a 30 day or 90 day RX:  Preferred Pharmacy with phone number:  [id number] - [place], [place] - [address] AT [facility] [address]  [address]  [place] [address]  Phone: [phone] Fax: [phone]  Local or Mail Order:local  Ordering Provider:  Would the patient rather a call back or a response via [facility]? call  Best Call Back Number:[phone]    Additional Information:  states pt was in hospital in Dr there didn't give rx above told pt to call PCP to get rx above

## 2025-07-03 ENCOUNTER — PATIENT OUTREACH (OUTPATIENT)
Dept: ADMINISTRATIVE | Facility: CLINIC | Age: 85
End: 2025-07-03
Payer: MEDICARE

## 2025-07-03 ENCOUNTER — OFFICE VISIT (OUTPATIENT)
Dept: CARDIOLOGY | Facility: CLINIC | Age: 85
End: 2025-07-03
Payer: MEDICARE

## 2025-07-03 ENCOUNTER — HOSPITAL ENCOUNTER (OUTPATIENT)
Dept: CARDIOLOGY | Facility: HOSPITAL | Age: 85
Discharge: HOME | End: 2025-07-03
Payer: MEDICARE

## 2025-07-03 VITALS
HEIGHT: 68 IN | OXYGEN SATURATION: 98 % | SYSTOLIC BLOOD PRESSURE: 124 MMHG | HEART RATE: 70 BPM | WEIGHT: 225 LBS | DIASTOLIC BLOOD PRESSURE: 80 MMHG | BODY MASS INDEX: 34.1 KG/M2

## 2025-07-03 DIAGNOSIS — E78.2 HYPERLIPIDEMIA, MIXED: ICD-10-CM

## 2025-07-03 DIAGNOSIS — N18.31 CHRONIC KIDNEY DISEASE, STAGE 3A: ICD-10-CM

## 2025-07-03 DIAGNOSIS — I10 ESSENTIAL HYPERTENSION: ICD-10-CM

## 2025-07-03 DIAGNOSIS — D50.9 IRON DEFICIENCY ANEMIA, UNSPECIFIED IRON DEFICIENCY ANEMIA TYPE: ICD-10-CM

## 2025-07-03 DIAGNOSIS — I50.32 CHRONIC DIASTOLIC CONGESTIVE HEART FAILURE: ICD-10-CM

## 2025-07-03 DIAGNOSIS — I34.0 MITRAL VALVE INSUFFICIENCY, UNSPECIFIED ETIOLOGY: ICD-10-CM

## 2025-07-03 DIAGNOSIS — I48.3 TYPICAL ATRIAL FLUTTER: Primary | ICD-10-CM

## 2025-07-03 LAB
OHS QRS DURATION: 96 MS
OHS QTC CALCULATION: 515 MS

## 2025-07-03 PROCEDURE — 93010 ELECTROCARDIOGRAM REPORT: CPT | Mod: HCNC,,, | Performed by: INTERNAL MEDICINE

## 2025-07-03 PROCEDURE — 93005 ELECTROCARDIOGRAM TRACING: CPT | Mod: HCNC

## 2025-07-03 PROCEDURE — 99999 PR PBB SHADOW E&M-EST. PATIENT-LVL II: CPT | Mod: PBBFAC,HCNC,, | Performed by: NURSE PRACTITIONER

## 2025-07-03 NOTE — PROGRESS NOTES
C3 nurse spoke with Lizeth Henderson 's daughter, Ruba for a TCC Post Discharge follow-up call. The patient has a scheduled \[facility] [place] [facility]\"" appointment with [facility]man[facility] Urbina on 07/09/25. The NP will call you to provide a time-frame for the appointment.

## 2025-07-03 NOTE — PROGRESS NOTES
Subjective:   Patient ID:  Lizeth Henderson is a 84 y.o. female who presents for evaluation of No chief complaint on file.      HPI    Lizeth Henderson is a 84 year old female who presents to Arrhythmia clinic for AFL management. Her current medical conditions include CHF, HTN, new onset AFL, HTN, HLP, MR, DASH, Depression, CKD, prediabetes.     Prolonged QTc during admission and amiodarone d/c'ed.    EKG today- Sinus rhythm, FDAVB , premature supraventricular complexes, QTc 515 ms    Echo during admission revealed EF 45-50%, mild pHTN, Moderately dilated Left atrium, mild to mod MR and TR.     Has been undergoing therapy for lymphedema.     Placed on eliquis for cardio-embolic protection during hospital admission.   Severely iron deficient during admission.  pg/ml.    She had resumed her amio upon discharge from PCP, but will stop today.     Reviewed dietary restrictions in detail with patient.   Living with her daughter.     Past Medical History:   Diagnosis Date    Adrenal adenoma 2016    Anxiety     Arthritis     Benign hypertension     Colon cancer age 61    colon    Coronary artery disease     Depression     Full dentures     General anesthetics causing adverse effect in therapeutic use     yells and talks when wakes up    Hyperlipidemia     Osteopenia     Shingles     Type 2 diabetes mellitus without complication, without long-term current use of insulin 1/23/2024    Wears glasses        Past Surgical History:   Procedure Laterality Date    APPENDECTOMY      BREAST BIOPSY Left     benign    BUNIONECTOMY Left     CATARACT EXTRACTION Bilateral     COLON SURGERY      2001    EYE SURGERY      cataract surg    HEMICOLECTOMY  2002    HERNIA REPAIR      x5    JOINT REPLACEMENT      knee    left toe surgery      hammertoe    PATENT DUCTUS ARTERIOUS LIGATION  1948    SALPINGOOPHORECTOMY  2002    due to colon cancer    TONSILLECTOMY, ADENOIDECTOMY         Social History[1]    Family History   Problem Relation  Name Age of Onset    Alzheimer's disease Mother Tracy     Arthritis Mother [place]     Cancer Father price         Colon    Hypertension Father price     Arthritis Father price     Hearing loss Father price     Depression Daughter Connie     Kidney disease Daughter Ruba     Ulcerative colitis Daughter Ruba     Depression Daughter Ruba     Depression Daughter Veronica     Anxiety disorder Daughter Veronica         PTSD    Cancer Maternal Uncle Nikolas         Colon    Early death Maternal Grandmother Chata     Cancer Cousin          colon cancer    Amblyopia Neg Hx      Blindness Neg Hx      Cataracts Neg Hx      Diabetes Neg Hx      Glaucoma Neg Hx      Macular degeneration Neg Hx      Retinal detachment Neg Hx      Strabismus Neg Hx      Stroke Neg Hx      Thyroid disease Neg Hx         Wt Readings from Last 3 Encounters:   07/03/25 102.1 kg (225 lb)   07/01/25 99.5 kg (219 lb 5.7 oz)   06/16/25 103.3 kg (227 lb 11.2 oz)     Temp Readings from Last 3 Encounters:   07/01/25 98.3 °F (36.8 °C) (Oral)   06/16/25 98.7 °F (37.1 °C)   05/02/25 97.1 °F (36.2 °C) (Temporal)     BP Readings from Last 3 Encounters:   07/03/25 124/80   07/01/25 129/69   06/16/25 (!) 178/84     Pulse Readings from Last 3 Encounters:   07/03/25 70   07/01/25 64   06/16/25 93       Medications Ordered Prior to Encounter[2]    No cardiac monitor results found for the past 12 months    Results for orders placed during the hospital encounter of 06/29/25    Echo    Interpretation Summary    Left Ventricle: The left ventricle is mildly dilated. There is moderate eccentric hypertrophy. There is mildly reduced systolic function with a visually estimated ejection fraction of 45 - 50%.    Right Ventricle: The right ventricle is normal in size Systolic function is normal.    Left Atrium: The left atrium is moderately dilated    Mitral Valve: There is mild anterior leaflet sclerosis. There is mild posterior mitral annular calcification. There is mild to moderate  regurgitation.    Tricuspid Valve: There is mild to moderate regurgitation.    Pulmonary Artery: There is mild pulmonary hypertension. The estimated pulmonary artery systolic pressure is 34 mmHg.    IVC/SVC: Normal venous pressure at 3 mmHg.    No results found for this or any previous visit.        Results for orders placed or performed during the hospital encounter of 07/03/25   SCHEDULED EKG 12-LEAD (to Muse)    Collection Time: 07/03/25  7:16 AM   Result Value Ref Range    QRS Duration 96 ms    [facility] QTC Calculation 515 ms    Narrative    Test Reason : I10,    Vent. Rate :  78 BPM     Atrial Rate : 129 BPM     P-R Int :    ms          QRS Dur :  96 ms      QT Int : 452 ms       P-R-T Axes :  69  14  63 degrees    QTcB Int : 515 ms    Sinus tachycardia with 2nd degree A-V block (Mobitz I) with Premature  supraventricular complexes  Prolonged QT  Abnormal ECG  No previous ECGs available    Referred By: MANDY HENRIQUEZ           Confirmed By:          Review of Systems   Constitutional: Positive for malaise/fatigue.   HENT:  Negative for hearing loss and hoarse voice.    Eyes:  Negative for blurred vision and visual disturbance.   Cardiovascular:  Positive for leg swelling (bilateral lymphedema) and palpitations. Negative for chest pain, claudication, dyspnea on exertion, irregular heartbeat, near-syncope, orthopnea, paroxysmal nocturnal dyspnea and syncope.   Respiratory:  Negative for cough, hemoptysis, shortness of breath, sleep disturbances due to breathing, snoring and wheezing.    Endocrine: Negative for cold intolerance and heat intolerance.   Hematologic/Lymphatic: Bruises/bleeds easily.   Skin:  Negative for color change, dry skin and nail changes.   Musculoskeletal:  Positive for arthritis, back pain and joint pain (bilateral knees). Negative for myalgias.   Gastrointestinal:  Negative for bloating, abdominal pain, constipation, nausea and vomiting.   Genitourinary:  Negative for dysuria, flank pain, hematuria  "and hesitancy.   Neurological:  Positive for weakness. Negative for headaches, light-headedness, loss of balance, numbness and paresthesias.   Psychiatric/Behavioral:  Negative for altered mental status.    Allergic/Immunologic: Negative for environmental allergies.         Objective:/80 (BP Location: Left arm, Patient Position: Sitting)   Pulse 70   Ht 5' 8" (1.727 m)   Wt 102.1 kg (225 lb)   SpO2 98%   BMI 34.21 kg/m²      Physical Exam  Vitals and nursing note reviewed.   Constitutional:       General: She is not in acute distress.     Appearance: Normal appearance. She is well-developed. She is obese. She is not ill-appearing.   HENT:      Head: Normocephalic and atraumatic.      Nose: Nose normal.      Mouth/Throat:      Mouth: Mucous membranes are moist.   Eyes:      Pupils: Pupils are equal, round, and reactive to light.   Neck:      Thyroid: No thyromegaly.      Vascular: No JVD.      Trachea: No tracheal deviation.   Cardiovascular:      Rate and Rhythm: Normal rate and regular rhythm.      Chest Wall: PMI is not displaced.      Pulses: Intact distal pulses.           Radial pulses are 2+ on the right side and 2+ on the left side.        Dorsalis pedis pulses are 2+ on the right side and 2+ on the left side.      Heart sounds: S1 normal and S2 normal. Heart sounds not distant. No murmur heard.     Comments: Remains in SR  Pulmonary:      Effort: Pulmonary effort is normal. No respiratory distress.      Breath sounds: Normal breath sounds. No wheezing.   Abdominal:      General: Bowel sounds are normal. There is no distension.      Palpations: Abdomen is soft.      Tenderness: There is no abdominal tenderness.   Musculoskeletal:         General: No swelling. Normal range of motion.      Cervical back: Full passive range of motion without pain, normal range of motion and neck supple.      Right lower leg: Edema present.      Left lower leg: Edema present.      Right ankle: No swelling.      Left " ankle: No swelling.   Skin:     General: Skin is warm and dry.      Capillary Refill: Capillary refill takes less than 2 seconds.      Nails: There is no clubbing.   Neurological:      General: No focal deficit present.      Mental Status: She is alert and oriented to person, place, and time.      Motor: No weakness.   Psychiatric:         Speech: Speech normal.         Behavior: Behavior normal.         Thought Content: Thought content normal.         Judgment: Judgment normal.         Lab Results   Component Value Date    CHOL 135 03/06/2025    CHOL 169 07/18/2024    CHOL 258 (H) 07/12/2023     Lab Results   Component Value Date    HDL 36 (L) 03/06/2025    HDL 37 (L) 07/18/2024    HDL 32 (L) 07/12/2023     Lab Results   Component Value Date    LDLCALC 80.0 03/06/2025    LDLCALC 98.6 07/18/2024    LDLCALC 177.4 (H) 07/12/2023     Lab Results   Component Value Date    TRIG 95 03/06/2025    TRIG 167 (H) 07/18/2024    TRIG 243 (H) 07/12/2023     Lab Results   Component Value Date    CHOLHDL 26.7 03/06/2025    CHOLHDL 21.9 07/18/2024    CHOLHDL 12.4 (L) 07/12/2023       Chemistry        Component Value Date/Time     07/01/2025 0458     03/06/2025 1014    K 4.1 07/01/2025 0458    K 3.5 03/06/2025 1014     07/01/2025 0458     03/06/2025 1014    CO2 26 07/01/2025 0458    CO2 25 03/06/2025 1014    BUN 18 07/01/2025 0458    CREATININE 1.0 07/01/2025 0458     (H) 07/01/2025 0458     (H) 03/06/2025 1014        Component Value Date/Time    CALCIUM 9.0 07/01/2025 0458    CALCIUM 9.0 03/06/2025 1014    ALKPHOS 91 06/30/2025 0527    ALKPHOS 82 03/06/2025 1014    AST 12 06/30/2025 0527    AST 19 03/06/2025 1014    ALT 9 (L) 06/30/2025 0527    ALT 6 (L) 03/06/2025 1014    BILITOT 0.4 06/30/2025 0527    BILITOT 0.4 03/06/2025 1014    ESTGFRAFRICA 49 (A) 09/04/2021 0731    EGFRNONAA 42 (A) 09/04/2021 0731          Lab Results   Component Value Date    TSH 1.184 06/29/2025     Lab Results    Component Value Date    INR 1.1 06/29/2025    INR 1.0 10/12/2016    INR 1.0 05/03/2010    PROTIME 11.9 06/29/2025     Lab Results   Component Value Date    WBC 6.99 07/01/2025    HGB 10.1 (L) 07/01/2025    HCT 34.0 (L) 07/01/2025    MCV 86 07/01/2025     07/01/2025          Assessment:      1. Typical atrial flutter    2. Chronic diastolic congestive heart failure    3. Chronic kidney disease, stage 3a    4. Hyperlipidemia, mixed    5. Essential hypertension    6. Mitral valve insufficiency, unspecified etiology    7. Iron deficiency anemia, unspecified iron deficiency anemia type        Plan:     Stop Amio given prolonged QTc   7 day MCT to assess AFL burden  Continue eliquis for cardio-embolic protection  Continue CCB  Dash diet 2 gm sodium restriction  Consider Iron infusions given persistent iron deficiency and new onset atrial flutter.     RTC in 1 month     Nicole May, FNP-C [facility]    Visit today included increased complexity associated with the care of the episodic problem new onset AFL, CHF addressed and managing the longitudinal care of the patient due to the serious and/or complex managed problem(s) prolonged QTc amiodarone therapy stopped.           [1]   Social History  Tobacco Use    Smoking status: Never     Passive exposure: Never    Smokeless tobacco: Never   Substance Use Topics    Alcohol use: No    Drug use: Never   [2]   Current Outpatient Medications on File Prior to Visit   Medication Sig Dispense Refill    aspirin (ECOTRIN) 81 MG EC tablet Take 81 mg by mouth once daily.      atorvastatin (LIPITOR) 10 MG tablet TAKE 1 TABLET(10 MG) BY MOUTH EVERY DAY 90 tablet 0    buPROPion (WELLBUTRIN XL) 150 MG TB24 tablet Take 1 tablet (150 mg total) by mouth once daily. 90 tablet 0    busPIRone (BUSPAR) 10 MG tablet Take 2 tablets (20 mg total) by mouth 2 (two) times daily. 120 tablet 1    clonazePAM (KLONOPIN) 0.25 MG TbDL Take 1 tablet (0.25 mg total) by mouth 2 (two) times daily  as needed (anxiety). 60 tablet 1    diltiaZEM (CARDIZEM CD) 180 MG 24 hr capsule Take 1 capsule (180 mg total) by mouth once daily. 30 capsule 0    EScitalopram oxalate (LEXAPRO) 10 MG tablet Take 1.5 tablets (15 mg total) by mouth once daily. 135 tablet 0    ferrous sulfate (FEOSOL) 325 mg (65 mg iron) Tab tablet Take 1 tablet (325 mg total) by mouth every other day. 60 tablet 2    furosemide (LASIX) 40 MG tablet Take 1 tablet (40 mg total) by mouth once daily. 30 tablet 0    ibandronate (BONIVA) 150 mg tablet Take 1 tablet (150 mg total) by mouth every 30 days. 3 tablet 3    losartan (COZAAR) 100 MG tablet TAKE 1 TABLET(100 MG) BY MOUTH EVERY DAY 90 tablet 3    miconazole NITRATE 2 % (MICOTIN) 2 % top powder Apply topically 2 (two) times daily. 85 g 0    mirabegron (MYRBETRIQ) 50 mg Tb24 Take 1 tablet (50 mg total) by mouth once daily. 90 tablet 3    pramipexole (MIRAPEX) 0.125 MG tablet TAKE 1 TABLET BY MOUTH EVERY EVENING 90 tablet 3    suvorexant (BELSOMRA) 10 mg Tab Take 1 tablet by mouth every evening. 30 tablet 1    [DISCONTINUED] amiodarone (PACERONE) 200 MG Tab Take 1 tablet (200 mg total) by mouth once daily. Take 200 mg by mouth once daily. 30 tablet 0    [DISCONTINUED] apixaban (ELIQUIS) 5 mg Tab Take 1 tablet (5 mg total) by mouth 2 (two) times daily. 60 tablet 0     No current facility-administered medications on file prior to visit.

## 2025-07-08 ENCOUNTER — PATIENT MESSAGE (OUTPATIENT)
Dept: HEMATOLOGY/ONCOLOGY | Facility: CLINIC | Age: 85
End: 2025-07-08
Payer: MEDICARE

## 2025-07-09 ENCOUNTER — OFFICE VISIT (OUTPATIENT)
Dept: HOME HEALTH SERVICES | Facility: CLINIC | Age: 85
End: 2025-07-09
Payer: MEDICARE

## 2025-07-09 VITALS
OXYGEN SATURATION: 98 % | TEMPERATURE: 98 F | DIASTOLIC BLOOD PRESSURE: 80 MMHG | RESPIRATION RATE: 18 BRPM | HEART RATE: 63 BPM | SYSTOLIC BLOOD PRESSURE: 150 MMHG

## 2025-07-09 DIAGNOSIS — I48.0 PAF (PAROXYSMAL ATRIAL FIBRILLATION): ICD-10-CM

## 2025-07-09 DIAGNOSIS — I10 WHITE COAT SYNDROME WITH DIAGNOSIS OF HYPERTENSION: Primary | ICD-10-CM

## 2025-07-09 DIAGNOSIS — I50.9 ACUTE CONGESTIVE HEART FAILURE, UNSPECIFIED HEART FAILURE TYPE: ICD-10-CM

## 2025-07-09 NOTE — PROGRESS NOTES
[facility]  Transitional Care Management (TCM) Home Visit    Encounter Provider: Marie Amado   PCP: Jennifer Flynn MD  Consult Requested By: Dr. Marimar Monzon  Admit Date: 6/29/25   IP Discharge Date: 7/1/25  Hospital Length of Stay:RRHLOS@ days  Days since discharge (from IP or SNF): 8 days   [facility] On Call Contact Note: 07/03/2025  Hospital Diagnosis: Acute congestive heart failure, unspecified heart failure type [I50.9];PAF (paroxysmal atrial fibrillation) [I48.0]     HISTORY OF PRESENT ILLNESS      Patient ID: Lizeth Henderson is a 84 y.o. female was recently admitted to the hospital, this is their TCM encounter.    Hospital Course Synopsis:    1)  84 y.o. female patient with a PMHx of lymphedema, colon cancer, anxiety, depression, HLD, CAD, and DM Type 2 who presents to the Emergency Department for evaluation of SOB which began at 6 PM last night. Pt has had open heart surgery in the past, but reports no issue with blood clots. Pt had previously came to the ED for a left-sided fall that occurred 2 weeks ago. Patient reports that she has been feeling much better since discharge from the hospital. Patient denies any chest pain, shortness of breath, and palpitations.   2) Developments since hospitalization and current needs: no new issues or current needs  3) Please confirm dates of admit, discharge, LOS above are correct: verified with the patient, her daughter, and the MR.     DECISION MAKING TODAY       Assessment & Plan:  1. White coat syndrome with diagnosis of hypertension  Assessment & Plan:  Slightly elevated at visit 150/80  Continue to check BP daily (patient has digital medicine)  Continue all home medications as instructed  Follow up with PCP/Cardiology as instructed      2. Acute congestive heart failure, unspecified heart failure type  Comments:  call MD for S&S of CHF (Weight gain, SOB, increased edema)  continue all home medications as instructed  Orders:  -      Ambulatory referral/consult to [facility]    3. PAF (paroxysmal atrial fibrillation)  Comments:  patient with RRR today on visit  continue all home medications as instructed   follow up with cardiology and arrythmia clinic as instructed  Orders:  -     Ambulatory referral/consult to [facility]         Medication List on Discharge:     Medication List            Accurate as of July 9, 2025  2:49 PM. If you have any questions, ask your nurse or doctor.                CONTINUE taking these medications      AntifungaL (miconazole) 2 % top powder  Generic drug: miconazole NITRATE 2 %  Apply topically 2 (two) times daily.     apixaban 5 mg Tab  Commonly known as: ELIQUIS  Take 1 tablet (5 mg total) by mouth 2 (two) times daily.     aspirin 81 MG EC tablet  Commonly known as: ECOTRIN  Take 81 mg by mouth once daily.     atorvastatin 10 MG tablet  Commonly known as: LIPITOR  TAKE 1 TABLET(10 MG) BY MOUTH EVERY DAY     BELSOMRA 10 mg Tab  Generic drug: suvorexant  Take 1 tablet by mouth every evening.     buPROPion 150 MG TB24 tablet  Commonly known as: WELLBUTRIN XL  Take 1 tablet (150 mg total) by mouth once daily.     busPIRone 10 MG tablet  Commonly known as: BUSPAR  Take 2 tablets (20 mg total) by mouth 2 (two) times daily.     clonazePAM 0.25 MG Tbdl  Commonly known as: KlonoPIN  Take 1 tablet (0.25 mg total) by mouth 2 (two) times daily as needed (anxiety).     diltiaZEM 180 MG 24 hr capsule  Commonly known as: CARDIZEM CD  Take 1 capsule (180 mg total) by mouth once daily.     EScitalopram oxalate 10 MG tablet  Commonly known as: LEXAPRO  Take 1.5 tablets (15 mg total) by mouth once daily.     ferrous sulfate 325 mg (65 mg iron) Tab tablet  Commonly known as: FEOSOL  Take 1 tablet (325 mg total) by mouth every other day.     furosemide 40 MG tablet  Commonly known as: LASIX  Take 1 tablet (40 mg total) by mouth once daily.     ibandronate 150 mg tablet  Commonly known as: BONIVA  Take  1 tablet (150 mg total) by mouth every 30 days.     losartan 100 MG tablet  Commonly known as: COZAAR  TAKE 1 TABLET(100 MG) BY MOUTH EVERY DAY     mirabegron 50 mg Tb24  Commonly known as: MYRBETRIQ  Take 1 tablet (50 mg total) by mouth once daily.     pramipexole 0.125 MG tablet  Commonly known as: MIRAPEX  TAKE 1 TABLET BY MOUTH EVERY EVENING              Medication Reconciliation:  Were medications changed on discharge? Yes  Were medications in the home? Yes  Is the patient taking the medications as directed? Yes  Does the patient understand the medications and changes? Yes  Does updated med list accurately reflects meds patient is currently taking? No, patient d/c'd from amiodarone    ENVIRONMENT OF CARE      Family and/or Caregiver present at visit?  Yes  Name of Caregiver: Ruba  History provided by: patient and caregiver    Advance Care Planning   Advanced Care Planning Status:  Patient has had an ACP conversation  Living Will: No  Power of : No  [place]: No    Does Caregiver have HCPoA: No  Changes today: None  Is patient hospice appropriate: No  (If needed, use PPS <30 or FAST score >7)  Was referral to hospice placed: No       Impression upon entering the home:  Physical Dwelling: single family home   Appearance of home environment: cleaniness: clean, walking pathways: clear, lighting: adequate, and home structure: sound structure  Functional Status: minimal assistance  Mobility: ambulatory with device  Nutritional access: adequate intake and access  Home Health: No, and does not need it at this time   DME/Supplies: rolling walker     Diagnostic tests reviewed/disposition: No diagnosic tests pending after this hospitalization.  Disease/illness education: A-flutter  Establishment or re-establishment of referral orders for community resources: No other necessary community resources.   Discussion with other health care providers: No discussion with other health care providers necessary.   Does patient  have a PCP at [facility]? Yes   Repatriation plan with PCP? follow-up with PCP within 90d   Does patient have an ostomy (ileostomy, colostomy, suprapubic catheter, nephrostomy tube, tracheostomy, PEG tube, pleurex catheter, cholecystostomy, etc)? No  Were BPAs reviewed? 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    Rates diet as poor.    She is not satisfied with weight.             Social Drivers of Health     Financial Resource Strain: Patient Declined (7/1/2025)    Overall Financial Resource Strain (CARDIA)     Difficulty of Paying Living Expenses: Patient declined   Food Insecurity: Patient Declined (7/1/2025)    Hunger Vital Sign     Worried About Running Out of Food in the Last Year: Patient declined     Ran Out of Food in the Last Year: Patient declined   Transportation Needs: Patient Declined (7/1/2025)    PRAPARE - Transportation     Lack of Transportation (Medical): Patient declined     Lack of Transportation (Non-Medical): Patient declined   Physical Activity: Inactive (5/1/2025)    Exercise Vital Sign     Days of Exercise per Week: 0 days     Minutes of Exercise per Session: 0 min   Stress: Patient Declined (7/1/2025)    [place] [employer] - Occupational Stress Questionnaire     Feeling of Stress : Patient declined   Recent Concern: Stress - Stress Concern Present (5/1/2025)    [place] [employer] - Occupational Stress Questionnaire     Feeling of Stress : Rather much   Housing Stability: Patient Declined (7/1/2025)    Housing Stability Vital Sign     Unable  to Pay for Housing in the Last Year: Patient declined     Number of Times Moved in the Last Year: 1     Homeless in the Last Year: Patient declined       OBJECTIVE:     Vital Signs:  Vitals:    07/09/25 1157   BP: (!) 150/80   Pulse: 63   Resp: 18   Temp: 98.3 °F (36.8 °C)       Review of Systems   Constitutional:  Positive for fatigue (improving).   HENT: Negative.     Eyes: Negative.    Respiratory:  Negative for cough, chest tightness and shortness of breath.    Cardiovascular:  Positive for leg swelling (chronic lymphedema). Negative for chest pain and palpitations.   Gastrointestinal: Negative.    Endocrine: Negative.    Genitourinary: Negative.    Musculoskeletal: Negative.    Skin: Negative.    Allergic/Immunologic: Negative.    Neurological: Negative.    Hematological: Negative.    Psychiatric/Behavioral:  Positive for sleep disturbance. The patient is nervous/anxious.        Physical Exam:  Physical Exam  Vitals reviewed.   Constitutional:       General: She is not in acute distress.     Appearance: She is not ill-appearing.   HENT:      Head: Normocephalic and atraumatic.      Nose: Nose normal.      Mouth/Throat:      Mouth: Mucous membranes are moist.      Pharynx: Oropharynx is clear.   Eyes:      Pupils: Pupils are equal, round, and reactive to light.   Cardiovascular:      Rate and Rhythm: Normal rate and regular rhythm.      Heart sounds: Normal heart sounds.   Pulmonary:      Effort: Pulmonary effort is normal.      Breath sounds: Normal breath sounds.   Abdominal:      General: Bowel sounds are normal.      Palpations: Abdomen is soft.   Musculoskeletal:      Cervical back: Neck supple.      Right lower leg: Edema present.      Left lower leg: Edema present.   Skin:     General: Skin is warm and dry.      Capillary Refill: Capillary refill takes 2 to 3 seconds.      Findings: Bruising (to BLE from IV placement) present.   Neurological:      Mental Status: She is alert and oriented to person, place,  and time.   Psychiatric:         Mood and Affect: Mood normal.         Behavior: Behavior normal.         INSTRUCTIONS FOR PATIENT:     Scheduled Follow-up, Appts Reviewed with Modifications if Needed: Yes  Future Appointments   Date Time Provider Department Center   7/10/2025  8:00 AM HOLTER, [facility] [place]   7/10/2025 10:20 AM LABORATORY, [facility] LAB [facility]   7/11/2025  8:00 AM Tara Dickerson NP [facility]   7/23/2025  8:00 AM Danyell Nails DPM [facility] POD [facility]   8/4/2025  8:00 AM Jazmine Chun FNP-C [facility] ARR [facility]   8/19/2025  8:00 AM Preston Frye MD [facility] PSYCH  [place]   10/27/2025 11:00 AM Petrona Rodriguez MD ON[facility] V[facility]     Encounter for Medical Follow-Up and Medication Review  - [facility] NP to schedule follow-up visit with patient in 4 weeks or PRN     Patient Instructions Given:  - Continue all medications, treatments and therapies as ordered.   - Follow all instructions, recommendations as discussed.  - Maintain Safety Precautions at all times.  - Attend all medical appointments as scheduled.  - For worsening symptoms: call Primary Care Physician or Nurse Practitioner.  - For emergencies, call 911 or immediately report to the nearest emergency room          Signature: Marie Amado NP    Transition of Care Visit:  I have reviewed and updated the history and problem list.  I have reconciled the medication list.  I have discussed the hospitalization and current medical issues, prognosis and plans with the patient/family.

## 2025-07-09 NOTE — ASSESSMENT & PLAN NOTE
Slightly elevated at visit 150/80  Continue to check BP daily (patient has digital medicine)  Continue all home medications as instructed  Follow up with PCP/Cardiology as instructed

## 2025-07-10 ENCOUNTER — HOSPITAL ENCOUNTER (OUTPATIENT)
Dept: CARDIOLOGY | Facility: HOSPITAL | Age: 85
Discharge: HOME | End: 2025-07-10
Attending: NURSE PRACTITIONER
Payer: MEDICARE

## 2025-07-10 DIAGNOSIS — I48.3 TYPICAL ATRIAL FLUTTER: ICD-10-CM

## 2025-07-11 ENCOUNTER — OFFICE VISIT (OUTPATIENT)
Dept: HEMATOLOGY/ONCOLOGY | Facility: CLINIC | Age: 85
End: 2025-07-11
Payer: MEDICARE

## 2025-07-11 VITALS
BODY MASS INDEX: 34.21 KG/M2 | SYSTOLIC BLOOD PRESSURE: 141 MMHG | HEIGHT: 68 IN | HEART RATE: 66 BPM | OXYGEN SATURATION: 98 % | DIASTOLIC BLOOD PRESSURE: 78 MMHG | TEMPERATURE: 97 F

## 2025-07-11 DIAGNOSIS — D64.9 ANEMIA, UNSPECIFIED TYPE: ICD-10-CM

## 2025-07-11 DIAGNOSIS — D53.9 NUTRITIONAL ANEMIA, UNSPECIFIED: ICD-10-CM

## 2025-07-11 DIAGNOSIS — D50.9 IRON DEFICIENCY ANEMIA, UNSPECIFIED IRON DEFICIENCY ANEMIA TYPE: Primary | ICD-10-CM

## 2025-07-11 PROCEDURE — 99999 PR PBB SHADOW E&M-EST. PATIENT-LVL IV: CPT | Mod: PBBFAC,HCNC,, | Performed by: NURSE PRACTITIONER

## 2025-07-11 RX ORDER — FERROUS SULFATE 325(65) MG
325 TABLET ORAL DAILY
Qty: 90 TABLET | Refills: 3 | Status: SHIPPED | OUTPATIENT
Start: 2025-07-11

## 2025-07-11 NOTE — PROGRESS NOTES
Subjective:       Patient ID: Lizeth Henderson is a 84 y.o. female.    Chief Complaint: anemia. Oral iron tolerance     HPI: 84 y.o female with medical history adrenal adenoma, anxiety, HTN, CAD, referred by PCP for anemia evaluation. She notes recent h/o cellulitis and notes this is a recurrent intermittent issue do to vascular insufficiency. She notes remote h/o colon cancer dx around 2000 s/o surgery and chemotherapy. Review of medical record reveal chronic mild anemia with decline in hemoglobin 3/2024 during hospitalization to 8 range. Most recently decline in hemoglobin 6 range requiring 2 units PRBCs 3/7/2025. She was seen by PCP 3/6/2025 for c/o SOB and found to have hemoglobin 6.7, referred to ER for blood transfusion.     Review of medical record reveal iron deficiency noted on 7/2024 labs. Patient denies any iron supplementation. She denies noting abnormal bleeding. She notes resolution of SOB following blood transfusion. Occult stool 37/2025 negative.     Today she presents for anemia follow up taking oral iron EOD. She notes tolerating well without GI upset or constipation. Interval hospitalization for A flutter.    Social History[1]    Past Medical History:   Diagnosis Date    Adrenal adenoma 2016    Anxiety     Arthritis     Benign hypertension     Colon cancer age 61    colon    Coronary artery disease     Depression     Full dentures     General anesthetics causing adverse effect in therapeutic use     yells and talks when wakes up    Hyperlipidemia     Osteopenia     Shingles     Type 2 diabetes mellitus without complication, without long-term current use of insulin 1/23/2024    Wears glasses        Family History   Problem Relation Name Age of Onset    Alzheimer's disease Mother Tracy     Arthritis Mother [place]     Cancer Father price         Colon    Hypertension Father price     Arthritis Father price     Hearing loss Father price     Depression Daughter Connie     Kidney disease Daughter Ruba      Ulcerative colitis Daughter Ruba     Depression Daughter Ruba     Depression Daughter Veronica     Anxiety disorder Daughter Veronica         PTSD    Cancer Maternal Uncle Nikolas         Colon    Early death Maternal Grandmother Chata     Cancer Cousin          colon cancer    Amblyopia Neg Hx      Blindness Neg Hx      Cataracts Neg Hx      Diabetes Neg Hx      Glaucoma Neg Hx      Macular degeneration Neg Hx      Retinal detachment Neg Hx      Strabismus Neg Hx      Stroke Neg Hx      Thyroid disease Neg Hx         Past Surgical History:   Procedure Laterality Date    APPENDECTOMY      BREAST BIOPSY Left     benign    BUNIONECTOMY Left     CATARACT EXTRACTION Bilateral     COLON SURGERY      2001    EYE SURGERY      cataract surg    HEMICOLECTOMY  2002    HERNIA REPAIR      x5    JOINT REPLACEMENT      knee    left toe surgery      hammertoe    PATENT DUCTUS ARTERIOUS LIGATION  1948    SALPINGOOPHORECTOMY  2002    due to colon cancer    TONSILLECTOMY, ADENOIDECTOMY         Review of Systems   Constitutional:  Positive for fatigue. Negative for appetite change and unexpected weight change.   HENT:  Negative for mouth sores.    Eyes:  Negative for visual disturbance.   Respiratory:  Negative for cough and shortness of breath.    Cardiovascular:  Negative for chest pain.   Gastrointestinal:  Negative for abdominal pain and diarrhea.   Genitourinary:  Negative for frequency.   Musculoskeletal:  Negative for back pain.   Skin:  Negative for rash.   Neurological:  Negative for headaches.   Hematological:  Negative for adenopathy.   Psychiatric/Behavioral:  The patient is nervous/anxious.          Medication List with Changes/Refills   Current Medications    APIXABAN (ELIQUIS) 5 MG TAB    Take 1 tablet (5 mg total) by mouth 2 (two) times daily.    ASPIRIN (ECOTRIN) 81 MG EC TABLET    Take 81 mg by mouth once daily.    ATORVASTATIN (LIPITOR) 10 MG TABLET    TAKE 1 TABLET(10 MG) BY MOUTH EVERY DAY    BUPROPION (WELLBUTRIN XL)  150 MG TB24 TABLET    Take 1 tablet (150 mg total) by mouth once daily.    BUSPIRONE (BUSPAR) 10 MG TABLET    Take 2 tablets (20 mg total) by mouth 2 (two) times daily.    CLONAZEPAM (KLONOPIN) 0.25 MG TBDL    Take 1 tablet (0.25 mg total) by mouth 2 (two) times daily as needed (anxiety).    DILTIAZEM (CARDIZEM CD) 180 MG 24 HR CAPSULE    Take 1 capsule (180 mg total) by mouth once daily.    ESCITALOPRAM OXALATE (LEXAPRO) 10 MG TABLET    Take 1.5 tablets (15 mg total) by mouth once daily.    FUROSEMIDE (LASIX) 40 MG TABLET    Take 1 tablet (40 mg total) by mouth once daily.    IBANDRONATE (BONIVA) 150 MG TABLET    Take 1 tablet (150 mg total) by mouth every 30 days.    LOSARTAN (COZAAR) 100 MG TABLET    TAKE 1 TABLET(100 MG) BY MOUTH EVERY DAY    MICONAZOLE NITRATE 2 % (MICOTIN) 2 % TOP POWDER    Apply topically 2 (two) times daily.    MIRABEGRON (MYRBETRIQ) 50 MG TB24    Take 1 tablet (50 mg total) by mouth once daily.    PRAMIPEXOLE (MIRAPEX) 0.125 MG TABLET    TAKE 1 TABLET BY MOUTH EVERY EVENING    SUVOREXANT (BELSOMRA) 10 MG TAB    Take 1 tablet by mouth every evening.   Changed and/or Refilled Medications    Modified Medication Previous Medication    FERROUS SULFATE (FEOSOL) 325 MG (65 MG IRON) TAB TABLET ferrous sulfate (FEOSOL) 325 mg (65 mg iron) Tab tablet       Take 1 tablet (325 mg total) by mouth once daily.    Take 1 tablet (325 mg total) by mouth every other day.     Objective:     Vitals:    07/11/25 0800   BP: (!) 141/78   Pulse: 66   Temp: 97.3 °F (36.3 °C)     Lab Results   Component Value Date    WBC 6.80 07/10/2025    HGB 11.6 (L) 07/10/2025    HCT 37.3 07/10/2025    MCV 84 07/10/2025     07/10/2025       BMP  Lab Results   Component Value Date     07/10/2025    K 4.7 07/10/2025     07/10/2025    CO2 26 07/10/2025    BUN 19 07/10/2025    CREATININE 1.0 07/10/2025    CALCIUM 9.5 07/10/2025    ANIONGAP 9 07/10/2025    EGFRNORACEVR 55 (L) 07/10/2025     Lab Results   Component  Value Date    ALT 9 (L) 06/30/2025    AST 12 06/30/2025    ALKPHOS 91 06/30/2025    BILITOT 0.4 06/30/2025     Lab Results   Component Value Date    IRON 41 07/10/2025    TRANSFERRIN 298 07/10/2025    TIBC 441 07/10/2025    LABIRON 9 (L) 07/10/2025    FESATURATED 4 (L) 07/23/2024      Lab Results   Component Value Date    DYYZNUJI23 555 07/23/2024     Lab Results   Component Value Date    FOLATE 14.7 07/23/2024     Physical Exam  Vitals reviewed.   Constitutional:       Appearance: She is well-developed.   HENT:      Head: Normocephalic.      Right Ear: External ear normal.      Left Ear: External ear normal.      Nose: Nose normal.   Eyes:      General: Lids are normal. No scleral icterus.        Right eye: No discharge.         Left eye: No discharge.      Conjunctiva/sclera: Conjunctivae normal.   Neck:      Thyroid: No thyroid mass.   Cardiovascular:      Rate and Rhythm: Normal rate and regular rhythm.      Heart sounds: Normal heart sounds.   Pulmonary:      Effort: Pulmonary effort is normal. No respiratory distress.      Breath sounds: Normal breath sounds. No wheezing or rales.   Abdominal:      General: There is no distension.   Genitourinary:     Comments: deferred  Musculoskeletal:         General: Normal range of motion.      Cervical back: Normal range of motion.   Skin:     General: Skin is warm and dry.   Neurological:      Mental Status: She is alert and oriented to person, place, and time.   Psychiatric:         Speech: Speech normal.         Behavior: Behavior normal. Behavior is cooperative.         Thought Content: Thought content normal.        Assessment:     Problem List Items Addressed This Visit          Oncology    Anemia    Interval improvement in anemia. Will trial daily oral iron    F/u 10 weeks with repeat labs prior         Relevant Medications    ferrous sulfate (FEOSOL) 325 mg (65 mg iron) Tab tablet    Other Relevant Orders    CBC Auto Differential    Iron and TIBC    CBC Auto  Differential    Iron and TIBC    Iron deficiency anemia - Primary    Interval slight improvement in iron deficiency. She notes tolerating oral iron EOD well w/o significant constipation or GI upset    Increase iron to daily. F/u 10 weeks with labs 1-2 days prior             Relevant Orders    CBC Auto Differential    Iron and TIBC     Other Visit Diagnoses         Nutritional anemia, unspecified        Relevant Medications    ferrous sulfate (FEOSOL) 325 mg (65 mg iron) Tab tablet              Plan:     Iron deficiency anemia, unspecified iron deficiency anemia type  -     CBC Auto Differential; Future; Expected date: 07/11/2025  -     Iron and TIBC; Future; Expected date: 07/11/2025    Anemia, unspecified type  -     ferrous sulfate (FEOSOL) 325 mg (65 mg iron) Tab tablet; Take 1 tablet (325 mg total) by mouth once daily.  Dispense: 90 tablet; Refill: 3  -     CBC Auto Differential; Future; Expected date: 07/11/2025  -     Iron and TIBC; Future; Expected date: 07/11/2025    Nutritional anemia, unspecified  -     ferrous sulfate (FEOSOL) 325 mg (65 mg iron) Tab tablet; Take 1 tablet (325 mg total) by mouth once daily.  Dispense: 90 tablet; Refill: 3          Med Onc Chart Routing      Follow up with physician    Follow up with JUAN Other. 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    Rates diet as poor.    She is not satisfied with weight.             Social Drivers of Health     Financial Resource Strain: Patient Declined (7/1/2025)    Overall Financial Resource Strain (CARDIA)     Difficulty of Paying Living Expenses: Patient declined   Food Insecurity: Patient Declined (7/1/2025)    Hunger Vital Sign     Worried About Running Out of Food in the Last Year: Patient declined     Ran Out of Food in the Last Year: Patient declined   Transportation Needs: Patient Declined (7/1/2025)    PRAPARE - Transportation     Lack of Transportation (Medical): Patient declined     Lack of Transportation (Non-Medical): Patient declined   Physical Activity: Inactive (5/1/2025)    Exercise Vital Sign     Days of Exercise per Week: 0 days     Minutes of Exercise per Session: 0 min   Stress: Patient Declined (7/1/2025)    [place] [employer] - Occupational Stress Questionnaire     Feeling of Stress : Patient declined   Recent Concern: Stress - Stress Concern Present (5/1/2025)    [place] [employer] - Occupational Stress Questionnaire     Feeling of Stress : Rather much   Housing Stability: Patient Declined (7/1/2025)    Housing Stability Vital Sign     Unable to Pay for Housing in the Last Year: Patient declined     Number of Times Moved in the Last Year: 1     Homeless in the Last Year: Patient declined

## 2025-07-11 NOTE — ASSESSMENT & PLAN NOTE
Interval slight improvement in iron deficiency. She notes tolerating oral iron EOD well w/o significant constipation or GI upset    Increase iron to daily. F/u 10 weeks with labs 1-2 days prior

## 2025-07-23 ENCOUNTER — PATIENT MESSAGE (OUTPATIENT)
Dept: CARDIOLOGY | Facility: CLINIC | Age: 85
End: 2025-07-23
Payer: MEDICARE

## 2025-07-23 DIAGNOSIS — I10 ESSENTIAL HYPERTENSION: ICD-10-CM

## 2025-07-23 DIAGNOSIS — I50.32 CHRONIC DIASTOLIC CONGESTIVE HEART FAILURE: Primary | ICD-10-CM

## 2025-07-24 RX ORDER — DILTIAZEM HYDROCHLORIDE 180 MG/1
180 CAPSULE, COATED, EXTENDED RELEASE ORAL DAILY
Qty: 30 CAPSULE | Refills: 3 | Status: SHIPPED | OUTPATIENT
Start: 2025-07-24 | End: 2025-08-23

## 2025-07-24 RX ORDER — FUROSEMIDE 40 MG/1
40 TABLET ORAL DAILY
Qty: 30 TABLET | Refills: 3 | Status: SHIPPED | OUTPATIENT
Start: 2025-07-24 | End: 2025-08-23

## 2025-07-29 ENCOUNTER — PATIENT MESSAGE (OUTPATIENT)
Dept: ADMINISTRATIVE | Facility: HOSPITAL | Age: 85
End: 2025-07-29
Payer: MEDICARE

## 2025-08-11 ENCOUNTER — OFFICE VISIT (OUTPATIENT)
Dept: PODIATRY | Facility: CLINIC | Age: 85
End: 2025-08-11
Payer: MEDICARE

## 2025-08-11 ENCOUNTER — OFFICE VISIT (OUTPATIENT)
Dept: CARDIOLOGY | Facility: CLINIC | Age: 85
End: 2025-08-11
Payer: MEDICARE

## 2025-08-11 ENCOUNTER — PATIENT MESSAGE (OUTPATIENT)
Dept: CARDIOLOGY | Facility: HOSPITAL | Age: 85
End: 2025-08-11
Payer: MEDICARE

## 2025-08-11 VITALS
SYSTOLIC BLOOD PRESSURE: 128 MMHG | DIASTOLIC BLOOD PRESSURE: 64 MMHG | HEART RATE: 85 BPM | OXYGEN SATURATION: 98 % | WEIGHT: 220.25 LBS | BODY MASS INDEX: 33.38 KG/M2 | HEIGHT: 68 IN

## 2025-08-11 DIAGNOSIS — E11.9 TYPE 2 DIABETES MELLITUS WITHOUT COMPLICATION, WITHOUT LONG-TERM CURRENT USE OF INSULIN: Primary | ICD-10-CM

## 2025-08-11 DIAGNOSIS — L60.1 ONYCHOLYSIS: ICD-10-CM

## 2025-08-11 DIAGNOSIS — I10 ESSENTIAL HYPERTENSION: Primary | ICD-10-CM

## 2025-08-11 DIAGNOSIS — T45.1X5A CHEMOTHERAPY-INDUCED PERIPHERAL NEUROPATHY: ICD-10-CM

## 2025-08-11 DIAGNOSIS — D50.8 IRON DEFICIENCY ANEMIA SECONDARY TO INADEQUATE DIETARY IRON INTAKE: ICD-10-CM

## 2025-08-11 DIAGNOSIS — L60.3 ONYCHODYSTROPHY: ICD-10-CM

## 2025-08-11 DIAGNOSIS — I47.19 ATRIAL TACHYCARDIA, PAROXYSMAL: ICD-10-CM

## 2025-08-11 DIAGNOSIS — G62.0 CHEMOTHERAPY-INDUCED PERIPHERAL NEUROPATHY: ICD-10-CM

## 2025-08-11 DIAGNOSIS — Z79.01 ANTICOAGULATED: ICD-10-CM

## 2025-08-11 DIAGNOSIS — I50.32 CHRONIC DIASTOLIC CONGESTIVE HEART FAILURE: ICD-10-CM

## 2025-08-11 DIAGNOSIS — I87.2 VENOUS INSUFFICIENCY OF BOTH LOWER EXTREMITIES: ICD-10-CM

## 2025-08-11 PROCEDURE — 3074F SYST BP LT 130 MM HG: CPT | Mod: CPTII,HCNC,S$GLB, | Performed by: NURSE PRACTITIONER

## 2025-08-11 PROCEDURE — 1159F MED LIST DOCD IN RCRD: CPT | Mod: CPTII,HCNC,S$GLB, | Performed by: NURSE PRACTITIONER

## 2025-08-11 PROCEDURE — 99214 OFFICE O/P EST MOD 30 MIN: CPT | Mod: HCNC,S$GLB,, | Performed by: NURSE PRACTITIONER

## 2025-08-11 PROCEDURE — 99999 PR PBB SHADOW E&M-EST. PATIENT-LVL III: CPT | Mod: PBBFAC,HCNC,, | Performed by: PODIATRIST

## 2025-08-11 PROCEDURE — 3288F FALL RISK ASSESSMENT DOCD: CPT | Mod: CPTII,HCNC,S$GLB, | Performed by: NURSE PRACTITIONER

## 2025-08-11 PROCEDURE — 99999 PR PBB SHADOW E&M-EST. PATIENT-LVL III: CPT | Mod: PBBFAC,HCNC,, | Performed by: NURSE PRACTITIONER

## 2025-08-11 PROCEDURE — 1101F PT FALLS ASSESS-DOCD LE1/YR: CPT | Mod: CPTII,HCNC,S$GLB, | Performed by: NURSE PRACTITIONER

## 2025-08-11 PROCEDURE — 1126F AMNT PAIN NOTED NONE PRSNT: CPT | Mod: CPTII,HCNC,S$GLB, | Performed by: NURSE PRACTITIONER

## 2025-08-11 PROCEDURE — 3078F DIAST BP <80 MM HG: CPT | Mod: CPTII,HCNC,S$GLB, | Performed by: NURSE PRACTITIONER

## 2025-08-22 ENCOUNTER — TELEPHONE (OUTPATIENT)
Dept: FAMILY MEDICINE | Facility: CLINIC | Age: 85
End: 2025-08-22
Payer: MEDICARE

## 2025-08-25 RX ORDER — ATORVASTATIN CALCIUM 10 MG/1
10 TABLET, FILM COATED ORAL NIGHTLY
Qty: 90 TABLET | Refills: 3 | Status: SHIPPED | OUTPATIENT
Start: 2025-08-25

## 2025-08-30 ENCOUNTER — TELEPHONE (OUTPATIENT)
Dept: PHARMACY | Facility: CLINIC | Age: 85
End: 2025-08-30
Payer: MEDICARE